# Patient Record
Sex: FEMALE | Race: WHITE | NOT HISPANIC OR LATINO | Employment: OTHER | URBAN - METROPOLITAN AREA
[De-identification: names, ages, dates, MRNs, and addresses within clinical notes are randomized per-mention and may not be internally consistent; named-entity substitution may affect disease eponyms.]

---

## 2021-10-07 ENCOUNTER — MEDICAL CORRESPONDENCE (OUTPATIENT)
Dept: HEALTH INFORMATION MANAGEMENT | Facility: CLINIC | Age: 76
End: 2021-10-07

## 2021-10-07 ENCOUNTER — TRANSCRIBE ORDERS (OUTPATIENT)
Dept: OTHER | Age: 76
End: 2021-10-07

## 2021-10-07 DIAGNOSIS — I27.24 CTEPH (CHRONIC THROMBOEMBOLIC PULMONARY HYPERTENSION) (H): Primary | ICD-10-CM

## 2021-11-23 ENCOUNTER — PRE VISIT (OUTPATIENT)
Dept: CARDIOLOGY | Facility: CLINIC | Age: 76
End: 2021-11-23
Payer: MEDICARE

## 2021-11-23 NOTE — TELEPHONE ENCOUNTER
New Pulmonary Hypertension Patient Form   Patient Name: Debi Espinoza Age: 76 year old, female, 1945 MRN: [id number]   Referring Provider:  Dr. Redmond Appt:  11/29/21       PH Medications:  Sildenafil 20 mg tid, Opsumit 10 mg daily Eliquis 5 mg and Tyvaso 0.6/ml qid      Patient History: Pt has a history of HP, CHF      Recent Testing:       Date Test Results    10/7/21 Echo RVSP:   LVEF:  75%         RV: Markedly dilated right ventricle. Reduced right ventricular systolic function. Pulmonary artery systolic pressure is measured at        3/26/21 6/MWT   Meters/lowest SaO2 O2:  89%     Max HR:  127             PFT FEV1/FVC:   FEV1:       FVC:   DLCO:      2/4/21 RHC RA:  12 PA: 84/19 Mean 45 PVR:  15.5     RV:  76 PAW:  2 EDP 14 COI:  2.77/1.61     Sleep Study       EKG      1/22/21 Chest CT  The study is positive for pulmonary embolism. Findings suggest both acute and chronic components. The thrombus in the pulmonary artery to the anterior basal segment of the left lower lobe is new.   2.  There is evidence for pulmonary hypertension   3.  Enlargement of right ventricle and right atrium relative to the left, which may represent a strain pattern   4.  There are underlying emphysematous changes, of moderate severity.          1/22/21 VQ Scan  The ventilation lung scan shows mild central radiotracer impaction. There is peripheral heterogeneity, but no segmental ventilatory abnormalities.     Moderate size perfusion defect left lower lobe, corresponding to the superior segment. This defect mismatches. There are additional mismatch segmental perfusion defects in the lateral right lung base, in the right upper lobe and in the left lung apex.        Liver US       Angiogram/ 1/25/21  Stress Test

## 2021-11-29 ENCOUNTER — OFFICE VISIT (OUTPATIENT)
Dept: CARDIOLOGY | Facility: CLINIC | Age: 76
End: 2021-11-29
Attending: INTERNAL MEDICINE
Payer: MEDICARE

## 2021-11-29 VITALS
DIASTOLIC BLOOD PRESSURE: 84 MMHG | OXYGEN SATURATION: 95 % | SYSTOLIC BLOOD PRESSURE: 132 MMHG | BODY MASS INDEX: 29.29 KG/M2 | WEIGHT: 149.2 LBS | HEART RATE: 85 BPM | HEIGHT: 60 IN

## 2021-11-29 DIAGNOSIS — I27.24 CTEPH (CHRONIC THROMBOEMBOLIC PULMONARY HYPERTENSION) (H): ICD-10-CM

## 2021-11-29 DIAGNOSIS — R06.09 DOE (DYSPNEA ON EXERTION): Primary | ICD-10-CM

## 2021-11-29 DIAGNOSIS — Z11.59 ENCOUNTER FOR SCREENING FOR OTHER VIRAL DISEASES: ICD-10-CM

## 2021-11-29 PROCEDURE — G0463 HOSPITAL OUTPT CLINIC VISIT: HCPCS

## 2021-11-29 PROCEDURE — 99204 OFFICE O/P NEW MOD 45 MIN: CPT | Mod: GC | Performed by: INTERNAL MEDICINE

## 2021-11-29 RX ORDER — CALCITRIOL 0.25 UG/1
0.25 CAPSULE, LIQUID FILLED ORAL
COMMUNITY
Start: 2021-06-10 | End: 2022-04-04

## 2021-11-29 RX ORDER — ACETAMINOPHEN 500 MG
1000 TABLET ORAL EVERY 6 HOURS PRN
Status: ON HOLD | COMMUNITY
End: 2022-06-25

## 2021-11-29 RX ORDER — FUROSEMIDE 40 MG
20 TABLET ORAL DAILY
COMMUNITY
Start: 2020-07-30 | End: 2022-04-27

## 2021-11-29 RX ORDER — LIDOCAINE 40 MG/G
CREAM TOPICAL
Status: CANCELLED | OUTPATIENT
Start: 2021-11-29

## 2021-11-29 ASSESSMENT — MIFFLIN-ST. JEOR: SCORE: 1083.27

## 2021-11-29 ASSESSMENT — PAIN SCALES - GENERAL: PAINLEVEL: NO PAIN (0)

## 2021-11-29 NOTE — NURSING NOTE
Pts plan of care per Lisette Knox MD:    We will get you scheduled for the following tests  Chest CT (at the Hospital)  Echocardiogram with Bubble Study   Pulmonary Function Test  Right heart catheterization    Orders placed and message sent to Mariya to assist with scheduling.  Dana Ojeda RN on 11/29/2021 at 2:10 PM    Debi Espinoza  [address] [facility] [address]  [phone] (home)     PCP:  Lisette Knox  Anticoagulation: apixaban (ELIQUIS)  Ambulation status: with walker    Reason for Visit:  Patient seen for pre-procedure education in preparation for: Right Heart Catheterization    Procedure Prep:  Hemogram results obtained: To be completed on day of cath lab procedure  Basic Metabolic Panel results obtained: To be completed on day of cath lab procedure  Pertinent cardiac test results: in Epic   COVID-19 test results obtained: [facility]    Patient Education    1. Your arrival time is TBD.  Location is [address] Waiting Room  2. Please plan on being at the hospital all day.  3. At any time, emergencies and/or urgent cases may come up which could delay the start of your procedure.    COVID Testing Instructions  *Mandatory COVID Testing:   ALL Patients will need to complete a COVID test no sooner than 4 days prior to their procedure (regardless of vaccination status).      To schedule COVID testing Please call [phone]    If you want to complete this at an outside facility please call them to find out if they will have the results within the appropriate time frame and their fax number.  You will need to provide us with that information so we can send the order.    The facility completing the test will need to fax the results to [phone]    If you are running into and issues please call us.     Pre-procedure instructions  Patient instructed to not Eat or Drink after  midnight.  Patient instructed to shower the evening before or the morning of the procedure.  Patient instructed to arrange for transportation home following procedure from a responsible family member or friend.     Pre-procedure medication instructions.  Continue medications as scheduled, with a small amount of water on the day of the procedure unless indicated. (NO Diabetic Medications or Blood Thinners)  Pt instructed not to consume Alcohol, Tobacco, Caffeine, or Carbonated beverages 12 hours prior to procedure.              Anticoagulation Medication Instructions   apixaban (ELIQUIS) - Hold 48 hours prior to procedure      Patient states understanding of procedure and agrees to proceed.    *PATIENTS RECORDS AVAILABLE IN [facility] UNLESS OTHERWISE INDICATED*      There is no problem list on file for this patient.      Current Outpatient Medications   Medication Sig Dispense Refill     apixaban ANTICOAGULANT (ELIQUIS) 5 MG tablet Take 5 mg by mouth       calcitRIOL (ROCALTROL) 0.25 MCG capsule Take 0.25 mcg by mouth       furosemide (LASIX) 40 MG tablet Take 40 mg by mouth       riociguat (ADEMPAS) 0.5 MG tablet Take 0.5 mg by mouth 3 times daily (with meals)        acetaminophen (TYLENOL) 500 MG tablet Take 1,000 mg by mouth         Allergies   Allergen Reactions     Lisinopril Cough       Dana Ojeda RN on 11/29/2021 at 2:10 PM

## 2021-11-29 NOTE — PATIENT INSTRUCTIONS
Medication Changes:  No medication changes at this time. Please continue current medication regiment.    Patient Instructions:  1. Continue staying active and eat a heart healthy diet.    2. Please keep current list of medications with you at all times.    3. Remember to weigh yourself daily after voiding and before you consume any food or beverages and log the numbers.  If you have gained 2 pounds overnight or 5 pounds in a week contact us immediately for medication adjustments or further instructions.    4. **Please call us immediately if you have any syncope (fainting or passing out), chest pain, edema (swelling or weight gain), or decline in your functional status (general decline in how you are feeling).ts o    Follow up Appointment Information:    We will get you scheduled for the following tests  Chest CT (at the Hospital)  Echocardiogram with Bubble Study   Pulmonary Function Test  Right heart catheterization    *Mandatory COVID Testing:   Pt will need to complete a COVID test no sooner than 4 days prior to their procedure.      To schedule COVID testing Please call [phone]    If you want to complete this at an outside facility please call them to find out if they will have the results within the appropriate time frame and their fax number.  You will need to provide us with that information so we can send them the order.    They will need to fax the results to [phone]    If you are running into and issues please call us.      Check-In  Time Check-In Location Estimated Length Procedure   Name        [facility]  waiting room 60-90 minutes Right Heart Catheterization**     Procedure Preparations & Instructions     This is an invasive procedure that DOES require preparation:    - Nothing to eat for 6 hours   - You may have clear liquids up until the time of your procedure  - You can take your morning medications (except diabetic and blood thinners) with sips of water  - Please arrange for a ride to drop you  off and pick you up in the instance you are unable to drive home, however you should be able to function as you normally would after the procedure     *For Patients on anticoagulants: ? Hold your Eliquis 48 hours prior to procedure and restart in the evening after your procedure.       We are located on the third floor of [facility] on the [facility].  Our address is     [address] [place] [address] [place] [address] [place] [address]    Thank you for allowing us to be a part of your care here at the [facility]    If you have questions or concerns please contact us at:    Yane Cardenas, RN, BSN, PHN  Mariya Rinaldi (Scheduling,Prior Auth)  Nurse Coordinator     Clinic   Pulmonary Hypertension   Pulmonary Hypertension  [facility]  (Phone)[phone]   (Phone) [phone]        (Fax) [phone]    ** Please note that you will NOT receive a reminder call regarding your scheduled testing, reminder calls are for provider appointments only.  If you are scheduled for testing within the [employer] system you may receive a call regarding pre-registration for billing purposes only.**     Support Group:  Pulmonary Hypertension Association  Https://www.phassociation.org/  **Look at the Events Tab** They even have Support Groups that you can call into    [facility] PH Support Group  Second Saturday of the Month from 1-3 PM   Location: [address] [place] [address]  Leader: Rupa Worthy and Dominique Camacho  Phone: [phone] or [phone]  Email: [email or web address]

## 2021-11-29 NOTE — PROGRESS NOTES
[facility]  Pulmonary Hypertension Clinic    New Patient Visit  Service Date: 11/29/21    Dear Dr. Jerome,     Debi Espinoza is a 76 year old female who presents to us for evaluation of her pulmonary hypertension.    Although you know her well, I will summarize the pertinent points of her history briefly for our records.  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      With regards to her pulmonary hypertension:  In May 2019 she reports that she presented to her PCPs office with fatigue cough, and SOB for a few days.  CTPE showed PE with mild to moderate clot burden (?bilateral). She was also found to have DVT bilaterally. TTE (no images) showed dilated RV with reduced RV function and RVSP of 84mmHg suggestive of severe pulmonary hypertension with mild to moderate TR.   I did find notes that she had a work related injury in June 2018 and was wearing and unna boot into early 2019, ?provoked.  She did see hematology at [place] [facility] in July 2019 who felt her DVT and PE were idiopathic and she wouldn't benefit from a hypercoag workup, recommended anticoagulation for 2 years. Debi says she was on coumadin for about a year.    She saw cardiologist Dr. White in Oct 2019 with SOB, and was ordered a left and right heart cath, which she did not get done.  She also saw Pulm Nov 2019 who recommended RHC to rule out CTEPH. She then presented again with SOB in July 2020, and the tests were done [facility] which showed no significant CAD, LVEDP 17. RHC mean RA 12 showed PA 79/20 mean 43, and WP documented as 26. CO/CI 3.7/2, PVR 4.6.  She had PFTs done which were normal with mildly decreased DLCO.  She only walked 65m on 6MWT with lowest O2 sat was 91%.  She had overnight pulse ox in which she had multiple desats to 79%. Pulmonary hypertension workup  was initiated but patient was refusing additional tests and medications.    She eventually presented back to Dr. White earlier this year in January with worsening SOB. VQ scan was done which showed multiple mismatches, and repeat CTPE was done which suggested acute on chronic PE bilaterally with new thrombus in the left lower lobe. CT also showed moderate emphysema. For PE she was put on Eliquis, and for PH she was started on sildenafil 20mg tid.  Her lasix dose was also adjusted and she was started on home O2.  Dr. Solorio tried to get her on Adempas and on followup felt better, she had stopped using O2. Repeat 6mwt was improved at 156m with no desaturations. Repeat TTE in March showed per report markedly dilated RV with reduced function, PA pressure 82mmHg, severe TR, normal LVEF, markedly dilated RA, dilated IVC and bowing of the IAS from right to left. She was eventually also started on macitentan.  She initially also refused further eval for her CTEPH but now presents today for it.    She presents today with her daughter in law.  She is from [facility].  She reports that she is currently on Adempas 0.5 mg 3 times daily, off Opsimut, and Tyvaso was not covered.  She remains on Eliquis.  She has oxygen at home but she has not used it since February.  She reports her shortness of breath is overall stable.  Walking from the entrance of the building to the clinic she got short of breath.  She says she can walk about a block but feels significant shortness of breath with the stairs.  Her laundry is in the basement and she is able to do that one step at a time.  She is about functional class III.  She has a walker because when she goes out she feels insecure due to balance issues but she does not need the walker in her house she has been using it for over a year.  She denies any chest pain, dizziness, lightheadedness, presyncope or syncope.  No orthopnea or PND.  She reports chronic lower extremity  swelling right greater than left which is controlled with her diuretic dosing of 40 mg Lasix daily.  She has occasional palpitations with exertion but no associated symptoms.  No recent ED visits or hospitalizations.  No history of autoimmune disease.  No other history of blood clots or miscarriages.  No family history that she knows of a pulmonary hypertension.    Other pertinent history:  Family-her sister had breast cancer, her mother  when she was old, her dad  of lung cancer.  She has 1 son who is adopted.  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  She lives at home she is .  Her son and daughter-in-law live close by.    PAST MEDICAL HISTORY:  No past medical history on file.    CURRENT MEDICATIONS:  Current Outpatient Medications   Medication Sig     apixaban ANTICOAGULANT (ELIQUIS) 5 MG tablet Take 5 mg by mouth     calcitRIOL (ROCALTROL) 0.25 MCG capsule Take 0.25 mcg by mouth     furosemide (LASIX) 40 MG tablet Take 40 mg by mouth     riociguat (ADEMPAS) 0.5 MG tablet Take 0.5 mg by mouth 3 times daily (with meals)      acetaminophen (TYLENOL) 500 MG tablet Take 1,000 mg by mouth     No current facility-administered medications for this visit.     ROS:   10 point ROS negative except as discussed in above HPI.    EXAM:  /84 (BP Location: Right arm, Patient Position: Sitting, Cuff Size: Adult Regular)   Pulse 85   Ht 1.524 m (5')   Wt 67.7 kg (149 lb 3.2 oz)   SpO2 95%   BMI 29.14 kg/m      General: appears comfortable, alert and articulate sitting up independently  Head: normocephalic, atraumatic  Eyes: anicteric sclera, EOMI  Neck: no adenopathy  Orophyarynx: moist mucosa, no lesions, dentition intact  Heart: regular, normal S1/S2, no murmur, gallop, rub, estimated JVP 12cn  Lungs: clear to auscultation bilaterally, no rales or wheezing  Abdomen: soft, non-tender, bowel sounds present, no  hepatosplenomegaly  Extremities: no clubbing, cyanosis or edema  Neurological: normal speech and affect, no gross motor deficits    Labs:  CBC RESULTS: No results for input(s): WBC, RBC, HGB, HCT, MCV, MCH, MCHC, RDW, PLT in the last 04762 hours.    No results for input(s): NA, POTASSIUM, CHLORIDE, CO2, ANIONGAP, GLC, BUN, CR, CHELE in the last 18011 hours.    Liver Function Studies - No results for input(s): PROTTOTAL, ALBUMIN, BILITOTAL, ALKPHOS, AST, ALT, BILIDIRECT in the last 69731 hours.    No results found for: NTBNPI  No results found for: NTBNP    Records below are from Care Everywhere    Echocardiogram:  TTE 3/26/21  Left Ventricle: Hyperdynamic left ventricular systolic function. The ejection fraction is visually estimated to be 75 %.     Left Atrium: Normal left atrial size.     IAS: The atrial septum bows from right-to-left.     Right Ventricle: Markedly dilated right ventricle. Reduced right ventricular systolic function. Pulmonary artery systolic pressure is measured at 82 mmHg.     Right Atrium: Markedly dilated right atrium.     Tricuspid Valve: Wide open tricuspid regurgitation.       Comparison Date: 01/23/2021   Comparison Study: Transthoracic Echocardiogram   Pulmonary artery pressure has decreased from 101 mmHg to 82 mmHg     TTE 6/26/20  Qualitative ejection fraction is >70% (hyperdynamic).The left ventricle is small.There is septal flattening compatible with pulmonary hypertension.Wall motion is otherwise normal.   The right ventricular systolic function is reduced.The right ventricle is severely enlarged.   No significant aortic insufficiency.There is no aortic stenosis.   There is trace mitral regurgitation.No mitral valve stenosis.   Moderate tricuspid regurgitation.   Right ventricular systolic pressure estimate is  mmHg.   Inferior vena cava size enlarged and collapsibility < 50% indicating elevated right atrial pressure (15 mm Hg).   There is no pericardial effusion.     TTE  5/31/19  Left Ventricle:   Normal left ventricular systolic function. The ejection fraction is visually estimated to be 65 %.     Right Ventricle:   Reduced right ventricular systolic function. Pulmonary artery systolic pressure is measured at 84 mmHg.     Pulmonary Artery:   Severe pulmonary artery hypertension.       Comparison Study: No previous echo was available for comparison     RHC:  2/4/21  RA: 12  RV: 76/-, 14  PA: 84/19/45  PCWP: 2  Boris CO/CI: 2.8/1.6  PVR: 15.5    PFTs 11/28/19  FVC is 2.54, which is 111% of predicted.  FEV1 is 1.86, which is 110% of predicted.  FEV1/FVC ratio is 73.     Diffusion capacity is 12.9 mL/mmHg per minute, which is 63% of predicted.     CONCLUSION:     1.  Normal spirometry.   2.  Mildly reduced DLCO corrected for hemoglobin.   3.  No previous studies to compare.      6MWT:   10/7/21: 156m, lowest O2 saturation 89%  3/2021: 156m    CTA Chest 1/22/21  1.  The study is positive for pulmonary embolism. Findings suggest both acute and chronic components. The thrombus in the pulmonary artery to the anterior basal segment of the left lower lobe is new.   2.  There is evidence for pulmonary hypertension   3.  Enlargement of right ventricle and right atrium relative to the left, which may represent a strain pattern   4.  There are underlying emphysematous changes, of moderate severity.     CTA Chest 5/30/19  1. Positive for acute pulmonary embolus.   2. Change in the right hilus suggest a may be an element of cor pulmonale related to the PE although clot burden is felt to be moderate   3. Changes superior segment left lower lobe might be related to pulmonary embolus versus infection.   4. Possible obstruction of the right kidney suggest ultrasound for evaluation.      CT Chest without contrast 11/7/19  1. Mildly limited exam due to respiratory motion.   2. Mild scattered linear scarring throughout the bilateral lungs.   3. No airspace consolidation. Previously seen consolidation  within the superior segment of the left lower lobe has resolved.   4. No mediastinal or hilar lymphadenopathy.      V/Q scan 1/22/21  1.   Multiple perfusion defects which mismatch with the ventilation scan. Findings represents a high probability for pulmonary embolism. The pattern suggests the possibility for acute pulmonary emboli.      [facility] 7/4/2020  Coronary Angiography: The coronary circulation is right dominant.     Left Heart Cath: Left ventricular function was not assessed. Ejection fraction was not calculated. The left ventricular end diastolic pressure   was 21 mmHg.     Diagnostic Conclusions:   - There is no evidence of angiographic coronary artery disease      Assessment and Plan:     Debi Espinoza is a 76 year old female with medical history including HTN, renal stones complicated by infection, knee osteoarthritis, history of bilateral PE x2 in 2019 and 2021, DVT 2019, on chronic anticoagulation with Eliquis, and severe pulmonary hypertension.      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  She is maintained on lifelong anticoagulation now since her second PE earlier this year and is on Eliquis.  For pulmonary hypertension therapy she is currently maintained on Adempas 0.5 mg 3 times daily.  We will begin our evaluation here with repeat imaging including a complete echo with bubble study, and a CT PE dual-energy to assess the status of her clots.  We will also get PFTs, especially if we are thinking later on about a surgical treatment option such as endarterectomy.  We will repeat a right heart catheterization here as well since it has been sometime since her last one and she has been maintained on some therapy.  After the results of all of this testing comes back we will have further discussion about further therapeutic options including interventions for disease based on thrombus location such as balloon pulmonary angioplasty or a pulmonary  endarterectomy.  If she is still continuing to refuse those we can also talk further about additional medical therapy.    Plan:  -No changes to medication regimen at this time.  -Once we get the following tests completed we will schedule her for follow-up.  -Tests ordered: CT chest dual-energy, complete TTE with bubble study, PFTs, right heart catheterization    Patient was seen and examined with Dr. Knox.    Agueda Mohr MD.  Adv HF Fellow    I examined the patient and agree with the assessment and plan of Dr. Mohr      She completed her testing for CTEPH.     Dual-energy CT pulmonary angiogram confirmed chronic thromboembolic disease with subsegmental disease in the left lower lobe as well as right lower lobe.    Invasive pulmonary angiogram is showing bilateral proximal disease along with segmental and subsegmental disease.  She clearly has surgical disease.    Her echocardiogram shows moderate to severe right ventricular dilatation with moderate to severely dilated right ventricle.    Her hemodynamic assessment revealed a RA pressure of 6, RV of 77/10, PA of 77/30 with a mean of 44, pulmonary H pressure of 10, measured Boris cardiac output of 3.3 with an index of 2, thermodilution cardiac output of 3.5 with an index of 2.12, PA saturation of 48%, and a calculated PVR of 826 dynes per second per centimeter to the 4-5.    Her pulmonary function test was normal.      Her serological work-up for associated cause of pulmonary arterial hypertension are within normal limits.    Her renal function is marginal with a GFR of 40% and a creatinine of 1.3.    On her most recent 6-minute walk test in [place] she walked only 159 m.    In summary she clearly has proximal as well as segmental and subsegmental chronic thromboembolic pulmonary hypertension.  We discussed her case in our multidisciplinary meeting.  Surgery would be the best option for better outcomes long-term.  However this would be a high risk surgery given  her age, frailty, and kidney dysfunction.  Her mortality risk during surgery would be 10% with a 10% risk of dialysis.  If she is willing, will schedule her for pulmonary thromboendarterectomy.  She would need a coronary angiogram if she decides to go for pulmonary thromboendarterectomy.      If not we will plan for balloon pulmonary angioplasty.  Regardless, in the interim we will optimize her medical therapy.  She is only on a very low-dose of RIOCIGUAT.  I recommend her to increase it to 1 mg 3 times daily and increase it every 2 weeks as tolerated by 25 mg in treatment to maximum dose of 2.5 mg 3 times a day.  We will also start her on macitentan.  She has tolerated macitentan in the recent past.  She will continue on anticoagulation with Eliquis.        Total time today was 80 minutes reviewing notes, imaging, labs, patient visit, orders and documentation         Lisette Knox MD   [facility]  Heart Failure, Transplant, and Mechanical Circulatory Support Cardiology   Cardiovascular Division  [facility]   [phone]

## 2021-11-29 NOTE — NURSING NOTE
Cardiac Testing: Patient given instructions regarding  echocardiogram . Discussed purpose, preparation, procedure and when to expect results reported back to the patient. Patient demonstrated understanding of this information and agreed to call with further questions or concerns.  Return Appointment: Patient given instructions regarding scheduling next clinic visit. Patient demonstrated understanding of this information and agreed to call with further questions or concerns.  Patient stated she understood all health information given and agreed to call with further questions or concerns.  Medication Changes:  No medication changes at this time. Please continue current medication regiment.    Patient Instructions:  1. Continue staying active and eat a heart healthy diet.    2. Please keep current list of medications with you at all times.    3. Remember to weigh yourself daily after voiding and before you consume any food or beverages and log the numbers.  If you have gained 2 pounds overnight or 5 pounds in a week contact us immediately for medication adjustments or further instructions.    4. **Please call us immediately if you have any syncope (fainting or passing out), chest pain, edema (swelling or weight gain), or decline in your functional status (general decline in how you are feeling).      Follow up Appointment Information:  We will decide on follow up testing once we see the results of your labs and CT Today

## 2021-11-29 NOTE — LETTER
11/29/2021      RE: Debi Espinoza  [address] [address]       Dear Colleague,    Thank you for the opportunity to participate in the care of your patient, Debi Espinoza, at the [facility] [place] at [facility]. Please see a copy of my visit note below.    [facility]  Pulmonary Hypertension Clinic    New Patient Visit  Service Date: 11/29/21    Dear Dr. Jerome,     Debi Espinoza is a 76 year old female who presents to us for evaluation of her pulmonary hypertension.    Although you know her well, I will summarize the pertinent points of her history briefly for our records.  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      With regards to her pulmonary hypertension:  In May 2019 she reports that she presented to her PCPs office with fatigue cough, and SOB for a few days.  CTPE showed PE with mild to moderate clot burden (?bilateral). She was also found to have DVT bilaterally. TTE (no images) showed dilated RV with reduced RV function and RVSP of 84mmHg suggestive of severe pulmonary hypertension with mild to moderate TR.   I did find notes that she had a work related injury in June 2018 and was wearing and unna boot into early 2019, ?provoked.  She did see hematology at [facility] in July 2019 who felt her DVT and PE were idiopathic and she wouldn't benefit from a hypercoag workup, recommended anticoagulation for 2 years. Debi says she was on coumadin for about a year.    She saw cardiologist Dr. White in Oct 2019 with SOB, and was ordered a left and right heart cath, which she did not get done.  She also saw Pulm Nov 2019 who recommended RHC to rule out CTEPH. She then presented again with SOB in July 2020, and the tests were done [facility]  which showed no significant CAD, LVEDP 17. RHC mean RA 12 showed PA 79/20 mean 43, and WP documented as 26. CO/CI 3.7/2, PVR 4.6.  She had PFTs done which were normal with mildly decreased DLCO.  She only walked 65m on 6MWT with lowest O2 sat was 91%.  She had overnight pulse ox in which she had multiple desats to 79%. Pulmonary hypertension workup was initiated but patient was refusing additional tests and medications.    She eventually presented back to Dr. White earlier this year in January with worsening SOB. VQ scan was done which showed multiple mismatches, and repeat CTPE was done which suggested acute on chronic PE bilaterally with new thrombus in the left lower lobe. CT also showed moderate emphysema. For PE she was put on Eliquis, and for PH she was started on sildenafil 20mg tid.  Her lasix dose was also adjusted and she was started on home O2.  Dr. Solorio tried to get her on Adempas and on followup felt better, she had stopped using O2. Repeat 6mwt was improved at 156m with no desaturations. Repeat TTE in March showed per report markedly dilated RV with reduced function, PA pressure 82mmHg, severe TR, normal LVEF, markedly dilated RA, dilated IVC and bowing of the IAS from right to left. She was eventually also started on macitentan.  She initially also refused further eval for her CTEPH but now presents today for it.    She presents today with her daughter in law.  She is from [facility].  She reports that she is currently on Adempas 0.5 mg 3 times daily, off Opsimut, and Tyvaso was not covered.  She remains on Eliquis.  She has oxygen at home but she has not used it since February.  She reports her shortness of breath is overall stable.  Walking from the entrance of the building to the clinic she got short of breath.  She says she can walk about a block but feels significant shortness of breath with the stairs.  Her laundry is in the basement and she is able to do that one step at a  time.  She is about functional class III.  She has a walker because when she goes out she feels insecure due to balance issues but she does not need the walker in her house she has been using it for over a year.  She denies any chest pain, dizziness, lightheadedness, presyncope or syncope.  No orthopnea or PND.  She reports chronic lower extremity swelling right greater than left which is controlled with her diuretic dosing of 40 mg Lasix daily.  She has occasional palpitations with exertion but no associated symptoms.  No recent ED visits or hospitalizations.  No history of autoimmune disease.  No other history of blood clots or miscarriages.  No family history that she knows of a pulmonary hypertension.    Other pertinent history:  Family-her sister had breast cancer, her mother  when she was old, her dad  of lung cancer.  She has 1 son who is adopted.  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  She lives at home she is .  Her son and daughter-in-law live close by.    PAST MEDICAL HISTORY:  No past medical history on file.    CURRENT MEDICATIONS:  Current Outpatient Medications   Medication Sig     apixaban ANTICOAGULANT (ELIQUIS) 5 MG tablet Take 5 mg by mouth     calcitRIOL (ROCALTROL) 0.25 MCG capsule Take 0.25 mcg by mouth     furosemide (LASIX) 40 MG tablet Take 40 mg by mouth     riociguat (ADEMPAS) 0.5 MG tablet Take 0.5 mg by mouth 3 times daily (with meals)      acetaminophen (TYLENOL) 500 MG tablet Take 1,000 mg by mouth     No current facility-administered medications for this visit.     ROS:   10 point ROS negative except as discussed in above HPI.    EXAM:  /84 (BP Location: Right arm, Patient Position: Sitting, Cuff Size: Adult Regular)   Pulse 85   Ht 1.524 m (5')   Wt 67.7 kg (149 lb 3.2 oz)   SpO2 95%   BMI 29.14 kg/m      General: appears comfortable, alert and articulate sitting up  independently  Head: normocephalic, atraumatic  Eyes: anicteric sclera, EOMI  Neck: no adenopathy  Orophyarynx: moist mucosa, no lesions, dentition intact  Heart: regular, normal S1/S2, no murmur, gallop, rub, estimated JVP 12cn  Lungs: clear to auscultation bilaterally, no rales or wheezing  Abdomen: soft, non-tender, bowel sounds present, no hepatosplenomegaly  Extremities: no clubbing, cyanosis or edema  Neurological: normal speech and affect, no gross motor deficits    Labs:  CBC RESULTS: No results for input(s): WBC, RBC, HGB, HCT, MCV, MCH, MCHC, RDW, PLT in the last 10244 hours.    No results for input(s): NA, POTASSIUM, CHLORIDE, CO2, ANIONGAP, GLC, BUN, CR, CHELE in the last 40762 hours.    Liver Function Studies - No results for input(s): PROTTOTAL, ALBUMIN, BILITOTAL, ALKPHOS, AST, ALT, BILIDIRECT in the last 09299 hours.    No results found for: NTBNPI  No results found for: NTBNP    Records below are from Care Everywhere    Echocardiogram:  TTE 3/26/21  Left Ventricle: Hyperdynamic left ventricular systolic function. The ejection fraction is visually estimated to be 75 %.     Left Atrium: Normal left atrial size.     IAS: The atrial septum bows from right-to-left.     Right Ventricle: Markedly dilated right ventricle. Reduced right ventricular systolic function. Pulmonary artery systolic pressure is measured at 82 mmHg.     Right Atrium: Markedly dilated right atrium.     Tricuspid Valve: Wide open tricuspid regurgitation.       Comparison Date: 01/23/2021   Comparison Study: Transthoracic Echocardiogram   Pulmonary artery pressure has decreased from 101 mmHg to 82 mmHg     TTE 6/26/20  Qualitative ejection fraction is >70% (hyperdynamic).The left ventricle is small.There is septal flattening compatible with pulmonary hypertension.Wall motion is otherwise normal.   The right ventricular systolic function is reduced.The right ventricle is severely enlarged.   No significant aortic insufficiency.There is no  aortic stenosis.   There is trace mitral regurgitation.No mitral valve stenosis.   Moderate tricuspid regurgitation.   Right ventricular systolic pressure estimate is  mmHg.   Inferior vena cava size enlarged and collapsibility < 50% indicating elevated right atrial pressure (15 mm Hg).   There is no pericardial effusion.     TTE 5/31/19  Left Ventricle:   Normal left ventricular systolic function. The ejection fraction is visually estimated to be 65 %.     Right Ventricle:   Reduced right ventricular systolic function. Pulmonary artery systolic pressure is measured at 84 mmHg.     Pulmonary Artery:   Severe pulmonary artery hypertension.       Comparison Study: No previous echo was available for comparison     RHC:  2/4/21  RA: 12  RV: 76/-, 14  PA: 84/19/45  PCWP: 2  Boris CO/CI: 2.8/1.6  PVR: 15.5    PFTs 11/28/19  FVC is 2.54, which is 111% of predicted.  FEV1 is 1.86, which is 110% of predicted.  FEV1/FVC ratio is 73.     Diffusion capacity is 12.9 mL/mmHg per minute, which is 63% of predicted.     CONCLUSION:     1.  Normal spirometry.   2.  Mildly reduced DLCO corrected for hemoglobin.   3.  No previous studies to compare.      6MWT:   10/7/21: 156m, lowest O2 saturation 89%  3/2021: 156m    CTA Chest 1/22/21  1.  The study is positive for pulmonary embolism. Findings suggest both acute and chronic components. The thrombus in the pulmonary artery to the anterior basal segment of the left lower lobe is new.   2.  There is evidence for pulmonary hypertension   3.  Enlargement of right ventricle and right atrium relative to the left, which may represent a strain pattern   4.  There are underlying emphysematous changes, of moderate severity.     CTA Chest 5/30/19  1. Positive for acute pulmonary embolus.   2. Change in the right hilus suggest a may be an element of cor pulmonale related to the PE although clot burden is felt to be moderate   3. Changes superior segment left lower lobe might be related to  pulmonary embolus versus infection.   4. Possible obstruction of the right kidney suggest ultrasound for evaluation.      CT Chest without contrast 11/7/19  1. Mildly limited exam due to respiratory motion.   2. Mild scattered linear scarring throughout the bilateral lungs.   3. No airspace consolidation. Previously seen consolidation within the superior segment of the left lower lobe has resolved.   4. No mediastinal or hilar lymphadenopathy.      V/Q scan 1/22/21  1.   Multiple perfusion defects which mismatch with the ventilation scan. Findings represents a high probability for pulmonary embolism. The pattern suggests the possibility for acute pulmonary emboli.      [facility] 7/4/2020  Coronary Angiography: The coronary circulation is right dominant.     Left Heart Cath: Left ventricular function was not assessed. Ejection fraction was not calculated. The left ventricular end diastolic pressure   was 21 mmHg.     Diagnostic Conclusions:   - There is no evidence of angiographic coronary artery disease      Assessment and Plan:     Debi Espinoza is a 76 year old female with medical history including HTN, renal stones complicated by infection, knee osteoarthritis, history of bilateral PE x2 in 2019 and 2021, DVT 2019, on chronic anticoagulation with Eliquis, and severe pulmonary hypertension.      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  She is maintained on lifelong anticoagulation now since her second PE earlier this year and is on Eliquis.  For pulmonary hypertension therapy she is currently maintained on Adempas 0.5 mg 3 times daily.  We will begin our evaluation here with repeat imaging including a complete echo with bubble study, and a CT PE dual-energy to assess the status of her clots.  We will also get PFTs, especially if we are thinking later on about a surgical treatment option such as endarterectomy.  We will repeat a right heart catheterization here as  well since it has been sometime since her last one and she has been maintained on some therapy.  After the results of all of this testing comes back we will have further discussion about further therapeutic options including interventions for disease based on thrombus location such as balloon pulmonary angioplasty or a pulmonary endarterectomy.  If she is still continuing to refuse those we can also talk further about additional medical therapy.    Plan:  -No changes to medication regimen at this time.  -Once we get the following tests completed we will schedule her for follow-up.  -Tests ordered: CT chest dual-energy, complete TTE with bubble study, PFTs, right heart catheterization    Patient was seen and examined with Dr. Knox.    Agueda Mohr MD.  Adv HF Fellow        Please do not hesitate to contact me if you have any questions/concerns.     Sincerely,     Lisette Knox MD

## 2021-11-29 NOTE — NURSING NOTE
Chief Complaint   Patient presents with     New Patient     New PH referral from Dr. Redmond; R06.09 Dyspnea on Exertion   Vitals were taken and medications reconciled.    Amari Arroyo, EMT  12:44 PM

## 2021-12-01 ENCOUNTER — TELEPHONE (OUTPATIENT)
Dept: CARDIOLOGY | Facility: CLINIC | Age: 76
End: 2021-12-01
Payer: MEDICARE

## 2021-12-01 NOTE — TELEPHONE ENCOUNTER
*Mandatory COVID Testing:   Pt will need to complete a COVID test no sooner than 4 days prior to their procedure.      To schedule COVID testing Please call [phone]    If you want to complete this at an outside facility please call them to find out if they will have the results within the appropriate time frame and their fax number.  You will need to provide us with that information so we can send them the order.    They will need to fax the results to [phone]    If you are running into and issues please call us.       Check-In  Time Check-In Location Estimated Length Procedure   Name       12-8-21  10:15 am  [facility]  waiting room 60-90 minutes Right Heart Catheterization**     Procedure Preparations & Instructions     This is an invasive procedure that DOES require preparation:    - Nothing to eat for 6 hours   - You may have clear liquids up until the time of your procedure  - You can take your morning medications (except diabetic and blood thinners) with sips of water  - Please arrange for a ride to drop you off and pick you up in the instance you are unable to drive home, however you should be able to function as you normally would after the procedure      ?      *For Patients on anticoagulants: ? Hold Eliquis 48 hours before procedure.

## 2021-12-02 NOTE — TELEPHONE ENCOUNTER
Spoke with pt regarding prep for R heart catheterization, PFT and CT Chest.  Agreed with plan. Will hold Eliquis for 48 hours.

## 2021-12-07 ENCOUNTER — TELEPHONE (OUTPATIENT)
Dept: CARDIOLOGY | Facility: CLINIC | Age: 76
End: 2021-12-07
Payer: MEDICARE

## 2021-12-08 ENCOUNTER — APPOINTMENT (OUTPATIENT)
Dept: LAB | Facility: CLINIC | Age: 76
End: 2021-12-08
Attending: INTERNAL MEDICINE
Payer: MEDICARE

## 2021-12-08 ENCOUNTER — HOSPITAL ENCOUNTER (OUTPATIENT)
Facility: CLINIC | Age: 76
Discharge: HOME | End: 2021-12-08
Attending: INTERNAL MEDICINE | Admitting: INTERNAL MEDICINE
Payer: MEDICARE

## 2021-12-08 ENCOUNTER — APPOINTMENT (OUTPATIENT)
Dept: CARDIOLOGY | Facility: CLINIC | Age: 76
End: 2021-12-08
Attending: INTERNAL MEDICINE
Payer: MEDICARE

## 2021-12-08 ENCOUNTER — APPOINTMENT (OUTPATIENT)
Dept: MEDSURG UNIT | Facility: CLINIC | Age: 76
End: 2021-12-08
Attending: INTERNAL MEDICINE
Payer: MEDICARE

## 2021-12-08 ENCOUNTER — HOSPITAL ENCOUNTER (OUTPATIENT)
Dept: CT IMAGING | Facility: CLINIC | Age: 76
End: 2021-12-08
Attending: INTERNAL MEDICINE
Payer: MEDICARE

## 2021-12-08 VITALS
BODY MASS INDEX: 29.25 KG/M2 | HEIGHT: 60 IN | SYSTOLIC BLOOD PRESSURE: 156 MMHG | DIASTOLIC BLOOD PRESSURE: 91 MMHG | RESPIRATION RATE: 18 BRPM | TEMPERATURE: 97.6 F | HEART RATE: 81 BPM | OXYGEN SATURATION: 99 % | WEIGHT: 149 LBS

## 2021-12-08 DIAGNOSIS — R06.09 DOE (DYSPNEA ON EXERTION): ICD-10-CM

## 2021-12-08 DIAGNOSIS — I27.24 CTEPH (CHRONIC THROMBOEMBOLIC PULMONARY HYPERTENSION) (H): ICD-10-CM

## 2021-12-08 DIAGNOSIS — Z11.59 ENCOUNTER FOR SCREENING FOR OTHER VIRAL DISEASES: ICD-10-CM

## 2021-12-08 DIAGNOSIS — I50.9 HEART FAILURE (H): ICD-10-CM

## 2021-12-08 LAB
ALBUMIN SERPL-MCNC: 3.3 G/DL (ref 3.4–5)
ALP SERPL-CCNC: 87 U/L (ref 40–150)
ALT SERPL W P-5'-P-CCNC: 19 U/L (ref 0–50)
ANION GAP SERPL CALCULATED.3IONS-SCNC: 7 MMOL/L (ref 3–14)
AST SERPL W P-5'-P-CCNC: 22 U/L (ref 0–45)
BILIRUB SERPL-MCNC: 0.9 MG/DL (ref 0.2–1.3)
BUN SERPL-MCNC: 27 MG/DL (ref 7–30)
CALCIUM SERPL-MCNC: 9.4 MG/DL (ref 8.5–10.1)
CHLORIDE BLD-SCNC: 108 MMOL/L (ref 94–109)
CHOLEST SERPL-MCNC: 113 MG/DL
CO2 SERPL-SCNC: 22 MMOL/L (ref 20–32)
CREAT SERPL-MCNC: 1.29 MG/DL (ref 0.52–1.04)
CRP SERPL-MCNC: 7.1 MG/L (ref 0–8)
DEPRECATED CALCIDIOL+CALCIFEROL SERPL-MC: 28 UG/L (ref 20–75)
DLCOUNC-%PRED-PRE: 80 %
DLCOUNC-PRE: 13.27 ML/MIN/MMHG
DLCOUNC-PRED: 16.5 ML/MIN/MMHG
ERV-%PRED-PRE: 162 %
ERV-PRE: 0.57 L
ERV-PRED: 0.35 L
ERYTHROCYTE [DISTWIDTH] IN BLOOD BY AUTOMATED COUNT: 17.1 % (ref 10–15)
EXPTIME-PRE: 6.47 SEC
FASTING STATUS PATIENT QL REPORTED: YES
FEF2575-%PRED-PRE: 82 %
FEF2575-PRE: 1.23 L/SEC
FEF2575-PRED: 1.5 L/SEC
FEFMAX-%PRED-PRE: 84 %
FEFMAX-PRE: 3.8 L/SEC
FEFMAX-PRED: 4.47 L/SEC
FERRITIN SERPL-MCNC: 15 NG/ML (ref 8–252)
FEV1-%PRED-PRE: 88 %
FEV1-PRE: 1.55 L
FEV1FEV6-PRE: 76 %
FEV1FEV6-PRED: 78 %
FEV1FVC-PRE: 76 %
FEV1FVC-PRED: 78 %
FEV1SVC-PRE: 65 %
FEV1SVC-PRED: 73 %
FIFMAX-PRE: 2.95 L/SEC
FRCPLETH-%PRED-PRE: 108 %
FRCPLETH-PRE: 2.71 L
FRCPLETH-PRED: 2.49 L
FVC-%PRED-PRE: 89 %
FVC-PRE: 2.04 L
FVC-PRED: 2.27 L
GFR SERPL CREATININE-BSD FRML MDRD: 40 ML/MIN/1.73M2
GLUCOSE BLD-MCNC: 103 MG/DL (ref 70–99)
HBA1C MFR BLD: 5.8 % (ref 0–5.6)
HBV SURFACE AB SERPL IA-ACNC: 0.52 M[IU]/ML
HBV SURFACE AG SERPL QL IA: NONREACTIVE
HCG SERPL QL: NEGATIVE
HCT VFR BLD AUTO: 41.2 % (ref 35–47)
HCV AB SERPL QL IA: NONREACTIVE
HDLC SERPL-MCNC: 35 MG/DL
HGB BLD-MCNC: 12.5 G/DL (ref 11.7–15.7)
HGB BLD-MCNC: 13 G/DL (ref 11.7–15.7)
HIV 1+2 AB+HIV1 P24 AG SERPL QL IA: NONREACTIVE
IC-%PRED-PRE: 87 %
IC-PRE: 1.8 L
IC-PRED: 2.06 L
INR PPP: 1.2 (ref 0.85–1.15)
IRON SATN MFR SERPL: 5 % (ref 15–46)
IRON SERPL-MCNC: 28 UG/DL (ref 35–180)
LDLC SERPL CALC-MCNC: 59 MG/DL
LVEF ECHO: NORMAL
MAGNESIUM SERPL-MCNC: 2.2 MG/DL (ref 1.6–2.3)
MCH RBC QN AUTO: 26.6 PG (ref 26.5–33)
MCHC RBC AUTO-ENTMCNC: 31.6 G/DL (ref 31.5–36.5)
MCV RBC AUTO: 84 FL (ref 78–100)
NONHDLC SERPL-MCNC: 78 MG/DL
NT-PROBNP SERPL-MCNC: 6611 PG/ML (ref 0–1800)
OXYHGB MFR BLDV: 48 % (ref 92–100)
PLATELET # BLD AUTO: 241 10E3/UL (ref 150–450)
POTASSIUM BLD-SCNC: 3.7 MMOL/L (ref 3.4–5.3)
PROT SERPL-MCNC: 7.5 G/DL (ref 6.8–8.8)
RBC # BLD AUTO: 4.88 10E6/UL (ref 3.8–5.2)
RHEUMATOID FACT SER NEPH-ACNC: <7 IU/ML
RVPLETH-%PRED-PRE: 107 %
RVPLETH-PRE: 2.14 L
RVPLETH-PRED: 1.99 L
SODIUM SERPL-SCNC: 137 MMOL/L (ref 133–144)
TIBC SERPL-MCNC: 547 UG/DL (ref 240–430)
TLCPLETH-%PRED-PRE: 105 %
TLCPLETH-PRE: 4.51 L
TLCPLETH-PRED: 4.27 L
TRIGL SERPL-MCNC: 94 MG/DL
TSH SERPL DL<=0.005 MIU/L-ACNC: 1.63 MU/L (ref 0.4–4)
VA-%PRED-PRE: 92 %
VA-PRE: 3.61 L
VC-%PRED-PRE: 98 %
VC-PRE: 2.37 L
VC-PRED: 2.41 L
WBC # BLD AUTO: 4.3 10E3/UL (ref 4–11)

## 2021-12-08 PROCEDURE — 84425 ASSAY OF VITAMIN B-1: CPT | Performed by: INTERNAL MEDICINE

## 2021-12-08 PROCEDURE — 86431 RHEUMATOID FACTOR QUANT: CPT | Performed by: INTERNAL MEDICINE

## 2021-12-08 PROCEDURE — 250N000009 HC RX 250: Performed by: INTERNAL MEDICINE

## 2021-12-08 PROCEDURE — 85018 HEMOGLOBIN: CPT | Mod: 91

## 2021-12-08 PROCEDURE — C1894 INTRO/SHEATH, NON-LASER: HCPCS | Performed by: INTERNAL MEDICINE

## 2021-12-08 PROCEDURE — 94729 DIFFUSING CAPACITY: CPT | Performed by: INTERNAL MEDICINE

## 2021-12-08 PROCEDURE — 87340 HEPATITIS B SURFACE AG IA: CPT | Performed by: INTERNAL MEDICINE

## 2021-12-08 PROCEDURE — 80053 COMPREHEN METABOLIC PANEL: CPT | Performed by: INTERNAL MEDICINE

## 2021-12-08 PROCEDURE — 80061 LIPID PANEL: CPT | Performed by: INTERNAL MEDICINE

## 2021-12-08 PROCEDURE — 71275 CT ANGIOGRAPHY CHEST: CPT | Mod: MG

## 2021-12-08 PROCEDURE — 85730 THROMBOPLASTIN TIME PARTIAL: CPT | Performed by: INTERNAL MEDICINE

## 2021-12-08 PROCEDURE — 82306 VITAMIN D 25 HYDROXY: CPT | Mod: GZ | Performed by: INTERNAL MEDICINE

## 2021-12-08 PROCEDURE — 87389 HIV-1 AG W/HIV-1&-2 AB AG IA: CPT | Performed by: INTERNAL MEDICINE

## 2021-12-08 PROCEDURE — 94726 PLETHYSMOGRAPHY LUNG VOLUMES: CPT | Performed by: INTERNAL MEDICINE

## 2021-12-08 PROCEDURE — 84238 ASSAY NONENDOCRINE RECEPTOR: CPT | Performed by: INTERNAL MEDICINE

## 2021-12-08 PROCEDURE — 258N000003 HC RX IP 258 OP 636: Performed by: INTERNAL MEDICINE

## 2021-12-08 PROCEDURE — 93321 DOPPLER ECHO F-UP/LMTD STD: CPT | Mod: 26 | Performed by: INTERNAL MEDICINE

## 2021-12-08 PROCEDURE — 93308 TTE F-UP OR LMTD: CPT

## 2021-12-08 PROCEDURE — 250N000011 HC RX IP 250 OP 636: Performed by: INTERNAL MEDICINE

## 2021-12-08 PROCEDURE — 93325 DOPPLER ECHO COLOR FLOW MAPG: CPT | Mod: 26 | Performed by: INTERNAL MEDICINE

## 2021-12-08 PROCEDURE — 84703 CHORIONIC GONADOTROPIN ASSAY: CPT | Performed by: INTERNAL MEDICINE

## 2021-12-08 PROCEDURE — 71275 CT ANGIOGRAPHY CHEST: CPT | Mod: 26 | Performed by: RADIOLOGY

## 2021-12-08 PROCEDURE — 82810 BLOOD GASES O2 SAT ONLY: CPT

## 2021-12-08 PROCEDURE — 86706 HEP B SURFACE ANTIBODY: CPT | Performed by: INTERNAL MEDICINE

## 2021-12-08 PROCEDURE — 999N000142 HC STATISTIC PROCEDURE PREP ONLY

## 2021-12-08 PROCEDURE — 86147 CARDIOLIPIN ANTIBODY EA IG: CPT | Performed by: INTERNAL MEDICINE

## 2021-12-08 PROCEDURE — 83520 IMMUNOASSAY QUANT NOS NONAB: CPT | Performed by: INTERNAL MEDICINE

## 2021-12-08 PROCEDURE — 93568 NJX CAR CTH NSLC P-ART ANGRP: CPT | Performed by: INTERNAL MEDICINE

## 2021-12-08 PROCEDURE — 94375 RESPIRATORY FLOW VOLUME LOOP: CPT | Performed by: INTERNAL MEDICINE

## 2021-12-08 PROCEDURE — 93451 RIGHT HEART CATH: CPT | Performed by: INTERNAL MEDICINE

## 2021-12-08 PROCEDURE — 83880 ASSAY OF NATRIURETIC PEPTIDE: CPT | Performed by: INTERNAL MEDICINE

## 2021-12-08 PROCEDURE — 36415 COLL VENOUS BLD VENIPUNCTURE: CPT | Performed by: INTERNAL MEDICINE

## 2021-12-08 PROCEDURE — 93308 TTE F-UP OR LMTD: CPT | Mod: 26 | Performed by: INTERNAL MEDICINE

## 2021-12-08 PROCEDURE — 83735 ASSAY OF MAGNESIUM: CPT | Performed by: INTERNAL MEDICINE

## 2021-12-08 PROCEDURE — G1004 CDSM NDSC: HCPCS | Mod: GC | Performed by: RADIOLOGY

## 2021-12-08 PROCEDURE — 272N000001 HC OR GENERAL SUPPLY STERILE: Performed by: INTERNAL MEDICINE

## 2021-12-08 PROCEDURE — 86146 BETA-2 GLYCOPROTEIN ANTIBODY: CPT | Performed by: INTERNAL MEDICINE

## 2021-12-08 PROCEDURE — 84443 ASSAY THYROID STIM HORMONE: CPT | Performed by: INTERNAL MEDICINE

## 2021-12-08 PROCEDURE — 83036 HEMOGLOBIN GLYCOSYLATED A1C: CPT | Mod: GZ | Performed by: INTERNAL MEDICINE

## 2021-12-08 PROCEDURE — 93451 RIGHT HEART CATH: CPT | Mod: 26 | Performed by: INTERNAL MEDICINE

## 2021-12-08 PROCEDURE — 86235 NUCLEAR ANTIGEN ANTIBODY: CPT | Performed by: INTERNAL MEDICINE

## 2021-12-08 PROCEDURE — 85610 PROTHROMBIN TIME: CPT | Performed by: INTERNAL MEDICINE

## 2021-12-08 PROCEDURE — 86140 C-REACTIVE PROTEIN: CPT | Performed by: INTERNAL MEDICINE

## 2021-12-08 PROCEDURE — 86803 HEPATITIS C AB TEST: CPT | Performed by: INTERNAL MEDICINE

## 2021-12-08 PROCEDURE — 82728 ASSAY OF FERRITIN: CPT | Performed by: INTERNAL MEDICINE

## 2021-12-08 PROCEDURE — 86038 ANTINUCLEAR ANTIBODIES: CPT | Performed by: INTERNAL MEDICINE

## 2021-12-08 PROCEDURE — 96360 HYDRATION IV INFUSION INIT: CPT | Mod: 59

## 2021-12-08 PROCEDURE — 83550 IRON BINDING TEST: CPT | Performed by: INTERNAL MEDICINE

## 2021-12-08 PROCEDURE — 85014 HEMATOCRIT: CPT | Performed by: INTERNAL MEDICINE

## 2021-12-08 PROCEDURE — 999N000132 HC STATISTIC PP CARE STAGE 1

## 2021-12-08 PROCEDURE — 272N000002 HC OR SUPPLY OTHER OPNP: Performed by: INTERNAL MEDICINE

## 2021-12-08 RX ORDER — IOPAMIDOL 755 MG/ML
INJECTION, SOLUTION INTRAVASCULAR
Status: DISCONTINUED | OUTPATIENT
Start: 2021-12-08 | End: 2021-12-08 | Stop reason: HOSPADM

## 2021-12-08 RX ORDER — IOPAMIDOL 755 MG/ML
100 INJECTION, SOLUTION INTRAVASCULAR ONCE
Status: COMPLETED | OUTPATIENT
Start: 2021-12-08 | End: 2021-12-08

## 2021-12-08 RX ORDER — LIDOCAINE 40 MG/G
CREAM TOPICAL
Status: COMPLETED | OUTPATIENT
Start: 2021-12-08 | End: 2021-12-08

## 2021-12-08 RX ADMIN — LIDOCAINE: 40 CREAM TOPICAL at 13:25

## 2021-12-08 RX ADMIN — SODIUM CHLORIDE 250 ML: 9 INJECTION, SOLUTION INTRAVENOUS at 16:39

## 2021-12-08 RX ADMIN — IOPAMIDOL 100 ML: 755 INJECTION, SOLUTION INTRAVENOUS at 11:34

## 2021-12-08 ASSESSMENT — MIFFLIN-ST. JEOR: SCORE: 1082.36

## 2021-12-08 NOTE — PROGRESS NOTES
[facility]   Interventional Cardiology        Consenting/Education for Balloon Pulmonary Angiogram and Right Heart Catheterization     Patient understands due to their history of probable CTEPH we would like to perform a Pulmonary Angiogram and Right Heart Catheterization.  This procedure will be performed by Dr. Knox and Dr. Hylton    Patient understands during the portion for right heart catheterization a fine tube (catheter) is put into the vein of the groin/neck.  It is carefully passed along until it reaches the heart and then goes up into the blood vessels of the lungs. This is done to measure a variety of pressures in your heart and can tell us how well the heart is filling and emptying, as well as monitor fluid status.   During the portion of the pulmonary angiogram, the physician will inject contrast dye to see the blood flow in the lungs.  At the end of the procedure the artery may be closed with a special plug, Angio Seal, to stop the bleeding.     Patient also understands risks and complications of the procedure which include, but are not limited to bruising/swelling around the incision site, infection, bleeding, allergic reaction to local anesthetic, air embolism, arterial puncture, dissection, cough/hemoptysis.     Patient verbalized understanding of risks and benefits of the pulmonary angiogram and right heart catheterization and has elected to proceed with the procedure.       Denisse CARVER, KERRI  [facility] Interventional Cardiology  [phone]

## 2021-12-08 NOTE — DISCHARGE INSTRUCTIONS
[facility]                        Interventional Cardiology  Discharge Instructions   Post Right Heart Catheterization and Pulmonary Angiogram      AFTER YOU GO HOME:    DO drink plenty of fluids    DO resume your regular diet and medications unless otherwise instructed by your Primary Physician    Do Not scrub the procedure site vigorously    No lotion or powder to the puncture site for 3 days    CALL YOUR PRIMARY PHYSICIAN IF: You may resume all normal activity.  Monitor neck site for bleeding, swelling, or voice changes. If you notice bleeding or swelling immediately apply pressure to the site and call number below to speak with Cardiology Fellow.  If you experience any changes in your breathing you should call your doctor immediately or come to the closest Emergency Department.  Do not drive yourself.    ADDITIONAL INSTRUCTIONS: Medications: You are to resume all home medications including anticoagulation therapy unless otherwise advised by your primary cardiologist or nurse coordinator.    Follow Up: Per your primary cardiology team    If you have any questions or concerns regarding your procedure site please call [phone] at anytime and ask for Cardiology Fellow on call.  They are available 24 hours a day.  You may also contact the Cardiology Clinic after hours number at [phone].                                                       Telephone Numbers [phone] Monday-Friday 8:00 am to 4:30 pm    [phone] After 4:30 pm Monday-Friday, Weekends & Holidays  Ask for Interventional Cardiologist on call. Someone is on call 24 hours/day   [facility] toll free number [phone] Monday-Friday 8:00 am to 4:30 pm   [facility] Emergency Dept [phone]

## 2021-12-08 NOTE — PROGRESS NOTES
Tolerated fluid flush.  RIJ site CDI.  Denies pain.  Reviewed discharge instructions with patient.  PIV removed.  Will discharge to home with daughter.

## 2021-12-08 NOTE — PROGRESS NOTES
Returned from CCL s/p RHC and Pulmonary angiogram.  VSS.  Denies pain.  Dr. Taylor at bedside speaking with Debi and her daughter in law.  IV fluid flush infusing now.  Resting in bed.

## 2021-12-09 LAB
ANA SER QL IF: NEGATIVE
B2 GLYCOPROT1 IGG SERPL IA-ACNC: <0.8 U/ML
B2 GLYCOPROT1 IGM SERPL IA-ACNC: <2.4 U/ML
CARDIOLIPIN IGG SER IA-ACNC: <2 GPL-U/ML
CARDIOLIPIN IGG SER IA-ACNC: NEGATIVE
CARDIOLIPIN IGM SER IA-ACNC: <2 MPL-U/ML
CARDIOLIPIN IGM SER IA-ACNC: NEGATIVE
DRVVT SCREEN RATIO: 1.16
IL6 SERPL-MCNC: 6.84 PG/ML
LA PPP-IMP: NEGATIVE
LUPUS INTERPRETATION: NORMAL
PTT RATIO: 1.03
THROMBIN TIME: 18.6 SECONDS (ref 13–19)

## 2021-12-09 PROCEDURE — 85390 FIBRINOLYSINS SCREEN I&R: CPT | Mod: 26 | Performed by: PATHOLOGY

## 2021-12-10 LAB
ENA SCL70 IGG SER IA-ACNC: <0.6 U/ML
ENA SCL70 IGG SER IA-ACNC: NEGATIVE
STFR SERPL-MCNC: 4.4 MG/L

## 2021-12-11 LAB — VIT B1 SERPL-SCNC: 2 NMOL/L

## 2021-12-12 ENCOUNTER — HEALTH MAINTENANCE LETTER (OUTPATIENT)
Age: 76
End: 2021-12-12

## 2021-12-14 ENCOUNTER — TELEPHONE (OUTPATIENT)
Dept: CARDIOLOGY | Facility: CLINIC | Age: 76
End: 2021-12-14
Payer: MEDICARE

## 2021-12-15 ENCOUNTER — TELEPHONE (OUTPATIENT)
Dept: CARDIOLOGY | Facility: CLINIC | Age: 76
End: 2021-12-15
Payer: MEDICARE

## 2021-12-15 DIAGNOSIS — I27.24 CTEPH (CHRONIC THROMBOEMBOLIC PULMONARY HYPERTENSION) (H): Primary | ICD-10-CM

## 2021-12-15 DIAGNOSIS — R06.09 DOE (DYSPNEA ON EXERTION): ICD-10-CM

## 2021-12-15 NOTE — TELEPHONE ENCOUNTER
From: Lisette Knox MD   Sent: 12/8/2021  11:44 PM CST   To: Marcelo Skinner MD, Mariya Rinaldi, *     Team,     She completed her testing. 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 She is not too enthusiastic about surgery and also to my eyes don't see extensive proximal disease.     Plan:   1. Increase Adempas to 1 mg tid and then every two weeks to a maximum of 2.5 mg tid     2. Opsumit 10 mg daily - She was already on it from Dr. Solorio. She tolerated it but for some reason they stopped it. Thus, I don't think we need a PA - she likely already has one approved     3. Please add her to our next week's CTEPH meeting.     Thank you       TT   -------------------------------------------  Called patient to find out where she receives her Adempas from?  She wasn't sure, but gave me the name [employer] and a number to call; [phone].    LM with new Adempas Rx on the provider line at [facility] with the [employer] assistance foundation.  Yane Cardenas RN on 12/15/2021 at 3:29 PM     Called [facility] to see if they previously serviced her Opsumit, as their are some notes they may have.  They do have her in their system, but they never shipped any meds.  Pharmacist recommend we call the [facility] and find out who was her  for the Opsumit.  Agreed with plan. Yane Cardenas RN on 12/15/2021 at 3:36 PM    Sent staff msg to mariya asking for her help in finding out who was dispensing Opsumit to patient previously, vs starting new PA.  Yane Cardenas RN on 12/15/2021 at 3:40 PM  ----------------------------------------  Mariya Rinaldi sent to Yane Cardenas RN Hi Heather,     I dont think she has a Part D, so I applied for [employer] PAP for the patient.     -Mariya

## 2021-12-16 NOTE — PROGRESS NOTES
Discussed our multidisciplinary recommendations with patient.  Recommended high risk pulmonary thromboendarterectomy given her proximal disease and multiple 's.  Her mortality risk is 10% with a 10% need for dialysis.  She understands the risk and benefits of pulmonary thromboendarterectomy and is willing to proceed.  She understands the alternative option of going for medical therapy and pulmonary balloon angioplasty which would improve her symptoms but would still only have a median survival of 3 to 5 years given her severe RV dysfunction.    She would like to have her surgery in February as this would be convenient for her family.  We will arrange for her to see our surgeon Dr. Connor coughlin in January.  In the interim we will continue to uptitrate her medical therapy with Adempas and macitentan.  We will increase her Adempas by 0.5 mg increments every 2 weeks to a maximum of 2.5 mg 3 times a day.    She will need a coronary angiogram prior to her pulmonary thromboendarterectomy.    She will call us in the interim if you have any further worsening symptoms.    Sincerely,  Lisette Knox MD   [facility]  Heart Failure, Transplant, and Mechanical Circulatory Support Cardiology   Cardiovascular Division  [facility]   [phone]

## 2021-12-16 NOTE — PROGRESS NOTES
Multi-disciplinary Chronic Thromboembolic Pulmonary Hypertension Evaluation    Date of Discussion: 12/15/2021    Primary PH Cardiologist: Lisette Knox MD  Secondary PH Cardiologist Present: Marcelo Skinner MD, Dillan Davila MD    CV Surgeon Present: Connor Peterson MD and Topher Camacho MD, PhD    Interventional Cardiologist Present: Ralph Hylton MD    Cardiac Anesthesiologist Present: Stevie Silva MD, JD [facility]    Radiologist Present: Darcie Bentley MD    Summary of discussion:  She has proximal disease involving the left lower lobe, right lower lobe, right middle lobe and left upper lobe.  She also has segmental and subsegmental disease with 's bilaterally.  Furthermore she has distal disease with proning.    Pulmonary thrombotic endarterectomy would be the best option given her proximal disease and 's.  However this would be high risk surgery with a 10% mortality in 10% risk of dialysis due to her age, frailty and reduced renal function.      Plan  -Proceed with PTE surgery if patient is acceptable with the higher risk of mortality and dialysis.  -Up titration of medical therapy regardless in the interim.  -BPA if patient is not willing for surgery or for residual disease after PT    Lisette Knox MD   [facility]  Heart Failure, Transplant, and Mechanical Circulatory Support Cardiology   Cardiovascular Division  [facility]   [phone]

## 2022-01-12 ENCOUNTER — TELEPHONE (OUTPATIENT)
Dept: CARDIOLOGY | Facility: CLINIC | Age: 77
End: 2022-01-12
Payer: MEDICARE

## 2022-01-12 NOTE — TELEPHONE ENCOUNTER
[facility] Call Center    Phone Message    May a detailed message be left on voicemail: yes     Reason for Call: Other: Debi called stating that she would like her orders for a PFT and echocardiogram sent to [facility] in [place] because it is closer to home for her.  Please advise. Thank you.      Action Taken: Message routed to:  [facility] ([facility]): Cardio    Travel Screening: Not Applicable

## 2022-01-13 ENCOUNTER — TELEPHONE (OUTPATIENT)
Dept: CARDIOLOGY | Facility: CLINIC | Age: 77
End: 2022-01-13
Payer: MEDICARE

## 2022-01-13 NOTE — TELEPHONE ENCOUNTER
Called [facility] in [place] to obtain fax #'s Faxed order for PFT to [phone].  Echocardiogram faxed to [phone]. Pt notified that orders were faxed to [facility].

## 2022-01-13 NOTE — TELEPHONE ENCOUNTER
"Received call from Simin @ [facility] REMS program.  She was calling to confirm that patient is switching prescribing providers for her Adempas to Dr. Knox now?  If his is correct, they will have to send us some forms to complete.  --------------------------  Called Ade[employer] REMS program and confirmed Dr. Davila was going to be the new prescriber for patient's Adempas.  I was advised a new \"enrollment form\" has to be signed by MD with patient's name on it.  The patient doesn't need to sign it again.  Verbalized understanding and agreed with plan.  Yane Cardenas RN on 1/13/2022 at 3:23 PM      [employer]  Healthcare portal for healthcare professionals  Enrollment form needs       "

## 2022-01-13 NOTE — TELEPHONE ENCOUNTER
Debi called back in. She is confused as to why she needs these tests as she just had them done in early Dec. Please review and call her back to discuss

## 2022-01-13 NOTE — TELEPHONE ENCOUNTER
Reviewed chart. Pt does not need any further testing. Pt had PFT and echo on 12/8/21. No further testing needed.

## 2022-01-25 NOTE — TELEPHONE ENCOUNTER
Completed and faxed [employer] Patient Assistance Program request forms for [employer] for review.    Mariya Rinaldi  Clinic   Pulmonary Hypertension  [facility]  (P) [phone]

## 2022-01-25 NOTE — TELEPHONE ENCOUNTER
Received notification from [employer] Patient [employer] that pt has been approved for the 's patient assistance program from 12/15/2021 through 12/31/2022 for O[employer]it.    Mariya Rinaldi  Clinic   Pulmonary Hypertension  [facility]  (P) [phone]

## 2022-01-25 NOTE — TELEPHONE ENCOUNTER
*Confirmed that patient receive her current medication, Adempas, via 's assistance.     *[employer] enrollment and REMS form completed and faxed to [facility] for review and informed [facility] that patient has no insurance coverage for medications and will need assistance through [employer].     [employer] Voucher Program was not requested.    Mariya Rinaldi  Clinic   Pulmonary Hypertension  [facility]  (P) [phone]

## 2022-02-09 ENCOUNTER — TELEPHONE (OUTPATIENT)
Dept: CARDIOLOGY | Facility: CLINIC | Age: 77
End: 2022-02-09
Payer: MEDICARE

## 2022-02-09 DIAGNOSIS — R06.02 SOB (SHORTNESS OF BREATH): ICD-10-CM

## 2022-02-09 DIAGNOSIS — I27.24 CTEPH (CHRONIC THROMBOEMBOLIC PULMONARY HYPERTENSION) (H): Primary | ICD-10-CM

## 2022-02-10 ENCOUNTER — TELEPHONE (OUTPATIENT)
Dept: CARDIOLOGY | Facility: CLINIC | Age: 77
End: 2022-02-10
Payer: MEDICARE

## 2022-02-10 NOTE — TELEPHONE ENCOUNTER
----- Message -----  From: Lisette Knox MD  Sent: 2/9/2022  10:39 AM CST  To: Marcelo Skinner MD, Connor Peterson MD, #    Team,    I am not sure what happened. She was supposed to see  in clinic but I don't any appointments. She is coming to see us at the end of the month - I would appreciate it if we could schedule her to see him during that visit. She is a potential PTE candidate.     Thank you    TT  -----------------------------------------  Placed referral for CV surgery referral with Dr. Peterson for potential PTE surgery.  Unfortunately Dr. Peterson does not have any clinic on 2/28 when patient will be here for an appt with dr. Knox.  Will send msg to MIKE Montoya regarding need for referral appt,  Yane Cardenas RN on 2/9/2022 at 7:05 PM

## 2022-02-10 NOTE — TELEPHONE ENCOUNTER
Called patient and offered Dr. Peterson see her on 3/2 (same week Claudia is seeing her) but she said she'd still have to make two trips. 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 Her daughter will be bringing her to her appts.    Contact info given to patient, address of appt confirmed, all questions/concerns addressed.    ----- Message from Connor Peterson MD sent at 2/10/2022  8:04 AM CST -----  I'm sorry if we missed scheduling her. I have cc'd Cristina here to help in arranging a clinic appointment. Thank you,  Connor  ----- Message -----  From: Lisette Knox MD  Sent: 2/9/2022  10:39 AM CST  To: Marcelo Skinner MD, Connor Peterson MD, #    Team,    I am not sure what happened. She was supposed to see  in clinic but I don't any appointments. She is coming to see us at the end of the month - I would appreciate it if we could schedule her to see him during that visit. She is a potential PTE candidate.     Thank you    TT

## 2022-02-24 ENCOUNTER — TELEPHONE (OUTPATIENT)
Dept: CARDIOLOGY | Facility: CLINIC | Age: 77
End: 2022-02-24
Payer: MEDICARE

## 2022-02-24 DIAGNOSIS — R06.02 SOB (SHORTNESS OF BREATH): ICD-10-CM

## 2022-02-24 DIAGNOSIS — I27.24 CTEPH (CHRONIC THROMBOEMBOLIC PULMONARY HYPERTENSION) (H): Primary | ICD-10-CM

## 2022-02-24 NOTE — TELEPHONE ENCOUNTER
Called patient and asked her what dose of her Adempas she was on? 1.0 and receives it from [facility].  She did start the [employer] and has been on it for about a month now.  Advised her we will want labs prior to Monday's appt.  Patient verbalized understanding, agreed with plan and denied any further questions. Yane Cardenas RN on 2/24/2022 at 12:55 PM      Lab orders placed & lab appt made. Yane Cardenas RN on 2/24/2022 at 12:55 PM  --------------------------------  Called [facility] assistance [facility] and she recently was shipped 1.0mg tabs of Adempas.  Left Verbal prescription on pharmacist line to increase dose by 0.5mg TID every two weeks to a max tolerated dose of 25.mg TID. Yane Cardenas RN on 2/24/2022 at 1:04 PM

## 2022-02-28 ENCOUNTER — TELEPHONE (OUTPATIENT)
Dept: CARDIOLOGY | Facility: CLINIC | Age: 77
End: 2022-02-28
Payer: MEDICARE

## 2022-02-28 NOTE — TELEPHONE ENCOUNTER
----- Message -----  From: Freya Win  Sent: 2/28/2022  10:45 AM CST  To: Cardiology Ph Nurse-    Hello-     Pharmacist called to speak with Yane.  Please call back at [phone].   --------------------------------------  Called pharmacy back and was asked to provide a VORB for the Adempas to alleviate confusion, as they are not able to see what  RX dipak was calling about because they haven't sent the Rx to them yet.  Agreed with plan.    Spoke with Tawanna, pharmacist and gave TORB for up-titration of Adempas by 0.5mg TID Q2 weeks to max dose of 2.5mg TID.    Yane Cardenas RN on 2/28/2022 at 1:14 PM

## 2022-03-14 ENCOUNTER — TELEPHONE (OUTPATIENT)
Dept: CARDIOLOGY | Facility: CLINIC | Age: 77
End: 2022-03-14
Payer: MEDICARE

## 2022-03-14 NOTE — TELEPHONE ENCOUNTER
"[facility] Call Center    Phone Message    May a detailed message be left on voicemail: yes     Reason for Call: Medication Question or concern regarding medication   Prescription Clarification  Name of Medication: Adempas  Prescribing Provider: Lisette   Pharmacy: [facility] - [place], [place] - [address] [facility] [address]   What on the order needs clarification? Clinic wants to know if they can increase medication          Action Taken: Other: routed to [facility] nursing    Travel Screening: Not Applicable           --------------------------------------  Dominique is an RN with the [employer] [facility] who is following patient during up titration.  VSS and patient reports, \" doing good\".  Gave OK to increase Adempas dose to 2.0mg.  They will call back in ~2 weeks with next titration. Yane Cardenas RN on 3/14/2022 at 10:11 AM                                                              "

## 2022-03-16 ENCOUNTER — OFFICE VISIT (OUTPATIENT)
Dept: CARDIOLOGY | Facility: CLINIC | Age: 77
End: 2022-03-16
Attending: THORACIC SURGERY (CARDIOTHORACIC VASCULAR SURGERY)
Payer: MEDICARE

## 2022-03-16 VITALS
HEIGHT: 60 IN | HEART RATE: 90 BPM | BODY MASS INDEX: 30.04 KG/M2 | DIASTOLIC BLOOD PRESSURE: 62 MMHG | WEIGHT: 153 LBS | OXYGEN SATURATION: 90 % | SYSTOLIC BLOOD PRESSURE: 131 MMHG

## 2022-03-16 DIAGNOSIS — I27.24 CTEPH (CHRONIC THROMBOEMBOLIC PULMONARY HYPERTENSION) (H): ICD-10-CM

## 2022-03-16 DIAGNOSIS — R79.9 ABNORMAL FINDING OF BLOOD CHEMISTRY, UNSPECIFIED: ICD-10-CM

## 2022-03-16 DIAGNOSIS — R06.02 SOB (SHORTNESS OF BREATH): ICD-10-CM

## 2022-03-16 PROCEDURE — G0463 HOSPITAL OUTPT CLINIC VISIT: HCPCS

## 2022-03-16 PROCEDURE — 99204 OFFICE O/P NEW MOD 45 MIN: CPT | Performed by: THORACIC SURGERY (CARDIOTHORACIC VASCULAR SURGERY)

## 2022-03-16 ASSESSMENT — PAIN SCALES - GENERAL: PAINLEVEL: NO PAIN (0)

## 2022-03-16 NOTE — NURSING NOTE
Chief Complaint   Patient presents with     New Patient     CTEPH evaluation      Vitals were taken and medications were reconciled.   Jerrell Greenberg, EMT  2:16 PM

## 2022-03-16 NOTE — LETTER
3/16/2022      RE: Debi Espinoza  [address] [address]       Dear Colleague,    Thank you for the opportunity to participate in the care of your patient, Debi Espinoza, at the [facility] [place] [facility]. Please see a copy of my visit note below.    [facility] Cardiovascular Surgery Consult     Debi Espinoza MRN# [id number]   YOB: 1945 Age: 76 year old     Primary care provider: Lisette Knox    Referring Cardiologist(s): Lisette Knox MD and Fely Solorio MD    Date of Service: March 17, 2022    Reason for consult: Chronic thromboembolic pulmonary hypertension           Assessment and Plan:   Debi Espinoza is a 76 year old year old female with a PMH significant for DVT/PE in 2019 associated with sepsis and infected nephrolithiasis. She has had progressive dyspnea (WHO class III). 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 I recommend pulmonary thromboendarterectomy. I discussed the operation along with its risks, benefits and alternatives. Risks of surgery include risk of death (10%), stroke, bleeding, infection, lung failure and prolonged ventilation, potential need for ECMO, lung bleeding (hemoptysis), liver failure, nerve injury, renal failure requiring dialysis, and arrhythmias. Her risks are elevated due to co-morbidities of age, CKD, and severe pulmonary hypertension. She understand that medical therapy may alleviate symptoms but will not reduce risk of mortality, which with severe PH and RV failure is predicted to be within 3-5 years. She is hesitant due to the risks and wishes to consider her options of medically therapy with possible BPA vs. PTE surgery.     - Will schedule for pulmonary thromboendarterectomy after confirming her agreement to proceed  - Plan for anticoagulation bridging with enoxaparin  -  Will plan to hold PH medications day prior to surgery  - PAC clinic appointment and routine blood work to be scheduled    Connor Peterson MD  Cardiothoracic Surgery  [phone]                 Chief Complaint:   Shortness of breath         History of Present Illness:   Ms. Debi Espinoza is a 76 year old female with progressive shortness of breath. She was admitted with sepsis and infected renal stones in 2019 at which time she was diagnosed with PT and DVT. She also has chronic renal disease and chronic right lower extremity edema with some sores at time treated with Unna boots. She has had severe progression of her shortness of breath since 2019 with a gradual decrease in activity. She states when doing housework she is okay but going for a longer walk makes her short of breath. She denies symptoms of palpitations, orthopnea, syncope, pre-syncope, palpitations, or hemoptysis.                    Past Medical History:   No past medical history on file.          Past Surgical History:     Past Surgical History:   Procedure Laterality Date     CV PULMONARY ANGIOGRAM N/A 12/8/2021    Procedure: Pulmonary Angiogram;  Surgeon: Lisette Knox MD;  Location:  HEART CARDIAC CATH LAB     CV RIGHT HEART CATH MEASUREMENTS RECORDED N/A 12/8/2021    Procedure: CV RIGHT HEART CATH;  Surgeon: Lisette Knox MD;  Location:  HEART CARDIAC CATH LAB              Social History:     Social History     Socioeconomic History     Marital status:      Spouse name: Not on file     Number of children: Not on file     Years of education: Not on file     Highest education level: Not on file   Occupational History     Not on file   Tobacco Use     Smoking status: Never Smoker     Smokeless tobacco: Never Used   Substance and Sexual Activity     Alcohol use: Never     Drug use: Never     Sexual activity: Not on file   Other Topics Concern     Parent/sibling w/ CABG, MI or angioplasty before 65F 55M? No   Social History  Narrative     Not on file     Social Determinants of Health     Financial Resource Strain: Not on file   Food Insecurity: Not on file   Transportation Needs: Not on file   Physical Activity: Not on file   Stress: Not on file   Social Connections: Not on file   Intimate Partner Violence: Not on file   Housing Stability: Not on file            Family History:   No family history on file.          Immunizations:     Immunization History   Administered Date(s) Administered     COVID-19,PF,Carmella 03/12/2021             Allergies:      Allergies   Allergen Reactions     Lisinopril Cough             Medications:     Current Outpatient Medications   Medication     acetaminophen (TYLENOL) 500 MG tablet     furosemide (LASIX) 40 MG tablet     riociguat (ADEMPAS) 0.5 MG tablet     riociguat (ADEMPAS) 0.5 MG tablet     calcitRIOL (ROCALTROL) 0.25 MCG capsule     No current facility-administered medications for this visit.             Review of Systems:     A 10 point ROS was performed and is negative other than HPI.             Physical Exam:        Pulse:  [90] 90  BP: (131)/(62) 131/62  SpO2:  [90 %] 90 %    Gen: NAD  Neck: mild JVD, trachea midline  ENT: EOMI, anicteric  Lungs: CTAB, short of breath after long sentances  CV: regular rhythm, normal rate  Abd: soft, nt, nd  Ext: RLL edema to above knee.   Neuro: AOx3    Labs:  Lab Results   Component Value Date    WBC 4.3 12/08/2021    HGB 12.5 12/08/2021    HCT 41.2 12/08/2021     12/08/2021     12/08/2021    POTASSIUM 3.7 12/08/2021    CHLORIDE 108 12/08/2021    CO2 22 12/08/2021    BUN 27 12/08/2021    CR 1.29 (H) 12/08/2021     (H) 12/08/2021    NTBNPI 6,611 (H) 12/08/2021    AST 22 12/08/2021    ALT 19 12/08/2021    ALKPHOS 87 12/08/2021    BILITOTAL 0.9 12/08/2021    INR 1.20 (H) 12/08/2021       Imaging:  Transthoracic echocardiogram (12/8/2022): I have personally reviewed these images  Paradoxical ventricular septal motion c/w increased RV pressure  and volume overload, severe RV dysfunction, Severe TR, RVSP 77 mmHg + RA, LVEF 55-60%,     Coronary angiogram (7/24/2020):  I have personally reviewed these images  No coronary artery disease    Right heart cath/Pulmonary angigoram (12/8/2022):   of right A2, A5 and A6, A2 stenosis, A6 stenosis, Stenosis A4, A7-10  Left A9 and 10 occlusions, segmenta branch A1 and A2 web, A3 stenosis, A4 stenosis, A7 and A8 stenosis    RA 6 mmHg, PA 77/30/44 mmHg, PCWP 10 mmHg, CO/CI Boris 3.29/2, PVR 10 MARTINEZ, 826 dsc-5    V/Q scan (1/22/2021):  1.   Multiple perfusion defects which mismatch with the ventilation scan. Findings represents a high probability for pulmonary embolism. The pattern suggests the possibility for acute pulmonary emboli.     CT Chest/PE (12/8/2022):  I have personally reviewed these images  SARA subsegmental and RLL PEs chronic in nature, mosaic attenuation of bibasilar segments, chronic infarcts, RA and RV dilation with IVC reflux of contrast,     Pulmonary function testing (12/8/2022):  FEV1 1.55, 89% pre  DLCO 80% pre             Please do not hesitate to contact me if you have any questions/concerns.     Sincerely,     Connor Peterson MD

## 2022-03-16 NOTE — PATIENT INSTRUCTIONS
You were seen today in the [facility]acic [facility]    We will work on scheduling surgery and call you with the date. You can update Lynette or Gracia on your decision regarding surgery at that time.    Please call MIKE Ojeda surgery coordinator with any questions.  Thank you.    Phone [phone]  Fax [phone]

## 2022-03-17 ENCOUNTER — PREP FOR PROCEDURE (OUTPATIENT)
Dept: CARDIOLOGY | Facility: CLINIC | Age: 77
End: 2022-03-17
Payer: MEDICARE

## 2022-03-17 DIAGNOSIS — I26.99: Primary | ICD-10-CM

## 2022-03-17 NOTE — PROGRESS NOTES
[facility] Cardiovascular Surgery Consult     Debi Espinoza MRN# [id number]   YOB: 1945 Age: 76 year old     Primary care provider: Lisette Knox    Referring Cardiologist(s): Lisette Knox MD and Fely Solorio MD    Date of Service: March 17, 2022    Reason for consult: Chronic thromboembolic pulmonary hypertension           Assessment and Plan:   Debi Espinoza is a 76 year old year old female with a PMH significant for DVT/PE in 2019 associated with sepsis and infected nephrolithiasis. She has had progressive dyspnea (WHO class III). 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 I recommend pulmonary thromboendarterectomy. I discussed the operation along with its risks, benefits and alternatives. Risks of surgery include risk of death (10%), stroke, bleeding, infection, lung failure and prolonged ventilation, potential need for ECMO, lung bleeding (hemoptysis), liver failure, nerve injury, renal failure requiring dialysis, and arrhythmias. Her risks are elevated due to co-morbidities of age, CKD, and severe pulmonary hypertension. She understand that medical therapy may alleviate symptoms but will not reduce risk of mortality, which with severe PH and RV failure is predicted to be within 3-5 years. She is hesitant due to the risks and wishes to consider her options of medically therapy with possible BPA vs. PTE surgery.     - Will schedule for pulmonary thromboendarterectomy after confirming her agreement to proceed  - Plan for anticoagulation bridging with enoxaparin  - Will plan to hold PH medications day prior to surgery  - PAC clinic appointment and routine blood work to be scheduled    Connor Peterson MD  Cardiothoracic Surgery  [phone]                 Chief Complaint:   Shortness of breath         History of Present Illness:   Ms. Debi Espinoza is a 76 year old female with progressive shortness of  breath. She was admitted with sepsis and infected renal stones in 2019 at which time she was diagnosed with PT and DVT. She also has chronic renal disease and chronic right lower extremity edema with some sores at time treated with Unna boots. She has had severe progression of her shortness of breath since 2019 with a gradual decrease in activity. She states when doing housework she is okay but going for a longer walk makes her short of breath. She denies symptoms of palpitations, orthopnea, syncope, pre-syncope, palpitations, or hemoptysis.                    Past Medical History:   No past medical history on file.          Past Surgical History:     Past Surgical History:   Procedure Laterality Date     CV PULMONARY ANGIOGRAM N/A 12/8/2021    Procedure: Pulmonary Angiogram;  Surgeon: Lisette Knox MD;  Location:  HEART CARDIAC CATH LAB     CV RIGHT HEART CATH MEASUREMENTS RECORDED N/A 12/8/2021    Procedure: CV RIGHT HEART CATH;  Surgeon: Lisette Knox MD;  Location: [facility] HEART CARDIAC CATH LAB              Social History:     Social History     Socioeconomic History     Marital status:      Spouse name: Not on file     Number of children: Not on file     Years of education: Not on file     Highest education level: Not on file   Occupational History     Not on file   Tobacco Use     Smoking status: Never Smoker     Smokeless tobacco: Never Used   Substance and Sexual Activity     Alcohol use: Never     Drug use: Never     Sexual activity: Not on file   Other Topics Concern     Parent/sibling w/ CABG, MI or angioplasty before 65F 55M? No   Social History Narrative     Not on file     Social Determinants of Health     Financial Resource Strain: Not on file   Food Insecurity: Not on file   Transportation Needs: Not on file   Physical Activity: Not on file   Stress: Not on file   Social Connections: Not on file   Intimate Partner Violence: Not on file   Housing Stability: Not on file             Family History:   No family history on file.          Immunizations:     Immunization History   Administered Date(s) Administered     COVID-19,MAGGIE,Carmella 03/12/2021             Allergies:      Allergies   Allergen Reactions     Lisinopril Cough             Medications:     Current Outpatient Medications   Medication     acetaminophen (TYLENOL) 500 MG tablet     furosemide (LASIX) 40 MG tablet     riociguat (ADEMPAS) 0.5 MG tablet     riociguat (ADEMPAS) 0.5 MG tablet     calcitRIOL (ROCALTROL) 0.25 MCG capsule     No current facility-administered medications for this visit.             Review of Systems:     A 10 point ROS was performed and is negative other than HPI.             Physical Exam:        Pulse:  [90] 90  BP: (131)/(62) 131/62  SpO2:  [90 %] 90 %    Gen: NAD  Neck: mild JVD, trachea midline  ENT: EOMI, anicteric  Lungs: CTAB, short of breath after long sentances  CV: regular rhythm, normal rate  Abd: soft, nt, nd  Ext: RLL edema to above knee.   Neuro: AOx3    Labs:  Lab Results   Component Value Date    WBC 4.3 12/08/2021    HGB 12.5 12/08/2021    HCT 41.2 12/08/2021     12/08/2021     12/08/2021    POTASSIUM 3.7 12/08/2021    CHLORIDE 108 12/08/2021    CO2 22 12/08/2021    BUN 27 12/08/2021    CR 1.29 (H) 12/08/2021     (H) 12/08/2021    NTBNPI 6,611 (H) 12/08/2021    AST 22 12/08/2021    ALT 19 12/08/2021    ALKPHOS 87 12/08/2021    BILITOTAL 0.9 12/08/2021    INR 1.20 (H) 12/08/2021       Imaging:  Transthoracic echocardiogram (12/8/2022): I have personally reviewed these images  Paradoxical ventricular septal motion c/w increased RV pressure and volume overload, severe RV dysfunction, Severe TR, RVSP 77 mmHg + RA, LVEF 55-60%,     Coronary angiogram (7/24/2020):  I have personally reviewed these images  No coronary artery disease    Right heart cath/Pulmonary angigoram (12/8/2022):   of right A2, A5 and A6, A2 stenosis, A6 stenosis, Stenosis A4, A7-10  Left A9 and 10  occlusions, segmenta branch A1 and A2 web, A3 stenosis, A4 stenosis, A7 and A8 stenosis    RA 6 mmHg, PA 77/30/44 mmHg, PCWP 10 mmHg, CO/CI Boris 3.29/2, PVR 10 MARTINEZ, 826 dsc-5    V/Q scan (1/22/2021):  1.   Multiple perfusion defects which mismatch with the ventilation scan. Findings represents a high probability for pulmonary embolism. The pattern suggests the possibility for acute pulmonary emboli.     CT Chest/PE (12/8/2022):  I have personally reviewed these images  SARA subsegmental and RLL PEs chronic in nature, mosaic attenuation of bibasilar segments, chronic infarcts, RA and RV dilation with IVC reflux of contrast,     Pulmonary function testing (12/8/2022):  FEV1 1.55, 89% pre  DLCO 80% pre

## 2022-03-18 ENCOUNTER — HOSPITAL ENCOUNTER (INPATIENT)
Facility: CLINIC | Age: 77
Setting detail: SURGERY ADMIT
DRG: 270 | End: 2022-03-18
Attending: THORACIC SURGERY (CARDIOTHORACIC VASCULAR SURGERY) | Admitting: THORACIC SURGERY (CARDIOTHORACIC VASCULAR SURGERY)
Payer: MEDICARE

## 2022-03-18 ENCOUNTER — TELEPHONE (OUTPATIENT)
Dept: CARDIOLOGY | Facility: CLINIC | Age: 77
End: 2022-03-18
Payer: MEDICARE

## 2022-03-18 DIAGNOSIS — I27.24 CTEPH (CHRONIC THROMBOEMBOLIC PULMONARY HYPERTENSION) (H): Primary | ICD-10-CM

## 2022-03-18 RX ORDER — LIDOCAINE 40 MG/G
CREAM TOPICAL
Status: CANCELLED | OUTPATIENT
Start: 2022-03-18

## 2022-03-18 RX ORDER — CEFAZOLIN SODIUM 2 G/50ML
2 SOLUTION INTRAVENOUS
Status: CANCELLED | OUTPATIENT
Start: 2022-03-18

## 2022-03-18 RX ORDER — FAMOTIDINE 20 MG/1
20 TABLET, FILM COATED ORAL
Status: CANCELLED | OUTPATIENT
Start: 2022-03-18

## 2022-03-18 RX ORDER — ACETAMINOPHEN 325 MG/1
975 TABLET ORAL ONCE
Status: CANCELLED | OUTPATIENT
Start: 2022-03-18 | End: 2022-03-18

## 2022-03-18 RX ORDER — GABAPENTIN 100 MG/1
100 CAPSULE ORAL
Status: CANCELLED | OUTPATIENT
Start: 2022-03-18

## 2022-03-18 RX ORDER — CHLORHEXIDINE GLUCONATE ORAL RINSE 1.2 MG/ML
10 SOLUTION DENTAL ONCE
Status: CANCELLED | OUTPATIENT
Start: 2022-03-18 | End: 2022-03-18

## 2022-03-18 RX ORDER — CEFAZOLIN SODIUM 2 G/50ML
2 SOLUTION INTRAVENOUS SEE ADMIN INSTRUCTIONS
Status: CANCELLED | OUTPATIENT
Start: 2022-03-18

## 2022-03-18 RX ORDER — DEXMEDETOMIDINE HYDROCHLORIDE 4 UG/ML
0.2-1.2 INJECTION, SOLUTION INTRAVENOUS CONTINUOUS
Status: CANCELLED | OUTPATIENT
Start: 2022-05-27

## 2022-03-18 RX ORDER — PHENYLEPHRINE HCL IN 0.9% NACL 50MG/250ML
.1-6 PLASTIC BAG, INJECTION (ML) INTRAVENOUS CONTINUOUS
Status: CANCELLED | OUTPATIENT
Start: 2022-05-27

## 2022-03-18 NOTE — TELEPHONE ENCOUNTER
Letter by Lynette Lucia RN on 3/18/2022            CARDIOTHORACIC SURGERY PRE-OP INSTRUCTIONS     Your Heart Surgery is scheduled with Dr. Peterson on Monday, 5/2.  Please arrive at 5:30 AM for your surgery scheduled at 7:30 AM.        Here are instructions for your upcoming surgery and clinic visit if scheduled.     On the morning of your surgery.  Report to the information desk in the front lobby of the hospital at [address], [place] [address]. When you walk in the main entrance of the hospital it is directly in front of you. Ask for an escort or the  can help direct you. You will then be escorted or directed to 3C on the third floor for your surgery preparation.      You have been pre-registered.     At this time due to the Coronavirus, only ONE consistent visitor will be allowed per adult patient for the patient's entire hospital stay.  Visiting policies will be strictly enforced.  Surgical patients can have one visitor only during the preoperative phase, and one person may escort an adult patient to their outpatient clinic appointments.  All visitors will be screened each time they visit, and must pass screening before entry.  Individuals who are sick and not seeking care or have known exposure to COVID-19 are not allowed to visit.  Visitors under the age of 18 are not allowed to enter. Hospital visiting hours are 8:00 am to 6:30 pm. This policy is subject to change as the COVID virus continues to evolve.      You will spend approximately 5 - 7 days in the hospital depending on how quickly you recover.       COVID-19 TESTING  You will be required to undergo COVID-19 testing prior to your surgery.  Please call [phone] to schedule an appointment. You must schedule it WITHIN 4 DAYS of your surgery. A COVID-19 test performed more than 4 days prior will not be counted as your pre-op COVID-19 test.   Your COVID test will be done at your lab appointment on 4/29.     After  the COVID test please protect yourself by following the CDC recommendations for disease prevention.  Wash hands regularly with soap and water and use hand  if soap and water is not available, wear a face mask, separate yourself from others by 6 feet and keep your hands away from your face.      Call your surgery coordinator Lynette Lucia RN or the [facility] number ([phone]) if you develop any of the following symptoms; fever, cough, shortness of breath, sore throat, runny or stuffy nose, muscle or body aches, headaches, fatigue, vomiting or diarrhea.      INSTRUCTIONS PRIOR TO SURGERY      MEDICATIONS:     ASPIRIN is okay to take on the day of your surgery if you are having bypass surgery. Do not take your aspirin on your day of surgery if you are scheduled for a different surgery (valve or aortic surgery). If you do not take aspirin, do not start aspirin unless instructed otherwise. Take your other medications as you normally would until the day of surgery unless instructed otherwise.       IF you are taking a blood thinner, (Coumadin, Warfarin, Plavix, Pradaxa, Effient, Brilinta, Pletal, Eliquis, Xarelto or other antiplatelet),  please inform your surgery team as soon as possible as these medications may need to be stopped for several days (3-7) prior your surgery.       4/27 Eliquis - last dose 4/27 PM  4/28 no Eliquis - hold until AFTER surgery on 5/2 4/29 RHC and coronary angiogram at 12 PM - Start Lovenox injection 4/29 PM   4/30 Lovenox 8AM and 8PM  5/1 Lovenox 7:30 AM ONLY - No PM dose, Also, per Dr. Yusuf hold Opsumit and Adempas all day on 5/1 5/2 day of surgery - no lovenox, adempas or opsumit     LOVENOX BRIDGING INSTRUCTIONS:     Take your last dose of XARELTO on 4/27 (IN PREP FOR YOUR ANGIOGRAM).     Start lovenox 60 mg on 4/29.     Give 1 dose twice a day on 4/29 & 4/30 (12 hours apart).     Give 1 dose only in the morning on 5/1.     Surgery scheduled 5/2.     A prescription  for LOVENOX 60 MG was sent to [facility] pharmacy.      STOP TAKING these medications. STOP ALL ANTIINFLAMMATORY MEDICATIONS: (Ibuprofen, Aleve, Advil, Celebrex, Votaren, Ketoprofen, and Naproxen).  Stop ALL SUPPLEMENTS 10 days prior to your surgery. This includes stopping Co-Q 10, vitamin E and all fish oil supplements.   Please check the labels on any OTC eye vitamins you may be taking.  They often contain vitamin E and should be stopped 10 days prior to surgery.      MEDICATIONS THE MORNING OF SURGERY:  You do not need to take any medications the morning of your surgery.     DO NOT EAT ANY SOLID FOODS AFTER MIDNIGHT or the morning of your surgery. NO MILK, MILK PRODUCTS, SMOOTHIES 8 HOURS PRIOR TO SURGERY.      DO NOT EAT ANYTHING, however you may have any clear liquids; black coffee (sugar okay), clear tea, water, sprite, ginger ale, apple juice, Gatorade or any clear liquid up to two hours before your surgery time. (No iraida tea) No milk, or milk products, no smoothies or juice with pulp.      HISTORY AND PHYSICAL:  LABS and IMAGING  All blood work, tests, and procedures must be within 30 days of your surgery date.  You do NOT need to fast for the blood work.      You have a Pre-operative Assessment Center (PAC) visit in the [facility], [address] [place] [address] on Friday, 4/29. A  or coordinator will assist you. The laboratory and radiology is on the first floor.  The Pre-operative Assessment Center (PAC) is on the fifth floor.       Your schedule for 4/29 is as follows:     EKG at 11:40 AM  Chest xray at 12:00 PM  Blood work & COVID test at 12:30 PM   PAC appointment at 12:45 PM     BELONGINGS  Do not bring personal belongings such as jewelry, money, valuables, toiletries or medications to the hospital the morning of your surgery.   Please remove all jewelry, including body piercings. Cautery instruments are used during surgery that may pose a risk of  burns if a body piercing is left in during surgery.      Do bring a photo ID and insurance card.  A copy of your health care directives, if you have one.  Bring a bag including your phone and  or something to keep you connected to your family/friends.  Things you may want to pack or have brought to you later are slippers/shoes with a sole that are easy to take on/off and comfy pants to wear while you are up walking after surgery. Glasses and hearing aids (bring cases).  You cannot wear contact lenses during the surgery.  Inhaler(s) and eye drops if you use them, tell the staff about them when you arrive. Bring your CPAP machine or breathing device, if you use them.  If you have a pacemaker or ICD (cardiac defibrillator) bring the ID card.  If you have an implanted stimulator, bring the remote control.  If you are a legal guardian bring a copy of the certified (court-stamped) guardianship papers.      Call Amanda our  with any questions or concerns about scheduling. She can be reached by phone at [phone] between 8 AM and 4:30 PM Monday through Friday. Our answering service will  calls outside of those business hours.   If you have questions about your medications, test results or have a change in your health status call your surgery coordinator, MIKE Ojeda during regular business hours.  Call Lynette or the afterhours number ([phone]) if you develop any of the following symptoms; fever, cough, shortness of breath, sore throat, runny or stuffy nose, muscle or body aches, headaches, fatigue, vomiting or diarrhea.       On weekends or after 4:30 PM, please call [phone] and ask the  to page the Cardiovascular Surgery staff on call that weekend.      PLEASE NOTE, there are times elective surgery (like yours) needs to be rescheduled due to unplanned emergency, transplant or urgent surgeries needed in the hospital. Your surgery may also be postponed or cancelled if there are no  ICU beds available in the hospital.  If that should happen, your surgery will be rescheduled as quickly as possible. Our surgery team appreciates your understanding.      Please call me with any questions.  Thank you,  Lynette Lucia RN  Cardiovascular Surgery Coordinator  [phone]  Direct Phone  [phone]  Fax

## 2022-03-18 NOTE — LETTER
Letter by Lynette Lucia RN on 3/18/2022             CARDIOTHORACIC SURGERY PRE-OP INSTRUCTIONS     Your Heart Surgery is scheduled with Dr. Peterson on Monday, 5/2.  Please arrive at 5:30 AM for your surgery scheduled at 7:30 AM.        Here are instructions for your upcoming surgery and clinic visit if scheduled.     On the morning of your surgery.  Report to the information desk in the front lobby of the hospital at [address] [facility] [facility], [place] [address]. When you walk in the main entrance of the hospital it is directly in front of you. Ask for an escort or the  can help direct you. You will then be escorted or directed to 3C on the third floor for your surgery preparation.      You have been pre-registered.     At this time due to the Coronavirus, only ONE consistent visitor will be allowed per adult patient for the patient's entire hospital stay.  Visiting policies will be strictly enforced.  Surgical patients can have one visitor only during the preoperative phase, and one person may escort an adult patient to their outpatient clinic appointments.  All visitors will be screened each time they visit, and must pass screening before entry.  Individuals who are sick and not seeking care or have known exposure to COVID-19 are not allowed to visit.  Visitors under the age of 18 are not allowed to enter. Hospital visiting hours are 8:00 am to 6:30 pm. This policy is subject to change as the COVID virus continues to evolve.      You will spend approximately 5 - 7 days in the hospital depending on how quickly you recover.       COVID-19 TESTING  You will be required to undergo COVID-19 testing prior to your surgery.  Please call [phone] to schedule an appointment. You must schedule it WITHIN 4 DAYS of your surgery. A COVID-19 test performed more than 4 days prior will not be counted as your pre-op COVID-19 test.   Your COVID test will be done at your lab appointment on 4/29.     After  the COVID test please protect yourself by following the CDC recommendations for disease prevention.  Wash hands regularly with soap and water and use hand  if soap and water is not available, wear a face mask, separate yourself from others by 6 feet and keep your hands away from your face.      Call your surgery coordinator Lynette Lucia RN or the [facility] number ([phone]) if you develop any of the following symptoms; fever, cough, shortness of breath, sore throat, runny or stuffy nose, muscle or body aches, headaches, fatigue, vomiting or diarrhea.      INSTRUCTIONS PRIOR TO SURGERY      MEDICATIONS:     ASPIRIN is okay to take on the day of your surgery if you are having bypass surgery. Do not take your aspirin on your day of surgery if you are scheduled for a different surgery (valve or aortic surgery). If you do not take aspirin, do not start aspirin unless instructed otherwise. Take your other medications as you normally would until the day of surgery unless instructed otherwise.       IF you are taking a blood thinner, (Coumadin, Warfarin, Plavix, Pradaxa, Effient, Brilinta, Pletal, Eliquis, Xarelto or other antiplatelet),  please inform your surgery team as soon as possible as these medications may need to be stopped for several days (3-7) prior your surgery.     Take your LAST DOSE of XARELTO on 4/28. Stop your ADEMPAS 24 hours prior to surgery.     LOVENOX BRIDGING INSTRUCTIONS:     Take your last dose of XARELTO on 4/28.     Start lovenox 60 mg on 4/29.     Give 1 dose twice a day on 4/29 & 4/30 (12 hours apart).     Give 1 dose only in the morning on 5/1.     Surgery scheduled 5/2.     A prescription for LOVENOX 60 MG was sent to [facility] pharmacy.      STOP TAKING these medications. STOP ALL ANTIINFLAMMATORY MEDICATIONS: (Ibuprofen, Aleve, Advil, Celebrex, Votaren, Ketoprofen, and Naproxen).  Stop ALL SUPPLEMENTS 10 days prior to your surgery. This includes stopping Co-Q  10, vitamin E and all fish oil supplements.   Please check the labels on any OTC eye vitamins you may be taking.  They often contain vitamin E and should be stopped 10 days prior to surgery.      MEDICATIONS THE MORNING OF SURGERY:  You do not need to take any medications the morning of your surgery.     DO NOT EAT ANY SOLID FOODS AFTER MIDNIGHT or the morning of your surgery. NO MILK, MILK PRODUCTS, SMOOTHIES 8 HOURS PRIOR TO SURGERY.      DO NOT EAT ANYTHING, however you may have any clear liquids; black coffee (sugar okay), clear tea, water, sprite, ginger ale, apple juice, Gatorade or any clear liquid up to two hours before your surgery time. (No iraida tea) No milk, or milk products, no smoothies or juice with pulp.      HISTORY AND PHYSICAL:  LABS and IMAGING  All blood work, tests, and procedures must be within 30 days of your surgery date.  You do NOT need to fast for the blood work.      You have a Pre-operative Assessment Center (PAC) visit in the [facility], [address] [place] [address] on Friday, 4/29. A  or coordinator will assist you. The laboratory and radiology is on the first floor.  The Pre-operative Assessment Center (PAC) is on the fifth floor.       Your schedule for 4/29 is as follows:     EKG at 11:40 AM  Chest xray at 12:00 PM  Blood work & COVID test at 12:30 PM   PAC appointment at 12:45 PM     BELONGINGS  Do not bring personal belongings such as jewelry, money, valuables, toiletries or medications to the hospital the morning of your surgery.   Please remove all jewelry, including body piercings. Cautery instruments are used during surgery that may pose a risk of burns if a body piercing is left in during surgery.      Do bring a photo ID and insurance card.  A copy of your health care directives, if you have one.  Bring a bag including your phone and  or something to keep you connected to your family/friends.  Things you may want to pack or  have brought to you later are slippers/shoes with a sole that are easy to take on/off and comfy pants to wear while you are up walking after surgery. Glasses and hearing aids (bring cases).  You cannot wear contact lenses during the surgery.  Inhaler(s) and eye drops if you use them, tell the staff about them when you arrive. Bring your CPAP machine or breathing device, if you use them.  If you have a pacemaker or ICD (cardiac defibrillator) bring the ID card.  If you have an implanted stimulator, bring the remote control.  If you are a legal guardian bring a copy of the certified (court-stamped) guardianship papers.      Call Amanda our  with any questions or concerns about scheduling. She can be reached by phone at [phone] between 8 AM and 4:30 PM Monday through Friday. Our answering service will  calls outside of those business hours.   If you have questions about your medications, test results or have a change in your health status call your surgery coordinator, MIKE Ojeda during regular business hours.  Call Lynette or the afterhours number ([phone]) if you develop any of the following symptoms; fever, cough, shortness of breath, sore throat, runny or stuffy nose, muscle or body aches, headaches, fatigue, vomiting or diarrhea.       On weekends or after 4:30 PM, please call [phone] and ask the  to page the Cardiovascular Surgery staff on call that weekend.      PLEASE NOTE, there are times elective surgery (like yours) needs to be rescheduled due to unplanned emergency, transplant or urgent surgeries needed in the hospital. Your surgery may also be postponed or cancelled if there are no ICU beds available in the hospital.  If that should happen, your surgery will be rescheduled as quickly as possible. Our surgery team appreciates your understanding.      Please call me with any questions.  Thank you,  Lynette Lucia RN  Cardiovascular Surgery Coordinator  [phone]   Direct Phone  [phone]  Fax

## 2022-03-18 NOTE — TELEPHONE ENCOUNTER
LOVENOX BRIDGING INSTRUCTIONS:    Take your last dose of XARELTO on 4/28.    Start lovenox 60 mg on 4/29.    Give 1 dose twice a day on 4/29 & 4/30 (12 hours apart).    Give 1 dose only in the morning on 5/1.    Surgery scheduled 5/2.

## 2022-03-21 ENCOUNTER — TELEPHONE (OUTPATIENT)
Dept: CARDIOLOGY | Facility: CLINIC | Age: 77
End: 2022-03-21
Payer: MEDICARE

## 2022-03-21 ENCOUNTER — MYC MEDICAL ADVICE (OUTPATIENT)
Dept: CARDIOLOGY | Facility: CLINIC | Age: 77
End: 2022-03-21
Payer: MEDICARE

## 2022-03-21 NOTE — TELEPHONE ENCOUNTER
FUTURE VISIT INFORMATION      SURGERY INFORMATION:    Date: 5/2/22    Location: uu or    Surgeon:  Connor Peterson MD Huddleston, Stephen James, MD    Anesthesia Type: general    Procedure: pulmonary thromboendarterectomy AND ASSOCIATED PROCEDURES    Consult: ov 3/16    RECORDS REQUESTED FROM:       Primary Care Provider: Felicitas Villar APRN-CNP - [facility]    Most recent EKG+ Tracing: 3/16/22    Most recent ECHO: 12/8/21    Most recent PFT's: 12/8/21

## 2022-03-21 NOTE — TELEPHONE ENCOUNTER
LOVENOX BRIDGING INSTRUCTIONS:     Take your last dose of XARELTO on 4/27.     Start lovenox 60 mg on 4/29.     Give 1 dose twice a day on 4/29 & 4/30 (12 hours apart).     Give 1 dose only in the morning on 5/1.     Surgery scheduled 5/2.    Lovenox prescription called into patient's pharmacy - [facility] in [place].

## 2022-03-24 ENCOUNTER — TELEPHONE (OUTPATIENT)
Dept: CARDIOLOGY | Facility: CLINIC | Age: 77
End: 2022-03-24
Payer: MEDICARE

## 2022-03-24 NOTE — TELEPHONE ENCOUNTER
Called Nga back, and she was wondering about condensing appointments - pt is scheduled for all of her preop testing on 4/29, and Dr. Bynum is preferring to see patient prior to surgery as well. Advised daughter in law Nga to ask for a virtual appt with Misa, and the blood work can be done on 4/29.    Nga will follow up with cardiology, all questions/concerns addressed.    ----- Message from Amanda Wade sent at 3/24/2022  8:40 AM CDT -----    Good Morning!  I had a voicemail from this patients daughter in law saying they had questions about upcoming appointments. DIL name is Nga [phone].    Also who do I direct VMs I get for [facility] patients?    Amanda

## 2022-03-27 ENCOUNTER — MEDICAL CORRESPONDENCE (OUTPATIENT)
Dept: HEALTH INFORMATION MANAGEMENT | Facility: CLINIC | Age: 77
End: 2022-03-27
Payer: MEDICARE

## 2022-04-04 ENCOUNTER — VIRTUAL VISIT (OUTPATIENT)
Dept: CARDIOLOGY | Facility: CLINIC | Age: 77
End: 2022-04-04
Attending: INTERNAL MEDICINE
Payer: MEDICARE

## 2022-04-04 DIAGNOSIS — Z11.59 ENCOUNTER FOR SCREENING FOR OTHER VIRAL DISEASES: Primary | ICD-10-CM

## 2022-04-04 DIAGNOSIS — I27.24 CTEPH (CHRONIC THROMBOEMBOLIC PULMONARY HYPERTENSION) (H): Primary | ICD-10-CM

## 2022-04-04 PROCEDURE — 99215 OFFICE O/P EST HI 40 MIN: CPT | Mod: 95 | Performed by: INTERNAL MEDICINE

## 2022-04-04 RX ORDER — POTASSIUM CHLORIDE 1500 MG/1
40 TABLET, EXTENDED RELEASE ORAL
Status: CANCELLED | OUTPATIENT
Start: 2022-04-04

## 2022-04-04 RX ORDER — LIDOCAINE 40 MG/G
CREAM TOPICAL
Status: CANCELLED | OUTPATIENT
Start: 2022-04-04

## 2022-04-04 RX ORDER — ASPIRIN 81 MG/1
243 TABLET, CHEWABLE ORAL ONCE
Status: CANCELLED | OUTPATIENT
Start: 2022-04-04

## 2022-04-04 RX ORDER — ASPIRIN 325 MG
325 TABLET ORAL ONCE
Status: CANCELLED | OUTPATIENT
Start: 2022-04-04 | End: 2022-04-04

## 2022-04-04 RX ORDER — SODIUM CHLORIDE 9 MG/ML
INJECTION, SOLUTION INTRAVENOUS CONTINUOUS
Status: CANCELLED | OUTPATIENT
Start: 2022-04-04

## 2022-04-04 RX ORDER — POTASSIUM CHLORIDE 1500 MG/1
20 TABLET, EXTENDED RELEASE ORAL
Status: CANCELLED | OUTPATIENT
Start: 2022-04-04

## 2022-04-04 ASSESSMENT — ENCOUNTER SYMPTOMS
EXERCISE INTOLERANCE: 1
SPUTUM PRODUCTION: 0
HYPERTENSION: 0
SYNCOPE: 0
COUGH: 1
POSTURAL DYSPNEA: 1
LIGHT-HEADEDNESS: 0
SHORTNESS OF BREATH: 1
HEMOPTYSIS: 0
WHEEZING: 1
LEG PAIN: 0
HYPOTENSION: 0
SNORES LOUDLY: 0
ORTHOPNEA: 1
COUGH DISTURBING SLEEP: 1
DYSPNEA ON EXERTION: 1
PALPITATIONS: 0
SLEEP DISTURBANCES DUE TO BREATHING: 0

## 2022-04-04 ASSESSMENT — PATIENT HEALTH QUESTIONNAIRE - PHQ9: SUM OF ALL RESPONSES TO PHQ QUESTIONS 1-9: 11

## 2022-04-04 NOTE — PATIENT INSTRUCTIONS
Medication Changes:  No changes    Follow up Appointment Information:  We will call you for scheduling following procedures    *Mandatory COVID Testing:   Complete a COVID test no sooner than 4 days prior to their procedure.      To schedule COVID testing Please call [phone]    If you want to complete this at an outside facility please call them to find out if they will have the results within the appropriate time frame and their fax number.  You will need to provide us with that information so we can send them the order.    They will need to fax the results to [phone]    If you are running into and issues please call us.           Right Heart Catheterization    A Right Heart Cath is a test that measures how well your heart is pumping blood to your body and will assess the volume and pressures in your heart.   Location: [facility], [address] [place] [address].   Check in: Gold Waiting Area.   Please bring an updated list of your current medications.  A physician will come and talk with you about the procedure and obtain consent.  A nurse from the Cardiac Catheterization Lab will then escort you to the procedure area. You will receive a shot to numb the site where the catheter (tube) will enter your body.  This may be in the neck or groin - but likely your neck.  You will not receive sedation or anesthesia.   After the procedure you will recover for a short period and then be discharged.    Pre procedure instructions:     1.  Do not eat any solid food or milk products for 6 hours prior to the exam. You may drink clear liquids until 2 hours prior to the exam. Clear liquids include the following: water, Jell-O, clear broth, apple juice or any non-carbonated drink that you can see through (no soda).   2. Do not drink alcohol or smoke 24 hours prior to test.  3. Hold these meds:  eliquis 2 days prior to surger on 5/2/22       Do not take your oral diabetic  medication or short acting insulin the day of the procedure.     4. You may take your other morning medications as prescribed with a sip of water. You may hold your diuretic that morning.   5. The morning of your test, please take your temperature at home. If your temperature is below 100F, continue with plan to come in for testing. If temperature greater than 100F, please call [phone] to discuss whether to proceed with testing or not.   Check-In  Time Check-In Location Estimated Length Procedure   Name     10am   [facility]  waiting room 60-90 minutes Right Heart Catheterization**     Procedure Preparations & Instructions     This is an invasive procedure that DOES require preparation:    - Take your temperature in the morning prior to coming in.  If your temperature is 100 F please call [phone] (Opt. 1) and notify them.  If you do not have access to a thermometer at home, please come in for testing.  If you are running a temperature your procedure may be rescheduled.  - Nothing to eat for 6 hours   - You may have clear liquids up until the time of your procedure  - You can take your morning medications (except diabetic and blood thinners) with sips of water  - Please arrange for a ride to drop you off and pick you up in the instance you are unable to drive home, however you should be able to function as you normally would after the procedure      ?      *For Patients on anticoagulants: ? Your Coumadin Clinic will give you instructions on medication adjustments or bridging prior to the procedure   ? apixaban (ELIQUIS) - Hold 48 hours prior to procedure   You will be administering Lovenox injections on 4/30 AM and PM and 5/1 AM only.                  Results:    We are located on the third floor of the [facility] ([facility]) on the [facility].  Our address is     [address] [place] [address] [place] [address] [place] [address]    Thank you for allowing us to be a part of your  care here at the [facility]    If you have questions or concerns please contact us at:    Yane Cardenas, RN, BSN, PHN  Ladan Aldrich (Scheduling,Prior Auth)  Nurse Coordinator     Clinic   Pulmonary Hypertension   Pulmonary Hypertension  [facility]  (Phone)[phone]   (Phone) [phone]        (Fax) [phone]    ** Please note that you will NOT receive a reminder call regarding your scheduled testing, reminder calls are for provider appointments only.  If you are scheduled for testing within the [employer] system you may receive a call regarding pre-registration for billing purposes only.**     Support Group:  Pulmonary Hypertension Association  Https://www.phassociation.org/  **Look at the Events Tab** They even have Support Groups that you can call into    [facility] Support Group  Second Saturday of the Month from 1-3 PM   Location: [address] [place] [address]  Leader: Rupa Worthy  Phone: [phone]  Email: [email or web address] Application

## 2022-04-04 NOTE — LETTER
4/4/2022      RE: Debi Espinoza  [address] [facility] [address]       Dear Colleague,    Thank you for the opportunity to participate in the care of your patient, Debi Espinoza, at the [facility] [place] at [facility]. Please see a copy of my visit note below.      [facility] [place]  Pulmonary Hypertension Clinic    Service Date: April 4, 2022     Dear Dr. Jerome,     Debi Espinoza is a 76 year old female who presents to us for evaluation of her pulmonary hypertension.    Although you know her well, I will summarize the pertinent points of her history briefly for our records.  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      With regards to her pulmonary hypertension:  In May 2019 she reports that she presented to her PCPs office with fatigue cough, and SOB for a few days.  CTPE showed PE with mild to moderate clot burden (?bilateral). She was also found to have DVT bilaterally. TTE (no images) showed dilated RV with reduced RV function and RVSP of 84mmHg suggestive of severe pulmonary hypertension with mild to moderate TR.   I did find notes that she had a work related injury in June 2018 and was wearing and unna boot into early 2019, ?provoked.  She did see hematology at [place] in July 2019 who felt her DVT and PE were idiopathic and she wouldn't benefit from a hypercoag workup, recommended anticoagulation for 2 years. Debi says she was on coumadin for about a year.    She eventually presented back to Dr. White earlier this year in January with worsening SOB. VQ scan was done which showed multiple mismatches, and repeat CTPE was done which suggested acute on chronic PE bilaterally with new thrombus in the left lower lobe.  CT also showed moderate emphysema. For PE she was put on apixaban, and for PH she was started on sildenafil 20mg tid.  Her lasix dose was also adjusted and she was started on home O2.  Dr. Solorio tried to get her on Adempas and on followup felt better, she had stopped using O2. Repeat 6mwt was improved at 156m with no desaturations. Repeat TTE in March showed per report markedly dilated RV with reduced function, PA pressure 82mmHg, severe TR, normal LVEF, markedly dilated RA, dilated IVC and bowing of the IAS from right to left.       She presents today via video visit from her daughter's home. She reports that she is currently on Adempas 2.5 mg 3 times daily which she was increasing every two weeks after some delay. She has also started macitentan. She remains on Apixaban.  She has oxygen at home but she has not used it.  She reports her shortness of breath is overall stable slightly improved. She says she can walk about a block but feels significant shortness of breath with the stairs.  Her laundry is in the basement and she is able to do that one step at a time. She is able to take care of all of her ADLs. Her groceries are done for her. She is about functional class III. She denies any chest pain, dizziness, lightheadedness, presyncope or syncope. No orthopnea or PND. She continues on a stable dose of 40 mg furosemide daily. No recent ED visits or hospitalizations.     Since our last visit she was seen by Dr. Peterson. They discussed pulmonary endarterectomy and has plans to undergo this surgery on 2022. She still has not undergone coronary angiography.     Other pertinent history:  Family-her sister had breast cancer, her mother  when she was old, her dad  of lung cancer.  She has 1 son who is adopted.  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  She lives at home she is .  Her son and daughter-in-law live close  by.    PAST MEDICAL HISTORY:  No past medical history on file.    CURRENT MEDICATIONS:  Current Outpatient Medications   Medication Sig     acetaminophen (TYLENOL) 500 MG tablet Take 1,000 mg by mouth     calcitRIOL (ROCALTROL) 0.25 MCG capsule Take 0.25 mcg by mouth (Patient not taking: Reported on 3/16/2022)     furosemide (LASIX) 40 MG tablet Take 40 mg by mouth     riociguat (ADEMPAS) 0.5 MG tablet Take 2 tablets (1 mg) by mouth 3 times daily Please make sure you are completing you monthly mandatory labs.     riociguat (ADEMPAS) 0.5 MG tablet Take 0.5 mg by mouth 3 times daily (with meals)      No current facility-administered medications for this visit.     ROS:   10 point ROS negative except as discussed in above HPI.    EXAM:  Exam deferred given Video Encounter    Labs:  Lab Results   Component Value Date    WBC 4.3 12/08/2021    HGB 12.5 12/08/2021    HCT 41.2 12/08/2021     12/08/2021     12/08/2021    POTASSIUM 3.7 12/08/2021    CHLORIDE 108 12/08/2021    CO2 22 12/08/2021    BUN 27 12/08/2021    CR 1.29 (H) 12/08/2021     (H) 12/08/2021    NTBNPI 6,611 (H) 12/08/2021    AST 22 12/08/2021    ALT 19 12/08/2021    ALKPHOS 87 12/08/2021    BILITOTAL 0.9 12/08/2021    INR 1.20 (H) 12/08/2021       Records below are from Care Everywhere    Echocardiogram:  TTE 3/26/21  Left Ventricle: Hyperdynamic left ventricular systolic function. The ejection fraction is visually estimated to be 75 %.     Left Atrium: Normal left atrial size.     IAS: The atrial septum bows from right-to-left.     Right Ventricle: Markedly dilated right ventricle. Reduced right ventricular systolic function. Pulmonary artery systolic pressure is measured at 82 mmHg.     Right Atrium: Markedly dilated right atrium.     Tricuspid Valve: Wide open tricuspid regurgitation.       Comparison Date: 01/23/2021   Comparison Study: Transthoracic Echocardiogram   Pulmonary artery pressure has decreased from 101 mmHg to 82 mmHg      TTE 6/26/20  Qualitative ejection fraction is >70% (hyperdynamic).The left ventricle is small.There is septal flattening compatible with pulmonary hypertension.Wall motion is otherwise normal.   The right ventricular systolic function is reduced.The right ventricle is severely enlarged.   No significant aortic insufficiency.There is no aortic stenosis.   There is trace mitral regurgitation.No mitral valve stenosis.   Moderate tricuspid regurgitation.   Right ventricular systolic pressure estimate is  mmHg.   Inferior vena cava size enlarged and collapsibility < 50% indicating elevated right atrial pressure (15 mm Hg).   There is no pericardial effusion.     TTE 5/31/19  Left Ventricle:   Normal left ventricular systolic function. The ejection fraction is visually estimated to be 65 %.     Right Ventricle:   Reduced right ventricular systolic function. Pulmonary artery systolic pressure is measured at 84 mmHg.     Pulmonary Artery:   Severe pulmonary artery hypertension.       Comparison Study: No previous echo was available for comparison     RHC 2/4/21  RA: 12  RV: 76/-, 14  PA: 84/19/45  PCWP: 2  Boris CO/CI: 2.8/1.6  PVR: 15.5    RHC and Pulmonary Angiogram 12/8/2021  RA: 12  RV: 76/-, 14  PA: 84/19/45  PCWP: 2  Boris CO/CI: 2.8/1.6  PVR: 15.5    RPA, interlobar artery, basal trunk and truncus anterior were all patent.  There is  of the A2 and A5-6 segments.  A separate branch of the A2 segment has a stenosis.  A separate branch of the A6 segment has a stenosis.  There is stenosis of A4, A7-10 segments.    LPA, interlobar artery and truncus anterior were all patent. Basal trunk involving A9 and 10 is occluded.  The main branch of A1 and 2 has a web, A1 and 2 are patent.  A3 has a stenosis.  A4 has a stenosis proximally, A5 is patent.  A 6 is patent. A7 has a stenosis, A8 has a stenosis.  A9 and 10 appear to be occluded.      PFTs 11/28/19  FVC is 2.54, which is 111% of predicted.  FEV1 is 1.86, which  is 110% of predicted.  FEV1/FVC ratio is 73.     Diffusion capacity is 12.9 mL/mmHg per minute, which is 63% of predicted.     CONCLUSION:     1.  Normal spirometry.   2.  Mildly reduced DLCO corrected for hemoglobin.   3.  No previous studies to compare.      6MWT:   10/7/21: 156m, lowest O2 saturation 89%  3/2021: 156m    CTA Chest 1/22/21  1.  The study is positive for pulmonary embolism. Findings suggest both acute and chronic components. The thrombus in the pulmonary artery to the anterior basal segment of the left lower lobe is new.   2.  There is evidence for pulmonary hypertension   3.  Enlargement of right ventricle and right atrium relative to the left, which may represent a strain pattern   4.  There are underlying emphysematous changes, of moderate severity.     CTA Chest 5/30/19  1. Positive for acute pulmonary embolus.   2. Change in the right hilus suggest a may be an element of cor pulmonale related to the PE although clot burden is felt to be moderate   3. Changes superior segment left lower lobe might be related to pulmonary embolus versus infection.   4. Possible obstruction of the right kidney suggest ultrasound for evaluation.      CT Chest without contrast 11/7/19  1. Mildly limited exam due to respiratory motion.   2. Mild scattered linear scarring throughout the bilateral lungs.   3. No airspace consolidation. Previously seen consolidation within the superior segment of the left lower lobe has resolved.   4. No mediastinal or hilar lymphadenopathy.      V/Q scan 1/22/21  1.   Multiple perfusion defects which mismatch with the ventilation scan. Findings represents a high probability for pulmonary embolism. The pattern suggests the possibility for acute pulmonary emboli.      [facility] 7/4/2020  Coronary Angiography: The coronary circulation is right dominant.     Left Heart Cath: Left ventricular function was not assessed. Ejection fraction was not calculated. The left ventricular end diastolic  pressure   was 21 mmHg.     Diagnostic Conclusions:   - There is no evidence of angiographic coronary artery disease      Assessment and Plan:     Debi Espinoza is a 76 year old female with medical history including HTN, renal stones complicated by infection, knee osteoarthritis, history of bilateral PE x2 in 2019 and 2021, DVT 2019, on chronic anticoagulation with Apixaban, and severe pulmonary hypertension.      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 She is maintained on lifelong anticoagulation now since her second PE earlier this year and is on Apixaban. For pulmonary hypertension therapy she is currently maintained on Adempas 2.5 mg 3 times daily.  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 She has since been evaluated by Dr. Peterson with plans to undergo endarterectomy on 5/2/2022.     Prior to surgery she will need to have a coronary angiogram performed. We will schedule this hopefully for the same day she comes in for her pre-op visit 4/29. She has been instructed to stop taking her Apixaban 48 hrs prior to her angiogram and she has received enoxaparin bridging supplies for the weekend prior to her surgery.     Plan:  - no changes to medication regimen at this time.  - coronary angiogram prior to planned pulmonary endarterectomy     Patient was seen and examined with Dr. Knox.    Barak Umanzor MD   Cardiology Fellow, PGY-5  April 4, 2022  3:09 PM     I examined the patient and agree with the assessment and plan of Dr. Umanzor.    On Macitentan and Riociguat 2.5 mg tid  Doing better but still limited FC III  No evidence of decompensated heart failure  Plan for high risk PEA in early May - High risk given her age, high PVR, and borderline renal function. 10% risk of mortality and 10-20% risk of dialysis. Patient understands the risks and willing undergo PTE as long-term outcomes are better with PTE. She is also quite  limited now.   Will plan for coronary angiogram prior to her PTE  Bridge with Lovenox after her coronary angiogram     Total time today was 45 minutes reviewing notes, imaging, labs, patient visit, orders and documentation         Lisette Knox MD   [facility]  Heart Failure, Transplant, and Mechanical Circulatory Support Cardiology   Cardiovascular Division  [facility]   [phone]

## 2022-04-04 NOTE — NURSING NOTE
Patient educated to the following during visit and educated to contact RN or MD for any questions.     Right Heart Catheterization: Patient was instructed regarding right heart catheterization, including discussion of the procedure, preparation, intra-procedural steps, and recovery at home. Patient demonstrated understanding of this information and agreed to call with further questions or concerns.    Medication changes: Hold eliquis prior to procedure on 5/2/22 with lovenox bridging on 4/30 and 4/31    Return Appointment: To be scheduled after testing and procedures    Called patient to discuss further instructions and schedule for testing. Patient will come to clinic on 4/28 for covid testing with procedures and appointments scheduled on 4/29. Patient agreeable to this plan, requested [employer] message instructions and send via mail.     *Mandatory COVID Testing:   Complete a COVID test no sooner than 4 days prior to their procedure.      To schedule COVID testing Please call [phone]    If you want to complete this at an outside facility please call them to find out if they will have the results within the appropriate time frame and their fax number.  You will need to provide us with that information so we can send them the order.    They will need to fax the results to [phone]    If you are running into and issues please call us.           Right Heart Catheterization    A Right Heart Cath is a test that measures how well your heart is pumping blood to your body and will assess the volume and pressures in your heart.   Location: [facility], [address] [place] [address].   Check in: Gold Waiting Area.   Please bring an updated list of your current medications.  A physician will come and talk with you about the procedure and obtain consent.  A nurse from the Cardiac Catheterization Lab will then escort you to the procedure area. You will receive a shot to  numb the site where the catheter (tube) will enter your body.  This may be in the neck or groin - but likely your neck.  You will not receive sedation or anesthesia.   After the procedure you will recover for a short period and then be discharged.    Pre procedure instructions:     1.  Do not eat any solid food or milk products for 6 hours prior to the exam. You may drink clear liquids until 2 hours prior to the exam. Clear liquids include the following: water, Jell-O, clear broth, apple juice or any non-carbonated drink that you can see through (no soda).   2. Do not drink alcohol or smoke 24 hours prior to test.  3. Hold these meds:  eliquis 2 days prior to surger on 5/2/22       Do not take your oral diabetic medication or short acting insulin the day of the procedure.     4. You may take your other morning medications as prescribed with a sip of water. You may hold your diuretic that morning.   5. The morning of your test, please take your temperature at home. If your temperature is below 100F, continue with plan to come in for testing. If temperature greater than 100F, please call [phone] to discuss whether to proceed with testing or not.   Check-In  Time Check-In Location Estimated Length Procedure   Name        [facility]  waiting room 60-90 minutes Right Heart Catheterization**     Procedure Preparations & Instructions     This is an invasive procedure that DOES require preparation:    - Take your temperature in the morning prior to coming in.  If your temperature is 100 F please call [phone] (Opt. 1) and notify them.  If you do not have access to a thermometer at home, please come in for testing.  If you are running a temperature your procedure may be rescheduled.  - Nothing to eat for 6 hours   - You may have clear liquids up until the time of your procedure  - You can take your morning medications (except diabetic and blood thinners) with sips of water  - Please arrange for a ride to drop you off and  pick you up in the instance you are unable to drive home, however you should be able to function as you normally would after the procedure      ?      *For Patients on anticoagulants: ? Your Coumadin Clinic will give you instructions on medication adjustments or bridging prior to the procedure   ? apixaban (ELIQUIS) - Hold 48 hours prior to procedure   You will be administering Lovenox injections on 4/30 AM and PM and 4/31 AM only.

## 2022-04-04 NOTE — NURSING NOTE
Chief Complaint   Patient presents with     Follow Up     no questions while rooming, phq9 score of 11 today, no thoughts of self harm, no vitals to report for todays visit (pt takes them in afternoon)     Patient denies any changes since echeck-in regarding medication and allergies and states all information entered during echeck-in remains accurate.    Jordon Ivory, VF/CMA

## 2022-04-04 NOTE — PROGRESS NOTES
Debi Espinoza  is being evaluated via a billable video visit.      How would you like to obtain your AVS? [employer]  For the video visit, send the invitation by:   Joining via [employer]   Will anyone else be joining your video visit?   pts daughter in law Nga Herreraten, VF/CMA        [facility]  Pulmonary Hypertension Clinic    Service Date: April 4, 2022     Dear Dr. Jerome,     Debi Espinoza is a 76 year old female who presents to us for evaluation of her pulmonary hypertension.    Although you know her well, I will summarize the pertinent points of her history briefly for our records.  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      With regards to her pulmonary hypertension:  In May 2019 she reports that she presented to her PCPs office with fatigue cough, and SOB for a few days.  CTPE showed PE with mild to moderate clot burden (?bilateral). She was also found to have DVT bilaterally. TTE (no images) showed dilated RV with reduced RV function and RVSP of 84mmHg suggestive of severe pulmonary hypertension with mild to moderate TR.   I did find notes that she had a work related injury in June 2018 and was wearing and unna boot into early 2019, ?provoked.  She did see hematology at [facility] in July 2019 who felt her DVT and PE were idiopathic and she wouldn't benefit from a hypercoag workup, recommended anticoagulation for 2 years. Debi says she was on coumadin for about a year.    She eventually presented back to Dr. White earlier this year in January with worsening SOB. VQ scan was done which showed multiple mismatches, and repeat CTPE was done which suggested acute on chronic PE bilaterally with new thrombus in the left lower lobe. CT also showed moderate emphysema. For PE she was put  on apixaban, and for PH she was started on sildenafil 20mg tid.  Her lasix dose was also adjusted and she was started on home O2.  Dr. Solorio tried to get her on Adempas and on followup felt better, she had stopped using O2. Repeat 6mwt was improved at 156m with no desaturations. Repeat TTE in March showed per report markedly dilated RV with reduced function, PA pressure 82mmHg, severe TR, normal LVEF, markedly dilated RA, dilated IVC and bowing of the IAS from right to left.       She presents today via video visit from her daughter's home. She reports that she is currently on Adempas 2.5 mg 3 times daily which she was increasing every two weeks after some delay. She has also started macitentan. She remains on Apixaban.  She has oxygen at home but she has not used it.  She reports her shortness of breath is overall stable slightly improved. She says she can walk about a block but feels significant shortness of breath with the stairs.  Her laundry is in the basement and she is able to do that one step at a time. She is able to take care of all of her ADLs. Her groceries are done for her. She is about functional class III. She denies any chest pain, dizziness, lightheadedness, presyncope or syncope. No orthopnea or PND. She continues on a stable dose of 40 mg furosemide daily. No recent ED visits or hospitalizations.     Since our last visit she was seen by Dr. Peterson. They discussed pulmonary endarterectomy and has plans to undergo this surgery on 2022. She still has not undergone coronary angiography.     Other pertinent history:  Family-her sister had breast cancer, her mother  when she was old, her dad  of lung cancer.  She has 1 son who is adopted.  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  She lives at home she is .  Her son and daughter-in-law live close by.    PAST MEDICAL HISTORY:  No past medical history on  file.    CURRENT MEDICATIONS:  Current Outpatient Medications   Medication Sig     acetaminophen (TYLENOL) 500 MG tablet Take 1,000 mg by mouth     calcitRIOL (ROCALTROL) 0.25 MCG capsule Take 0.25 mcg by mouth (Patient not taking: Reported on 3/16/2022)     furosemide (LASIX) 40 MG tablet Take 40 mg by mouth     riociguat (ADEMPAS) 0.5 MG tablet Take 2 tablets (1 mg) by mouth 3 times daily Please make sure you are completing you monthly mandatory labs.     riociguat (ADEMPAS) 0.5 MG tablet Take 0.5 mg by mouth 3 times daily (with meals)      No current facility-administered medications for this visit.     ROS:   10 point ROS negative except as discussed in above HPI.    EXAM:  Exam deferred given Video Encounter    Labs:  Lab Results   Component Value Date    WBC 4.3 12/08/2021    HGB 12.5 12/08/2021    HCT 41.2 12/08/2021     12/08/2021     12/08/2021    POTASSIUM 3.7 12/08/2021    CHLORIDE 108 12/08/2021    CO2 22 12/08/2021    BUN 27 12/08/2021    CR 1.29 (H) 12/08/2021     (H) 12/08/2021    NTBNPI 6,611 (H) 12/08/2021    AST 22 12/08/2021    ALT 19 12/08/2021    ALKPHOS 87 12/08/2021    BILITOTAL 0.9 12/08/2021    INR 1.20 (H) 12/08/2021       Records below are from Care Everywhere    Echocardiogram:  TTE 3/26/21  Left Ventricle: Hyperdynamic left ventricular systolic function. The ejection fraction is visually estimated to be 75 %.     Left Atrium: Normal left atrial size.     IAS: The atrial septum bows from right-to-left.     Right Ventricle: Markedly dilated right ventricle. Reduced right ventricular systolic function. Pulmonary artery systolic pressure is measured at 82 mmHg.     Right Atrium: Markedly dilated right atrium.     Tricuspid Valve: Wide open tricuspid regurgitation.       Comparison Date: 01/23/2021   Comparison Study: Transthoracic Echocardiogram   Pulmonary artery pressure has decreased from 101 mmHg to 82 mmHg     TTE 6/26/20  Qualitative ejection fraction is >70%  (hyperdynamic).The left ventricle is small.There is septal flattening compatible with pulmonary hypertension.Wall motion is otherwise normal.   The right ventricular systolic function is reduced.The right ventricle is severely enlarged.   No significant aortic insufficiency.There is no aortic stenosis.   There is trace mitral regurgitation.No mitral valve stenosis.   Moderate tricuspid regurgitation.   Right ventricular systolic pressure estimate is  mmHg.   Inferior vena cava size enlarged and collapsibility < 50% indicating elevated right atrial pressure (15 mm Hg).   There is no pericardial effusion.     TTE 5/31/19  Left Ventricle:   Normal left ventricular systolic function. The ejection fraction is visually estimated to be 65 %.     Right Ventricle:   Reduced right ventricular systolic function. Pulmonary artery systolic pressure is measured at 84 mmHg.     Pulmonary Artery:   Severe pulmonary artery hypertension.       Comparison Study: No previous echo was available for comparison     RHC 2/4/21  RA: 12  RV: 76/-, 14  PA: 84/19/45  PCWP: 2  Boris CO/CI: 2.8/1.6  PVR: 15.5    RHC and Pulmonary Angiogram 12/8/2021  RA: 12  RV: 76/-, 14  PA: 84/19/45  PCWP: 2  Boris CO/CI: 2.8/1.6  PVR: 15.5    RPA, interlobar artery, basal trunk and truncus anterior were all patent.  There is  of the A2 and A5-6 segments.  A separate branch of the A2 segment has a stenosis.  A separate branch of the A6 segment has a stenosis.  There is stenosis of A4, A7-10 segments.    LPA, interlobar artery and truncus anterior were all patent. Basal trunk involving A9 and 10 is occluded.  The main branch of A1 and 2 has a web, A1 and 2 are patent.  A3 has a stenosis.  A4 has a stenosis proximally, A5 is patent.  A 6 is patent. A7 has a stenosis, A8 has a stenosis.  A9 and 10 appear to be occluded.      PFTs 11/28/19  FVC is 2.54, which is 111% of predicted.  FEV1 is 1.86, which is 110% of predicted.  FEV1/FVC ratio is 73.      Diffusion capacity is 12.9 mL/mmHg per minute, which is 63% of predicted.     CONCLUSION:     1.  Normal spirometry.   2.  Mildly reduced DLCO corrected for hemoglobin.   3.  No previous studies to compare.      6MWT:   10/7/21: 156m, lowest O2 saturation 89%  3/2021: 156m    CTA Chest 1/22/21  1.  The study is positive for pulmonary embolism. Findings suggest both acute and chronic components. The thrombus in the pulmonary artery to the anterior basal segment of the left lower lobe is new.   2.  There is evidence for pulmonary hypertension   3.  Enlargement of right ventricle and right atrium relative to the left, which may represent a strain pattern   4.  There are underlying emphysematous changes, of moderate severity.     CTA Chest 5/30/19  1. Positive for acute pulmonary embolus.   2. Change in the right hilus suggest a may be an element of cor pulmonale related to the PE although clot burden is felt to be moderate   3. Changes superior segment left lower lobe might be related to pulmonary embolus versus infection.   4. Possible obstruction of the right kidney suggest ultrasound for evaluation.      CT Chest without contrast 11/7/19  1. Mildly limited exam due to respiratory motion.   2. Mild scattered linear scarring throughout the bilateral lungs.   3. No airspace consolidation. Previously seen consolidation within the superior segment of the left lower lobe has resolved.   4. No mediastinal or hilar lymphadenopathy.      V/Q scan 1/22/21  1.   Multiple perfusion defects which mismatch with the ventilation scan. Findings represents a high probability for pulmonary embolism. The pattern suggests the possibility for acute pulmonary emboli.      [facility] 7/4/2020  Coronary Angiography: The coronary circulation is right dominant.     Left Heart Cath: Left ventricular function was not assessed. Ejection fraction was not calculated. The left ventricular end diastolic pressure   was 21 mmHg.     Diagnostic  Conclusions:   - There is no evidence of angiographic coronary artery disease      Assessment and Plan:     Debi Espinoza is a 76 year old female with medical history including HTN, renal stones complicated by infection, knee osteoarthritis, history of bilateral PE x2 in 2019 and 2021, DVT 2019, on chronic anticoagulation with Apixaban, and severe pulmonary hypertension.      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 She is maintained on lifelong anticoagulation now since her second PE earlier this year and is on Apixaban. For pulmonary hypertension therapy she is currently maintained on Adempas 2.5 mg 3 times daily.  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 She has since been evaluated by Dr. Peterson with plans to undergo endarterectomy on 5/2/2022.     Prior to surgery she will need to have a coronary angiogram performed. We will schedule this hopefully for the same day she comes in for her pre-op visit 4/29. She has been instructed to stop taking her Apixaban 48 hrs prior to her angiogram and she has received enoxaparin bridging supplies for the weekend prior to her surgery.     Plan:  - no changes to medication regimen at this time.  - coronary angiogram prior to planned pulmonary endarterectomy     Patient was seen and examined with Dr. Knox.    Barak Umanzor MD   Cardiology Fellow, PGY-5  April 4, 2022  3:09 PM     I examined the patient and agree with the assessment and plan of Dr. Umanzor.    On Macitentan and Riociguat 2.5 mg tid  Doing better but still limited FC III  No evidence of decompensated heart failure  Plan for high risk PEA in early May - High risk given her age, high PVR, and borderline renal function. 10% risk of mortality and 10-20% risk of dialysis. Patient understands the risks and willing undergo PTE as long-term outcomes are better with PTE. She is also quite limited now.   Will plan for coronary  angiogram prior to her PTE  Bridge with Lovenox after her coronary angiogram     Total time today was 45 minutes reviewing notes, imaging, labs, patient visit, orders and documentation         Lisette Knox MD   [facility]  Heart Failure, Transplant, and Mechanical Circulatory Support Cardiology   Cardiovascular Division  [facility]   [phone]

## 2022-04-11 ENCOUNTER — TELEPHONE (OUTPATIENT)
Dept: CARDIOLOGY | Facility: CLINIC | Age: 77
End: 2022-04-11
Payer: MEDICARE

## 2022-04-11 NOTE — TELEPHONE ENCOUNTER
[facility] Call Center    Phone Message    May a detailed message be left on voicemail: yes     Reason for Call: Other: Dominique from the Adempas Program called with questions in regards to Debi's adempas and her upcoming surgery. Please advise. Thank you.      Action Taken: Message routed to:  [facility] ([facility] Cardio    Travel Screening: Not Applicable           ------------------------------------  LM advising her I was calling back and left a direct dial number for call back.Yane Cardenas RN on 4/12/2022 at 11:35 AM                       Spoke with RN and confirmed patient should stay on Adempas 235mg right up until surgery. Yane Cardenas RN on 4/13/2022 at 11:22 AM

## 2022-04-27 RX ORDER — FUROSEMIDE 20 MG
20 TABLET ORAL DAILY
COMMUNITY
End: 2022-08-24

## 2022-04-27 NOTE — PHARMACY - PREOPERATIVE ASSESSMENT CENTER
Anticoagulation Note - Preoperative Assessment Center (PAC) Pharmacist     Patient was interviewed on April 27, 2022 as a part of PAC clinic appointment. The purpose of this note is to document the perioperative anticoagulation plan outlined by the providers caring for Debi Espinoza.     Current Regimen  Anticoagulation Regimen as of April 27, 2022: apixaban (ELIQUIS) 5 mg by mouth twice daily       Indication: CTEPH  Current medications that may interact with this include: none  Creatinine   Date Value Ref Range Status   12/08/2021 1.29 (H) 0.52 - 1.04 mg/dL Final     Perioperative plan  Debi Espinoza is scheduled for pulmonary thromboendarterectomy AND ASSOCIATED PROCEDURES on 5/2 with Dr. Peterson and the perioperative anticoagulation plan outlined by phone conversation with Dr. Knox today is to hold Eliquis after tonight's dose and then bridge with lovenox per instructions below prior to surgery.     Resumption of anticoagulation after procedure will be based on surgery team assessment of bleeding risks and complications.  This plan may require re-assessment and modification by her primary team in the perioperative setting depending on patients clinical situation.        Delmer Toribio, [facility]  April 27, 2022  4:03 PM      --------   Per phone conversation with Dr. Knox on April 27, 2022 at 4:30 PM:     4/27 Eliquis - last dose 4/27 PM  4/28 no Eliquis - hold until AFTER surgery on 5/2 4/29 RHC and coronary angiogram at 12 PM - Start Lovenox injection 4/29 PM   4/30 Lovenox 8AM and 8PM  5/1 Lovenox 7:30 AM ONLY - No PM dose, Also, per Dr. Yusuf hold Opsumit and Adempas all day on 5/1 5/2 day of surgery - no lovenox, adempas or opsumit

## 2022-04-27 NOTE — PHARMACY - PREOPERATIVE ASSESSMENT CENTER
Preoperative Assessment Center Medication History Note    Medication history completed on April 27, 2022 by this writer. See Epic admission navigator for prior to admission medications. Operating room staff will still need to confirm medications and last dose information on day of surgery.     Medication history interview sources  Patient interview: Yes  Care Everywhere records: Yes  Surescripts pharmacy refill records: Yes  Other (if applicable): called [facility] but patient not on file there. Had patient read me her PAH medications from the bottle.     Changes made to PTA medication list  Added: none  Deleted: none  Changed: adempas dose/sig, macitentan dose/sig, acetaminophen,     Additional medication history information (including reliability of information, actions taken by pharmacist):    -- No recent (within 30 days) course of antibiotics  -- No recent (within 30 days) course of systemic steroids  -- Patient reports being on blood thinning medications - Eliquis, see other note.    -- Patient declines being on any other prescription or over-the-counter medications    Prior to Admission medications    Medication Sig Last Dose Taking? Auth Provider   acetaminophen (TYLENOL) 500 MG tablet Take 1,000 mg by mouth every 6 hours as needed Taking Yes Reported, Patient   apixaban ANTICOAGULANT (ELIQUIS) 5 MG tablet Take 1 tablet (5 mg) by mouth 2 times daily Taking Yes Lisette Knox MD   furosemide (LASIX) 20 MG tablet Take 20 mg by mouth daily Taking Yes Unknown, Entered By History   macitentan (OPSUMIT) 10 MG tablet Take 10 mg by mouth daily (with lunch) Taking Yes Lisette Knox MD   riociguat (ADEMPAS) 2.5 MG tablet Take 2.5 mg by mouth 3 times daily Taking Yes Unknown, Entered By History        Medication history completed by: Delmer Toribio [facility]

## 2022-04-28 ENCOUNTER — TELEPHONE (OUTPATIENT)
Dept: CARDIOLOGY | Facility: CLINIC | Age: 77
End: 2022-04-28
Payer: MEDICARE

## 2022-04-28 ENCOUNTER — LAB (OUTPATIENT)
Dept: LAB | Facility: CLINIC | Age: 77
End: 2022-04-28
Attending: INTERNAL MEDICINE

## 2022-04-28 DIAGNOSIS — I27.24 CTEPH (CHRONIC THROMBOEMBOLIC PULMONARY HYPERTENSION) (H): ICD-10-CM

## 2022-04-28 DIAGNOSIS — Z11.59 ENCOUNTER FOR SCREENING FOR OTHER VIRAL DISEASES: ICD-10-CM

## 2022-04-28 PROCEDURE — U0003 INFECTIOUS AGENT DETECTION BY NUCLEIC ACID (DNA OR RNA); SEVERE ACUTE RESPIRATORY SYNDROME CORONAVIRUS 2 (SARS-COV-2) (CORONAVIRUS DISEASE [COVID-19]), AMPLIFIED PROBE TECHNIQUE, MAKING USE OF HIGH THROUGHPUT TECHNOLOGIES AS DESCRIBED BY CMS-2020-01-R: HCPCS | Performed by: THORACIC SURGERY (CARDIOTHORACIC VASCULAR SURGERY)

## 2022-04-28 NOTE — TELEPHONE ENCOUNTER
Called patient to complete Travel Screen for Cardiac Cath Lab appointment on 4/29 and inform patient of updated Visitor Policy.       No answer, no VM  Per chart review, covid test scheduled today

## 2022-04-29 ENCOUNTER — HOSPITAL ENCOUNTER (OUTPATIENT)
Facility: CLINIC | Age: 77
Discharge: HOME | End: 2022-04-29
Attending: INTERNAL MEDICINE | Admitting: INTERNAL MEDICINE
Payer: MEDICARE

## 2022-04-29 ENCOUNTER — OFFICE VISIT (OUTPATIENT)
Dept: SURGERY | Facility: CLINIC | Age: 77
End: 2022-04-29
Payer: MEDICARE

## 2022-04-29 ENCOUNTER — ANESTHESIA EVENT (OUTPATIENT)
Dept: SURGERY | Facility: CLINIC | Age: 77
DRG: 270 | End: 2022-04-29
Payer: MEDICARE

## 2022-04-29 ENCOUNTER — PRE VISIT (OUTPATIENT)
Dept: SURGERY | Facility: CLINIC | Age: 77
End: 2022-04-29

## 2022-04-29 ENCOUNTER — TELEPHONE (OUTPATIENT)
Dept: NURSING | Facility: CLINIC | Age: 77
End: 2022-04-29

## 2022-04-29 ENCOUNTER — TELEPHONE (OUTPATIENT)
Dept: CARDIOLOGY | Facility: CLINIC | Age: 77
End: 2022-04-29

## 2022-04-29 ENCOUNTER — HOSPITAL ENCOUNTER (OUTPATIENT)
Dept: GENERAL RADIOLOGY | Facility: CLINIC | Age: 77
Discharge: HOME | End: 2022-04-29
Attending: THORACIC SURGERY (CARDIOTHORACIC VASCULAR SURGERY)
Payer: MEDICARE

## 2022-04-29 ENCOUNTER — APPOINTMENT (OUTPATIENT)
Dept: MEDSURG UNIT | Facility: CLINIC | Age: 77
End: 2022-04-29
Attending: THORACIC SURGERY (CARDIOTHORACIC VASCULAR SURGERY)
Payer: MEDICARE

## 2022-04-29 VITALS
HEART RATE: 81 BPM | RESPIRATION RATE: 24 BRPM | WEIGHT: 148.5 LBS | BODY MASS INDEX: 29.16 KG/M2 | DIASTOLIC BLOOD PRESSURE: 67 MMHG | SYSTOLIC BLOOD PRESSURE: 111 MMHG | TEMPERATURE: 97.6 F | OXYGEN SATURATION: 92 % | HEIGHT: 60 IN

## 2022-04-29 DIAGNOSIS — I27.24 CTEPH (CHRONIC THROMBOEMBOLIC PULMONARY HYPERTENSION) (H): ICD-10-CM

## 2022-04-29 DIAGNOSIS — I51.89 OTHER ILL-DEFINED HEART DISEASES: ICD-10-CM

## 2022-04-29 DIAGNOSIS — Z01.818 PRE-OP EVALUATION: Primary | ICD-10-CM

## 2022-04-29 DIAGNOSIS — R06.02 SOB (SHORTNESS OF BREATH): ICD-10-CM

## 2022-04-29 LAB
ANION GAP SERPL CALCULATED.3IONS-SCNC: 8 MMOL/L (ref 3–14)
APTT PPP: 45 SECONDS (ref 22–38)
BUN SERPL-MCNC: 26 MG/DL (ref 7–30)
CALCIUM SERPL-MCNC: 9.8 MG/DL (ref 8.5–10.1)
CHLORIDE BLD-SCNC: 114 MMOL/L (ref 94–109)
CO2 SERPL-SCNC: 20 MMOL/L (ref 20–32)
CREAT SERPL-MCNC: 1.14 MG/DL (ref 0.52–1.04)
ERYTHROCYTE [DISTWIDTH] IN BLOOD BY AUTOMATED COUNT: 17 % (ref 10–15)
GFR SERPL CREATININE-BSD FRML MDRD: 49 ML/MIN/1.73M2
GLUCOSE BLD-MCNC: 99 MG/DL (ref 70–99)
HCT VFR BLD AUTO: 37.6 % (ref 35–47)
HGB BLD-MCNC: 11.7 G/DL (ref 11.7–15.7)
INR PPP: 1.35 (ref 0.85–1.15)
MCH RBC QN AUTO: 27.1 PG (ref 26.5–33)
MCHC RBC AUTO-ENTMCNC: 31.1 G/DL (ref 31.5–36.5)
MCV RBC AUTO: 87 FL (ref 78–100)
NT-PROBNP SERPL-MCNC: 4583 PG/ML (ref 0–1800)
PLATELET # BLD AUTO: 241 10E3/UL (ref 150–450)
POTASSIUM BLD-SCNC: 4.2 MMOL/L (ref 3.4–5.3)
RBC # BLD AUTO: 4.32 10E6/UL (ref 3.8–5.2)
SARS-COV-2 RNA RESP QL NAA+PROBE: POSITIVE
SODIUM SERPL-SCNC: 142 MMOL/L (ref 133–144)
WBC # BLD AUTO: 7.4 10E3/UL (ref 4–11)

## 2022-04-29 PROCEDURE — 71046 X-RAY EXAM CHEST 2 VIEWS: CPT

## 2022-04-29 PROCEDURE — 85014 HEMATOCRIT: CPT | Performed by: INTERNAL MEDICINE

## 2022-04-29 PROCEDURE — 83880 ASSAY OF NATRIURETIC PEPTIDE: CPT | Performed by: INTERNAL MEDICINE

## 2022-04-29 PROCEDURE — 85730 THROMBOPLASTIN TIME PARTIAL: CPT | Performed by: INTERNAL MEDICINE

## 2022-04-29 PROCEDURE — 36415 COLL VENOUS BLD VENIPUNCTURE: CPT | Performed by: INTERNAL MEDICINE

## 2022-04-29 PROCEDURE — 71046 X-RAY EXAM CHEST 2 VIEWS: CPT | Mod: 26 | Performed by: RADIOLOGY

## 2022-04-29 PROCEDURE — 99204 OFFICE O/P NEW MOD 45 MIN: CPT | Performed by: NURSE PRACTITIONER

## 2022-04-29 PROCEDURE — 85610 PROTHROMBIN TIME: CPT | Performed by: INTERNAL MEDICINE

## 2022-04-29 PROCEDURE — 80048 BASIC METABOLIC PNL TOTAL CA: CPT | Performed by: INTERNAL MEDICINE

## 2022-04-29 ASSESSMENT — PAIN SCALES - GENERAL: PAINLEVEL: NO PAIN (0)

## 2022-04-29 ASSESSMENT — LIFESTYLE VARIABLES: TOBACCO_USE: 0

## 2022-04-29 ASSESSMENT — ENCOUNTER SYMPTOMS: ORTHOPNEA: 0

## 2022-04-29 NOTE — TELEPHONE ENCOUNTER
Patient classified as COVID treatment eligible by Epic high risk algorithm:  Yes    Coronavirus (COVID-19) Notification    Reason for call  Notify of POSITIVE COVID-19 lab result, assess symptoms,  review [facility] recommendations    Lab Result   Lab test for 2019-nCoV rRt-PCR or SARS-COV-2 PCR  Oropharyngeal AND/OR nasopharyngeal swabs were POSITIVE for 2019-nCoV RNA [OR] SARS-COV-2 RNA (COVID-19) RNA     We have been unable to reach patient by phone at this time to notify of their Positive COVID-19 result.    Left voicemail message requesting a call back to [phone] [facility] for results.        A Positive COVID-19 letter will be sent via [employer] or the mail. (Exception, no letters sent to Presurgerical/Preprocedure Patients)    Marie Howard

## 2022-04-29 NOTE — TELEPHONE ENCOUNTER
Received a call from Dr. Knox advising me that patient had tested positive for Covid, so her surgery (PTE) had to be cancelled.  Surgery will re-schedule patient for 6/8 weeks from now.  He would like me to call patient and advise her she should re-start her Eliquis today and take all her meds as usual.  Verbalized understanding  -------------------------------  LM for patient with Dr. Knox's recommendations.  Also advised her if she is having any sx she needs help with, she should see her PMD.  .If she has any questions for us, she can call or send a [employer] message. Left my direct dial number for call back.  Yane Cardenas RN on 4/29/2022 at 3:34 PM  --------------------------------  Lynette RN with Dr. Peterson and I discussed plan to let patient recover (6-8 weeks)then re-schedule surgery.  Per Dr. Knox, patient will be pre-admitted to start Heparin, have Coronary Angio, then next day PTE. Lynette will advise me of new day so I can set up admission.Yane Cardenas RN on 5/2/2022 at 10:47 AM

## 2022-04-29 NOTE — PATIENT INSTRUCTIONS
Preparing for Your Surgery      Name:  Debi Espinoza   MRN:  [id number]   :  1945   Today's Date:  2022       Arriving for surgery:  Surgery date:  22  Arrival time:  5:30 am    Restrictions due to COVID 19       Effective 22 [facility] is implementing the following visitor policy:     1 person may accompany the patient through the Pre-Op process.      That same person may wait in the Surgery Waiting room, provided there is enough room to social distance         Inpatients are allowed 2 visitors per day for the duration of their stay.        Visitors must wear a mask.      Visitors must not be ill.      Visiting hours are 8 am to 8 pm.    [employer] parking is available for anyone with mobility limitations or disabilities.  ([place]  24 hours/ 7 days a week; [facility]  7 am- 3:30 pm, Mon- Fri)    Please come to:     [facility] [address]  [address] [place] [address]    -  Parking is available at the Patient Visitor Ramp on [facility].    -  When entering the hospital, you will be asked some Covid screening questions and directed to Patient Registration. Patient Registration will then direct you to the 3rd floor Surgery Waiting Room.  [phone]?     - ?If you are in need of directions, wheelchair or escort please stop at the Information Desk in the lobby.  Inform the information person that you are here for surgery; a wheelchair and escort to [address] will be provided.?     What can I eat or drink?  -  You may eat and drink normally for up to 8 hours before your surgery. (Until 11:30 pm 22)  -  You may have clear liquids until 2 hours before surgery. (Until 5:30 am)    Examples of clear liquids:  Water  Clear broth  Juices (apple, white grape, white cranberry  and cider) without pulp  Noncarbonated, powder based beverages  (lemonade and Ramón-Aid)  Sodas (Sprite, 7-Up, ginger ale and  genesis)  Coffee or tea (without milk or cream)  Gatorade    -  No Alcohol for at least 24 hours before surgery     Which medicines can I take?  Hold Multivitamins for 10 days before surgery.  Hold Supplements for 10 days before surgery.  Hold Ibuprofen (Advil, Motrin) for 10 day before surgery--unless otherwise directed by surgeon.  Hold Naproxen (Aleve) for 109 days before surgery.    Per phone conversation with Dr. Knox on April 27, 2022 at 4:30 PM:     4/27 Eliquis - last dose 4/27 PM  4/28 no Eliquis - hold until AFTER surgery on 5/2 4/29 RHC and coronary angiogram at 12 PM - Start Lovenox injection 4/29 PM   4/30 Lovenox 8AM and 8PM  5/1 Lovenox 7:30 AM ONLY - No PM dose, Also, per Dr. Yusuf hold Opsumit and Adempas all day on 5/1 5/2 day of surgery - no lovenox, adempas or opsumit    -  DO NOT take these medications the day of surgery:  Furosemide (Lasix),     -  PLEASE TAKE these medications the day of surgery:  Acetaminophen (Tylenol) if needed    How do I prepare myself?  - Please take 2 showers before surgery using Scrubcare or Hibiclens soap.    Use this soap only from the neck to your toes.     Leave the soap on your skin for one minute--then rinse thoroughly.      You may use your own shampoo and conditioner; no other hair products.   - Please remove all jewelry and body piercings.  - No lotions, deodorants or fragrance.  - No makeup or fingernail polish.   - Bring your ID and insurance card.    -If you have a Deep Brain Stimulator, Spinal Cord Stimulator or any neuro stimulator device---you must bring the remote control to the hospital     - All patients are required to have a Covid-19 test within 4 days of surgery/procedure.      -Patients will be contacted by the [facility] scheduling team within 1 week of surgery to make an appointment.      - Patients may call the Scheduling team at [phone] if they have not been scheduled within 4 days of  surgery.      ALL PATIENTS GOING  HOME THE SAME DAY OF SURGERY ARE REQUIRED TO HAVE A RESPONSIBLE ADULT TO DRIVE AND BE IN ATTENDANCE WITH THEM FOR 24 HOURS FOLLOWING SURGERY.      Questions or Concerns:    - For any questions regarding the day of surgery or your hospital stay, please contact the Pre Admission Nursing Office at [phone].       - If you have health changes between today and your surgery please call your surgeon.       For questions after surgery please call your surgeons office.

## 2022-04-29 NOTE — H&P
Pre-Operative H & P     CC:  Preoperative exam to assess for increased cardiopulmonary risk while undergoing surgery and anesthesia.    Date of Encounter: 4/29/2022  Primary Care Physician:  Lisette Knox     Reason for visit: Pre-operative evaluation    HPI  Debi Espinoza is a 76 year old female who presents for pre-operative H & P in preparation for  Procedure Information     Case: [id number] Date/Time: 05/02/22 0730    Procedure: pulmonary thromboendarterectomy AND ASSOCIATED PROCEDURES (N/A Chest)    Anesthesia type: General    Diagnosis: PTE (pulmonary thromboembolism) (H) [I26.99]    Pre-op diagnosis: PTE (pulmonary thromboembolism) (H) [I26.99]    Location:  OR [address] [place] [address] OR    Providers: Connor Peterson MD          Debi Espinoza is a 76 year old female with medical history including HTN, renal stones complicated by infection, knee osteoarthritis, history of bilateral PE x2 in 2019 and 2021, DVT 2019, on chronic anticoagulation with Apixaban, and severe pulmonary hypertension.    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  She is maintained on lifelong anticoagulation now since her second PE earlier this year and is on Apixaban.  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 She has since been evaluated by Dr. Peterson with plans to undergo endarterectomy on 5/2/2022.   Prior to surgery she will need to have a coronary angiogram performed with Dr. Hylton.     History is obtained from the patient and chart review    Hx of abnormal bleeding or anti-platelet use: yes apixaban    Menstrual history: No LMP recorded. Patient is postmenopausal.:      Past Medical History  No past medical history on file.    Past Surgical History  Past Surgical History:   Procedure Laterality Date     CV PULMONARY ANGIOGRAM N/A 12/8/2021    Procedure: Pulmonary Angiogram;  Surgeon: Lisette Knox MD;   Location:  [facility]     CV RIGHT HEART CATH MEASUREMENTS RECORDED N/A 12/8/2021    Procedure: CV RIGHT HEART CATH;  Surgeon: Lisette Knox MD;  Location:  [facility]       Prior to Admission Medications  Current Outpatient Medications   Medication Sig Dispense Refill     acetaminophen (TYLENOL) 500 MG tablet Take 1,000 mg by mouth every 6 hours as needed       apixaban ANTICOAGULANT (ELIQUIS) 5 MG tablet Take 1 tablet (5 mg) by mouth 2 times daily 60 tablet 3     furosemide (LASIX) 20 MG tablet Take 20 mg by mouth daily       macitentan (OPSUMIT) 10 MG tablet Take 10 mg by mouth daily (with lunch)  3     riociguat (ADEMPAS) 2.5 MG tablet Take 2.5 mg by mouth 3 times daily         Allergies  Allergies   Allergen Reactions     Lisinopril Cough       Social History  Social History     Socioeconomic History     Marital status:      Spouse name: Not on file     Number of children: Not on file     Years of education: Not on file     Highest education level: Not on file   Occupational History     Not on file   Tobacco Use     Smoking status: Never Smoker     Smokeless tobacco: Never Used   Substance and Sexual Activity     Alcohol use: Never     Drug use: Never     Sexual activity: Not on file   Other Topics Concern     Parent/sibling w/ CABG, MI or angioplasty before 65F 55M? No   Social History Narrative     Not on file     Social Determinants of Health     Financial Resource Strain: Not on file   Food Insecurity: Not on file   Transportation Needs: Not on file   Physical Activity: Not on file   Stress: Not on file   Social Connections: Not on file   Intimate Partner Violence: Not on file   Housing Stability: Not on file       Family History  No family history on file.    Review of Systems  The complete review of systems is negative other than noted in the HPI or here.   Anesthesia Evaluation   Pt has had prior anesthetic.     No history of anesthetic complications  "      ROS/MED HX  ENT/Pulmonary:     (+) LUIS M risk factors,  (-) tobacco use   Neurologic:  - neg neurologic ROS     Cardiovascular: Comment: Sleeps in recliner  Severe pulmonary HTN      (+) hypertension-----AGUILAR. Previous cardiac testing   Echo: Date: 12/2021 Results:  Interpretation Summary  Paradoxical septal motion consistent with right ventricular pressure and  volume overload is present.  Moderate to severe right ventricular dilation is present.  Global right ventricular function is severely reduced.  RVFWSL -14.3%.  RV4CSL -10%.  Severe tricuspid insufficiency is present.  Right ventricular systolic pressure is 77mmHg above the right atrial pressure.  IVC diameter >2.1 cm collapsing <50% with sniff suggests a high RA pressure  estimated at 15 mmHg or greater.  No pericardial effusion is present.    Stress Test: Date: Results:    ECG Reviewed: Date: Results:    Cath: Date: 2/2021 Results:  RHC 2/4/21   RA: 12   RV: 76/-, 14   PA: 84/19/45   PCWP: 2   Boris CO/CI: 2.8/1.6   PVR: 15.5      (-) orthopnea/PND, syncope and irregular heartbeat/palpitations   METS/Exercise Tolerance: 1 - Eating, dressing Comment: Uses walker/ wheelchair due to AGUILAR   Hematologic: Comments: bilateral PE x2 in 2019 and 2021, DVT 2019, on chronic anticoagulation with Apixaban  ~Chronic thromboembolic pulmonary hypertension    (+) History of blood clots, pt is anticoagulated,     Musculoskeletal: Comment: knee osteoarthritis  (+) arthritis,     GI/Hepatic:  - neg GI/hepatic ROS     Renal/Genitourinary: Comment: Kidney stones      Endo:       Psychiatric/Substance Use:  - neg psychiatric ROS     Infectious Disease: Comment: Vaccinated for covid x 1   (-) Recent Fever   Malignancy:  - neg malignancy ROS     Other:            /67 (BP Location: Right arm, Patient Position: Sitting, Cuff Size: Adult Regular)   Pulse 81   Temp 97.6  F (36.4  C) (Oral)   Resp 24   Ht 1.521 m (4' 11.9\")   Wt 67.4 kg (148 lb 8 oz)   SpO2 92%   " Breastfeeding No   BMI 29.10 kg/m      Physical Exam   Constitutional: Awake, alert, cooperative, mild shortness of breath at rest, and appears stated age.  Eyes: Pupils equal, round and reactive to light, extra ocular muscles intact, sclera clear, conjunctiva normal.  HENT: Normocephalic, oral pharynx with moist mucus membranes, good dentition. No goiter appreciated.   Respiratory: Clear to auscultation bilaterally, no crackles or wheezing. Mild SOB while speaking  Cardiovascular: Regular rate and rhythm, normal S1 and S2, and no murmur noted.  Carotids +2, no bruits. + edema non-pitting. Palpable pulses to radial  DP and PT arteries.   GI: Normal bowel sounds, soft, non-distended, non-tender, no masses palpated, no hepatosplenomegaly.    Lymph/Hematologic: No cervical lymphadenopathy and no supraclavicular lymphadenopathy.  Genitourinary:  deferred  Skin: Warm and dry.  No rashes at anticipated surgical site.   Musculoskeletal: Full ROM of neck. There is no redness, warmth, or swelling of the joints. Gross motor strength is normal.    Neurologic: Awake, alert, oriented to name, place and time. Cranial nerves II-XII are grossly intact. Gait is normal  Neuropsychiatric: Calm, cooperative. Normal affect.     Prior Labs/Diagnostic Studies   All labs and imaging personally reviewed     EKG/ stress test - if available please see in ROS above   12/2021  Echo result w/o MOPS: Interpretation SummaryParadoxical septal motion consistent with right ventricular pressure andvolume overload is present.Moderate to severe right ventricular dilation is present.Global right ventricular function is severely reduced.RVFWSL -14.3%.RV4CSL -10%.Severe tricuspid insufficiency is present.Right ventricular systolic pressure is 77mmHg above the right atrial pressure.IVC diameter >2.1 cm collapsing <50% with sniff suggests a high RA pressureestimated at 15 mmHg or greater.No pericardial effusion is present.      PFT Latest Ref Rng & Units  12/8/2021   FVC L 2.04   FEV1 L 1.55   FVC% % 89   FEV1% % 88     CT scan 12/2021  IMPRESSION:   1. Multiple subsegmental pulmonary emboli are seen within the left  upper lobe and right lower lobe pulmonary arteries which appear  chronic in nature and correlate to findings described on CT chest  imaging report dated 1/22/2021  2. Bibasilar peripheral consolidative opacities with associated  perfusion defects, concerning for pulmonary infarcts.  3. Evidence of right heart dysfunction with right atrial and right  ventricular dilatation.  4. Multiple sub-6 mm pulmonary nodules as described above. If patient  is at high risk for lung cancer, recommend follow-up CT in 12 months  or correlation with outside imaging to ensure stability.  5. Patchy consolidative opacity in the superior left upper lobe, which  may represent infection. Follow-up to clearing.  6. Pulmonary artery is dilated up to 3.4 cm, to be seen in setting of  pulmonary artery hypertension.  7. Mosaic attenuation of the lungs, which can be seen in the setting  of small airway or small vessel disease.           The patient's records and results personally reviewed by this provider.     Outside records reviewed from: Care Everywhere    LAB/DIAGNOSTIC STUDIES TODAY:    Lab Results   Component Value Date    WBC 7.4 04/29/2022     Lab Results   Component Value Date    RBC 4.32 04/29/2022     Lab Results   Component Value Date    HGB 11.7 04/29/2022     Lab Results   Component Value Date    HCT 37.6 04/29/2022     No components found for: MCT  Lab Results   Component Value Date    MCV 87 04/29/2022     Lab Results   Component Value Date    MCH 27.1 04/29/2022     Lab Results   Component Value Date    MCHC 31.1 04/29/2022     Lab Results   Component Value Date    RDW 17.0 04/29/2022     Lab Results   Component Value Date     04/29/2022     Last Comprehensive Metabolic Panel:  Sodium   Date Value Ref Range Status   04/29/2022 142 133 - 144 mmol/L Final      Potassium   Date Value Ref Range Status   04/29/2022 4.2 3.4 - 5.3 mmol/L Final     Chloride   Date Value Ref Range Status   04/29/2022 114 (H) 94 - 109 mmol/L Final     Carbon Dioxide (CO2)   Date Value Ref Range Status   04/29/2022 20 20 - 32 mmol/L Final     Anion Gap   Date Value Ref Range Status   04/29/2022 8 3 - 14 mmol/L Final     Glucose   Date Value Ref Range Status   04/29/2022 99 70 - 99 mg/dL Final     Urea Nitrogen   Date Value Ref Range Status   04/29/2022 26 7 - 30 mg/dL Final     Creatinine   Date Value Ref Range Status   04/29/2022 1.14 (H) 0.52 - 1.04 mg/dL Final     GFR Estimate   Date Value Ref Range Status   04/29/2022 49 (L) >60 mL/min/1.73m2 Final     Comment:     Effective December 21, 2021 eGFRcr in adults is calculated using the 2021 CKD-EPI creatinine equation which includes age and gender (Susan et al., [place], DOI: 10.1056/XRRXqp6385504)     Calcium   Date Value Ref Range Status   04/29/2022 9.8 8.5 - 10.1 mg/dL Final     INR   Date Value Ref Range Status   04/29/2022 1.35 (H) 0.85 - 1.15 Final          Component Ref Range & Units  9:29 AM 4 mo ago    N terminal Pro BNP Inpatient 0 - 1,800 pg/mL 4,583 High   6,611 High  R          Assessment      Debi Espinoza is a 76 year old female seen as a PAC referral for risk assessment and optimization for anesthesia.    Plan/Recommendations  Pt will be optimized for the proposed procedure.  See below for details on the assessment, risk, and preoperative recommendations    NEUROLOGY  - No history of TIA, CVA or seizure  -Post Op delirium risk factors:  Age    ENT  - No current airway concerns.  Will need to be reassessed day of surgery.  Mallampati: II  TM: > 3    CARDIAC  - No history of CAD and Afib  - Severe PHTN- maintained on adempas 2.5mg 3 times daily and opsumit.  - Chronic thromboembolic pulmonary hypertension- on lifelong anticoagulation.   See pharm D note for anticoagulation plan. She is being bridged with lovenox.   Will hold  "lovenox, adempas and opsumit DOS.  + significant AGUILAR, sleeps in recliner. Uses walker for assist or wheelchair     - METS (Metabolic Equivalents)  Patient CANNOT perform 4 METS exercise without symptoms            Total Score: 1    Functional Capacity: Unable to complete 4 METS      RCRI-Low risk: Class 2 0.9% complication rate            Total Score: 1    RCRI: CHF        PULMONARY  history of bilateral PE x2 in 2019 and 2021, on chronic anticoagulation with Apixaban   AGUILAR and mild SOB while talking at rest. 2/2 Chronic thromboembolic pulmonary hypertension  LUIS M Low Risk  *This score is based on incomplete data *           Total Score: 2*    LUIS M: Hypertension    LUIS M: Over 50 ys old        Criteria with incomplete data:    LUIS M: Observed stopped breathing      - Denies asthma or inhaler use  - Tobacco History      History   Smoking Status     Never Smoker   Smokeless Tobacco     Never Used       GI    PONV Medium Risk  Total Score: 2           1 AN PONV: Pt is Female    1 AN PONV: Patient is not a current smoker       Renal-history of renal stones    ENDOCRINE    - BMI: Estimated body mass index is 29.1 kg/m  as calculated from the following:    Height as of this encounter: 1.521 m (4' 11.9\").    Weight as of this encounter: 67.4 kg (148 lb 8 oz).  Overweight (BMI 25.0-29.9)  - No history of Diabetes Mellitus    HEME  history of bilateral PE x2 in 2019 and 2021, DVT 2019, on chronic anticoagulation with Apixaban  - Coagulopathy second to Apixaban (Eliquis)      MSK    OA    Patient is NOT Frail            Total Score: -        Criteria with incomplete data:    Frailty: Weight loss 10 lbs or greater    Frailty: Slower walking speed    Frailty: Decrease in strength    Frailty: Increased exhaustion    Frailty: Overall lack of energy      - This score is not calculated because of inadequate data             Patient was discussed with Dr Diaz    The patient is optimized for their procedure. AVS with information on " surgery time/arrival time, meds and NPO status given by nursing staff. No further diagnostic testing indicated.      On the day of service:     Prep time: 20 minutes  Visit time: 20 minutes  Documentation time: 15 minutes  ------------------------------------------  Total time: 55 minutes      WEN Olivares CNP  Preoperative Assessment Center  [facility]  [facility]  Phone: [phone]  Fax: [phone]

## 2022-05-03 ENCOUNTER — TELEPHONE (OUTPATIENT)
Dept: CARDIOLOGY | Facility: CLINIC | Age: 77
End: 2022-05-03
Payer: MEDICARE

## 2022-05-03 NOTE — TELEPHONE ENCOUNTER
Called patient's daughter in law Nga and rescheduled PTE surgery to 5/27 with Dr. Peterson due to positive COVID test on 4/28. Admission and angiogram to be coordinated prior to surgery by Dr. Knox's office. Nga aware patient may be admitted 2-3 days prior to her surgery.    All questions addressed at this time, message sent to cardiology RN to coordinate admission and angiogram.

## 2022-05-10 DIAGNOSIS — R06.02 SOB (SHORTNESS OF BREATH): ICD-10-CM

## 2022-05-10 DIAGNOSIS — I27.0 PRIMARY PULMONARY HYPERTENSION (H): Primary | ICD-10-CM

## 2022-05-10 RX ORDER — ASPIRIN 81 MG/1
243 TABLET, CHEWABLE ORAL ONCE
Status: CANCELLED | OUTPATIENT
Start: 2022-05-10

## 2022-05-10 RX ORDER — SODIUM CHLORIDE 9 MG/ML
INJECTION, SOLUTION INTRAVENOUS CONTINUOUS
Status: CANCELLED | OUTPATIENT
Start: 2022-05-10

## 2022-05-10 RX ORDER — LIDOCAINE 40 MG/G
CREAM TOPICAL
Status: CANCELLED | OUTPATIENT
Start: 2022-05-10

## 2022-05-10 RX ORDER — ASPIRIN 325 MG
325 TABLET ORAL ONCE
Status: CANCELLED | OUTPATIENT
Start: 2022-05-10 | End: 2022-05-10

## 2022-05-10 RX ORDER — POTASSIUM CHLORIDE 1500 MG/1
20 TABLET, EXTENDED RELEASE ORAL
Status: CANCELLED | OUTPATIENT
Start: 2022-05-10

## 2022-05-10 RX ORDER — POTASSIUM CHLORIDE 1500 MG/1
40 TABLET, EXTENDED RELEASE ORAL
Status: CANCELLED | OUTPATIENT
Start: 2022-05-10

## 2022-05-16 DIAGNOSIS — Z11.59 ENCOUNTER FOR SCREENING FOR OTHER VIRAL DISEASES: Primary | ICD-10-CM

## 2022-05-18 ENCOUNTER — TELEPHONE (OUTPATIENT)
Dept: CARDIOLOGY | Facility: CLINIC | Age: 77
End: 2022-05-18
Payer: MEDICARE

## 2022-05-18 ENCOUNTER — DOCUMENTATION ONLY (OUTPATIENT)
Dept: CARDIOLOGY | Facility: CLINIC | Age: 77
End: 2022-05-18
Payer: MEDICARE

## 2022-05-18 RX ORDER — POTASSIUM CHLORIDE 1500 MG/1
20 TABLET, EXTENDED RELEASE ORAL
Status: CANCELLED | OUTPATIENT
Start: 2022-05-18

## 2022-05-18 RX ORDER — LIDOCAINE 40 MG/G
CREAM TOPICAL
Status: CANCELLED | OUTPATIENT
Start: 2022-05-18

## 2022-05-18 RX ORDER — SODIUM CHLORIDE 9 MG/ML
INJECTION, SOLUTION INTRAVENOUS CONTINUOUS
Status: CANCELLED | OUTPATIENT
Start: 2022-05-18

## 2022-05-18 RX ORDER — ASPIRIN 325 MG
325 TABLET ORAL ONCE
Status: CANCELLED | OUTPATIENT
Start: 2022-05-18 | End: 2022-05-18

## 2022-05-18 RX ORDER — POTASSIUM CHLORIDE 1500 MG/1
40 TABLET, EXTENDED RELEASE ORAL
Status: CANCELLED | OUTPATIENT
Start: 2022-05-18

## 2022-05-18 RX ORDER — ASPIRIN 81 MG/1
243 TABLET, CHEWABLE ORAL ONCE
Status: CANCELLED | OUTPATIENT
Start: 2022-05-18

## 2022-05-18 NOTE — TELEPHONE ENCOUNTER
Completed admission registration for 5/25/22 @1pm.  patient will begin IV Heparin, have Coronary Angio & RHC 5/26 & PTE 5/27.    Yane Cardenas RN on 5/18/2022 at 1:06 PM  ----------------------  Placed orders for Coronary Angio/RHC 5/26/22 which will be completed as in-patient. Yane Cardenas RN on 5/18/2022 at 1:12 PM  ---------------------  Sent patient instructions via [employer]. Yane Cardenas RN on 5/18/2022 at 1:12 PM

## 2022-05-25 ENCOUNTER — HOSPITAL ENCOUNTER (INPATIENT)
Facility: CLINIC | Age: 77
LOS: 31 days | Discharge: NURSING, REHAB OR LONG-TERM CARE | DRG: 270 | End: 2022-06-25
Attending: INTERNAL MEDICINE | Admitting: INTERNAL MEDICINE
Payer: MEDICARE

## 2022-05-25 DIAGNOSIS — I27.24 CTEPH (CHRONIC THROMBOEMBOLIC PULMONARY HYPERTENSION) (H): Primary | ICD-10-CM

## 2022-05-25 DIAGNOSIS — R06.02 SOB (SHORTNESS OF BREATH): ICD-10-CM

## 2022-05-25 DIAGNOSIS — J15.9 HOSPITAL-ACQUIRED BACTERIAL PNEUMONIA: ICD-10-CM

## 2022-05-25 DIAGNOSIS — I27.0 PRIMARY PULMONARY HYPERTENSION (H): ICD-10-CM

## 2022-05-25 DIAGNOSIS — Z98.890 S/P CORONARY ANGIOGRAM: ICD-10-CM

## 2022-05-25 LAB
ALBUMIN SERPL-MCNC: 3.4 G/DL (ref 3.4–5)
ALP SERPL-CCNC: 75 U/L (ref 40–150)
ALT SERPL W P-5'-P-CCNC: 16 U/L (ref 0–50)
ANION GAP SERPL CALCULATED.3IONS-SCNC: 6 MMOL/L (ref 3–14)
APTT PPP: 35 SECONDS (ref 22–38)
APTT PPP: 78 SECONDS (ref 22–38)
AST SERPL W P-5'-P-CCNC: 16 U/L (ref 0–45)
BILIRUB SERPL-MCNC: 0.7 MG/DL (ref 0.2–1.3)
BUN SERPL-MCNC: 20 MG/DL (ref 7–30)
CALCIUM SERPL-MCNC: 9.4 MG/DL (ref 8.5–10.1)
CHLORIDE BLD-SCNC: 110 MMOL/L (ref 94–109)
CO2 SERPL-SCNC: 23 MMOL/L (ref 20–32)
CREAT SERPL-MCNC: 1.08 MG/DL (ref 0.52–1.04)
ERYTHROCYTE [DISTWIDTH] IN BLOOD BY AUTOMATED COUNT: 17.2 % (ref 10–15)
FERRITIN SERPL-MCNC: 10 NG/ML (ref 8–252)
GFR SERPL CREATININE-BSD FRML MDRD: 53 ML/MIN/1.73M2
GLUCOSE BLD-MCNC: 108 MG/DL (ref 70–99)
HCT VFR BLD AUTO: 36.2 % (ref 35–47)
HGB BLD-MCNC: 11.4 G/DL (ref 11.7–15.7)
INR PPP: 1.13 (ref 0.85–1.15)
IRON SATN MFR SERPL: 8 % (ref 15–46)
IRON SERPL-MCNC: 32 UG/DL (ref 35–180)
MCH RBC QN AUTO: 27.1 PG (ref 26.5–33)
MCHC RBC AUTO-ENTMCNC: 31.5 G/DL (ref 31.5–36.5)
MCV RBC AUTO: 86 FL (ref 78–100)
NT-PROBNP SERPL-MCNC: 3532 PG/ML (ref 0–1800)
PLATELET # BLD AUTO: 225 10E3/UL (ref 150–450)
POTASSIUM BLD-SCNC: 4 MMOL/L (ref 3.4–5.3)
PROT SERPL-MCNC: 6.8 G/DL (ref 6.8–8.8)
RBC # BLD AUTO: 4.21 10E6/UL (ref 3.8–5.2)
SODIUM SERPL-SCNC: 139 MMOL/L (ref 133–144)
TIBC SERPL-MCNC: 416 UG/DL (ref 240–430)
UFH PPP CHRO-ACNC: 0.52 IU/ML
WBC # BLD AUTO: 5.5 10E3/UL (ref 4–11)

## 2022-05-25 PROCEDURE — 85027 COMPLETE CBC AUTOMATED: CPT | Performed by: STUDENT IN AN ORGANIZED HEALTH CARE EDUCATION/TRAINING PROGRAM

## 2022-05-25 PROCEDURE — 250N000013 HC RX MED GY IP 250 OP 250 PS 637: Performed by: STUDENT IN AN ORGANIZED HEALTH CARE EDUCATION/TRAINING PROGRAM

## 2022-05-25 PROCEDURE — 83550 IRON BINDING TEST: CPT | Performed by: STUDENT IN AN ORGANIZED HEALTH CARE EDUCATION/TRAINING PROGRAM

## 2022-05-25 PROCEDURE — 36415 COLL VENOUS BLD VENIPUNCTURE: CPT | Performed by: STUDENT IN AN ORGANIZED HEALTH CARE EDUCATION/TRAINING PROGRAM

## 2022-05-25 PROCEDURE — 999N000128 HC STATISTIC PERIPHERAL IV START W/O US GUIDANCE

## 2022-05-25 PROCEDURE — 120N000003 HC R&B IMCU UMMC

## 2022-05-25 PROCEDURE — 93010 ELECTROCARDIOGRAM REPORT: CPT | Performed by: INTERNAL MEDICINE

## 2022-05-25 PROCEDURE — 83880 ASSAY OF NATRIURETIC PEPTIDE: CPT | Performed by: STUDENT IN AN ORGANIZED HEALTH CARE EDUCATION/TRAINING PROGRAM

## 2022-05-25 PROCEDURE — 80053 COMPREHEN METABOLIC PANEL: CPT | Performed by: STUDENT IN AN ORGANIZED HEALTH CARE EDUCATION/TRAINING PROGRAM

## 2022-05-25 PROCEDURE — 250N000011 HC RX IP 250 OP 636: Performed by: INTERNAL MEDICINE

## 2022-05-25 PROCEDURE — 85730 THROMBOPLASTIN TIME PARTIAL: CPT | Performed by: INTERNAL MEDICINE

## 2022-05-25 PROCEDURE — 82728 ASSAY OF FERRITIN: CPT | Performed by: STUDENT IN AN ORGANIZED HEALTH CARE EDUCATION/TRAINING PROGRAM

## 2022-05-25 PROCEDURE — 85520 HEPARIN ASSAY: CPT | Performed by: STUDENT IN AN ORGANIZED HEALTH CARE EDUCATION/TRAINING PROGRAM

## 2022-05-25 PROCEDURE — 99223 1ST HOSP IP/OBS HIGH 75: CPT | Mod: 25 | Performed by: INTERNAL MEDICINE

## 2022-05-25 PROCEDURE — 85730 THROMBOPLASTIN TIME PARTIAL: CPT | Performed by: STUDENT IN AN ORGANIZED HEALTH CARE EDUCATION/TRAINING PROGRAM

## 2022-05-25 PROCEDURE — 85610 PROTHROMBIN TIME: CPT | Performed by: STUDENT IN AN ORGANIZED HEALTH CARE EDUCATION/TRAINING PROGRAM

## 2022-05-25 PROCEDURE — 250N000011 HC RX IP 250 OP 636: Performed by: STUDENT IN AN ORGANIZED HEALTH CARE EDUCATION/TRAINING PROGRAM

## 2022-05-25 PROCEDURE — 93005 ELECTROCARDIOGRAM TRACING: CPT

## 2022-05-25 RX ORDER — FUROSEMIDE 20 MG
20 TABLET ORAL DAILY
Status: DISCONTINUED | OUTPATIENT
Start: 2022-05-25 | End: 2022-05-27

## 2022-05-25 RX ORDER — LIDOCAINE 40 MG/G
CREAM TOPICAL
Status: DISCONTINUED | OUTPATIENT
Start: 2022-05-25 | End: 2022-06-06

## 2022-05-25 RX ORDER — POLYETHYLENE GLYCOL 3350 17 G/17G
17 POWDER, FOR SOLUTION ORAL DAILY
Status: DISCONTINUED | OUTPATIENT
Start: 2022-05-25 | End: 2022-05-27

## 2022-05-25 RX ORDER — FUROSEMIDE 10 MG/ML
40 INJECTION INTRAMUSCULAR; INTRAVENOUS ONCE
Status: COMPLETED | OUTPATIENT
Start: 2022-05-25 | End: 2022-05-25

## 2022-05-25 RX ORDER — HEPARIN SODIUM 10000 [USP'U]/100ML
0-5000 INJECTION, SOLUTION INTRAVENOUS CONTINUOUS
Status: DISCONTINUED | OUTPATIENT
Start: 2022-05-25 | End: 2022-05-25

## 2022-05-25 RX ORDER — HEPARIN SODIUM 10000 [USP'U]/100ML
0-5000 INJECTION, SOLUTION INTRAVENOUS CONTINUOUS
Status: DISCONTINUED | OUTPATIENT
Start: 2022-05-25 | End: 2022-05-27

## 2022-05-25 RX ORDER — ACETAMINOPHEN 500 MG
1000 TABLET ORAL EVERY 6 HOURS PRN
Status: DISCONTINUED | OUTPATIENT
Start: 2022-05-25 | End: 2022-05-27

## 2022-05-25 RX ORDER — POTASSIUM CHLORIDE 750 MG/1
40 TABLET, EXTENDED RELEASE ORAL ONCE
Status: COMPLETED | OUTPATIENT
Start: 2022-05-25 | End: 2022-05-25

## 2022-05-25 RX ADMIN — FUROSEMIDE 40 MG: 10 INJECTION, SOLUTION INTRAVENOUS at 18:15

## 2022-05-25 RX ADMIN — HEPARIN SODIUM AND DEXTROSE 800 UNITS/HR: 10000; 5 INJECTION INTRAVENOUS at 17:10

## 2022-05-25 RX ADMIN — ACETAMINOPHEN 1000 MG: 500 TABLET ORAL at 18:22

## 2022-05-25 RX ADMIN — POTASSIUM CHLORIDE 40 MEQ: 750 TABLET, EXTENDED RELEASE ORAL at 18:15

## 2022-05-25 RX ADMIN — FUROSEMIDE 20 MG: 20 TABLET ORAL at 14:37

## 2022-05-25 ASSESSMENT — ACTIVITIES OF DAILY LIVING (ADL)
ADLS_ACUITY_SCORE: 30
ADLS_ACUITY_SCORE: 26
ADLS_ACUITY_SCORE: 30
ADLS_ACUITY_SCORE: 26
ADLS_ACUITY_SCORE: 30

## 2022-05-25 NOTE — PROGRESS NOTES
Admission          5/25/2022 12:43 PM  -----------------------------------------------------------  Reason for admission: Scheduled Pulmonary endarderectomy  Primary team notified of pt arrival.  Admitted from: [facility]  Via: Wheelchair  Accompanied by: family  Belongings: Placed in closet; valuables sent home with family  Admission Profile: complete  Teaching: orientation to unit and call light- call light within reach, call don't fall, use of console, meal times, when to call for the RN, and enforced importance of safety   Access: PIV  Telemetry:Placed on pt  Ht./Wt.: complete  Code Status verified on armband: yes  2 RN Skin Assessment Completed By: Magda MCKEON and Valeria BELLA RN  Med Rec completed: Yes  Bed surface reassessed with algorithm and charted: yes  New bed surface ordered: No  Suction/Ambu bag/Flowmeter at bedside: yes  Is patient having diarrhea upon admission- if YES fill out testing algorithm : no    C. Diff Testing Algorithm (MUST be marked YES)   1. 3 or more loose stools in 24 hrs. [] Yes [] No       Additional symptoms:(At least ONE must be marked yes)   1. Abdominal pain/discomfort [] Yes [] No   2. Fever at least 38C (100.4 F) [] Yes [] No   3. Elevated WBC(>11,000) [] Yes [] No       Exclusion Criteria:  (MUST be marked YES)   1. Off laxatives for at least 48 hrs. [] Yes [] No       Pt status:  Neuro: A&Ox4.   Cardiac: SR. VSS.   Respiratory: Sating >92% on RA.  GI/: Adequate urine output. No BM.  Diet/appetite: Awaiting diet order  Activity:  Independent.  Pain: Denies  Skin: No new deficits noted.  LDA's: None    Plan: Admission in progress, CARDs 2 MD to place admission orders. Continue with POC. Notify primary team with changes.      Temp:  [97.6  F (36.4  C)] 97.6  F (36.4  C)  Pulse:  [82] 82  Resp:  [20] 20  BP: (134)/(66) 134/66  SpO2:  [90 %-94 %] 94 %

## 2022-05-25 NOTE — PLAN OF CARE
Neuro: A&Ox4.   Cardiac: SR with first degree AVB. VSS.   Respiratory: Sating > 90% on RA. Dyspnea with exertion.  GI/: Adequate urine output. No Bm this shift.   Diet/appetite: Tolerating low sodium diet. Eating well.  Activity:  SBA with walker.   Pain: At acceptable level on current regimen.   Skin: No new deficits noted.  LDA's: PIV with heparin gtt at 800 unit(s)/hr, PTT recheck due at 2300.     Plan: Continue with POC. Notify primary team with changes.

## 2022-05-25 NOTE — Clinical Note
dry, intact, no bleeding and no hematoma. 7fr RIJ sheath removed manual pressure applied, hemostasis achieved, bandage placed.  6fr RRA sheath removed, TR band placed with 15 ml of air

## 2022-05-25 NOTE — H&P
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    # Pulmonary hypertension, Group IV  # CTEPH  # DVTs  # Severe tricuspid insufficiency  Initially diagnosed with bilateral DVT, PE 2019. TTE showed dilated RV with reduced RV function and RVSP of 84mmHg suggestive of severe pulmonary hypertension with mild to moderate TR. Patient believes the above relates to a work related injury, unna boot early 2019. RHC 12/18/2021 with RA 12, PA 84/19/45, PCWP 2, TAHIRA CO/CI 2.8/1.6. Last dose eliquis 5/23 AM. Has oxygen at home but does not use this.   - Oxygen as needed to maintain saturation >92%  - Continue opsumit, adempas until 5/26 cath  - Hold apixaban  - Low intensity heparin gtt  - Plan for RHC, coronary angiogram 5/26 (NPO at [facility]), order placed. Requesting biplane angiogram to minimize contrast    # Volume overload  PTA on lasix 20mg qday (decreased from 40mg ~6 weeks ago). JVP elevated on initial exam.   - IV lasix 40mg once    # Anemia  Hgb 11.4, similar to prior last month.   - Iron studies    # CKD  Baseline Cr ~1.3.        Diet: 2g Na, NPO at 0800  DVT Prophylaxis: heparin gtt  Win Catheter: Not present  Code Status: Full  Fluids: N/A  Lines: Peripheral IV    Disposition Plan   Expected discharge: 4 - 7 days, recommended to prior living arrangement once recovery from pulmonary artery endarterectomy completed.    Entered: Carol Hunter MD 05/25/2022, 1:37 PM     The patient's care was discussed with the Attending Physician, Dr. Joseph.    Carol Hunter MD  [facility]    ______________________________________________________________________    Chief Complaint   Shortness of breath    History is obtained from the patient.    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    Reports she cut her leg ~0640-6400 and that's when 'this all started'. Was put in a boot to assist with leg healing and and afterwards was diagnosed with multiple DVTs and pulmonary emboli.     She has been feeling stable at home. This procedure was postponed (originally scheduled for 5/2) due to positive COVID screen after which she felt sick for 3-4 days. Reports now feeling back to her 'baseline'. She reports dyspnea with exertion, occasionally accompanied by light-headedness, palpitations. Denies syncope. Shortness of breath not at rest but with minimal exertion (eg walking 8 feet). Also has orthopnea for 3-4 years. Denies PND. Reports swelling in her legs, but states this is improving. Denies recent weight changes but reports 80lb weight loss over last 3 years, most in 2020 'right after this all started'. BP at home: 115-121/78. Has oxygen at home wich she has only used a few nights last summer  (2020) when it was very humid out. Reports cough for years, usually dry but occasionally with phlegm. No hemoptysis. Nausea occasionally with emesis, last episode yesterday. No hematemesis. Doesn't take any antiemetics at home. Reports compliance with her meds. Last dose apixaban  5/23 AM.    Lives alone in [place], is independent. No tobacco, etoh use, drug use.    Review of Systems    Denies Chest pain, fevers, chills, headache, sore throat, abdominal pain/fullness, constipation/diarrhea, difficulty urinating, dysuria, weakness, bleeding/bruising, new skin findings. 'Always feels cold'.    'Everything bothers her'.     Past Medical History    I have reviewed this patient's medical history and updated it with pertinent information if needed.   No past medical history on file.    Past Surgical History   I have reviewed this patient's surgical history and updated it with pertinent information if needed.  Past Surgical History:   Procedure Laterality Date     CV PULMONARY ANGIOGRAM N/A 12/8/2021    Procedure: Pulmonary Angiogram;  Surgeon: Lisette Knox MD;  Location:  [facility]     CV RIGHT HEART CATH MEASUREMENTS RECORDED N/A 12/8/2021    Procedure: CV RIGHT HEART CATH;  Surgeon: Lisette Knox MD;  Location:  HE[facility]ARD[facility] CATH LAB     Social History   I have reviewed this patient's social history and updated it with pertinent information if needed.  Social History     Tobacco Use     Smoking status: Never Smoker     Smokeless tobacco: Never Used   Substance Use Topics     Alcohol use: Never     Drug use: Never     Family History   I have reviewed this patient's family history and updated it with pertinent information if needed.       Prior to Admission Medications   Prior to Admission Medications   Prescriptions Last Dose Informant Patient Reported? Taking?   acetaminophen (TYLENOL) 500 MG tablet   Yes No   Sig: Take 1,000 mg by mouth every 6 hours as needed   apixaban ANTICOAGULANT (ELIQUIS) 5 MG tablet   Yes No   Sig: Take 1 tablet (5 mg) by mouth 2 times daily   furosemide (LASIX) 20 MG tablet   Yes No   Sig: Take 20 mg by mouth daily   macitentan (OPSUMIT) 10 MG tablet   Yes No   Sig: Take 10 mg by mouth daily (with lunch)   riociguat (ADEMPAS) 2.5 MG tablet   Yes No   Sig: Take 2.5 mg by mouth 3 times daily      Facility-Administered Medications: None     Allergies   Allergies   Allergen Reactions     Lisinopril Cough     Physical Exam   Vital Signs: Temp: 97.6  F (36.4  C) Temp src: Oral   Pulse: 82   Resp: 20 SpO2: 90 %      Weight: 150 lbs 9.6 oz    General  Appearance: well-appearing female, lying in bed  Eyes: EOMI, PERRL, no scleral icterus  HEENT: NCAT, MMM, neck supple  Respiratory: Mildly increased work of breathing while sitting up; CTAB  Cardiovascular: RRR, systolic murmur heard throughout precordium, best at LLSB; JVP est 14cm; 3+ BLE edema; palpable pulses in all 4 extremities  GI: Abdomen soft, nontender; bowel sounds present  Skin: No rashes visualized on exposed skin  Musculoskeletal:   Neurologic: Alert, oriented, moving all extremities spontaneously  Psychiatric: Pleasant mood, full affect    Data   Data reviewed today: I reviewed all medications, new labs and imaging results over the last 24 hours. I personally reviewed Cardiology specific data review: no imaging or EKG's to review for today.     No lab results found in last 7 days.    No results found for this or any previous visit (from the past 24 hour(s)).

## 2022-05-26 LAB
ABO/RH(D): NORMAL
ANION GAP SERPL CALCULATED.3IONS-SCNC: 3 MMOL/L (ref 3–14)
ANTIBODY SCREEN: NEGATIVE
APTT PPP: 57 SECONDS (ref 22–38)
APTT PPP: 60 SECONDS (ref 22–38)
ATRIAL RATE - MUSE: 66 BPM
ATRIAL RATE - MUSE: 84 BPM
BLD PROD TYP BPU: NORMAL
BLOOD COMPONENT TYPE: NORMAL
BUN SERPL-MCNC: 20 MG/DL (ref 7–30)
CALCIUM SERPL-MCNC: 9.2 MG/DL (ref 8.5–10.1)
CHLORIDE BLD-SCNC: 110 MMOL/L (ref 94–109)
CO2 SERPL-SCNC: 25 MMOL/L (ref 20–32)
CODING SYSTEM: NORMAL
CREAT SERPL-MCNC: 1.16 MG/DL (ref 0.52–1.04)
CROSSMATCH: NORMAL
CROSSMATCH: NORMAL
DIASTOLIC BLOOD PRESSURE - MUSE: NORMAL MMHG
DIASTOLIC BLOOD PRESSURE - MUSE: NORMAL MMHG
ERYTHROCYTE [DISTWIDTH] IN BLOOD BY AUTOMATED COUNT: 17.2 % (ref 10–15)
GFR SERPL CREATININE-BSD FRML MDRD: 48 ML/MIN/1.73M2
GLUCOSE BLD-MCNC: 86 MG/DL (ref 70–99)
HCT VFR BLD AUTO: 33.9 % (ref 35–47)
HGB BLD-MCNC: 10.6 G/DL (ref 11.7–15.7)
HGB BLD-MCNC: 11.5 G/DL (ref 11.7–15.7)
INTERPRETATION ECG - MUSE: NORMAL
INTERPRETATION ECG - MUSE: NORMAL
ISSUE DATE AND TIME: NORMAL
ISSUE DATE AND TIME: NORMAL
MAGNESIUM SERPL-MCNC: 2.1 MG/DL (ref 1.6–2.3)
MCH RBC QN AUTO: 27.1 PG (ref 26.5–33)
MCHC RBC AUTO-ENTMCNC: 31.3 G/DL (ref 31.5–36.5)
MCV RBC AUTO: 87 FL (ref 78–100)
OXYHGB MFR BLDV: 64 % (ref 92–100)
P AXIS - MUSE: 27 DEGREES
P AXIS - MUSE: 56 DEGREES
PLATELET # BLD AUTO: 212 10E3/UL (ref 150–450)
POTASSIUM BLD-SCNC: 4.2 MMOL/L (ref 3.4–5.3)
PR INTERVAL - MUSE: 210 MS
PR INTERVAL - MUSE: 240 MS
QRS DURATION - MUSE: 104 MS
QRS DURATION - MUSE: 94 MS
QT - MUSE: 394 MS
QT - MUSE: 446 MS
QTC - MUSE: 465 MS
QTC - MUSE: 467 MS
R AXIS - MUSE: 126 DEGREES
R AXIS - MUSE: 130 DEGREES
RBC # BLD AUTO: 3.91 10E6/UL (ref 3.8–5.2)
SODIUM SERPL-SCNC: 138 MMOL/L (ref 133–144)
SPECIMEN EXPIRATION DATE: NORMAL
SYSTOLIC BLOOD PRESSURE - MUSE: NORMAL MMHG
SYSTOLIC BLOOD PRESSURE - MUSE: NORMAL MMHG
T AXIS - MUSE: -28 DEGREES
T AXIS - MUSE: -44 DEGREES
UNIT ABO/RH: NORMAL
UNIT NUMBER: NORMAL
UNIT STATUS: NORMAL
UNIT TYPE ISBT: 6200
VENTRICULAR RATE- MUSE: 66 BPM
VENTRICULAR RATE- MUSE: 84 BPM
WBC # BLD AUTO: 4.9 10E3/UL (ref 4–11)

## 2022-05-26 PROCEDURE — 85018 HEMOGLOBIN: CPT

## 2022-05-26 PROCEDURE — 85027 COMPLETE CBC AUTOMATED: CPT | Performed by: STUDENT IN AN ORGANIZED HEALTH CARE EDUCATION/TRAINING PROGRAM

## 2022-05-26 PROCEDURE — 86923 COMPATIBILITY TEST ELECTRIC: CPT | Performed by: INTERNAL MEDICINE

## 2022-05-26 PROCEDURE — 999N000054 HC STATISTIC EKG NON-CHARGEABLE

## 2022-05-26 PROCEDURE — 99152 MOD SED SAME PHYS/QHP 5/>YRS: CPT | Performed by: INTERNAL MEDICINE

## 2022-05-26 PROCEDURE — 93456 R HRT CORONARY ARTERY ANGIO: CPT | Performed by: INTERNAL MEDICINE

## 2022-05-26 PROCEDURE — 86901 BLOOD TYPING SEROLOGIC RH(D): CPT | Performed by: INTERNAL MEDICINE

## 2022-05-26 PROCEDURE — 999N000128 HC STATISTIC PERIPHERAL IV START W/O US GUIDANCE

## 2022-05-26 PROCEDURE — 250N000011 HC RX IP 250 OP 636: Performed by: THORACIC SURGERY (CARDIOTHORACIC VASCULAR SURGERY)

## 2022-05-26 PROCEDURE — C1887 CATHETER, GUIDING: HCPCS | Performed by: INTERNAL MEDICINE

## 2022-05-26 PROCEDURE — 85730 THROMBOPLASTIN TIME PARTIAL: CPT | Performed by: INTERNAL MEDICINE

## 2022-05-26 PROCEDURE — 99232 SBSQ HOSP IP/OBS MODERATE 35: CPT | Mod: GC | Performed by: INTERNAL MEDICINE

## 2022-05-26 PROCEDURE — 250N000011 HC RX IP 250 OP 636: Performed by: INTERNAL MEDICINE

## 2022-05-26 PROCEDURE — 250N000009 HC RX 250: Performed by: THORACIC SURGERY (CARDIOTHORACIC VASCULAR SURGERY)

## 2022-05-26 PROCEDURE — 36415 COLL VENOUS BLD VENIPUNCTURE: CPT | Performed by: STUDENT IN AN ORGANIZED HEALTH CARE EDUCATION/TRAINING PROGRAM

## 2022-05-26 PROCEDURE — 36415 COLL VENOUS BLD VENIPUNCTURE: CPT | Performed by: INTERNAL MEDICINE

## 2022-05-26 PROCEDURE — 250N000013 HC RX MED GY IP 250 OP 250 PS 637: Performed by: STUDENT IN AN ORGANIZED HEALTH CARE EDUCATION/TRAINING PROGRAM

## 2022-05-26 PROCEDURE — 93010 ELECTROCARDIOGRAM REPORT: CPT | Performed by: INTERNAL MEDICINE

## 2022-05-26 PROCEDURE — 83735 ASSAY OF MAGNESIUM: CPT | Performed by: STUDENT IN AN ORGANIZED HEALTH CARE EDUCATION/TRAINING PROGRAM

## 2022-05-26 PROCEDURE — 4A023N6 MEASUREMENT OF CARDIAC SAMPLING AND PRESSURE, RIGHT HEART, PERCUTANEOUS APPROACH: ICD-10-PCS | Performed by: INTERNAL MEDICINE

## 2022-05-26 PROCEDURE — 120N000003 HC R&B IMCU UMMC

## 2022-05-26 PROCEDURE — 99152 MOD SED SAME PHYS/QHP 5/>YRS: CPT | Mod: GC | Performed by: INTERNAL MEDICINE

## 2022-05-26 PROCEDURE — 80048 BASIC METABOLIC PNL TOTAL CA: CPT | Performed by: STUDENT IN AN ORGANIZED HEALTH CARE EDUCATION/TRAINING PROGRAM

## 2022-05-26 PROCEDURE — B2111ZZ FLUOROSCOPY OF MULTIPLE CORONARY ARTERIES USING LOW OSMOLAR CONTRAST: ICD-10-PCS | Performed by: INTERNAL MEDICINE

## 2022-05-26 PROCEDURE — 250N000009 HC RX 250: Performed by: INTERNAL MEDICINE

## 2022-05-26 PROCEDURE — 272N000001 HC OR GENERAL SUPPLY STERILE: Performed by: INTERNAL MEDICINE

## 2022-05-26 PROCEDURE — 82810 BLOOD GASES O2 SAT ONLY: CPT

## 2022-05-26 PROCEDURE — 93456 R HRT CORONARY ARTERY ANGIO: CPT | Mod: 26 | Performed by: INTERNAL MEDICINE

## 2022-05-26 PROCEDURE — 258N000003 HC RX IP 258 OP 636: Performed by: NURSE PRACTITIONER

## 2022-05-26 PROCEDURE — C1894 INTRO/SHEATH, NON-LASER: HCPCS | Performed by: INTERNAL MEDICINE

## 2022-05-26 PROCEDURE — 258N000003 HC RX IP 258 OP 636: Performed by: THORACIC SURGERY (CARDIOTHORACIC VASCULAR SURGERY)

## 2022-05-26 RX ORDER — SODIUM CHLORIDE 9 MG/ML
75 INJECTION, SOLUTION INTRAVENOUS CONTINUOUS
Status: ACTIVE | OUTPATIENT
Start: 2022-05-26 | End: 2022-05-26

## 2022-05-26 RX ORDER — ASPIRIN 81 MG/1
324 TABLET, CHEWABLE ORAL ONCE
Status: COMPLETED | OUTPATIENT
Start: 2022-05-26 | End: 2022-05-26

## 2022-05-26 RX ORDER — LIDOCAINE/PRILOCAINE 2.5 %-2.5%
CREAM (GRAM) TOPICAL
Status: DISCONTINUED | OUTPATIENT
Start: 2022-05-26 | End: 2022-06-16

## 2022-05-26 RX ORDER — ASPIRIN 81 MG/1
243 TABLET, CHEWABLE ORAL ONCE
Status: DISCONTINUED | OUTPATIENT
Start: 2022-05-26 | End: 2022-05-26

## 2022-05-26 RX ORDER — CHLORHEXIDINE GLUCONATE ORAL RINSE 1.2 MG/ML
10 SOLUTION DENTAL ONCE
Status: COMPLETED | OUTPATIENT
Start: 2022-05-26 | End: 2022-05-27

## 2022-05-26 RX ORDER — SODIUM CHLORIDE 9 MG/ML
INJECTION, SOLUTION INTRAVENOUS CONTINUOUS
Status: DISCONTINUED | OUTPATIENT
Start: 2022-05-26 | End: 2022-05-26

## 2022-05-26 RX ORDER — FLUMAZENIL 0.1 MG/ML
0.2 INJECTION, SOLUTION INTRAVENOUS
Status: ACTIVE | OUTPATIENT
Start: 2022-05-26 | End: 2022-05-26

## 2022-05-26 RX ORDER — NALOXONE HYDROCHLORIDE 0.4 MG/ML
0.4 INJECTION, SOLUTION INTRAMUSCULAR; INTRAVENOUS; SUBCUTANEOUS
Status: ACTIVE | OUTPATIENT
Start: 2022-05-26 | End: 2022-05-26

## 2022-05-26 RX ORDER — CEFAZOLIN SODIUM 2 G/100ML
2 INJECTION, SOLUTION INTRAVENOUS
Status: COMPLETED | OUTPATIENT
Start: 2022-05-26 | End: 2022-05-27

## 2022-05-26 RX ORDER — OXYCODONE HYDROCHLORIDE 10 MG/1
10 TABLET ORAL EVERY 4 HOURS PRN
Status: DISCONTINUED | OUTPATIENT
Start: 2022-05-26 | End: 2022-06-01

## 2022-05-26 RX ORDER — PHENYLEPHRINE HCL IN 0.9% NACL 50MG/250ML
.1-6 PLASTIC BAG, INJECTION (ML) INTRAVENOUS CONTINUOUS
Status: DISCONTINUED | OUTPATIENT
Start: 2022-05-26 | End: 2022-05-27

## 2022-05-26 RX ORDER — HYDROXYZINE HYDROCHLORIDE 25 MG/1
25-50 TABLET, FILM COATED ORAL EVERY 6 HOURS PRN
Status: DISCONTINUED | OUTPATIENT
Start: 2022-05-26 | End: 2022-06-01

## 2022-05-26 RX ORDER — HEPARIN SODIUM 1000 [USP'U]/ML
INJECTION, SOLUTION INTRAVENOUS; SUBCUTANEOUS
Status: DISCONTINUED | OUTPATIENT
Start: 2022-05-26 | End: 2022-05-26 | Stop reason: HOSPADM

## 2022-05-26 RX ORDER — ATROPINE SULFATE 0.1 MG/ML
0.5 INJECTION INTRAVENOUS
Status: ACTIVE | OUTPATIENT
Start: 2022-05-26 | End: 2022-05-26

## 2022-05-26 RX ORDER — NICARDIPINE HYDROCHLORIDE 2.5 MG/ML
INJECTION INTRAVENOUS
Status: DISCONTINUED | OUTPATIENT
Start: 2022-05-26 | End: 2022-05-26 | Stop reason: HOSPADM

## 2022-05-26 RX ORDER — ASPIRIN 325 MG
325 TABLET ORAL ONCE
Status: DISCONTINUED | OUTPATIENT
Start: 2022-05-26 | End: 2022-05-26

## 2022-05-26 RX ORDER — GABAPENTIN 100 MG/1
100 CAPSULE ORAL
Status: COMPLETED | OUTPATIENT
Start: 2022-05-26 | End: 2022-05-27

## 2022-05-26 RX ORDER — LANOLIN ALCOHOL/MO/W.PET/CERES
3 CREAM (GRAM) TOPICAL AT BEDTIME
Status: DISCONTINUED | OUTPATIENT
Start: 2022-05-26 | End: 2022-06-01

## 2022-05-26 RX ORDER — DEXMEDETOMIDINE HYDROCHLORIDE 4 UG/ML
0.2-1.2 INJECTION, SOLUTION INTRAVENOUS CONTINUOUS
Status: DISCONTINUED | OUTPATIENT
Start: 2022-05-26 | End: 2022-05-27 | Stop reason: HOSPADM

## 2022-05-26 RX ORDER — FENTANYL CITRATE 50 UG/ML
INJECTION, SOLUTION INTRAMUSCULAR; INTRAVENOUS
Status: DISCONTINUED | OUTPATIENT
Start: 2022-05-26 | End: 2022-05-26 | Stop reason: HOSPADM

## 2022-05-26 RX ORDER — IOPAMIDOL 755 MG/ML
INJECTION, SOLUTION INTRAVASCULAR
Status: DISCONTINUED | OUTPATIENT
Start: 2022-05-26 | End: 2022-05-26 | Stop reason: HOSPADM

## 2022-05-26 RX ORDER — NALOXONE HYDROCHLORIDE 0.4 MG/ML
0.2 INJECTION, SOLUTION INTRAMUSCULAR; INTRAVENOUS; SUBCUTANEOUS
Status: ACTIVE | OUTPATIENT
Start: 2022-05-26 | End: 2022-05-26

## 2022-05-26 RX ORDER — POTASSIUM CHLORIDE 750 MG/1
20 TABLET, EXTENDED RELEASE ORAL
Status: DISCONTINUED | OUTPATIENT
Start: 2022-05-26 | End: 2022-05-26 | Stop reason: HOSPADM

## 2022-05-26 RX ORDER — LIDOCAINE 40 MG/G
CREAM TOPICAL
Status: DISCONTINUED | OUTPATIENT
Start: 2022-05-26 | End: 2022-05-27 | Stop reason: HOSPADM

## 2022-05-26 RX ORDER — POTASSIUM CHLORIDE 750 MG/1
40 TABLET, EXTENDED RELEASE ORAL
Status: DISCONTINUED | OUTPATIENT
Start: 2022-05-26 | End: 2022-05-26 | Stop reason: HOSPADM

## 2022-05-26 RX ORDER — OXYCODONE HYDROCHLORIDE 5 MG/1
5 TABLET ORAL EVERY 4 HOURS PRN
Status: DISCONTINUED | OUTPATIENT
Start: 2022-05-26 | End: 2022-06-01

## 2022-05-26 RX ORDER — ACETAMINOPHEN 325 MG/1
975 TABLET ORAL ONCE
Status: COMPLETED | OUTPATIENT
Start: 2022-05-26 | End: 2022-05-27

## 2022-05-26 RX ORDER — NITROGLYCERIN 5 MG/ML
VIAL (ML) INTRAVENOUS
Status: DISCONTINUED | OUTPATIENT
Start: 2022-05-26 | End: 2022-05-26 | Stop reason: HOSPADM

## 2022-05-26 RX ORDER — CEFAZOLIN SODIUM 2 G/100ML
2 INJECTION, SOLUTION INTRAVENOUS SEE ADMIN INSTRUCTIONS
Status: DISCONTINUED | OUTPATIENT
Start: 2022-05-26 | End: 2022-05-27 | Stop reason: HOSPADM

## 2022-05-26 RX ORDER — FAMOTIDINE 20 MG/1
20 TABLET, FILM COATED ORAL
Status: COMPLETED | OUTPATIENT
Start: 2022-05-26 | End: 2022-05-27

## 2022-05-26 RX ADMIN — SODIUM CHLORIDE, PRESERVATIVE FREE 75 ML/HR: 5 INJECTION INTRAVENOUS at 14:35

## 2022-05-26 RX ADMIN — FUROSEMIDE 20 MG: 20 TABLET ORAL at 08:13

## 2022-05-26 RX ADMIN — Medication 3 MG: at 22:28

## 2022-05-26 RX ADMIN — ACETAMINOPHEN 1000 MG: 500 TABLET ORAL at 04:15

## 2022-05-26 RX ADMIN — ASPIRIN 81 MG CHEWABLE TABLET 324 MG: 81 TABLET CHEWABLE at 11:55

## 2022-05-26 RX ADMIN — HEPARIN SODIUM AND DEXTROSE 800 UNITS/HR: 10000; 5 INJECTION INTRAVENOUS at 16:46

## 2022-05-26 ASSESSMENT — ACTIVITIES OF DAILY LIVING (ADL)
ADLS_ACUITY_SCORE: 30

## 2022-05-26 ASSESSMENT — ENCOUNTER SYMPTOMS: ORTHOPNEA: 0

## 2022-05-26 ASSESSMENT — LIFESTYLE VARIABLES: TOBACCO_USE: 0

## 2022-05-26 NOTE — PROGRESS NOTES
Neuro: A&Ox4.   Cardiac: SR. VSS.   Respiratory: Sating 96 on RA, dyspnea w exertion.  GI/: Adequate urine output. No BM this shift.  Diet/appetite: NPO at 0800 for anticipated procedure..  Activity:  Assist of 1, up to chair and bathroom.  Pain: At acceptable level on current regimen. PRN tylenol given x1.  Skin: No new deficits noted.  LDA's: L PIV running heparin gtt @ 800units/hr, next PTT recheck scheduled for 0400 05/27.     Plan: Plan for angiogram and R heart cath this afternoon 05/26. Continue with POC. Notify primary team with changes.

## 2022-05-26 NOTE — PLAN OF CARE
Neuro: A&Ox4.   Cardiac: SR. VSS.   Respiratory: Sating >95% on 2L at rest. 2-4L with activity. Mild dyspnea noted with activity.  GI/: Adequate urine output. Lasix this morning.  Diet/appetite: NPO all morning. Regular diet this afternoon.  Activity:  SBA, up to chair most of shift.   Pain: C/O intermittenet neck soreness this morning, complete relief with heat.   Skin: TR Band to R Wrist.   LDA's: PIV x1 to LUE, PIV x1 to RUE.     R Heart cath and angiogram today. Got back to floor at 1345    Plan: Continue with POC. Notify primary team with changes.

## 2022-05-26 NOTE — ANESTHESIA PREPROCEDURE EVALUATION
Pre-Operative H & P     CC:  Preoperative exam to assess for increased cardiopulmonary risk while undergoing surgery and anesthesia.    Date of Encounter: 4/29/2022  Primary Care Physician:  Lisette Knox     Reason for visit: Pre-operative evaluation    HPI  Debi Espinoza is a 76 year old female who presents for pre-operative H & P in preparation for  Procedure Information     Case: [id number] Date/Time: 05/27/22 0730    Procedure: pulmonary thromboendarterectomy and Associated Procedures (N/A Chest)    Anesthesia type: General    Diagnosis: PTE (pulmonary thromboembolism) (H) [I26.99]    Pre-op diagnosis: PTE (pulmonary thromboembolism) (H) [I26.99]    Location:  OR [address] [place] [address] OR    Providers: Connor Peterson MD          Debi Espinoza is a 76 year old female with medical history including HTN, renal stones complicated by infection, knee osteoarthritis, history of bilateral PE x2 in 2019 and 2021, DVT 2019, on chronic anticoagulation with Apixaban, and severe pulmonary hypertension.    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  She is maintained on lifelong anticoagulation now since her second PE earlier this year and is on Apixaban.  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 She has since been evaluated by Dr. Peterson with plans to undergo endarterectomy on 5/2/2022.   Prior to surgery she will need to have a coronary angiogram performed with Dr. Hylton.     History is obtained from the patient and chart review    Hx of abnormal bleeding or anti-platelet use: yes apixaban    Menstrual history: No LMP recorded. Patient is postmenopausal.:      Past Medical History  No past medical history on file.    Past Surgical History  Past Surgical History:   Procedure Laterality Date     CV PULMONARY ANGIOGRAM N/A 12/8/2021    Procedure: Pulmonary Angiogram;  Surgeon: Lisette Knox MD;   Location:  [facility]     CV RIGHT HEART CATH MEASUREMENTS RECORDED N/A 12/8/2021    Procedure: CV RIGHT HEART CATH;  Surgeon: Lisette Knox MD;  [facility]       Prior to Admission Medications  No current outpatient medications on file.       Allergies  Allergies   Allergen Reactions     Lisinopril Cough       Social History  Social History     Socioeconomic History     Marital status:      Spouse name: Not on file     Number of children: Not on file     Years of education: Not on file     Highest education level: Not on file   Occupational History     Not on file   Tobacco Use     Smoking status: Never Smoker     Smokeless tobacco: Never Used   Substance and Sexual Activity     Alcohol use: Never     Drug use: Never     Sexual activity: Not on file   Other Topics Concern     Parent/sibling w/ CABG, MI or angioplasty before 65F 55M? No   Social History Narrative     Not on file     Social Determinants of Health     Financial Resource Strain: Not on file   Food Insecurity: Not on file   Transportation Needs: Not on file   Physical Activity: Not on file   Stress: Not on file   Social Connections: Not on file   Intimate Partner Violence: Not on file   Housing Stability: Not on file       Family History  No family history on file.    Review of Systems  The complete review of systems is negative other than noted in the HPI or here.   Anesthesia Evaluation   Pt has had prior anesthetic.     No history of anesthetic complications       ROS/MED HX  ENT/Pulmonary:     (+) LUIS M risk factors, hypertension,  (-) tobacco use   Neurologic:  - neg neurologic ROS  (-) no CVA and no TIA   Cardiovascular: Comment: Sleeps in recliner  Severe pulmonary HTN      (+) hypertension-----AGUILAR. pulmonary hypertension, Previous cardiac testing   Echo: Date: 12/8/22 Results:  Interpretation Summary  Paradoxical septal motion consistent with right ventricular pressure and  volume overload is  present.  Moderate to severe right ventricular dilation is present.  Global right ventricular function is severely reduced.  RVFWSL -14.3%.  RV4CSL -10%.  Severe tricuspid insufficiency is present.  Right ventricular systolic pressure is 77mmHg above the right atrial pressure.  IVC diameter >2.1 cm collapsing <50% with sniff suggests a high RA pressure  estimated at 15 mmHg or greater.  No pericardial effusion is present.    Stress Test: Date: Results:    ECG Reviewed: Date: 5/26/22 Results:  Sinus rhythm with 1st degree A-V block   Right axis deviation   Incomplete right bundle branch block   Right ventricular hypertrophy   ST & T wave abnormality, consider inferior ischemia   ST & T wave abnormality, consider anterolateral ischemia   Cath:  Date: 5/26/22 Results:  Diagnostic  Dominance: Right    Left Main  The vessel was visualized by selective angiography, is moderate in size and is angiographically normal.  Left Anterior Descending  The vessel is moderate in size.  Prox LAD lesion is 30% stenosed.  0% stenosed Mid LAD lesion.  First Diagonal Branch  The vessel is moderate in size and is angiographically normal.  First Septal Branch  The vessel is small and is angiographically normal.  Second Diagonal Branch  The vessel is moderate in size. The vessel exhibits minimal luminal irregularities.  Second Septal Branch  The vessel is small and is angiographically normal.  Left Circumflex  The vessel is moderate in size and is angiographically normal.  First Obtuse Marginal Branch  The vessel is small and is angiographically normal.  Second Obtuse Marginal Branch  The vessel is small and is angiographically normal. The vessel is tortuous.  Third Obtuse Marginal Branch  The vessel is small and is angiographically normal.  Right Coronary Artery  The vessel was visualized by selective angiography, is large and is angiographically normal.  Acute Marginal Branch  The vessel is small and is angiographically normal.  Right  Ventricular Branch  The vessel is small and is angiographically normal.  Right Posterior Descending Artery  The vessel is moderate in size.  RPDA lesion is 20% stenosed.  Right Posterior Atrioventricular Artery  The vessel is large.  RPAV lesion is 20% stenosed.      RHC:  Ao 104/60/82  RA 13/13/10  RV 85/10  PA 86/33/47  PCWP --/--/12  PA Sat 64.4%  TD CO/CI 5.0/3.1  Boris CO/CI 5.3/3.2  PVR 6.7 MARTINEZ  SVR 1094 dsc-5     Right sided filling pressures are mildly elevated. Left sided filling pressures are normal. Severely elevated pulmonary artery hypertension. Normal cardiac output level. (-) orthopnea/PND, syncope and irregular heartbeat/palpitations   METS/Exercise Tolerance: 1 - Eating, dressing Comment: Uses walker/ wheelchair due to AGUILAR   Hematologic: Comments: bilateral PE x2 in 2019 and 2021, DVT 2019, on chronic anticoagulation with Apixaban  ~Chronic thromboembolic pulmonary hypertension    (+) History of blood clots, pt is anticoagulated,     Musculoskeletal: Comment: knee osteoarthritis  (+) arthritis,     GI/Hepatic:  - neg GI/hepatic ROS     Renal/Genitourinary: Comment: Kidney stones    (+) renal disease, type: CRI, Pt does not require dialysis,     Endo:  - neg endo ROS  (-) Type II DM and thyroid disease   Psychiatric/Substance Use:  - neg psychiatric ROS     Infectious Disease: Comment: Vaccinated for covid x 1    !!!COVID POSITIVE 4/28/22!!!   (-) Recent Fever   Malignancy:  - neg malignancy ROS     Other:  - neg other ROS          /57 (BP Location: Right arm, Cuff Size: Adult Regular)   Pulse 67   Temp 36.9  C (98.4  F) (Oral)   Resp 16   Ht 1.524 m (5')   Wt 65.9 kg (145 lb 4.5 oz)   SpO2 96%   BMI 28.37 kg/m      Physical Exam   Constitutional: Awake, alert, cooperative, mild shortness of breath at rest, and appears stated age.  Eyes: Pupils equal, round and reactive to light, extra ocular muscles intact, sclera clear, conjunctiva normal.  HENT: Normocephalic, oral pharynx with moist  mucus membranes, good dentition. No goiter appreciated.   Respiratory: Clear to auscultation bilaterally, no crackles or wheezing. Mild SOB while speaking  Cardiovascular: Regular rate and rhythm, normal S1 and S2, and no murmur noted.  Carotids +2, no bruits. + edema non-pitting. Palpable pulses to radial  DP and PT arteries.   GI: Normal bowel sounds, soft, non-distended, non-tender, no masses palpated, no hepatosplenomegaly.    Lymph/Hematologic: No cervical lymphadenopathy and no supraclavicular lymphadenopathy.  Genitourinary:  deferred  Skin: Warm and dry.  No rashes at anticipated surgical site.   Musculoskeletal: Full ROM of neck. There is no redness, warmth, or swelling of the joints. Gross motor strength is normal.    Neurologic: Awake, alert, oriented to name, place and time. Cranial nerves II-XII are grossly intact. Gait is normal  Neuropsychiatric: Calm, cooperative. Normal affect.     Prior Labs/Diagnostic Studies   All labs and imaging personally reviewed     EKG/ stress test - if available please see in ROS above   12/2021  Echo result w/o MOPS: Interpretation SummaryParadoxical septal motion consistent with right ventricular pressure andvolume overload is present.Moderate to severe right ventricular dilation is present.Global right ventricular function is severely reduced.RVFWSL -14.3%.RV4CSL -10%.Severe tricuspid insufficiency is present.Right ventricular systolic pressure is 77mmHg above the right atrial pressure.IVC diameter >2.1 cm collapsing <50% with sniff suggests a high RA pressureestimated at 15 mmHg or greater.No pericardial effusion is present.      PFT Latest Ref Rng & Units 12/8/2021   FVC L 2.04   FEV1 L 1.55   FVC% % 89   FEV1% % 88     CT scan 12/2021  IMPRESSION:   1. Multiple subsegmental pulmonary emboli are seen within the left  upper lobe and right lower lobe pulmonary arteries which appear  chronic in nature and correlate to findings described on CT chest  imaging report dated  1/22/2021  2. Bibasilar peripheral consolidative opacities with associated  perfusion defects, concerning for pulmonary infarcts.  3. Evidence of right heart dysfunction with right atrial and right  ventricular dilatation.  4. Multiple sub-6 mm pulmonary nodules as described above. If patient  is at high risk for lung cancer, recommend follow-up CT in 12 months  or correlation with outside imaging to ensure stability.  5. Patchy consolidative opacity in the superior left upper lobe, which  may represent infection. Follow-up to clearing.  6. Pulmonary artery is dilated up to 3.4 cm, to be seen in setting of  pulmonary artery hypertension.  7. Mosaic attenuation of the lungs, which can be seen in the setting  of small airway or small vessel disease.           The patient's records and results personally reviewed by this provider.     Outside records reviewed from: Care Everywhere    LAB/DIAGNOSTIC STUDIES TODAY:    Lab Results   Component Value Date    WBC 7.4 04/29/2022     Lab Results   Component Value Date    RBC 4.32 04/29/2022     Lab Results   Component Value Date    HGB 11.7 04/29/2022     Lab Results   Component Value Date    HCT 37.6 04/29/2022     No components found for: MCT  Lab Results   Component Value Date    MCV 87 04/29/2022     Lab Results   Component Value Date    MCH 27.1 04/29/2022     Lab Results   Component Value Date    MCHC 31.1 04/29/2022     Lab Results   Component Value Date    RDW 17.0 04/29/2022     Lab Results   Component Value Date     04/29/2022     Last Comprehensive Metabolic Panel:  Sodium   Date Value Ref Range Status   05/27/2022 140 133 - 144 mmol/L Preliminary     Potassium   Date Value Ref Range Status   05/27/2022 4.0 3.4 - 5.3 mmol/L Preliminary     Chloride   Date Value Ref Range Status   05/27/2022 109 94 - 109 mmol/L Preliminary     Carbon Dioxide (CO2)   Date Value Ref Range Status   05/26/2022 25 20 - 32 mmol/L Final     Anion Gap   Date Value Ref Range Status    05/26/2022 3 3 - 14 mmol/L Final     Glucose   Date Value Ref Range Status   05/26/2022 86 70 - 99 mg/dL Final     Urea Nitrogen   Date Value Ref Range Status   05/26/2022 20 7 - 30 mg/dL Final     Creatinine   Date Value Ref Range Status   05/26/2022 1.16 (H) 0.52 - 1.04 mg/dL Final     GFR Estimate   Date Value Ref Range Status   05/26/2022 48 (L) >60 mL/min/1.73m2 Final     Comment:     Effective December 21, 2021 eGFRcr in adults is calculated using the 2021 CKD-EPI creatinine equation which includes age and gender (Susan et al., [place], DOI: 10.1056/IZMMte3986858)     Calcium   Date Value Ref Range Status   05/26/2022 9.2 8.5 - 10.1 mg/dL Final     INR   Date Value Ref Range Status   05/25/2022 1.13 0.85 - 1.15 Final          Component Ref Range & Units  9:29 AM 4 mo ago    N terminal Pro BNP Inpatient 0 - 1,800 pg/mL 4,583 High   6,611 High  R          Assessment      Debi Espinoza is a 76 year old female seen as a PAC referral for risk assessment and optimization for anesthesia.    Plan/Recommendations  Pt will be optimized for the proposed procedure.  See below for details on the assessment, risk, and preoperative recommendations    NEUROLOGY  - No history of TIA, CVA or seizure  -Post Op delirium risk factors:  Age    ENT  - No current airway concerns.  Will need to be reassessed day of surgery.  Mallampati: II  TM: > 3    CARDIAC  - No history of CAD and Afib  - Severe PHTN- maintained on adempas 2.5mg 3 times daily and opsumit.  - Chronic thromboembolic pulmonary hypertension- on lifelong anticoagulation.   See pharm D note for anticoagulation plan. She is being bridged with lovenox.   Will hold lovenox, adempas and opsumit DOS.  + significant AGUILAR, sleeps in recliner. Uses walker for assist or wheelchair     - METS (Metabolic Equivalents)  Patient CANNOT perform 4 METS exercise without symptoms            Total Score: 1    Functional Capacity: Unable to complete 4 METS      RCRI-Low risk: Class 2 0.9%  complication rate            Total Score: 1    RCRI: CHF        PULMONARY  history of bilateral PE x2 in 2019 and 2021, on chronic anticoagulation with Apixaban   AGUILAR and mild SOB while talking at rest. 2/2 Chronic thromboembolic pulmonary hypertension  LUIS M Low Risk  *This score is based on incomplete data *           Total Score: 2*    LUIS M: Hypertension    LUIS M: Over 50 ys old        Criteria with incomplete data:    LUIS M: Observed stopped breathing      - Denies asthma or inhaler use  - Tobacco History      History   Smoking Status     Never Smoker   Smokeless Tobacco     Never Used       GI    PONV High Risk  Total Score: 3           1 AN PONV: Pt is Female    1 AN PONV: Patient is not a current smoker    1 AN PONV: Intended Post Op Opioids       Renal-history of renal stones    ENDOCRINE    - BMI: Estimated body mass index is 28.37 kg/m  as calculated from the following:    Height as of this encounter: 1.524 m (5').    Weight as of this encounter: 65.9 kg (145 lb 4.5 oz).  Overweight (BMI 25.0-29.9)  - No history of Diabetes Mellitus    HEME  history of bilateral PE x2 in 2019 and 2021, DVT 2019, on chronic anticoagulation with Apixaban  - Coagulopathy second to Apixaban (Eliquis)      MSK    OA    Patient is NOT Frail            Total Score: -        Criteria with incomplete data:    Frailty: Weight loss 10 lbs or greater    Frailty: Slower walking speed    Frailty: Decrease in strength    Frailty: Increased exhaustion    Frailty: Overall lack of energy      - This score is not calculated because of inadequate data             Patient was discussed with Dr Diaz    The patient is optimized for their procedure. AVS with information on surgery time/arrival time, meds and NPO status given by nursing staff. No further diagnostic testing indicated.      On the day of service:     Prep time: 20 minutes  Visit time: 20 minutes  Documentation time: 15 minutes  ------------------------------------------  Total time: 55  minutes      WEN Olivares CNP  Preoperative Assessment Center  [facility]  Phone: [phone]  Fax: [phone]           Anesthesia Plan    ASA Status:  4   NPO Status:  NPO Appropriate    Anesthesia Type: General.     - Airway: ETT   Induction: Intravenous.   Maintenance: Balanced.   Techniques and Equipment:     - Lines/Monitors: Arterial Line, Central Line, PAC, CVP, BIS, NIRS, AMILCAR            AMILCAR Absolute Contra-indication: NONE            AMILCAR Relative Contra-indication: NONE     - Blood: Cell Saver, T&S     - Drips/Meds: Vasopressin, Epinephrine, Nitric Oxide, Norepi, Milrinone     Consents    Anesthesia Plan(s) and associated risks, benefits, and realistic alternatives discussed. Questions answered and patient/representative(s) expressed understanding.    - Discussed:     - Discussed with:  Patient      - Extended Intubation/Ventilatory Support Discussed: Yes.      - Patient is DNR/DNI Status: No    Use of blood products discussed: Yes.     - Discussed with: Patient.     - Consented: consented to blood products            Reason for refusal: other.     Postoperative Care    Pain management: Multi-modal analgesia.   PONV prophylaxis: Ondansetron (or other 5HT-3), Dexamethasone or Solumedrol     Comments:    Other Comments: 77 yo F w/ PMH of CTEPH presents to the OR for pulmonary thromboendarterectomy. Plan for GETA w/ arterial line (x2), CVC, PAC and AMILCAR.

## 2022-05-26 NOTE — PHARMACY-ADMISSION MEDICATION HISTORY
Admission Medication History Completed by Pharmacy    See [facility] Admission Navigator for allergy information, preferred outpatient pharmacy, prior to admission medications and immunization status.     Medication History Sources:     Spoke with patient via phone    Reviewed [facility] Everywhere record    Changes made to PTA medication list (reason):    Added: None    Deleted: None    Changed: None    Additional Information:    Verified with patient that she is not taking the following medications that were listed on her [facility] medication list: calcitriol 0.25 mcg by mouth daily, allopurinol 100mg by mouth daily and benzonatate 200mg by mouth three times daily as needed for cough.    Patient was a good historian. She was able to say that her furosemide medication dose had changed from 40mg daily to 20mg daily.    Prior to Admission medications    Medication Sig Last Dose Taking? Auth Provider   acetaminophen (TYLENOL) 500 MG tablet Take 1,000 mg by mouth every 6 hours as needed 5/25/2022 at 0700 Yes Reported, Patient   furosemide (LASIX) 20 MG tablet Take 20 mg by mouth daily 5/24/2022 at 1200 Yes Unknown, Entered By History   macitentan (OPSUMIT) 10 MG tablet Take 10 mg by mouth daily (with lunch) 5/24/2022 at 1200 Yes Lisette Knox MD   riociguat (ADEMPAS) 2.5 MG tablet Take 2.5 mg by mouth 3 times daily 5/25/2022 at 0730 Yes Unknown, Entered By History   apixaban ANTICOAGULANT (ELIQUIS) 5 MG tablet Take 1 tablet (5 mg) by mouth 2 times daily 5/23/2022 at 0800  Lisette Knox MD       Date completed: 05/26/22    Medication history completed by: Kim Booth, Pharm.D., [facility]  Pager [phone]

## 2022-05-26 NOTE — UTILIZATION REVIEW
"  Admission Status; Secondary Review Determination         Under the authority of the Utilization Management Committee, the utilization review process indicated a secondary review on the above patient.  The review outcome is based on review of the medical records, discussions with staff, and applying clinical experience noted on the date of the review.        (X)      Inpatient Status Appropriate - This patient's medical care is consistent with medical management for inpatient care and reasonable inpatient medical practice.      () Observation Status Appropriate - This patient does not meet hospital inpatient criteria and is placed in observation status. If this patient's primary payer is Medicare and was admitted as an inpatient, Condition Code 44 should be used and patient status changed to \"observation\".   () Admission Status NOT Appropriate - This patient's medical care is not consistent with medical management for Inpatient or Observation Status.          RATIONALE FOR DETERMINATION     76 year old female with a history of renal stones, CKD, DVT/PE (on apixaban), 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  She requires bridging anticoagulation prior to the procedures.    The severity of illness, intensity of service provided, expected LOS and risk for adverse outcome make the care complex, high risk and appropriate for hospital admission.        The information on this document is developed by the utilization review team in order for the business office to ensure compliance.  This only denotes the appropriateness of proper admission status and does not reflect the quality of care rendered.         The definitions of Inpatient Status and Observation Status used in making the determination above are those provided in the CMS Coverage Manual, Chapter 1 and Chapter 6, section 70.4.      Sincerely,     Jhon Mustafa, " MD  Physician Advisor  Utilization Review/ Case Management  [facility].

## 2022-05-26 NOTE — PROGRESS NOTES
[facility]    Cardiology Progress Note- Cards 2        Date of Admission:  5/25/2022     Assessment & Plan: HVSL   Debi Espinoza is a 76 year old female with a history of renal stones, CKD, DVT/PE (on apixaban), 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    Today: RHC, coronary angiogram    # Pulmonary hypertension, Group IV  # CTEPH  # DVTs  # Severe tricuspid insufficiency  Initially diagnosed with bilateral DVT, PE 2019. TTE showed dilated RV with reduced RV function and RVSP of 84mmHg suggestive of severe pulmonary hypertension with mild to moderate TR. Patient believes the above relates to a work related injury, unna boot early 2019. RHC 12/18/2021 with RA 12, PA 84/19/45, PCWP 2, TAHIRA CO/CI 2.8/1.6. Last dose eliquis 5/23 AM. Has oxygen at home but does not use this.   - Oxygen as needed to maintain saturation >92%  - Continue opsumit, adempas until 5/26 cath  - Hold apixaban  - Low intensity heparin gtt  - Plan for RHC, coronary angiogram 5/26 (NPO at [place]), order placed. Requesting biplane angiogram to minimize contrast  - Planning for pulmonary artery endarterectomy 5/27    # Volume overload  PTA on lasix 20mg qday (decreased from 40mg ~6 weeks ago). JVP elevated on initial exam. S/p 40mg IV lasix 5/25  - Further diuresis per RHC     # Anemia  Hgb 11.4, similar to prior last month.   - Iron studies    # CKD  Baseline Cr ~1.3.      Diet:   NPO  DVT Prophylaxis: heparin gtt  Win Catheter: Not present  Code Status:         Disposition Plan   Expected discharge: 3-4 days, recommended to prior living arrangement once pulmonary artery endarterectomy complete, patient stabilized.    Entered: Carol Hunter MD 05/26/2022, 6:32 AM     The patient's care was discussed with the Attending Physician, Dr. Joseph.    Carol Hunter MD  [facility]  [facility]        Late entry- pt seen and discussed on 5/26/22  I have reviewed today's vital signs, notes, medications, labs and imaging. I have also seen and examined the patient and agree with the findings and plan as outlined above.    Jody Joseph MD  Section Head - Advanced Heart Failure, Transplantation and Mechanical Circulatory Support  Director - Adult Congenital [facility] Cardiovascular [facility]etics [facility]  Associate Professor of Medicine, [facility]    ______________________________________________________________________    Interval History   NAEO. Slept OK. Still has dyspnea on exertion. Some neck discomfort this morning. Denies chest pain, light-headedness, nausea/vomiting, fevers/chills.     Data reviewed today: I reviewed all medications, new labs and imaging results over the last 24 hours. I personally reviewed the EKG tracing showing 1st degree AV block, RVH, right axis deviation     Physical Exam   Vital Signs: Temp: 97.8  F (36.6  C) Temp src: Oral BP: 97/61 Pulse: 67   Resp: 18 SpO2: 99 % O2 Device: Nasal cannula Oxygen Delivery: 2 LPM  Weight: 147 lbs .75 oz  General Appearance: well-appearing female, lying in bed  Eyes: EOMI, PERRL, no scleral icterus  HEENT: NCAT, MMM, neck supple  Respiratory: Mildly increased work of breathing while sitting up; CTAB  Cardiovascular: RRR, systolic murmur heard throughout precordium, best at LLSB; JVP est 11cm; 3+ BLE edema; palpable pulses in all 4 extremities  GI: Abdomen soft, nontender; bowel sounds present  Skin: No rashes visualized on exposed skin  Musculoskeletal:   Neurologic: Alert, oriented, moving all extremities spontaneously  Psychiatric: Pleasant mood, full affect    Data   Recent Labs   Lab 05/26/22  0454 05/25/22  1622   WBC 4.9 5.5   HGB 10.6* 11.4*   MCV 87 86    225   INR  --  1.13    139   POTASSIUM 4.2 4.0   CHLORIDE 110* 110*   CO2 25 23   BUN 20 20   CR 1.16* 1.08*   ANIONGAP 3 6   CHELE 9.2 9.4    GLC 86 108*   ALBUMIN  --  3.4   PROTTOTAL  --  6.8   BILITOTAL  --  0.7   ALKPHOS  --  75   ALT  --  16   AST  --  16     No results found for this or any previous visit (from the past 24 hour(s)).

## 2022-05-27 ENCOUNTER — APPOINTMENT (OUTPATIENT)
Dept: GENERAL RADIOLOGY | Facility: CLINIC | Age: 77
DRG: 270 | End: 2022-05-27
Attending: SURGERY
Payer: MEDICARE

## 2022-05-27 ENCOUNTER — ANESTHESIA (OUTPATIENT)
Dept: SURGERY | Facility: CLINIC | Age: 77
DRG: 270 | End: 2022-05-27
Payer: MEDICARE

## 2022-05-27 ENCOUNTER — RESEARCH ENCOUNTER (OUTPATIENT)
Dept: CARDIOLOGY | Facility: CLINIC | Age: 77
End: 2022-05-27
Payer: MEDICARE

## 2022-05-27 LAB
ALBUMIN SERPL-MCNC: 1.8 G/DL (ref 3.4–5)
ALBUMIN SERPL-MCNC: 2.5 G/DL (ref 3.4–5)
ALP SERPL-CCNC: 37 U/L (ref 40–150)
ALP SERPL-CCNC: 48 U/L (ref 40–150)
ALT SERPL W P-5'-P-CCNC: 11 U/L (ref 0–50)
ALT SERPL W P-5'-P-CCNC: 14 U/L (ref 0–50)
ANION GAP SERPL CALCULATED.3IONS-SCNC: 6 MMOL/L (ref 3–14)
ANION GAP SERPL CALCULATED.3IONS-SCNC: 7 MMOL/L (ref 3–14)
ANION GAP SERPL CALCULATED.3IONS-SCNC: 7 MMOL/L (ref 3–14)
APTT PPP: 60 SECONDS (ref 22–38)
APTT PPP: 63 SECONDS (ref 22–38)
APTT PPP: 76 SECONDS (ref 22–38)
APTT PPP: 89 SECONDS (ref 22–38)
AST SERPL W P-5'-P-CCNC: 36 U/L (ref 0–45)
AST SERPL W P-5'-P-CCNC: 47 U/L (ref 0–45)
BASE EXCESS BLDA CALC-SCNC: -0.2 MMOL/L (ref -9.6–2)
BASE EXCESS BLDA CALC-SCNC: -1.1 MMOL/L (ref -9.6–2)
BASE EXCESS BLDA CALC-SCNC: -1.3 MMOL/L (ref -9.6–2)
BASE EXCESS BLDA CALC-SCNC: -1.5 MMOL/L (ref -9–1.8)
BASE EXCESS BLDA CALC-SCNC: -2 MMOL/L (ref -9–1.8)
BASE EXCESS BLDA CALC-SCNC: -2.2 MMOL/L (ref -9.6–2)
BASE EXCESS BLDA CALC-SCNC: -2.6 MMOL/L (ref -9.6–2)
BASE EXCESS BLDA CALC-SCNC: -3 MMOL/L (ref -9.6–2)
BASE EXCESS BLDA CALC-SCNC: -3.9 MMOL/L (ref -9.6–2)
BASE EXCESS BLDA CALC-SCNC: -4.2 MMOL/L (ref -9.6–2)
BASE EXCESS BLDA CALC-SCNC: -4.8 MMOL/L (ref -9.6–2)
BASE EXCESS BLDA CALC-SCNC: 0.4 MMOL/L (ref -9.6–2)
BASE EXCESS BLDV CALC-SCNC: -0.3 MMOL/L (ref -8.1–1.9)
BASE EXCESS BLDV CALC-SCNC: -1.2 MMOL/L (ref -7.7–1.9)
BASE EXCESS BLDV CALC-SCNC: -5.1 MMOL/L (ref -8.1–1.9)
BASE EXCESS BLDV CALC-SCNC: 0 MMOL/L (ref -8.1–1.9)
BILIRUB SERPL-MCNC: 1.1 MG/DL (ref 0.2–1.3)
BILIRUB SERPL-MCNC: 1.2 MG/DL (ref 0.2–1.3)
BLD PROD TYP BPU: NORMAL
BLOOD COMPONENT TYPE: NORMAL
BUN SERPL-MCNC: 16 MG/DL (ref 7–30)
BUN SERPL-MCNC: 18 MG/DL (ref 7–30)
BUN SERPL-MCNC: 20 MG/DL (ref 7–30)
CA-I BLD-MCNC: 4.2 MG/DL (ref 4.4–5.2)
CA-I BLD-MCNC: 4.4 MG/DL (ref 4.4–5.2)
CA-I BLD-MCNC: 4.4 MG/DL (ref 4.4–5.2)
CA-I BLD-MCNC: 4.5 MG/DL (ref 4.4–5.2)
CA-I BLD-MCNC: 4.7 MG/DL (ref 4.4–5.2)
CA-I BLD-MCNC: 4.7 MG/DL (ref 4.4–5.2)
CA-I BLD-MCNC: 4.8 MG/DL (ref 4.4–5.2)
CA-I BLD-MCNC: 4.8 MG/DL (ref 4.4–5.2)
CA-I BLD-MCNC: 4.9 MG/DL (ref 4.4–5.2)
CA-I BLD-MCNC: 4.9 MG/DL (ref 4.4–5.2)
CA-I BLD-MCNC: 5 MG/DL (ref 4.4–5.2)
CA-I BLD-MCNC: 5.3 MG/DL (ref 4.4–5.2)
CA-I BLD-MCNC: 5.4 MG/DL (ref 4.4–5.2)
CA-I BLD-MCNC: 6 MG/DL (ref 4.4–5.2)
CALCIUM SERPL-MCNC: 8.1 MG/DL (ref 8.5–10.1)
CALCIUM SERPL-MCNC: 8.7 MG/DL (ref 8.5–10.1)
CALCIUM SERPL-MCNC: 9.4 MG/DL (ref 8.5–10.1)
CF REDUC 30M P MA P HEP LENFR BLD TEG: 0 % (ref 0–8)
CF REDUC 30M P MA P HEP LENFR BLD TEG: 0 % (ref 0–8)
CF REDUC 60M P MA P HEPASE LENFR BLD TEG: 0.1 % (ref 0–15)
CF REDUC 60M P MA P HEPASE LENFR BLD TEG: 1.2 % (ref 0–15)
CFT P HPASE BLD TEG: 0.9 MINUTE (ref 1–3)
CFT P HPASE BLD TEG: 2.5 MINUTE (ref 1–3)
CHLORIDE BLD-SCNC: 109 MMOL/L (ref 94–109)
CHLORIDE BLD-SCNC: 113 MMOL/L (ref 94–109)
CHLORIDE BLD-SCNC: 115 MMOL/L (ref 94–109)
CI (COAGULATION INDEX)(Z): -0.8 (ref -3–3)
CI (COAGULATION INDEX)(Z): 3.9 (ref -3–3)
CLOT ANGLE P HPASE BLD TEG: 63.2 DEGREES (ref 53–72)
CLOT ANGLE P HPASE BLD TEG: 76.3 DEGREES (ref 53–72)
CLOT INIT P HPASE BLD TEG: 3.5 MINUTE (ref 5–10)
CLOT INIT P HPASE BLD TEG: 4.6 MINUTE (ref 5–10)
CLOT STRENGTH P HPASE BLD TEG: 11.7 KD/SC (ref 4.5–11)
CLOT STRENGTH P HPASE BLD TEG: 5 KD/SC (ref 4.5–11)
CO2 SERPL-SCNC: 25 MMOL/L (ref 20–32)
CO2 SERPL-SCNC: 25 MMOL/L (ref 20–32)
CO2 SERPL-SCNC: 26 MMOL/L (ref 20–32)
CODING SYSTEM: NORMAL
CREAT SERPL-MCNC: 0.88 MG/DL (ref 0.52–1.04)
CREAT SERPL-MCNC: 0.98 MG/DL (ref 0.52–1.04)
CREAT SERPL-MCNC: 1.17 MG/DL (ref 0.52–1.04)
CROSSMATCH: NORMAL
ERYTHROCYTE [DISTWIDTH] IN BLOOD BY AUTOMATED COUNT: 16.3 % (ref 10–15)
ERYTHROCYTE [DISTWIDTH] IN BLOOD BY AUTOMATED COUNT: 16.3 % (ref 10–15)
ERYTHROCYTE [DISTWIDTH] IN BLOOD BY AUTOMATED COUNT: 16.4 % (ref 10–15)
ERYTHROCYTE [DISTWIDTH] IN BLOOD BY AUTOMATED COUNT: 16.8 % (ref 10–15)
ERYTHROCYTE [DISTWIDTH] IN BLOOD BY AUTOMATED COUNT: 17.2 % (ref 10–15)
FIBRINOGEN PPP-MCNC: 137 MG/DL (ref 170–490)
FIBRINOGEN PPP-MCNC: 142 MG/DL (ref 170–490)
FIBRINOGEN PPP-MCNC: 204 MG/DL (ref 170–490)
GFR SERPL CREATININE-BSD FRML MDRD: 48 ML/MIN/1.73M2
GFR SERPL CREATININE-BSD FRML MDRD: 59 ML/MIN/1.73M2
GFR SERPL CREATININE-BSD FRML MDRD: 67 ML/MIN/1.73M2
GLUCOSE BLD-MCNC: 113 MG/DL (ref 70–99)
GLUCOSE BLD-MCNC: 126 MG/DL (ref 70–99)
GLUCOSE BLD-MCNC: 141 MG/DL (ref 70–99)
GLUCOSE BLD-MCNC: 146 MG/DL (ref 70–99)
GLUCOSE BLD-MCNC: 152 MG/DL (ref 70–99)
GLUCOSE BLD-MCNC: 156 MG/DL (ref 70–99)
GLUCOSE BLD-MCNC: 157 MG/DL (ref 70–99)
GLUCOSE BLD-MCNC: 158 MG/DL (ref 70–99)
GLUCOSE BLD-MCNC: 159 MG/DL (ref 70–99)
GLUCOSE BLD-MCNC: 164 MG/DL (ref 70–99)
GLUCOSE BLD-MCNC: 167 MG/DL (ref 70–99)
GLUCOSE BLD-MCNC: 180 MG/DL (ref 70–99)
GLUCOSE BLD-MCNC: 185 MG/DL (ref 70–99)
GLUCOSE BLD-MCNC: 88 MG/DL (ref 70–99)
GLUCOSE BLD-MCNC: 89 MG/DL (ref 70–99)
GLUCOSE BLD-MCNC: 92 MG/DL (ref 70–99)
GLUCOSE BLDC GLUCOMTR-MCNC: 135 MG/DL (ref 70–99)
GLUCOSE BLDC GLUCOMTR-MCNC: 135 MG/DL (ref 70–99)
GLUCOSE BLDC GLUCOMTR-MCNC: 87 MG/DL (ref 70–99)
GLUCOSE BLDC GLUCOMTR-MCNC: 95 MG/DL (ref 70–99)
HCO3 BLD-SCNC: 24 MMOL/L (ref 21–28)
HCO3 BLD-SCNC: 25 MMOL/L (ref 21–28)
HCO3 BLDA-SCNC: 22 MMOL/L (ref 21–28)
HCO3 BLDA-SCNC: 23 MMOL/L (ref 21–28)
HCO3 BLDA-SCNC: 24 MMOL/L (ref 21–28)
HCO3 BLDV-SCNC: 22 MMOL/L (ref 21–28)
HCO3 BLDV-SCNC: 23 MMOL/L (ref 21–28)
HCO3 BLDV-SCNC: 25 MMOL/L (ref 21–28)
HCO3 BLDV-SCNC: 26 MMOL/L (ref 21–28)
HCT VFR BLD AUTO: 23.5 % (ref 35–47)
HCT VFR BLD AUTO: 25.3 % (ref 35–47)
HCT VFR BLD AUTO: 29.2 % (ref 35–47)
HCT VFR BLD AUTO: 31.2 % (ref 35–47)
HCT VFR BLD AUTO: 36 % (ref 35–47)
HGB BLD-MCNC: 10.1 G/DL (ref 11.7–15.7)
HGB BLD-MCNC: 10.2 G/DL (ref 11.7–15.7)
HGB BLD-MCNC: 11.1 G/DL (ref 11.7–15.7)
HGB BLD-MCNC: 7.2 G/DL (ref 11.7–15.7)
HGB BLD-MCNC: 7.2 G/DL (ref 11.7–15.7)
HGB BLD-MCNC: 7.3 G/DL (ref 11.7–15.7)
HGB BLD-MCNC: 7.6 G/DL (ref 11.7–15.7)
HGB BLD-MCNC: 7.6 G/DL (ref 11.7–15.7)
HGB BLD-MCNC: 7.7 G/DL (ref 11.7–15.7)
HGB BLD-MCNC: 7.8 G/DL (ref 11.7–15.7)
HGB BLD-MCNC: 7.9 G/DL (ref 11.7–15.7)
HGB BLD-MCNC: 8 G/DL (ref 11.7–15.7)
HGB BLD-MCNC: 8.2 G/DL (ref 11.7–15.7)
HGB BLD-MCNC: 8.3 G/DL (ref 11.7–15.7)
HGB BLD-MCNC: 8.5 G/DL (ref 11.7–15.7)
HGB BLD-MCNC: 8.5 G/DL (ref 11.7–15.7)
HGB BLD-MCNC: 9.4 G/DL (ref 11.7–15.7)
HGB BLD-MCNC: 9.7 G/DL (ref 11.7–15.7)
INR PPP: 1.71 (ref 0.85–1.15)
INR PPP: 2.2 (ref 0.85–1.15)
INR PPP: 2.4 (ref 0.85–1.15)
ISSUE DATE AND TIME: NORMAL
LACTATE BLD-SCNC: 0.5 MMOL/L
LACTATE BLD-SCNC: 1 MMOL/L
LACTATE BLD-SCNC: 1.1 MMOL/L
LACTATE BLD-SCNC: 1.1 MMOL/L
LACTATE BLD-SCNC: 1.9 MMOL/L
LACTATE BLD-SCNC: 2.1 MMOL/L
LACTATE BLD-SCNC: 2.2 MMOL/L
LACTATE BLD-SCNC: 2.3 MMOL/L
LACTATE BLD-SCNC: 2.7 MMOL/L
LACTATE BLD-SCNC: 2.9 MMOL/L
LACTATE BLD-SCNC: 3.2 MMOL/L
LACTATE BLD-SCNC: 3.2 MMOL/L
LACTATE BLD-SCNC: 3.3 MMOL/L
LACTATE SERPL-SCNC: 2.2 MMOL/L (ref 0.7–2)
LACTATE SERPL-SCNC: 2.3 MMOL/L (ref 0.7–2)
MAGNESIUM SERPL-MCNC: 2.1 MG/DL (ref 1.6–2.3)
MAGNESIUM SERPL-MCNC: 2.4 MG/DL (ref 1.6–2.3)
MAGNESIUM SERPL-MCNC: 2.4 MG/DL (ref 1.6–2.3)
MCF P HPASE BLD TEG: 49.9 MM (ref 50–70)
MCF P HPASE BLD TEG: 70 MM (ref 50–70)
MCH RBC QN AUTO: 26.8 PG (ref 26.5–33)
MCH RBC QN AUTO: 26.9 PG (ref 26.5–33)
MCH RBC QN AUTO: 27.4 PG (ref 26.5–33)
MCH RBC QN AUTO: 27.5 PG (ref 26.5–33)
MCH RBC QN AUTO: 27.7 PG (ref 26.5–33)
MCHC RBC AUTO-ENTMCNC: 30.6 G/DL (ref 31.5–36.5)
MCHC RBC AUTO-ENTMCNC: 30.8 G/DL (ref 31.5–36.5)
MCHC RBC AUTO-ENTMCNC: 31.2 G/DL (ref 31.5–36.5)
MCHC RBC AUTO-ENTMCNC: 32.2 G/DL (ref 31.5–36.5)
MCHC RBC AUTO-ENTMCNC: 32.4 G/DL (ref 31.5–36.5)
MCV RBC AUTO: 85 FL (ref 78–100)
MCV RBC AUTO: 86 FL (ref 78–100)
MCV RBC AUTO: 87 FL (ref 78–100)
MCV RBC AUTO: 87 FL (ref 78–100)
MCV RBC AUTO: 88 FL (ref 78–100)
O2/TOTAL GAS SETTING VFR VENT: 100 %
O2/TOTAL GAS SETTING VFR VENT: 40 %
O2/TOTAL GAS SETTING VFR VENT: 45 %
O2/TOTAL GAS SETTING VFR VENT: 50 %
O2/TOTAL GAS SETTING VFR VENT: 60 %
O2/TOTAL GAS SETTING VFR VENT: 80 %
O2/TOTAL GAS SETTING VFR VENT: 90 %
OXYHGB MFR BLD: 89 % (ref 92–100)
OXYHGB MFR BLD: 90 % (ref 92–100)
OXYHGB MFR BLDV: 50 % (ref 70–75)
PCO2 BLD: 45 MM HG (ref 35–45)
PCO2 BLD: 49 MM HG (ref 35–45)
PCO2 BLDA: 27 MM HG (ref 35–45)
PCO2 BLDA: 35 MM HG (ref 35–45)
PCO2 BLDA: 37 MM HG (ref 35–45)
PCO2 BLDA: 38 MM HG (ref 35–45)
PCO2 BLDA: 38 MM HG (ref 35–45)
PCO2 BLDA: 42 MM HG (ref 35–45)
PCO2 BLDA: 44 MM HG (ref 35–45)
PCO2 BLDA: 45 MM HG (ref 35–45)
PCO2 BLDA: 46 MM HG (ref 35–45)
PCO2 BLDA: 55 MM HG (ref 35–45)
PCO2 BLDV: 31 MM HG (ref 40–50)
PCO2 BLDV: 44 MM HG (ref 40–50)
PCO2 BLDV: 51 MM HG (ref 40–50)
PCO2 BLDV: 54 MM HG (ref 40–50)
PH BLD: 7.31 [PH] (ref 7.35–7.45)
PH BLD: 7.34 [PH] (ref 7.35–7.45)
PH BLDA: 7.23 [PH] (ref 7.35–7.45)
PH BLDA: 7.3 [PH] (ref 7.35–7.45)
PH BLDA: 7.3 [PH] (ref 7.35–7.45)
PH BLDA: 7.35 [PH] (ref 7.35–7.45)
PH BLDA: 7.35 [PH] (ref 7.35–7.45)
PH BLDA: 7.38 [PH] (ref 7.35–7.45)
PH BLDA: 7.39 [PH] (ref 7.35–7.45)
PH BLDA: 7.41 [PH] (ref 7.35–7.45)
PH BLDA: 7.45 [PH] (ref 7.35–7.45)
PH BLDA: 7.51 [PH] (ref 7.35–7.45)
PH BLDV: 7.25 [PH] (ref 7.32–7.43)
PH BLDV: 7.29 [PH] (ref 7.32–7.43)
PH BLDV: 7.37 [PH] (ref 7.32–7.43)
PH BLDV: 7.48 [PH] (ref 7.32–7.43)
PHOSPHATE SERPL-MCNC: 2.5 MG/DL (ref 2.5–4.5)
PHOSPHATE SERPL-MCNC: 2.8 MG/DL (ref 2.5–4.5)
PLATELET # BLD AUTO: 119 10E3/UL (ref 150–450)
PLATELET # BLD AUTO: 136 10E3/UL (ref 150–450)
PLATELET # BLD AUTO: 193 10E3/UL (ref 150–450)
PLATELET # BLD AUTO: 211 10E3/UL (ref 150–450)
PLATELET # BLD AUTO: 69 10E3/UL (ref 150–450)
PO2 BLD: 62 MM HG (ref 80–105)
PO2 BLD: 67 MM HG (ref 80–105)
PO2 BLDA: 177 MM HG (ref 80–105)
PO2 BLDA: 238 MM HG (ref 80–105)
PO2 BLDA: 310 MM HG (ref 80–105)
PO2 BLDA: 322 MM HG (ref 80–105)
PO2 BLDA: 323 MM HG (ref 80–105)
PO2 BLDA: 415 MM HG (ref 80–105)
PO2 BLDA: 436 MM HG (ref 80–105)
PO2 BLDA: 512 MM HG (ref 80–105)
PO2 BLDA: 515 MM HG (ref 80–105)
PO2 BLDA: 86 MM HG (ref 80–105)
PO2 BLDV: 32 MM HG (ref 25–47)
PO2 BLDV: 42 MM HG (ref 25–47)
PO2 BLDV: 43 MM HG (ref 25–47)
PO2 BLDV: 76 MM HG (ref 25–47)
POTASSIUM BLD-SCNC: 3.3 MMOL/L (ref 3.5–5)
POTASSIUM BLD-SCNC: 3.5 MMOL/L (ref 3.5–5)
POTASSIUM BLD-SCNC: 3.5 MMOL/L (ref 3.5–5)
POTASSIUM BLD-SCNC: 3.6 MMOL/L (ref 3.5–5)
POTASSIUM BLD-SCNC: 3.8 MMOL/L (ref 3.5–5)
POTASSIUM BLD-SCNC: 3.9 MMOL/L (ref 3.5–5)
POTASSIUM BLD-SCNC: 4 MMOL/L (ref 3.4–5.3)
POTASSIUM BLD-SCNC: 4 MMOL/L (ref 3.5–5)
POTASSIUM BLD-SCNC: 4 MMOL/L (ref 3.5–5)
POTASSIUM BLD-SCNC: 4.1 MMOL/L (ref 3.4–5.3)
POTASSIUM BLD-SCNC: 4.4 MMOL/L (ref 3.5–5)
POTASSIUM BLD-SCNC: 4.5 MMOL/L (ref 3.5–5)
POTASSIUM BLD-SCNC: 4.5 MMOL/L (ref 3.5–5)
PROT SERPL-MCNC: 3.2 G/DL (ref 6.8–8.8)
PROT SERPL-MCNC: 4.4 G/DL (ref 6.8–8.8)
RBC # BLD AUTO: 2.69 10E6/UL (ref 3.8–5.2)
RBC # BLD AUTO: 2.87 10E6/UL (ref 3.8–5.2)
RBC # BLD AUTO: 3.39 10E6/UL (ref 3.8–5.2)
RBC # BLD AUTO: 3.68 10E6/UL (ref 3.8–5.2)
RBC # BLD AUTO: 4.13 10E6/UL (ref 3.8–5.2)
SODIUM BLD-SCNC: 140 MMOL/L (ref 133–144)
SODIUM BLD-SCNC: 140 MMOL/L (ref 133–144)
SODIUM BLD-SCNC: 141 MMOL/L (ref 133–144)
SODIUM BLD-SCNC: 142 MMOL/L (ref 133–144)
SODIUM BLD-SCNC: 143 MMOL/L (ref 133–144)
SODIUM BLD-SCNC: 144 MMOL/L (ref 133–144)
SODIUM SERPL-SCNC: 140 MMOL/L (ref 133–144)
SODIUM SERPL-SCNC: 145 MMOL/L (ref 133–144)
SODIUM SERPL-SCNC: 148 MMOL/L (ref 133–144)
UFH PPP CHRO-ACNC: 0.36 IU/ML
UNIT ABO/RH: NORMAL
UNIT NUMBER: NORMAL
UNIT STATUS: NORMAL
UNIT TYPE ISBT: 6200
WBC # BLD AUTO: 12.4 10E3/UL (ref 4–11)
WBC # BLD AUTO: 15.1 10E3/UL (ref 4–11)
WBC # BLD AUTO: 17.4 10E3/UL (ref 4–11)
WBC # BLD AUTO: 4.6 10E3/UL (ref 4–11)
WBC # BLD AUTO: 9.3 10E3/UL (ref 4–11)

## 2022-05-27 PROCEDURE — 250N000011 HC RX IP 250 OP 636: Performed by: NURSE ANESTHETIST, CERTIFIED REGISTERED

## 2022-05-27 PROCEDURE — 82805 BLOOD GASES W/O2 SATURATION: CPT | Performed by: SURGERY

## 2022-05-27 PROCEDURE — 02HQ32Z INSERTION OF MONITORING DEVICE INTO RIGHT PULMONARY ARTERY, PERCUTANEOUS APPROACH: ICD-10-PCS | Performed by: THORACIC SURGERY (CARDIOTHORACIC VASCULAR SURGERY)

## 2022-05-27 PROCEDURE — 82330 ASSAY OF CALCIUM: CPT | Performed by: THORACIC SURGERY (CARDIOTHORACIC VASCULAR SURGERY)

## 2022-05-27 PROCEDURE — 410N000003 HC PER-PERFUSION 1ST 30 MIN: Performed by: THORACIC SURGERY (CARDIOTHORACIC VASCULAR SURGERY)

## 2022-05-27 PROCEDURE — 4A133B3 MONITORING OF ARTERIAL PRESSURE, PULMONARY, PERCUTANEOUS APPROACH: ICD-10-PCS | Performed by: THORACIC SURGERY (CARDIOTHORACIC VASCULAR SURGERY)

## 2022-05-27 PROCEDURE — 200N000002 HC R&B ICU UMMC

## 2022-05-27 PROCEDURE — 4A1239Z MONITORING OF CARDIAC OUTPUT, PERCUTANEOUS APPROACH: ICD-10-PCS | Performed by: THORACIC SURGERY (CARDIOTHORACIC VASCULAR SURGERY)

## 2022-05-27 PROCEDURE — 272N000001 HC OR GENERAL SUPPLY STERILE: Performed by: THORACIC SURGERY (CARDIOTHORACIC VASCULAR SURGERY)

## 2022-05-27 PROCEDURE — 84132 ASSAY OF SERUM POTASSIUM: CPT

## 2022-05-27 PROCEDURE — 272N000088 HC PUMP APP ADULT PERFUSION: Performed by: THORACIC SURGERY (CARDIOTHORACIC VASCULAR SURGERY)

## 2022-05-27 PROCEDURE — 84100 ASSAY OF PHOSPHORUS: CPT | Performed by: THORACIC SURGERY (CARDIOTHORACIC VASCULAR SURGERY)

## 2022-05-27 PROCEDURE — 83605 ASSAY OF LACTIC ACID: CPT | Performed by: THORACIC SURGERY (CARDIOTHORACIC VASCULAR SURGERY)

## 2022-05-27 PROCEDURE — 250N000009 HC RX 250: Performed by: NURSE ANESTHETIST, CERTIFIED REGISTERED

## 2022-05-27 PROCEDURE — 88304 TISSUE EXAM BY PATHOLOGIST: CPT | Mod: TC | Performed by: THORACIC SURGERY (CARDIOTHORACIC VASCULAR SURGERY)

## 2022-05-27 PROCEDURE — 272N000085 HC PACK CELL SAVER CSP: Performed by: THORACIC SURGERY (CARDIOTHORACIC VASCULAR SURGERY)

## 2022-05-27 PROCEDURE — 84132 ASSAY OF SERUM POTASSIUM: CPT | Performed by: THORACIC SURGERY (CARDIOTHORACIC VASCULAR SURGERY)

## 2022-05-27 PROCEDURE — 82805 BLOOD GASES W/O2 SATURATION: CPT | Performed by: THORACIC SURGERY (CARDIOTHORACIC VASCULAR SURGERY)

## 2022-05-27 PROCEDURE — 250N000009 HC RX 250: Performed by: STUDENT IN AN ORGANIZED HEALTH CARE EDUCATION/TRAINING PROGRAM

## 2022-05-27 PROCEDURE — 71045 X-RAY EXAM CHEST 1 VIEW: CPT | Mod: 26 | Performed by: RADIOLOGY

## 2022-05-27 PROCEDURE — 33916 SURGERY OF GREAT VESSEL: CPT | Mod: GC | Performed by: THORACIC SURGERY (CARDIOTHORACIC VASCULAR SURGERY)

## 2022-05-27 PROCEDURE — 85730 THROMBOPLASTIN TIME PARTIAL: CPT | Performed by: THORACIC SURGERY (CARDIOTHORACIC VASCULAR SURGERY)

## 2022-05-27 PROCEDURE — 85027 COMPLETE CBC AUTOMATED: CPT | Performed by: NURSE ANESTHETIST, CERTIFIED REGISTERED

## 2022-05-27 PROCEDURE — 80053 COMPREHEN METABOLIC PANEL: CPT | Performed by: STUDENT IN AN ORGANIZED HEALTH CARE EDUCATION/TRAINING PROGRAM

## 2022-05-27 PROCEDURE — 999N000157 HC STATISTIC RCP TIME EA 10 MIN

## 2022-05-27 PROCEDURE — 250N000009 HC RX 250: Performed by: SURGERY

## 2022-05-27 PROCEDURE — 258N000003 HC RX IP 258 OP 636: Performed by: NURSE ANESTHETIST, CERTIFIED REGISTERED

## 2022-05-27 PROCEDURE — 02CR0ZZ EXTIRPATION OF MATTER FROM LEFT PULMONARY ARTERY, OPEN APPROACH: ICD-10-PCS | Performed by: SURGERY

## 2022-05-27 PROCEDURE — 250N000011 HC RX IP 250 OP 636

## 2022-05-27 PROCEDURE — 84450 TRANSFERASE (AST) (SGOT): CPT | Performed by: THORACIC SURGERY (CARDIOTHORACIC VASCULAR SURGERY)

## 2022-05-27 PROCEDURE — 36415 COLL VENOUS BLD VENIPUNCTURE: CPT | Performed by: STUDENT IN AN ORGANIZED HEALTH CARE EDUCATION/TRAINING PROGRAM

## 2022-05-27 PROCEDURE — 33916 SURGERY OF GREAT VESSEL: CPT | Mod: 80 | Performed by: SURGERY

## 2022-05-27 PROCEDURE — 85520 HEPARIN ASSAY: CPT | Performed by: INTERNAL MEDICINE

## 2022-05-27 PROCEDURE — 02UQ08Z SUPPLEMENT RIGHT PULMONARY ARTERY WITH ZOOPLASTIC TISSUE, OPEN APPROACH: ICD-10-PCS | Performed by: THORACIC SURGERY (CARDIOTHORACIC VASCULAR SURGERY)

## 2022-05-27 PROCEDURE — 250N000011 HC RX IP 250 OP 636: Performed by: SURGERY

## 2022-05-27 PROCEDURE — 258N000003 HC RX IP 258 OP 636: Performed by: STUDENT IN AN ORGANIZED HEALTH CARE EDUCATION/TRAINING PROGRAM

## 2022-05-27 PROCEDURE — C1898 LEAD, PMKR, OTHER THAN TRANS: HCPCS | Performed by: THORACIC SURGERY (CARDIOTHORACIC VASCULAR SURGERY)

## 2022-05-27 PROCEDURE — 258N000003 HC RX IP 258 OP 636: Performed by: THORACIC SURGERY (CARDIOTHORACIC VASCULAR SURGERY)

## 2022-05-27 PROCEDURE — 83735 ASSAY OF MAGNESIUM: CPT | Performed by: THORACIC SURGERY (CARDIOTHORACIC VASCULAR SURGERY)

## 2022-05-27 PROCEDURE — 85014 HEMATOCRIT: CPT | Performed by: INTERNAL MEDICINE

## 2022-05-27 PROCEDURE — 360N000079 HC SURGERY LEVEL 6, PER MIN: Performed by: THORACIC SURGERY (CARDIOTHORACIC VASCULAR SURGERY)

## 2022-05-27 PROCEDURE — 250N000011 HC RX IP 250 OP 636: Performed by: STUDENT IN AN ORGANIZED HEALTH CARE EDUCATION/TRAINING PROGRAM

## 2022-05-27 PROCEDURE — 93005 ELECTROCARDIOGRAM TRACING: CPT

## 2022-05-27 PROCEDURE — 85610 PROTHROMBIN TIME: CPT | Performed by: INTERNAL MEDICINE

## 2022-05-27 PROCEDURE — P9037 PLATE PHERES LEUKOREDU IRRAD: HCPCS | Performed by: THORACIC SURGERY (CARDIOTHORACIC VASCULAR SURGERY)

## 2022-05-27 PROCEDURE — 250N000009 HC RX 250: Performed by: THORACIC SURGERY (CARDIOTHORACIC VASCULAR SURGERY)

## 2022-05-27 PROCEDURE — C1763 CONN TISS, NON-HUMAN: HCPCS | Performed by: THORACIC SURGERY (CARDIOTHORACIC VASCULAR SURGERY)

## 2022-05-27 PROCEDURE — 250N000025 HC SEVOFLURANE, PER MIN: Performed by: THORACIC SURGERY (CARDIOTHORACIC VASCULAR SURGERY)

## 2022-05-27 PROCEDURE — 3E043XZ INTRODUCTION OF VASOPRESSOR INTO CENTRAL VEIN, PERCUTANEOUS APPROACH: ICD-10-PCS | Performed by: THORACIC SURGERY (CARDIOTHORACIC VASCULAR SURGERY)

## 2022-05-27 PROCEDURE — P9073 PLATELETS PHERESIS PATH REDU: HCPCS | Performed by: THORACIC SURGERY (CARDIOTHORACIC VASCULAR SURGERY)

## 2022-05-27 PROCEDURE — 999N000065 XR CHEST PORT 1 VIEW

## 2022-05-27 PROCEDURE — 83735 ASSAY OF MAGNESIUM: CPT | Performed by: STUDENT IN AN ORGANIZED HEALTH CARE EDUCATION/TRAINING PROGRAM

## 2022-05-27 PROCEDURE — 85730 THROMBOPLASTIN TIME PARTIAL: CPT | Performed by: INTERNAL MEDICINE

## 2022-05-27 PROCEDURE — 85730 THROMBOPLASTIN TIME PARTIAL: CPT | Performed by: NURSE ANESTHETIST, CERTIFIED REGISTERED

## 2022-05-27 PROCEDURE — 85027 COMPLETE CBC AUTOMATED: CPT | Performed by: STUDENT IN AN ORGANIZED HEALTH CARE EDUCATION/TRAINING PROGRAM

## 2022-05-27 PROCEDURE — 999N000141 HC STATISTIC PRE-PROCEDURE NURSING ASSESSMENT: Performed by: THORACIC SURGERY (CARDIOTHORACIC VASCULAR SURGERY)

## 2022-05-27 PROCEDURE — 85384 FIBRINOGEN ACTIVITY: CPT | Performed by: INTERNAL MEDICINE

## 2022-05-27 PROCEDURE — 5A1221Z PERFORMANCE OF CARDIAC OUTPUT, CONTINUOUS: ICD-10-PCS | Performed by: THORACIC SURGERY (CARDIOTHORACIC VASCULAR SURGERY)

## 2022-05-27 PROCEDURE — P9016 RBC LEUKOCYTES REDUCED: HCPCS | Performed by: INTERNAL MEDICINE

## 2022-05-27 PROCEDURE — 250N000011 HC RX IP 250 OP 636: Performed by: THORACIC SURGERY (CARDIOTHORACIC VASCULAR SURGERY)

## 2022-05-27 PROCEDURE — 85384 FIBRINOGEN ACTIVITY: CPT | Performed by: NURSE ANESTHETIST, CERTIFIED REGISTERED

## 2022-05-27 PROCEDURE — 370N000017 HC ANESTHESIA TECHNICAL FEE, PER MIN: Performed by: THORACIC SURGERY (CARDIOTHORACIC VASCULAR SURGERY)

## 2022-05-27 PROCEDURE — 02CQ0ZZ EXTIRPATION OF MATTER FROM RIGHT PULMONARY ARTERY, OPEN APPROACH: ICD-10-PCS | Performed by: THORACIC SURGERY (CARDIOTHORACIC VASCULAR SURGERY)

## 2022-05-27 PROCEDURE — 93010 ELECTROCARDIOGRAM REPORT: CPT | Performed by: INTERNAL MEDICINE

## 2022-05-27 PROCEDURE — 250N000013 HC RX MED GY IP 250 OP 250 PS 637: Performed by: THORACIC SURGERY (CARDIOTHORACIC VASCULAR SURGERY)

## 2022-05-27 PROCEDURE — 85610 PROTHROMBIN TIME: CPT | Performed by: THORACIC SURGERY (CARDIOTHORACIC VASCULAR SURGERY)

## 2022-05-27 PROCEDURE — 85610 PROTHROMBIN TIME: CPT | Performed by: NURSE ANESTHETIST, CERTIFIED REGISTERED

## 2022-05-27 PROCEDURE — 250N000009 HC RX 250

## 2022-05-27 PROCEDURE — P9041 ALBUMIN (HUMAN),5%, 50ML: HCPCS

## 2022-05-27 PROCEDURE — 84155 ASSAY OF PROTEIN SERUM: CPT | Performed by: THORACIC SURGERY (CARDIOTHORACIC VASCULAR SURGERY)

## 2022-05-27 PROCEDURE — 85396 CLOTTING ASSAY WHOLE BLOOD: CPT | Performed by: INTERNAL MEDICINE

## 2022-05-27 PROCEDURE — 82803 BLOOD GASES ANY COMBINATION: CPT

## 2022-05-27 PROCEDURE — 999N000065 XR ABDOMEN PORT 1 VIEWS

## 2022-05-27 PROCEDURE — 85384 FIBRINOGEN ACTIVITY: CPT | Performed by: THORACIC SURGERY (CARDIOTHORACIC VASCULAR SURGERY)

## 2022-05-27 PROCEDURE — 410N000004: Performed by: THORACIC SURGERY (CARDIOTHORACIC VASCULAR SURGERY)

## 2022-05-27 PROCEDURE — 5A1945Z RESPIRATORY VENTILATION, 24-96 CONSECUTIVE HOURS: ICD-10-PCS | Performed by: THORACIC SURGERY (CARDIOTHORACIC VASCULAR SURGERY)

## 2022-05-27 PROCEDURE — 85027 COMPLETE CBC AUTOMATED: CPT | Performed by: THORACIC SURGERY (CARDIOTHORACIC VASCULAR SURGERY)

## 2022-05-27 PROCEDURE — 94002 VENT MGMT INPAT INIT DAY: CPT

## 2022-05-27 PROCEDURE — 74018 RADEX ABDOMEN 1 VIEW: CPT | Mod: 26 | Performed by: RADIOLOGY

## 2022-05-27 PROCEDURE — 250N000024 HC ISOFLURANE, PER MIN: Performed by: THORACIC SURGERY (CARDIOTHORACIC VASCULAR SURGERY)

## 2022-05-27 PROCEDURE — P9059 PLASMA, FRZ BETWEEN 8-24HOUR: HCPCS | Performed by: INTERNAL MEDICINE

## 2022-05-27 PROCEDURE — 82330 ASSAY OF CALCIUM: CPT

## 2022-05-27 DEVICE — GRAFT PERICARDIUM 6X8CM BOVINE E6P8: Type: IMPLANTABLE DEVICE | Site: CHEST | Status: FUNCTIONAL

## 2022-05-27 RX ORDER — FENTANYL CITRATE 50 UG/ML
INJECTION, SOLUTION INTRAMUSCULAR; INTRAVENOUS PRN
Status: DISCONTINUED | OUTPATIENT
Start: 2022-05-27 | End: 2022-05-27

## 2022-05-27 RX ORDER — MUPIROCIN 20 MG/G
0.5 OINTMENT TOPICAL 2 TIMES DAILY
Status: DISCONTINUED | OUTPATIENT
Start: 2022-05-27 | End: 2022-05-31

## 2022-05-27 RX ORDER — HYDRALAZINE HYDROCHLORIDE 20 MG/ML
10 INJECTION INTRAMUSCULAR; INTRAVENOUS EVERY 30 MIN PRN
Status: DISCONTINUED | OUTPATIENT
Start: 2022-05-27 | End: 2022-06-17

## 2022-05-27 RX ORDER — AMOXICILLIN 250 MG
1 CAPSULE ORAL 2 TIMES DAILY
Status: DISCONTINUED | OUTPATIENT
Start: 2022-05-27 | End: 2022-06-15

## 2022-05-27 RX ORDER — PHENYLEPHRINE HCL IN 0.9% NACL 50MG/250ML
.1-6 PLASTIC BAG, INJECTION (ML) INTRAVENOUS CONTINUOUS
Status: DISCONTINUED | OUTPATIENT
Start: 2022-05-27 | End: 2022-06-01

## 2022-05-27 RX ORDER — MILRINONE LACTATE 0.2 MG/ML
INJECTION, SOLUTION INTRAVENOUS CONTINUOUS PRN
Status: DISCONTINUED | OUTPATIENT
Start: 2022-05-27 | End: 2022-05-27

## 2022-05-27 RX ORDER — NOREPINEPHRINE BITARTRATE 0.06 MG/ML
.01-.4 INJECTION, SOLUTION INTRAVENOUS CONTINUOUS
Status: DISCONTINUED | OUTPATIENT
Start: 2022-05-27 | End: 2022-06-08 | Stop reason: CLARIF

## 2022-05-27 RX ORDER — METHOCARBAMOL 500 MG/1
500 TABLET, FILM COATED ORAL EVERY 6 HOURS PRN
Status: DISCONTINUED | OUTPATIENT
Start: 2022-05-27 | End: 2022-06-01

## 2022-05-27 RX ORDER — HEPARIN SODIUM 5000 [USP'U]/.5ML
5000 INJECTION, SOLUTION INTRAVENOUS; SUBCUTANEOUS EVERY 8 HOURS
Status: DISCONTINUED | OUTPATIENT
Start: 2022-05-28 | End: 2022-05-28

## 2022-05-27 RX ORDER — NALOXONE HYDROCHLORIDE 0.4 MG/ML
0.2 INJECTION, SOLUTION INTRAMUSCULAR; INTRAVENOUS; SUBCUTANEOUS
Status: DISCONTINUED | OUTPATIENT
Start: 2022-05-27 | End: 2022-06-25 | Stop reason: HOSPADM

## 2022-05-27 RX ORDER — SODIUM CHLORIDE, SODIUM LACTATE, POTASSIUM CHLORIDE, CALCIUM CHLORIDE 600; 310; 30; 20 MG/100ML; MG/100ML; MG/100ML; MG/100ML
INJECTION, SOLUTION INTRAVENOUS CONTINUOUS PRN
Status: DISCONTINUED | OUTPATIENT
Start: 2022-05-27 | End: 2022-05-27

## 2022-05-27 RX ORDER — BISACODYL 10 MG
10 SUPPOSITORY, RECTAL RECTAL DAILY PRN
Status: DISCONTINUED | OUTPATIENT
Start: 2022-05-27 | End: 2022-06-25 | Stop reason: HOSPADM

## 2022-05-27 RX ORDER — HEPARIN SODIUM 1000 [USP'U]/ML
INJECTION, SOLUTION INTRAVENOUS; SUBCUTANEOUS PRN
Status: DISCONTINUED | OUTPATIENT
Start: 2022-05-27 | End: 2022-05-27

## 2022-05-27 RX ORDER — ASPIRIN 81 MG/1
81 TABLET, CHEWABLE ORAL DAILY
Status: DISCONTINUED | OUTPATIENT
Start: 2022-05-28 | End: 2022-06-05

## 2022-05-27 RX ORDER — LIDOCAINE 40 MG/G
CREAM TOPICAL
Status: DISCONTINUED | OUTPATIENT
Start: 2022-05-27 | End: 2022-06-16

## 2022-05-27 RX ORDER — PROPOFOL 10 MG/ML
5-75 INJECTION, EMULSION INTRAVENOUS CONTINUOUS
Status: DISCONTINUED | OUTPATIENT
Start: 2022-05-27 | End: 2022-06-02

## 2022-05-27 RX ORDER — GABAPENTIN 100 MG/1
100 CAPSULE ORAL AT BEDTIME
Status: DISCONTINUED | OUTPATIENT
Start: 2022-05-27 | End: 2022-06-01

## 2022-05-27 RX ORDER — ONDANSETRON 4 MG/1
4 TABLET, ORALLY DISINTEGRATING ORAL EVERY 6 HOURS PRN
Status: DISCONTINUED | OUTPATIENT
Start: 2022-05-27 | End: 2022-06-25 | Stop reason: HOSPADM

## 2022-05-27 RX ORDER — PROCHLORPERAZINE MALEATE 5 MG
5 TABLET ORAL EVERY 6 HOURS PRN
Status: DISCONTINUED | OUTPATIENT
Start: 2022-05-27 | End: 2022-06-01

## 2022-05-27 RX ORDER — CALCIUM GLUCONATE 20 MG/ML
1 INJECTION, SOLUTION INTRAVENOUS
Status: ACTIVE | OUTPATIENT
Start: 2022-05-27 | End: 2022-05-30

## 2022-05-27 RX ORDER — VANCOMYCIN HYDROCHLORIDE 1 G/20ML
INJECTION, POWDER, LYOPHILIZED, FOR SOLUTION INTRAVENOUS PRN
Status: DISCONTINUED | OUTPATIENT
Start: 2022-05-27 | End: 2022-05-27 | Stop reason: HOSPADM

## 2022-05-27 RX ORDER — NALOXONE HYDROCHLORIDE 0.4 MG/ML
0.4 INJECTION, SOLUTION INTRAMUSCULAR; INTRAVENOUS; SUBCUTANEOUS
Status: DISCONTINUED | OUTPATIENT
Start: 2022-05-27 | End: 2022-06-25 | Stop reason: HOSPADM

## 2022-05-27 RX ORDER — FUROSEMIDE 10 MG/ML
60 INJECTION INTRAMUSCULAR; INTRAVENOUS ONCE
Status: COMPLETED | OUTPATIENT
Start: 2022-05-27 | End: 2022-05-27

## 2022-05-27 RX ORDER — DEXTROSE MONOHYDRATE 25 G/50ML
25-50 INJECTION, SOLUTION INTRAVENOUS
Status: DISCONTINUED | OUTPATIENT
Start: 2022-05-27 | End: 2022-06-25 | Stop reason: HOSPADM

## 2022-05-27 RX ORDER — PROTAMINE SULFATE 10 MG/ML
INJECTION, SOLUTION INTRAVENOUS PRN
Status: DISCONTINUED | OUTPATIENT
Start: 2022-05-27 | End: 2022-05-27

## 2022-05-27 RX ORDER — SODIUM CHLORIDE, SODIUM GLUCONATE, SODIUM ACETATE, POTASSIUM CHLORIDE AND MAGNESIUM CHLORIDE 526; 502; 368; 37; 30 MG/100ML; MG/100ML; MG/100ML; MG/100ML; MG/100ML
INJECTION, SOLUTION INTRAVENOUS CONTINUOUS PRN
Status: DISCONTINUED | OUTPATIENT
Start: 2022-05-27 | End: 2022-05-27

## 2022-05-27 RX ORDER — PROPOFOL 10 MG/ML
INJECTION, EMULSION INTRAVENOUS CONTINUOUS PRN
Status: DISCONTINUED | OUTPATIENT
Start: 2022-05-27 | End: 2022-05-27

## 2022-05-27 RX ORDER — ACETAMINOPHEN 325 MG/1
975 TABLET ORAL EVERY 8 HOURS
Status: COMPLETED | OUTPATIENT
Start: 2022-05-27 | End: 2022-05-30

## 2022-05-27 RX ORDER — POLYETHYLENE GLYCOL 3350 17 G/17G
17 POWDER, FOR SOLUTION ORAL DAILY
Status: DISCONTINUED | OUTPATIENT
Start: 2022-05-28 | End: 2022-05-30

## 2022-05-27 RX ORDER — HYDROMORPHONE HCL IN WATER/PF 6 MG/30 ML
0.2 PATIENT CONTROLLED ANALGESIA SYRINGE INTRAVENOUS
Status: DISCONTINUED | OUTPATIENT
Start: 2022-05-27 | End: 2022-06-22

## 2022-05-27 RX ORDER — ETOMIDATE 2 MG/ML
INJECTION INTRAVENOUS PRN
Status: DISCONTINUED | OUTPATIENT
Start: 2022-05-27 | End: 2022-05-27

## 2022-05-27 RX ORDER — LIDOCAINE HYDROCHLORIDE 20 MG/ML
INJECTION, SOLUTION INFILTRATION; PERINEURAL PRN
Status: DISCONTINUED | OUTPATIENT
Start: 2022-05-27 | End: 2022-05-27

## 2022-05-27 RX ORDER — ONDANSETRON 2 MG/ML
4 INJECTION INTRAMUSCULAR; INTRAVENOUS EVERY 6 HOURS PRN
Status: DISCONTINUED | OUTPATIENT
Start: 2022-05-27 | End: 2022-06-25 | Stop reason: HOSPADM

## 2022-05-27 RX ORDER — CEFAZOLIN SODIUM 1 G/3ML
1 INJECTION, POWDER, FOR SOLUTION INTRAMUSCULAR; INTRAVENOUS EVERY 8 HOURS
Status: COMPLETED | OUTPATIENT
Start: 2022-05-27 | End: 2022-05-28

## 2022-05-27 RX ORDER — FIBRINOGEN (HUMAN) 700-1300MG
1100 KIT INTRAVENOUS ONCE
Status: DISCONTINUED | OUTPATIENT
Start: 2022-05-27 | End: 2022-05-27

## 2022-05-27 RX ORDER — CALCIUM GLUCONATE 20 MG/ML
2 INJECTION, SOLUTION INTRAVENOUS
Status: ACTIVE | OUTPATIENT
Start: 2022-05-27 | End: 2022-05-30

## 2022-05-27 RX ORDER — ACETAMINOPHEN 325 MG/1
650 TABLET ORAL EVERY 4 HOURS PRN
Status: DISCONTINUED | OUTPATIENT
Start: 2022-05-30 | End: 2022-06-01

## 2022-05-27 RX ORDER — PANTOPRAZOLE SODIUM 40 MG/1
40 TABLET, DELAYED RELEASE ORAL DAILY
Status: DISCONTINUED | OUTPATIENT
Start: 2022-05-28 | End: 2022-06-11

## 2022-05-27 RX ORDER — OXYCODONE HYDROCHLORIDE 5 MG/1
5 TABLET ORAL EVERY 4 HOURS PRN
Status: DISCONTINUED | OUTPATIENT
Start: 2022-05-27 | End: 2022-05-27

## 2022-05-27 RX ORDER — NICOTINE POLACRILEX 4 MG
15-30 LOZENGE BUCCAL
Status: DISCONTINUED | OUTPATIENT
Start: 2022-05-27 | End: 2022-06-25 | Stop reason: HOSPADM

## 2022-05-27 RX ORDER — DEXMEDETOMIDINE HYDROCHLORIDE 4 UG/ML
.2-.7 INJECTION, SOLUTION INTRAVENOUS CONTINUOUS
Status: DISCONTINUED | OUTPATIENT
Start: 2022-05-27 | End: 2022-05-30

## 2022-05-27 RX ADMIN — CHLORHEXIDINE GLUCONATE 0.12% ORAL RINSE 10 ML: 1.2 LIQUID ORAL at 07:00

## 2022-05-27 RX ADMIN — PROPOFOL 50 MCG/KG/MIN: 10 INJECTION, EMULSION INTRAVENOUS at 18:30

## 2022-05-27 RX ADMIN — Medication 50 MG: at 15:08

## 2022-05-27 RX ADMIN — EPINEPHRINE 0.03 MCG/KG/MIN: 1 INJECTION INTRAMUSCULAR; INTRAVENOUS; SUBCUTANEOUS at 11:00

## 2022-05-27 RX ADMIN — Medication 0.12 MCG/KG/MIN: at 21:18

## 2022-05-27 RX ADMIN — ETOMIDATE 20 MG: 40 INJECTION, SOLUTION INTRAVENOUS at 10:50

## 2022-05-27 RX ADMIN — NOREPINEPHRINE BITARTRATE 0.03 MCG/KG/MIN: 1 INJECTION, SOLUTION, CONCENTRATE INTRAVENOUS at 11:00

## 2022-05-27 RX ADMIN — EPINEPHRINE 0.05 MCG/KG/MIN: 1 INJECTION INTRAMUSCULAR; INTRAVENOUS; SUBCUTANEOUS at 22:41

## 2022-05-27 RX ADMIN — MILRINONE LACTATE IN DEXTROSE 0.25 MCG/KG/MIN: 200 INJECTION, SOLUTION INTRAVENOUS at 11:03

## 2022-05-27 RX ADMIN — SODIUM CHLORIDE, SODIUM LACTATE, POTASSIUM CHLORIDE, CALCIUM CHLORIDE: 600; 310; 30; 20 INJECTION, SOLUTION INTRAVENOUS at 11:03

## 2022-05-27 RX ADMIN — ACETAMINOPHEN 975 MG: 325 TABLET, FILM COATED ORAL at 06:58

## 2022-05-27 RX ADMIN — CEFAZOLIN 1 G: 1 INJECTION, POWDER, FOR SOLUTION INTRAMUSCULAR; INTRAVENOUS at 22:42

## 2022-05-27 RX ADMIN — EPINEPHRINE 0.04 MCG/KG/MIN: 1 INJECTION INTRAMUSCULAR; INTRAVENOUS; SUBCUTANEOUS at 21:18

## 2022-05-27 RX ADMIN — GABAPENTIN 100 MG: 100 CAPSULE ORAL at 07:00

## 2022-05-27 RX ADMIN — Medication 1 UNITS: at 15:58

## 2022-05-27 RX ADMIN — Medication 2 UNITS: at 18:03

## 2022-05-27 RX ADMIN — Medication 50 MG: at 16:34

## 2022-05-27 RX ADMIN — Medication 4 UNITS/HR: at 22:24

## 2022-05-27 RX ADMIN — DOCUSATE SODIUM 50 MG AND SENNOSIDES 8.6 MG 1 TABLET: 8.6; 5 TABLET, FILM COATED ORAL at 21:22

## 2022-05-27 RX ADMIN — SODIUM CHLORIDE, POTASSIUM CHLORIDE, SODIUM LACTATE AND CALCIUM CHLORIDE: 600; 310; 30; 20 INJECTION, SOLUTION INTRAVENOUS at 11:00

## 2022-05-27 RX ADMIN — HUMAN INSULIN 1 UNITS/HR: 100 INJECTION, SOLUTION SUBCUTANEOUS at 14:54

## 2022-05-27 RX ADMIN — HEPARIN SODIUM 20000 UNITS: 1000 INJECTION INTRAVENOUS; SUBCUTANEOUS at 16:18

## 2022-05-27 RX ADMIN — FAMOTIDINE 20 MG: 20 TABLET ORAL at 06:59

## 2022-05-27 RX ADMIN — PROTAMINE SULFATE 100 MG: 10 INJECTION, SOLUTION INTRAVENOUS at 15:58

## 2022-05-27 RX ADMIN — Medication 2 G: at 11:05

## 2022-05-27 RX ADMIN — SODIUM CHLORIDE, SODIUM LACTATE, POTASSIUM CHLORIDE, CALCIUM CHLORIDE: 600; 310; 30; 20 INJECTION, SOLUTION INTRAVENOUS at 17:48

## 2022-05-27 RX ADMIN — PROPOFOL 30 MCG/KG/MIN: 10 INJECTION, EMULSION INTRAVENOUS at 21:04

## 2022-05-27 RX ADMIN — Medication 50 MG: at 11:51

## 2022-05-27 RX ADMIN — Medication 12.5 MG: at 07:00

## 2022-05-27 RX ADMIN — PROTAMINE SULFATE 50 MG: 10 INJECTION, SOLUTION INTRAVENOUS at 18:30

## 2022-05-27 RX ADMIN — VASOPRESSIN 0.5 UNITS/HR: 20 INJECTION INTRAVENOUS at 11:53

## 2022-05-27 RX ADMIN — Medication 1 UNITS: at 16:02

## 2022-05-27 RX ADMIN — LIDOCAINE HYDROCHLORIDE 100 MG: 20 INJECTION, SOLUTION INFILTRATION; PERINEURAL at 10:50

## 2022-05-27 RX ADMIN — HEPARIN SODIUM 5000 UNITS: 1000 INJECTION INTRAVENOUS; SUBCUTANEOUS at 12:06

## 2022-05-27 RX ADMIN — PROTAMINE SULFATE 150 MG: 10 INJECTION, SOLUTION INTRAVENOUS at 17:32

## 2022-05-27 RX ADMIN — MUPIROCIN 0.5 G: 20 OINTMENT TOPICAL at 21:49

## 2022-05-27 RX ADMIN — FENTANYL CITRATE 100 MCG: 50 INJECTION, SOLUTION INTRAMUSCULAR; INTRAVENOUS at 11:49

## 2022-05-27 RX ADMIN — FENTANYL CITRATE 50 MCG: 50 INJECTION, SOLUTION INTRAMUSCULAR; INTRAVENOUS at 10:50

## 2022-05-27 RX ADMIN — Medication 2 UNITS: at 17:45

## 2022-05-27 RX ADMIN — NOREPINEPHRINE BITARTRATE 12.8 MCG: 1 INJECTION, SOLUTION, CONCENTRATE INTRAVENOUS at 17:21

## 2022-05-27 RX ADMIN — FUROSEMIDE 60 MG: 10 INJECTION, SOLUTION INTRAVENOUS at 23:27

## 2022-05-27 RX ADMIN — FENTANYL CITRATE 250 MCG: 50 INJECTION, SOLUTION INTRAMUSCULAR; INTRAVENOUS at 14:57

## 2022-05-27 RX ADMIN — FENTANYL CITRATE 100 MCG: 50 INJECTION, SOLUTION INTRAMUSCULAR; INTRAVENOUS at 11:41

## 2022-05-27 RX ADMIN — SODIUM CHLORIDE, SODIUM GLUCONATE, SODIUM ACETATE, POTASSIUM CHLORIDE AND MAGNESIUM CHLORIDE: 526; 502; 368; 37; 30 INJECTION, SOLUTION INTRAVENOUS at 18:30

## 2022-05-27 RX ADMIN — ACETAMINOPHEN 325MG 975 MG: 325 TABLET ORAL at 21:22

## 2022-05-27 RX ADMIN — SODIUM CHLORIDE, SODIUM GLUCONATE, SODIUM ACETATE, POTASSIUM CHLORIDE AND MAGNESIUM CHLORIDE: 526; 502; 368; 37; 30 INJECTION, SOLUTION INTRAVENOUS at 10:50

## 2022-05-27 RX ADMIN — Medication 0.5 MCG/KG/MIN: at 23:17

## 2022-05-27 RX ADMIN — MIDAZOLAM 5 MG: 1 INJECTION INTRAMUSCULAR; INTRAVENOUS at 10:50

## 2022-05-27 RX ADMIN — Medication 100 MG: at 10:50

## 2022-05-27 RX ADMIN — HEPARIN SODIUM 16000 UNITS: 1000 INJECTION INTRAVENOUS; SUBCUTANEOUS at 11:51

## 2022-05-27 RX ADMIN — GABAPENTIN 100 MG: 100 CAPSULE ORAL at 22:23

## 2022-05-27 RX ADMIN — FENTANYL CITRATE 250 MCG: 50 INJECTION, SOLUTION INTRAMUSCULAR; INTRAVENOUS at 13:00

## 2022-05-27 RX ADMIN — Medication 1 UNITS: at 16:31

## 2022-05-27 RX ADMIN — Medication 2 UNITS: at 17:43

## 2022-05-27 RX ADMIN — Medication 2 G: at 15:05

## 2022-05-27 ASSESSMENT — ACTIVITIES OF DAILY LIVING (ADL)
ADLS_ACUITY_SCORE: 30
ADLS_ACUITY_SCORE: 38
ADLS_ACUITY_SCORE: 30

## 2022-05-27 NOTE — ANESTHESIA PROCEDURE NOTES
Perioperative AMILCAR Procedure Note    Staff -        Anesthesiologist:  Tyler Ledezma MD       Performed By: anesthesiologist  Preanesthesia Checklist:  Patient identified, IV assessed, risks and benefits discussed, monitors and equipment assessed, procedure being performed at surgeon's request and anesthesia consent obtained.    AMILCAR Probe Insertion    Probe Status PRE Insertion: NO obvious damage  Probe type:  Adult 3D  Bite block used:   Oral Airway  Insertion Technique: Easy, no oropharyngeal manipulation  Insertion complications: None obvious  Billing Report:AMILCAR report by Anesthesiologist (See Separate Report note)  Probe Status POST Removal: NO obvious damage    AMILCAR Report  General Procedure Information  Images for this study have been archived.  Modalities: 2D, 3D, CW Doppler, Color flow mapping and PW Doppler  Diagnostic Indications comments: CTEPH.  Echocardiographic and Doppler Measurements  Right Ventricle:  Cavity size dilated.    Hypertrophy present.   Thrombus not present.    Global function moderately impaired.     Left Ventricle:  Cavity size normal.    Hypertrophy not present.   Thrombus not present.   Global Function normal.   Ejection Fraction 55%.      Ventricular Regional Function:  1- Basal Anteroseptal:  normal  2- Basal Anterior:  normal  3- Basal Anterolateral:  normal  4- Basal Inferolateral:  normal  5- Basal Inferior:  normal  6- Basal Inferoseptal:  normal  7- Mid Anteroseptal:  normal  8- Mid Anterior:  normal  9- Mid Anterolateral:  normal  10- Mid Inferolateral:  normal  11- Mid Inferior:  normal  12- Mid Inferoseptal:  normal  13- Apical Anterior:  normal  14- Apical Lateral:  normal  15- Apical Inferior:  normal  16- Apical Septal:  normal  17- [place]:  normal    Valves  Aortic Valve: Annulus normal.  Stenosis not present.  Area: 1.9 cm .  Regurgitation absent.  Leaflets normal.  Leaflet motions normal.    Mitral Valve: Annulus normal.  Stenosis not present.  Regurgitation +1.   Leaflets normal.  Leaflet motions normal.    Tricuspid Valve: Annulus normal.  Stenosis not present.  Regurgitation +4.  Leaflets normal.  Leaflet motions normal.    Pulmonic Valve: Annulus normal.  Stenosis not present.  Regurgitation +1.      Aorta: Ascending Aorta: Size normal.  Diameter 3.42 cm.  Dissection not present.  Plaque thickness less than 3 mm.  Mobile plaque not present.    Aortic Arch: Size normal.    Dissection not present.   Plaque thickness less than 3 mm.   Mobile plaque not present.    Descending Aorta: Size normal.    Dissection not present.   Plaque thickness less than 3 mm.   Mobile plaque not present.      Right Atrium:  Size normal.   Spontaneous echo contrast not present.   Thrombus not present.   Tumor not present.   Device not present.     Left Atrium: Size normal.  Spontaneous echo contrast not present.  Thrombus not present.  Tumor not present.  Device not present.    Left atrial appendage normal.   Other Atria Findings:  ADITYA velocity > 40 cm/s  Atrial Septum: Intra-atrial septal morphology normal.       Ventricular Septum: Intra-ventricular septum morphology normal.      Diastolic Function Measurements:  Diastolic Dysfunction Grade= II.  E=  ms.  A=  ms.  E/A Ratio= .  DT=  ms.  S/D= .  IVRT= ms.  Other Findings:   Pericardium:  normal. Pleural Effusion:  none. Pulmonary Arteries:  normal. Pulmonary Venous Flow:  normal. No coronary sinus catheter present.    Post Intervention Findings  Procedure(s) performed:  Other (see comments) (PA endarterectomy).  Regional wall motion: Unchanged. Surgeon(s) notified of all postintervention findings: Yes.             Post Intervention comments: Post-bypass RV function in improved. RV remains dilated though is significantly less dilated post-CTEPH, TR is now trivial to mild. LV is hyperdynamic, no regional wall motion abnormalities. Mitral, Aortic, pulmonic valves are unchanged. No aortic dissection or pericardial effusion. There was fluid in the  right chest, noted to be hemothorax, evacuated, fluid absent on re-eval. .    Echocardiogram Comments  Echocardiogram comments:   PRE-CPB:  Normal LV systolic function. Psuedonormal filling pattern. Severely dilated RV with moderately reduced systolic function. Severe TR with systolic hepatic venous flow reversal. No PFO by CFD. No thrombus visualized in the main PA branches. No pericardial effusion. No aortic dissection. All findings communicated with surgical team.    .

## 2022-05-27 NOTE — PROGRESS NOTES
"CLINICAL NUTRITION SERVICES    Nutrition screen alert received per MST score of 2, for unplanned weight loss PTA and decreased appetite.  Pt is a 76 yof, admitted to [facility] for a pulmonary artery endarterectomy. Currently in the OR and NPO. Per rounds, pt likely to be transferred to CVICU following procedure.     Recent weights:   Wt Readings from Last 10 Encounters:   05/27/22 65.9 kg (145 lb 4.5 oz)   04/29/22 67.4 kg (148 lb 8 oz)   03/16/22 69.4 kg (153 lb)   12/08/21 67.6 kg (149 lb)   11/29/21 67.7 kg (149 lb 3.2 oz)     Per RN documentation, previously on a regular diet with good intake, I/O reflects intake of % of meals.     Per H&P: \"Denies recent weight changes but reports 80lb weight loss over last 3 years, most in 2020 'right after this all started'.\" Per review of weight measures PTA, weight has been stable for past ~6-7 months.     Defer nutrition screen assessment at this time, given pt unavailable and in OR. Will see pt for LOS if remains inpatient at LOS 7-10, unless consulted earlier. RD will follow via rounds at this time, unless consulted.    Keith Ardon, MAYO, LD, [facility]   Weekend/Holiday RD pager [phone]          "

## 2022-05-27 NOTE — ANESTHESIA PROCEDURE NOTES
Central Line/PA Catheter Placement    Pre-Procedure   Staff -        Anesthesiologist:  Tyler Ledezma MD       Resident/Fellow: Power Mckay III, MD       Performed By: resident       Location: OR       Pre-Anesthestic Checklist: patient identified, IV checked, site marked, risks and benefits discussed, informed consent, monitors and equipment checked, pre-op evaluation and at physician/surgeon's request  Timeout:       Correct Patient: Yes        Correct Procedure: Yes        Correct Site: Yes        Correct Position: Yes        Correct Laterality: Yes   Line Placement:   This line was placed Post Induction    Procedure   Procedure: central line       Diagnosis: perioperative management and monitoring       Laterality: left       Insertion Site: internal jugular.       Patient Position: Trendelenburg  Sterile Prep        All elements of maximal sterile barrier technique followed       Patient Prep/Sterile Barriers: draped, hand hygiene, gloves , hat , mask , draped, gown, sterile gel and probe cover       Skin prep: Chloraprep  Insertion/Injection        Technique: ultrasound guided and Seldinger Technique        1. Ultrasound was used to evaluate the access site.       2. Vein evaluated via ultrasound for patency/adequacy.       3. Using real-time ultrasound the needle/catheter was observed entering the artery/vein.       Introducer Type: 9 Fr, 2-lumen MAC        Type: PA/CVC with Introducer       Catheter Size: 9 Fr       Catheter Length: 15       Number of Lumens: double lumen       PA Catheter Type: CCO         Appropriate RV, RA and PA waveforms noted:  Yes            Withdrawn and Locked at cm: 52            Balloon down at end of the procedure:   Narrative         Secured by: suture       Tegaderm and Biopatch dressing used.       Complications: None apparent,        blood aspirated from all lumens,        All lumens flushed: Yes       Verification method: Ultrasound and Placement to be verified  post-op   Comments:  Routine MAC line and PAC. Placed on left due to bruising from right heart cath on right.

## 2022-05-27 NOTE — PROGRESS NOTES
[facility]    Cardiology Progress Note- Cards 2        Date of Admission:  5/25/2022     Assessment & Plan: HVSL   Debi Espinoza is a 76 year old female with a history of renal stones, CKD, DVT/PE (on apixaban), 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    Today: Pulmonary endarterectomy today. Transfer to ICU post up under CVICU team. Will follow post op.    # Pulmonary hypertension, Group IV  # CTEPH  # DVTs  # Severe tricuspid insufficiency  Initially diagnosed with bilateral DVT, PE 2019. TTE showed dilated RV with reduced RV function and RVSP of 84mmHg suggestive of severe pulmonary hypertension with mild to moderate TR. Patient believes the above relates to a work related injury requiuring unna boot and immobilization early 2019. RHC (5/26): RA 10, PA 47, PCWP 12, CI 3.2  - Oxygen as needed to maintain saturation >92%  - opsumit, adempas on hold for OR  - Hold apixaban  - Low intensity heparin gtt         Diet:   NPO  DVT Prophylaxis: heparin gtt  Win Catheter: Not present  Code Status:         Disposition Plan   Expected discharge: 3-4 days, recommended to prior living arrangement once pulmonary artery endarterectomy complete, patient stabilized.    Entered: Reji Stewart MD 05/27/2022, 8:58 AM     The patient's care was discussed with the Attending Physician, Dr. Joseph.    Reji Stewart MD  [facility]    ______________________________________________________________________    Interval History   Reviewed events from last 24 hours.  No acute events overnight.      Data reviewed today: I reviewed all medications, new labs and imaging results over the last 24 hours. I personally reviewed the EKG tracing showing 1st degree AV block, RVH, right axis deviation     Physical Exam   Vital Signs: Temp: 98.4  F (36.9  C) Temp  src: Oral BP: 122/53 Pulse: 65   Resp: 16 SpO2: 98 % O2 Device: Nasal cannula Oxygen Delivery: 2 LPM  Weight: 145 lbs 4.53 oz       DID NOT EXAMINE PT TODAY SINCE PT WAS IN OR THE WHOLE DAY AND CVTS WILL BE PRIMARY TEAM POST OP. THE FOLLOWING EXAMINATION IS FROM YESTERDAY.  General Appearance: well-appearing female, lying in bed  Eyes: EOMI, PERRL, no scleral icterus  HEENT: NCAT, MMM, neck supple  Respiratory: Mildly increased work of breathing while sitting up; CTAB  Cardiovascular: RRR, systolic murmur heard throughout precordium, best at LLSB; JVP est 11cm; 3+ BLE edema; palpable pulses in all 4 extremities  GI: Abdomen soft, nontender; bowel sounds present  Skin: No rashes visualized on exposed skin  Musculoskeletal:   Neurologic: Alert, oriented, moving all extremities spontaneously  Psychiatric: Pleasant mood, full affect    Data   Recent Labs   Lab 05/27/22  0630 05/27/22  0504 05/26/22  1307 05/26/22  0454 05/25/22  1622   WBC  --  4.6  --  4.9 5.5   HGB  --  11.1* 11.5* 10.6* 11.4*   MCV  --  87  --  87 86   PLT  --  211  --  212 225   INR  --   --   --   --  1.13   NA  --  140  --  138 139   POTASSIUM  --  4.0  --  4.2 4.0   CHLORIDE  --  109  --  110* 110*   CO2  --  25  --  25 23   BUN  --  20  --  20 20   CR  --  1.17*  --  1.16* 1.08*   ANIONGAP  --  6  --  3 6   CHELE  --  9.4  --  9.2 9.4   GLC 95 89  --  86 108*   ALBUMIN  --   --   --   --  3.4   PROTTOTAL  --   --   --   --  6.8   BILITOTAL  --   --   --   --  0.7   ALKPHOS  --   --   --   --  75   ALT  --   --   --   --  16   AST  --   --   --   --  16     Recent Results (from the past 24 hour(s))   Cardiac Catheterization    Narrative      Mild non-obstructive coronary artery disease.    Right sided filling pressures are mildly elevated.    Severe pre-capillary pulmonary hypertension.    Left sided filling pressures are normal.    Normal cardiac output level.

## 2022-05-27 NOTE — PROGRESS NOTES
Research Title: [place] Pulmonary Hypertension Repository Study (MEASURE)  IRB #: [id number]  PI: Lisette Knox MD ([phone])   Study coordinators: Corin Woo RN ([phone]), Bertha Rinaldi CMA ([phone])    Estimated duration of study: Until no longer receiving clinical care at [facility]    Patient was approached for possible participation in the MEASURE registry. The current IRB approved consent form was reviewed and discussed at length with the patient. This included purpose, nature of the research, and the potential risks of blood draws if the patient decided to participate in the optional biorepository sub-study.  Patient was informed that if they chose to participate in the sub-study, blood will be collected concurrently during their standard clinical visit and that no additional research related follow up were required.  Confidentiality issues, compensation for injury, and alternatives were also explained. Patient was informed that participation is completely voluntary and that their refusal to participate will incur no penalty or decreased benefits to which the patient is otherwise entitled to.      Patient had the opportunity to read the entire written consent form, ask any questions and concerns of the study, and was offered sufficient time to consider the research trial.  All questions and concerns were addressed and the patient was able to clearly state what study participation involved. Patient voluntarily signed the combined consent and HIPAA form prior to any research data collection and/or blood sample collection.  A signed copy of the combined consent form was given to the patient.    Patient was consented on 27-MAY-2022 at 5:45AM and opted in the sub-study.

## 2022-05-27 NOTE — PLAN OF CARE
Neuro: A&Ox4.   Cardiac: SR. SBP . Afebrile  Respiratory: Sating >93% on 2L NC at rest, 4L with activity. LS clear/diminished.  GI/: Adequate urine output. No BM this shift  Diet/appetite: Tolerating regular diet. Eating well.  Activity:  SBA, up to bathroom  Pain: At acceptable level on current regimen.   Skin: No new deficits noted.  LDA's: PIV x 2  TR band removed at 1635 per protocol. No bleeding noted.  Heparin drip restarted at previous rate at 1645 per Dr. Hunter. PTT scheduled for 2300.    Plan: Continue with POC. Plan for pulmonary thromboendarterectomy 5/27 @ 7:30AM.  Notify primary team with changes.

## 2022-05-27 NOTE — ANESTHESIA PROCEDURE NOTES
Arterial Line Procedure Note    Pre-Procedure   Staff -        Anesthesiologist:  Tyler Ledezma MD       Resident/Fellow: Power Mckay III, MD       Performed By: anesthesiologist       Location: OR       Pre-Anesthestic Checklist: patient identified, IV checked, risks and benefits discussed, informed consent, monitors and equipment checked, pre-op evaluation and at physician/surgeon's request  Timeout:       Correct Patient: Yes        Correct Procedure: Yes        Correct Site: Yes        Correct Position: Yes   Line Placement:   This line was placed Pre Induction starting at 5/27/2022 10:30 AM and ending at 5/27/2022 11:00 AM  Procedure   Procedure: arterial line       Diagnosis: perioperative monitoring       Laterality: left       Insertion Site: brachial.  Sterile Prep        Standard elements of sterile barrier followed       Skin prep: Chloraprep  Insertion/Injection        Technique: ultrasound guided and Seldinger Technique        1. Ultrasound was used to evaluate the access site.       2. Artery evaluated via ultrasound for patency/adequacy.       3. Using real-time ultrasound the needle/catheter was observed entering the artery/vein.       Catheter Type/Size: 20 G, 12 cm  Narrative         Secured by: suture       Tegaderm dressing used.       Complications: None apparent and Hematoma,        Arterial waveform: Yes        IBP within 10% of NIBP: Yes   Comments:  Attempts on left and right radials unsuccessful with small hematoma formations due to small vessels. Pressure held and hematomas stable. Left brachial successful with micropuncture kit.

## 2022-05-27 NOTE — ANESTHESIA PROCEDURE NOTES
Airway       Patient location during procedure: OR       Procedure Start/Stop Times: 5/27/2022 10:51 AM  Staff -        Anesthesiologist:  Tyler Ledezma MD       Resident/Fellow: Power Mckay III, MD       Performed By: resident  Consent for Airway        Urgency: elective  Indications and Patient Condition       Indications for airway management: samantha-procedural       Induction type:intravenous       Mask difficulty assessment: 1 - vent by mask    Final Airway Details       Final airway type: endotracheal airway       Successful airway: ETT - single  Endotracheal Airway Details        ETT size (mm): 8.0       Cuffed: yes       Successful intubation technique: direct laryngoscopy       DL Blade Type: MAC 3       Grade View of Cords: 3       Adjucts: stylet       Position: Right       Measured from: gums/teeth       Secured at (cm): 21       Bite block used: None    Post intubation assessment        Placement verified by: capnometry, equal breath sounds and chest rise        Number of attempts at approach: 1       Number of other approaches attempted: 0       Secured with: pink tape       Ease of procedure: easy       Dentition: Intact    Medication(s) Administered   Medication Administration Time: 5/27/2022 10:51 AM    Additional Comments       Routine intubation. Poor view. Would recommend different blade or videoscope next attempt

## 2022-05-27 NOTE — PLAN OF CARE
Goal Outcome Evaluation:    Plan of Care Reviewed With: patient     Overall Patient Progress: no change    Neuro: A&Ox4. Generalized weakness.  Cardiac: SR. VSS. Softer pressures. 90s-110s/40s-50s.   Respiratory: Sating 100 on 2L NC.  GI/: Adequate urine output. Last BM prior to admission  Diet/appetite: Tolerating regular diet but has been NPO since midnight.   Activity:  Standby assist up to bathroom. Patient ambulates well.   Pain: At acceptable level on current regimen. Did not complain of pain during shift.   Skin: No new deficits noted.  LDA's: Right and Left PIV.     Plan: Continue with POC. Notify primary team with changes. Patient heading down to pre-op shortly for pulmonary artery thrombectomy. CHG bath completed. Awaiting transport. Accompanied by Son.

## 2022-05-28 ENCOUNTER — APPOINTMENT (OUTPATIENT)
Dept: GENERAL RADIOLOGY | Facility: CLINIC | Age: 77
DRG: 270 | End: 2022-05-28
Attending: THORACIC SURGERY (CARDIOTHORACIC VASCULAR SURGERY)
Payer: MEDICARE

## 2022-05-28 LAB
ALBUMIN UR-MCNC: NEGATIVE MG/DL
ANION GAP SERPL CALCULATED.3IONS-SCNC: 9 MMOL/L (ref 3–14)
APPEARANCE UR: CLEAR
APTT PPP: 59 SECONDS (ref 22–38)
APTT PPP: 60 SECONDS (ref 22–38)
APTT PPP: 60 SECONDS (ref 22–38)
BASE EXCESS BLDA CALC-SCNC: -1.3 MMOL/L (ref -9–1.8)
BASE EXCESS BLDA CALC-SCNC: -1.9 MMOL/L (ref -9–1.8)
BASE EXCESS BLDA CALC-SCNC: 0 MMOL/L (ref -9–1.8)
BASE EXCESS BLDA CALC-SCNC: 0.9 MMOL/L (ref -9–1.8)
BASE EXCESS BLDA CALC-SCNC: 2 MMOL/L (ref -9–1.8)
BASE EXCESS BLDA CALC-SCNC: 2 MMOL/L (ref -9–1.8)
BASE EXCESS BLDA CALC-SCNC: 2.7 MMOL/L (ref -9–1.8)
BASE EXCESS BLDA CALC-SCNC: 2.7 MMOL/L (ref -9–1.8)
BASE EXCESS BLDV CALC-SCNC: -0.9 MMOL/L (ref -7.7–1.9)
BASE EXCESS BLDV CALC-SCNC: 0 MMOL/L (ref -7.7–1.9)
BASE EXCESS BLDV CALC-SCNC: 0.4 MMOL/L (ref -7.7–1.9)
BASE EXCESS BLDV CALC-SCNC: 1.9 MMOL/L (ref -7.7–1.9)
BASE EXCESS BLDV CALC-SCNC: 3.3 MMOL/L (ref -7.7–1.9)
BASE EXCESS BLDV CALC-SCNC: 3.5 MMOL/L (ref -7.7–1.9)
BILIRUB UR QL STRIP: NEGATIVE
BUN SERPL-MCNC: 19 MG/DL (ref 7–30)
CA-I BLD-MCNC: 4.8 MG/DL (ref 4.4–5.2)
CA-I BLD-MCNC: 4.8 MG/DL (ref 4.4–5.2)
CA-I BLD-MCNC: 4.9 MG/DL (ref 4.4–5.2)
CALCIUM SERPL-MCNC: 9 MG/DL (ref 8.5–10.1)
CHLORIDE BLD-SCNC: 113 MMOL/L (ref 94–109)
CO2 SERPL-SCNC: 24 MMOL/L (ref 20–32)
COLOR UR AUTO: ABNORMAL
CREAT SERPL-MCNC: 1.06 MG/DL (ref 0.52–1.04)
ERYTHROCYTE [DISTWIDTH] IN BLOOD BY AUTOMATED COUNT: 16.8 % (ref 10–15)
ERYTHROCYTE [DISTWIDTH] IN BLOOD BY AUTOMATED COUNT: 16.8 % (ref 10–15)
GFR SERPL CREATININE-BSD FRML MDRD: 54 ML/MIN/1.73M2
GLUCOSE BLD-MCNC: 151 MG/DL (ref 70–99)
GLUCOSE BLDC GLUCOMTR-MCNC: 114 MG/DL (ref 70–99)
GLUCOSE BLDC GLUCOMTR-MCNC: 120 MG/DL (ref 70–99)
GLUCOSE BLDC GLUCOMTR-MCNC: 121 MG/DL (ref 70–99)
GLUCOSE BLDC GLUCOMTR-MCNC: 125 MG/DL (ref 70–99)
GLUCOSE BLDC GLUCOMTR-MCNC: 131 MG/DL (ref 70–99)
GLUCOSE BLDC GLUCOMTR-MCNC: 131 MG/DL (ref 70–99)
GLUCOSE BLDC GLUCOMTR-MCNC: 132 MG/DL (ref 70–99)
GLUCOSE BLDC GLUCOMTR-MCNC: 137 MG/DL (ref 70–99)
GLUCOSE BLDC GLUCOMTR-MCNC: 161 MG/DL (ref 70–99)
GLUCOSE BLDC GLUCOMTR-MCNC: 91 MG/DL (ref 70–99)
GLUCOSE UR STRIP-MCNC: NEGATIVE MG/DL
HCO3 BLD-SCNC: 24 MMOL/L (ref 21–28)
HCO3 BLD-SCNC: 24 MMOL/L (ref 21–28)
HCO3 BLD-SCNC: 25 MMOL/L (ref 21–28)
HCO3 BLD-SCNC: 26 MMOL/L (ref 21–28)
HCO3 BLD-SCNC: 27 MMOL/L (ref 21–28)
HCO3 BLD-SCNC: 27 MMOL/L (ref 21–28)
HCO3 BLD-SCNC: 28 MMOL/L (ref 21–28)
HCO3 BLD-SCNC: 28 MMOL/L (ref 21–28)
HCO3 BLDV-SCNC: 26 MMOL/L (ref 21–28)
HCO3 BLDV-SCNC: 26 MMOL/L (ref 21–28)
HCO3 BLDV-SCNC: 27 MMOL/L (ref 21–28)
HCO3 BLDV-SCNC: 28 MMOL/L (ref 21–28)
HCO3 BLDV-SCNC: 29 MMOL/L (ref 21–28)
HCO3 BLDV-SCNC: 29 MMOL/L (ref 21–28)
HCT VFR BLD AUTO: 31.6 % (ref 35–47)
HCT VFR BLD AUTO: 32 % (ref 35–47)
HGB BLD-MCNC: 10.2 G/DL (ref 11.7–15.7)
HGB BLD-MCNC: 10.3 G/DL (ref 11.7–15.7)
HGB BLD-MCNC: 10.3 G/DL (ref 11.7–15.7)
HGB BLD-MCNC: 9.5 G/DL (ref 11.7–15.7)
HGB UR QL STRIP: ABNORMAL
HYALINE CASTS: 1 /LPF
INR PPP: 1.52 (ref 0.85–1.15)
KETONES UR STRIP-MCNC: NEGATIVE MG/DL
LEUKOCYTE ESTERASE UR QL STRIP: NEGATIVE
MAGNESIUM SERPL-MCNC: 2 MG/DL (ref 1.6–2.3)
MAGNESIUM SERPL-MCNC: 2.1 MG/DL (ref 1.6–2.3)
MAGNESIUM SERPL-MCNC: 2.3 MG/DL (ref 1.6–2.3)
MCH RBC QN AUTO: 27.6 PG (ref 26.5–33)
MCH RBC QN AUTO: 27.6 PG (ref 26.5–33)
MCHC RBC AUTO-ENTMCNC: 32.2 G/DL (ref 31.5–36.5)
MCHC RBC AUTO-ENTMCNC: 32.3 G/DL (ref 31.5–36.5)
MCV RBC AUTO: 86 FL (ref 78–100)
MCV RBC AUTO: 86 FL (ref 78–100)
MUCOUS THREADS #/AREA URNS LPF: PRESENT /LPF
NITRATE UR QL: NEGATIVE
O2/TOTAL GAS SETTING VFR VENT: 50 %
O2/TOTAL GAS SETTING VFR VENT: 60 %
O2/TOTAL GAS SETTING VFR VENT: 80 %
OXYHGB MFR BLD: 95 % (ref 92–100)
OXYHGB MFR BLD: 98 % (ref 92–100)
OXYHGB MFR BLD: 98 % (ref 92–100)
OXYHGB MFR BLD: 99 % (ref 92–100)
OXYHGB MFR BLD: 99 % (ref 92–100)
OXYHGB MFR BLDV: 58 % (ref 70–75)
OXYHGB MFR BLDV: 61 % (ref 70–75)
OXYHGB MFR BLDV: 63 % (ref 70–75)
OXYHGB MFR BLDV: 64 % (ref 70–75)
OXYHGB MFR BLDV: 65 % (ref 70–75)
OXYHGB MFR BLDV: 68 % (ref 70–75)
PCO2 BLD: 41 MM HG (ref 35–45)
PCO2 BLD: 42 MM HG (ref 35–45)
PCO2 BLD: 42 MM HG (ref 35–45)
PCO2 BLD: 43 MM HG (ref 35–45)
PCO2 BLD: 44 MM HG (ref 35–45)
PCO2 BLD: 44 MM HG (ref 35–45)
PCO2 BLDV: 48 MM HG (ref 40–50)
PCO2 BLDV: 49 MM HG (ref 40–50)
PCO2 BLDV: 49 MM HG (ref 40–50)
PCO2 BLDV: 50 MM HG (ref 40–50)
PH BLD: 7.34 [PH] (ref 7.35–7.45)
PH BLD: 7.35 [PH] (ref 7.35–7.45)
PH BLD: 7.39 [PH] (ref 7.35–7.45)
PH BLD: 7.4 [PH] (ref 7.35–7.45)
PH BLD: 7.41 [PH] (ref 7.35–7.45)
PH BLD: 7.42 [PH] (ref 7.35–7.45)
PH BLDV: 7.32 [PH] (ref 7.32–7.43)
PH BLDV: 7.33 [PH] (ref 7.32–7.43)
PH BLDV: 7.34 [PH] (ref 7.32–7.43)
PH BLDV: 7.36 [PH] (ref 7.32–7.43)
PH BLDV: 7.38 [PH] (ref 7.32–7.43)
PH BLDV: 7.39 [PH] (ref 7.32–7.43)
PH UR STRIP: 5 [PH] (ref 5–7)
PHOSPHATE SERPL-MCNC: 3.2 MG/DL (ref 2.5–4.5)
PLATELET # BLD AUTO: 165 10E3/UL (ref 150–450)
PLATELET # BLD AUTO: 186 10E3/UL (ref 150–450)
PO2 BLD: 133 MM HG (ref 80–105)
PO2 BLD: 135 MM HG (ref 80–105)
PO2 BLD: 148 MM HG (ref 80–105)
PO2 BLD: 148 MM HG (ref 80–105)
PO2 BLD: 171 MM HG (ref 80–105)
PO2 BLD: 192 MM HG (ref 80–105)
PO2 BLD: 217 MM HG (ref 80–105)
PO2 BLD: 86 MM HG (ref 80–105)
PO2 BLDV: 33 MM HG (ref 25–47)
PO2 BLDV: 34 MM HG (ref 25–47)
PO2 BLDV: 35 MM HG (ref 25–47)
PO2 BLDV: 38 MM HG (ref 25–47)
POTASSIUM BLD-SCNC: 4.4 MMOL/L (ref 3.4–5.3)
POTASSIUM BLD-SCNC: 4.4 MMOL/L (ref 3.4–5.3)
POTASSIUM BLD-SCNC: 4.8 MMOL/L (ref 3.4–5.3)
RBC # BLD AUTO: 3.69 10E6/UL (ref 3.8–5.2)
RBC # BLD AUTO: 3.73 10E6/UL (ref 3.8–5.2)
RBC URINE: 6 /HPF
SARS-COV-2 RNA RESP QL NAA+PROBE: NEGATIVE
SODIUM SERPL-SCNC: 146 MMOL/L (ref 133–144)
SP GR UR STRIP: 1.01 (ref 1–1.03)
SQUAMOUS EPITHELIAL: <1 /HPF
TRANSITIONAL EPI: <1 /HPF
UROBILINOGEN UR STRIP-MCNC: NORMAL MG/DL
WBC # BLD AUTO: 14.1 10E3/UL (ref 4–11)
WBC # BLD AUTO: 14.9 10E3/UL (ref 4–11)
WBC URINE: 2 /HPF

## 2022-05-28 PROCEDURE — 250N000011 HC RX IP 250 OP 636: Performed by: SURGERY

## 2022-05-28 PROCEDURE — 83735 ASSAY OF MAGNESIUM: CPT | Performed by: SURGERY

## 2022-05-28 PROCEDURE — 82805 BLOOD GASES W/O2 SATURATION: CPT | Performed by: THORACIC SURGERY (CARDIOTHORACIC VASCULAR SURGERY)

## 2022-05-28 PROCEDURE — 250N000009 HC RX 250: Performed by: SURGERY

## 2022-05-28 PROCEDURE — 94003 VENT MGMT INPAT SUBQ DAY: CPT

## 2022-05-28 PROCEDURE — 999N000015 HC STATISTIC ARTERIAL MONITORING DAILY

## 2022-05-28 PROCEDURE — 99291 CRITICAL CARE FIRST HOUR: CPT | Mod: GC | Performed by: ANESTHESIOLOGY

## 2022-05-28 PROCEDURE — 71045 X-RAY EXAM CHEST 1 VIEW: CPT

## 2022-05-28 PROCEDURE — 71045 X-RAY EXAM CHEST 1 VIEW: CPT | Mod: 26 | Performed by: RADIOLOGY

## 2022-05-28 PROCEDURE — 87040 BLOOD CULTURE FOR BACTERIA: CPT | Performed by: THORACIC SURGERY (CARDIOTHORACIC VASCULAR SURGERY)

## 2022-05-28 PROCEDURE — 84132 ASSAY OF SERUM POTASSIUM: CPT | Performed by: SURGERY

## 2022-05-28 PROCEDURE — 36415 COLL VENOUS BLD VENIPUNCTURE: CPT | Performed by: THORACIC SURGERY (CARDIOTHORACIC VASCULAR SURGERY)

## 2022-05-28 PROCEDURE — U0003 INFECTIOUS AGENT DETECTION BY NUCLEIC ACID (DNA OR RNA); SEVERE ACUTE RESPIRATORY SYNDROME CORONAVIRUS 2 (SARS-COV-2) (CORONAVIRUS DISEASE [COVID-19]), AMPLIFIED PROBE TECHNIQUE, MAKING USE OF HIGH THROUGHPUT TECHNOLOGIES AS DESCRIBED BY CMS-2020-01-R: HCPCS

## 2022-05-28 PROCEDURE — 250N000011 HC RX IP 250 OP 636: Performed by: THORACIC SURGERY (CARDIOTHORACIC VASCULAR SURGERY)

## 2022-05-28 PROCEDURE — 82805 BLOOD GASES W/O2 SATURATION: CPT | Performed by: SURGERY

## 2022-05-28 PROCEDURE — 93005 ELECTROCARDIOGRAM TRACING: CPT

## 2022-05-28 PROCEDURE — 999N000157 HC STATISTIC RCP TIME EA 10 MIN

## 2022-05-28 PROCEDURE — 84100 ASSAY OF PHOSPHORUS: CPT | Performed by: THORACIC SURGERY (CARDIOTHORACIC VASCULAR SURGERY)

## 2022-05-28 PROCEDURE — 85730 THROMBOPLASTIN TIME PARTIAL: CPT | Performed by: INTERNAL MEDICINE

## 2022-05-28 PROCEDURE — 82330 ASSAY OF CALCIUM: CPT | Performed by: SURGERY

## 2022-05-28 PROCEDURE — 200N000002 HC R&B ICU UMMC

## 2022-05-28 PROCEDURE — 258N000003 HC RX IP 258 OP 636: Performed by: THORACIC SURGERY (CARDIOTHORACIC VASCULAR SURGERY)

## 2022-05-28 PROCEDURE — 85018 HEMOGLOBIN: CPT | Performed by: SURGERY

## 2022-05-28 PROCEDURE — 82805 BLOOD GASES W/O2 SATURATION: CPT

## 2022-05-28 PROCEDURE — 85027 COMPLETE CBC AUTOMATED: CPT

## 2022-05-28 PROCEDURE — 80048 BASIC METABOLIC PNL TOTAL CA: CPT | Performed by: STUDENT IN AN ORGANIZED HEALTH CARE EDUCATION/TRAINING PROGRAM

## 2022-05-28 PROCEDURE — 83735 ASSAY OF MAGNESIUM: CPT | Performed by: STUDENT IN AN ORGANIZED HEALTH CARE EDUCATION/TRAINING PROGRAM

## 2022-05-28 PROCEDURE — 250N000011 HC RX IP 250 OP 636

## 2022-05-28 PROCEDURE — 81001 URINALYSIS AUTO W/SCOPE: CPT

## 2022-05-28 PROCEDURE — 82330 ASSAY OF CALCIUM: CPT | Performed by: THORACIC SURGERY (CARDIOTHORACIC VASCULAR SURGERY)

## 2022-05-28 PROCEDURE — 82803 BLOOD GASES ANY COMBINATION: CPT

## 2022-05-28 PROCEDURE — 93010 ELECTROCARDIOGRAM REPORT: CPT | Mod: 59 | Performed by: INTERNAL MEDICINE

## 2022-05-28 PROCEDURE — 85027 COMPLETE CBC AUTOMATED: CPT | Performed by: THORACIC SURGERY (CARDIOTHORACIC VASCULAR SURGERY)

## 2022-05-28 PROCEDURE — 250N000013 HC RX MED GY IP 250 OP 250 PS 637: Performed by: THORACIC SURGERY (CARDIOTHORACIC VASCULAR SURGERY)

## 2022-05-28 PROCEDURE — 85730 THROMBOPLASTIN TIME PARTIAL: CPT

## 2022-05-28 PROCEDURE — 82803 BLOOD GASES ANY COMBINATION: CPT | Performed by: SURGERY

## 2022-05-28 PROCEDURE — 99291 CRITICAL CARE FIRST HOUR: CPT | Mod: GC | Performed by: INTERNAL MEDICINE

## 2022-05-28 RX ORDER — FUROSEMIDE 10 MG/ML
60 INJECTION INTRAMUSCULAR; INTRAVENOUS ONCE
Status: COMPLETED | OUTPATIENT
Start: 2022-05-28 | End: 2022-05-28

## 2022-05-28 RX ORDER — HEPARIN SODIUM 10000 [USP'U]/100ML
500 INJECTION, SOLUTION INTRAVENOUS CONTINUOUS
Status: DISCONTINUED | OUTPATIENT
Start: 2022-05-28 | End: 2022-05-29

## 2022-05-28 RX ADMIN — CEFAZOLIN 1 G: 1 INJECTION, POWDER, FOR SOLUTION INTRAMUSCULAR; INTRAVENOUS at 06:32

## 2022-05-28 RX ADMIN — DOCUSATE SODIUM 50 MG AND SENNOSIDES 8.6 MG 1 TABLET: 8.6; 5 TABLET, FILM COATED ORAL at 19:46

## 2022-05-28 RX ADMIN — MUPIROCIN 0.5 G: 20 OINTMENT TOPICAL at 07:56

## 2022-05-28 RX ADMIN — HEPARIN SODIUM AND DEXTROSE 500 UNITS/HR: 10000; 5 INJECTION INTRAVENOUS at 15:22

## 2022-05-28 RX ADMIN — Medication 4 UNITS/HR: at 13:00

## 2022-05-28 RX ADMIN — Medication 4 UNITS/HR: at 05:36

## 2022-05-28 RX ADMIN — ASPIRIN 81 MG 81 MG: 81 TABLET ORAL at 07:56

## 2022-05-28 RX ADMIN — Medication 1 MCG/KG/MIN: at 23:19

## 2022-05-28 RX ADMIN — Medication 1 MCG/KG/MIN: at 12:00

## 2022-05-28 RX ADMIN — PROPOFOL 20 MCG/KG/MIN: 10 INJECTION, EMULSION INTRAVENOUS at 17:25

## 2022-05-28 RX ADMIN — POLYETHYLENE GLYCOL 3350 17 G: 17 POWDER, FOR SOLUTION ORAL at 07:57

## 2022-05-28 RX ADMIN — ACETAMINOPHEN 325MG 975 MG: 325 TABLET ORAL at 15:27

## 2022-05-28 RX ADMIN — GABAPENTIN 100 MG: 100 CAPSULE ORAL at 22:13

## 2022-05-28 RX ADMIN — MUPIROCIN 0.5 G: 20 OINTMENT TOPICAL at 19:46

## 2022-05-28 RX ADMIN — ACETAMINOPHEN 325MG 975 MG: 325 TABLET ORAL at 22:11

## 2022-05-28 RX ADMIN — ACETAMINOPHEN 325MG 975 MG: 325 TABLET ORAL at 05:17

## 2022-05-28 RX ADMIN — FUROSEMIDE 60 MG: 10 INJECTION, SOLUTION INTRAVENOUS at 10:46

## 2022-05-28 RX ADMIN — PROPOFOL 30 MCG/KG/MIN: 10 INJECTION, EMULSION INTRAVENOUS at 04:30

## 2022-05-28 RX ADMIN — Medication 50 MCG/HR: at 00:52

## 2022-05-28 RX ADMIN — Medication 40 MG: at 07:57

## 2022-05-28 RX ADMIN — CEFAZOLIN 1 G: 1 INJECTION, POWDER, FOR SOLUTION INTRAMUSCULAR; INTRAVENOUS at 15:27

## 2022-05-28 ASSESSMENT — ACTIVITIES OF DAILY LIVING (ADL)
ADLS_ACUITY_SCORE: 34
ADLS_ACUITY_SCORE: 42
ADLS_ACUITY_SCORE: 42
ADLS_ACUITY_SCORE: 38
ADLS_ACUITY_SCORE: 34
ADLS_ACUITY_SCORE: 38

## 2022-05-28 NOTE — PLAN OF CARE
Goal Outcome Evaluation:    Plan of Care Reviewed With: patient     Overall Patient Progress: improving    Outcome Evaluation: Oxygenating well on 50% FiO2, pressors weaned slightly, diuresed with 60 IV lasix (CVP recovering without increased pressors)    Major Shift Events:  GARZA to command with prop/fent held. Tmax 38.5C, downtrending to 37.6C. SR+PACs (60s). Temp-PM competing with intrinsic rate, PM rate now 50. Able to wean pressors slightly while maintaining MAP 70-75 (see gtt FS). PA 50s/10s. Diuresed with 60 IV lasix for CVP 10, back up to 8 by EOS. TAHIRA CI 2.5, C2 and CVTS aware. CI on flotrak 1.8-2.2. NPO, +flatus. Insulin gtt per protocol. ~1.4L net negative today.    Plan: Maintain CVP<10, wean pressors as able.  For vital signs and complete assessments, please see documentation flowsheets.

## 2022-05-28 NOTE — PROGRESS NOTES
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    Changes today:   - wean pressors as tolerated  - goal CI 2.0-2.5 (at goal today)  - goal CVP < 10 (at goal today)  - maintained diuresis to keep slightly negative and achieve above parameters  - follow post CTEPH pulmonary endarterectomy protocols  - wean epinepherine if CI rises above goal range    # Pulmonary hypertension, Group IV  # CTEPH s/p pulmonary endaterectomy  # DVTs  # Severe tricuspid insufficiency  Initially diagnosed with bilateral DVT, PE 2019. TTE showed dilated RV with reduced RV function and RVSP of 84mmHg suggestive of severe pulmonary hypertension with mild to moderate TR. Patient believes the above relates to a work related injury, unna boot early 2019. RHC 12/18/2021 with RA 12, PA 84/19/45, PCWP 2, TAHIRA CO/CI 2.8/1.6. Last dose eliquis 5/23 AM. Has oxygen at home but does not use this. Underwent endarterectomy on 5/27. Post op on multiple pressors and briefly on milrinone.   - Goal CVP < 10, diuresis as needed to achieve  - Goal CI 2.0-2.5  - holding pHTN meds in ICU    # Volume overload  PTA on lasix 20mg qday (decreased from 40mg ~6 weeks ago). JVP elevated on initial exam. S/p 40mg IV lasix 5/25      # Anemia  Hgb 11.4, similar to prior last month.       # CKD  Baseline Cr ~1.3.         Dwain Ferrer MD, MSc  Cardiovascular Disease Fellow  [facility]      ______________________________________________________________________    Interval History   Underwent pulmonary endarterectomy yesterday.  Recovering well, however remains in multiple pressors, weaning as  tolerated. I/O slightly negative overnight. Remains intubated/sedated.     Data reviewed today: I reviewed all medications, new labs and imaging results over the last 24 hours. I personally reviewed the EKG tracing showing 1st degree AV block, RVH, right axis deviation     Physical Exam   Vital Signs: Temp: (!) 100.76  F (38.2  C) Temp src: Bladder   Pulse: 62   Resp: 14 SpO2: 98 % O2 Device: Mechanical Ventilator    Weight: 157 lbs 10.06 oz  General Appearance:intubated/sedated lying in bed  Eyes: EOMI, PERRL, no scleral icterus  HEENT: NCAT, MMM, neck supple  Respiratory: Mildly increased work of breathing while sitting up; CTAB  Cardiovascular: RRR, systolic murmur heard throughout precordium, best at LLSB; 2+ BLE edema; palpable pulses in all 4 extremities  GI: Abdomen soft, nontender; bowel sounds present  Skin: No rashes visualized on exposed skin  Musculoskeletal:   Neurologic: Alert, oriented, moving all extremities spontaneously  Psychiatric: Pleasant mood, full affect    Data   Recent Labs   Lab 05/28/22  0655 05/28/22  0556 05/28/22  0357 05/28/22  0349 05/27/22  2203 05/27/22  2156 05/27/22  1933 05/27/22  1931 05/27/22  1824 05/27/22  1758   WBC  --   --   --  14.9*  --  17.4*  --  15.1*  --  9.3   HGB  --   --   --  10.2*  --  10.1*  --  9.4*   < > 7.9*   MCV  --   --   --  86  --  85  --  86  --  88   PLT  --   --   --  186  --  193  --  119*  --  69*   INR  --   --   --   --   --  1.52*  --  2.20*  --  2.40*   NA  --   --   --  146*  --  145*  --  148*   < >  --    POTASSIUM  --   --   --  4.4  --  4.0  4.0  --  4.1   < >  --    CHLORIDE  --   --   --  113*  --  113*  --  115*  --   --    CO2  --   --   --  24  --  25  --  26  --   --    BUN  --   --   --  19  --  18  --  16  --   --    CR  --   --   --  1.06*  --  0.98  --  0.88  --   --    ANIONGAP  --   --   --  9  --  7  --  7  --   --    CHELE  --   --   --  9.0  --  8.7  --  8.1*  --   --    * 137* 161* 151*   < > 141*   < > 92   < >  --     ALBUMIN  --   --   --   --   --  2.5*  --  1.8*  --   --    PROTTOTAL  --   --   --   --   --  4.4*  --  3.2*  --   --    BILITOTAL  --   --   --   --   --  1.1  --  1.2  --   --    ALKPHOS  --   --   --   --   --  48  --  37*  --   --    ALT  --   --   --   --   --  14  --  11  --   --    AST  --   --   --   --   --  47*  --  36  --   --     < > = values in this interval not displayed.     Recent Results (from the past 24 hour(s))   XR Abdomen Port 1 View    Narrative    EXAMINATION:  XR ABDOMEN PORT 1 VIEWS 5/27/2022 8:31 PM.    COMPARISON: None.    HISTORY:  OG placement    FINDINGS: Frontal view. Gastric tube tip and sidehole project over the  stomach. Partial visualized thoracic support devices. Nonobstructive  bowel gas pattern. Lung bases are clear.      Impression    IMPRESSION: Gastric tube tip and sidehole project over the stomach.    I have personally reviewed the examination and initial interpretation  and I agree with the findings.    CANDACE ALMONTE MD         SYSTEM ID:  [id number]   XR Chest Port 1 View    Narrative    EXAMINATION:  XR CHEST PORT 1 VIEW 5/27/2022 8:31 PM.    COMPARISON: 4/29/2022    HISTORY:  swan placement    FINDINGS: Frontal view. Endotracheal tube tip projects over the mid  trachea. Left IJ approach [place] catheter tip projects over the  central right pulmonary artery. Right basilar chest tube. Mediastinal  drain. Epicardial pacer wires. Stable cardiac silhouette. No new  pulmonary opacity. No pneumothorax.      Impression    IMPRESSION: [place] catheter tip projects over the central right  pulmonary artery.    I have personally reviewed the examination and initial interpretation  and I agree with the findings.    CANDACE ALMONTE MD         SYSTEM ID:  [id number]         Intake/Output Summary (Last 24 hours) at 5/28/2022 1253  Last data filed at 5/28/2022 1200  Gross per 24 hour   Intake 4705.28 ml   Output 3175 ml   Net 1530.28 ml     Net IO Since Admission: 465.48 mL [05/28/22  1253]

## 2022-05-28 NOTE — H&P
CV ICU H&P  5/27/2022      CO-MORBIDITIES:   Patient Active Problem List   Diagnosis     CTEPH (chronic thromboembolic pulmonary hypertension) (H)       ASSESSMENT: Debi Espinoza is a 76 year old female with PMH of HTN, nephrolithiasis, DVT (2019) and PE (2019, 2021) on chronic anticoagulation, and chronic thromboembolic pulmonary hypertension who underwent pulmonary artery thromboendarterectomy on 5/27 with Dr. Peterson.    PLAN:  Neuro/ pain/ sedation:  Acute Postoperative pain  - Monitor neurological status. Notify the MD for any acute changes in exam.  - Pain: fentanyl gtt.   - Sedation: propofol gtt    Pulmonary care:   S/p Pulmonary artery thromboendarterectomy on 5/27/2022 by Dr. Peterson  Hx Chronic pulmonary thromboembolic disease  Hx PE (2019, 2021)  Hx Emphysema, moderate  PTA regimen: apixaban 5mg BID  CTA 05/2019 demonstrated underlying moderate emphysema  - Intubated, ventilated  -- lung protective vent settings  - Titrate supplemental oxygen and PEEP to maintain saturation above 92%.    Cardiovascular:    Hx Severe pulmonary hypertension  Hx RV dilation and reduced RV function  Hx Severe tricuspid insufficiency  Hx Essential HTN   PTA regimen: furosemide 20mg qd, adempas 2.5mg TID, opsumit 10mg qd  Echo on 03/2021 with LVEF of 78%, severe pulmonary hypertension (82 mmHg), reduced RV function (TAPSE 16mm), severe tricuspid insufficiency  - Monitor hemodynamic status.   - Goal MAP>65, SBP<140  - Statin hold  - BB hold  - ASA: start tomorrow  - Epi, norepi, vaso gtt; wean as tolerated  --Okay with CI 1.8-2.2, decrease risk of reperfusion injury to the lungs    GI care/ Nutrition:   - NPO   - PPI    Renal/ Fluid Balance/ Electrolytes:   Nephrolithiasis  PTA regimen: furosemide 20mg qd  BL creat appears to be ~ 1.14  - Strict I/O, daily weights  - Avoid/limit nephrotoxins as able  - Replete lytes PRN per protocol  - Keep on the dry side-- goal net negative 1 L-- surgeon okay with lasix use as  needed    Endocrine:    Stress induced hyperglycemia  Preop A1c 5.8 (12/21)  - Insulin gtt  - Goal BG <180 for optimal healing    ID/ Antibiotics:  Stress induced leukocytosis  - To complete perioperative regimen  - Continue to monitor fever curve, WBC and inflammatory markers as appropriate    Heme:     Stress induced leukocytosis  Acute blood loss anemia  Acute blood loss thrombocytopenia  Hx DVT (2019), PE (2019, 2021) on chronic anticoagulation  - Continue to monitor  - CBC   - PA tear intra-op s/p patch repair-- surgeon wishes to hold off on heparin until the morning of POD#1, will re-assess with surgery team tomorrow    MSK/ Skin:  Sternotomy  Surgical Incision  - Sternal precautions  - Postoperative incision management per protocol  - PT/OT/CR    Prophylaxis:    - Mechanical prophylaxis for DVT  - Chemical DVT prophylaxis - start subcutaneous heparin tomorrow  - PPI    Lines/ tubes/ drains:  - Arterial Line, ETT, CTs, PA catheter, Left IJ, ly, OG    Disposition:  - CVICU    Rene Sin MD  CVICU moonlighter  ====================================    HPI:   Ms. Espinoza is a 76 year old female with PMH of DVT (2019), PE (2019, 2021), and chronic thromboembolic pulmonary hypertension.    Ms. Espinoza was first diagnosed with bilateral DVTs and a provoked pulmonary embolism in 2019 following prolonged immobilization. She was treated with 6 months of eliquis. During a follow-up appointment in 2021, she was diagnosed with an acute on chronic pulmonary embolism on chest CTA. Right heart catheterization demonstrated severe pulmonary hypertension. She was placed on chronic anticoagulation with Eliquis BID, as well as opsumit and adempas for treatment of severe pulmonary hypertension. With ongoing severe pulmonary hypertension, Ms. Espinoza underwent a pulmonary artery thromboendarterectomy on 5/27 with Dr. Peterson.    PAST MEDICAL HISTORY:   Past Medical History:   Diagnosis Date     History of deep venous  thrombosis     2019     History of pulmonary embolism     2019, 2021     HTN (hypertension)      Nephrolithiasis      Osteoarthritis        PAST SURGICAL HISTORY:   Past Surgical History:   Procedure Laterality Date     CV PULMONARY ANGIOGRAM N/A 12/8/2021    Procedure: Pulmonary Angiogram;  Surgeon: Lisette Knox MD;  Location:  [facility]     CV RIGHT HEART CATH MEASUREMENTS RECORDED N/A 12/8/2021    Procedure: CV RIGHT HEART CATH;  Surgeon: Lisette Knox MD;  Location:  HE[facility]ARD[facility] CATH LAB       FAMILY HISTORY: History reviewed. No pertinent family history.    SOCIAL HISTORY:   Social History     Tobacco Use     Smoking status: Never Smoker     Smokeless tobacco: Never Used   Substance Use Topics     Alcohol use: Never         OBJECTIVE:   1. VITAL SIGNS:    Vital signs:  Temp: 98.4  F (36.9  C) Temp src: Oral BP: 122/53 Pulse: 65   Resp: 16 SpO2: 98 % O2 Device: Nasal cannula Oxygen Delivery: 2 LPM Height: 152.4 cm (5') Weight: 65.9 kg (145 lb 4.5 oz)  Estimated body mass index is 28.37 kg/m  as calculated from the following:    Height as of this encounter: 1.524 m (5').    Weight as of this encounter: 65.9 kg (145 lb 4.5 oz).      2. PHYSICAL EXAMINATION:     General: sedated  Neuro: sedated  Resp: intubated, ventilated  CV: S1, S2, RRR  Abdomen: Soft, non-distended, non-tender  Incisions: c/d/i  Extremities: warm and well perfused  CT: To suction, serosang output, no airleak, crepitus    4. INVESTIGATIONS:   Arterial Blood Gases   Recent Labs   Lab 05/27/22  1824 05/27/22  1739 05/27/22  1645 05/27/22  1608   PH 7.23* 7.30* 7.41 7.35   PCO2 55* 45 38 42   PO2 177* 86 436* 238*   HCO3 23 22 24 23     Complete Blood Count   Recent Labs   Lab 05/27/22  1824 05/27/22  1758 05/27/22  1739 05/27/22  1705 05/27/22  1608 05/27/22  1604 05/27/22  1121 05/27/22  0504 05/26/22  1307 05/26/22  0454   WBC  --  9.3  --   --   --  12.4*  --  4.6  --  4.9   HGB 9.7* 7.9* 7.7* 7.3*   < > 7.2*    < > 11.1*   < > 10.6*   PLT  --  69*  --   --   --  136*  --  211  --  212    < > = values in this interval not displayed.     Basic Metabolic Panel  Recent Labs   Lab 05/27/22  1824 05/27/22  1739 05/27/22  1705 05/27/22  1645 05/27/22  0630 05/27/22  0504 05/26/22  0454 05/25/22  1622    143 143 144   < > 140 138 139   POTASSIUM 3.9 3.5 3.6 3.6   < > 4.0 4.2 4.0   CHLORIDE  --   --   --   --   --  109 110* 110*   CO2  --   --   --   --   --  25 25 23   BUN  --   --   --   --   --  20 20 20   CR  --   --   --   --   --  1.17* 1.16* 1.08*   GLC 88 126* 146* 156*   < > 89 86 108*    < > = values in this interval not displayed.     Liver Function Tests  Recent Labs   Lab 05/27/22  1758 05/27/22  1604 05/25/22  1622   AST  --   --  16   ALT  --   --  16   ALKPHOS  --   --  75   BILITOTAL  --   --  0.7   ALBUMIN  --   --  3.4   INR 2.40* 1.71* 1.13     Pancreatic Enzymes  No lab results found in last 7 days.  Coagulation Profile  Recent Labs   Lab 05/27/22  1758 05/27/22  1604 05/27/22  0504 05/26/22  2321 05/25/22  2146 05/25/22  1622   INR 2.40* 1.71*  --   --   --  1.13   PTT 76* 89* 60* 57*   < > 35    < > = values in this interval not displayed.         5. RADIOLOGY:   Catheterization, Coronary angiogram 05/26/2022  Summary    Mild non-obstructive coronary artery disease.    Right sided filling pressures are mildly elevated.    Severe pre-capillary pulmonary hypertension.    Left sided filling pressures are normal.    Normal cardiac output level.  Ao 104/60/82  RA 13/13/10  RV 85/10  PA 86/33/47  PCWP --/--/12  PA Sat 64.4%  TD CO/CI 5.0/3.1  Boris CO/CI 5.3/3.2  PVR 6.7 MARTINEZ  SVR 1094 dsc-5    Angiogram:   LAD: proximal 30% stenosis   First Diagonal: normal   Second Diagonal: normal   First Septal: normal   Second Septal: normal  LCx: normal   First Obtuse Marginal Branch: normal   Second Obtuse Marginal Branch: normal   Third Obtuse Marginal Branch: normal  RCA: normal   Acute Marginal: normal   Right  Ventricular: normal   Right Posterior Descending Artery: 20% stenosis   Right Posterior Atrioventricular Artery: 20% stenosis    EKG 5/26/2022   Sinus rhythm with 1st degree A-V block    Right axis deviation    Incomplete right bundle branch block    Right ventricular hypertrophy    ST & T wave abnormality, consider inferior ischemia    ST & T wave abnormality, consider anterolateral ischemia    Abnormal ECG    When compared with ECG of 25-MAY-2022 16:01, (unconfirmed)    No significant change was found    Confirmed by Howard Robbins ([id number]) on 5/26/2022 12:38:45 PM     AMILCAR (3/26/2021)  Conclusions:  Left Ventricle: Hyperdynamic left ventricular systolic function. The ejection fraction is visually estimated to be 75 %.    Left Atrium: Normal left atrial size.     IAS: The atrial septum bows from right-to-left.    Right Ventricle: Markedly dilated right ventricle. Reduced right ventricular systolic function (TAPSE 16mm). Pulmonary artery systolic pressure is measured at 82 mmHg.   Right Atrium: Markedly dilated right atrium.     Tricuspid Valve: Wide open tricuspid regurgitation.       Echocardiogram 12/2021  Interpretation Summary  Paradoxical septal motion consistent with right ventricular pressure and volume overload is present. Moderate to severe right ventricular dilation is present. Global right ventricular function is severely reduced.RVFWSL -14.3%.RV4CSL -10%. Severe tricuspid insufficiency is present. Right ventricular systolic pressure is 77mmHg above the right atrial pressure.IVC diameter >2.1 cm collapsing <50% with sniff suggests a high RA pressure estimated at 15 mmHg or greater.No pericardial effusion is present.    CT scan 12/2021  IMPRESSION:   1. Multiple subsegmental pulmonary emboli are seen within the left  upper lobe and right lower lobe pulmonary arteries which appear  chronic in nature and correlate to findings described on CT chest  imaging report dated 1/22/2021  2. Bibasilar peripheral  consolidative opacities with associated  perfusion defects, concerning for pulmonary infarcts.  3. Evidence of right heart dysfunction with right atrial and right  ventricular dilatation.  4. Multiple sub-6 mm pulmonary nodules as described above. If patient  is at high risk for lung cancer, recommend follow-up CT in 12 months  or correlation with outside imaging to ensure stability.  5. Patchy consolidative opacity in the superior left upper lobe, which  may represent infection. Follow-up to clearing.  6. Pulmonary artery is dilated up to 3.4 cm, to be seen in setting of  pulmonary artery hypertension.  7. Mosaic attenuation of the lungs, which can be seen in the setting  of small airway or small vessel disease.         =========================================

## 2022-05-28 NOTE — ANESTHESIA CARE TRANSFER NOTE
Patient: Debi Espinoza    Procedure: Procedure(s):  Median Sternotomy, Pulmonary Thromboendarterectomy, on cardiopulmonary bypass, Transesophageal Echocardiogram by Anesthesia       Diagnosis: PTE (pulmonary thromboembolism) (H) [I26.99]  Diagnosis Additional Information: No value filed.    Anesthesia Type:   General     Note:    Oropharynx: endotracheal tube in place  Level of Consciousness: awake      Independent Airway: airway patency not satisfactory and stable  Dentition: dentition unchanged  Vital Signs Stable: post-procedure vital signs reviewed and stable  Report to RN Given: handoff report given  Patient transferred to: PACU  Comments: Smooth transport to 4e  Handoff Report: Identifed the Patient, Identified the Reponsible Provider, Reviewed the pertinent medical history, Discussed the surgical course, Reviewed Intra-OP anesthesia mangement and issues during anesthesia, Set expectations for post-procedure period and Allowed opportunity for questions and acknowledgement of understanding      Vitals:  Vitals Value Taken Time   BP     Temp     Pulse 75 05/27/22 1908   Resp 13 05/27/22 1908   SpO2     Vitals shown include unvalidated device data.    Electronically Signed By: WEN Sorto CRNA  May 27, 2022  7:09 PM

## 2022-05-28 NOTE — PROGRESS NOTES
CLINICAL NUTRITION SERVICES - BRIEF NOTE    Received provider consult for nutrition education with comments post op cardiovascular surgery (automatic consult on post-op order set). S/p median sternotomy on 5/27. Nutrition education not indicated.    RD will follow per LOS protocol or if re-consulted.     Candida Lopez MS, RD, LD, CNSC  4E (CVICU) RD pager: [phone]  Ascom: [id number]  Weekend/Holiday RD pager: [phone]

## 2022-05-28 NOTE — PROGRESS NOTES
CV ICU PROGRESS NOTE  May 28, 2022      CO-MORBIDITIES:   CTEPH (chronic thromboembolic pulmonary hypertension) (H)  (primary encounter diagnosis)  Primary pulmonary hypertension (H)  SOB (shortness of breath)  S/P coronary angiogram    ASSESSMENT: Debi Espinoza is a 76 year old female with PMH of HTN, nephrolithiasis, DVT (2019) and PE (2019, 2021) on chronic anticoagulation, and chronic thromboembolic pulmonary hypertension who underwent pulmonary artery thromboendarterectomy on 5/27 with Dr. Peterson.    TODAY'S PROGRESS:   - Sedation vacation  - Decrease back up pacing to 50  - Wean off off levophed first, then epi  - Decrease , RR 18  - Hold heparin gtt per CVTS, okay for SQH  - Discontinue femoral arterial line  - Febrile, pan culture    PLAN:  Neuro/ pain/ sedation:  Acute Postoperative pain  - Monitor neurological status. Notify the MD for any acute changes in exam.  - Pain: fentanyl gtt.   - Sedation: propofol gtt  - Daily sedation vacation     Pulmonary care:   S/p Pulmonary artery thromboendarterectomy on 5/27/2022 by Dr. Peterson  Hx Chronic pulmonary thromboembolic disease  Hx PE (2019, 2021)  Hx Emphysema, moderate  PTA regimen: apixaban 5mg BID  CTA 05/2019 demonstrated underlying moderate emphysema  - Intubated, ventilated CMV 18/360/19/80  -- lung protective vent settings  - Titrate supplemental oxygen and PEEP to maintain saturation above 92%.     Cardiovascular:    Hx Severe pulmonary hypertension  Hx RV dilation and reduced RV function  Hx Severe tricuspid insufficiency  Hx Essential HTN   PTA regimen: furosemide 20mg qd, adempas 2.5mg TID, opsumit 10mg qd  Echo on 03/2021 with LVEF of 78%, severe pulmonary hypertension (82 mmHg), reduced RV function (TAPSE 16mm), severe tricuspid insufficiency  - Monitor hemodynamic status.   - ASA 81  - Epi, norepi, vaso gtt; wean as tolerated  --Okay with CI 1.8-2.2, decrease risk of reperfusion injury to the lungs  - CVP goal <10, MAP goal 70-75 for  reduced reperfusion edema and hypoxia  - Cardiac index goal 2.0-2.5  - V pacing back up 50 bpm    GI care/ Nutrition:   - NPO   - PPI    Renal/ Fluid Balance/ Electrolytes:   Nephrolithiasis  PTA regimen: furosemide 20mg qd  BL creat appears to be ~ 1.14  - Goal for net negative daily  - Will follow and diurese as needed with CVP <10  - Strict I/O, daily weights  - Avoid/limit nephrotoxins as able  - Replete lytes PRN per protocol  - Keep on the dry side-- goal net negative 1 L-- surgeon okay with lasix use as needed     Endocrine:    Stress induced hyperglycemia  Preop A1c 5.8 (12/21)  - Insulin gtt  - Goal BG <180 for optimal healing     ID/ Antibiotics:  Stress induced leukocytosis  Fever to 38.5  - To complete perioperative regimen  - Continue to monitor fever curve, WBC and inflammatory markers as appropriate  - Will pan culture given fever    5/28: Blood culture x2, UA/UC - pend     Heme:     Stress induced leukocytosis  Acute blood loss anemia  Acute blood loss thrombocytopenia  Hx DVT (2019), PE (2019, 2021) on chronic anticoagulation  - Continue to monitor  - CBC   - PA tear intra-op s/p patch repair  - Hold heparin for now, likely start gtt tomorrow     MSK/ Skin:  Sternotomy  Surgical Incision  - Sternal precautions  - Postoperative incision management per protocol  - PT/OT/CR     Prophylaxis:    - Mechanical prophylaxis for DVT  - Chemical DVT prophylaxis - start subcutaneous heparin  - PPI     Lines/ tubes/ drains:  - Arterial Line, ETT, CTs, PA catheter, Left IJ, ly, OG, pull femoral line     Disposition:  - CVICU    Patient seen, findings and plan discussed with CV ICU staff, Dr. Denis, CVTS fellow, Dr. Dihel, Cards 2 team, CVTS attending, Dr. George.    -----------------------------------  Jhon Corado MD  PGY-3, General Surgery    ====================================    SUBJECTIVE:   No acute events overnight. Some increased oxygen requirements with PEEP increased to 10. Given lasix with good  response.     OBJECTIVE:   1. VITAL SIGNS:   Temp:  [96.8  F (36  C)-100.94  F (38.3  C)] 100.76  F (38.2  C)  Pulse:  [56-77] 62  Resp:  [14-28] 14  MAP:  [56 mmHg-90 mmHg] 74 mmHg  Arterial Line BP: ()/(41-63) 136/50  FiO2 (%):  [60 %-80 %] 60 %  SpO2:  [79 %-100 %] 99 %  Vent Mode: CMV/AC  (Continuous Mandatory Ventilation/ Assist Control)  FiO2 (%): 60 %  Resp Rate (Set): 18 breaths/min  Tidal Volume (Set, mL): 360 mL  PEEP (cm H2O): 10 cmH2O  Resp: 14      2. INTAKE/ OUTPUT:   I/O last 3 completed shifts:  In: 4837 [I.V.:3593; Other:555; NG/GT:60]  Out: 2545 [Urine:2145; Emesis/NG output:100; Chest Tube:300]    3. PHYSICAL EXAMINATION:   General: lying in bed, critically ill  Neuro: intubated, sedated  Resp: Mechanically ventilated, chest tubes with serosanguinous output  CV: RRR  Abdomen: Soft, Non-distended, Non-tender  Incisions: clean dry intact  Extremities: warm and well perfused    4. INVESTIGATIONS:   Arterial Blood Gases   Recent Labs   Lab 05/28/22  1057 05/28/22  0814 05/28/22  0350 05/27/22  2347   PH 7.40 7.39 7.35 7.34*   PCO2 41 41 44 44   PO2 192* 171* 217* 86   HCO3 26 25 24 24     Complete Blood Count   Recent Labs   Lab 05/28/22  0349 05/27/22  2156 05/27/22  1931 05/27/22  1824 05/27/22  1758   WBC 14.9* 17.4* 15.1*  --  9.3   HGB 10.2* 10.1* 9.4* 9.7* 7.9*    193 119*  --  69*     Basic Metabolic Panel  Recent Labs   Lab 05/28/22  1103 05/28/22  0819 05/28/22  0655 05/28/22  0556 05/28/22  0357 05/28/22  0349 05/27/22 2203 05/27/22 2156 05/27/22 1933 05/27/22 1931 05/27/22  1824 05/27/22  0630 05/27/22  0504   NA  --   --   --   --   --  146*  --  145*  --  148* 142   < > 140   POTASSIUM  --   --   --   --   --  4.4  --  4.0  4.0  --  4.1 3.9   < > 4.0   CHLORIDE  --   --   --   --   --  113*  --  113*  --  115*  --   --  109   CO2  --   --   --   --   --  24  --  25  --  26  --   --  25   BUN  --   --   --   --   --  19  --  18  --  16  --   --  20   CR  --   --   --   --    --  1.06*  --  0.98  --  0.88  --   --  1.17*   * 131* 120* 137*   < > 151*   < > 141*   < > 92 88   < > 89    < > = values in this interval not displayed.     Liver Function Tests  Recent Labs   Lab 05/27/22 2156 05/27/22 1931 05/27/22 1758 05/27/22  1604 05/25/22  1622   AST 47* 36  --   --  16   ALT 14 11  --   --  16   ALKPHOS 48 37*  --   --  75   BILITOTAL 1.1 1.2  --   --  0.7   ALBUMIN 2.5* 1.8*  --   --  3.4   INR 1.52* 2.20* 2.40* 1.71* 1.13     Pancreatic Enzymes  No lab results found in last 7 days.  Coagulation Profile  Recent Labs   Lab 05/28/22  1057 05/28/22  0349 05/27/22 2156 05/27/22 1931 05/27/22 1758 05/27/22  1604   INR  --   --  1.52* 2.20* 2.40* 1.71*   PTT 59* 60* 60* 63* 76* 89*         5. RADIOLOGY:   Recent Results (from the past 24 hour(s))   XR Abdomen Port 1 View    Narrative    EXAMINATION:  XR ABDOMEN PORT 1 VIEWS 5/27/2022 8:31 PM.    COMPARISON: None.    HISTORY:  OG placement    FINDINGS: Frontal view. Gastric tube tip and sidehole project over the  stomach. Partial visualized thoracic support devices. Nonobstructive  bowel gas pattern. Lung bases are clear.      Impression    IMPRESSION: Gastric tube tip and sidehole project over the stomach.    I have personally reviewed the examination and initial interpretation  and I agree with the findings.    CANDACE ALMONTE MD         SYSTEM ID:  [id number]   XR Chest Port 1 View    Narrative    EXAMINATION:  XR CHEST PORT 1 VIEW 5/27/2022 8:31 PM.    COMPARISON: 4/29/2022    HISTORY:  swan placement    FINDINGS: Frontal view. Endotracheal tube tip projects over the mid  trachea. Left IJ approach Given-Diamond catheter tip projects over the  central right pulmonary artery. Right basilar chest tube. Mediastinal  drain. Epicardial pacer wires. Stable cardiac silhouette. No new  pulmonary opacity. No pneumothorax.      Impression    IMPRESSION: Given-Diamond catheter tip projects over the central right  pulmonary artery.    I have  personally reviewed the examination and initial interpretation  and I agree with the findings.    CANDACE ALMONTE MD         SYSTEM ID:  [id number]   XR Chest Port 1 View    Narrative    Exam: XR CHEST PORT 1 VIEW, 5/28/2022 2:08 AM    Indication: Post Op CVTS Surgery    Comparison: 5/27/2022    Findings:   Postsurgical changes of the chest similar to prior. ET tube projects  2.5 cm from the man.  Morley-Diamond projects over the right main  pulmonary artery. Chest tubes and mediastinal drain in place similar  to prior. Cardiac silhouette is unchanged. Slightly improved perihilar  opacities. No pneumothorax or pleural effusion.      Impression    Impression: Postoperative chest with slightly improved perihilar  atelectasis or edema. Endotracheal tube tip in lower trachea 2.5 cm  above the man.    I have personally reviewed the examination and initial interpretation  and I agree with the findings.    YVETTE GRAY MD         SYSTEM ID:  [id number]       =========================================

## 2022-05-28 NOTE — ANESTHESIA POSTPROCEDURE EVALUATION
Patient: Debi Espinoza    Procedure: Procedure(s):  Median Sternotomy, Pulmonary Thromboendarterectomy, on cardiopulmonary bypass, Transesophageal Echocardiogram by Anesthesia       Anesthesia Type:  General    Note:  Disposition: ICU            ICU Sign Out: Anesthesiologist/ICU physician sign out WAS performed   Postop Pain Control: Uneventful            Sign Out: Well controlled pain   PONV: No   Neuro/Psych: Uneventful            Sign Out: PLANNED postop sedation   Airway/Respiratory: Uneventful            Sign Out: AIRWAY IN SITU/Resp. Support               Airway in situ/Resp. Support: ETT                 Reason: Planned Pre-op   CV/Hemodynamics: Uneventful            Sign Out: Detailed CV status               Blood Pressure: Normal; Pressors               Rate/Rhythm: Normal HR               Perfusion:  Adequate perfusion indices; Inotropes   Other NRE: NONE   DID A NON-ROUTINE EVENT OCCUR? No           Last vitals:  Vitals:    05/28/22 0400 05/28/22 0415 05/28/22 0430   BP:      Pulse: 57 57    Resp: 14 14    Temp: 38  C (100.4  F) 38  C (100.4  F) 38  C (100.4  F)   SpO2: 100% 100%        Electronically Signed By: Topher Salguero MD  May 28, 2022  4:47 AM

## 2022-05-28 NOTE — OP NOTE
"DATE OF SERVICE: 05/27/22      PREOPERATIVE DIAGNOSES:  1. Chronic thromboembolic pulmonary hypertension  2. Severe tricuspid regurgitation  3. Pulmonary embolism  4. Deep venous thrombosis  5. Hypertension  6. Osteoarthritis  7. Nephrolithiasis with infection    POSTOPERATIVE DIAGNOSES:  1. Chronic thromboembolic pulmonary hypertension  2. Severe tricuspid regurgitation  3. Pulmonary embolism  4. Deep venous thrombosis  5. Hypertension  6. Osteoarthritis  7. Nephrolithiasis with infection     PROCEDURE PERFORMED:  1. Median sternotomy and cardiopulmonary bypass with profound hypothermic circulatory arrest  2. Right pulmonary endarterectomy  3. Left pulmonary endarterectomy  4. Patch repair of right pulmonary artery     SURGEON:  Connor Peterson MD    CO-SURGEON: Topher Camacho MD, PhD     FELLOW SURGEON:  Dev Diehl MD.     ANESTHESIA:  General endotracheal anesthesia.     ANESTHESIOLOGIST:  Tyler Ledezma MD     ESTIMATED BLOOD LOSS:  1000 mL     SPECIMEN:  Bilateral pulmonary endarterectomy.    CARDIOPULMONARY BYPASS TIME: 223\" + 50\"; Total 273\"    AORTIC CROSS CLAMP TIME: 64\"    DEEP HYPOTHERMIC CIRCULATORY ARREST TIME:  Right: 18\"  Left: 23\"  Total: 41\"     INDICATIONS FOR PROCEDURE: Ms. Debi Espinoza is a 76 year old year-old female who presents with chronic thromboembolic pulmonary hypertension with WHO class 3 symptoms. Upon CTePH committee review they are deemed to be a surgical candidate with disease that is approachable by endarterectomy. Risks, benefits and alternatives were discussed and they agree to proceed with surgery electively.     OPERATIVE FINDINGS:   1. Right pulmonary endarterectomy specimen extracted under circulatory arrest  2. Left pulmonary endarterectomy specimen extracted under circulatory arrest      Preop (Cath) Preop (OR) Post op   AoP 150/82/110 103/49/68 102/44/60   PAP 77/30/44 85/30/49 44/25/30    800 500   CO 3.29 3.9 3.2   CVP 6 15 11       OPERATIVE " DESCRIPTION IN DETAIL:    After obtaining informed consent, the patient was brought to the operating room and placed in the supine position on the operating room table. The patient underwent smooth induction of general anesthesia, and the trachea was intubated orally. Invasive monitoring lines were placed by anesthesia.  The patient was prepped and draped, and a timeout was performed to confirm the correct patient identity,as well as the procedure to be performed.      A median sternotomy was performed and the pericardial well opened and tented to create a well. The aorta and pulmonary artery were dissected apart and the ligamentum arteriosum was transected. The SVC was mobilized from the right pulmonary artery. The right pulmonary artery was also mobilized from the aorta.      The patient was given full dose heparin. Cannulation of the distal ascending aorta, inferior vena cava and superior vena cava via the right atrium was performed through pursestrings and cardiopulmonary bypass was commenced. The cavae were snared. An antegrade cardioplegia catheter/root vent was placed. A left ventricular vent was placed via the right superior pulmonary vein through a pursestring. A pulmonary artery vent was placed in the proximal main pulmonary artery. We began to cool the patient to 20 degrees Centigrade.      At 22 degrees Centigrade a longitudinal arteriotomy was made in the right PA from the aorta to the takeoff of the right middle lobe branch. An endarterectomy spatula was used to raise a flap and identify the correct endarterectomy plane. The aortic crossclamp was then placed and the heart achieved successful diastolic arrest with 1 litre antegrade cold blood cardioplegia. The endarterectomy plane was continued distally. The orifice of the upper lobe specimen was transected to return to later. The endarterectomy continued distally until blood prohibited visualization. The head was packed in ice and mannitol and  solumedrol were given by anesthesia. At a core temperature of 20 degrees Centigrade the patient was exsanguinated into the pump and the circuit arrested. The endarterectomy was continued distally into the lobar and segmental branches until the specimen was delivered. We then returned to the right upper lobe and completed the endarterectomy into the segmental branches of the upper lobe. Once completed, flow was resumed and the aortic crossclamp was removed. The right PA arteriotomy was closed in 2 layers with a running 6-0 prolene suture.     Next the same process was repeated for the left side. A longitudinal arteriotomy was made in the main PA directed towards the left main PA and hockey stick extended inferiorly just beyond the ligamentum arteriosum. An endarterectomy spatula was used to raise a flap and identify the correct endarterectomy plane. The aortic crossclamp was then placed and the heart achieved successful diastolic arrest with 400 mL antegrade cold blood cardioplegia. The endarterectomy continued distally until blood prohibited visualization. The patient was exsanguinated again into the pump and the circuit arrested. The endarterectomy was continued distally into the lobar and segmental branches until the specimen was delivered. Flow was then resumed and the aortic crossclamp was removed. The PA arteriotomy was closed in 2 layers with a running 6-0 prolene suture. We began rewarming the patient at this time.     A sinus rhythm was obtained after removing the crossclamp . A paced rhythm was initiated via a temporary epicardial ventricular wire and then set to a back up rate. Once the patient was warm, the PA vent and LV vent were removed and oversewn. The root vent was removed and was hemostatic. A vasoconstrictor was begun by anesthesia. Calcium was given by perfusion and we weaned from cardiopulmonary bypass. They were then decannulated and cannulation sites were oversewn for hemostasis. Protamine was  given for reversal of heparinization.     There was dark bleeding noted at between the aorta and superior vena cava. The right pulmonary arteriotomy suture line was noted to be tearing and this was initially repaired with several 5-0 prolene pledgeted sutures. The pulmonary artery continued to tear at every suture repair site. The patient was re-heparinized with 400 units/Kg with a final ACT of > 480 seconds. The aorta, SVC and IVC were again cannulated through new pursestrings as before. The aorta was cannulated in a new location. The SVC and IVC were cannulated through the previous sites. Full bi-caval cardiopulmonary bypass was initiated.     The right pulmonary arteriotomy was opened and the edges freshened by excising the torn tissue. A bovine pericardial patch was cut into a miri shape and sutures to the pulmonary artery as a patch with running 6-0 prolene suture. Volume was transfused to the patient and the PA distended. Several repair sutures were placed. The patient was again weaned from cardiopulmonary bypass and decannulated. Heparin was again reversed with protamine. Cannulation sites were made hemostatic with additional suture. The PA patch repair was made hemostatic with additional repair suture and topical agents.    A 32-Cambodian and a 28-Cambodian angled chest tubes were placed in the anterior mediastinum and brought out through a separate stab incision. A 28-Cambodian angled chest tube was placed into the right pleural space as a pneumothorax was evident and the pleura opened. The sternal edges were coated with vancomycin paste and then reapposed with 3 interrupted #6 stainless steel sternal wires in the manubrium, 3 double wires in the body of the sternum and 1 additional #6 stainless steel sternal wire at the lower aspect of the sternum. The muscle, fascia, and skin were closed in layers with absorbable Stratafix suture. Surgical skin glue was applied to the incision. All sponge, needle and instrument  counts were correct at the end of the case.      Connor Peterson MD  Cardiothoracic Surgery  Pager [phone]

## 2022-05-28 NOTE — PLAN OF CARE
Major shift events: Patient arrived to  at 1910 05/27/2022 post thrombo endarterectomy. 2 RN skin assessment completed by Rashard. Patient's glasses in bedside safe. Patient arouses to voice and intermittently follows commands moving all extremities when sedation weaned. SR 60-70s and temp paced at VVI 60. BP labile requiring the addition of phenylephrine. CVP 8, PAP 50/17, SvO2 61%, CI 2.4, SVR 1100. PEEP increased to 8 with improving ABGs. 60 mg lasix given. 0-20 ml/hr chest tube output. OG to LIS. Urine output through ly 50-60 ml/hr. No BM. Continue to wean pressors as able, monitor respiratory status, and update team with further changes. See flowsheet for details and assessment.

## 2022-05-29 ENCOUNTER — APPOINTMENT (OUTPATIENT)
Dept: GENERAL RADIOLOGY | Facility: CLINIC | Age: 77
DRG: 270 | End: 2022-05-29
Attending: INTERNAL MEDICINE
Payer: MEDICARE

## 2022-05-29 LAB
ANION GAP SERPL CALCULATED.3IONS-SCNC: 7 MMOL/L (ref 3–14)
BASE EXCESS BLDA CALC-SCNC: 0.9 MMOL/L (ref -9–1.8)
BASE EXCESS BLDA CALC-SCNC: 0.9 MMOL/L (ref -9–1.8)
BASE EXCESS BLDA CALC-SCNC: 1.9 MMOL/L (ref -9–1.8)
BASE EXCESS BLDA CALC-SCNC: 2.5 MMOL/L (ref -9–1.8)
BASE EXCESS BLDA CALC-SCNC: 2.6 MMOL/L (ref -9–1.8)
BASE EXCESS BLDA CALC-SCNC: 2.8 MMOL/L (ref -9–1.8)
BASE EXCESS BLDA CALC-SCNC: 3.1 MMOL/L (ref -9–1.8)
BASE EXCESS BLDA CALC-SCNC: 3.8 MMOL/L (ref -9–1.8)
BASE EXCESS BLDV CALC-SCNC: 1.6 MMOL/L (ref -7.7–1.9)
BASE EXCESS BLDV CALC-SCNC: 1.7 MMOL/L (ref -7.7–1.9)
BASE EXCESS BLDV CALC-SCNC: 1.9 MMOL/L (ref -7.7–1.9)
BASE EXCESS BLDV CALC-SCNC: 2.7 MMOL/L (ref -7.7–1.9)
BASE EXCESS BLDV CALC-SCNC: 3.7 MMOL/L (ref -7.7–1.9)
BASE EXCESS BLDV CALC-SCNC: 3.7 MMOL/L (ref -7.7–1.9)
BASE EXCESS BLDV CALC-SCNC: 4.2 MMOL/L (ref -7.7–1.9)
BUN SERPL-MCNC: 24 MG/DL (ref 7–30)
CA-I BLD-MCNC: 4.9 MG/DL (ref 4.4–5.2)
CALCIUM SERPL-MCNC: 8.4 MG/DL (ref 8.5–10.1)
CHLORIDE BLD-SCNC: 112 MMOL/L (ref 94–109)
CO2 SERPL-SCNC: 26 MMOL/L (ref 20–32)
CREAT SERPL-MCNC: 1.09 MG/DL (ref 0.52–1.04)
ERYTHROCYTE [DISTWIDTH] IN BLOOD BY AUTOMATED COUNT: 17.2 % (ref 10–15)
ERYTHROCYTE [DISTWIDTH] IN BLOOD BY AUTOMATED COUNT: 17.4 % (ref 10–15)
GFR SERPL CREATININE-BSD FRML MDRD: 52 ML/MIN/1.73M2
GLUCOSE BLD-MCNC: 129 MG/DL (ref 70–99)
GLUCOSE BLDC GLUCOMTR-MCNC: 112 MG/DL (ref 70–99)
GLUCOSE BLDC GLUCOMTR-MCNC: 113 MG/DL (ref 70–99)
GLUCOSE BLDC GLUCOMTR-MCNC: 121 MG/DL (ref 70–99)
GLUCOSE BLDC GLUCOMTR-MCNC: 125 MG/DL (ref 70–99)
GLUCOSE BLDC GLUCOMTR-MCNC: 130 MG/DL (ref 70–99)
GLUCOSE BLDC GLUCOMTR-MCNC: 133 MG/DL (ref 70–99)
GLUCOSE BLDC GLUCOMTR-MCNC: 137 MG/DL (ref 70–99)
GLUCOSE BLDC GLUCOMTR-MCNC: 146 MG/DL (ref 70–99)
GLUCOSE BLDC GLUCOMTR-MCNC: 156 MG/DL (ref 70–99)
HCO3 BLD-SCNC: 26 MMOL/L (ref 21–28)
HCO3 BLD-SCNC: 26 MMOL/L (ref 21–28)
HCO3 BLD-SCNC: 27 MMOL/L (ref 21–28)
HCO3 BLD-SCNC: 28 MMOL/L (ref 21–28)
HCO3 BLD-SCNC: 28 MMOL/L (ref 21–28)
HCO3 BLDV-SCNC: 27 MMOL/L (ref 21–28)
HCO3 BLDV-SCNC: 28 MMOL/L (ref 21–28)
HCO3 BLDV-SCNC: 29 MMOL/L (ref 21–28)
HCO3 BLDV-SCNC: 29 MMOL/L (ref 21–28)
HCO3 BLDV-SCNC: 30 MMOL/L (ref 21–28)
HCT VFR BLD AUTO: 26.8 % (ref 35–47)
HCT VFR BLD AUTO: 26.9 % (ref 35–47)
HGB BLD-MCNC: 8.5 G/DL (ref 11.7–15.7)
LACTATE SERPL-SCNC: 1 MMOL/L (ref 0.7–2)
MAGNESIUM SERPL-MCNC: 2.2 MG/DL (ref 1.6–2.3)
MCH RBC QN AUTO: 27.2 PG (ref 26.5–33)
MCH RBC QN AUTO: 28.1 PG (ref 26.5–33)
MCHC RBC AUTO-ENTMCNC: 31.6 G/DL (ref 31.5–36.5)
MCHC RBC AUTO-ENTMCNC: 31.7 G/DL (ref 31.5–36.5)
MCV RBC AUTO: 86 FL (ref 78–100)
MCV RBC AUTO: 88 FL (ref 78–100)
O2/TOTAL GAS SETTING VFR VENT: 40 %
O2/TOTAL GAS SETTING VFR VENT: 50 %
O2/TOTAL GAS SETTING VFR VENT: 50 %
OXYHGB MFR BLD: 93 % (ref 92–100)
OXYHGB MFR BLD: 95 % (ref 92–100)
OXYHGB MFR BLD: 99 % (ref 92–100)
OXYHGB MFR BLDV: 55 % (ref 70–75)
OXYHGB MFR BLDV: 56 % (ref 70–75)
OXYHGB MFR BLDV: 58 % (ref 70–75)
OXYHGB MFR BLDV: 60 % (ref 70–75)
OXYHGB MFR BLDV: 63 % (ref 70–75)
OXYHGB MFR BLDV: 67 % (ref 70–75)
OXYHGB MFR BLDV: 70 % (ref 70–75)
PCO2 BLD: 40 MM HG (ref 35–45)
PCO2 BLD: 41 MM HG (ref 35–45)
PCO2 BLD: 43 MM HG (ref 35–45)
PCO2 BLD: 43 MM HG (ref 35–45)
PCO2 BLDV: 44 MM HG (ref 40–50)
PCO2 BLDV: 45 MM HG (ref 40–50)
PCO2 BLDV: 46 MM HG (ref 40–50)
PCO2 BLDV: 47 MM HG (ref 40–50)
PCO2 BLDV: 48 MM HG (ref 40–50)
PH BLD: 7.39 [PH] (ref 7.35–7.45)
PH BLD: 7.41 [PH] (ref 7.35–7.45)
PH BLD: 7.42 [PH] (ref 7.35–7.45)
PH BLD: 7.42 [PH] (ref 7.35–7.45)
PH BLD: 7.43 [PH] (ref 7.35–7.45)
PH BLD: 7.44 [PH] (ref 7.35–7.45)
PH BLD: 7.44 [PH] (ref 7.35–7.45)
PH BLD: 7.46 [PH] (ref 7.35–7.45)
PH BLDV: 7.36 [PH] (ref 7.32–7.43)
PH BLDV: 7.38 [PH] (ref 7.32–7.43)
PH BLDV: 7.39 [PH] (ref 7.32–7.43)
PH BLDV: 7.4 [PH] (ref 7.32–7.43)
PH BLDV: 7.4 [PH] (ref 7.32–7.43)
PH BLDV: 7.41 [PH] (ref 7.32–7.43)
PH BLDV: 7.41 [PH] (ref 7.32–7.43)
PHOSPHATE SERPL-MCNC: 3.8 MG/DL (ref 2.5–4.5)
PLATELET # BLD AUTO: 122 10E3/UL (ref 150–450)
PLATELET # BLD AUTO: 123 10E3/UL (ref 150–450)
PO2 BLD: 121 MM HG (ref 80–105)
PO2 BLD: 153 MM HG (ref 80–105)
PO2 BLD: 65 MM HG (ref 80–105)
PO2 BLD: 74 MM HG (ref 80–105)
PO2 BLD: 74 MM HG (ref 80–105)
PO2 BLD: 77 MM HG (ref 80–105)
PO2 BLD: 77 MM HG (ref 80–105)
PO2 BLD: 99 MM HG (ref 80–105)
PO2 BLDV: 31 MM HG (ref 25–47)
PO2 BLDV: 32 MM HG (ref 25–47)
PO2 BLDV: 32 MM HG (ref 25–47)
PO2 BLDV: 33 MM HG (ref 25–47)
PO2 BLDV: 33 MM HG (ref 25–47)
PO2 BLDV: 36 MM HG (ref 25–47)
PO2 BLDV: 39 MM HG (ref 25–47)
POTASSIUM BLD-SCNC: 4.3 MMOL/L (ref 3.4–5.3)
RBC # BLD AUTO: 3.03 10E6/UL (ref 3.8–5.2)
RBC # BLD AUTO: 3.13 10E6/UL (ref 3.8–5.2)
SODIUM SERPL-SCNC: 145 MMOL/L (ref 133–144)
UFH PPP CHRO-ACNC: 0.19 IU/ML
UFH PPP CHRO-ACNC: 0.27 IU/ML
UFH PPP CHRO-ACNC: 0.3 IU/ML
WBC # BLD AUTO: 12.6 10E3/UL (ref 4–11)
WBC # BLD AUTO: 12.8 10E3/UL (ref 4–11)

## 2022-05-29 PROCEDURE — 250N000013 HC RX MED GY IP 250 OP 250 PS 637: Performed by: STUDENT IN AN ORGANIZED HEALTH CARE EDUCATION/TRAINING PROGRAM

## 2022-05-29 PROCEDURE — 999N000157 HC STATISTIC RCP TIME EA 10 MIN

## 2022-05-29 PROCEDURE — 250N000009 HC RX 250: Performed by: SURGERY

## 2022-05-29 PROCEDURE — 85520 HEPARIN ASSAY: CPT

## 2022-05-29 PROCEDURE — 82805 BLOOD GASES W/O2 SATURATION: CPT

## 2022-05-29 PROCEDURE — 999N000015 HC STATISTIC ARTERIAL MONITORING DAILY

## 2022-05-29 PROCEDURE — 85027 COMPLETE CBC AUTOMATED: CPT

## 2022-05-29 PROCEDURE — 250N000009 HC RX 250: Performed by: THORACIC SURGERY (CARDIOTHORACIC VASCULAR SURGERY)

## 2022-05-29 PROCEDURE — 85520 HEPARIN ASSAY: CPT | Performed by: THORACIC SURGERY (CARDIOTHORACIC VASCULAR SURGERY)

## 2022-05-29 PROCEDURE — 250N000012 HC RX MED GY IP 250 OP 636 PS 637

## 2022-05-29 PROCEDURE — 83735 ASSAY OF MAGNESIUM: CPT | Performed by: STUDENT IN AN ORGANIZED HEALTH CARE EDUCATION/TRAINING PROGRAM

## 2022-05-29 PROCEDURE — 71045 X-RAY EXAM CHEST 1 VIEW: CPT | Mod: 26 | Performed by: RADIOLOGY

## 2022-05-29 PROCEDURE — 258N000003 HC RX IP 258 OP 636: Performed by: THORACIC SURGERY (CARDIOTHORACIC VASCULAR SURGERY)

## 2022-05-29 PROCEDURE — P9041 ALBUMIN (HUMAN),5%, 50ML: HCPCS

## 2022-05-29 PROCEDURE — 82803 BLOOD GASES ANY COMBINATION: CPT

## 2022-05-29 PROCEDURE — 999N000065 XR CHEST PORT 1 VIEW

## 2022-05-29 PROCEDURE — 94003 VENT MGMT INPAT SUBQ DAY: CPT

## 2022-05-29 PROCEDURE — 82805 BLOOD GASES W/O2 SATURATION: CPT | Performed by: THORACIC SURGERY (CARDIOTHORACIC VASCULAR SURGERY)

## 2022-05-29 PROCEDURE — 84100 ASSAY OF PHOSPHORUS: CPT

## 2022-05-29 PROCEDURE — 200N000002 HC R&B ICU UMMC

## 2022-05-29 PROCEDURE — 82330 ASSAY OF CALCIUM: CPT | Performed by: THORACIC SURGERY (CARDIOTHORACIC VASCULAR SURGERY)

## 2022-05-29 PROCEDURE — 83605 ASSAY OF LACTIC ACID: CPT | Performed by: STUDENT IN AN ORGANIZED HEALTH CARE EDUCATION/TRAINING PROGRAM

## 2022-05-29 PROCEDURE — 85027 COMPLETE CBC AUTOMATED: CPT | Performed by: SURGERY

## 2022-05-29 PROCEDURE — 80048 BASIC METABOLIC PNL TOTAL CA: CPT | Performed by: STUDENT IN AN ORGANIZED HEALTH CARE EDUCATION/TRAINING PROGRAM

## 2022-05-29 PROCEDURE — 71045 X-RAY EXAM CHEST 1 VIEW: CPT

## 2022-05-29 PROCEDURE — 250N000011 HC RX IP 250 OP 636

## 2022-05-29 PROCEDURE — 99291 CRITICAL CARE FIRST HOUR: CPT | Mod: GC | Performed by: ANESTHESIOLOGY

## 2022-05-29 PROCEDURE — 99291 CRITICAL CARE FIRST HOUR: CPT | Mod: GC | Performed by: INTERNAL MEDICINE

## 2022-05-29 PROCEDURE — 250N000011 HC RX IP 250 OP 636: Performed by: SURGERY

## 2022-05-29 PROCEDURE — 250N000013 HC RX MED GY IP 250 OP 250 PS 637: Performed by: THORACIC SURGERY (CARDIOTHORACIC VASCULAR SURGERY)

## 2022-05-29 PROCEDURE — 250N000013 HC RX MED GY IP 250 OP 250 PS 637

## 2022-05-29 PROCEDURE — 250N000011 HC RX IP 250 OP 636: Performed by: STUDENT IN AN ORGANIZED HEALTH CARE EDUCATION/TRAINING PROGRAM

## 2022-05-29 PROCEDURE — 250N000011 HC RX IP 250 OP 636: Performed by: THORACIC SURGERY (CARDIOTHORACIC VASCULAR SURGERY)

## 2022-05-29 PROCEDURE — 85018 HEMOGLOBIN: CPT | Performed by: THORACIC SURGERY (CARDIOTHORACIC VASCULAR SURGERY)

## 2022-05-29 RX ORDER — GUAIFENESIN 600 MG/1
15 TABLET, EXTENDED RELEASE ORAL DAILY
Status: DISCONTINUED | OUTPATIENT
Start: 2022-05-29 | End: 2022-06-25 | Stop reason: HOSPADM

## 2022-05-29 RX ORDER — HEPARIN SODIUM 10000 [USP'U]/100ML
0-5000 INJECTION, SOLUTION INTRAVENOUS CONTINUOUS
Status: DISCONTINUED | OUTPATIENT
Start: 2022-05-29 | End: 2022-06-02

## 2022-05-29 RX ORDER — FUROSEMIDE 10 MG/ML
40 INJECTION INTRAMUSCULAR; INTRAVENOUS ONCE
Status: COMPLETED | OUTPATIENT
Start: 2022-05-29 | End: 2022-05-29

## 2022-05-29 RX ORDER — ALBUMIN, HUMAN INJ 5% 5 %
12.5 SOLUTION INTRAVENOUS ONCE
Status: COMPLETED | OUTPATIENT
Start: 2022-05-29 | End: 2022-05-29

## 2022-05-29 RX ORDER — DEXTROSE MONOHYDRATE 100 MG/ML
INJECTION, SOLUTION INTRAVENOUS CONTINUOUS PRN
Status: DISCONTINUED | OUTPATIENT
Start: 2022-05-29 | End: 2022-06-02

## 2022-05-29 RX ORDER — ALBUMIN, HUMAN INJ 5% 5 %
SOLUTION INTRAVENOUS
Status: COMPLETED
Start: 2022-05-29 | End: 2022-05-29

## 2022-05-29 RX ORDER — AMINO AC/PROTEIN HYDR/WHEY PRO 10G-100/30
1 LIQUID (ML) ORAL DAILY
Status: DISCONTINUED | OUTPATIENT
Start: 2022-05-29 | End: 2022-06-01

## 2022-05-29 RX ADMIN — Medication 2 UNITS/HR: at 17:00

## 2022-05-29 RX ADMIN — GABAPENTIN 100 MG: 100 CAPSULE ORAL at 22:59

## 2022-05-29 RX ADMIN — PROPOFOL 20 MCG/KG/MIN: 10 INJECTION, EMULSION INTRAVENOUS at 03:41

## 2022-05-29 RX ADMIN — Medication 50 MCG/HR: at 23:24

## 2022-05-29 RX ADMIN — ACETAMINOPHEN 325MG 975 MG: 325 TABLET ORAL at 05:50

## 2022-05-29 RX ADMIN — MUPIROCIN 0.5 G: 20 OINTMENT TOPICAL at 08:19

## 2022-05-29 RX ADMIN — Medication 0.08 MCG/KG/MIN: at 22:48

## 2022-05-29 RX ADMIN — ACETAMINOPHEN 325MG 975 MG: 325 TABLET ORAL at 14:44

## 2022-05-29 RX ADMIN — Medication 15 ML: at 14:44

## 2022-05-29 RX ADMIN — DOCUSATE SODIUM 50 MG AND SENNOSIDES 8.6 MG 1 TABLET: 8.6; 5 TABLET, FILM COATED ORAL at 07:42

## 2022-05-29 RX ADMIN — INSULIN ASPART 1 UNITS: 100 INJECTION, SOLUTION INTRAVENOUS; SUBCUTANEOUS at 21:58

## 2022-05-29 RX ADMIN — Medication 1 PACKET: at 14:44

## 2022-05-29 RX ADMIN — Medication 2 UNITS/HR: at 00:01

## 2022-05-29 RX ADMIN — EPINEPHRINE 0.02 MCG/KG/MIN: 1 INJECTION INTRAMUSCULAR; INTRAVENOUS; SUBCUTANEOUS at 21:05

## 2022-05-29 RX ADMIN — ALBUMIN HUMAN 12.5 G: 50 SOLUTION INTRAVENOUS at 21:42

## 2022-05-29 RX ADMIN — DOCUSATE SODIUM 50 MG AND SENNOSIDES 8.6 MG 1 TABLET: 8.6; 5 TABLET, FILM COATED ORAL at 19:41

## 2022-05-29 RX ADMIN — EPINEPHRINE 0.02 MCG/KG/MIN: 1 INJECTION INTRAMUSCULAR; INTRAVENOUS; SUBCUTANEOUS at 00:01

## 2022-05-29 RX ADMIN — PROPOFOL 10 MCG/KG/MIN: 10 INJECTION, EMULSION INTRAVENOUS at 17:45

## 2022-05-29 RX ADMIN — ACETAMINOPHEN 325MG 975 MG: 325 TABLET ORAL at 22:59

## 2022-05-29 RX ADMIN — Medication 3 MG: at 22:59

## 2022-05-29 RX ADMIN — Medication 1 MCG/KG/MIN: at 14:00

## 2022-05-29 RX ADMIN — POLYETHYLENE GLYCOL 3350 17 G: 17 POWDER, FOR SOLUTION ORAL at 07:42

## 2022-05-29 RX ADMIN — ALBUMIN HUMAN 12.5 G: 0.05 INJECTION, SOLUTION INTRAVENOUS at 21:42

## 2022-05-29 RX ADMIN — MUPIROCIN 0.5 G: 20 OINTMENT TOPICAL at 19:40

## 2022-05-29 RX ADMIN — Medication 40 MG: at 07:41

## 2022-05-29 RX ADMIN — FUROSEMIDE 40 MG: 10 INJECTION, SOLUTION INTRAVENOUS at 06:55

## 2022-05-29 RX ADMIN — ASPIRIN 81 MG 81 MG: 81 TABLET ORAL at 07:42

## 2022-05-29 RX ADMIN — FUROSEMIDE 40 MG: 10 INJECTION, SOLUTION INTRAVENOUS at 19:41

## 2022-05-29 ASSESSMENT — ACTIVITIES OF DAILY LIVING (ADL)
ADLS_ACUITY_SCORE: 38
ADLS_ACUITY_SCORE: 34
ADLS_ACUITY_SCORE: 38
ADLS_ACUITY_SCORE: 34
ADLS_ACUITY_SCORE: 38

## 2022-05-29 NOTE — OP NOTE
"CO-SURGEON NOTE    DATE OF SERVICE: 05/27/22      PREOPERATIVE DIAGNOSES:  1. Chronic thromboembolic pulmonary hypertension  2. Severe tricuspid regurgitation  3. Pulmonary embolism  4. Deep venous thrombosis  5. Hypertension  6. Osteoarthritis  7. Nephrolithiasis with infection     POSTOPERATIVE DIAGNOSES:  1. Chronic thromboembolic pulmonary hypertension  2. Severe tricuspid regurgitation  3. Pulmonary embolism  4. Deep venous thrombosis  5. Hypertension  6. Osteoarthritis  7. Nephrolithiasis with infection     PROCEDURE PERFORMED:  1. Median sternotomy and cardiopulmonary bypass with profound hypothermic circulatory arrest  2. Right pulmonary endarterectomy  3. Left pulmonary endarterectomy  4. Patch repair of right pulmonary artery     SURGEON:  Connor Peterson MD     CO-SURGEON: Topher Camacho MD, PhD. The role of a co-surgeon was necessary due to the compelxity and high risk nature of the surgery to be performed.     FELLOW SURGEON:  Dev Diehl MD.     ANESTHESIA:  General endotracheal anesthesia.     ANESTHESIOLOGIST:  Tyler Ledezma MD     ESTIMATED BLOOD LOSS:  1000 mL     SPECIMEN:  Bilateral pulmonary endarterectomy.     CARDIOPULMONARY BYPASS TIME: 223\" + 50\"; Total 273\"     AORTIC CROSS CLAMP TIME: 64\"     DEEP HYPOTHERMIC CIRCULATORY ARREST TIME:  Right: 18\"  Left: 23\"  Total: 41\"     INDICATIONS FOR PROCEDURE: Ms. Debi Espinoza is a 76 year old year-old female who presents with chronic thromboembolic pulmonary hypertension with WHO class 3 symptoms. Upon CTePH committee review they are deemed to be a surgical candidate with disease that is approachable by endarterectomy. Risks, benefits and alternatives were discussed and they agree to proceed with surgery electively.     OPERATIVE FINDINGS:   1. Right pulmonary endarterectomy specimen extracted under circulatory arrest  2. Left pulmonary endarterectomy specimen extracted under circulatory arrest        Preop (Cath) Preop (OR) Post op "   AoP 150/82/110 103/49/68 102/44/60   PAP 77/30/44 85/30/49 44/25/30    800 500   CO 3.29 3.9 3.2   CVP 6 15 11         OPERATIVE DESCRIPTION IN DETAIL:    I performed the pulmonary thromboendarterectomy on the left pulmonary artery and branches, and I closed the left pulmonary arteriotomy. Please see the separate operative report by Dr. Peterson for additional details.    Topher Camacho MD

## 2022-05-29 NOTE — PROGRESS NOTES
CLINICAL NUTRITION SERVICES - ASSESSMENT NOTE     Nutrition Prescription    RECOMMENDATIONS FOR MDs/PROVIDERS TO ORDER:  None at this time    Malnutrition Status:    Patient does not meet two of the established criteria necessary for diagnosing malnutrition but is at risk for malnutrition    Recommendations already ordered by Registered Dietitian (RD):  --If EN becomes POC:  --Enteral access: OGT  --GOAL: Osmolite 1.5 Mayur @ goal of  35ml/hr (840ml/day) will provide: 1260 kcals, 52 g PRO, 640 ml free H20, 171 g CHO, and 0 g fiber daily. + 1 pkt prosource TF = 1300 kcal (25 kcal/kg) and 63 g protein (1.2 g/kg)  --Start TF @ 10 ml/hr and advance by 10 ml q 8 hrs until goal rate.  --Do not start or advance TF rate unless K+ >3.0, Mg++ > 1.5,  and Phos > 1.9.  --Minimum 30 ml q 4 hrs water flushes for tube patency.  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 Peterson.    FINDINGS  Patient vented and sedated with goal to wean as tolerated. Pt previously tolerating diet, not at risk for refeeding. Plan to start trickle feeds today.    CURRENT NUTRITION ORDERS  Diet: NPO  Intake/Tolerance: Previously with good intakes    LABS  Labs reviewed: Na 145 (H), K 4.3 (WNL), BUN 24 (WNL), Creatinine 1.09 (H), Mag 2.2 (WNL) Phos 3.2 on 5/28 (WNL) Glucose  5/27-5/28    MEDICATIONS  Medications reviewed  Lasix  miralax, senna  Epinephrine :  "0.02 mcg/kg/min - stable  Norepinephrine: stopped at 0600  Phenylephrine: 1 mcg/kg/min - stable  Vasopressin: 2 units/hr - decreasing  Fentanyl  Insulin 1 unit(s)/hr dc'ed 1100  propofol    ANTHROPOMETRICS  Height: 152.4 cm (5' 0\")  Most Recent Weight: 70.2 kg (154 lb 12.2 oz)    IBW: 45.5 kg  BMI: Obesity Grade I BMI 30-34.9  Weight History: Pt with weight fluctuations over last year, previously with 8% weight loss in 2 months weight stable over last 6 months. Likely some fluid related.   Wt Readings from Last Encounters:   05/29/22 70.2 kg (154 lb 12.2 oz)   04/29/22 67.4 kg (148 lb 8 oz)   03/16/22 69.4 kg (153 lb)   12/23/21 69.9 kg (154 lb)   12/08/21 67.6 kg (149 lb)   11/29/21 67.7 kg (149 lb 3.2 oz)   10/07/21 73.5 kg (162 lb 1.6 oz)   07/06/21 70.1 kg (154 lb 8 oz)   06/10/21 69.5 kg (153 lb 4.8 oz)     Dosing Weight: 52 kg - ABW based on current weight and IBW of 45.5 kg    ASSESSED NUTRITION NEEDS  Estimated Energy Needs: 7613-4316 kcals/day (20 - 25 kcals/kg)  Justification: Vented  Estimated Protein Needs: 62-78 grams protein/day (1.2 - 1.5 grams of pro/kg)  Justification: Hypercatabolism with critical illness  Estimated Fluid Needs: 1 mL/kcal or per provider pending fluid status    PHYSICAL FINDINGS  See malnutrition section below.  Sternotomy, surgical incisions, ventilated    MALNUTRITION  % Intake: No decreased intake noted  % Weight Loss: None recently, significant weight loss 6 months ago  Subcutaneous Fat Loss: None observed  Muscle Loss: None observed  Fluid Accumulation/Edema: Mild-moderate  Malnutrition Diagnosis: Patient does not meet two of the established criteria necessary for diagnosing malnutrition but is at risk for malnutrition    NUTRITION DIAGNOSIS  Inadequate oral intake related to ventilation as evidenced by NPO order.       INTERVENTIONS  Implementation  Enteral Nutrition - Initiate     Goals  Diet adv v nutrition support within 2-3 days.     Monitoring/Evaluation  Progress toward " goals will be monitored and evaluated per protocol.    Candida Damian MS, RDN, LDN  4C ([facility]) RD pager: [phone]  Weekend/Holiday RD pager: [phone]

## 2022-05-29 NOTE — PROGRESS NOTES
Cardiology Fellow Brief Progress Note  _________________________________    May 29, 2022    Interval Events  ----------------------  NAEON    Current Medications  ----------------------  Current Facility-Administered Medications   Medication     [START ON 5/30/2022] acetaminophen (TYLENOL) tablet 650 mg     acetaminophen (TYLENOL) tablet 975 mg     aspirin (ASA) chewable tablet 81 mg     bisacodyl (DULCOLAX) Suppository 10 mg     calcium gluconate 1 g in 50 mL sodium chloride intermittent infusion (premix)     calcium gluconate 2 g in 100 mL NS intermittent infusion PREMIX     calcium gluconate 3 g in sodium chloride 0.9 % 100 mL intermittent infusion     dexmedetomidine (PRECEDEX) 400 mcg in 0.9% sodium chloride 100 mL     glucose gel 15-30 g    Or     dextrose 50 % injection 25-50 mL    Or     glucagon injection 1 mg     EPINEPHrine (ADRENALIN) 5 mg in sodium chloride 0.9 % 250 mL infusion CENTRAL     fentaNYL (SUBLIMAZE) infusion     gabapentin (NEURONTIN) capsule 100 mg     heparin infusion 25,000 units in D5W 250 mL ANTICOAGULANT     HOLD:  Metformin and metformin containing medications if patient received IV contrast with acute kidney injury or severe chronic kidney disease (stage IV or stage V; i.e., eGFR less than 30)     hydrALAZINE (APRESOLINE) injection 10 mg     HYDROmorphone (DILAUDID) injection 0.2 mg     hydrOXYzine (ATARAX) tablet 25-50 mg     insulin 1 unit/mL in saline (NovoLIN, HumuLIN Regular) drip - ADULT IV Infusion     lidocaine (LMX4) cream     lidocaine (LMX4) cream     lidocaine 1 % 0.1-1 mL     lidocaine 1 % 0.1-1 mL     lidocaine-prilocaine (EMLA) cream     magnesium hydroxide (MILK OF MAGNESIA) suspension 30 mL     medication instruction     melatonin tablet 3 mg     methocarbamol (ROBAXIN) tablet 500 mg     mupirocin (BACTROBAN) 2 % ointment 0.5 g     naloxone (NARCAN) injection 0.2 mg    Or     naloxone (NARCAN) injection 0.4 mg    Or     naloxone (NARCAN) injection 0.2 mg    Or      naloxone (NARCAN) injection 0.4 mg     norepinephrine (LEVOPHED) 16 mg in  mL infusion MAX CONC CENTRAL LINE     ondansetron (ZOFRAN ODT) ODT tab 4 mg    Or     ondansetron (ZOFRAN) injection 4 mg     oxyCODONE (ROXICODONE) tablet 5 mg    Or     oxyCODONE IR (ROXICODONE) tablet 10 mg     pantoprazole (PROTONIX) 2 mg/mL suspension 40 mg    Or     pantoprazole (PROTONIX) EC tablet 40 mg     phenylephrine (LUISITO-SYNEPHRINE) 50 mg in NaCl 0.9 % 250 mL infusion     polyethylene glycol (MIRALAX) Packet 17 g     prochlorperazine (COMPAZINE) injection 5 mg    Or     prochlorperazine (COMPAZINE) tablet 5 mg     propofol (DIPRIVAN) infusion     Reason beta blocker order not selected     senna-docusate (SENOKOT-S/PERICOLACE) 8.6-50 MG per tablet 1 tablet     sodium chloride (PF) 0.9% PF flush 3 mL     sodium chloride (PF) 0.9% PF flush 3 mL     sodium chloride (PF) 0.9% PF flush 3 mL     sodium chloride (PF) 0.9% PF flush 3 mL     vasopressin 1 unit/mL MAX Conc (PITRESSIN) infusion        Intake and Output  ----------------------    Intake/Output Summary (Last 24 hours) at 5/29/2022 0744  Last data filed at 5/29/2022 0700  Gross per 24 hour   Intake 1770.51 ml   Output 2290 ml   Net -519.49 ml       Weight  ----------------------  Wt Readings from Last 2 Encounters:   05/29/22 70.2 kg (154 lb 12.2 oz)   04/29/22 67.4 kg (148 lb 8 oz)       Objective  ----------------------  Results for orders placed or performed during the hospital encounter of 05/25/22 (from the past 24 hour(s))   Blood gas arterial   Result Value Ref Range    pH Arterial 7.41 7.35 - 7.45    pCO2 Arterial 43 35 - 45 mm Hg    pO2 Arterial 135 (H) 80 - 105 mm Hg    FIO2 50     Bicarbonate Arterial 27 21 - 28 mmol/L    Base Excess/Deficit (+/-) 2.0 (H) -9.0 - 1.8 mmol/L   Blood gas venous with oxyhemoglobin   Result Value Ref Range    pH Venous 7.38 7.32 - 7.43    pCO2 Venous 49 40 - 50 mm Hg    pO2 Venous 35 25 - 47 mm Hg    Bicarbonate Venous 29 (H) 21 -  28 mmol/L    FIO2 50     Oxyhemoglobin Venous 64 (L) 70 - 75 %    Base Excess/Deficit (+/-) 3.3 (H) -7.7 - 1.9 mmol/L   Glucose by meter   Result Value Ref Range    GLUCOSE BY METER POCT 125 (H) 70 - 99 mg/dL   Asymptomatic COVID-19 Virus (Coronavirus) by PCR Nasopharyngeal    Specimen: Nasopharyngeal; Swab   Result Value Ref Range    SARS CoV2 PCR Negative Negative, Testing sent to reference lab. Results will be returned via unsolicited result    Narrative    Testing was performed using the Xpert Xpress SARS-CoV-2 Assay on the  Cepheid Gene-Xpert Instrument Systems. Additional information about  this Emergency Use Authorization (EUA) assay can be found via the Lab  Guide. This test should be ordered for the detection of SARS-CoV-2 in  individuals who meet SARS-CoV-2 clinical and/or epidemiological  criteria. Test performance is unknown in asymptomatic patients. This  test is for in vitro diagnostic use under the FDA EUA for  laboratories certified under CLIA to perform high complexity testing.  This test has not been FDA cleared or approved. A negative result  does not rule out the presence of PCR inhibitors in the specimen or  target RNA in concentration below the limit of detection for the  assay. The possibility of a false negative should be considered if  the patient's recent exposure or clinical presentation suggests  COVID-19. This test was validated by the [facility]. This laboratory is certified under  the Clinical Laboratory Improvement Amendments of 1988 (CLIA-88) as  qualified to perform high complexity laboratory testing.     Blood gas arterial with oxyhemoglobin   Result Value Ref Range    pH Arterial 7.42 7.35 - 7.45    pCO2 Arterial 42 35 - 45 mm Hg    pO2 Arterial 148 (H) 80 - 105 mm Hg    Bicarbonate Arterial 28 21 - 28 mmol/L    Oxyhemoglobin Arterial 98 92 - 100 %    Base Excess/Deficit (+/-) 2.7 (H) -9.0 - 1.8 mmol/L    FIO2 50    Blood gas  arterial   Result Value Ref Range    pH Arterial 7.42 7.35 - 7.45    pCO2 Arterial 42 35 - 45 mm Hg    pO2 Arterial 148 (H) 80 - 105 mm Hg    FIO2 50     Bicarbonate Arterial 28 21 - 28 mmol/L    Base Excess/Deficit (+/-) 2.7 (H) -9.0 - 1.8 mmol/L   Ionized Calcium   Result Value Ref Range    Calcium Ionized 4.8 4.4 - 5.2 mg/dL   Magnesium   Result Value Ref Range    Magnesium 2.0 1.6 - 2.3 mg/dL   Potassium   Result Value Ref Range    Potassium 4.4 3.4 - 5.3 mmol/L   Hemoglobin   Result Value Ref Range    Hemoglobin 9.5 (L) 11.7 - 15.7 g/dL   Blood gas venous with oxyhemoglobin   Result Value Ref Range    pH Venous 7.39 7.32 - 7.43    pCO2 Venous 48 40 - 50 mm Hg    pO2 Venous 34 25 - 47 mm Hg    Bicarbonate Venous 29 (H) 21 - 28 mmol/L    FIO2 50     Oxyhemoglobin Venous 65 (L) 70 - 75 %    Base Excess/Deficit (+/-) 3.5 (H) -7.7 - 1.9 mmol/L   Glucose by meter   Result Value Ref Range    GLUCOSE BY METER POCT 114 (H) 70 - 99 mg/dL   Glucose by meter   Result Value Ref Range    GLUCOSE BY METER POCT 91 70 - 99 mg/dL   Blood gas arterial with oxyhemoglobin   Result Value Ref Range    pH Arterial 7.39 7.35 - 7.45    pCO2 Arterial 43 35 - 45 mm Hg    pO2 Arterial 153 (H) 80 - 105 mm Hg    Bicarbonate Arterial 26 21 - 28 mmol/L    Oxyhemoglobin Arterial 99 92 - 100 %    Base Excess/Deficit (+/-) 0.9 -9.0 - 1.8 mmol/L    FIO2 50    Blood gas venous with oxyhemoglobin   Result Value Ref Range    pH Venous 7.36 7.32 - 7.43    pCO2 Venous 48 40 - 50 mm Hg    pO2 Venous 39 25 - 47 mm Hg    Bicarbonate Venous 27 21 - 28 mmol/L    FIO2 50     Oxyhemoglobin Venous 70 70 - 75 %    Base Excess/Deficit (+/-) 1.6 -7.7 - 1.9 mmol/L   Glucose by meter   Result Value Ref Range    GLUCOSE BY METER POCT 125 (H) 70 - 99 mg/dL   Basic metabolic panel   Result Value Ref Range    Sodium 145 (H) 133 - 144 mmol/L    Potassium 4.3 3.4 - 5.3 mmol/L    Chloride 112 (H) 94 - 109 mmol/L    Carbon Dioxide (CO2) 26 20 - 32 mmol/L    Anion Gap 7 3 -  14 mmol/L    Urea Nitrogen 24 7 - 30 mg/dL    Creatinine 1.09 (H) 0.52 - 1.04 mg/dL    Calcium 8.4 (L) 8.5 - 10.1 mg/dL    Glucose 129 (H) 70 - 99 mg/dL    GFR Estimate 52 (L) >60 mL/min/1.73m2   Magnesium   Result Value Ref Range    Magnesium 2.2 1.6 - 2.3 mg/dL   Ionized Calcium   Result Value Ref Range    Calcium Ionized 4.9 4.4 - 5.2 mg/dL   Heparin Unfractionated Anti Xa Level   Result Value Ref Range    Anti Xa Unfractionated Heparin 0.19 For Reference Range, See Comment IU/mL    Narrative    Therapeutic Range: UFH: 0.25-0.50 IU/mL for low intensity dosing,  0.30-0.70 IU/mL for high intensity dosing DVT and PE.  This test is not validated for other direct factor X inhibitors (e.g. rivaroxaban, apixaban, edoxaban, betrixaban, fondaparinux) and should not be used for monitoring of other medications.   Blood gas venous with oxyhemoglobin   Result Value Ref Range    pH Venous 7.38 7.32 - 7.43    pCO2 Venous 46 40 - 50 mm Hg    pO2 Venous 36 25 - 47 mm Hg    Bicarbonate Venous 27 21 - 28 mmol/L    FIO2 40     Oxyhemoglobin Venous 67 (L) 70 - 75 %    Base Excess/Deficit (+/-) 1.7 -7.7 - 1.9 mmol/L   Blood gas arterial   Result Value Ref Range    pH Arterial 7.41 7.35 - 7.45    pCO2 Arterial 41 35 - 45 mm Hg    pO2 Arterial 121 (H) 80 - 105 mm Hg    FIO2 40     Bicarbonate Arterial 26 21 - 28 mmol/L    Base Excess/Deficit (+/-) 0.9 -9.0 - 1.8 mmol/L   CBC with platelets   Result Value Ref Range    WBC Count 12.8 (H) 4.0 - 11.0 10e3/uL    RBC Count 3.13 (L) 3.80 - 5.20 10e6/uL    Hemoglobin 8.5 (L) 11.7 - 15.7 g/dL    Hematocrit 26.9 (L) 35.0 - 47.0 %    MCV 86 78 - 100 fL    MCH 27.2 26.5 - 33.0 pg    MCHC 31.6 31.5 - 36.5 g/dL    RDW 17.2 (H) 10.0 - 15.0 %    Platelet Count 122 (L) 150 - 450 10e3/uL   Phosphorus   Result Value Ref Range    Phosphorus 3.8 2.5 - 4.5 mg/dL   Glucose by meter   Result Value Ref Range    GLUCOSE BY METER POCT 130 (H) 70 - 99 mg/dL   Glucose by meter   Result Value Ref Range    GLUCOSE BY  METER POCT 133 (H) 70 - 99 mg/dL   Lactic acid whole blood   Result Value Ref Range    Lactic Acid 1.0 0.7 - 2.0 mmol/L   Blood gas venous with oxyhemoglobin   Result Value Ref Range    pH Venous 7.40 7.32 - 7.43    pCO2 Venous 44 40 - 50 mm Hg    pO2 Venous 33 25 - 47 mm Hg    Bicarbonate Venous 27 21 - 28 mmol/L    FIO2 40     Oxyhemoglobin Venous 63 (L) 70 - 75 %    Base Excess/Deficit (+/-) 1.9 -7.7 - 1.9 mmol/L   Hemoglobin   Result Value Ref Range    Hemoglobin 8.5 (L) 11.7 - 15.7 g/dL   CBC with platelets   Result Value Ref Range    WBC Count 12.6 (H) 4.0 - 11.0 10e3/uL    RBC Count 3.03 (L) 3.80 - 5.20 10e6/uL    Hemoglobin 8.5 (L) 11.7 - 15.7 g/dL    Hematocrit 26.8 (L) 35.0 - 47.0 %    MCV 88 78 - 100 fL    MCH 28.1 26.5 - 33.0 pg    MCHC 31.7 31.5 - 36.5 g/dL    RDW 17.4 (H) 10.0 - 15.0 %    Platelet Count 123 (L) 150 - 450 10e3/uL   Glucose by meter   Result Value Ref Range    GLUCOSE BY METER POCT 146 (H) 70 - 99 mg/dL   XR Chest Port 1 View    Narrative    Exam: XR CHEST PORT 1 VIEW, 5/29/2022 9:28 AM    Indication: interval follow-up s/p pulmonary endarterectomy    Comparison: 5/28/2022    Findings:   Postsurgical changes of the chest similar to prior. ET tube tip in the  lower thoracic trachea.  [place] projects over the right main  pulmonary artery. Chest tubes and mediastinal drain in place similar  to prior. Cardiac silhouette is unchanged. No significant change in  perihilar opacities. No pneumothorax or pleural effusion.      Impression    Impression: Postoperative chest with stable perihilar atelectasis or  edema. Endotracheal tube tip 1.7 cm superior to the man.    I have personally reviewed the examination and initial interpretation  and I agree with the findings.    YVETTE GRAY MD         SYSTEM ID:  [id number]   XR Chest Port 1 View    Narrative     Examination:  XR CHEST PORT 1 VIEW     Date:  5/29/2022 10:08 AM      Clinical Information: s/p ETT retraction     Additional  Information: none    Comparison: Chest x-ray 5/29/2022 0840 hours    Findings:   Endotracheal tube pulled back slightly now 2.4 cm above the man.  Bilateral chest tubes and mediastinal drain unchanged. A left IJ  [place] catheter with tip in the right pulmonary artery. Patchy  opacities both lung bases. Patchy opacity in the right midlung  laterally. Otherwise lungs are relatively clear.  No acute osseous  injury.      Impression    Impression:  1. Endotracheal tube retracted now 2.4 cm from the man.   2. No significant change in small amount of atelectasis lung bases  bilaterally..    YVETTE GRAY MD         SYSTEM ID:  [id number]   Blood gas arterial   Result Value Ref Range    pH Arterial 7.46 (H) 7.35 - 7.45    pCO2 Arterial 40 35 - 45 mm Hg    pO2 Arterial 99 80 - 105 mm Hg    FIO2 40     Bicarbonate Arterial 28 21 - 28 mmol/L    Base Excess/Deficit (+/-) 3.8 (H) -9.0 - 1.8 mmol/L   Glucose by meter   Result Value Ref Range    GLUCOSE BY METER POCT 113 (H) 70 - 99 mg/dL   Blood gas venous with oxyhemoglobin   Result Value Ref Range    pH Venous 7.41 7.32 - 7.43    pCO2 Venous 47 40 - 50 mm Hg    pO2 Venous 33 25 - 47 mm Hg    Bicarbonate Venous 30 (H) 21 - 28 mmol/L    FIO2 40     Oxyhemoglobin Venous 60 (L) 70 - 75 %    Base Excess/Deficit (+/-) 4.2 (H) -7.7 - 1.9 mmol/L   Glucose by meter   Result Value Ref Range    GLUCOSE BY METER POCT 121 (H) 70 - 99 mg/dL   Blood gas arterial with oxyhemoglobin   Result Value Ref Range    pH Arterial 7.44 7.35 - 7.45    pCO2 Arterial 40 35 - 45 mm Hg    pO2 Arterial 65 (L) 80 - 105 mm Hg    Bicarbonate Arterial 27 21 - 28 mmol/L    Oxyhemoglobin Arterial 93 92 - 100 %    Base Excess/Deficit (+/-) 2.5 (H) -9.0 - 1.8 mmol/L    FIO2 40    Blood gas venous with oxyhemoglobin   Result Value Ref Range    pH Venous 7.41 7.32 - 7.43    pCO2 Venous 45 40 - 50 mm Hg    pO2 Venous 32 25 - 47 mm Hg    Bicarbonate Venous 29 (H) 21 - 28 mmol/L    FIO2 40     Oxyhemoglobin  Venous 58 (L) 70 - 75 %    Base Excess/Deficit (+/-) 3.7 (H) -7.7 - 1.9 mmol/L   Glucose by meter   Result Value Ref Range    GLUCOSE BY METER POCT 112 (H) 70 - 99 mg/dL   Blood gas arterial   Result Value Ref Range    pH Arterial 7.42 7.35 - 7.45    pCO2 Arterial 41 35 - 45 mm Hg    pO2 Arterial 77 (L) 80 - 105 mm Hg    FIO2 40     Bicarbonate Arterial 27 21 - 28 mmol/L    Base Excess/Deficit (+/-) 1.9 (H) -9.0 - 1.8 mmol/L   Glucose by meter   Result Value Ref Range    GLUCOSE BY METER POCT 137 (H) 70 - 99 mg/dL       Recent Labs   Lab Test 12/08/21  1221   CHOL 113   HDL 35*   LDL 59   TRIG 94       Hemoglobin A1C   Date Value Ref Range Status   12/08/2021 5.8 (H) 0.0 - 5.6 % Final     Comment:     Normal <5.7%   Prediabetes 5.7-6.4%    Diabetes 6.5% or higher     Note: Adopted from ADA consensus guidelines.       Results for orders placed or performed during the hospital encounter of 05/25/22   XR Chest Port 1 View    Impression    IMPRESSION: [place] catheter tip projects over the central right  pulmonary artery.    I have personally reviewed the examination and initial interpretation  and I agree with the findings.    CANDACE ALMONTE MD         SYSTEM ID:  [id number]   XR Abdomen Port 1 View    Impression    IMPRESSION: Gastric tube tip and sidehole project over the stomach.    I have personally reviewed the examination and initial interpretation  and I agree with the findings.    CANDACE ALMONTE MD         SYSTEM ID:  [id number]   XR Chest Port 1 View    Impression    Impression: Postoperative chest with slightly improved perihilar  atelectasis or edema. Endotracheal tube tip in lower trachea 2.5 cm  above the man.    I have personally reviewed the examination and initial interpretation  and I agree with the findings.    YVETTE GRAY MD         SYSTEM ID:  [id number]       Physical Exam  Vitals:    05/29/22 0630 05/29/22 0645 05/29/22 0700 05/29/22 0800   BP:       BP Location:       Cuff Size:       Pulse:  68 67 66    Resp: 18 18 18    Temp: 98.96  F (37.2  C) 98.96  F (37.2  C) 98.96  F (37.2  C)    TempSrc:    Bladder   SpO2: 99% 99% 99%    Weight:       Height:         Physical Exam   Constitutional: No distress. She appears chronically ill.   Neck: No JVD present.   Pulmonary/Chest: Breath sounds normal. She has bibasilar rales.   Skin: Skin is warm and dry.        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     Changes today:   - wean pressors as tolerated  - goal CI 2.0-2.5 (at goal today)  - goal CVP < 10 (at goal today)  - maintained diuresis to keep slightly negative and achieve above parameters  - follow post CTEPH pulmonary endarterectomy protocols  - wean PEEP     # Pulmonary hypertension, Group IV  # CTEPH s/p pulmonary endaterectomy  # DVTs  # Severe tricuspid insufficiency  Initially diagnosed with bilateral DVT, PE 2019. TTE showed dilated RV with reduced RV function and RVSP of 84mmHg suggestive of severe pulmonary hypertension with mild to moderate TR. Patient believes the above relates to a work related injury, unna boot early 2019. RHC 12/18/2021 with RA 12, PA 84/19/45, PCWP 2, TAHIRA CO/CI 2.8/1.6. Last dose eliquis 5/23 AM. Has oxygen at home but does not use this. Underwent endarterectomy on 5/27. Post op on multiple pressors and briefly on milrinone.   - Goal CVP < 10, diuresis as needed to achieve  - Goal CI 2.0-2.5  - holding pHTN meds in ICU     # Volume overload  PTA on lasix 20mg qday (decreased from 40mg ~6 weeks ago). JVP elevated on initial exam. S/p 40mg IV lasix 5/25        # Anemia  Hgb 11.4, similar to prior last month.         # CKD  Baseline Cr ~1.3.     Benjamín Jimenez MD  Fellow Physician PGY-4  Division of Cardiovascular Disease  Pager [phone]    Discussed with  Attending, recommendations are not final until attested by the Attending

## 2022-05-29 NOTE — PLAN OF CARE
Goal Outcome Evaluation:    Plan of Care Reviewed With: patient, other (see comments), son (D-I-L Nga)     Overall Patient Progress: improving    Outcome Evaluation: Unable to wean PEEP <8 without P-F ratio <200, pressors weaned as able. Epi off d/t increased CI    Major Shift Events:  GARZA to command with prop/fent held. Tmax 37.5C. SR+PACs (60s). Temp-PM backup at 50. Epi weaned off d/t CI >2.5. Vaso 2, phenyl 1. LI heparin gtt started, tx at 850, recheck ordered for 21:00Attempted to wean PEEP 10 to 8 then -1 cmH20 Q1H while P-F ratio >200 (per PTE protocol). P-F ~150 with PEEP=6. PEEP uptitrated to 7 and 8 step-wise d/t low PaO2 (P-F <200). Awaiting recheck 18:00 ABG. TF started at 15/hr (increase by 10ml q8h), standard FWF. Transitioned to subcutaneous insulin. UOP trending down towards end of shift. CVTS notified.     Plan: Maintain P-F ratio >200 and CVP<10, wean pressors as able.  For vital signs and complete assessments, please see documentation flowsheets

## 2022-05-29 NOTE — PROVIDER NOTIFICATION
Uptrending CVP, PA discussed with CVTS (Jack Lozano and Adeel) who deferred to Cards 2 attending Dr. Etienne. Plan made to continue to wean PEEP to 6. Will reassess following next ABG, VBG.

## 2022-05-29 NOTE — PLAN OF CARE
Goal Outcome Evaluation:    Plan of Care Reviewed With: patient     Overall Patient Progress: improving    Outcome Evaluation: Oxygenating well on 40% FiO2, pressors weaned and off levo    Major shift events: Patient able to follow commands and moves all extremities when sedation weaned. FiO2 weaned and trending ABGs. TAHIRA CVP 8, PAP 56/20, SvO2 67%, CI 3.4, . MD aware of CI 3.4 with no futher changes at this time. Temp pacer in place but remains SR 60-70 without pacing. Win with marginal urine output 30 ml/hr. Chest tubes with minimal output and known leak. No BM with audible bowel sounds. Insulin gtt off at this time. Continue to wean pressors as able and update team with further changes. See flowsheet for details and assessment.

## 2022-05-29 NOTE — PROGRESS NOTES
CV ICU PROGRESS NOTE        CO-MORBIDITIES:   CTEPH (chronic thromboembolic pulmonary hypertension) (H)  (primary encounter diagnosis)  Primary pulmonary hypertension (H)  SOB (shortness of breath)  S/P coronary angiogram    ASSESSMENT: Debi Espinoza is a 76 year old female with PMH of HTN, nephrolithiasis, DVT (2019) and PE (2019, 2021) on chronic anticoagulation, and chronic thromboembolic pulmonary hypertension who underwent pulmonary artery thromboendarterectomy on 5/27 with Dr. Peterson.    TODAY'S PROGRESS:   - Sedation vacation  - Wean off epi, preferentially use phenylephrine if needed  - Wean PEEP down to 8, then 1 mm Hg until 5  - Can pressure support afterward  - Monitor I/Os, consider diuresis this afternoon if net positive  - Low intensity heparin gtt today, no bolus    PLAN:  Neuro/ pain/ sedation:  Acute Postoperative pain  - Monitor neurological status. Notify the MD for any acute changes in exam.  - Pain: fentanyl gtt.   - Sedation: propofol gtt  - Daily sedation vacation     Pulmonary care:   S/p Pulmonary artery thromboendarterectomy on 5/27/2022 by Dr. Peterson  Hx Chronic pulmonary thromboembolic disease  Hx PE (2019, 2021)  Hx Emphysema, moderate  PTA regimen: apixaban 5mg BID  CTA 05/2019 demonstrated underlying moderate emphysema  - Intubated, ventilated CMV 18/360/10/40  -- lung protective vent settings  - Titrate supplemental oxygen and PEEP to maintain saturation above 92%.  - PEEP down to  8 from 10, if maintaining oxygenation can continue to drop by 1 mm Hg  - Will pressure support once PEEP down to 5     Cardiovascular:    Hx Severe pulmonary hypertension  Hx RV dilation and reduced RV function  Hx Severe tricuspid insufficiency  Hx Essential HTN   PTA regimen: furosemide 20mg qd, adempas 2.5mg TID, opsumit 10mg qd  Echo on 03/2021 with LVEF of 78%, severe pulmonary hypertension (82 mmHg), reduced RV function (TAPSE 16mm), severe tricuspid insufficiency  - Monitor hemodynamic status.    - ASA 81  - Phenylephrine and vasopressin, preferentially avoid epi to avoid increasing CI  - CVP goal <10, MAP goal 70-75 for reduced reperfusion edema and hypoxia  - Cardiac index goal 2.0-2.5  - V pacing back up 50 bpm    GI care/ Nutrition:   - NPO   - PPI    Renal/ Fluid Balance/ Electrolytes:   Nephrolithiasis  PTA regimen: furosemide 20mg qd  BL creat appears to be ~ 1.14  - Goal for net negative daily  - Will follow and diurese as needed with CVP <10  - Strict I/O, daily weights  - Avoid/limit nephrotoxins as able  - Replete lytes PRN per protocol  - Keep on the dry side-- goal net negative 1 L-- surgeon okay with lasix use as needed     Endocrine:    Stress induced hyperglycemia  Preop A1c 5.8 (12/21)  - Discontinue insulin gtt  - Start high sliding scale insulin   - Goal BG <180 for optimal healing     ID/ Antibiotics:  Stress induced leukocytosis  Fever to 38.5 on 5/28 - afebrile now  - To complete perioperative regimen  - Continue to monitor fever curve, WBC and inflammatory markers as appropriate  - Afebrile    5/28: Blood culture x2, UA/UC - NGTD     Heme:     Stress induced leukocytosis  Acute blood loss anemia  Acute blood loss thrombocytopenia  Hx DVT (2019), PE (2019, 2021) on chronic anticoagulation  - Continue to monitor  - CBC   - PA tear intra-op s/p patch repair  - Low intensity heparin gtt, no bolus     MSK/ Skin:  Sternotomy  Surgical Incision  - Sternal precautions  - Postoperative incision management per protocol  - PT/OT/CR     Prophylaxis:    - Mechanical prophylaxis for DVT  - Chemical DVT prophylaxis - low intensity heparin gtt  - PPI     Lines/ tubes/ drains:  - Arterial Line, ETT, CTs, PA catheter, Left IJ, ly, OG     Disposition:  - CVICU    Patient seen, findings and plan discussed with CV ICU staff, Dr. Denis, CVTS fellow, Dr. Diehl, Cards 2 team, CVTS attending, Dr. Camacho.    -----------------------------------  Jhon Corado MD  PGY-3, General  Surgery    ====================================    SUBJECTIVE:   No acute events overnight. Diuresed some overnight. Awakening with sedation weaned.    OBJECTIVE:   1. VITAL SIGNS:   Temp:  [98.6  F (37  C)-100.8  F (38.2  C)] 98.96  F (37.2  C)  Pulse:  [59-87] 68  Resp:  [18-30] 18  MAP:  [0 mmHg-98 mmHg] 78 mmHg  Arterial Line BP: (-1-158)/(-1-62) 117/53  FiO2 (%):  [40 %-60 %] 40 %  SpO2:  [64 %-100 %] 100 %  Vent Mode: CMV/AC  (Continuous Mandatory Ventilation/ Assist Control)  FiO2 (%): 40 %  Resp Rate (Set): 18 breaths/min  Tidal Volume (Set, mL): 360 mL  PEEP (cm H2O): 10 cmH2O  Resp: 18      2. INTAKE/ OUTPUT:   I/O last 3 completed shifts:  In: 1801.31 [I.V.:1671.31; NG/GT:130]  Out: 2340 [Urine:1910; Emesis/NG output:300; Chest Tube:130]    3. PHYSICAL EXAMINATION:   General: lying in bed, appears comfortable  Neuro: intubated, sedated  Resp: Mechanically ventilated, chest tubes with serosanguinous output  CV: RRR  Abdomen: Soft, Non-distended, Non-tender  Incisions: clean dry intact  Extremities: warm and well perfused    4. INVESTIGATIONS:   Arterial Blood Gases   Recent Labs   Lab 05/29/22  0343 05/29/22  0002 05/28/22 1931 05/28/22  1753   PH 7.41 7.39 7.42  7.42 7.41   PCO2 41 43 42  42 43   PO2 121* 153* 148*  148* 135*   HCO3 26 26 28  28 27     Complete Blood Count   Recent Labs   Lab 05/29/22  0732 05/29/22  0343 05/28/22  1932 05/28/22  1401 05/28/22  0349   WBC 12.6* 12.8*  --  14.1* 14.9*   HGB 8.5*  8.5* 8.5* 9.5* 10.3*  10.3* 10.2*   * 122*  --  165 186     Basic Metabolic Panel  Recent Labs   Lab 05/29/22  1057 05/29/22  0737 05/29/22  0624 05/29/22  0356 05/29/22 0343 05/28/22 1938 05/28/22 1932 05/28/22  1414 05/28/22  1401 05/28/22  0357 05/28/22  0349 05/27/22 2203 05/27/22 2156 05/27/22 1933 05/27/22 1931   NA  --   --   --   --  145*  --   --   --   --   --  146*  --  145*  --  148*   POTASSIUM  --   --   --   --  4.3  --  4.4  --  4.8  --  4.4  --  4.0  4.0  --   4.1   CHLORIDE  --   --   --   --  112*  --   --   --   --   --  113*  --  113*  --  115*   CO2  --   --   --   --  26  --   --   --   --   --  24  --  25  --  26   BUN  --   --   --   --  24  --   --   --   --   --  19  --  18  --  16   CR  --   --   --   --  1.09*  --   --   --   --   --  1.06*  --  0.98  --  0.88   * 146* 133* 130* 129*   < >  --    < >  --    < > 151*   < > 141*   < > 92    < > = values in this interval not displayed.     Liver Function Tests  Recent Labs   Lab 05/27/22 2156 05/27/22 1931 05/27/22 1758 05/27/22  1604 05/25/22  1622   AST 47* 36  --   --  16   ALT 14 11  --   --  16   ALKPHOS 48 37*  --   --  75   BILITOTAL 1.1 1.2  --   --  0.7   ALBUMIN 2.5* 1.8*  --   --  3.4   INR 1.52* 2.20* 2.40* 1.71* 1.13     Pancreatic Enzymes  No lab results found in last 7 days.  Coagulation Profile  Recent Labs   Lab 05/28/22  1057 05/28/22  0349 05/27/22 2156 05/27/22 1931 05/27/22 1758 05/27/22  1604   INR  --   --  1.52* 2.20* 2.40* 1.71*   PTT 59* 60* 60* 63* 76* 89*         5. RADIOLOGY:   Recent Results (from the past 24 hour(s))   XR Chest Port 1 View    Impression    RESIDENT PRELIMINARY INTERPRETATION  Impression: Postoperative chest with stable perihilar atelectasis or  edema.       =========================================

## 2022-05-30 ENCOUNTER — APPOINTMENT (OUTPATIENT)
Dept: GENERAL RADIOLOGY | Facility: CLINIC | Age: 77
DRG: 270 | End: 2022-05-30
Attending: STUDENT IN AN ORGANIZED HEALTH CARE EDUCATION/TRAINING PROGRAM
Payer: MEDICARE

## 2022-05-30 LAB
ABO/RH(D): NORMAL
ALBUMIN UR-MCNC: 10 MG/DL
ANION GAP SERPL CALCULATED.3IONS-SCNC: 7 MMOL/L (ref 3–14)
ANTIBODY SCREEN: NEGATIVE
APPEARANCE UR: CLEAR
BASE EXCESS BLDA CALC-SCNC: 2.7 MMOL/L (ref -9–1.8)
BASE EXCESS BLDA CALC-SCNC: 3 MMOL/L (ref -9–1.8)
BASE EXCESS BLDA CALC-SCNC: 3.3 MMOL/L (ref -9–1.8)
BASE EXCESS BLDA CALC-SCNC: 3.7 MMOL/L (ref -9–1.8)
BASE EXCESS BLDA CALC-SCNC: 3.9 MMOL/L (ref -9–1.8)
BASE EXCESS BLDA CALC-SCNC: 4.3 MMOL/L (ref -9–1.8)
BASE EXCESS BLDA CALC-SCNC: 5.2 MMOL/L (ref -9–1.8)
BASE EXCESS BLDA CALC-SCNC: 5.6 MMOL/L (ref -9–1.8)
BASE EXCESS BLDV CALC-SCNC: 2.8 MMOL/L (ref -7.7–1.9)
BASE EXCESS BLDV CALC-SCNC: 3.2 MMOL/L (ref -7.7–1.9)
BASE EXCESS BLDV CALC-SCNC: 3.4 MMOL/L (ref -7.7–1.9)
BASE EXCESS BLDV CALC-SCNC: 4 MMOL/L (ref -7.7–1.9)
BASE EXCESS BLDV CALC-SCNC: 4.8 MMOL/L (ref -7.7–1.9)
BASE EXCESS BLDV CALC-SCNC: 6.3 MMOL/L (ref -7.7–1.9)
BILIRUB UR QL STRIP: NEGATIVE
BLD PROD TYP BPU: NORMAL
BLOOD COMPONENT TYPE: NORMAL
BUN SERPL-MCNC: 29 MG/DL (ref 7–30)
CA-I BLD-MCNC: 4.5 MG/DL (ref 4.4–5.2)
CA-I BLD-MCNC: 4.6 MG/DL (ref 4.4–5.2)
CALCIUM SERPL-MCNC: 8.4 MG/DL (ref 8.5–10.1)
CHLORIDE BLD-SCNC: 110 MMOL/L (ref 94–109)
CO2 SERPL-SCNC: 27 MMOL/L (ref 20–32)
CODING SYSTEM: NORMAL
COLOR UR AUTO: YELLOW
CREAT SERPL-MCNC: 1.19 MG/DL (ref 0.52–1.04)
CROSSMATCH: NORMAL
ERYTHROCYTE [DISTWIDTH] IN BLOOD BY AUTOMATED COUNT: 17.2 % (ref 10–15)
ERYTHROCYTE [DISTWIDTH] IN BLOOD BY AUTOMATED COUNT: 17.3 % (ref 10–15)
GFR SERPL CREATININE-BSD FRML MDRD: 47 ML/MIN/1.73M2
GLUCOSE BLD-MCNC: 162 MG/DL (ref 70–99)
GLUCOSE BLDC GLUCOMTR-MCNC: 145 MG/DL (ref 70–99)
GLUCOSE BLDC GLUCOMTR-MCNC: 152 MG/DL (ref 70–99)
GLUCOSE BLDC GLUCOMTR-MCNC: 158 MG/DL (ref 70–99)
GLUCOSE BLDC GLUCOMTR-MCNC: 163 MG/DL (ref 70–99)
GLUCOSE BLDC GLUCOMTR-MCNC: 169 MG/DL (ref 70–99)
GLUCOSE BLDC GLUCOMTR-MCNC: 189 MG/DL (ref 70–99)
GLUCOSE UR STRIP-MCNC: NEGATIVE MG/DL
HCO3 BLD-SCNC: 28 MMOL/L (ref 21–28)
HCO3 BLD-SCNC: 29 MMOL/L (ref 21–28)
HCO3 BLD-SCNC: 30 MMOL/L (ref 21–28)
HCO3 BLD-SCNC: 30 MMOL/L (ref 21–28)
HCO3 BLDV-SCNC: 28 MMOL/L (ref 21–28)
HCO3 BLDV-SCNC: 29 MMOL/L (ref 21–28)
HCO3 BLDV-SCNC: 30 MMOL/L (ref 21–28)
HCO3 BLDV-SCNC: 32 MMOL/L (ref 21–28)
HCT VFR BLD AUTO: 22.2 % (ref 35–47)
HCT VFR BLD AUTO: 23 % (ref 35–47)
HGB BLD-MCNC: 6.9 G/DL (ref 11.7–15.7)
HGB BLD-MCNC: 7.3 G/DL (ref 11.7–15.7)
HGB BLD-MCNC: 8.2 G/DL (ref 11.7–15.7)
HGB UR QL STRIP: NEGATIVE
HYALINE CASTS: 3 /LPF
ISSUE DATE AND TIME: NORMAL
KETONES UR STRIP-MCNC: NEGATIVE MG/DL
LACTATE SERPL-SCNC: 0.8 MMOL/L (ref 0.7–2)
LEUKOCYTE ESTERASE UR QL STRIP: NEGATIVE
MAGNESIUM SERPL-MCNC: 1.9 MG/DL (ref 1.6–2.3)
MCH RBC QN AUTO: 27.4 PG (ref 26.5–33)
MCH RBC QN AUTO: 27.9 PG (ref 26.5–33)
MCHC RBC AUTO-ENTMCNC: 31.1 G/DL (ref 31.5–36.5)
MCHC RBC AUTO-ENTMCNC: 31.7 G/DL (ref 31.5–36.5)
MCV RBC AUTO: 88 FL (ref 78–100)
MCV RBC AUTO: 88 FL (ref 78–100)
MRSA DNA SPEC QL NAA+PROBE: NEGATIVE
MUCOUS THREADS #/AREA URNS LPF: PRESENT /LPF
NITRATE UR QL: NEGATIVE
O2/TOTAL GAS SETTING VFR VENT: 40 %
O2/TOTAL GAS SETTING VFR VENT: 50 %
OXYHGB MFR BLD: 94 % (ref 92–100)
OXYHGB MFR BLD: 96 % (ref 92–100)
OXYHGB MFR BLD: 97 % (ref 92–100)
OXYHGB MFR BLD: 98 % (ref 92–100)
OXYHGB MFR BLDV: 56 % (ref 70–75)
OXYHGB MFR BLDV: 60 % (ref 70–75)
OXYHGB MFR BLDV: 60 % (ref 70–75)
OXYHGB MFR BLDV: 63 % (ref 70–75)
OXYHGB MFR BLDV: 66 % (ref 70–75)
OXYHGB MFR BLDV: 68 % (ref 70–75)
PCO2 BLD: 42 MM HG (ref 35–45)
PCO2 BLD: 43 MM HG (ref 35–45)
PCO2 BLD: 43 MM HG (ref 35–45)
PCO2 BLD: 44 MM HG (ref 35–45)
PCO2 BLD: 44 MM HG (ref 35–45)
PCO2 BLD: 45 MM HG (ref 35–45)
PCO2 BLD: 45 MM HG (ref 35–45)
PCO2 BLD: 47 MM HG (ref 35–45)
PCO2 BLDV: 46 MM HG (ref 40–50)
PCO2 BLDV: 47 MM HG (ref 40–50)
PCO2 BLDV: 48 MM HG (ref 40–50)
PCO2 BLDV: 49 MM HG (ref 40–50)
PCO2 BLDV: 49 MM HG (ref 40–50)
PCO2 BLDV: 53 MM HG (ref 40–50)
PH BLD: 7.41 [PH] (ref 7.35–7.45)
PH BLD: 7.41 [PH] (ref 7.35–7.45)
PH BLD: 7.42 [PH] (ref 7.35–7.45)
PH BLD: 7.43 [PH] (ref 7.35–7.45)
PH BLD: 7.44 [PH] (ref 7.35–7.45)
PH BLD: 7.45 [PH] (ref 7.35–7.45)
PH BLDV: 7.37 [PH] (ref 7.32–7.43)
PH BLDV: 7.38 [PH] (ref 7.32–7.43)
PH BLDV: 7.39 [PH] (ref 7.32–7.43)
PH BLDV: 7.39 [PH] (ref 7.32–7.43)
PH BLDV: 7.41 [PH] (ref 7.32–7.43)
PH BLDV: 7.42 [PH] (ref 7.32–7.43)
PH UR STRIP: 5.5 [PH] (ref 5–7)
PLATELET # BLD AUTO: 106 10E3/UL (ref 150–450)
PLATELET # BLD AUTO: 113 10E3/UL (ref 150–450)
PO2 BLD: 101 MM HG (ref 80–105)
PO2 BLD: 70 MM HG (ref 80–105)
PO2 BLD: 72 MM HG (ref 80–105)
PO2 BLD: 79 MM HG (ref 80–105)
PO2 BLD: 79 MM HG (ref 80–105)
PO2 BLD: 87 MM HG (ref 80–105)
PO2 BLD: 89 MM HG (ref 80–105)
PO2 BLD: 97 MM HG (ref 80–105)
PO2 BLDV: 30 MM HG (ref 25–47)
PO2 BLDV: 33 MM HG (ref 25–47)
PO2 BLDV: 33 MM HG (ref 25–47)
PO2 BLDV: 34 MM HG (ref 25–47)
PO2 BLDV: 36 MM HG (ref 25–47)
PO2 BLDV: 37 MM HG (ref 25–47)
POTASSIUM BLD-SCNC: 3.4 MMOL/L (ref 3.4–5.3)
POTASSIUM BLD-SCNC: 3.5 MMOL/L (ref 3.4–5.3)
POTASSIUM BLD-SCNC: 4 MMOL/L (ref 3.4–5.3)
RBC # BLD AUTO: 2.52 10E6/UL (ref 3.8–5.2)
RBC # BLD AUTO: 2.62 10E6/UL (ref 3.8–5.2)
RBC URINE: 2 /HPF
SA TARGET DNA: NEGATIVE
SODIUM SERPL-SCNC: 144 MMOL/L (ref 133–144)
SP GR UR STRIP: 1.03 (ref 1–1.03)
SPECIMEN EXPIRATION DATE: NORMAL
SQUAMOUS EPITHELIAL: 2 /HPF
TRANSITIONAL EPI: 1 /HPF
UFH PPP CHRO-ACNC: 0.3 IU/ML
UNIT ABO/RH: NORMAL
UNIT NUMBER: NORMAL
UNIT STATUS: NORMAL
UNIT TYPE ISBT: 6200
UROBILINOGEN UR STRIP-MCNC: NORMAL MG/DL
WBC # BLD AUTO: 12.6 10E3/UL (ref 4–11)
WBC # BLD AUTO: 13.6 10E3/UL (ref 4–11)
WBC URINE: 6 /HPF

## 2022-05-30 PROCEDURE — 250N000011 HC RX IP 250 OP 636: Performed by: SURGERY

## 2022-05-30 PROCEDURE — P9041 ALBUMIN (HUMAN),5%, 50ML: HCPCS | Performed by: STUDENT IN AN ORGANIZED HEALTH CARE EDUCATION/TRAINING PROGRAM

## 2022-05-30 PROCEDURE — 99291 CRITICAL CARE FIRST HOUR: CPT | Mod: 24 | Performed by: ANESTHESIOLOGY

## 2022-05-30 PROCEDURE — 250N000009 HC RX 250: Performed by: THORACIC SURGERY (CARDIOTHORACIC VASCULAR SURGERY)

## 2022-05-30 PROCEDURE — 71045 X-RAY EXAM CHEST 1 VIEW: CPT

## 2022-05-30 PROCEDURE — 86901 BLOOD TYPING SEROLOGIC RH(D): CPT | Performed by: THORACIC SURGERY (CARDIOTHORACIC VASCULAR SURGERY)

## 2022-05-30 PROCEDURE — 85027 COMPLETE CBC AUTOMATED: CPT | Performed by: STUDENT IN AN ORGANIZED HEALTH CARE EDUCATION/TRAINING PROGRAM

## 2022-05-30 PROCEDURE — 84132 ASSAY OF SERUM POTASSIUM: CPT | Performed by: THORACIC SURGERY (CARDIOTHORACIC VASCULAR SURGERY)

## 2022-05-30 PROCEDURE — 87040 BLOOD CULTURE FOR BACTERIA: CPT

## 2022-05-30 PROCEDURE — 87641 MR-STAPH DNA AMP PROBE: CPT

## 2022-05-30 PROCEDURE — 85520 HEPARIN ASSAY: CPT | Performed by: THORACIC SURGERY (CARDIOTHORACIC VASCULAR SURGERY)

## 2022-05-30 PROCEDURE — 83605 ASSAY OF LACTIC ACID: CPT | Performed by: STUDENT IN AN ORGANIZED HEALTH CARE EDUCATION/TRAINING PROGRAM

## 2022-05-30 PROCEDURE — 86923 COMPATIBILITY TEST ELECTRIC: CPT | Performed by: STUDENT IN AN ORGANIZED HEALTH CARE EDUCATION/TRAINING PROGRAM

## 2022-05-30 PROCEDURE — 99291 CRITICAL CARE FIRST HOUR: CPT | Mod: GC | Performed by: INTERNAL MEDICINE

## 2022-05-30 PROCEDURE — 250N000011 HC RX IP 250 OP 636: Performed by: STUDENT IN AN ORGANIZED HEALTH CARE EDUCATION/TRAINING PROGRAM

## 2022-05-30 PROCEDURE — 250N000013 HC RX MED GY IP 250 OP 250 PS 637: Performed by: THORACIC SURGERY (CARDIOTHORACIC VASCULAR SURGERY)

## 2022-05-30 PROCEDURE — 258N000003 HC RX IP 258 OP 636: Performed by: THORACIC SURGERY (CARDIOTHORACIC VASCULAR SURGERY)

## 2022-05-30 PROCEDURE — 82805 BLOOD GASES W/O2 SATURATION: CPT

## 2022-05-30 PROCEDURE — 999N000015 HC STATISTIC ARTERIAL MONITORING DAILY

## 2022-05-30 PROCEDURE — 82805 BLOOD GASES W/O2 SATURATION: CPT | Performed by: STUDENT IN AN ORGANIZED HEALTH CARE EDUCATION/TRAINING PROGRAM

## 2022-05-30 PROCEDURE — 71045 X-RAY EXAM CHEST 1 VIEW: CPT | Mod: 26 | Performed by: RADIOLOGY

## 2022-05-30 PROCEDURE — 82330 ASSAY OF CALCIUM: CPT | Performed by: THORACIC SURGERY (CARDIOTHORACIC VASCULAR SURGERY)

## 2022-05-30 PROCEDURE — 250N000013 HC RX MED GY IP 250 OP 250 PS 637

## 2022-05-30 PROCEDURE — 200N000002 HC R&B ICU UMMC

## 2022-05-30 PROCEDURE — 250N000009 HC RX 250: Performed by: SURGERY

## 2022-05-30 PROCEDURE — 36415 COLL VENOUS BLD VENIPUNCTURE: CPT

## 2022-05-30 PROCEDURE — 250N000011 HC RX IP 250 OP 636: Performed by: THORACIC SURGERY (CARDIOTHORACIC VASCULAR SURGERY)

## 2022-05-30 PROCEDURE — 250N000013 HC RX MED GY IP 250 OP 250 PS 637: Performed by: STUDENT IN AN ORGANIZED HEALTH CARE EDUCATION/TRAINING PROGRAM

## 2022-05-30 PROCEDURE — 999N000045 HC STATISTIC DAILY SWAN MONITORING

## 2022-05-30 PROCEDURE — 80048 BASIC METABOLIC PNL TOTAL CA: CPT | Performed by: STUDENT IN AN ORGANIZED HEALTH CARE EDUCATION/TRAINING PROGRAM

## 2022-05-30 PROCEDURE — 250N000011 HC RX IP 250 OP 636

## 2022-05-30 PROCEDURE — 999N000157 HC STATISTIC RCP TIME EA 10 MIN

## 2022-05-30 PROCEDURE — 82805 BLOOD GASES W/O2 SATURATION: CPT | Performed by: THORACIC SURGERY (CARDIOTHORACIC VASCULAR SURGERY)

## 2022-05-30 PROCEDURE — 85018 HEMOGLOBIN: CPT | Performed by: THORACIC SURGERY (CARDIOTHORACIC VASCULAR SURGERY)

## 2022-05-30 PROCEDURE — 87205 SMEAR GRAM STAIN: CPT

## 2022-05-30 PROCEDURE — 83735 ASSAY OF MAGNESIUM: CPT | Performed by: STUDENT IN AN ORGANIZED HEALTH CARE EDUCATION/TRAINING PROGRAM

## 2022-05-30 PROCEDURE — P9016 RBC LEUKOCYTES REDUCED: HCPCS | Performed by: STUDENT IN AN ORGANIZED HEALTH CARE EDUCATION/TRAINING PROGRAM

## 2022-05-30 PROCEDURE — 82330 ASSAY OF CALCIUM: CPT | Performed by: STUDENT IN AN ORGANIZED HEALTH CARE EDUCATION/TRAINING PROGRAM

## 2022-05-30 PROCEDURE — 94003 VENT MGMT INPAT SUBQ DAY: CPT

## 2022-05-30 PROCEDURE — 81001 URINALYSIS AUTO W/SCOPE: CPT

## 2022-05-30 PROCEDURE — 82803 BLOOD GASES ANY COMBINATION: CPT

## 2022-05-30 PROCEDURE — 999N000155 HC STATISTIC RAPCV CVP MONITORING

## 2022-05-30 RX ORDER — POTASSIUM CHLORIDE 7.45 MG/ML
10 INJECTION INTRAVENOUS ONCE
Status: COMPLETED | OUTPATIENT
Start: 2022-05-30 | End: 2022-05-30

## 2022-05-30 RX ORDER — ALBUMIN, HUMAN INJ 5% 5 %
12.5 SOLUTION INTRAVENOUS ONCE
Status: DISCONTINUED | OUTPATIENT
Start: 2022-05-30 | End: 2022-05-30

## 2022-05-30 RX ORDER — FUROSEMIDE 10 MG/ML
20 INJECTION INTRAMUSCULAR; INTRAVENOUS ONCE
Status: DISCONTINUED | OUTPATIENT
Start: 2022-05-30 | End: 2022-05-30

## 2022-05-30 RX ORDER — FUROSEMIDE 10 MG/ML
40 INJECTION INTRAMUSCULAR; INTRAVENOUS ONCE
Status: COMPLETED | OUTPATIENT
Start: 2022-05-30 | End: 2022-05-30

## 2022-05-30 RX ORDER — VANCOMYCIN HYDROCHLORIDE 1 G/200ML
1000 INJECTION, SOLUTION INTRAVENOUS EVERY 24 HOURS
Status: DISCONTINUED | OUTPATIENT
Start: 2022-05-31 | End: 2022-05-31

## 2022-05-30 RX ORDER — PIPERACILLIN SODIUM, TAZOBACTAM SODIUM 3; .375 G/15ML; G/15ML
3.38 INJECTION, POWDER, LYOPHILIZED, FOR SOLUTION INTRAVENOUS EVERY 6 HOURS
Status: DISCONTINUED | OUTPATIENT
Start: 2022-05-30 | End: 2022-05-31

## 2022-05-30 RX ORDER — POTASSIUM CHLORIDE 29.8 MG/ML
20 INJECTION INTRAVENOUS ONCE
Status: COMPLETED | OUTPATIENT
Start: 2022-05-30 | End: 2022-05-30

## 2022-05-30 RX ORDER — POLYETHYLENE GLYCOL 3350 17 G/17G
17 POWDER, FOR SOLUTION ORAL 2 TIMES DAILY
Status: DISCONTINUED | OUTPATIENT
Start: 2022-05-30 | End: 2022-06-15

## 2022-05-30 RX ORDER — MAGNESIUM SULFATE HEPTAHYDRATE 40 MG/ML
2 INJECTION, SOLUTION INTRAVENOUS ONCE
Status: COMPLETED | OUTPATIENT
Start: 2022-05-30 | End: 2022-05-30

## 2022-05-30 RX ADMIN — PIPERACILLIN AND TAZOBACTAM 3.38 G: 3; .375 INJECTION, POWDER, LYOPHILIZED, FOR SOLUTION INTRAVENOUS at 10:51

## 2022-05-30 RX ADMIN — PROPOFOL 20 MCG/KG/MIN: 10 INJECTION, EMULSION INTRAVENOUS at 01:10

## 2022-05-30 RX ADMIN — Medication 3 UNITS/HR: at 06:23

## 2022-05-30 RX ADMIN — INSULIN ASPART 2 UNITS: 100 INJECTION, SOLUTION INTRAVENOUS; SUBCUTANEOUS at 13:20

## 2022-05-30 RX ADMIN — MAGNESIUM SULFATE IN WATER 2 G: 40 INJECTION, SOLUTION INTRAVENOUS at 06:07

## 2022-05-30 RX ADMIN — INSULIN ASPART 1 UNITS: 100 INJECTION, SOLUTION INTRAVENOUS; SUBCUTANEOUS at 04:09

## 2022-05-30 RX ADMIN — INSULIN ASPART 1 UNITS: 100 INJECTION, SOLUTION INTRAVENOUS; SUBCUTANEOUS at 20:34

## 2022-05-30 RX ADMIN — POTASSIUM CHLORIDE 10 MEQ: 7.46 INJECTION, SOLUTION INTRAVENOUS at 04:51

## 2022-05-30 RX ADMIN — ACETAMINOPHEN 325MG 975 MG: 325 TABLET ORAL at 14:39

## 2022-05-30 RX ADMIN — INSULIN ASPART 1 UNITS: 100 INJECTION, SOLUTION INTRAVENOUS; SUBCUTANEOUS at 16:42

## 2022-05-30 RX ADMIN — DOCUSATE SODIUM 50 MG AND SENNOSIDES 8.6 MG 1 TABLET: 8.6; 5 TABLET, FILM COATED ORAL at 07:57

## 2022-05-30 RX ADMIN — Medication 0.06 MCG/KG/MIN: at 19:34

## 2022-05-30 RX ADMIN — POLYETHYLENE GLYCOL 3350 17 G: 17 POWDER, FOR SOLUTION ORAL at 07:57

## 2022-05-30 RX ADMIN — VANCOMYCIN HYDROCHLORIDE 1250 MG: 10 INJECTION, POWDER, LYOPHILIZED, FOR SOLUTION INTRAVENOUS at 10:31

## 2022-05-30 RX ADMIN — HEPARIN SODIUM AND DEXTROSE 850 UNITS/HR: 10000; 5 INJECTION INTRAVENOUS at 01:19

## 2022-05-30 RX ADMIN — PROPOFOL 20 MCG/KG/MIN: 10 INJECTION, EMULSION INTRAVENOUS at 22:18

## 2022-05-30 RX ADMIN — ACETAMINOPHEN 325MG 975 MG: 325 TABLET ORAL at 06:07

## 2022-05-30 RX ADMIN — PIPERACILLIN AND TAZOBACTAM 3.38 G: 3; .375 INJECTION, POWDER, LYOPHILIZED, FOR SOLUTION INTRAVENOUS at 16:57

## 2022-05-30 RX ADMIN — PROPOFOL 20 MCG/KG/MIN: 10 INJECTION, EMULSION INTRAVENOUS at 12:04

## 2022-05-30 RX ADMIN — POTASSIUM CHLORIDE 20 MEQ: 29.8 INJECTION, SOLUTION INTRAVENOUS at 19:30

## 2022-05-30 RX ADMIN — MUPIROCIN 0.5 G: 20 OINTMENT TOPICAL at 07:49

## 2022-05-30 RX ADMIN — INSULIN ASPART 2 UNITS: 100 INJECTION, SOLUTION INTRAVENOUS; SUBCUTANEOUS at 08:06

## 2022-05-30 RX ADMIN — FUROSEMIDE 40 MG: 10 INJECTION, SOLUTION INTRAVENOUS at 16:40

## 2022-05-30 RX ADMIN — Medication 1 PACKET: at 07:51

## 2022-05-30 RX ADMIN — FUROSEMIDE 40 MG: 10 INJECTION, SOLUTION INTRAVENOUS at 22:52

## 2022-05-30 RX ADMIN — DOCUSATE SODIUM 50 MG AND SENNOSIDES 8.6 MG 1 TABLET: 8.6; 5 TABLET, FILM COATED ORAL at 19:35

## 2022-05-30 RX ADMIN — Medication 0.7 MCG/KG/MIN: at 19:34

## 2022-05-30 RX ADMIN — POLYETHYLENE GLYCOL 3350 17 G: 17 POWDER, FOR SOLUTION ORAL at 19:35

## 2022-05-30 RX ADMIN — Medication 40 MG: at 07:49

## 2022-05-30 RX ADMIN — Medication 0.5 MCG/KG/MIN: at 01:29

## 2022-05-30 RX ADMIN — Medication 3 UNITS/HR: at 17:02

## 2022-05-30 RX ADMIN — GABAPENTIN 100 MG: 100 CAPSULE ORAL at 22:18

## 2022-05-30 RX ADMIN — MUPIROCIN 0.5 G: 20 OINTMENT TOPICAL at 19:35

## 2022-05-30 RX ADMIN — PIPERACILLIN AND TAZOBACTAM 3.38 G: 3; .375 INJECTION, POWDER, LYOPHILIZED, FOR SOLUTION INTRAVENOUS at 22:18

## 2022-05-30 RX ADMIN — INSULIN ASPART 2 UNITS: 100 INJECTION, SOLUTION INTRAVENOUS; SUBCUTANEOUS at 00:16

## 2022-05-30 RX ADMIN — HEPARIN SODIUM AND DEXTROSE 850 UNITS/HR: 10000; 5 INJECTION INTRAVENOUS at 22:19

## 2022-05-30 RX ADMIN — Medication 15 ML: at 07:57

## 2022-05-30 RX ADMIN — ASPIRIN 81 MG 81 MG: 81 TABLET ORAL at 07:57

## 2022-05-30 RX ADMIN — Medication 3 MG: at 22:18

## 2022-05-30 ASSESSMENT — ACTIVITIES OF DAILY LIVING (ADL)
ADLS_ACUITY_SCORE: 38
ADLS_ACUITY_SCORE: 34
ADLS_ACUITY_SCORE: 38
ADLS_ACUITY_SCORE: 38
ADLS_ACUITY_SCORE: 34
ADLS_ACUITY_SCORE: 34
ADLS_ACUITY_SCORE: 38
ADLS_ACUITY_SCORE: 34
ADLS_ACUITY_SCORE: 38
ADLS_ACUITY_SCORE: 38
ADLS_ACUITY_SCORE: 34
ADLS_ACUITY_SCORE: 34

## 2022-05-30 NOTE — PROGRESS NOTES
CV ICU PROGRESS NOTE        CO-MORBIDITIES:   CTEPH (chronic thromboembolic pulmonary hypertension) (H)  (primary encounter diagnosis)  Primary pulmonary hypertension (H)  SOB (shortness of breath)  S/P coronary angiogram    ASSESSMENT: Debi Espinoza is a 76 year old female with PMH of HTN, nephrolithiasis, DVT (2019) and PE (2019, 2021) on chronic anticoagulation, and chronic thromboembolic pulmonary hypertension who underwent pulmonary artery thromboendarterectomy on 5/27 with Dr. Peterson.    TODAY'S PROGRESS:   - Sedation vacation  - Wean pressors as able  - Keep CVP <10  - Wean PEEP to 8 today  - Monitor ABG  - Start trickle feeds  - Check cultures, MRSA swab  - Vanc, Zosyn started  - 1 unit(s) pRBC for Hgb 6.9    PLAN:  Neuro/ pain/ sedation:  Acute Postoperative pain  - Monitor neurological status. Notify the MD for any acute changes in exam.  - Pain: fentanyl gtt.   - Sedation: propofol gtt  - Daily sedation vacation     Pulmonary care:   S/p Pulmonary artery thromboendarterectomy on 5/27/2022 by Dr. Peterson  Hx Chronic pulmonary thromboembolic disease  Hx PE (2019, 2021)  Hx Emphysema, moderate  PTA regimen: apixaban 5mg BID  CTA 05/2019 demonstrated underlying moderate emphysema  - Intubated, ventilated CMV 18/360/10/40  -- lung protective vent settings  - Titrate supplemental oxygen and PEEP to maintain saturation above 92%.  - PEEP down to  8 from 10, follow up ABG  - Will wean further tomorrow     Cardiovascular:    Hx Severe pulmonary hypertension  Hx RV dilation and reduced RV function  Hx Severe tricuspid insufficiency  Hx Essential HTN  Distributive shock  PTA regimen: furosemide 20mg qd, adempas 2.5mg TID, opsumit 10mg qd  Echo on 03/2021 with LVEF of 78%, severe pulmonary hypertension (82 mmHg), reduced RV function (TAPSE 16mm), severe tricuspid insufficiency  - Monitor hemodynamic status.   - ASA 81  - Phenylephrine, vasopressin, levophed, avoid epi if able  - CVP goal <10, MAP goal 70-75 for  reduced reperfusion edema and hypoxia  - Cardiac index goal 2.0-2.5  - V pacing back up 50 bpm    GI care/ Nutrition:   - NPO   - Start trickle feeds through OGT  - Will plan on post pyloric tube tomorrow  - PPI    Renal/ Fluid Balance/ Electrolytes:   Nephrolithiasis  PTA regimen: furosemide 20mg qd  BL creat appears to be ~ 1.14  - Goal for net negative daily  - Will follow and diurese as needed with CVP <10  - Strict I/O, daily weights  - Avoid/limit nephrotoxins as able  - Replete lytes PRN per protocol  - Keep on the dry side-- goal net negative 1 L-- surgeon okay with lasix use as needed     Endocrine:    Stress induced hyperglycemia  Preop A1c 5.8 (12/21)  - high sliding scale insulin   - Goal BG <180 for optimal healing     ID/ Antibiotics:  Stress induced leukocytosis  Fever to 38.5 on 5/28 - afebrile now  Concern for possible infection given distributive shock  - To complete perioperative regimen  - Continue to monitor fever curve, WBC and inflammatory markers as appropriate  - Afebrile but concern for distributive shock  - Pan culture, MRSA swab  - Start vancomycin, zosyn    5/29: Blood culture x2, UA/UC, Sputum - pend  5/28: Blood culture x2, UA/UC - NGTD     Heme:     Stress induced leukocytosis  Acute blood loss anemia  Acute blood loss thrombocytopenia  Hx DVT (2019), PE (2019, 2021) on chronic anticoagulation  - Continue to monitor  - CBC   - PA tear intra-op s/p patch repair  - Low intensity heparin gtt, no bolus     MSK/ Skin:  Sternotomy  Surgical Incision  - Sternal precautions  - Postoperative incision management per protocol  - PT/OT/CR     Prophylaxis:    - Mechanical prophylaxis for DVT  - Chemical DVT prophylaxis - low intensity heparin gtt  - PPI     Lines/ tubes/ drains:  - Arterial Line, ETT, CTs, PA catheter, Left IJ, ly, OG     Disposition:  - CVICU    Patient seen, findings and plan discussed with CV ICU staff, Dr. Parker,  Cards 2 team, CVTS attending,   Janice.    -----------------------------------  Jhon Corado MD  PGY-3, General Surgery    ====================================    SUBJECTIVE:   No acute events overnight. Required increasing pressors. Put back on PEEP of 10.    OBJECTIVE:   1. VITAL SIGNS:   Temp:  [98.2  F (36.8  C)-99.5  F (37.5  C)] 99.3  F (37.4  C)  Pulse:  [53-79] 63  Resp:  [16-25] 18  BP: (82)/(45) 82/45  MAP:  [55 mmHg-82 mmHg] 71 mmHg  Arterial Line BP: ()/(35-53) 115/45  FiO2 (%):  [40 %] 40 %  SpO2:  [90 %-100 %] 95 %  Vent Mode: CMV/AC  (Continuous Mandatory Ventilation/ Assist Control)  FiO2 (%): 40 %  Resp Rate (Set): 18 breaths/min  Tidal Volume (Set, mL): 360 mL  PEEP (cm H2O): 10 cmH2O  Resp: 18      2. INTAKE/ OUTPUT:   I/O last 3 completed shifts:  In: 2269.78 [I.V.:1614.78; NG/GT:360]  Out: 2170 [Urine:1930; Emesis/NG output:100; Chest Tube:140]    3. PHYSICAL EXAMINATION:   General: lying in bed, appears comfortable  Neuro: intubated, sedated  Resp: Mechanically ventilated, chest tubes with serosanguinous output  CV: RRR  Abdomen: Soft, Non-distended, Non-tender  Incisions: clean dry intact  Extremities: warm and well perfused    4. INVESTIGATIONS:   Arterial Blood Gases   Recent Labs   Lab 05/30/22  0355 05/30/22  0011 05/29/22  2037 05/29/22  1811   PH 7.45 7.42 7.43 7.42   PCO2 42 43 41 43   PO2 97 79* 77* 74*   HCO3 29* 28 27 27     Complete Blood Count   Recent Labs   Lab 05/30/22  0354 05/30/22  0058 05/29/22  0732 05/29/22  0343   WBC 12.6* 13.6* 12.6* 12.8*   HGB 6.9* 7.3* 8.5*  8.5* 8.5*   * 113* 123* 122*     Basic Metabolic Panel  Recent Labs   Lab 05/30/22  0409 05/30/22 0354 05/30/22  0010 05/29/22 2036 05/29/22  0356 05/29/22 0343 05/28/22 1938 05/28/22 1932 05/28/22  1414 05/28/22  1401 05/28/22  0357 05/28/22 0349 05/27/22 2203 05/27/22  2156   NA  --  144  --   --   --  145*  --   --   --   --   --  146*  --  145*   POTASSIUM  --  3.5  --   --   --  4.3  --  4.4  --  4.8  --  4.4  --   4.0  4.0   CHLORIDE  --  110*  --   --   --  112*  --   --   --   --   --  113*  --  113*   CO2  --  27  --   --   --  26  --   --   --   --   --  24  --  25   BUN  --  29  --   --   --  24  --   --   --   --   --  19  --  18   CR  --  1.19*  --   --   --  1.09*  --   --   --   --   --  1.06*  --  0.98   * 162* 189* 156*   < > 129*   < >  --    < >  --    < > 151*   < > 141*    < > = values in this interval not displayed.     Liver Function Tests  Recent Labs   Lab 05/27/22 2156 05/27/22 1931 05/27/22 1758 05/27/22  1604 05/25/22  1622   AST 47* 36  --   --  16   ALT 14 11  --   --  16   ALKPHOS 48 37*  --   --  75   BILITOTAL 1.1 1.2  --   --  0.7   ALBUMIN 2.5* 1.8*  --   --  3.4   INR 1.52* 2.20* 2.40* 1.71* 1.13     Pancreatic Enzymes  No lab results found in last 7 days.  Coagulation Profile  Recent Labs   Lab 05/28/22  1057 05/28/22  0349 05/27/22 2156 05/27/22 1931 05/27/22 1758 05/27/22  1604   INR  --   --  1.52* 2.20* 2.40* 1.71*   PTT 59* 60* 60* 63* 76* 89*         5. RADIOLOGY:   Recent Results (from the past 24 hour(s))   XR Chest Port 1 View    Narrative    Exam: XR CHEST PORT 1 VIEW, 5/29/2022 9:28 AM    Indication: interval follow-up s/p pulmonary endarterectomy    Comparison: 5/28/2022    Findings:   Postsurgical changes of the chest similar to prior. ET tube tip in the  lower thoracic trachea.  [place] projects over the right main  pulmonary artery. Chest tubes and mediastinal drain in place similar  to prior. Cardiac silhouette is unchanged. No significant change in  perihilar opacities. No pneumothorax or pleural effusion.      Impression    Impression: Postoperative chest with stable perihilar atelectasis or  edema. Endotracheal tube tip 1.7 cm superior to the man.    I have personally reviewed the examination and initial interpretation  and I agree with the findings.    YVETTE GRAY MD         SYSTEM ID:  [id number]   XR Chest Port 1 View    Narrative     Examination:   XR CHEST PORT 1 VIEW     Date:  5/29/2022 10:08 AM      Clinical Information: s/p ETT retraction     Additional Information: none    Comparison: Chest x-ray 5/29/2022 0840 hours    Findings:   Endotracheal tube pulled back slightly now 2.4 cm above the man.  Bilateral chest tubes and mediastinal drain unchanged. A left IJ  [place] catheter with tip in the right pulmonary artery. Patchy  opacities both lung bases. Patchy opacity in the right midlung  laterally. Otherwise lungs are relatively clear.  No acute osseous  injury.      Impression    Impression:  1. Endotracheal tube retracted now 2.4 cm from the man.   2. No significant change in small amount of atelectasis lung bases  bilaterally..    YVETTE GRAY MD         SYSTEM ID:  [id number]       =========================================

## 2022-05-30 NOTE — PHARMACY-VANCOMYCIN DOSING SERVICE
"Pharmacy Vancomycin Initial Note  Date of Service May 30, 2022  Patient's  1945  76 year old, female    Indication: Sepsis    Current estimated CrCl = Estimated Creatinine Clearance: 33.9 mL/min (A) (based on SCr of 1.19 mg/dL (H)).    Creatinine for last 3 days  2022:  7:31 PM Creatinine 0.88 mg/dL;  9:56 PM Creatinine 0.98 mg/dL  2022:  3:49 AM Creatinine 1.06 mg/dL  2022:  3:43 AM Creatinine 1.09 mg/dL  2022:  3:54 AM Creatinine 1.19 mg/dL    Recent Vancomycin Level(s) for last 3 days  No results found for requested labs within last 72 hours.      Vancomycin IV Administrations (past 72 hours)                   vancomycin 1250 mg in 0.9% NaCl 250 mL intermittent infusion 1,250 mg (mg) 1,250 mg New Bag 22 1031                Nephrotoxins and other renal medications (From now, onward)    Start     Dose/Rate Route Frequency Ordered Stop    22 1030  vancomycin (VANCOCIN) 1000 mg in dextrose 5% 200 mL PREMIX         1,000 mg  200 mL/hr over 1 Hours Intravenous EVERY 24 HOURS 22 1326      22 1000  piperacillin-tazobactam (ZOSYN) 3.375 g vial to attach to  mL bag        Note to Pharmacy: For [facility], [facility] and [facility]: For Zosyn-naive patients, use the \"Zosyn initial dose + extended infusion\" order panel.    3.375 g  over 30 Minutes Intravenous EVERY 6 HOURS 22 0922 2100  vasopressin 1 unit/mL MAX Conc (PITRESSIN) infusion         0.5-4 Units/hr  0.5-4 mL/hr  Intravenous CONTINUOUS 22 2100  norepinephrine (LEVOPHED) 16 mg in  mL infusion MAX CONC CENTRAL LINE         0.01-0.4 mcg/kg/min × 65.9 kg  0.6-24.7 mL/hr  Intravenous CONTINUOUS 22            Contrast Orders - past 72 hours (72h ago, onward)    None          [employer] Prediction of Planned Initial Vancomycin Regimen  Loading dose: 1250 mg  Regimen: 1000 mg IV every 24 hours.  Start time: 14:22 on 2022  Exposure target: AUC24 (range)400-600 mg/L.hr "   AUC24,ss: 522 mg/L.hr  Probability of AUC24 > 400: 76 %  Ctrough,ss: 16.4 mg/L  Probability of Ctrough,ss > 20: 34 %  Probability of nephrotoxicity (Lodise SUNNY 2009): 12 %          Plan:  1. Start vancomycin  1250 mg IV load followed by 1000 mg IV q24h.   2. Vancomycin monitoring method: AUC  3. Vancomycin therapeutic monitoring goal: 400-600 mg*h/L  4. Pharmacy will check vancomycin levels as appropriate in 3-5 Days.    5. Serum creatinine levels will be ordered daily for the first week of therapy and at least twice weekly for subsequent weeks.      Gina Francisco RPH

## 2022-05-30 NOTE — PLAN OF CARE
Shift Summary [id number]      Major Shift Events:    Pt remains intubated/sedated on prop/fent, RASS -2, follows commands, PERRLA. PEEP increased to 10 d/t PF ration <200, PF ratio 242 during 0400 gas. At start of shift, pt presented with bradycardia, nasir shut off. Due to hypotension, epi restarted, CVP low, 250mL albumin given. D/t increase in CI, epi off, touch of nasir started w/levo and vaso. See flow sheets for TAHIRA/Flotrac #'s. Win remains, w/UO trending down, team made aware, no BM, TF increased to 30 at 0000. No new skin issues present during shift.    Potassium and Mag replaced per protocol x1    Critical Hgb of 6.9, 1 unit PRBCs given, no s/sx of bleeding    Plan: continue current  For vital signs and complete assessments, please see documentation flowsheets.

## 2022-05-30 NOTE — PROGRESS NOTES
Cardiology Fellow Brief Progress Note  _________________________________        Interval Events  ----------------------  Increased pressor requirements overnight    Current Medications  ----------------------  Current Facility-Administered Medications   Medication     acetaminophen (TYLENOL) tablet 650 mg     acetaminophen (TYLENOL) tablet 975 mg     aspirin (ASA) chewable tablet 81 mg     bisacodyl (DULCOLAX) Suppository 10 mg     calcium gluconate 1 g in 50 mL sodium chloride intermittent infusion (premix)     calcium gluconate 2 g in 100 mL NS intermittent infusion PREMIX     calcium gluconate 3 g in sodium chloride 0.9 % 100 mL intermittent infusion     dexmedetomidine (PRECEDEX) 400 mcg in 0.9% sodium chloride 100 mL     dextrose 10% infusion     glucose gel 15-30 g    Or     dextrose 50 % injection 25-50 mL    Or     glucagon injection 1 mg     EPINEPHrine (ADRENALIN) 5 mg in sodium chloride 0.9 % 250 mL infusion CENTRAL     fentaNYL (SUBLIMAZE) infusion     gabapentin (NEURONTIN) capsule 100 mg     heparin infusion 25,000 units in D5W 250 mL ANTICOAGULANT     HOLD:  Metformin and metformin containing medications if patient received IV contrast with acute kidney injury or severe chronic kidney disease (stage IV or stage V; i.e., eGFR less than 30)     hydrALAZINE (APRESOLINE) injection 10 mg     HYDROmorphone (DILAUDID) injection 0.2 mg     hydrOXYzine (ATARAX) tablet 25-50 mg     insulin aspart (NovoLOG) injection (RAPID ACTING)     lidocaine (LMX4) cream     lidocaine (LMX4) cream     lidocaine 1 % 0.1-1 mL     lidocaine 1 % 0.1-1 mL     lidocaine-prilocaine (EMLA) cream     magnesium hydroxide (MILK OF MAGNESIA) suspension 30 mL     medication instruction     melatonin tablet 3 mg     methocarbamol (ROBAXIN) tablet 500 mg     multivitamins w/minerals liquid 15 mL     mupirocin (BACTROBAN) 2 % ointment 0.5 g     naloxone (NARCAN) injection 0.2 mg    Or     naloxone (NARCAN) injection 0.4 mg    Or      naloxone (NARCAN) injection 0.2 mg    Or     naloxone (NARCAN) injection 0.4 mg     norepinephrine (LEVOPHED) 16 mg in  mL infusion MAX CONC CENTRAL LINE     ondansetron (ZOFRAN ODT) ODT tab 4 mg    Or     ondansetron (ZOFRAN) injection 4 mg     oxyCODONE (ROXICODONE) tablet 5 mg    Or     oxyCODONE IR (ROXICODONE) tablet 10 mg     pantoprazole (PROTONIX) 2 mg/mL suspension 40 mg    Or     pantoprazole (PROTONIX) EC tablet 40 mg     phenylephrine (LUISITO-SYNEPHRINE) 50 mg in NaCl 0.9 % 250 mL infusion     polyethylene glycol (MIRALAX) Packet 17 g     prochlorperazine (COMPAZINE) injection 5 mg    Or     prochlorperazine (COMPAZINE) tablet 5 mg     propofol (DIPRIVAN) infusion     protein modular (PROSOURCE TF) 1 packet     Reason beta blocker order not selected     senna-docusate (SENOKOT-S/PERICOLACE) 8.6-50 MG per tablet 1 tablet     sodium chloride (PF) 0.9% PF flush 3 mL     sodium chloride (PF) 0.9% PF flush 3 mL     sodium chloride (PF) 0.9% PF flush 3 mL     sodium chloride (PF) 0.9% PF flush 3 mL     vasopressin 1 unit/mL MAX Conc (PITRESSIN) infusion        Intake and Output  ----------------------    Intake/Output Summary (Last 24 hours) at 5/30/2022 1319  Last data filed at 5/30/2022 1300  Gross per 24 hour   Intake 3197.72 ml   Output 1529 ml   Net 1668.72 ml         Weight  ----------------------  Wt Readings from Last 2 Encounters:   05/30/22 67.4 kg (148 lb 9.4 oz)   04/29/22 67.4 kg (148 lb 8 oz)       Objective  ----------------------  Results for orders placed or performed during the hospital encounter of 05/25/22 (from the past 24 hour(s))   Lactic acid whole blood   Result Value Ref Range    Lactic Acid 1.0 0.7 - 2.0 mmol/L   Blood gas venous with oxyhemoglobin   Result Value Ref Range    pH Venous 7.40 7.32 - 7.43    pCO2 Venous 44 40 - 50 mm Hg    pO2 Venous 33 25 - 47 mm Hg    Bicarbonate Venous 27 21 - 28 mmol/L    FIO2 40     Oxyhemoglobin Venous 63 (L) 70 - 75 %    Base Excess/Deficit  (+/-) 1.9 -7.7 - 1.9 mmol/L   Hemoglobin   Result Value Ref Range    Hemoglobin 8.5 (L) 11.7 - 15.7 g/dL   CBC with platelets   Result Value Ref Range    WBC Count 12.6 (H) 4.0 - 11.0 10e3/uL    RBC Count 3.03 (L) 3.80 - 5.20 10e6/uL    Hemoglobin 8.5 (L) 11.7 - 15.7 g/dL    Hematocrit 26.8 (L) 35.0 - 47.0 %    MCV 88 78 - 100 fL    MCH 28.1 26.5 - 33.0 pg    MCHC 31.7 31.5 - 36.5 g/dL    RDW 17.4 (H) 10.0 - 15.0 %    Platelet Count 123 (L) 150 - 450 10e3/uL   Glucose by meter   Result Value Ref Range    GLUCOSE BY METER POCT 146 (H) 70 - 99 mg/dL   XR Chest Port 1 View    Narrative    Exam: XR CHEST PORT 1 VIEW, 5/29/2022 9:28 AM    Indication: interval follow-up s/p pulmonary endarterectomy    Comparison: 5/28/2022    Findings:   Postsurgical changes of the chest similar to prior. ET tube tip in the  lower thoracic trachea.  [place] projects over the right main  pulmonary artery. Chest tubes and mediastinal drain in place similar  to prior. Cardiac silhouette is unchanged. No significant change in  perihilar opacities. No pneumothorax or pleural effusion.      Impression    Impression: Postoperative chest with stable perihilar atelectasis or  edema. Endotracheal tube tip 1.7 cm superior to the man.    I have personally reviewed the examination and initial interpretation  and I agree with the findings.    YVETTE GRAY MD         SYSTEM ID:  [id number]   XR Chest Port 1 View    Narrative     Examination:  XR CHEST PORT 1 VIEW     Date:  5/29/2022 10:08 AM      Clinical Information: s/p ETT retraction     Additional Information: none    Comparison: Chest x-ray 5/29/2022 0840 hours    Findings:   Endotracheal tube pulled back slightly now 2.4 cm above the man.  Bilateral chest tubes and mediastinal drain unchanged. A left IJ  [place]amond catheter with tip in the right pulmonary artery. Patchy  opacities both lung bases. Patchy opacity in the right midlung  laterally. Otherwise lungs are relatively clear.   No acute osseous  injury.      Impression    Impression:  1. Endotracheal tube retracted now 2.4 cm from the man.   2. No significant change in small amount of atelectasis lung bases  bilaterally..    YVETTE GRAY MD         SYSTEM ID:  [id number]   Blood gas arterial   Result Value Ref Range    pH Arterial 7.46 (H) 7.35 - 7.45    pCO2 Arterial 40 35 - 45 mm Hg    pO2 Arterial 99 80 - 105 mm Hg    FIO2 40     Bicarbonate Arterial 28 21 - 28 mmol/L    Base Excess/Deficit (+/-) 3.8 (H) -9.0 - 1.8 mmol/L   Glucose by meter   Result Value Ref Range    GLUCOSE BY METER POCT 113 (H) 70 - 99 mg/dL   Blood gas venous with oxyhemoglobin   Result Value Ref Range    pH Venous 7.41 7.32 - 7.43    pCO2 Venous 47 40 - 50 mm Hg    pO2 Venous 33 25 - 47 mm Hg    Bicarbonate Venous 30 (H) 21 - 28 mmol/L    FIO2 40     Oxyhemoglobin Venous 60 (L) 70 - 75 %    Base Excess/Deficit (+/-) 4.2 (H) -7.7 - 1.9 mmol/L   Glucose by meter   Result Value Ref Range    GLUCOSE BY METER POCT 121 (H) 70 - 99 mg/dL   Blood gas arterial with oxyhemoglobin   Result Value Ref Range    pH Arterial 7.44 7.35 - 7.45    pCO2 Arterial 40 35 - 45 mm Hg    pO2 Arterial 65 (L) 80 - 105 mm Hg    Bicarbonate Arterial 27 21 - 28 mmol/L    Oxyhemoglobin Arterial 93 92 - 100 %    Base Excess/Deficit (+/-) 2.5 (H) -9.0 - 1.8 mmol/L    FIO2 40    Blood gas venous with oxyhemoglobin   Result Value Ref Range    pH Venous 7.41 7.32 - 7.43    pCO2 Venous 45 40 - 50 mm Hg    pO2 Venous 32 25 - 47 mm Hg    Bicarbonate Venous 29 (H) 21 - 28 mmol/L    FIO2 40     Oxyhemoglobin Venous 58 (L) 70 - 75 %    Base Excess/Deficit (+/-) 3.7 (H) -7.7 - 1.9 mmol/L   Glucose by meter   Result Value Ref Range    GLUCOSE BY METER POCT 112 (H) 70 - 99 mg/dL   Heparin Unfractionated Anti Xa Level   Result Value Ref Range    Anti Xa Unfractionated Heparin 0.27 For Reference Range, See Comment IU/mL    Narrative    Therapeutic Range: UFH: 0.25-0.50 IU/mL for low intensity  dosing,  0.30-0.70 IU/mL for high intensity dosing DVT and PE.  This test is not validated for other direct factor X inhibitors (e.g. rivaroxaban, apixaban, edoxaban, betrixaban, fondaparinux) and should not be used for monitoring of other medications.   Blood gas arterial   Result Value Ref Range    pH Arterial 7.42 7.35 - 7.45    pCO2 Arterial 41 35 - 45 mm Hg    pO2 Arterial 77 (L) 80 - 105 mm Hg    FIO2 40     Bicarbonate Arterial 27 21 - 28 mmol/L    Base Excess/Deficit (+/-) 1.9 (H) -9.0 - 1.8 mmol/L   Glucose by meter   Result Value Ref Range    GLUCOSE BY METER POCT 137 (H) 70 - 99 mg/dL   Blood gas venous with oxyhemoglobin   Result Value Ref Range    pH Venous 7.39 7.32 - 7.43    pCO2 Venous 47 40 - 50 mm Hg    pO2 Venous 32 25 - 47 mm Hg    Bicarbonate Venous 28 21 - 28 mmol/L    FIO2 40     Oxyhemoglobin Venous 56 (L) 70 - 75 %    Base Excess/Deficit (+/-) 2.7 (H) -7.7 - 1.9 mmol/L   Blood gas arterial   Result Value Ref Range    pH Arterial 7.44 7.35 - 7.45    pCO2 Arterial 40 35 - 45 mm Hg    pO2 Arterial 74 (L) 80 - 105 mm Hg    FIO2 40     Bicarbonate Arterial 28 21 - 28 mmol/L    Base Excess/Deficit (+/-) 3.1 (H) -9.0 - 1.8 mmol/L   Blood gas arterial   Result Value Ref Range    pH Arterial 7.42 7.35 - 7.45    pCO2 Arterial 43 35 - 45 mm Hg    pO2 Arterial 74 (L) 80 - 105 mm Hg    FIO2 40     Bicarbonate Arterial 27 21 - 28 mmol/L    Base Excess/Deficit (+/-) 2.6 (H) -9.0 - 1.8 mmol/L   Glucose by meter   Result Value Ref Range    GLUCOSE BY METER POCT 156 (H) 70 - 99 mg/dL   Blood gas arterial with oxyhemoglobin   Result Value Ref Range    pH Arterial 7.43 7.35 - 7.45    pCO2 Arterial 41 35 - 45 mm Hg    pO2 Arterial 77 (L) 80 - 105 mm Hg    Bicarbonate Arterial 27 21 - 28 mmol/L    Oxyhemoglobin Arterial 95 92 - 100 %    Base Excess/Deficit (+/-) 2.8 (H) -9.0 - 1.8 mmol/L    FIO2 40    Blood gas venous with oxyhemoglobin   Result Value Ref Range    pH Venous 7.40 7.32 - 7.43    pCO2 Venous 47 40 -  50 mm Hg    pO2 Venous 31 25 - 47 mm Hg    Bicarbonate Venous 29 (H) 21 - 28 mmol/L    FIO2 40     Oxyhemoglobin Venous 55 (L) 70 - 75 %    Base Excess/Deficit (+/-) 3.7 (H) -7.7 - 1.9 mmol/L   Heparin Unfractionated Anti Xa Level   Result Value Ref Range    Anti Xa Unfractionated Heparin 0.30 For Reference Range, See Comment IU/mL    Narrative    Therapeutic Range: UFH: 0.25-0.50 IU/mL for low intensity dosing,  0.30-0.70 IU/mL for high intensity dosing DVT and PE.  This test is not validated for other direct factor X inhibitors (e.g. rivaroxaban, apixaban, edoxaban, betrixaban, fondaparinux) and should not be used for monitoring of other medications.   Glucose by meter   Result Value Ref Range    GLUCOSE BY METER POCT 189 (H) 70 - 99 mg/dL   Blood gas venous with oxyhemoglobin   Result Value Ref Range    pH Venous 7.41 7.32 - 7.43    pCO2 Venous 47 40 - 50 mm Hg    pO2 Venous 33 25 - 47 mm Hg    Bicarbonate Venous 29 (H) 21 - 28 mmol/L    FIO2 40     Oxyhemoglobin Venous 60 (L) 70 - 75 %    Base Excess/Deficit (+/-) 4.0 (H) -7.7 - 1.9 mmol/L   Blood gas arterial   Result Value Ref Range    pH Arterial 7.42 7.35 - 7.45    pCO2 Arterial 43 35 - 45 mm Hg    pO2 Arterial 79 (L) 80 - 105 mm Hg    FIO2 40     Bicarbonate Arterial 28 21 - 28 mmol/L    Base Excess/Deficit (+/-) 3.3 (H) -9.0 - 1.8 mmol/L   CBC with platelets   Result Value Ref Range    WBC Count 13.6 (H) 4.0 - 11.0 10e3/uL    RBC Count 2.62 (L) 3.80 - 5.20 10e6/uL    Hemoglobin 7.3 (L) 11.7 - 15.7 g/dL    Hematocrit 23.0 (L) 35.0 - 47.0 %    MCV 88 78 - 100 fL    MCH 27.9 26.5 - 33.0 pg    MCHC 31.7 31.5 - 36.5 g/dL    RDW 17.3 (H) 10.0 - 15.0 %    Platelet Count 113 (L) 150 - 450 10e3/uL   Lactic acid whole blood   Result Value Ref Range    Lactic Acid 0.8 0.7 - 2.0 mmol/L   Ionized Calcium   Result Value Ref Range    Calcium Ionized 4.5 4.4 - 5.2 mg/dL   Basic metabolic panel   Result Value Ref Range    Sodium 144 133 - 144 mmol/L    Potassium 3.5 3.4 -  5.3 mmol/L    Chloride 110 (H) 94 - 109 mmol/L    Carbon Dioxide (CO2) 27 20 - 32 mmol/L    Anion Gap 7 3 - 14 mmol/L    Urea Nitrogen 29 7 - 30 mg/dL    Creatinine 1.19 (H) 0.52 - 1.04 mg/dL    Calcium 8.4 (L) 8.5 - 10.1 mg/dL    Glucose 162 (H) 70 - 99 mg/dL    GFR Estimate 47 (L) >60 mL/min/1.73m2   Magnesium   Result Value Ref Range    Magnesium 1.9 1.6 - 2.3 mg/dL   Heparin Unfractionated Anti Xa Level   Result Value Ref Range    Anti Xa Unfractionated Heparin 0.30 For Reference Range, See Comment IU/mL    Narrative    Therapeutic Range: UFH: 0.25-0.50 IU/mL for low intensity dosing,  0.30-0.70 IU/mL for high intensity dosing DVT and PE.  This test is not validated for other direct factor X inhibitors (e.g. rivaroxaban, apixaban, edoxaban, betrixaban, fondaparinux) and should not be used for monitoring of other medications.   CBC with platelets   Result Value Ref Range    WBC Count 12.6 (H) 4.0 - 11.0 10e3/uL    RBC Count 2.52 (L) 3.80 - 5.20 10e6/uL    Hemoglobin 6.9 (LL) 11.7 - 15.7 g/dL    Hematocrit 22.2 (L) 35.0 - 47.0 %    MCV 88 78 - 100 fL    MCH 27.4 26.5 - 33.0 pg    MCHC 31.1 (L) 31.5 - 36.5 g/dL    RDW 17.2 (H) 10.0 - 15.0 %    Platelet Count 106 (L) 150 - 450 10e3/uL   Blood gas venous with oxyhemoglobin   Result Value Ref Range    pH Venous 7.42 7.32 - 7.43    pCO2 Venous 46 40 - 50 mm Hg    pO2 Venous 30 25 - 47 mm Hg    Bicarbonate Venous 30 (H) 21 - 28 mmol/L    FIO2 40     Oxyhemoglobin Venous 56 (L) 70 - 75 %    Base Excess/Deficit (+/-) 4.8 (H) -7.7 - 1.9 mmol/L   ABO/Rh type and screen    Narrative    The following orders were created for panel order ABO/Rh type and screen.  Procedure                               Abnormality         Status                     ---------                               -----------         ------                     Adult Type and Screen[[id number]]                            Final result                 Please view results for these tests on the individual  orders.   Adult Type and Screen   Result Value Ref Range    ABO/RH(D) A POS     Antibody Screen Negative Negative    SPECIMEN EXPIRATION DATE [id number]    Blood gas arterial with oxyhemoglobin   Result Value Ref Range    pH Arterial 7.45 7.35 - 7.45    pCO2 Arterial 42 35 - 45 mm Hg    pO2 Arterial 97 80 - 105 mm Hg    Bicarbonate Arterial 29 (H) 21 - 28 mmol/L    Oxyhemoglobin Arterial 98 92 - 100 %    Base Excess/Deficit (+/-) 4.3 (H) -9.0 - 1.8 mmol/L    FIO2 40    Ionized Calcium   Result Value Ref Range    Calcium Ionized 4.6 4.4 - 5.2 mg/dL   Glucose by meter   Result Value Ref Range    GLUCOSE BY METER POCT 152 (H) 70 - 99 mg/dL   Prepare red blood cells (unit)   Result Value Ref Range    CROSSMATCH Compatible     UNIT ABO/RH A Pos     Unit Number [id number]     Unit Status Issued     Blood Component Type Red Blood Cells     Product Code I2615I97     CODING SYSTEM [id number]     UNIT TYPE ISBT 6200     ISSUE DATE AND TIME [id number]        Recent Labs   Lab Test 12/08/21  1221   CHOL 113   HDL 35*   LDL 59   TRIG 94       Hemoglobin A1C   Date Value Ref Range Status   12/08/2021 5.8 (H) 0.0 - 5.6 % Final     Comment:     Normal <5.7%   Prediabetes 5.7-6.4%    Diabetes 6.5% or higher     Note: Adopted from ADA consensus guidelines.       Results for orders placed or performed during the hospital encounter of 05/25/22   XR Chest Port 1 View    Impression    IMPRESSION: [place] catheter tip projects over the central right  pulmonary artery.    I have personally reviewed the examination and initial interpretation  and I agree with the findings.    CANDACE ALMONTE MD         SYSTEM ID:  [id number]   XR Abdomen Port 1 View    Impression    IMPRESSION: Gastric tube tip and sidehole project over the stomach.    I have personally reviewed the examination and initial interpretation  and I agree with the findings.    CANDACE ALMONTE MD         SYSTEM ID:  [id number]   XR Chest Port 1 View    Impression    Impression:  Postoperative chest with slightly improved perihilar  atelectasis or edema. Endotracheal tube tip in lower trachea 2.5 cm  above the man.    I have personally reviewed the examination and initial interpretation  and I agree with the findings.    YVETTE GRAY MD         SYSTEM ID:  [id number]       Physical Exam  Vitals:    05/30/22 0630 05/30/22 0645 05/30/22 0700 05/30/22 0726   BP:       BP Location:       Cuff Size:       Pulse: 65 63 63 67   Resp: 18 18 18 18   Temp: 99.3  F (37.4  C) 99.3  F (37.4  C) 99.3  F (37.4  C)    TempSrc:       SpO2: 96% 95% 95% 99%   Weight:       Height:         Physical Exam   Constitutional: No distress. She appears chronically ill.   Neck: No JVD present.   Pulmonary/Chest: Breath sounds normal. She has bibasilar rales.   Skin: Skin is warm and dry.        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     Changes today:   - wean pressors as tolerated  - please stop tirating pressors to CO/CI on Flowtrack  - please use [place] based CO/CI  - please consider discontinuing Flowtrack given unreliable CO/CI estimates  - goal CI 2.0-2.5 (at goal today)  - goal CVP < 10 (at goal today)  - follow post [facility] pulmonary endarterectomy protocols     # Pulmonary hypertension, Group IV  # [facility] s/p pulmonary endaterectomy  # DVTs  # Severe tricuspid insufficiency  Initially diagnosed with bilateral DVT, PE 2019. TTE showed dilated RV with reduced RV function and RVSP of 84mmHg suggestive of severe pulmonary hypertension with mild to moderate TR. Patient believes the above relates to a work related injury, unna boot early 2019. RHC 12/18/2021 with RA 12, PA 84/19/45, PCWP 2, TAHIRA CO/CI 2.8/1.6. Last dose eliquis 5/23 AM. Has oxygen at home but does not use this. Underwent endarterectomy  on 5/27. Post op on multiple pressors and briefly on milrinone.   - Goal CVP < 10, diuresis as needed to achieve  - Goal CI 2.0-2.5  - holding pHTN meds in ICU     # Volume overload  PTA on lasix 20mg qday (decreased from 40mg ~6 weeks ago). JVP elevated on initial exam. S/p 40mg IV lasix 5/25        # Anemia  Hgb 11.4, similar to prior last month.         # CKD  Baseline Cr ~1.3.     Dwain Ferrer MD, MSc  Cardiovascular Disease Fellow  [facility]    Discussed with Attending

## 2022-05-30 NOTE — PLAN OF CARE
Goal Outcome Evaluation: PEEP lowered from 10 to 8, P02 down to 70 from 89-team informed, Fi02 increased from 40-50%.  Pt arouses to voice, follows commands and nods yes/no to questions.  Minimal CT output. Lasix 40mg IV given once for CVP 10. Pt in SR with different conduction patterns this morning, occasional PVC's all shift.    Plan of Care Reviewed With: son, other (see comments) (daughter in law)     Overall Patient Progress: no change

## 2022-05-31 ENCOUNTER — APPOINTMENT (OUTPATIENT)
Dept: OCCUPATIONAL THERAPY | Facility: CLINIC | Age: 77
DRG: 270 | End: 2022-05-31
Attending: THORACIC SURGERY (CARDIOTHORACIC VASCULAR SURGERY)
Payer: MEDICARE

## 2022-05-31 ENCOUNTER — APPOINTMENT (OUTPATIENT)
Dept: GENERAL RADIOLOGY | Facility: CLINIC | Age: 77
DRG: 270 | End: 2022-05-31
Attending: STUDENT IN AN ORGANIZED HEALTH CARE EDUCATION/TRAINING PROGRAM
Payer: MEDICARE

## 2022-05-31 LAB
ANION GAP SERPL CALCULATED.3IONS-SCNC: 5 MMOL/L (ref 3–14)
ATRIAL RATE - MUSE: 58 BPM
ATRIAL RATE - MUSE: 70 BPM
BASE EXCESS BLDA CALC-SCNC: 5.8 MMOL/L (ref -9–1.8)
BASE EXCESS BLDA CALC-SCNC: 5.9 MMOL/L (ref -9–1.8)
BASE EXCESS BLDA CALC-SCNC: 6.1 MMOL/L (ref -9–1.8)
BASE EXCESS BLDA CALC-SCNC: 6.1 MMOL/L (ref -9–1.8)
BASE EXCESS BLDA CALC-SCNC: 6.6 MMOL/L (ref -9–1.8)
BASE EXCESS BLDA CALC-SCNC: 7.1 MMOL/L (ref -9–1.8)
BASE EXCESS BLDA CALC-SCNC: 8.3 MMOL/L (ref -9–1.8)
BASE EXCESS BLDV CALC-SCNC: 6.9 MMOL/L (ref -7.7–1.9)
BASE EXCESS BLDV CALC-SCNC: 6.9 MMOL/L (ref -7.7–1.9)
BASE EXCESS BLDV CALC-SCNC: 7.5 MMOL/L (ref -7.7–1.9)
BASE EXCESS BLDV CALC-SCNC: 7.9 MMOL/L (ref -7.7–1.9)
BUN SERPL-MCNC: 24 MG/DL (ref 7–30)
CALCIUM SERPL-MCNC: 8.5 MG/DL (ref 8.5–10.1)
CHLORIDE BLD-SCNC: 107 MMOL/L (ref 94–109)
CO2 SERPL-SCNC: 30 MMOL/L (ref 20–32)
CREAT SERPL-MCNC: 0.98 MG/DL (ref 0.52–1.04)
DIASTOLIC BLOOD PRESSURE - MUSE: NORMAL MMHG
DIASTOLIC BLOOD PRESSURE - MUSE: NORMAL MMHG
ERYTHROCYTE [DISTWIDTH] IN BLOOD BY AUTOMATED COUNT: 17 % (ref 10–15)
GFR SERPL CREATININE-BSD FRML MDRD: 59 ML/MIN/1.73M2
GLUCOSE BLD-MCNC: 144 MG/DL (ref 70–99)
GLUCOSE BLDC GLUCOMTR-MCNC: 109 MG/DL (ref 70–99)
GLUCOSE BLDC GLUCOMTR-MCNC: 110 MG/DL (ref 70–99)
GLUCOSE BLDC GLUCOMTR-MCNC: 111 MG/DL (ref 70–99)
GLUCOSE BLDC GLUCOMTR-MCNC: 122 MG/DL (ref 70–99)
GLUCOSE BLDC GLUCOMTR-MCNC: 128 MG/DL (ref 70–99)
GLUCOSE BLDC GLUCOMTR-MCNC: 134 MG/DL (ref 70–99)
GLUCOSE BLDC GLUCOMTR-MCNC: 144 MG/DL (ref 70–99)
HCO3 BLD-SCNC: 30 MMOL/L (ref 21–28)
HCO3 BLD-SCNC: 31 MMOL/L (ref 21–28)
HCO3 BLD-SCNC: 33 MMOL/L (ref 21–28)
HCO3 BLDV-SCNC: 31 MMOL/L (ref 21–28)
HCO3 BLDV-SCNC: 32 MMOL/L (ref 21–28)
HCO3 BLDV-SCNC: 33 MMOL/L (ref 21–28)
HCO3 BLDV-SCNC: 33 MMOL/L (ref 21–28)
HCT VFR BLD AUTO: 25.6 % (ref 35–47)
HGB BLD-MCNC: 8.2 G/DL (ref 11.7–15.7)
INTERPRETATION ECG - MUSE: NORMAL
INTERPRETATION ECG - MUSE: NORMAL
MAGNESIUM SERPL-MCNC: 1.8 MG/DL (ref 1.6–2.3)
MAGNESIUM SERPL-MCNC: 2.2 MG/DL (ref 1.6–2.3)
MCH RBC QN AUTO: 28.2 PG (ref 26.5–33)
MCHC RBC AUTO-ENTMCNC: 32 G/DL (ref 31.5–36.5)
MCV RBC AUTO: 88 FL (ref 78–100)
O2/TOTAL GAS SETTING VFR VENT: 40 %
O2/TOTAL GAS SETTING VFR VENT: 50 %
OXYHGB MFR BLD: 91 % (ref 92–100)
OXYHGB MFR BLD: 93 % (ref 92–100)
OXYHGB MFR BLD: 94 % (ref 92–100)
OXYHGB MFR BLD: 95 % (ref 92–100)
OXYHGB MFR BLD: 95 % (ref 92–100)
OXYHGB MFR BLD: 97 % (ref 92–100)
OXYHGB MFR BLD: 98 % (ref 92–100)
OXYHGB MFR BLDV: 52 % (ref 70–75)
OXYHGB MFR BLDV: 56 % (ref 70–75)
OXYHGB MFR BLDV: 57 % (ref 70–75)
OXYHGB MFR BLDV: 60 % (ref 70–75)
P AXIS - MUSE: 47 DEGREES
P AXIS - MUSE: 56 DEGREES
PCO2 BLD: 41 MM HG (ref 35–45)
PCO2 BLD: 42 MM HG (ref 35–45)
PCO2 BLD: 43 MM HG (ref 35–45)
PCO2 BLD: 43 MM HG (ref 35–45)
PCO2 BLD: 44 MM HG (ref 35–45)
PCO2 BLD: 45 MM HG (ref 35–45)
PCO2 BLD: 46 MM HG (ref 35–45)
PCO2 BLDV: 44 MM HG (ref 40–50)
PCO2 BLDV: 48 MM HG (ref 40–50)
PCO2 BLDV: 50 MM HG (ref 40–50)
PCO2 BLDV: 50 MM HG (ref 40–50)
PH BLD: 7.44 [PH] (ref 7.35–7.45)
PH BLD: 7.44 [PH] (ref 7.35–7.45)
PH BLD: 7.45 [PH] (ref 7.35–7.45)
PH BLD: 7.45 [PH] (ref 7.35–7.45)
PH BLD: 7.48 [PH] (ref 7.35–7.45)
PH BLD: 7.48 [PH] (ref 7.35–7.45)
PH BLD: 7.49 [PH] (ref 7.35–7.45)
PH BLDV: 7.42 [PH] (ref 7.32–7.43)
PH BLDV: 7.43 [PH] (ref 7.32–7.43)
PH BLDV: 7.45 [PH] (ref 7.32–7.43)
PH BLDV: 7.46 [PH] (ref 7.32–7.43)
PLATELET # BLD AUTO: 114 10E3/UL (ref 150–450)
PO2 BLD: 126 MM HG (ref 80–105)
PO2 BLD: 131 MM HG (ref 80–105)
PO2 BLD: 58 MM HG (ref 80–105)
PO2 BLD: 65 MM HG (ref 80–105)
PO2 BLD: 66 MM HG (ref 80–105)
PO2 BLD: 78 MM HG (ref 80–105)
PO2 BLD: 80 MM HG (ref 80–105)
PO2 BLDV: 28 MM HG (ref 25–47)
PO2 BLDV: 29 MM HG (ref 25–47)
PO2 BLDV: 30 MM HG (ref 25–47)
PO2 BLDV: 31 MM HG (ref 25–47)
POTASSIUM BLD-SCNC: 3.4 MMOL/L (ref 3.4–5.3)
POTASSIUM BLD-SCNC: 3.4 MMOL/L (ref 3.4–5.3)
PR INTERVAL - MUSE: 176 MS
PR INTERVAL - MUSE: 208 MS
QRS DURATION - MUSE: 74 MS
QRS DURATION - MUSE: 82 MS
QT - MUSE: 428 MS
QT - MUSE: 498 MS
QTC - MUSE: 462 MS
QTC - MUSE: 488 MS
R AXIS - MUSE: 104 DEGREES
R AXIS - MUSE: 99 DEGREES
RBC # BLD AUTO: 2.91 10E6/UL (ref 3.8–5.2)
SODIUM SERPL-SCNC: 142 MMOL/L (ref 133–144)
SYSTOLIC BLOOD PRESSURE - MUSE: NORMAL MMHG
SYSTOLIC BLOOD PRESSURE - MUSE: NORMAL MMHG
T AXIS - MUSE: 20 DEGREES
T AXIS - MUSE: 43 DEGREES
UFH PPP CHRO-ACNC: 0.3 IU/ML
VENTRICULAR RATE- MUSE: 58 BPM
VENTRICULAR RATE- MUSE: 70 BPM
WBC # BLD AUTO: 12 10E3/UL (ref 4–11)

## 2022-05-31 PROCEDURE — 250N000009 HC RX 250: Performed by: SURGERY

## 2022-05-31 PROCEDURE — 85027 COMPLETE CBC AUTOMATED: CPT | Performed by: STUDENT IN AN ORGANIZED HEALTH CARE EDUCATION/TRAINING PROGRAM

## 2022-05-31 PROCEDURE — 250N000009 HC RX 250

## 2022-05-31 PROCEDURE — 250N000011 HC RX IP 250 OP 636: Performed by: THORACIC SURGERY (CARDIOTHORACIC VASCULAR SURGERY)

## 2022-05-31 PROCEDURE — 94660 CPAP INITIATION&MGMT: CPT

## 2022-05-31 PROCEDURE — 82805 BLOOD GASES W/O2 SATURATION: CPT | Performed by: THORACIC SURGERY (CARDIOTHORACIC VASCULAR SURGERY)

## 2022-05-31 PROCEDURE — 83735 ASSAY OF MAGNESIUM: CPT | Performed by: THORACIC SURGERY (CARDIOTHORACIC VASCULAR SURGERY)

## 2022-05-31 PROCEDURE — 94640 AIRWAY INHALATION TREATMENT: CPT | Mod: 76

## 2022-05-31 PROCEDURE — 250N000009 HC RX 250: Performed by: THORACIC SURGERY (CARDIOTHORACIC VASCULAR SURGERY)

## 2022-05-31 PROCEDURE — 250N000011 HC RX IP 250 OP 636

## 2022-05-31 PROCEDURE — 83735 ASSAY OF MAGNESIUM: CPT | Performed by: STUDENT IN AN ORGANIZED HEALTH CARE EDUCATION/TRAINING PROGRAM

## 2022-05-31 PROCEDURE — 999N000045 HC STATISTIC DAILY SWAN MONITORING

## 2022-05-31 PROCEDURE — 999N000155 HC STATISTIC RAPCV CVP MONITORING

## 2022-05-31 PROCEDURE — 999N000157 HC STATISTIC RCP TIME EA 10 MIN

## 2022-05-31 PROCEDURE — 71045 X-RAY EXAM CHEST 1 VIEW: CPT | Mod: 26 | Performed by: RADIOLOGY

## 2022-05-31 PROCEDURE — 99291 CRITICAL CARE FIRST HOUR: CPT | Mod: 24 | Performed by: INTERNAL MEDICINE

## 2022-05-31 PROCEDURE — 250N000011 HC RX IP 250 OP 636: Performed by: SURGERY

## 2022-05-31 PROCEDURE — 200N000002 HC R&B ICU UMMC

## 2022-05-31 PROCEDURE — 250N000013 HC RX MED GY IP 250 OP 250 PS 637

## 2022-05-31 PROCEDURE — 80048 BASIC METABOLIC PNL TOTAL CA: CPT | Performed by: STUDENT IN AN ORGANIZED HEALTH CARE EDUCATION/TRAINING PROGRAM

## 2022-05-31 PROCEDURE — 84132 ASSAY OF SERUM POTASSIUM: CPT | Performed by: THORACIC SURGERY (CARDIOTHORACIC VASCULAR SURGERY)

## 2022-05-31 PROCEDURE — 82805 BLOOD GASES W/O2 SATURATION: CPT | Performed by: STUDENT IN AN ORGANIZED HEALTH CARE EDUCATION/TRAINING PROGRAM

## 2022-05-31 PROCEDURE — 999N000253 HC STATISTIC WEANING TRIALS

## 2022-05-31 PROCEDURE — 99291 CRITICAL CARE FIRST HOUR: CPT | Mod: GC | Performed by: INTERNAL MEDICINE

## 2022-05-31 PROCEDURE — 258N000003 HC RX IP 258 OP 636: Performed by: THORACIC SURGERY (CARDIOTHORACIC VASCULAR SURGERY)

## 2022-05-31 PROCEDURE — 250N000011 HC RX IP 250 OP 636: Performed by: STUDENT IN AN ORGANIZED HEALTH CARE EDUCATION/TRAINING PROGRAM

## 2022-05-31 PROCEDURE — 999N000015 HC STATISTIC ARTERIAL MONITORING DAILY

## 2022-05-31 PROCEDURE — 94003 VENT MGMT INPAT SUBQ DAY: CPT

## 2022-05-31 PROCEDURE — 85520 HEPARIN ASSAY: CPT | Performed by: THORACIC SURGERY (CARDIOTHORACIC VASCULAR SURGERY)

## 2022-05-31 PROCEDURE — 250N000013 HC RX MED GY IP 250 OP 250 PS 637: Performed by: THORACIC SURGERY (CARDIOTHORACIC VASCULAR SURGERY)

## 2022-05-31 PROCEDURE — 94640 AIRWAY INHALATION TREATMENT: CPT

## 2022-05-31 PROCEDURE — 999N000259 HC STATISTIC EXTUBATION

## 2022-05-31 PROCEDURE — 71045 X-RAY EXAM CHEST 1 VIEW: CPT

## 2022-05-31 PROCEDURE — 97535 SELF CARE MNGMENT TRAINING: CPT | Mod: GO | Performed by: OCCUPATIONAL THERAPIST

## 2022-05-31 PROCEDURE — 82805 BLOOD GASES W/O2 SATURATION: CPT

## 2022-05-31 PROCEDURE — 97165 OT EVAL LOW COMPLEX 30 MIN: CPT | Mod: GO | Performed by: OCCUPATIONAL THERAPIST

## 2022-05-31 RX ORDER — LEVOFLOXACIN 5 MG/ML
750 INJECTION, SOLUTION INTRAVENOUS EVERY 24 HOURS
Status: DISCONTINUED | OUTPATIENT
Start: 2022-05-31 | End: 2022-06-01

## 2022-05-31 RX ORDER — IPRATROPIUM BROMIDE AND ALBUTEROL SULFATE 2.5; .5 MG/3ML; MG/3ML
3 SOLUTION RESPIRATORY (INHALATION) EVERY 4 HOURS PRN
Status: DISCONTINUED | OUTPATIENT
Start: 2022-05-31 | End: 2022-06-25 | Stop reason: HOSPADM

## 2022-05-31 RX ORDER — FUROSEMIDE 10 MG/ML
60 INJECTION INTRAMUSCULAR; INTRAVENOUS 2 TIMES DAILY
Status: COMPLETED | OUTPATIENT
Start: 2022-05-31 | End: 2022-05-31

## 2022-05-31 RX ORDER — MAGNESIUM SULFATE HEPTAHYDRATE 40 MG/ML
2 INJECTION, SOLUTION INTRAVENOUS ONCE
Status: COMPLETED | OUTPATIENT
Start: 2022-05-31 | End: 2022-05-31

## 2022-05-31 RX ORDER — POTASSIUM CHLORIDE 29.8 MG/ML
20 INJECTION INTRAVENOUS
Status: COMPLETED | OUTPATIENT
Start: 2022-05-31 | End: 2022-05-31

## 2022-05-31 RX ORDER — PIPERACILLIN SODIUM, TAZOBACTAM SODIUM 4; .5 G/20ML; G/20ML
4.5 INJECTION, POWDER, LYOPHILIZED, FOR SOLUTION INTRAVENOUS EVERY 6 HOURS
Status: DISCONTINUED | OUTPATIENT
Start: 2022-05-31 | End: 2022-06-02

## 2022-05-31 RX ORDER — FUROSEMIDE 10 MG/ML
60 INJECTION INTRAMUSCULAR; INTRAVENOUS ONCE
Status: DISCONTINUED | OUTPATIENT
Start: 2022-05-31 | End: 2022-05-31

## 2022-05-31 RX ORDER — IPRATROPIUM BROMIDE AND ALBUTEROL SULFATE 2.5; .5 MG/3ML; MG/3ML
3 SOLUTION RESPIRATORY (INHALATION)
Status: DISCONTINUED | OUTPATIENT
Start: 2022-05-31 | End: 2022-06-03

## 2022-05-31 RX ADMIN — FUROSEMIDE 60 MG: 10 INJECTION, SOLUTION INTRAVENOUS at 12:19

## 2022-05-31 RX ADMIN — LEVOFLOXACIN 750 MG: 5 INJECTION, SOLUTION INTRAVENOUS at 12:20

## 2022-05-31 RX ADMIN — IPRATROPIUM BROMIDE AND ALBUTEROL SULFATE 3 ML: 2.5; .5 SOLUTION RESPIRATORY (INHALATION) at 16:01

## 2022-05-31 RX ADMIN — POTASSIUM CHLORIDE 20 MEQ: 29.8 INJECTION, SOLUTION INTRAVENOUS at 20:38

## 2022-05-31 RX ADMIN — IPRATROPIUM BROMIDE AND ALBUTEROL SULFATE 3 ML: 2.5; .5 SOLUTION RESPIRATORY (INHALATION) at 09:18

## 2022-05-31 RX ADMIN — HYDROMORPHONE HYDROCHLORIDE 0.2 MG: 0.2 INJECTION, SOLUTION INTRAMUSCULAR; INTRAVENOUS; SUBCUTANEOUS at 18:36

## 2022-05-31 RX ADMIN — PIPERACILLIN SODIUM AND TAZOBACTAM SODIUM 4.5 G: 4; .5 INJECTION, POWDER, LYOPHILIZED, FOR SOLUTION INTRAVENOUS at 22:08

## 2022-05-31 RX ADMIN — POTASSIUM CHLORIDE 20 MEQ: 29.8 INJECTION, SOLUTION INTRAVENOUS at 05:22

## 2022-05-31 RX ADMIN — ACETAMINOPHEN 650 MG: 325 TABLET ORAL at 00:41

## 2022-05-31 RX ADMIN — IPRATROPIUM BROMIDE AND ALBUTEROL SULFATE 3 ML: 2.5; .5 SOLUTION RESPIRATORY (INHALATION) at 19:32

## 2022-05-31 RX ADMIN — POLYETHYLENE GLYCOL 3350 17 G: 17 POWDER, FOR SOLUTION ORAL at 07:53

## 2022-05-31 RX ADMIN — IPRATROPIUM BROMIDE AND ALBUTEROL SULFATE 3 ML: 2.5; .5 SOLUTION RESPIRATORY (INHALATION) at 13:07

## 2022-05-31 RX ADMIN — DOCUSATE SODIUM 50 MG AND SENNOSIDES 8.6 MG 1 TABLET: 8.6; 5 TABLET, FILM COATED ORAL at 07:54

## 2022-05-31 RX ADMIN — PIPERACILLIN AND TAZOBACTAM 3.38 G: 3; .375 INJECTION, POWDER, LYOPHILIZED, FOR SOLUTION INTRAVENOUS at 09:31

## 2022-05-31 RX ADMIN — INSULIN ASPART 1 UNITS: 100 INJECTION, SOLUTION INTRAVENOUS; SUBCUTANEOUS at 04:29

## 2022-05-31 RX ADMIN — ACETAMINOPHEN 650 MG: 325 TABLET ORAL at 09:34

## 2022-05-31 RX ADMIN — PIPERACILLIN SODIUM AND TAZOBACTAM SODIUM 4.5 G: 4; .5 INJECTION, POWDER, LYOPHILIZED, FOR SOLUTION INTRAVENOUS at 15:27

## 2022-05-31 RX ADMIN — VANCOMYCIN HYDROCHLORIDE 1000 MG: 1 INJECTION, SOLUTION INTRAVENOUS at 10:09

## 2022-05-31 RX ADMIN — ACETAMINOPHEN 650 MG: 325 TABLET ORAL at 04:29

## 2022-05-31 RX ADMIN — ASPIRIN 81 MG 81 MG: 81 TABLET ORAL at 08:37

## 2022-05-31 RX ADMIN — Medication 3 UNITS/HR: at 17:43

## 2022-05-31 RX ADMIN — Medication 15 ML: at 07:52

## 2022-05-31 RX ADMIN — Medication 40 MG: at 07:53

## 2022-05-31 RX ADMIN — MUPIROCIN 0.5 G: 20 OINTMENT TOPICAL at 07:55

## 2022-05-31 RX ADMIN — HYDROMORPHONE HYDROCHLORIDE 0.2 MG: 0.2 INJECTION, SOLUTION INTRAMUSCULAR; INTRAVENOUS; SUBCUTANEOUS at 20:18

## 2022-05-31 RX ADMIN — PIPERACILLIN AND TAZOBACTAM 3.38 G: 3; .375 INJECTION, POWDER, LYOPHILIZED, FOR SOLUTION INTRAVENOUS at 04:29

## 2022-05-31 RX ADMIN — POTASSIUM CHLORIDE 20 MEQ: 29.8 INJECTION, SOLUTION INTRAVENOUS at 21:37

## 2022-05-31 RX ADMIN — Medication 1 PACKET: at 07:54

## 2022-05-31 RX ADMIN — FUROSEMIDE 60 MG: 10 INJECTION, SOLUTION INTRAVENOUS at 20:19

## 2022-05-31 RX ADMIN — POTASSIUM CHLORIDE 20 MEQ: 29.8 INJECTION, SOLUTION INTRAVENOUS at 06:29

## 2022-05-31 RX ADMIN — Medication 3 UNITS/HR: at 04:26

## 2022-05-31 RX ADMIN — MAGNESIUM SULFATE IN WATER 2 G: 40 INJECTION, SOLUTION INTRAVENOUS at 21:09

## 2022-05-31 RX ADMIN — HYDROMORPHONE HYDROCHLORIDE 0.2 MG: 0.2 INJECTION, SOLUTION INTRAMUSCULAR; INTRAVENOUS; SUBCUTANEOUS at 22:52

## 2022-05-31 ASSESSMENT — ACTIVITIES OF DAILY LIVING (ADL)
ADLS_ACUITY_SCORE: 34
ADLS_ACUITY_SCORE: 34
ADLS_ACUITY_SCORE: 38
ADLS_ACUITY_SCORE: 38
ADLS_ACUITY_SCORE: 34
ADLS_ACUITY_SCORE: 34
ADLS_ACUITY_SCORE: 38
ADLS_ACUITY_SCORE: 34
ADLS_ACUITY_SCORE: 34
ADLS_ACUITY_SCORE: 38
PREVIOUS_RESPONSIBILITIES: HOUSEKEEPING;MEAL PREP;LAUNDRY;SHOPPING;YARDWORK;MEDICATION MANAGEMENT;FINANCES;DRIVING
ADLS_ACUITY_SCORE: 34
ADLS_ACUITY_SCORE: 34

## 2022-05-31 NOTE — PROGRESS NOTES
[facility] PROGRESS NOTE  5/31/2022      CO-MORBIDITIES:   CTEPH (chronic thromboembolic pulmonary hypertension) (H)  (primary encounter diagnosis)  Primary pulmonary hypertension (H)  SOB (shortness of breath)  S/P coronary angiogram    ASSESSMENT: Debi Espinoza is a 76 year old female with PMH of HTN, nephrolithiasis, DVT (2019) and PE (2019, 2021) on chronic anticoagulation, and chronic thromboembolic pulmonary hypertension who underwent pulmonary artery thromboendarterectomy on 5/27 with Dr. Peterson.    TODAY'S PROGRESS:   - PST; plan for extubation today pending repeat ABG  - Lasix 60 mg BID today   - Discontinue Vancomycin  - Continue Zosyn  - Start Levofloxacin    PLAN:  Neuro/ pain/ sedation:  Acute Postoperative pain  - Monitor neurological status. Notify the MD for any acute changes in exam.  - Pain: fentanyl gtt.   - Sedation: propofol gtt  - Daily sedation vacation     Pulmonary care:   S/p Pulmonary artery thromboendarterectomy on 5/27/2022 by Dr. Peterson  Hx Chronic pulmonary thromboembolic disease  Hx PE (2019, 2021)  Hx Emphysema, moderate  PTA regimen: apixaban 5mg BID  CTA 05/2019 demonstrated underlying moderate emphysema  - Intubated, PST 5 over 5 this morning; potential extubation today  - Lung protective vent settings  - PEEP stable at 8  - Trend ABGs  - Will wean further tomorrow     Cardiovascular:    Hx Severe pulmonary hypertension  Hx RV dilation and reduced RV function  Hx Severe tricuspid insufficiency  Hx Essential HTN  Distributive shock  PTA regimen: furosemide 20mg qd, adempas 2.5mg TID, opsumit 10mg qd  Echo on 03/2021 with LVEF of 78%, severe pulmonary hypertension (82 mmHg), reduced RV function (TAPSE 16mm), severe tricuspid insufficiency  - Monitor hemodynamic status.   - ASA 81  - Phenylephrine, vasopressin, levophed, avoid epi if able  - CVP goal <10, MAP goal > 65  - Cardiac index goal 2.0-2.5  - V pacing back up 50 BPM  - Heparin gtt    GI care/ Nutrition:   - NPO   - Trickle  feeds via OGT  - Plan for post pyloric feeding tube if no extubation today  - PPI    Renal/ Fluid Balance/ Electrolytes:   Nephrolithiasis  PTA regimen: furosemide 20mg qd  BL creat appears to be ~ 1.14  - Goal for net negative today, no more than -1 L   - Will follow and diurese as needed with CVP goal <10  - Strict I/O, daily weights  - Avoid/limit nephrotoxins as able  - Replete lytes PRN per protocol     Endocrine:    Stress induced hyperglycemia  Preop A1c 5.8 (12/21)  - High sliding scale insulin   - Goal BG <180 for optimal healing     ID/ Antibiotics:  Stress induced leukocytosis  Fever to 38.5 on 5/28 - afebrile now  Distributive shock  5/28 Blood cultures negative  5/30 MRSA swab negative  5/31 Sputum culture with 4+ lactose fermenting gram negative rods  5/31 Repeat blood cultures NGTD @ 1 day  Afebrile today, WBC down trending.   - Continue to monitor fever curve, WBC and inflammatory markers as appropriate  - Discontinue Vancomycin  - Continue Zosyn  - Start Levofloxacin 750 daily x7 days     Heme:     Stress induced leukocytosis  Acute blood loss anemia  Acute blood loss thrombocytopenia  Hx DVT (2019), PE (2019, 2021) on chronic anticoagulation  PA tear intra-op s/p patch repair.  - Continue to monitor  - Low intensity heparin gtt, no bolus  - Daily CBC      MSK/ Skin:  Sternotomy  Surgical Incision  - Sternal precautions  - Postoperative incision management per protocol  - PT/OT/CR     Prophylaxis:    - Mechanical prophylaxis for DVT  - Chemical DVT prophylaxis - low intensity heparin gtt  - PPI     Lines/ tubes/ drains:  - Arterial Line, ETT, CTs, PA catheter, Left IJ, ly, OG     Disposition:  - [facility]    Patient seen, findings and plan discussed with [facility] ICU staff, Dr. Go.    Anuj Lozano MD  PGY1 N Surgery  5/31/2022  [phone]    ====================================    SUBJECTIVE:   Afebrile. NAEO.     OBJECTIVE:   1. VITAL SIGNS:   Temp:  [98.2  F (36.8  C)-100.2  F (37.9  C)]  98.96  F (37.2  C)  Pulse:  [54-68] 68  Resp:  [18-21] 21  MAP:  [57 mmHg-88 mmHg] 67 mmHg  Arterial Line BP: ()/(37-58) 107/45  FiO2 (%):  [40 %-60 %] 40 %  SpO2:  [90 %-100 %] 100 %  Vent Mode: CMV/AC  (Continuous Mandatory Ventilation/ Assist Control)  FiO2 (%): 40 %  Resp Rate (Set): 18 breaths/min  Tidal Volume (Set, mL): 360 mL  PEEP (cm H2O): 8 cmH2O  Resp: 21    2. INTAKE/ OUTPUT:   I/O last 3 completed shifts:  In: 3358.76 [I.V.:1912.76; NG/GT:480]  Out: 3492 [Urine:3312; Chest Tube:180]    3. PHYSICAL EXAMINATION:   General: lying in bed, appears comfortable  Neuro: intubated, sedated  Resp: Mechanically ventilated, chest tubes with serosanguinous output  CV: RRR  Abdomen: Soft, Non-distended, Non-tender  Incisions: clean dry intact  Extremities: warm and well perfused    4. INVESTIGATIONS:   Arterial Blood Gases   Recent Labs   Lab 05/31/22  0759 05/31/22  0545 05/31/22  0357 05/31/22  0205   PH 7.45 7.44 7.48* 7.45   PCO2 44 46* 42 43   PO2 78* 80 131* 126*   HCO3 31* 31* 31* 30*     Complete Blood Count   Recent Labs   Lab 05/31/22  0357 05/30/22  1244 05/30/22  0354 05/30/22  0058 05/29/22  0732   WBC 12.0*  --  12.6* 13.6* 12.6*   HGB 8.2* 8.2* 6.9* 7.3* 8.5*  8.5*   *  --  106* 113* 123*     Basic Metabolic Panel  Recent Labs   Lab 05/31/22  0842 05/31/22  0413 05/31/22  0357 05/31/22  0022 05/30/22  2210 05/30/22  1952 05/30/22  1806 05/30/22  0409 05/30/22  0354 05/29/22  0356 05/29/22  0343 05/28/22  0357 05/28/22  0349   NA  --   --  142  --   --   --   --   --  144  --  145*  --  146*   POTASSIUM  --   --  3.4  --  4.0  --  3.4  --  3.5  --  4.3   < > 4.4   CHLORIDE  --   --  107  --   --   --   --   --  110*  --  112*  --  113*   CO2  --   --  30  --   --   --   --   --  27  --  26  --  24   BUN  --   --  24  --   --   --   --   --  29  --  24  --  19   CR  --   --  0.98  --   --   --   --   --  1.19*  --  1.09*  --  1.06*   * 144* 144* 128*  --    < >  --    < > 162*   < >  129*   < > 151*    < > = values in this interval not displayed.     Liver Function Tests  Recent Labs   Lab 05/27/22 2156 05/27/22 1931 05/27/22 1758 05/27/22  1604 05/25/22  1622   AST 47* 36  --   --  16   ALT 14 11  --   --  16   ALKPHOS 48 37*  --   --  75   BILITOTAL 1.1 1.2  --   --  0.7   ALBUMIN 2.5* 1.8*  --   --  3.4   INR 1.52* 2.20* 2.40* 1.71* 1.13     Pancreatic Enzymes  No lab results found in last 7 days.  Coagulation Profile  Recent Labs   Lab 05/28/22  1057 05/28/22  0349 05/27/22 2156 05/27/22 1931 05/27/22 1758 05/27/22  1604   INR  --   --  1.52* 2.20* 2.40* 1.71*   PTT 59* 60* 60* 63* 76* 89*     5. RADIOLOGY:   Recent Results (from the past 24 hour(s))   XR Chest Port 1 View    Narrative    Exam: XR CHEST PORT 1 VIEW, 5/31/2022 2:24 AM    Indication: ETT and swan placement    Comparison: 5/30/2022    Findings:   Median sternotomy wires. Endotracheal tube tip is over the lower  thoracic trachea, unchanged. Enteric tube courses past the diaphragm  with tip excluded from the field-of-view. Pulmonary catheter tip  unchanged over the right pulmonary artery. Bilateral chest tubes and  mediastinal drain in place.    Cardiomediastinal silhouette is unchanged. Small bilateral pleural  effusions. No appreciable pneumothorax. Bibasilar predominant  opacities are unchanged.      Impression    Impression:   1. Unchanged small effusions and bibasilar predominant opacities.  2. Unchanged support devices including pulmonary artery catheter and  ET tube over the low thoracic trachea.    I have personally reviewed the examination and initial interpretation  and I agree with the findings.    JESSICA WALKER MD         SYSTEM ID:  [id number]       =========================================

## 2022-05-31 NOTE — PLAN OF CARE
Shift Summary [id number]      Major Shift Events:    Pt remains intubated/sedated on prop/fent, RASS -2, follows commands, PERRLA. PEEP increased to 10 d/t decreased PO2, FIO2 increased. CVP was 10, 40 lasix given, recheck gas revealed increase in PO2, PEEP decreased back to 8, continuing to wean FIO2 as tolerated. Levo, nasir, vaso remain for hemodynamic support. Pt bradycardic, but tolerating, team aware. Win remains w/adequate output, overall trending net negative. No BM, but passing gas. At start of shift pt increased in cough and appeared to have TF colored sputum, TF paused and restarted at have rate of 15 at 0000, team aware. No new skin issues present during shift. Pt didn't tolerate turning to right side, left side and supine turns done t/o night.    Potassium  replaced per protocol x1    Plan: continue current  For vital signs and complete assessments, please see documentation flowsheets.

## 2022-05-31 NOTE — PROGRESS NOTES
Patient suctioned and electively extubated per physician order. Placed on Oxyplus Mask @ 10 LPM, breath sounds were crackles, wheezes, coarse. Patient tolerated procedure well without any immediate complications.nebulizer treatment given post extubation.    Abril Lewis, RRT, RT  5/31/2022 1:20 PM

## 2022-05-31 NOTE — PROGRESS NOTES
05/31/22 1600   Quick Adds   Type of Visit Initial Occupational Therapy Evaluation   Living Environment   People in Home alone   Current Living Arrangements house   Home Accessibility stairs within home   Number of Stairs, Within Home, Primary one   Transportation Anticipated car, drives self   Self-Care   Usual Activity Tolerance good   Current Activity Tolerance poor   Regular Exercise Yes   Activity/Exercise Type walking   Exercise Amount/Frequency daily   Equipment Currently Used at Home none   Instrumental Activities of Daily Living (IADL)   Previous Responsibilities housekeeping;meal prep;laundry;shopping;yardwork;medication management;finances;driving   General Information   Referring Physician Geraldo Peterson   Patient/Family Therapy Goal Statement (OT) return to home   Additional Occupational Profile Info/Pertinent History of Current Problem Debi Espinoza is a 76 year old female with PMH of HTN, nephrolithiasis, DVT (2019) and PE (2019, 2021) on chronic anticoagulation, and chronic thromboembolic pulmonary hypertension who underwent pulmonary artery thromboendarterectomy on 5/27 with Dr. Peterson.   Existing Precautions/Restrictions fall;cardiac;sternal   General Observations and Info pt supine, 7 LPM oximask.   Cognitive Status Examination   Orientation Status orientation to person, place and time   Cognitive Status Comments pt moderately lethargic, following all commands and following POC, will continue to monitor   Visual Perception   Visual Impairment/Limitations WFL   Sensory   Sensory Quick Adds No deficits were identified   Range of Motion Comprehensive   General Range of Motion other (see comments)   Comment, General Range of Motion PROM B UE WFL   Strength Comprehensive (MMT)   General Manual Muscle Testing (MMT) Assessment other (see comments)   Comment, General Manual Muscle Testing (MMT) Assessment overall deconditioning.   Coordination   Coordination Comments NT   Bed Mobility   Bed Mobility  rolling left;rolling right   Rolling Left [place] (Bed Mobility) maximum assist (25% patient effort)   Rolling Right [place] (Bed Mobility) maximum assist (25% patient effort)   Activities of Daily Living   BADL Assessment/Intervention lower body dressing;toileting   Lower Body Dressing Assessment/Training   [place] Level (Lower Body Dressing) dependent (less than 25% patient effort)   Toileting   [place] Level (Toileting) dependent (less than 25% patient effort)   Clinical Impression   Criteria for Skilled Therapeutic Interventions Met (OT) Yes, treatment indicated   OT Diagnosis decreaserd ADL I   Assessment of Occupational Performance 5 or more Performance Deficits   Identified Performance Deficits dressing, bathing, toileting, housekeeping, leisure.   Planned Therapy Interventions (OT) ADL retraining;IADL retraining;bed mobility training;cognition;ROM;strengthening;transfer training;home program guidelines;progressive activity/exercise;risk factor education   Clinical Decision Making Complexity (OT) low complexity   Risk & Benefits of therapy have been explained evaluation/treatment results reviewed;care plan/treatment goals reviewed;risks/benefits reviewed;current/potential barriers reviewed;participants voiced agreement with care plan;participants included;patient;spouse/significant other   Clinical Impression Comments Pt presents to OT with post surgical precautions and deconditioning leading to decreased ADL I.   OT Discharge Planning   OT Discharge Recommendation (DC Rec) Transitional Care Facility   OT Rationale for DC Rec pt below baseline in ADL I   OT Brief overview of current status Pt not tolerating mobility in bed today, following commands.   Total Evaluation Time (Minutes)   Total Evaluation Time (Minutes) 5   OT Goals   Therapy Frequency (OT) 5 times/wk   OT Predicted Duration/Target Date for Goal Attainment 06/13/22   OT Goals Upper Body Dressing;Lower Body  Dressing;Hygiene/Grooming;Toilet Transfer/Toileting;Cardiac Phase 1   OT: Hygiene/Grooming modified independent;within precautions;while standing   OT: Upper Body Dressing Modified independent;within precautions;including set-up/clothing retrieval   OT: Lower Body Dressing Modified independent;within precautions;including set-up/clothing retrieval   OT: Toilet Transfer/Toileting Modified independent;toilet transfer;cleaning and garment management;within precautions   OT: Understanding of cardiac education to maximize quality of life, condition management, and health outcomes Patient   OT: Perform aerobic activity with stable cardiovascular response continuous;15 minutes;ambulation;NuStep   OT: Functional/aerobic ambulation tolerance with stable cardiovascular response in order to return to home and community environment Modified independent;Greater than 300 feet   OT: Navigation of stairs simulating home set up with stable cardiovascular response in order to return to home and community environment Modified independent;1 stair

## 2022-05-31 NOTE — PROGRESS NOTES
Cardiology Fellow Brief Progress Note  _________________________________         Interval Events  ----------------------  Pressor requirement weaned overnight  PEEP increased to 10, then decreased to 8    Current Medications  ----------------------  Current Facility-Administered Medications   Medication     acetaminophen (TYLENOL) tablet 650 mg     aspirin (ASA) chewable tablet 81 mg     bisacodyl (DULCOLAX) Suppository 10 mg     dextrose 10% infusion     glucose gel 15-30 g    Or     dextrose 50 % injection 25-50 mL    Or     glucagon injection 1 mg     fentaNYL (SUBLIMAZE) infusion     furosemide (LASIX) injection 60 mg     gabapentin (NEURONTIN) capsule 100 mg     heparin infusion 25,000 units in D5W 250 mL ANTICOAGULANT     hydrALAZINE (APRESOLINE) injection 10 mg     HYDROmorphone (DILAUDID) injection 0.2 mg     hydrOXYzine (ATARAX) tablet 25-50 mg     insulin aspart (NovoLOG) injection (RAPID ACTING)     ipratropium - albuterol 0.5 mg/2.5 mg/3 mL (DUONEB) neb solution 3 mL     ipratropium - albuterol 0.5 mg/2.5 mg/3 mL (DUONEB) neb solution 3 mL     levofloxacin (LEVAQUIN) infusion 750 mg     lidocaine (LMX4) cream     lidocaine (LMX4) cream     lidocaine 1 % 0.1-1 mL     lidocaine 1 % 0.1-1 mL     lidocaine-prilocaine (EMLA) cream     magnesium hydroxide (MILK OF MAGNESIA) suspension 30 mL     medication instruction     melatonin tablet 3 mg     methocarbamol (ROBAXIN) tablet 500 mg     multivitamins w/minerals liquid 15 mL     naloxone (NARCAN) injection 0.2 mg    Or     naloxone (NARCAN) injection 0.4 mg    Or     naloxone (NARCAN) injection 0.2 mg    Or     naloxone (NARCAN) injection 0.4 mg     norepinephrine (LEVOPHED) 16 mg in  mL infusion MAX CONC CENTRAL LINE     ondansetron (ZOFRAN ODT) ODT tab 4 mg    Or     ondansetron (ZOFRAN) injection 4 mg     oxyCODONE (ROXICODONE) tablet 5 mg    Or     oxyCODONE IR (ROXICODONE) tablet 10 mg     pantoprazole (PROTONIX) 2 mg/mL suspension 40 mg    Or      pantoprazole (PROTONIX) EC tablet 40 mg     phenylephrine (LUISITO-SYNEPHRINE) 50 mg in NaCl 0.9 % 250 mL infusion     piperacillin-tazobactam (ZOSYN) 4.5 g vial to attach to  mL bag     polyethylene glycol (MIRALAX) Packet 17 g     prochlorperazine (COMPAZINE) injection 5 mg    Or     prochlorperazine (COMPAZINE) tablet 5 mg     propofol (DIPRIVAN) infusion     protein modular (PROSOURCE TF) 1 packet     Reason beta blocker order not selected     senna-docusate (SENOKOT-S/PERICOLACE) 8.6-50 MG per tablet 1 tablet     sodium chloride (PF) 0.9% PF flush 3 mL     sodium chloride (PF) 0.9% PF flush 3 mL     sodium chloride (PF) 0.9% PF flush 3 mL     sodium chloride (PF) 0.9% PF flush 3 mL     vasopressin 1 unit/mL MAX Conc (PITRESSIN) infusion        Intake and Output  ----------------------    Intake/Output Summary (Last 24 hours) at 5/31/2022 1510  Last data filed at 5/31/2022 1400  Gross per 24 hour   Intake 2780.31 ml   Output 3835 ml   Net -1054.69 ml         Weight  ----------------------  Wt Readings from Last 2 Encounters:   05/31/22 68.9 kg (151 lb 14.4 oz)   04/29/22 67.4 kg (148 lb 8 oz)       Objective  ----------------------  Results for orders placed or performed during the hospital encounter of 05/25/22 (from the past 24 hour(s))   Blood gas venous with oxyhemoglobin   Result Value Ref Range    pH Venous 7.39 7.32 - 7.43    pCO2 Venous 48 40 - 50 mm Hg    pO2 Venous 34 25 - 47 mm Hg    Bicarbonate Venous 29 (H) 21 - 28 mmol/L    FIO2 40     Oxyhemoglobin Venous 63 (L) 70 - 75 %    Base Excess/Deficit (+/-) 3.2 (H) -7.7 - 1.9 mmol/L   Blood gas arterial   Result Value Ref Range    pH Arterial 7.42 7.35 - 7.45    pCO2 Arterial 44 35 - 45 mm Hg    pO2 Arterial 70 (L) 80 - 105 mm Hg    FIO2 40     Bicarbonate Arterial 28 21 - 28 mmol/L    Base Excess/Deficit (+/-) 3.0 (H) -9.0 - 1.8 mmol/L   Glucose by meter   Result Value Ref Range    GLUCOSE BY METER POCT 145 (H) 70 - 99 mg/dL   Blood gas arterial    Result Value Ref Range    pH Arterial 7.43 7.35 - 7.45    pCO2 Arterial 43 35 - 45 mm Hg    pO2 Arterial 101 80 - 105 mm Hg    FIO2 50     Bicarbonate Arterial 29 (H) 21 - 28 mmol/L    Base Excess/Deficit (+/-) 3.9 (H) -9.0 - 1.8 mmol/L   Potassium whole blood (1200)   Result Value Ref Range    Potassium 3.4 3.4 - 5.3 mmol/L   Blood gas arterial with oxyhemoglobin   Result Value Ref Range    pH Arterial 7.42 7.35 - 7.45    pCO2 Arterial 47 (H) 35 - 45 mm Hg    pO2 Arterial 87 80 - 105 mm Hg    Bicarbonate Arterial 30 (H) 21 - 28 mmol/L    Oxyhemoglobin Arterial 96 92 - 100 %    Base Excess/Deficit (+/-) 5.2 (H) -9.0 - 1.8 mmol/L    FIO2 50    Glucose by meter   Result Value Ref Range    GLUCOSE BY METER POCT 158 (H) 70 - 99 mg/dL   Blood gas venous with oxyhemoglobin   Result Value Ref Range    pH Venous 7.39 7.32 - 7.43    pCO2 Venous 53 (H) 40 - 50 mm Hg    pO2 Venous 33 25 - 47 mm Hg    Bicarbonate Venous 32 (H) 21 - 28 mmol/L    FIO2 50     Oxyhemoglobin Venous 60 (L) 70 - 75 %    Base Excess/Deficit (+/-) 6.3 (H) -7.7 - 1.9 mmol/L   Potassium   Result Value Ref Range    Potassium 4.0 3.4 - 5.3 mmol/L   Blood gas arterial with oxyhemoglobin   Result Value Ref Range    pH Arterial 7.44 7.35 - 7.45    pCO2 Arterial 45 35 - 45 mm Hg    pO2 Arterial 72 (L) 80 - 105 mm Hg    Bicarbonate Arterial 30 (H) 21 - 28 mmol/L    Oxyhemoglobin Arterial 94 92 - 100 %    Base Excess/Deficit (+/-) 5.6 (H) -9.0 - 1.8 mmol/L    FIO2 50    Blood gas arterial with oxyhemoglobin   Result Value Ref Range    pH Arterial 7.44 7.35 - 7.45    pCO2 Arterial 45 35 - 45 mm Hg    pO2 Arterial 66 (L) 80 - 105 mm Hg    Bicarbonate Arterial 31 (H) 21 - 28 mmol/L    Oxyhemoglobin Arterial 93 92 - 100 %    Base Excess/Deficit (+/-) 5.9 (H) -9.0 - 1.8 mmol/L    FIO2 50    Blood gas venous with oxyhemoglobin   Result Value Ref Range    pH Venous 7.42 7.32 - 7.43    pCO2 Venous 50 40 - 50 mm Hg    pO2 Venous 29 25 - 47 mm Hg    Bicarbonate Venous 32  (H) 21 - 28 mmol/L    FIO2 50     Oxyhemoglobin Venous 52 (L) 70 - 75 %    Base Excess/Deficit (+/-) 6.9 (H) -7.7 - 1.9 mmol/L   Glucose by meter   Result Value Ref Range    GLUCOSE BY METER POCT 128 (H) 70 - 99 mg/dL   Blood gas arterial with oxyhemoglobin   Result Value Ref Range    pH Arterial 7.45 7.35 - 7.45    pCO2 Arterial 43 35 - 45 mm Hg    pO2 Arterial 126 (H) 80 - 105 mm Hg    Bicarbonate Arterial 30 (H) 21 - 28 mmol/L    Oxyhemoglobin Arterial 97 92 - 100 %    Base Excess/Deficit (+/-) 5.8 (H) -9.0 - 1.8 mmol/L    FIO2 50    XR Chest Port 1 View    Narrative    Exam: XR CHEST PORT 1 VIEW, 5/31/2022 2:24 AM    Indication: ETT and swan placement    Comparison: 5/30/2022    Findings:   Median sternotomy wires. Endotracheal tube tip is over the lower  thoracic trachea, unchanged. Enteric tube courses past the diaphragm  with tip excluded from the field-of-view. Pulmonary catheter tip  unchanged over the right pulmonary artery. Bilateral chest tubes and  mediastinal drain in place.    Cardiomediastinal silhouette is unchanged. Small bilateral pleural  effusions. No appreciable pneumothorax. Bibasilar predominant  opacities are unchanged.      Impression    Impression:   1. Unchanged small effusions and bibasilar predominant opacities.  2. Unchanged support devices including pulmonary artery catheter and  ET tube over the low thoracic trachea.    I have personally reviewed the examination and initial interpretation  and I agree with the findings.    JESSICA WALKER MD         SYSTEM ID:  [id number]   Basic metabolic panel   Result Value Ref Range    Sodium 142 133 - 144 mmol/L    Potassium 3.4 3.4 - 5.3 mmol/L    Chloride 107 94 - 109 mmol/L    Carbon Dioxide (CO2) 30 20 - 32 mmol/L    Anion Gap 5 3 - 14 mmol/L    Urea Nitrogen 24 7 - 30 mg/dL    Creatinine 0.98 0.52 - 1.04 mg/dL    Calcium 8.5 8.5 - 10.1 mg/dL    Glucose 144 (H) 70 - 99 mg/dL    GFR Estimate 59 (L) >60 mL/min/1.73m2   Magnesium   Result  Value Ref Range    Magnesium 2.2 1.6 - 2.3 mg/dL   Blood gas arterial with oxyhemoglobin   Result Value Ref Range    pH Arterial 7.48 (H) 7.35 - 7.45    pCO2 Arterial 42 35 - 45 mm Hg    pO2 Arterial 131 (H) 80 - 105 mm Hg    Bicarbonate Arterial 31 (H) 21 - 28 mmol/L    Oxyhemoglobin Arterial 98 92 - 100 %    Base Excess/Deficit (+/-) 7.1 (H) -9.0 - 1.8 mmol/L    FIO2 50    Heparin Unfractionated Anti Xa Level   Result Value Ref Range    Anti Xa Unfractionated Heparin 0.30 For Reference Range, See Comment IU/mL    Narrative    Therapeutic Range: UFH: 0.25-0.50 IU/mL for low intensity dosing,  0.30-0.70 IU/mL for high intensity dosing DVT and PE.  This test is not validated for other direct factor X inhibitors (e.g. rivaroxaban, apixaban, edoxaban, betrixaban, fondaparinux) and should not be used for monitoring of other medications.   CBC with platelets   Result Value Ref Range    WBC Count 12.0 (H) 4.0 - 11.0 10e3/uL    RBC Count 2.91 (L) 3.80 - 5.20 10e6/uL    Hemoglobin 8.2 (L) 11.7 - 15.7 g/dL    Hematocrit 25.6 (L) 35.0 - 47.0 %    MCV 88 78 - 100 fL    MCH 28.2 26.5 - 33.0 pg    MCHC 32.0 31.5 - 36.5 g/dL    RDW 17.0 (H) 10.0 - 15.0 %    Platelet Count 114 (L) 150 - 450 10e3/uL   Blood gas venous with oxyhemoglobin   Result Value Ref Range    pH Venous 7.45 (H) 7.32 - 7.43    pCO2 Venous 48 40 - 50 mm Hg    pO2 Venous 31 25 - 47 mm Hg    Bicarbonate Venous 33 (H) 21 - 28 mmol/L    FIO2 40     Oxyhemoglobin Venous 60 (L) 70 - 75 %    Base Excess/Deficit (+/-) 7.9 (H) -7.7 - 1.9 mmol/L   Glucose by meter   Result Value Ref Range    GLUCOSE BY METER POCT 144 (H) 70 - 99 mg/dL   Blood gas arterial with oxyhemoglobin   Result Value Ref Range    pH Arterial 7.44 7.35 - 7.45    pCO2 Arterial 46 (H) 35 - 45 mm Hg    pO2 Arterial 80 80 - 105 mm Hg    Bicarbonate Arterial 31 (H) 21 - 28 mmol/L    Oxyhemoglobin Arterial 95 92 - 100 %    Base Excess/Deficit (+/-) 6.1 (H) -9.0 - 1.8 mmol/L    FIO2 40    Blood gas arterial  with oxyhemoglobin   Result Value Ref Range    pH Arterial 7.45 7.35 - 7.45    pCO2 Arterial 44 35 - 45 mm Hg    pO2 Arterial 78 (L) 80 - 105 mm Hg    Bicarbonate Arterial 31 (H) 21 - 28 mmol/L    Oxyhemoglobin Arterial 95 92 - 100 %    Base Excess/Deficit (+/-) 6.1 (H) -9.0 - 1.8 mmol/L    FIO2 40    Blood gas venous with oxyhemoglobin   Result Value Ref Range    pH Venous 7.43 7.32 - 7.43    pCO2 Venous 50 40 - 50 mm Hg    pO2 Venous 30 25 - 47 mm Hg    Bicarbonate Venous 33 (H) 21 - 28 mmol/L    FIO2 40     Oxyhemoglobin Venous 57 (L) 70 - 75 %    Base Excess/Deficit (+/-) 7.5 (H) -7.7 - 1.9 mmol/L   Glucose by meter   Result Value Ref Range    GLUCOSE BY METER POCT 122 (H) 70 - 99 mg/dL   Blood gas arterial with oxyhemoglobin   Result Value Ref Range    pH Arterial 7.48 (H) 7.35 - 7.45    pCO2 Arterial 41 35 - 45 mm Hg    pO2 Arterial 65 (L) 80 - 105 mm Hg    Bicarbonate Arterial 31 (H) 21 - 28 mmol/L    Oxyhemoglobin Arterial 94 92 - 100 %    Base Excess/Deficit (+/-) 6.6 (H) -9.0 - 1.8 mmol/L    FIO2 40    Glucose by meter   Result Value Ref Range    GLUCOSE BY METER POCT 134 (H) 70 - 99 mg/dL       Recent Labs   Lab Test 12/08/21  1221   CHOL 113   HDL 35*   LDL 59   TRIG 94       Hemoglobin A1C   Date Value Ref Range Status   12/08/2021 5.8 (H) 0.0 - 5.6 % Final     Comment:     Normal <5.7%   Prediabetes 5.7-6.4%    Diabetes 6.5% or higher     Note: Adopted from ADA consensus guidelines.       Results for orders placed or performed during the hospital encounter of 05/25/22   XR Chest Port 1 View    Impression    IMPRESSION: [place] catheter tip projects over the central right  pulmonary artery.    I have personally reviewed the examination and initial interpretation  and I agree with the findings.    CANDACE ALMONTE MD         SYSTEM ID:  [id number]   XR Abdomen Port 1 View    Impression    IMPRESSION: Gastric tube tip and sidehole project over the stomach.    I have personally reviewed the examination and  initial interpretation  and I agree with the findings.    CANDACE ALMONTE MD         SYSTEM ID:  [id number]   XR Chest Port 1 View    Impression    Impression: Postoperative chest with slightly improved perihilar  atelectasis or edema. Endotracheal tube tip in lower trachea 2.5 cm  above the man.    I have personally reviewed the examination and initial interpretation  and I agree with the findings.    YVETTE GRAY MD         SYSTEM ID:  [id number]       Physical Exam  Vitals:    05/31/22 1305 05/31/22 1307 05/31/22 1335 05/31/22 1400   BP:       BP Location:       Cuff Size:       Pulse:   80 83   Resp:       Temp:       TempSrc:       SpO2: 100% 94% 90% 93%   Weight:       Height:         Physical Exam   Constitutional: No distress. She appears chronically ill.   Neck: No JVD present.   Pulmonary/Chest: Breath sounds normal. She has bibasilar rales.   Skin: Skin is warm and dry.        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     Changes today:   - wean pressors as tolerated  - goal CI 2.0-2.5 (at goal today)  - goal CVP < 10 (at goal today)  - follow post CTEPH pulmonary endarterectomy protocols  - low dose diuresis as needed to obtain CVP goal  - remaining cares per surgical ICU team     # Pulmonary hypertension, Group IV  # CTEPH s/p pulmonary endaterectomy  # DVTs  # Severe tricuspid insufficiency  Initially diagnosed with bilateral DVT, PE 2019. TTE showed dilated RV with reduced RV function and RVSP of 84mmHg suggestive of severe pulmonary hypertension with mild to moderate TR. Patient believes the above relates to a work related injury, unna boot early 2019. RHC 12/18/2021 with RA 12, PA 84/19/45, PCWP 2, TAHIRA CO/CI 2.8/1.6. Last dose eliquis 5/23 AM. Has oxygen at home but does not use this.  Underwent endarterectomy on 5/27. Post op on multiple pressors and briefly on milrinone.   - Goal CVP < 10, diuresis as needed to achieve  - Goal CI 2.0-2.5  - holding pHTN meds in ICU     # Volume overload  PTA on lasix 20mg qday (decreased from 40mg ~6 weeks ago). JVP elevated on initial exam. S/p 40mg IV lasix 5/25; repeat as needed        # Anemia  Hgb 11.4, similar to prior last month.         # CKD  Baseline Cr ~1.3.     Dwain Ferrer MD, MSc  Cardiovascular Disease Fellow  [facility]    Discussed with Attending

## 2022-06-01 ENCOUNTER — APPOINTMENT (OUTPATIENT)
Dept: GENERAL RADIOLOGY | Facility: CLINIC | Age: 77
DRG: 270 | End: 2022-06-01
Attending: INTERNAL MEDICINE
Payer: MEDICARE

## 2022-06-01 ENCOUNTER — APPOINTMENT (OUTPATIENT)
Dept: SPEECH THERAPY | Facility: CLINIC | Age: 77
DRG: 270 | End: 2022-06-01
Attending: INTERNAL MEDICINE
Payer: MEDICARE

## 2022-06-01 ENCOUNTER — APPOINTMENT (OUTPATIENT)
Dept: GENERAL RADIOLOGY | Facility: CLINIC | Age: 77
DRG: 270 | End: 2022-06-01
Attending: PHYSICIAN ASSISTANT
Payer: MEDICARE

## 2022-06-01 LAB
ANION GAP SERPL CALCULATED.3IONS-SCNC: 6 MMOL/L (ref 3–14)
ANION GAP SERPL CALCULATED.3IONS-SCNC: 7 MMOL/L (ref 3–14)
BACTERIA SPT CULT: ABNORMAL
BASE EXCESS BLDA CALC-SCNC: 6.7 MMOL/L (ref -9–1.8)
BASE EXCESS BLDA CALC-SCNC: 9.1 MMOL/L (ref -9–1.8)
BASE EXCESS BLDA CALC-SCNC: 9.2 MMOL/L (ref -9–1.8)
BASE EXCESS BLDA CALC-SCNC: 9.7 MMOL/L (ref -9–1.8)
BASE EXCESS BLDA CALC-SCNC: 9.7 MMOL/L (ref -9–1.8)
BASE EXCESS BLDV CALC-SCNC: 9.7 MMOL/L (ref -7.7–1.9)
BUN SERPL-MCNC: 24 MG/DL (ref 7–30)
BUN SERPL-MCNC: 25 MG/DL (ref 7–30)
CALCIUM SERPL-MCNC: 8.9 MG/DL (ref 8.5–10.1)
CALCIUM SERPL-MCNC: 9 MG/DL (ref 8.5–10.1)
CHLORIDE BLD-SCNC: 104 MMOL/L (ref 94–109)
CHLORIDE BLD-SCNC: 104 MMOL/L (ref 94–109)
CO2 SERPL-SCNC: 31 MMOL/L (ref 20–32)
CO2 SERPL-SCNC: 33 MMOL/L (ref 20–32)
CREAT SERPL-MCNC: 1.09 MG/DL (ref 0.52–1.04)
CREAT SERPL-MCNC: 1.25 MG/DL (ref 0.52–1.04)
ERYTHROCYTE [DISTWIDTH] IN BLOOD BY AUTOMATED COUNT: 17.2 % (ref 10–15)
GFR SERPL CREATININE-BSD FRML MDRD: 44 ML/MIN/1.73M2
GFR SERPL CREATININE-BSD FRML MDRD: 52 ML/MIN/1.73M2
GLUCOSE BLD-MCNC: 105 MG/DL (ref 70–99)
GLUCOSE BLD-MCNC: 123 MG/DL (ref 70–99)
GLUCOSE BLDC GLUCOMTR-MCNC: 116 MG/DL (ref 70–99)
GLUCOSE BLDC GLUCOMTR-MCNC: 125 MG/DL (ref 70–99)
GLUCOSE BLDC GLUCOMTR-MCNC: 97 MG/DL (ref 70–99)
GLUCOSE BLDC GLUCOMTR-MCNC: 98 MG/DL (ref 70–99)
GLUCOSE BLDC GLUCOMTR-MCNC: 99 MG/DL (ref 70–99)
GRAM STAIN RESULT: ABNORMAL
GRAM STAIN RESULT: ABNORMAL
HCO3 BLD-SCNC: 30 MMOL/L (ref 21–28)
HCO3 BLD-SCNC: 33 MMOL/L (ref 21–28)
HCO3 BLDV-SCNC: 34 MMOL/L (ref 21–28)
HCT VFR BLD AUTO: 25.1 % (ref 35–47)
HGB BLD-MCNC: 8.1 G/DL (ref 11.7–15.7)
MAGNESIUM SERPL-MCNC: 2.4 MG/DL (ref 1.6–2.3)
MAGNESIUM SERPL-MCNC: 2.5 MG/DL (ref 1.6–2.3)
MCH RBC QN AUTO: 28.6 PG (ref 26.5–33)
MCHC RBC AUTO-ENTMCNC: 32.3 G/DL (ref 31.5–36.5)
MCV RBC AUTO: 89 FL (ref 78–100)
O2/TOTAL GAS SETTING VFR VENT: 0 %
O2/TOTAL GAS SETTING VFR VENT: 0 %
O2/TOTAL GAS SETTING VFR VENT: 10 %
O2/TOTAL GAS SETTING VFR VENT: 10 %
O2/TOTAL GAS SETTING VFR VENT: 44 %
O2/TOTAL GAS SETTING VFR VENT: 50 %
OXYHGB MFR BLD: 85 % (ref 92–100)
OXYHGB MFR BLD: 88 % (ref 92–100)
OXYHGB MFR BLD: 95 % (ref 92–100)
OXYHGB MFR BLD: 98 % (ref 92–100)
OXYHGB MFR BLDV: 59 % (ref 70–75)
PCO2 BLD: 39 MM HG (ref 35–45)
PCO2 BLD: 39 MM HG (ref 35–45)
PCO2 BLD: 41 MM HG (ref 35–45)
PCO2 BLD: 41 MM HG (ref 35–45)
PCO2 BLD: 43 MM HG (ref 35–45)
PCO2 BLDV: 47 MM HG (ref 40–50)
PH BLD: 7.5 [PH] (ref 7.35–7.45)
PH BLD: 7.5 [PH] (ref 7.35–7.45)
PH BLD: 7.52 [PH] (ref 7.35–7.45)
PH BLD: 7.52 [PH] (ref 7.35–7.45)
PH BLD: 7.53 [PH] (ref 7.35–7.45)
PH BLDV: 7.47 [PH] (ref 7.32–7.43)
PLATELET # BLD AUTO: 106 10E3/UL (ref 150–450)
PO2 BLD: 150 MM HG (ref 80–105)
PO2 BLD: 46 MM HG (ref 80–105)
PO2 BLD: 54 MM HG (ref 80–105)
PO2 BLD: 54 MM HG (ref 80–105)
PO2 BLD: 71 MM HG (ref 80–105)
PO2 BLDV: 31 MM HG (ref 25–47)
POTASSIUM BLD-SCNC: 3.2 MMOL/L (ref 3.4–5.3)
POTASSIUM BLD-SCNC: 3.2 MMOL/L (ref 3.4–5.3)
POTASSIUM BLD-SCNC: 3.9 MMOL/L (ref 3.4–5.3)
RBC # BLD AUTO: 2.83 10E6/UL (ref 3.8–5.2)
SODIUM SERPL-SCNC: 142 MMOL/L (ref 133–144)
SODIUM SERPL-SCNC: 143 MMOL/L (ref 133–144)
UFH PPP CHRO-ACNC: 0.24 IU/ML
UFH PPP CHRO-ACNC: 0.33 IU/ML
WBC # BLD AUTO: 13.7 10E3/UL (ref 4–11)

## 2022-06-01 PROCEDURE — 999N000157 HC STATISTIC RCP TIME EA 10 MIN

## 2022-06-01 PROCEDURE — 71045 X-RAY EXAM CHEST 1 VIEW: CPT | Mod: 26 | Performed by: RADIOLOGY

## 2022-06-01 PROCEDURE — 84132 ASSAY OF SERUM POTASSIUM: CPT | Performed by: STUDENT IN AN ORGANIZED HEALTH CARE EDUCATION/TRAINING PROGRAM

## 2022-06-01 PROCEDURE — 99291 CRITICAL CARE FIRST HOUR: CPT | Mod: GC | Performed by: INTERNAL MEDICINE

## 2022-06-01 PROCEDURE — 80048 BASIC METABOLIC PNL TOTAL CA: CPT | Performed by: PHYSICIAN ASSISTANT

## 2022-06-01 PROCEDURE — 999N000155 HC STATISTIC RAPCV CVP MONITORING

## 2022-06-01 PROCEDURE — 250N000011 HC RX IP 250 OP 636: Performed by: STUDENT IN AN ORGANIZED HEALTH CARE EDUCATION/TRAINING PROGRAM

## 2022-06-01 PROCEDURE — 200N000002 HC R&B ICU UMMC

## 2022-06-01 PROCEDURE — 250N000011 HC RX IP 250 OP 636: Performed by: THORACIC SURGERY (CARDIOTHORACIC VASCULAR SURGERY)

## 2022-06-01 PROCEDURE — 250N000013 HC RX MED GY IP 250 OP 250 PS 637: Performed by: STUDENT IN AN ORGANIZED HEALTH CARE EDUCATION/TRAINING PROGRAM

## 2022-06-01 PROCEDURE — 999N000015 HC STATISTIC ARTERIAL MONITORING DAILY

## 2022-06-01 PROCEDURE — 999N000045 HC STATISTIC DAILY SWAN MONITORING

## 2022-06-01 PROCEDURE — 74018 RADEX ABDOMEN 1 VIEW: CPT | Mod: 26 | Performed by: RADIOLOGY

## 2022-06-01 PROCEDURE — 999N000065 XR ABDOMEN PORT 1 VIEWS

## 2022-06-01 PROCEDURE — 250N000013 HC RX MED GY IP 250 OP 250 PS 637: Performed by: NURSE PRACTITIONER

## 2022-06-01 PROCEDURE — 82803 BLOOD GASES ANY COMBINATION: CPT | Performed by: STUDENT IN AN ORGANIZED HEALTH CARE EDUCATION/TRAINING PROGRAM

## 2022-06-01 PROCEDURE — 94640 AIRWAY INHALATION TREATMENT: CPT | Mod: 76

## 2022-06-01 PROCEDURE — 85520 HEPARIN ASSAY: CPT | Performed by: THORACIC SURGERY (CARDIOTHORACIC VASCULAR SURGERY)

## 2022-06-01 PROCEDURE — 250N000009 HC RX 250: Performed by: STUDENT IN AN ORGANIZED HEALTH CARE EDUCATION/TRAINING PROGRAM

## 2022-06-01 PROCEDURE — 82805 BLOOD GASES W/O2 SATURATION: CPT | Performed by: STUDENT IN AN ORGANIZED HEALTH CARE EDUCATION/TRAINING PROGRAM

## 2022-06-01 PROCEDURE — 250N000011 HC RX IP 250 OP 636

## 2022-06-01 PROCEDURE — 99291 CRITICAL CARE FIRST HOUR: CPT | Mod: 24 | Performed by: INTERNAL MEDICINE

## 2022-06-01 PROCEDURE — 82805 BLOOD GASES W/O2 SATURATION: CPT | Performed by: THORACIC SURGERY (CARDIOTHORACIC VASCULAR SURGERY)

## 2022-06-01 PROCEDURE — 44500 INTRO GASTROINTESTINAL TUBE: CPT

## 2022-06-01 PROCEDURE — 94640 AIRWAY INHALATION TREATMENT: CPT

## 2022-06-01 PROCEDURE — 250N000013 HC RX MED GY IP 250 OP 250 PS 637: Performed by: THORACIC SURGERY (CARDIOTHORACIC VASCULAR SURGERY)

## 2022-06-01 PROCEDURE — 85027 COMPLETE CBC AUTOMATED: CPT

## 2022-06-01 PROCEDURE — 94660 CPAP INITIATION&MGMT: CPT

## 2022-06-01 PROCEDURE — 92610 EVALUATE SWALLOWING FUNCTION: CPT | Mod: GN | Performed by: SPEECH-LANGUAGE PATHOLOGIST

## 2022-06-01 PROCEDURE — 84132 ASSAY OF SERUM POTASSIUM: CPT | Performed by: THORACIC SURGERY (CARDIOTHORACIC VASCULAR SURGERY)

## 2022-06-01 PROCEDURE — 92526 ORAL FUNCTION THERAPY: CPT | Mod: GN | Performed by: SPEECH-LANGUAGE PATHOLOGIST

## 2022-06-01 PROCEDURE — 83735 ASSAY OF MAGNESIUM: CPT | Performed by: PHYSICIAN ASSISTANT

## 2022-06-01 PROCEDURE — 250N000009 HC RX 250: Performed by: THORACIC SURGERY (CARDIOTHORACIC VASCULAR SURGERY)

## 2022-06-01 PROCEDURE — 71045 X-RAY EXAM CHEST 1 VIEW: CPT

## 2022-06-01 PROCEDURE — 250N000011 HC RX IP 250 OP 636: Performed by: PHYSICIAN ASSISTANT

## 2022-06-01 PROCEDURE — 83735 ASSAY OF MAGNESIUM: CPT | Performed by: STUDENT IN AN ORGANIZED HEALTH CARE EDUCATION/TRAINING PROGRAM

## 2022-06-01 PROCEDURE — 82805 BLOOD GASES W/O2 SATURATION: CPT

## 2022-06-01 RX ORDER — ACETAZOLAMIDE 500 MG/5ML
250 INJECTION, POWDER, LYOPHILIZED, FOR SOLUTION INTRAVENOUS EVERY 8 HOURS SCHEDULED
Status: COMPLETED | OUTPATIENT
Start: 2022-06-01 | End: 2022-06-02

## 2022-06-01 RX ORDER — METOCLOPRAMIDE HYDROCHLORIDE 5 MG/ML
5 INJECTION INTRAMUSCULAR; INTRAVENOUS
Status: ACTIVE | OUTPATIENT
Start: 2022-06-01 | End: 2022-06-02

## 2022-06-01 RX ORDER — OXYCODONE HYDROCHLORIDE 10 MG/1
10 TABLET ORAL EVERY 4 HOURS PRN
Status: DISCONTINUED | OUTPATIENT
Start: 2022-06-01 | End: 2022-06-25 | Stop reason: HOSPADM

## 2022-06-01 RX ORDER — LIDOCAINE HYDROCHLORIDE 20 MG/ML
5 SOLUTION OROPHARYNGEAL ONCE
Status: COMPLETED | OUTPATIENT
Start: 2022-06-01 | End: 2022-06-01

## 2022-06-01 RX ORDER — LEVOFLOXACIN 5 MG/ML
750 INJECTION, SOLUTION INTRAVENOUS
Status: DISCONTINUED | OUTPATIENT
Start: 2022-06-02 | End: 2022-06-01

## 2022-06-01 RX ORDER — POTASSIUM CHLORIDE 29.8 MG/ML
20 INJECTION INTRAVENOUS ONCE
Status: COMPLETED | OUTPATIENT
Start: 2022-06-01 | End: 2022-06-01

## 2022-06-01 RX ORDER — LANOLIN ALCOHOL/MO/W.PET/CERES
3 CREAM (GRAM) TOPICAL AT BEDTIME
Status: DISCONTINUED | OUTPATIENT
Start: 2022-06-01 | End: 2022-06-02

## 2022-06-01 RX ORDER — METHOCARBAMOL 500 MG/1
500 TABLET, FILM COATED ORAL EVERY 6 HOURS PRN
Status: DISCONTINUED | OUTPATIENT
Start: 2022-06-01 | End: 2022-06-25 | Stop reason: HOSPADM

## 2022-06-01 RX ORDER — DEXTROSE MONOHYDRATE 100 MG/ML
INJECTION, SOLUTION INTRAVENOUS CONTINUOUS PRN
Status: DISCONTINUED | OUTPATIENT
Start: 2022-06-01 | End: 2022-06-25 | Stop reason: HOSPADM

## 2022-06-01 RX ORDER — HYDROXYZINE HYDROCHLORIDE 25 MG/1
25-50 TABLET, FILM COATED ORAL EVERY 6 HOURS PRN
Status: DISCONTINUED | OUTPATIENT
Start: 2022-06-01 | End: 2022-06-25 | Stop reason: HOSPADM

## 2022-06-01 RX ORDER — OXYCODONE HYDROCHLORIDE 5 MG/1
5 TABLET ORAL EVERY 4 HOURS PRN
Status: DISCONTINUED | OUTPATIENT
Start: 2022-06-01 | End: 2022-06-25 | Stop reason: HOSPADM

## 2022-06-01 RX ORDER — ACETAMINOPHEN 325 MG/1
975 TABLET ORAL EVERY 8 HOURS SCHEDULED
Status: DISCONTINUED | OUTPATIENT
Start: 2022-06-01 | End: 2022-06-24

## 2022-06-01 RX ORDER — GABAPENTIN 250 MG/5ML
100 SOLUTION ORAL AT BEDTIME
Status: DISCONTINUED | OUTPATIENT
Start: 2022-06-01 | End: 2022-06-05

## 2022-06-01 RX ORDER — LIDOCAINE 4 G/G
1 PATCH TOPICAL
Status: DISCONTINUED | OUTPATIENT
Start: 2022-06-01 | End: 2022-06-25 | Stop reason: HOSPADM

## 2022-06-01 RX ORDER — LIDOCAINE 4 G/G
1 PATCH TOPICAL
Status: DISCONTINUED | OUTPATIENT
Start: 2022-06-01 | End: 2022-06-01

## 2022-06-01 RX ORDER — FUROSEMIDE 10 MG/ML
40 INJECTION INTRAMUSCULAR; INTRAVENOUS EVERY 6 HOURS
Status: COMPLETED | OUTPATIENT
Start: 2022-06-01 | End: 2022-06-01

## 2022-06-01 RX ORDER — PROCHLORPERAZINE MALEATE 5 MG
5 TABLET ORAL EVERY 6 HOURS PRN
Status: DISCONTINUED | OUTPATIENT
Start: 2022-06-01 | End: 2022-06-25 | Stop reason: HOSPADM

## 2022-06-01 RX ORDER — AMINO AC/PROTEIN HYDR/WHEY PRO 10G-100/30
1 LIQUID (ML) ORAL DAILY
Status: DISCONTINUED | OUTPATIENT
Start: 2022-06-01 | End: 2022-06-25 | Stop reason: HOSPADM

## 2022-06-01 RX ADMIN — OXYCODONE HYDROCHLORIDE 5 MG: 5 TABLET ORAL at 22:11

## 2022-06-01 RX ADMIN — FUROSEMIDE 40 MG: 10 INJECTION, SOLUTION INTRAVENOUS at 15:24

## 2022-06-01 RX ADMIN — IPRATROPIUM BROMIDE AND ALBUTEROL SULFATE 3 ML: 2.5; .5 SOLUTION RESPIRATORY (INHALATION) at 12:51

## 2022-06-01 RX ADMIN — ACETAZOLAMIDE 250 MG: 500 INJECTION, POWDER, LYOPHILIZED, FOR SOLUTION INTRAVENOUS at 22:08

## 2022-06-01 RX ADMIN — IPRATROPIUM BROMIDE AND ALBUTEROL SULFATE 3 ML: 2.5; .5 SOLUTION RESPIRATORY (INHALATION) at 15:47

## 2022-06-01 RX ADMIN — OXYCODONE HYDROCHLORIDE 5 MG: 5 TABLET ORAL at 17:57

## 2022-06-01 RX ADMIN — IPRATROPIUM BROMIDE AND ALBUTEROL SULFATE 3 ML: 2.5; .5 SOLUTION RESPIRATORY (INHALATION) at 09:31

## 2022-06-01 RX ADMIN — ACETAZOLAMIDE 250 MG: 500 INJECTION, POWDER, LYOPHILIZED, FOR SOLUTION INTRAVENOUS at 13:27

## 2022-06-01 RX ADMIN — HEPARIN SODIUM AND DEXTROSE 850 UNITS/HR: 10000; 5 INJECTION INTRAVENOUS at 14:00

## 2022-06-01 RX ADMIN — PIPERACILLIN SODIUM AND TAZOBACTAM SODIUM 4.5 G: 4; .5 INJECTION, POWDER, LYOPHILIZED, FOR SOLUTION INTRAVENOUS at 09:56

## 2022-06-01 RX ADMIN — IPRATROPIUM BROMIDE AND ALBUTEROL SULFATE 3 ML: 2.5; .5 SOLUTION RESPIRATORY (INHALATION) at 20:00

## 2022-06-01 RX ADMIN — ACETAMINOPHEN 325MG 975 MG: 325 TABLET ORAL at 17:52

## 2022-06-01 RX ADMIN — PIPERACILLIN SODIUM AND TAZOBACTAM SODIUM 4.5 G: 4; .5 INJECTION, POWDER, LYOPHILIZED, FOR SOLUTION INTRAVENOUS at 04:22

## 2022-06-01 RX ADMIN — HYDROMORPHONE HYDROCHLORIDE 0.2 MG: 0.2 INJECTION, SOLUTION INTRAMUSCULAR; INTRAVENOUS; SUBCUTANEOUS at 04:47

## 2022-06-01 RX ADMIN — HYDROMORPHONE HYDROCHLORIDE 0.2 MG: 0.2 INJECTION, SOLUTION INTRAMUSCULAR; INTRAVENOUS; SUBCUTANEOUS at 09:44

## 2022-06-01 RX ADMIN — PIPERACILLIN SODIUM AND TAZOBACTAM SODIUM 4.5 G: 4; .5 INJECTION, POWDER, LYOPHILIZED, FOR SOLUTION INTRAVENOUS at 21:24

## 2022-06-01 RX ADMIN — POTASSIUM CHLORIDE 20 MEQ: 29.8 INJECTION INTRAVENOUS at 22:11

## 2022-06-01 RX ADMIN — Medication 3 MG: at 22:10

## 2022-06-01 RX ADMIN — POTASSIUM CHLORIDE 20 MEQ: 29.8 INJECTION INTRAVENOUS at 17:09

## 2022-06-01 RX ADMIN — Medication 1 PACKET: at 17:51

## 2022-06-01 RX ADMIN — HYDROMORPHONE HYDROCHLORIDE 0.2 MG: 0.2 INJECTION, SOLUTION INTRAMUSCULAR; INTRAVENOUS; SUBCUTANEOUS at 02:21

## 2022-06-01 RX ADMIN — PIPERACILLIN SODIUM AND TAZOBACTAM SODIUM 4.5 G: 4; .5 INJECTION, POWDER, LYOPHILIZED, FOR SOLUTION INTRAVENOUS at 15:24

## 2022-06-01 RX ADMIN — LIDOCAINE PATCH 4% 1 PATCH: 40 PATCH TOPICAL at 12:55

## 2022-06-01 RX ADMIN — FUROSEMIDE 40 MG: 10 INJECTION, SOLUTION INTRAVENOUS at 09:39

## 2022-06-01 RX ADMIN — LIDOCAINE HYDROCHLORIDE 5 ML: 20 SOLUTION ORAL; TOPICAL at 15:30

## 2022-06-01 RX ADMIN — GABAPENTIN 100 MG: 250 SUSPENSION ORAL at 22:11

## 2022-06-01 ASSESSMENT — ACTIVITIES OF DAILY LIVING (ADL)
ADLS_ACUITY_SCORE: 34
ADLS_ACUITY_SCORE: 34
ADLS_ACUITY_SCORE: 36
ADLS_ACUITY_SCORE: 31
ADLS_ACUITY_SCORE: 34
ADLS_ACUITY_SCORE: 31
ADLS_ACUITY_SCORE: 34
ADLS_ACUITY_SCORE: 34
ADLS_ACUITY_SCORE: 36
ADLS_ACUITY_SCORE: 34
ADLS_ACUITY_SCORE: 34
ADLS_ACUITY_SCORE: 31

## 2022-06-01 ASSESSMENT — VISUAL ACUITY
OU: GLASSES
OU: GLASSES

## 2022-06-01 NOTE — PROGRESS NOTES
"   06/01/22 1411   General Information   Onset of Illness/Injury or Date of Surgery 05/25/22   Referring Physician Shahla Parker MD   Patient/Family Therapy Goal Statement (SLP) Patient did not state; agreed to swallow evaluation.   Pertinent History of Current Problem Per MD note: \"  Debi Espinoza is a 76 year old female with a history of renal stones, CKD, DVT/PE (on apixaban), 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\" CXR today: \"Slightly increased left pleural  effusion/basilar atelectasis. Atherosclerosis. Mild central  atelectasis/subtle positive fluid volume balance.\"   General Observations Patient appeared weak, fatigued with eyes mostly closed. Patient's daughter in law present.   Past History of Dysphagia None per EMR review.   Type of Evaluation   Type of Evaluation Swallow Evaluation   Oral Motor   Oral Musculature generally intact   Structural Abnormalities none present   Mucosal Quality dry   Dentition (Oral Motor)   Dentition (Oral Motor) natural dentition;adequate dentition   Facial Symmetry (Oral Motor)   Facial Symmetry (Oral Motor) WNL   Lip Function (Oral Motor)   Lip Range of Motion (Oral Motor) WNL   Lip Strength (Oral Motor) WFL   Tongue Function (Oral Motor)   Tongue ROM (Oral Motor) WNL   Jaw Function (Oral Motor)   Jaw Function (Oral Motor) WNL   Cough/Swallow/Gag Reflex (Oral Motor)   Soft Palate/Velum (Oral Motor) unable/difficult to assess   Volitional Throat Clear/Cough (Oral Motor) impaired;reduced strength   Vocal Quality/Secretion Management (Oral Motor)   Vocal Quality (Oral Motor) breathy;hoarse;hypophonic;impaired duration of phonation   General Swallowing Observations   Respiratory Support (General Swallowing Observations)   (HFNC 40L/70% fiO2; intubated 5/27-5/31/2022)   Current Diet/Method of Nutritional Intake (General Swallowing Observations, NIS) NPO   Swallowing Evaluation " Clinical swallow evaluation   Clinical Swallow Evaluation   Feeding Assistance dependent   Clinical Swallow Evaluation Textures Trialed thin liquids;mildly thick liquids   Clinical Swallow Eval: Thin Liquid Texture Trial   Mode of Presentation, Thin Liquids spoon;straw;fed by clinician   Volume of Liquid or Food Presented 3 small ice chips; 1 sip water   Oral Phase of Swallow delayed AP movement   Pharyngeal Phase of Swallow repeated swallows;coughing/choking  (coughing episode after sip thin water)   Diagnostic Statement Patient coughed up thick bloody mucous and also noted to gag at times; RN notified.   Clinical Swallow Eval: Mildly Thick Liquids   Mode of Presentation spoon;fed by clinician   Volume Presented 2 small sips   Oral Phase delayed AP movement   Pharyngeal Phase repeated swallows;coughing/choking   Diagnostic Statement Coughing after second sip; coughed up additional mucous and gagged. RN present.   Swallowing Recommendations   Diet Consistency Recommendations NPO;ice chips only;consider alternative means of nutrition  (limited ice chips with trained staff/family only)   Medication Administration Recommendations, Swallowing (SLP) via alternate route   General Therapy Interventions   Planned Therapy Interventions Dysphagia Treatment   Dysphagia treatment Oropharyngeal exercise training;Modified diet education;Instruction of safe swallow strategies;Compensatory strategies for swallowing   Clinical Impression   Criteria for Skilled Therapeutic Interventions Met (SLP Eval) Yes, treatment indicated   SLP Diagnosis Dysphagia   Risks & Benefits of therapy have been explained evaluation/treatment results reviewed;care plan/treatment goals reviewed;risks/benefits reviewed;current/potential barriers reviewed;participants voiced agreement with care plan;participants included;patient;daughter  (daughter in law)   Clinical Impression Comments Moderate-severe oropharyngeal dysphagia in setting of recent lengthy  intubation, weak throat clear/cough effort, dysphonia and generalized weakness characterized by reduced oral bolus control and AP transit, delayed-appearing swallows, repeated swallows, and coughing after limited oral intake of thin water and mildly thick water. Patient additionally gagging and coughing up thick bloody mucous after coughing episodes. Patient currently at high risk for aspiration. Recommend continue NPO with consideration of short-term alternate nutrition/hydration/medication. Please continue excellent oral cares. Patient may have limited single ice chips as tolerated given 1:1 trained staff/family assist. SLP to continue to follow for PO readiness.   SLP Discharge Planning   SLP Discharge Recommendation Transitional Care Facility   SLP Rationale for DC Rec Below baseline for swallow function; currently NPO.   SLP Brief overview of current status  Recommend continue NPO with consideration of short-term alternate nutrition/hydration/medication. Please continue excellent oral cares. Patient may have limited single ice chips as tolerated given 1:1 trained staff/family assist. SLP to continue to follow for PO readiness.    Total Evaluation Time   Total Evaluation Time (Minutes) 10   SLP Goals   Therapy Frequency (SLP Eval) daily   SLP Predicted Duration/Target Date for Goal Attainment 07/28/22   SLP Goals Swallow

## 2022-06-01 NOTE — PLAN OF CARE
Major Shift Events:  No acute events overnight. Pt following commands and oriented. Pain treated with Q2h Dilaudid. CI 2.5-3 and CVP <9. NSR all shift. Levo weaned to 0.03 and Vaso weaned to 2 with MAP sustaining >66. Bipap 12/6 45-50% with breaks on oxymask 12-13 Lpm. Good urine output after Lasix administration. Frequent liquid stool overnight.   Plan: Wean pressors as tolerated. CVP <9 and MAP >65. Notify CVTS with any acute changes.  For vital signs and complete assessments, please see documentation flowsheets.

## 2022-06-01 NOTE — PROGRESS NOTES
Major Shift Events:       Neuro:  Intact. Slow actions - general weakness. Soft spoken.  Resp: Lungs clear/diminished on high flow N/C. 50% O2. Two of the three CT removed today.   Cards: SR. BP stable with MAPs >65. Levophed infusing to maintain parameters. [place] d'cd. Edematous in lower extremities.   GI/: Failed swallow study. NJ small diameter placed. Feeds 20 ml/hr started at 1800. Increase to 40 ml/hr (goal rate) at 2am. Tolerating well. Urethral cath in place with good output. Lasix given. Hold bowel med d/t diarrhea. Incontinent, stooling frequently.   Labs/lines: Potassium replaced x2, recheck at 2330. Heparin gtt increased at 1,000 unit/hr with next recheck at 0000.  Pain: Oxy for chest discomfort. Started lidocaine patches.    Plan:  Continue to closely monitor.  For vital signs and complete assessments, please see documentation flowsheets.

## 2022-06-01 NOTE — PHARMACY-CONSULT NOTE
Pharmacy Tube Feeding Consult    Medication reviewed for administration by feeding tube and for potential food/drug interactions.    Recommendation: No changes are needed at this time.     Pharmacy will continue to follow as new medications are ordered.    Carolina Scherer, KalinaD

## 2022-06-01 NOTE — PROGRESS NOTES
CVTS:     Discussed with Dr Camacho.  Removed TPW and mediastinal chest tubes today.   Has R pleural tube remaining.   Has sm left pleural effusion.     Andrew Drew PA-C  Cardiothoracic Surgery  Pager [phone]    11:10 AM   June 1, 2022

## 2022-06-01 NOTE — PROGRESS NOTES
Cardiology Fellow Brief Progress Note  _________________________________         Interval Events  ----------------------  Extubated yesterday  Pressor requirements continued to wean overnight  Continued diuresis      Current Medications  ----------------------  Current Facility-Administered Medications   Medication     acetaminophen (TYLENOL) tablet 975 mg     acetaZOLAMIDE (DIAMOX) injection 250 mg     aspirin (ASA) chewable tablet 81 mg     bisacodyl (DULCOLAX) Suppository 10 mg     dextrose 10% infusion     glucose gel 15-30 g    Or     dextrose 50 % injection 25-50 mL    Or     glucagon injection 1 mg     fentaNYL (SUBLIMAZE) infusion     furosemide (LASIX) injection 40 mg     gabapentin (NEURONTIN) capsule 100 mg     [Held by provider] heparin infusion 25,000 units in D5W 250 mL ANTICOAGULANT     HOLD MEDICATION     hydrALAZINE (APRESOLINE) injection 10 mg     HYDROmorphone (DILAUDID) injection 0.2 mg     hydrOXYzine (ATARAX) tablet 25-50 mg     insulin aspart (NovoLOG) injection (RAPID ACTING)     ipratropium - albuterol 0.5 mg/2.5 mg/3 mL (DUONEB) neb solution 3 mL     ipratropium - albuterol 0.5 mg/2.5 mg/3 mL (DUONEB) neb solution 3 mL     Lidocaine (LIDOCARE) 4 % Patch 1 patch     lidocaine (LMX4) cream     lidocaine (LMX4) cream     lidocaine 1 % 0.1-1 mL     lidocaine 1 % 0.1-1 mL     lidocaine patch in PLACE     lidocaine-prilocaine (EMLA) cream     magnesium hydroxide (MILK OF MAGNESIA) suspension 30 mL     medication instruction     melatonin tablet 3 mg     methocarbamol (ROBAXIN) tablet 500 mg     multivitamins w/minerals liquid 15 mL     naloxone (NARCAN) injection 0.2 mg    Or     naloxone (NARCAN) injection 0.4 mg    Or     naloxone (NARCAN) injection 0.2 mg    Or     naloxone (NARCAN) injection 0.4 mg     norepinephrine (LEVOPHED) 16 mg in  mL infusion MAX CONC CENTRAL LINE     ondansetron (ZOFRAN ODT) ODT tab 4 mg    Or     ondansetron (ZOFRAN) injection 4 mg     oxyCODONE (ROXICODONE)  tablet 5 mg    Or     oxyCODONE IR (ROXICODONE) tablet 10 mg     pantoprazole (PROTONIX) 2 mg/mL suspension 40 mg    Or     pantoprazole (PROTONIX) EC tablet 40 mg     piperacillin-tazobactam (ZOSYN) 4.5 g vial to attach to  mL bag     polyethylene glycol (MIRALAX) Packet 17 g     prochlorperazine (COMPAZINE) injection 5 mg    Or     prochlorperazine (COMPAZINE) tablet 5 mg     propofol (DIPRIVAN) infusion     protein modular (PROSOURCE TF) 1 packet     Reason beta blocker order not selected     senna-docusate (SENOKOT-S/PERICOLACE) 8.6-50 MG per tablet 1 tablet     sodium chloride (PF) 0.9% PF flush 3 mL     sodium chloride (PF) 0.9% PF flush 3 mL     sodium chloride (PF) 0.9% PF flush 3 mL     sodium chloride (PF) 0.9% PF flush 3 mL     vasopressin 1 unit/mL MAX Conc (PITRESSIN) infusion        Intake and Output  ----------------------    Intake/Output Summary (Last 24 hours) at 6/1/2022 1250  Last data filed at 6/1/2022 1200  Gross per 24 hour   Intake 1138.6 ml   Output 4205 ml   Net -3066.4 ml           Weight  ----------------------  Wt Readings from Last 2 Encounters:   06/01/22 66 kg (145 lb 8.1 oz)   04/29/22 67.4 kg (148 lb 8 oz)       Objective  ----------------------  Results for orders placed or performed during the hospital encounter of 05/25/22 (from the past 24 hour(s))   Blood gas venous with oxyhemoglobin   Result Value Ref Range    pH Venous 7.46 (H) 7.32 - 7.43    pCO2 Venous 44 40 - 50 mm Hg    pO2 Venous 28 25 - 47 mm Hg    Bicarbonate Venous 31 (H) 21 - 28 mmol/L    FIO2 40     Oxyhemoglobin Venous 56 (L) 70 - 75 %    Base Excess/Deficit (+/-) 6.9 (H) -7.7 - 1.9 mmol/L   Glucose by meter   Result Value Ref Range    GLUCOSE BY METER POCT 111 (H) 70 - 99 mg/dL   Potassium   Result Value Ref Range    Potassium 3.4 3.4 - 5.3 mmol/L   Magnesium   Result Value Ref Range    Magnesium 1.8 1.6 - 2.3 mg/dL   Blood gas arterial with oxyhemoglobin   Result Value Ref Range    pH Arterial 7.49 (H)  7.35 - 7.45    pCO2 Arterial 43 35 - 45 mm Hg    pO2 Arterial 58 (L) 80 - 105 mm Hg    Bicarbonate Arterial 33 (H) 21 - 28 mmol/L    Oxyhemoglobin Arterial 91 (L) 92 - 100 %    Base Excess/Deficit (+/-) 8.3 (H) -9.0 - 1.8 mmol/L    FIO2 50    Glucose by meter   Result Value Ref Range    GLUCOSE BY METER POCT 110 (H) 70 - 99 mg/dL   Glucose by meter   Result Value Ref Range    GLUCOSE BY METER POCT 109 (H) 70 - 99 mg/dL   Blood gas venous with oxyhemoglobin   Result Value Ref Range    pH Venous 7.47 (H) 7.32 - 7.43    pCO2 Venous 47 40 - 50 mm Hg    pO2 Venous 31 25 - 47 mm Hg    Bicarbonate Venous 34 (H) 21 - 28 mmol/L    FIO2 0     Oxyhemoglobin Venous 59 (L) 70 - 75 %    Base Excess/Deficit (+/-) 9.7 (H) -7.7 - 1.9 mmol/L   Basic metabolic panel   Result Value Ref Range    Sodium 142 133 - 144 mmol/L    Potassium 3.9 3.4 - 5.3 mmol/L    Chloride 104 94 - 109 mmol/L    Carbon Dioxide (CO2) 31 20 - 32 mmol/L    Anion Gap 7 3 - 14 mmol/L    Urea Nitrogen 24 7 - 30 mg/dL    Creatinine 1.09 (H) 0.52 - 1.04 mg/dL    Calcium 8.9 8.5 - 10.1 mg/dL    Glucose 123 (H) 70 - 99 mg/dL    GFR Estimate 52 (L) >60 mL/min/1.73m2   Magnesium   Result Value Ref Range    Magnesium 2.5 (H) 1.6 - 2.3 mg/dL   Heparin Unfractionated Anti Xa Level   Result Value Ref Range    Anti Xa Unfractionated Heparin 0.33 For Reference Range, See Comment IU/mL    Narrative    Therapeutic Range: UFH: 0.25-0.50 IU/mL for low intensity dosing,  0.30-0.70 IU/mL for high intensity dosing DVT and PE.  This test is not validated for other direct factor X inhibitors (e.g. rivaroxaban, apixaban, edoxaban, betrixaban, fondaparinux) and should not be used for monitoring of other medications.   CBC with platelets   Result Value Ref Range    WBC Count 13.7 (H) 4.0 - 11.0 10e3/uL    RBC Count 2.83 (L) 3.80 - 5.20 10e6/uL    Hemoglobin 8.1 (L) 11.7 - 15.7 g/dL    Hematocrit 25.1 (L) 35.0 - 47.0 %    MCV 89 78 - 100 fL    MCH 28.6 26.5 - 33.0 pg    MCHC 32.3 31.5 -  36.5 g/dL    RDW 17.2 (H) 10.0 - 15.0 %    Platelet Count 106 (L) 150 - 450 10e3/uL   Blood gas arterial with oxyhemoglobin   Result Value Ref Range    pH Arterial 7.50 (H) 7.35 - 7.45    pCO2 Arterial 43 35 - 45 mm Hg    pO2 Arterial 150 (H) 80 - 105 mm Hg    Bicarbonate Arterial 33 (H) 21 - 28 mmol/L    Oxyhemoglobin Arterial 98 92 - 100 %    Base Excess/Deficit (+/-) 9.1 (H) -9.0 - 1.8 mmol/L    FIO2 0    Glucose by meter   Result Value Ref Range    GLUCOSE BY METER POCT 125 (H) 70 - 99 mg/dL   Glucose by meter   Result Value Ref Range    GLUCOSE BY METER POCT 99 70 - 99 mg/dL   Blood gas arterial with oxyhemoglobin   Result Value Ref Range    pH Arterial 7.53 (H) 7.35 - 7.45    pCO2 Arterial 39 35 - 45 mm Hg    pO2 Arterial 46 (L) 80 - 105 mm Hg    Bicarbonate Arterial 33 (H) 21 - 28 mmol/L    Oxyhemoglobin Arterial 85 (L) 92 - 100 %    Base Excess/Deficit (+/-) 9.2 (H) -9.0 - 1.8 mmol/L    FIO2 44    XR Chest Port 1 View    Narrative    Portable chest    INDICATION: Post pulmonary endarterectomy with chest tubes in place    COMPARISON: 5/31/2022    FINDINGS: Heart size normal. Haziness at left costophrenic angle  slightly increased. Unchanged mediastinal drain and right basilar  chest tube. Left IJ [place]ak[place]-Diamond catheter tip in the right main branch  pulmonary artery. Median sternotomy. Calcifications at the aortic  knob. Mild prominence of perihilar structures which may indicate  subtle atelectasis or edema. Interval extubation.      Impression    IMPRESSION: Interval extubation. Slightly increased left pleural  effusion/basilar atelectasis. Atherosclerosis. Mild central  atelectasis/subtle positive fluid volume balance.    MARK GUADARRAMA MD         SYSTEM ID:  [id number]   Glucose by meter   Result Value Ref Range    GLUCOSE BY METER POCT 97 70 - 99 mg/dL   Blood gas arterial with oxyhemoglobin   Result Value Ref Range    pH Arterial 7.52 (H) 7.35 - 7.45    pCO2 Arterial 41 35 - 45 mm Hg    pO2 Arterial 54 (L)  80 - 105 mm Hg    Bicarbonate Arterial 33 (H) 21 - 28 mmol/L    Oxyhemoglobin Arterial 88 (L) 92 - 100 %    Base Excess/Deficit (+/-) 9.7 (H) -9.0 - 1.8 mmol/L    FIO2 10    Blood gas arterial   Result Value Ref Range    pH Arterial 7.52 (H) 7.35 - 7.45    pCO2 Arterial 41 35 - 45 mm Hg    pO2 Arterial 54 (L) 80 - 105 mm Hg    FIO2 10     Bicarbonate Arterial 33 (H) 21 - 28 mmol/L    Base Excess/Deficit (+/-) 9.7 (H) -9.0 - 1.8 mmol/L       Recent Labs   Lab Test 12/08/21  1221   CHOL 113   HDL 35*   LDL 59   TRIG 94       Hemoglobin A1C   Date Value Ref Range Status   12/08/2021 5.8 (H) 0.0 - 5.6 % Final     Comment:     Normal <5.7%   Prediabetes 5.7-6.4%    Diabetes 6.5% or higher     Note: Adopted from ADA consensus guidelines.       Results for orders placed or performed during the hospital encounter of 05/25/22   XR Chest Port 1 View    Impression    IMPRESSION: [place]-Diamond catheter tip projects over the central right  pulmonary artery.    I have personally reviewed the examination and initial interpretation  and I agree with the findings.    CANDACE ALMONTE MD         SYSTEM ID:  [id number]   XR Abdomen Port 1 View    Impression    IMPRESSION: Gastric tube tip and sidehole project over the stomach.    I have personally reviewed the examination and initial interpretation  and I agree with the findings.    CANDACE ALMONTE MD         SYSTEM ID:  [id number]   XR Chest Port 1 View    Impression    Impression: Postoperative chest with slightly improved perihilar  atelectasis or edema. Endotracheal tube tip in lower trachea 2.5 cm  above the man.    I have personally reviewed the examination and initial interpretation  and I agree with the findings.    YVETTE GRAY MD         SYSTEM ID:  [id number]       Physical Exam  Vitals:    06/01/22 1015 06/01/22 1030 06/01/22 1045 06/01/22 1200   BP:       BP Location:       Cuff Size:       Pulse: 86 86 84 86   Resp:       Temp: 99.1  F (37.3  C) 99.1  F (37.3  C) 99  F (37.2   C) 98.96  F (37.2  C)   TempSrc:       SpO2: (!) 82% 98% 100% 90%   Weight:       Height:            Exam:  Gen: AOx3 NAD  HEET: moist oral mucosae  CV: rrr, no mrg  Pulm: CTAB  Abd: soft nt/nd  Ext: warm well perfused    Assessment & Plan   Debi Espinoza is a 76 year old female with a history of renal stones, CKD, DVT/PE (on apixaban), 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     Changes today:   - wean pressors as tolerated  - goal CI 2.0-2.5   - goal CVP < 10 (at goal today)  - follow post CTEPH pulmonary endarterectomy protocols  - continue diuresis CVP goal  - remaining cares per surgical ICU team     # Pulmonary hypertension, Group IV  # CTEPH s/p pulmonary endaterectomy  # DVTs  # Severe tricuspid insufficiency  Initially diagnosed with bilateral DVT, PE 2019. TTE showed dilated RV with reduced RV function and RVSP of 84mmHg suggestive of severe pulmonary hypertension with mild to moderate TR. Patient believes the above relates to a work related injury, unna boot early 2019. RHC 12/18/2021 with RA 12, PA 84/19/45, PCWP 2, TAHIRA CO/CI 2.8/1.6. Last dose eliquis 5/23 AM. Has oxygen at home but does not use this. Underwent endarterectomy on 5/27. Post op on multiple pressors and briefly on milrinone.   - Goal CVP < 10, diuresis as needed to achieve  - Goal CI 2.0-2.5  - holding pHTN meds in ICU     # Volume overload  PTA on lasix 20mg qday (decreased from 40mg ~6 weeks ago). JVP elevated on initial exam. S/p 40mg IV lasix 5/25; repeat as needed        # Anemia  Hgb 11.4, similar to prior last month.         # CKD  Baseline Cr ~1.3.     Dwain Ferrer MD, MSc  Cardiovascular Disease Fellow  [facility]    Discussed with Attending Dr Etienne who is in agreement

## 2022-06-01 NOTE — PROGRESS NOTES
CLINICAL NUTRITION SERVICES - BRIEF NOTE   (See RD note on 5/29 for full assessment)     Reason for RD note: Provider order for post-pyloric feeding tube placement     New Findings/Chart Review:  Nutrition support: Plan to start TF via post-pyloric feeding tube (pending AXR confirmation).    TFs were running through OGT from 5/29-5/31; Osmolite 1.5 @ 35 ml/hr     Enteral Access: placed ppFT (pending AXR)    Respiratory: HFNC    GI: BM x 6 so far today, holding bowel regimen     Labs: WNL     Meds: Reviewed    Interventions:  Ordered Osmolite 1.5 Mayur @ goal of 40ml/hr (960ml/day) + 1 pkt Prosource will provide: 1520 kcals (29 kcal/kg), 71 g PRO (1.4 g/kg), 731 ml free H20, 195 g CHO, and 0 g fiber daily.   --Initiate @ 20 ml/hr and advance by 20 ml/hr q8h (faster advancement as pt was previously on TF)   --Do not adv until Mg++, K+ >/= WNL and phos >1.9.    --H2O flushes 30 ml q4hr for tube patency   --Continue MVI daily     Future/Additional Recommendations:  Monitor stool output and tolerance to TF     Nutrition will continue to follow per protocol.    Liseth Francisco, MS, RD, LD  4E (CVICU) RD pager: [phone]  Ascom: [phone]  Weekend/Holiday RD pager: [phone]

## 2022-06-01 NOTE — PROGRESS NOTES
CV ICU PROGRESS NOTE  5/31/2022      CO-MORBIDITIES:   CTEPH (chronic thromboembolic pulmonary hypertension) (H)  (primary encounter diagnosis)  Primary pulmonary hypertension (H)  SOB (shortness of breath)  S/P coronary angiogram    ASSESSMENT: Debi Espinoza is a 76 year old female with PMH of HTN, nephrolithiasis, DVT (2019) and PE (2019, 2021) on chronic anticoagulation, and chronic thromboembolic pulmonary hypertension who underwent pulmonary artery thromboendarterectomy on 5/27 with Dr. Peterson.    TODAY'S PROGRESS:   - Will hold heparin to pull wires and CT  - Pull Slade catheter per CVTS  - Add tylenol and lidocaine patches  - Speech consult  - Will continue diuresis with lasix 40 q6h x2 doses  - Diamox 250 q8h  - Discontinue levaquin    PLAN:  Neuro/ pain/ sedation:  Acute Postoperative pain  - Monitor neurological status. Notify the MD for any acute changes in exam.  - Pain: S tylenol 975 TID, lidocaine patches. PRN oxycodone 5-10 q4h, robaxin 500 q6h, dilaudid 0.2 q2h  - Daily sedation vacation     Pulmonary care:   S/p Pulmonary artery thromboendarterectomy on 5/27/2022 by Dr. Peterson  Hx Chronic pulmonary thromboembolic disease  Hx PE (2019, 2021)  Hx Emphysema, moderate  PTA regimen: apixaban 5mg BID  CTA 05/2019 demonstrated underlying moderate emphysema  Extubated 5/31  - BiPAP for respiratory support as able  - Last ABG 7.5/43/150/33  - Plan to pull CT today    Cardiovascular:    Hx Severe pulmonary hypertension  Hx RV dilation and reduced RV function  Hx Severe tricuspid insufficiency  Hx Essential HTN  Distributive shock  PTA regimen: furosemide 20mg qd, adempas 2.5mg TID, opsumit 10mg qd  Echo on 03/2021 with LVEF of 78%, severe pulmonary hypertension (82 mmHg), reduced RV function (TAPSE 16mm), severe tricuspid insufficiency  - Monitor hemodynamic status.   - ASA 81  - Wean off levophed as able   - CVP goal <10, MAP goal > 65  - Cardiac index goal 2.0-2.5  - V pacing back up 50 BPM  - Heparin  gtt    GI care/ Nutrition:   - Speech consult  - Consider feeding tube placement if unable to take by mouth  - PPI    Renal/ Fluid Balance/ Electrolytes:   Nephrolithiasis  PTA regimen: furosemide 20mg qd  BL creat appears to be ~ 1.14  - Continue diuresis today  - Will give lasix 40 x2 today and reassess  - Diamox 250 q8 given for alkalosis, Bicarb 31  - Strict I/O, daily weights  - Avoid/limit nephrotoxins as able  - Replete lytes PRN per protocol     Endocrine:    Stress induced hyperglycemia  Preop A1c 5.8 (12/21)  - High sliding scale insulin   - Goal BG <180 for optimal healing     ID/ Antibiotics:  Stress induced leukocytosis  Fever to 38.5 on 5/28 - afebrile now  Distributive shock  5/28 Blood cultures negative  5/30 MRSA swab negative  5/31 Sputum culture with 4+ lactose fermenting gram negative rods  5/31 Repeat blood cultures NGTD @ 1 day  Afebrile today, WBC down trending.   - Continue to monitor fever curve, WBC and inflammatory markers as appropriate  - Continue Zosyn  - Discontinue levaquin    Cultures  5/30 Sputum - Klebsiella     Heme:     Stress induced leukocytosis  Acute blood loss anemia  Acute blood loss thrombocytopenia  Hx DVT (2019), PE (2019, 2021) on chronic anticoagulation  PA tear intra-op s/p patch repair.  - Continue to monitor  - Low intensity heparin gtt, no bolus  - Daily CBC      MSK/ Skin:  Sternotomy  Surgical Incision  - Sternal precautions  - Postoperative incision management per protocol  - PT/OT/CR     Prophylaxis:    - Mechanical prophylaxis for DVT  - Chemical DVT prophylaxis - low intensity heparin gtt  - PPI     Lines/ tubes/ drains:  - Arterial Line, CT Left IJ, ly, OG     Disposition:  - CVICU    Patient seen, findings and plan discussed with CV ICU staff, Dr. Go and CVTS, Dr. Vega and Dr. Edvin Corado MD  PGY-3, General Surgery  ====================================    SUBJECTIVE:   Afebrile. Doing okay. Somewhat tachypneic this AM but overall  okkhanh.    OBJECTIVE:   1. VITAL SIGNS:   Temp:  [98.24  F (36.8  C)-101.3  F (38.5  C)] 99.32  F (37.4  C)  Pulse:  [] 75  Resp:  [17-28] 17  BP: (88)/(45) 88/45  MAP:  [60 mmHg-83 mmHg] 69 mmHg  Arterial Line BP: ()/(41-55) 103/47  FiO2 (%):  [40 %-50 %] 50 %  SpO2:  [85 %-100 %] 98 %  Vent Mode: CMV/AC  (Continuous Mandatory Ventilation/ Assist Control)  FiO2 (%): 50 %  Resp Rate (Set): 18 breaths/min  Tidal Volume (Set, mL): 360 mL  PEEP (cm H2O): 8 cmH2O  Resp: 17    2. INTAKE/ OUTPUT:   I/O last 3 completed shifts:  In: 2067.86 [I.V.:1677.86; NG/GT:270]  Out: 3917 [Urine:3757; Chest Tube:160]    3. PHYSICAL EXAMINATION:   General: lying in bed, appears relatively comfortable  Neuro: answers to commands, open eyes to voice  Resp: Tachypneic, on nasal canula  CV: RRR  Abdomen: Soft, Non-distended, Non-tender  Incisions: clean dry intact  Extremities: warm and well perfused    4. INVESTIGATIONS:   Arterial Blood Gases   Recent Labs   Lab 06/01/22 0429 05/31/22 1941 05/31/22  1212 05/31/22  0759   PH 7.50* 7.49* 7.48* 7.45   PCO2 43 43 41 44   PO2 150* 58* 65* 78*   HCO3 33* 33* 31* 31*     Complete Blood Count   Recent Labs   Lab 06/01/22 0427 05/31/22  0357 05/30/22  1244 05/30/22  0354 05/30/22  0058   WBC 13.7* 12.0*  --  12.6* 13.6*   HGB 8.1* 8.2* 8.2* 6.9* 7.3*   * 114*  --  106* 113*     Basic Metabolic Panel  Recent Labs   Lab 06/01/22 0429 06/01/22 0427 05/31/22  2344 05/31/22 1943 05/31/22  1846 05/31/22  0413 05/31/22 0357 05/31/22  0022 05/30/22  2210 05/30/22  0409 05/30/22 0354 05/29/22 0356 05/29/22  0343   NA  --  142  --   --   --   --  142  --   --   --  144  --  145*   POTASSIUM  --  3.9  --   --  3.4  --  3.4  --  4.0   < > 3.5  --  4.3   CHLORIDE  --  104  --   --   --   --  107  --   --   --  110*  --  112*   CO2  --  31  --   --   --   --  30  --   --   --  27  --  26   BUN  --  24  --   --   --   --  24  --   --   --  29  --  24   CR  --  1.09*  --   --   --   --   0.98  --   --   --  1.19*  --  1.09*   * 123* 109* 110*  --    < > 144*   < >  --    < > 162*   < > 129*    < > = values in this interval not displayed.     Liver Function Tests  Recent Labs   Lab 05/27/22 2156 05/27/22 1931 05/27/22 1758 05/27/22  1604 05/25/22  1622   AST 47* 36  --   --  16   ALT 14 11  --   --  16   ALKPHOS 48 37*  --   --  75   BILITOTAL 1.1 1.2  --   --  0.7   ALBUMIN 2.5* 1.8*  --   --  3.4   INR 1.52* 2.20* 2.40* 1.71* 1.13     Pancreatic Enzymes  No lab results found in last 7 days.  Coagulation Profile  Recent Labs   Lab 05/28/22  1057 05/28/22  0349 05/27/22 2156 05/27/22 1931 05/27/22 1758 05/27/22  1604   INR  --   --  1.52* 2.20* 2.40* 1.71*   PTT 59* 60* 60* 63* 76* 89*     5. RADIOLOGY:   No results found for this or any previous visit (from the past 24 hour(s)).    =========================================

## 2022-06-01 NOTE — PROCEDURES
Small Bowel Feeding Tube Placement Assessment  Reason for Feeding Tube Placement: administration of nutrition and medication  Cortrak Start Time: 1510   Cortrak End Time: 1530  Medicine Delivered During Procedure: lubricating jelly  Placement Successful: yes, presumed post pyloric (pending AXR)    Procedure Complications: none  Final Placement Iron at exit of nare: 102 cm  Face to Face time with patient: 20 minutes    Bridle Placement:   Reason for bridle placement: securement of FT    Medicine delivered during procedure: lubricating jelly    Procedure: Successful  Location of top of clip on FT: @ 103 cm marker   Condition of nose/skin at time of bridle placement: Unremarkable   Face to Face time with patient: <5 minutes.    Liseth Francisco, MS, RD, LD  4E (CVICU) RD pager: [phone]  Ascom: [phone]  Weekend/Holiday RD pager: [phone]

## 2022-06-02 ENCOUNTER — APPOINTMENT (OUTPATIENT)
Dept: GENERAL RADIOLOGY | Facility: CLINIC | Age: 77
DRG: 270 | End: 2022-06-02
Attending: PHYSICIAN ASSISTANT
Payer: MEDICARE

## 2022-06-02 ENCOUNTER — APPOINTMENT (OUTPATIENT)
Dept: GENERAL RADIOLOGY | Facility: CLINIC | Age: 77
DRG: 270 | End: 2022-06-02
Attending: INTERNAL MEDICINE
Payer: MEDICARE

## 2022-06-02 ENCOUNTER — APPOINTMENT (OUTPATIENT)
Dept: OCCUPATIONAL THERAPY | Facility: CLINIC | Age: 77
DRG: 270 | End: 2022-06-02
Attending: INTERNAL MEDICINE
Payer: MEDICARE

## 2022-06-02 ENCOUNTER — APPOINTMENT (OUTPATIENT)
Dept: CARDIOLOGY | Facility: CLINIC | Age: 77
DRG: 270 | End: 2022-06-02
Attending: STUDENT IN AN ORGANIZED HEALTH CARE EDUCATION/TRAINING PROGRAM
Payer: MEDICARE

## 2022-06-02 ENCOUNTER — APPOINTMENT (OUTPATIENT)
Dept: SPEECH THERAPY | Facility: CLINIC | Age: 77
DRG: 270 | End: 2022-06-02
Attending: INTERNAL MEDICINE
Payer: MEDICARE

## 2022-06-02 ENCOUNTER — APPOINTMENT (OUTPATIENT)
Dept: GENERAL RADIOLOGY | Facility: CLINIC | Age: 77
DRG: 270 | End: 2022-06-02
Attending: STUDENT IN AN ORGANIZED HEALTH CARE EDUCATION/TRAINING PROGRAM
Payer: MEDICARE

## 2022-06-02 LAB
ANION GAP SERPL CALCULATED.3IONS-SCNC: 6 MMOL/L (ref 3–14)
ANION GAP SERPL CALCULATED.3IONS-SCNC: 7 MMOL/L (ref 3–14)
BACTERIA BLD CULT: NO GROWTH
BACTERIA BLD CULT: NO GROWTH
BASE EXCESS BLDA CALC-SCNC: 1.5 MMOL/L (ref -9–1.8)
BASE EXCESS BLDA CALC-SCNC: 1.9 MMOL/L (ref -9–1.8)
BASE EXCESS BLDA CALC-SCNC: 2.6 MMOL/L (ref -9–1.8)
BASE EXCESS BLDA CALC-SCNC: 4.1 MMOL/L (ref -9–1.8)
BASE EXCESS BLDV CALC-SCNC: 1.2 MMOL/L (ref -7.7–1.9)
BASE EXCESS BLDV CALC-SCNC: 2.7 MMOL/L (ref -7.7–1.9)
BUN SERPL-MCNC: 26 MG/DL (ref 7–30)
BUN SERPL-MCNC: 26 MG/DL (ref 7–30)
C PNEUM DNA SPEC QL NAA+PROBE: NOT DETECTED
CA-I BLD-MCNC: 4.8 MG/DL (ref 4.4–5.2)
CALCIUM SERPL-MCNC: 8.4 MG/DL (ref 8.5–10.1)
CALCIUM SERPL-MCNC: 8.7 MG/DL (ref 8.5–10.1)
CHLORIDE BLD-SCNC: 112 MMOL/L (ref 94–109)
CHLORIDE BLD-SCNC: 116 MMOL/L (ref 94–109)
CO2 SERPL-SCNC: 27 MMOL/L (ref 20–32)
CO2 SERPL-SCNC: 28 MMOL/L (ref 20–32)
CREAT SERPL-MCNC: 1.28 MG/DL (ref 0.52–1.04)
CREAT SERPL-MCNC: 1.3 MG/DL (ref 0.52–1.04)
ERYTHROCYTE [DISTWIDTH] IN BLOOD BY AUTOMATED COUNT: 18.4 % (ref 10–15)
ERYTHROCYTE [DISTWIDTH] IN BLOOD BY AUTOMATED COUNT: 18.4 % (ref 10–15)
FLUAV H1 2009 PAND RNA SPEC QL NAA+PROBE: NOT DETECTED
FLUAV H1 RNA SPEC QL NAA+PROBE: NOT DETECTED
FLUAV H3 RNA SPEC QL NAA+PROBE: NOT DETECTED
FLUAV RNA SPEC QL NAA+PROBE: NOT DETECTED
FLUBV RNA SPEC QL NAA+PROBE: NOT DETECTED
GFR SERPL CREATININE-BSD FRML MDRD: 42 ML/MIN/1.73M2
GFR SERPL CREATININE-BSD FRML MDRD: 43 ML/MIN/1.73M2
GLUCOSE BLD-MCNC: 123 MG/DL (ref 70–99)
GLUCOSE BLD-MCNC: 158 MG/DL (ref 70–99)
GLUCOSE BLDC GLUCOMTR-MCNC: 107 MG/DL (ref 70–99)
GLUCOSE BLDC GLUCOMTR-MCNC: 121 MG/DL (ref 70–99)
GLUCOSE BLDC GLUCOMTR-MCNC: 137 MG/DL (ref 70–99)
GLUCOSE BLDC GLUCOMTR-MCNC: 143 MG/DL (ref 70–99)
GLUCOSE BLDC GLUCOMTR-MCNC: 143 MG/DL (ref 70–99)
GLUCOSE BLDC GLUCOMTR-MCNC: 154 MG/DL (ref 70–99)
GLUCOSE BLDC GLUCOMTR-MCNC: 168 MG/DL (ref 70–99)
HADV DNA SPEC QL NAA+PROBE: NOT DETECTED
HCO3 BLD-SCNC: 26 MMOL/L (ref 21–28)
HCO3 BLD-SCNC: 26 MMOL/L (ref 21–28)
HCO3 BLD-SCNC: 27 MMOL/L (ref 21–28)
HCO3 BLD-SCNC: 29 MMOL/L (ref 21–28)
HCO3 BLDV-SCNC: 25 MMOL/L (ref 21–28)
HCO3 BLDV-SCNC: 27 MMOL/L (ref 21–28)
HCOV PNL SPEC NAA+PROBE: NOT DETECTED
HCT VFR BLD AUTO: 26.7 % (ref 35–47)
HCT VFR BLD AUTO: 27.1 % (ref 35–47)
HGB BLD-MCNC: 8.2 G/DL (ref 11.7–15.7)
HGB BLD-MCNC: 8.5 G/DL (ref 11.7–15.7)
HMPV RNA SPEC QL NAA+PROBE: NOT DETECTED
HPIV1 RNA SPEC QL NAA+PROBE: NOT DETECTED
HPIV2 RNA SPEC QL NAA+PROBE: NOT DETECTED
HPIV3 RNA SPEC QL NAA+PROBE: NOT DETECTED
HPIV4 RNA SPEC QL NAA+PROBE: NOT DETECTED
INR PPP: 1.21 (ref 0.85–1.15)
LACTATE SERPL-SCNC: 0.7 MMOL/L (ref 0.7–2)
LVEF ECHO: NORMAL
M PNEUMO DNA SPEC QL NAA+PROBE: NOT DETECTED
MAGNESIUM SERPL-MCNC: 2.4 MG/DL (ref 1.6–2.3)
MCH RBC QN AUTO: 27.9 PG (ref 26.5–33)
MCH RBC QN AUTO: 28.3 PG (ref 26.5–33)
MCHC RBC AUTO-ENTMCNC: 30.7 G/DL (ref 31.5–36.5)
MCHC RBC AUTO-ENTMCNC: 31.4 G/DL (ref 31.5–36.5)
MCV RBC AUTO: 90 FL (ref 78–100)
MCV RBC AUTO: 91 FL (ref 78–100)
O2/TOTAL GAS SETTING VFR VENT: 45 %
O2/TOTAL GAS SETTING VFR VENT: 50 %
O2/TOTAL GAS SETTING VFR VENT: 60 %
OXYHGB MFR BLD: 87 % (ref 92–100)
OXYHGB MFR BLD: 91 % (ref 92–100)
OXYHGB MFR BLD: 95 % (ref 92–100)
OXYHGB MFR BLD: 98 % (ref 92–100)
OXYHGB MFR BLDV: 58 % (ref 70–75)
OXYHGB MFR BLDV: 93 % (ref 70–75)
PCO2 BLD: 36 MM HG (ref 35–45)
PCO2 BLD: 37 MM HG (ref 35–45)
PCO2 BLD: 38 MM HG (ref 35–45)
PCO2 BLD: 41 MM HG (ref 35–45)
PCO2 BLDV: 37 MM HG (ref 40–50)
PCO2 BLDV: 42 MM HG (ref 40–50)
PH BLD: 7.45 [PH] (ref 7.35–7.45)
PH BLD: 7.46 [PH] (ref 7.35–7.45)
PH BLDV: 7.43 [PH] (ref 7.32–7.43)
PH BLDV: 7.44 [PH] (ref 7.32–7.43)
PHOSPHATE SERPL-MCNC: 2.5 MG/DL (ref 2.5–4.5)
PLATELET # BLD AUTO: 158 10E3/UL (ref 150–450)
PLATELET # BLD AUTO: 160 10E3/UL (ref 150–450)
PO2 BLD: 100 MM HG (ref 80–105)
PO2 BLD: 53 MM HG (ref 80–105)
PO2 BLD: 62 MM HG (ref 80–105)
PO2 BLD: 72 MM HG (ref 80–105)
PO2 BLDV: 32 MM HG (ref 25–47)
PO2 BLDV: 68 MM HG (ref 25–47)
POTASSIUM BLD-SCNC: 3.2 MMOL/L (ref 3.4–5.3)
POTASSIUM BLD-SCNC: 3.2 MMOL/L (ref 3.4–5.3)
POTASSIUM BLD-SCNC: 3.5 MMOL/L (ref 3.4–5.3)
RADIOLOGIST FLAGS: ABNORMAL
RBC # BLD AUTO: 2.94 10E6/UL (ref 3.8–5.2)
RBC # BLD AUTO: 3 10E6/UL (ref 3.8–5.2)
RSV RNA SPEC QL NAA+PROBE: NOT DETECTED
RSV RNA SPEC QL NAA+PROBE: NOT DETECTED
RV+EV RNA SPEC QL NAA+PROBE: NOT DETECTED
SARS-COV-2 RNA RESP QL NAA+PROBE: NEGATIVE
SODIUM SERPL-SCNC: 147 MMOL/L (ref 133–144)
SODIUM SERPL-SCNC: 149 MMOL/L (ref 133–144)
UFH PPP CHRO-ACNC: 0.27 IU/ML
UFH PPP CHRO-ACNC: 0.3 IU/ML
UFH PPP CHRO-ACNC: 0.33 IU/ML
WBC # BLD AUTO: 16.7 10E3/UL (ref 4–11)
WBC # BLD AUTO: 18 10E3/UL (ref 4–11)

## 2022-06-02 PROCEDURE — 85520 HEPARIN ASSAY: CPT | Performed by: STUDENT IN AN ORGANIZED HEALTH CARE EDUCATION/TRAINING PROGRAM

## 2022-06-02 PROCEDURE — 82805 BLOOD GASES W/O2 SATURATION: CPT | Performed by: THORACIC SURGERY (CARDIOTHORACIC VASCULAR SURGERY)

## 2022-06-02 PROCEDURE — 83605 ASSAY OF LACTIC ACID: CPT | Performed by: STUDENT IN AN ORGANIZED HEALTH CARE EDUCATION/TRAINING PROGRAM

## 2022-06-02 PROCEDURE — 93306 TTE W/DOPPLER COMPLETE: CPT

## 2022-06-02 PROCEDURE — 85610 PROTHROMBIN TIME: CPT | Performed by: STUDENT IN AN ORGANIZED HEALTH CARE EDUCATION/TRAINING PROGRAM

## 2022-06-02 PROCEDURE — 82330 ASSAY OF CALCIUM: CPT | Performed by: STUDENT IN AN ORGANIZED HEALTH CARE EDUCATION/TRAINING PROGRAM

## 2022-06-02 PROCEDURE — 250N000011 HC RX IP 250 OP 636: Performed by: SURGERY

## 2022-06-02 PROCEDURE — 71045 X-RAY EXAM CHEST 1 VIEW: CPT | Mod: 26 | Performed by: RADIOLOGY

## 2022-06-02 PROCEDURE — 87486 CHLMYD PNEUM DNA AMP PROBE: CPT | Performed by: STUDENT IN AN ORGANIZED HEALTH CARE EDUCATION/TRAINING PROGRAM

## 2022-06-02 PROCEDURE — 93306 TTE W/DOPPLER COMPLETE: CPT | Mod: 26 | Performed by: INTERNAL MEDICINE

## 2022-06-02 PROCEDURE — 85520 HEPARIN ASSAY: CPT | Performed by: THORACIC SURGERY (CARDIOTHORACIC VASCULAR SURGERY)

## 2022-06-02 PROCEDURE — 85520 HEPARIN ASSAY: CPT | Performed by: INTERNAL MEDICINE

## 2022-06-02 PROCEDURE — 999N000215 HC STATISTIC HFNC ADULT NON-CPAP

## 2022-06-02 PROCEDURE — 999N000015 HC STATISTIC ARTERIAL MONITORING DAILY

## 2022-06-02 PROCEDURE — 250N000013 HC RX MED GY IP 250 OP 250 PS 637: Performed by: STUDENT IN AN ORGANIZED HEALTH CARE EDUCATION/TRAINING PROGRAM

## 2022-06-02 PROCEDURE — 94640 AIRWAY INHALATION TREATMENT: CPT

## 2022-06-02 PROCEDURE — 84132 ASSAY OF SERUM POTASSIUM: CPT | Performed by: THORACIC SURGERY (CARDIOTHORACIC VASCULAR SURGERY)

## 2022-06-02 PROCEDURE — 82805 BLOOD GASES W/O2 SATURATION: CPT

## 2022-06-02 PROCEDURE — 99291 CRITICAL CARE FIRST HOUR: CPT | Mod: GC | Performed by: INTERNAL MEDICINE

## 2022-06-02 PROCEDURE — 99291 CRITICAL CARE FIRST HOUR: CPT | Mod: 24 | Performed by: INTERNAL MEDICINE

## 2022-06-02 PROCEDURE — 94660 CPAP INITIATION&MGMT: CPT

## 2022-06-02 PROCEDURE — 82805 BLOOD GASES W/O2 SATURATION: CPT | Performed by: SURGERY

## 2022-06-02 PROCEDURE — 85027 COMPLETE CBC AUTOMATED: CPT | Performed by: STUDENT IN AN ORGANIZED HEALTH CARE EDUCATION/TRAINING PROGRAM

## 2022-06-02 PROCEDURE — 71045 X-RAY EXAM CHEST 1 VIEW: CPT

## 2022-06-02 PROCEDURE — 250N000011 HC RX IP 250 OP 636: Performed by: STUDENT IN AN ORGANIZED HEALTH CARE EDUCATION/TRAINING PROGRAM

## 2022-06-02 PROCEDURE — 85610 PROTHROMBIN TIME: CPT | Performed by: THORACIC SURGERY (CARDIOTHORACIC VASCULAR SURGERY)

## 2022-06-02 PROCEDURE — 250N000011 HC RX IP 250 OP 636

## 2022-06-02 PROCEDURE — 250N000013 HC RX MED GY IP 250 OP 250 PS 637: Performed by: THORACIC SURGERY (CARDIOTHORACIC VASCULAR SURGERY)

## 2022-06-02 PROCEDURE — 250N000009 HC RX 250: Performed by: THORACIC SURGERY (CARDIOTHORACIC VASCULAR SURGERY)

## 2022-06-02 PROCEDURE — 83735 ASSAY OF MAGNESIUM: CPT | Performed by: STUDENT IN AN ORGANIZED HEALTH CARE EDUCATION/TRAINING PROGRAM

## 2022-06-02 PROCEDURE — 250N000011 HC RX IP 250 OP 636: Performed by: THORACIC SURGERY (CARDIOTHORACIC VASCULAR SURGERY)

## 2022-06-02 PROCEDURE — 999N000157 HC STATISTIC RCP TIME EA 10 MIN

## 2022-06-02 PROCEDURE — 71045 X-RAY EXAM CHEST 1 VIEW: CPT | Mod: 77

## 2022-06-02 PROCEDURE — 200N000002 HC R&B ICU UMMC

## 2022-06-02 PROCEDURE — 71045 X-RAY EXAM CHEST 1 VIEW: CPT | Mod: 26 | Performed by: STUDENT IN AN ORGANIZED HEALTH CARE EDUCATION/TRAINING PROGRAM

## 2022-06-02 PROCEDURE — 84132 ASSAY OF SERUM POTASSIUM: CPT | Performed by: STUDENT IN AN ORGANIZED HEALTH CARE EDUCATION/TRAINING PROGRAM

## 2022-06-02 PROCEDURE — 84100 ASSAY OF PHOSPHORUS: CPT | Performed by: THORACIC SURGERY (CARDIOTHORACIC VASCULAR SURGERY)

## 2022-06-02 PROCEDURE — 92526 ORAL FUNCTION THERAPY: CPT | Mod: GN

## 2022-06-02 PROCEDURE — 999N000208 ECHOCARDIOGRAM COMPLETE

## 2022-06-02 PROCEDURE — 999N000065 XR CHEST PORT 1 VIEW

## 2022-06-02 PROCEDURE — 94640 AIRWAY INHALATION TREATMENT: CPT | Mod: 76

## 2022-06-02 PROCEDURE — 97530 THERAPEUTIC ACTIVITIES: CPT | Mod: GO

## 2022-06-02 PROCEDURE — 250N000013 HC RX MED GY IP 250 OP 250 PS 637

## 2022-06-02 PROCEDURE — 97535 SELF CARE MNGMENT TRAINING: CPT | Mod: GO

## 2022-06-02 PROCEDURE — 82805 BLOOD GASES W/O2 SATURATION: CPT | Performed by: STUDENT IN AN ORGANIZED HEALTH CARE EDUCATION/TRAINING PROGRAM

## 2022-06-02 PROCEDURE — U0003 INFECTIOUS AGENT DETECTION BY NUCLEIC ACID (DNA OR RNA); SEVERE ACUTE RESPIRATORY SYNDROME CORONAVIRUS 2 (SARS-COV-2) (CORONAVIRUS DISEASE [COVID-19]), AMPLIFIED PROBE TECHNIQUE, MAKING USE OF HIGH THROUGHPUT TECHNOLOGIES AS DESCRIBED BY CMS-2020-01-R: HCPCS | Performed by: STUDENT IN AN ORGANIZED HEALTH CARE EDUCATION/TRAINING PROGRAM

## 2022-06-02 RX ORDER — WARFARIN SODIUM 2 MG/1
2 TABLET ORAL
Status: COMPLETED | OUTPATIENT
Start: 2022-06-02 | End: 2022-06-02

## 2022-06-02 RX ORDER — FUROSEMIDE 10 MG/ML
INJECTION INTRAMUSCULAR; INTRAVENOUS
Status: COMPLETED
Start: 2022-06-02 | End: 2022-06-02

## 2022-06-02 RX ORDER — HEPARIN SODIUM 10000 [USP'U]/100ML
0-5000 INJECTION, SOLUTION INTRAVENOUS CONTINUOUS
Status: DISPENSED | OUTPATIENT
Start: 2022-06-02 | End: 2022-06-08

## 2022-06-02 RX ORDER — POTASSIUM CHLORIDE 20MEQ/15ML
20 LIQUID (ML) ORAL ONCE
Status: COMPLETED | OUTPATIENT
Start: 2022-06-02 | End: 2022-06-02

## 2022-06-02 RX ORDER — FUROSEMIDE 10 MG/ML
40 INJECTION INTRAMUSCULAR; INTRAVENOUS
Status: DISCONTINUED | OUTPATIENT
Start: 2022-06-02 | End: 2022-06-02

## 2022-06-02 RX ORDER — PIPERACILLIN SODIUM, TAZOBACTAM SODIUM 3; .375 G/15ML; G/15ML
3.38 INJECTION, POWDER, LYOPHILIZED, FOR SOLUTION INTRAVENOUS EVERY 6 HOURS
Status: DISCONTINUED | OUTPATIENT
Start: 2022-06-02 | End: 2022-06-02

## 2022-06-02 RX ORDER — POTASSIUM CHLORIDE 1.5 G/1.58G
40 POWDER, FOR SOLUTION ORAL 3 TIMES DAILY
Status: DISCONTINUED | OUTPATIENT
Start: 2022-06-02 | End: 2022-06-04

## 2022-06-02 RX ORDER — POTASSIUM CHLORIDE 29.8 MG/ML
20 INJECTION INTRAVENOUS ONCE
Status: COMPLETED | OUTPATIENT
Start: 2022-06-02 | End: 2022-06-02

## 2022-06-02 RX ORDER — POTASSIUM CHLORIDE 1.5 G/1.58G
20 POWDER, FOR SOLUTION ORAL ONCE
Status: COMPLETED | OUTPATIENT
Start: 2022-06-02 | End: 2022-06-02

## 2022-06-02 RX ORDER — FUROSEMIDE 10 MG/ML
40 INJECTION INTRAMUSCULAR; INTRAVENOUS
Status: COMPLETED | OUTPATIENT
Start: 2022-06-02 | End: 2022-06-03

## 2022-06-02 RX ORDER — ACYCLOVIR 200 MG/1
10 CAPSULE ORAL ONCE
Status: DISCONTINUED | OUTPATIENT
Start: 2022-06-02 | End: 2022-06-17

## 2022-06-02 RX ORDER — MEROPENEM 1 G/1
1 INJECTION, POWDER, FOR SOLUTION INTRAVENOUS EVERY 12 HOURS
Status: DISCONTINUED | OUTPATIENT
Start: 2022-06-02 | End: 2022-06-03

## 2022-06-02 RX ADMIN — POTASSIUM & SODIUM PHOSPHATES POWDER PACK 280-160-250 MG 1 PACKET: 280-160-250 PACK at 08:18

## 2022-06-02 RX ADMIN — ACETAMINOPHEN 325MG 975 MG: 325 TABLET ORAL at 08:18

## 2022-06-02 RX ADMIN — PIPERACILLIN SODIUM AND TAZOBACTAM SODIUM 4.5 G: 4; .5 INJECTION, POWDER, LYOPHILIZED, FOR SOLUTION INTRAVENOUS at 09:40

## 2022-06-02 RX ADMIN — OXYCODONE HYDROCHLORIDE 5 MG: 5 TABLET ORAL at 06:07

## 2022-06-02 RX ADMIN — PIPERACILLIN SODIUM AND TAZOBACTAM SODIUM 4.5 G: 4; .5 INJECTION, POWDER, LYOPHILIZED, FOR SOLUTION INTRAVENOUS at 04:54

## 2022-06-02 RX ADMIN — Medication 1 PACKET: at 08:19

## 2022-06-02 RX ADMIN — Medication 0.1 MCG/KG/MIN: at 13:36

## 2022-06-02 RX ADMIN — FUROSEMIDE 40 MG: 10 INJECTION, SOLUTION INTRAVENOUS at 17:26

## 2022-06-02 RX ADMIN — POTASSIUM CHLORIDE 20 MEQ: 40 SOLUTION ORAL at 05:59

## 2022-06-02 RX ADMIN — LIDOCAINE PATCH 4% 1 PATCH: 40 PATCH TOPICAL at 08:20

## 2022-06-02 RX ADMIN — WARFARIN SODIUM 2 MG: 2 TABLET ORAL at 17:26

## 2022-06-02 RX ADMIN — IPRATROPIUM BROMIDE AND ALBUTEROL SULFATE 3 ML: 2.5; .5 SOLUTION RESPIRATORY (INHALATION) at 20:26

## 2022-06-02 RX ADMIN — OXYCODONE HYDROCHLORIDE 5 MG: 5 TABLET ORAL at 19:50

## 2022-06-02 RX ADMIN — FUROSEMIDE 40 MG: 10 INJECTION, SOLUTION INTRAVENOUS at 15:11

## 2022-06-02 RX ADMIN — POTASSIUM CHLORIDE 40 MEQ: 1.5 POWDER, FOR SOLUTION ORAL at 19:50

## 2022-06-02 RX ADMIN — HEPARIN SODIUM AND DEXTROSE 1200 UNITS/HR: 10000; 5 INJECTION INTRAVENOUS at 13:35

## 2022-06-02 RX ADMIN — ACETAMINOPHEN 325MG 975 MG: 325 TABLET ORAL at 01:11

## 2022-06-02 RX ADMIN — OXYCODONE HYDROCHLORIDE 5 MG: 5 TABLET ORAL at 23:34

## 2022-06-02 RX ADMIN — POTASSIUM CHLORIDE 40 MEQ: 1.5 POWDER, FOR SOLUTION ORAL at 15:11

## 2022-06-02 RX ADMIN — PIPERACILLIN AND TAZOBACTAM 3.38 G: 3; .375 INJECTION, POWDER, LYOPHILIZED, FOR SOLUTION INTRAVENOUS at 15:48

## 2022-06-02 RX ADMIN — Medication 15 ML: at 08:18

## 2022-06-02 RX ADMIN — INSULIN ASPART 2 UNITS: 100 INJECTION, SOLUTION INTRAVENOUS; SUBCUTANEOUS at 23:34

## 2022-06-02 RX ADMIN — Medication 40 MG: at 08:18

## 2022-06-02 RX ADMIN — POTASSIUM CHLORIDE 20 MEQ: 29.8 INJECTION, SOLUTION INTRAVENOUS at 10:19

## 2022-06-02 RX ADMIN — Medication 10 MG: at 19:50

## 2022-06-02 RX ADMIN — FUROSEMIDE 40 MG: 10 INJECTION, SOLUTION INTRAMUSCULAR; INTRAVENOUS at 15:11

## 2022-06-02 RX ADMIN — ASPIRIN 81 MG 81 MG: 81 TABLET ORAL at 08:18

## 2022-06-02 RX ADMIN — OXYCODONE HYDROCHLORIDE 5 MG: 5 TABLET ORAL at 12:06

## 2022-06-02 RX ADMIN — IPRATROPIUM BROMIDE AND ALBUTEROL SULFATE 3 ML: 2.5; .5 SOLUTION RESPIRATORY (INHALATION) at 11:49

## 2022-06-02 RX ADMIN — GABAPENTIN 100 MG: 250 SUSPENSION ORAL at 23:34

## 2022-06-02 RX ADMIN — POTASSIUM & SODIUM PHOSPHATES POWDER PACK 280-160-250 MG 1 PACKET: 280-160-250 PACK at 19:50

## 2022-06-02 RX ADMIN — INSULIN ASPART 1 UNITS: 100 INJECTION, SOLUTION INTRAVENOUS; SUBCUTANEOUS at 12:02

## 2022-06-02 RX ADMIN — INSULIN ASPART 1 UNITS: 100 INJECTION, SOLUTION INTRAVENOUS; SUBCUTANEOUS at 08:18

## 2022-06-02 RX ADMIN — ACETAMINOPHEN 325MG 975 MG: 325 TABLET ORAL at 13:34

## 2022-06-02 RX ADMIN — IPRATROPIUM BROMIDE AND ALBUTEROL SULFATE 3 ML: 2.5; .5 SOLUTION RESPIRATORY (INHALATION) at 16:20

## 2022-06-02 RX ADMIN — ACETAZOLAMIDE 250 MG: 500 INJECTION, POWDER, LYOPHILIZED, FOR SOLUTION INTRAVENOUS at 06:00

## 2022-06-02 RX ADMIN — POTASSIUM CHLORIDE 20 MEQ: 1.5 POWDER, FOR SOLUTION ORAL at 08:22

## 2022-06-02 RX ADMIN — MEROPENEM 1 G: 1 INJECTION, POWDER, FOR SOLUTION INTRAVENOUS at 17:58

## 2022-06-02 RX ADMIN — POTASSIUM CHLORIDE 20 MEQ: 40 SOLUTION ORAL at 01:12

## 2022-06-02 ASSESSMENT — ACTIVITIES OF DAILY LIVING (ADL)
ADLS_ACUITY_SCORE: 35
ADLS_ACUITY_SCORE: 35
ADLS_ACUITY_SCORE: 36
ADLS_ACUITY_SCORE: 35
ADLS_ACUITY_SCORE: 36
ADLS_ACUITY_SCORE: 35
ADLS_ACUITY_SCORE: 36

## 2022-06-02 NOTE — PROGRESS NOTES
CV ICU PROGRESS NOTE  6/2/2022      CO-MORBIDITIES:   CTEPH (chronic thromboembolic pulmonary hypertension) (H)  (primary encounter diagnosis)  Primary pulmonary hypertension (H)  SOB (shortness of breath)  S/P coronary angiogram    ASSESSMENT: Debi Espinoza is a 76 year old female with PMH of HTN, nephrolithiasis, DVT (2019) and PE (2019, 2021) on chronic anticoagulation, and chronic thromboembolic pulmonary hypertension who underwent pulmonary artery thromboendarterectomy on 5/27 with Dr. Peterson.    TODAY'S PROGRESS:   - K repletion recheck @ 1400  - iCa and LA ordered  - Melatonin at night  - COVID repeat  - Respiratory panel   - ECHO   - start warfarin  - remove pleural chest tube  - BiPAP for support      PLAN:  Neuro/ pain/ sedation:  Acute Postoperative pain  - Monitor neurological status. Notify the MD for any acute changes in exam.  - Pain: S tylenol 975 TID, lidocaine patches. PRN oxycodone 5-10 q4h, robaxin 500 q6h, dilaudid 0.2 q2h     Pulmonary care:   S/p Pulmonary artery thromboendarterectomy on 5/27/2022 by Dr. Peterson  Hx Chronic pulmonary thromboembolic disease  Hx PE (2019, 2021)  Hx Emphysema, moderate  PTA regimen: apixaban 5mg BID  CTA 05/2019 demonstrated underlying moderate emphysema  Extubated 5/31  - BiPAP for respiratory support as able  - Last ABG 7.5/43/150/33  - Plan to pull CT today    Cardiovascular:    Hx Severe pulmonary hypertension  Hx RV dilation and reduced RV function  Hx Severe tricuspid insufficiency  Hx Essential HTN  Distributive shock  PTA regimen: furosemide 20mg qd, adempas 2.5mg TID, opsumit 10mg qd  Echo on 03/2021 with LVEF of 78%, severe pulmonary hypertension (82 mmHg), reduced RV function (TAPSE 16mm), severe tricuspid insufficiency  - Monitor hemodynamic status.   - ASA 81  - Wean off levophed as able   - CVP goal <10, MAP goal > 65  - V pacing back up 50 BPM  - high intensity Heparin gtt  -ECHO     GI care/ Nutrition:   - Speech consult  - NJ feeding - advance  TF to goal  - PPI    Renal/ Fluid Balance/ Electrolytes:   Nephrolithiasis  PTA regimen: furosemide 20mg qd  BL creat appears to be ~ 1.14  - Continue diuresis today  - Will give lasix 40 x 3  today and reassess  - Strict I/O, daily weights  - Avoid/limit nephrotoxins as able  - Replete lytes PRN per protocol     Endocrine:    Stress induced hyperglycemia  Preop A1c 5.8 (12/21)  - High sliding scale insulin   - Goal BG <180 for optimal healing      ID/ Antibiotics:  Stress induced leukocytosis  Fever to 38.5 on 5/28 - afebrile now  Distributive shock  5/28 Blood cultures negative  5/30 MRSA swab negative  5/31 Sputum culture with 4+ lactose fermenting gram negative rods  5/31 Repeat blood cultures NGTD @ 1 day  Afebrile today, WBC down trending.   - Continue to monitor fever curve, WBC and inflammatory markers as appropriate  - Continue Zosyn  - Discontinue levaquin    Cultures  5/30 Sputum - Klebsiella     Heme:     Stress induced leukocytosis  Acute blood loss anemia  Acute blood loss thrombocytopenia  Hx DVT (2019), PE (2019, 2021) on chronic anticoagulation  PA tear intra-op s/p patch repair.  - Continue to monitor  - Low intensity heparin gtt, no bolus  - Daily CBC      MSK/ Skin:  Sternotomy  Surgical Incision  - Sternal precautions  - Postoperative incision management per protocol  - PT/OT/CR     Prophylaxis:    - Mechanical prophylaxis for DVT  - Chemical DVT prophylaxis - low intensity heparin gtt  - PPI     Lines/ tubes/ drains:  - Arterial Line, Left IJ, ly, NJ     Disposition:  - CVICU    Patient seen, findings and plan discussed with CV ICU staff, Dr. Go and CVTS, Dr. Vega and VIOLETTA Kaplan  SCC fellow, [employer]  ====================================    SUBJECTIVE:   Afebrile. Doing okay. Somewhat tachypneic this AM but overall okay.    OBJECTIVE:   1. VITAL SIGNS:   Temp:  [97.9  F (36.6  C)-99.68  F (37.6  C)] 99.14  F (37.3  C)  Pulse:  [] 83  Resp:  [20-26] 24  MAP:   [59 mmHg-85 mmHg] 65 mmHg  Arterial Line BP: ()/(42-59) 97/45  FiO2 (%):  [50 %-70 %] 60 %  SpO2:  [82 %-100 %] 100 %  FiO2 (%): 60 %  Resp: 24    2. INTAKE/ OUTPUT:   I/O last 3 completed shifts:  In: 1576.3 [I.V.:936.3; NG/GT:300]  Out: 2380 [Urine:2310; Chest Tube:70]    3. PHYSICAL EXAMINATION:   General: lying in bed, appears relatively comfortable  Neuro: answers to commands, open eyes to voice  Resp: Tachypneic, on BiPAP  CV: RRR  Abdomen: Soft, Non-distended, Non-tender  Incisions: clean dry intact  Extremities: warm and well perfused    4. INVESTIGATIONS:   Arterial Blood Gases   Recent Labs   Lab 06/02/22  0542 06/01/22  1931 06/01/22  1233 06/01/22  0955   PH 7.45 7.50* 7.52*  7.52* 7.53*   PCO2 41 39 41  41 39   PO2 62* 71* 54*  54* 46*   HCO3 29* 30* 33*  33* 33*     Complete Blood Count   Recent Labs   Lab 06/02/22  0809 06/01/22  0427 05/31/22  0357 05/30/22  1244 05/30/22  0354   WBC 16.7* 13.7* 12.0*  --  12.6*   HGB 8.5* 8.1* 8.2* 8.2* 6.9*    106* 114*  --  106*     Basic Metabolic Panel  Recent Labs   Lab 06/02/22  0814 06/02/22  0438 06/02/22  0433 06/02/22  0008 06/02/22  0004 06/01/22  2008 06/01/22  1926 06/01/22  1512 06/01/22  0429 06/01/22  0427 05/31/22  0413 05/31/22  0357   NA  --   --  147*  --   --   --   --  143  --  142  --  142   POTASSIUM  --   --  3.2*  --  3.2* 3.2*  --  3.2*  --  3.9   < > 3.4   CHLORIDE  --   --  112*  --   --   --   --  104  --  104  --  107   CO2  --   --  28  --   --   --   --  33*  --  31  --  30   BUN  --   --  26  --   --   --   --  25  --  24  --  24   CR  --   --  1.28*  --   --   --   --  1.25*  --  1.09*  --  0.98   * 121* 123* 107*  --   --    < > 105*   < > 123*   < > 144*    < > = values in this interval not displayed.     Liver Function Tests  Recent Labs   Lab 05/27/22  2156 05/27/22  1931 05/27/22  1758 05/27/22  1604   AST 47* 36  --   --    ALT 14 11  --   --    ALKPHOS 48 37*  --   --    BILITOTAL 1.1 1.2  --   --     ALBUMIN 2.5* 1.8*  --   --    INR 1.52* 2.20* 2.40* 1.71*     Pancreatic Enzymes  No lab results found in last 7 days.  Coagulation Profile  Recent Labs   Lab 05/28/22  1057 05/28/22  0349 05/27/22  2156 05/27/22  1931 05/27/22  1758 05/27/22  1604   INR  --   --  1.52* 2.20* 2.40* 1.71*   PTT 59* 60* 60* 63* 76* 89*     5. RADIOLOGY:   Recent Results (from the past 24 hour(s))   XR Chest Port 1 View    Narrative    Portable chest    INDICATION: Post pulmonary endarterectomy with chest tubes in place    COMPARISON: 5/31/2022    FINDINGS: Heart size normal. Haziness at left costophrenic angle  slightly increased. Unchanged mediastinal drain and right basilar  chest tube. Left IJ Foley-Diamond catheter tip in the right main branch  pulmonary artery. Median sternotomy. Calcifications at the aortic  knob. Mild prominence of perihilar structures which may indicate  subtle atelectasis or edema. Interval extubation.      Impression    IMPRESSION: Interval extubation. Slightly increased left pleural  effusion/basilar atelectasis. Atherosclerosis. Mild central  atelectasis/subtle positive fluid volume balance.    MARK GUADARRAMA MD         SYSTEM ID:  [id number]   XR Abdomen Port 1 View    Narrative    Exam: XR ABDOMEN PORT 1 VIEWS, 6/1/2022 5:14 PM    Indication: Verify small bowel feeding tube bedside placement    Comparison: Radiograph of the abdomen dated 5/27/2022, Same day chest  radiograph at 0924    Findings:   Enteric tube is subdiaphragmatic with tip in the left pelvis.  Nonobstructive bowel gas pattern. Stable position of the right basilar  chest tube. Stable right basilar pulmonary opacities.      Impression    Impression: Enteric tube is subdiaphragmatic with tip in the left  pelvis, presumably within the jejunum.    I have personally reviewed the examination and initial interpretation  and I agree with the findings.    BEBE CLIFFORD MD         SYSTEM ID:  [id number]   XR Chest Port 1 View    Narrative    Exam:  XR CHEST PORT 1 VIEW, 6/2/2022 12:47 AM    Indication: s/p pulmonary endartarectomies    Comparison: Chest x-ray 6/1/2022    Findings:   Median sternotomy wires. Pulmonary artery catheter removed with left  IJ sheath over the left innominate vein. Right basilar chest tube  remains; mediastinal drain and left chest tube have been removed.  Placement of feeding tube coursing past the level of the diaphragm  with tip excluded from the field-of-view.    Unchanged cardiomediastinal silhouette. Trace right and small left  effusions with left basilar atelectasis. The lung apices are not  completely included in the field-of-view; small pneumothoraces would  be difficult to exclude. Increasing streaky perihilar opacities.      Impression    Impression:   1. Unchanged effusions with increased left basilar atelectasis and  interstitial pulmonary edema.  2. Removal of pulmonary artery catheter, mediastinal drain and left  chest tube. Given that lung apices are not entirely included in the  field-of-view, small pneumothoraces could be missed.    I have personally reviewed the examination and initial interpretation  and I agree with the findings.    MARK GUADARRAMA MD         SYSTEM ID:  [id number]       =========================================

## 2022-06-02 NOTE — PLAN OF CARE
Major Shift Events:  Pt VSS, afebrile and A&Ox4. Pt switching between HF and bipap. Pt is on levo at 0.08 to maintain MAP >60. Pt has tf running at goal and is tolerating it well. Pt has heparin gtt running. Pt having adequate UO with ly. Pt getting lasix x3 today. Pt had rectal tube placed for frequent loose stools. Pt had CT removed. Pt had echo today. Pt worked with PT and stood and pivoted to the chair.      Plan: Continue to monitor and wean levo as tolerated.      For vital signs and complete assessments, please see documentation flowsheets.

## 2022-06-02 NOTE — PROGRESS NOTES
Cardiology Fellow Brief Progress Note  _________________________________         Interval Events  ----------------------  Remains on high-flow  Pressor requirements increased overnight  Diuresed well yesterday after lasix and acetazolamide x3.   Hosford-Diamond Catheter removed yesterday.       Current Medications  ----------------------  Current Facility-Administered Medications   Medication     acetaminophen (TYLENOL) tablet 975 mg     aspirin (ASA) chewable tablet 81 mg     bisacodyl (DULCOLAX) Suppository 10 mg     dextrose 10% infusion     dextrose 10% infusion     glucose gel 15-30 g    Or     dextrose 50 % injection 25-50 mL    Or     glucagon injection 1 mg     fentaNYL (SUBLIMAZE) infusion     gabapentin (NEURONTIN) solution 100 mg     heparin infusion 25,000 units in D5W 250 mL ANTICOAGULANT     HOLD MEDICATION     hydrALAZINE (APRESOLINE) injection 10 mg     HYDROmorphone (DILAUDID) injection 0.2 mg     hydrOXYzine (ATARAX) tablet 25-50 mg     insulin aspart (NovoLOG) injection (RAPID ACTING)     ipratropium - albuterol 0.5 mg/2.5 mg/3 mL (DUONEB) neb solution 3 mL     ipratropium - albuterol 0.5 mg/2.5 mg/3 mL (DUONEB) neb solution 3 mL     Lidocaine (LIDOCARE) 4 % Patch 1 patch     lidocaine (LMX4) cream     lidocaine (LMX4) cream     lidocaine 1 % 0.1-1 mL     lidocaine 1 % 0.1-1 mL     lidocaine patch in PLACE     lidocaine-prilocaine (EMLA) cream     magnesium hydroxide (MILK OF MAGNESIA) suspension 30 mL     medication instruction     melatonin tablet 3 mg     methocarbamol (ROBAXIN) tablet 500 mg     metoclopramide (REGLAN) injection 5 mg     multivitamins w/minerals liquid 15 mL     naloxone (NARCAN) injection 0.2 mg    Or     naloxone (NARCAN) injection 0.4 mg    Or     naloxone (NARCAN) injection 0.2 mg    Or     naloxone (NARCAN) injection 0.4 mg     norepinephrine (LEVOPHED) 16 mg in  mL infusion MAX CONC CENTRAL LINE     ondansetron (ZOFRAN ODT) ODT tab 4 mg    Or     ondansetron (ZOFRAN)  injection 4 mg     oxyCODONE (ROXICODONE) tablet 5 mg    Or     oxyCODONE IR (ROXICODONE) tablet 10 mg     pantoprazole (PROTONIX) 2 mg/mL suspension 40 mg    Or     pantoprazole (PROTONIX) EC tablet 40 mg     piperacillin-tazobactam (ZOSYN) 4.5 g vial to attach to  mL bag     [Held by provider] polyethylene glycol (MIRALAX) Packet 17 g     potassium & sodium phosphates (NEUTRA-PHOS) Packet 1 packet     prochlorperazine (COMPAZINE) tablet 5 mg    Or     prochlorperazine (COMPAZINE) injection 5 mg     propofol (DIPRIVAN) infusion     protein modular (PROSOURCE TF) 1 packet     Reason beta blocker order not selected     [Held by provider] senna-docusate (SENOKOT-S/PERICOLACE) 8.6-50 MG per tablet 1 tablet     sodium chloride (PF) 0.9% PF flush 3 mL     sodium chloride (PF) 0.9% PF flush 3 mL     sodium chloride (PF) 0.9% PF flush 3 mL     sodium chloride (PF) 0.9% PF flush 3 mL     vasopressin 1 unit/mL MAX Conc (PITRESSIN) infusion        Intake and Output  ----------------------    Intake/Output Summary (Last 24 hours) at 6/2/2022 0728  Last data filed at 6/2/2022 0600  Gross per 24 hour   Intake 1550.4 ml   Output 2380 ml   Net -829.6 ml             Weight  ----------------------  Wt Readings from Last 2 Encounters:   06/01/22 66 kg (145 lb 8.1 oz)   04/29/22 67.4 kg (148 lb 8 oz)       Objective  ----------------------  Results for orders placed or performed during the hospital encounter of 05/25/22 (from the past 24 hour(s))   Glucose by meter   Result Value Ref Range    GLUCOSE BY METER POCT 99 70 - 99 mg/dL   Blood gas arterial with oxyhemoglobin   Result Value Ref Range    pH Arterial 7.53 (H) 7.35 - 7.45    pCO2 Arterial 39 35 - 45 mm Hg    pO2 Arterial 46 (L) 80 - 105 mm Hg    Bicarbonate Arterial 33 (H) 21 - 28 mmol/L    Oxyhemoglobin Arterial 85 (L) 92 - 100 %    Base Excess/Deficit (+/-) 9.2 (H) -9.0 - 1.8 mmol/L    FIO2 44    XR Chest Port 1 View    Narrative    Portable chest    INDICATION: Post  pulmonary endarterectomy with chest tubes in place    COMPARISON: 5/31/2022    FINDINGS: Heart size normal. Haziness at left costophrenic angle  slightly increased. Unchanged mediastinal drain and right basilar  chest tube. Left IJ Pollock-Diamond catheter tip in the right main branch  pulmonary artery. Median sternotomy. Calcifications at the aortic  knob. Mild prominence of perihilar structures which may indicate  subtle atelectasis or edema. Interval extubation.      Impression    IMPRESSION: Interval extubation. Slightly increased left pleural  effusion/basilar atelectasis. Atherosclerosis. Mild central  atelectasis/subtle positive fluid volume balance.    MARK GUADARRAMA MD         SYSTEM ID:  [id number]   Glucose by meter   Result Value Ref Range    GLUCOSE BY METER POCT 97 70 - 99 mg/dL   Blood gas arterial with oxyhemoglobin   Result Value Ref Range    pH Arterial 7.52 (H) 7.35 - 7.45    pCO2 Arterial 41 35 - 45 mm Hg    pO2 Arterial 54 (L) 80 - 105 mm Hg    Bicarbonate Arterial 33 (H) 21 - 28 mmol/L    Oxyhemoglobin Arterial 88 (L) 92 - 100 %    Base Excess/Deficit (+/-) 9.7 (H) -9.0 - 1.8 mmol/L    FIO2 10    Blood gas arterial   Result Value Ref Range    pH Arterial 7.52 (H) 7.35 - 7.45    pCO2 Arterial 41 35 - 45 mm Hg    pO2 Arterial 54 (L) 80 - 105 mm Hg    FIO2 10     Bicarbonate Arterial 33 (H) 21 - 28 mmol/L    Base Excess/Deficit (+/-) 9.7 (H) -9.0 - 1.8 mmol/L   Glucose by meter   Result Value Ref Range    GLUCOSE BY METER POCT 98 70 - 99 mg/dL   Basic metabolic panel   Result Value Ref Range    Sodium 143 133 - 144 mmol/L    Potassium 3.2 (L) 3.4 - 5.3 mmol/L    Chloride 104 94 - 109 mmol/L    Carbon Dioxide (CO2) 33 (H) 20 - 32 mmol/L    Anion Gap 6 3 - 14 mmol/L    Urea Nitrogen 25 7 - 30 mg/dL    Creatinine 1.25 (H) 0.52 - 1.04 mg/dL    Calcium 9.0 8.5 - 10.1 mg/dL    Glucose 105 (H) 70 - 99 mg/dL    GFR Estimate 44 (L) >60 mL/min/1.73m2   Magnesium   Result Value Ref Range    Magnesium 2.4 (H)  1.6 - 2.3 mg/dL   XR Abdomen Port 1 View    Narrative    Exam: XR ABDOMEN PORT 1 VIEWS, 6/1/2022 5:14 PM    Indication: Verify small bowel feeding tube bedside placement    Comparison: Radiograph of the abdomen dated 5/27/2022, Same day chest  radiograph at 0924    Findings:   Enteric tube is subdiaphragmatic with tip in the left pelvis.  Nonobstructive bowel gas pattern. Stable position of the right basilar  chest tube. Stable right basilar pulmonary opacities.      Impression    Impression: Enteric tube is subdiaphragmatic with tip in the left  pelvis, presumably within the jejunum.    I have personally reviewed the examination and initial interpretation  and I agree with the findings.    BEBE CLIFFORD MD         SYSTEM ID:  [id number]   Glucose by meter   Result Value Ref Range    GLUCOSE BY METER POCT 116 (H) 70 - 99 mg/dL   Blood gas arterial with oxyhemoglobin   Result Value Ref Range    pH Arterial 7.50 (H) 7.35 - 7.45    pCO2 Arterial 39 35 - 45 mm Hg    pO2 Arterial 71 (L) 80 - 105 mm Hg    Bicarbonate Arterial 30 (H) 21 - 28 mmol/L    Oxyhemoglobin Arterial 95 92 - 100 %    Base Excess/Deficit (+/-) 6.7 (H) -9.0 - 1.8 mmol/L    FIO2 50    Heparin Unfractionated Anti Xa Level   Result Value Ref Range    Anti Xa Unfractionated Heparin 0.24 For Reference Range, See Comment IU/mL    Narrative    Therapeutic Range: UFH: 0.25-0.50 IU/mL for low intensity dosing,  0.30-0.70 IU/mL for high intensity dosing DVT and PE.  This test is not validated for other direct factor X inhibitors (e.g. rivaroxaban, apixaban, edoxaban, betrixaban, fondaparinux) and should not be used for monitoring of other medications.   Potassium   Result Value Ref Range    Potassium 3.2 (L) 3.4 - 5.3 mmol/L   Potassium   Result Value Ref Range    Potassium 3.2 (L) 3.4 - 5.3 mmol/L   Glucose by meter   Result Value Ref Range    GLUCOSE BY METER POCT 107 (H) 70 - 99 mg/dL   Heparin Unfractionated Anti Xa Level   Result Value Ref Range    Anti Xa  Unfractionated Heparin 0.30 For Reference Range, See Comment IU/mL    Narrative    Therapeutic Range: UFH: 0.25-0.50 IU/mL for low intensity dosing,  0.30-0.70 IU/mL for high intensity dosing DVT and PE.  This test is not validated for other direct factor X inhibitors (e.g. rivaroxaban, apixaban, edoxaban, betrixaban, fondaparinux) and should not be used for monitoring of other medications.   Basic metabolic panel   Result Value Ref Range    Sodium 147 (H) 133 - 144 mmol/L    Potassium 3.2 (L) 3.4 - 5.3 mmol/L    Chloride 112 (H) 94 - 109 mmol/L    Carbon Dioxide (CO2) 28 20 - 32 mmol/L    Anion Gap 7 3 - 14 mmol/L    Urea Nitrogen 26 7 - 30 mg/dL    Creatinine 1.28 (H) 0.52 - 1.04 mg/dL    Calcium 8.4 (L) 8.5 - 10.1 mg/dL    Glucose 123 (H) 70 - 99 mg/dL    GFR Estimate 43 (L) >60 mL/min/1.73m2   Magnesium   Result Value Ref Range    Magnesium 2.4 (H) 1.6 - 2.3 mg/dL   Phosphorus   Result Value Ref Range    Phosphorus 2.5 2.5 - 4.5 mg/dL   Glucose by meter   Result Value Ref Range    GLUCOSE BY METER POCT 121 (H) 70 - 99 mg/dL   Blood gas arterial with oxyhemoglobin   Result Value Ref Range    pH Arterial 7.45 7.35 - 7.45    pCO2 Arterial 41 35 - 45 mm Hg    pO2 Arterial 62 (L) 80 - 105 mm Hg    Bicarbonate Arterial 29 (H) 21 - 28 mmol/L    Oxyhemoglobin Arterial 91 (L) 92 - 100 %    Base Excess/Deficit (+/-) 4.1 (H) -9.0 - 1.8 mmol/L    FIO2 60        Recent Labs   Lab Test 12/08/21  1221   CHOL 113   HDL 35*   LDL 59   TRIG 94       Hemoglobin A1C   Date Value Ref Range Status   12/08/2021 5.8 (H) 0.0 - 5.6 % Final     Comment:     Normal <5.7%   Prediabetes 5.7-6.4%    Diabetes 6.5% or higher     Note: Adopted from ADA consensus guidelines.       Results for orders placed or performed during the hospital encounter of 05/25/22   XR Chest Port 1 View    Impression    IMPRESSION: Suches-Diamond catheter tip projects over the central right  pulmonary artery.    I have personally reviewed the examination and initial  interpretation  and I agree with the findings.    CANDACE ALMONTE MD         SYSTEM ID:  [id number]   XR Abdomen Port 1 View    Impression    IMPRESSION: Gastric tube tip and sidehole project over the stomach.    I have personally reviewed the examination and initial interpretation  and I agree with the findings.    CANDACE ALMONTE MD         SYSTEM ID:  [id number]   XR Chest Port 1 View    Impression    Impression: Postoperative chest with slightly improved perihilar  atelectasis or edema. Endotracheal tube tip in lower trachea 2.5 cm  above the man.    I have personally reviewed the examination and initial interpretation  and I agree with the findings.    YVETTE GRAY MD         SYSTEM ID:  [id number]       Physical Exam  Vitals:    06/02/22 0300 06/02/22 0400 06/02/22 0500 06/02/22 0600   BP:       BP Location:       Cuff Size:       Pulse: 94 92 101 83   Resp: 23 26 24 24   Temp: 98.96  F (37.2  C) 98.96  F (37.2  C) 98.96  F (37.2  C) 99.14  F (37.3  C)   TempSrc:       SpO2: 91% 90% 95% 100%   Weight:       Height:            Exam:  Gen: AOx3 NAD  HEET: moist oral mucosae  CV: rrr, no mrg  Pulm: CTAB  Abd: soft nt/nd  Ext: warm well perfused    Assessment & Plan   Debi Espinoza is a 76 year old female with a history of renal stones, CKD, DVT/PE (on apixaban), 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     Changes today:   - wean pressors as tolerated  - holding   - follow post CTEPH pulmonary endarterectomy protocols  - remaining cares per surgical ICU team      # Pulmonary hypertension, Group IV  # CTEPH s/p pulmonary endaterectomy  # DVTs  # Severe tricuspid insufficiency  Initially diagnosed with bilateral DVT, PE 2019. TTE showed dilated RV with reduced RV function and RVSP of 84mmHg suggestive of severe pulmonary hypertension with mild to moderate TR. Patient believes the above relates to a work related injury,  unna boot early 2019. [facility] 12/18/2021 with RA 12, PA 84/19/45, PCWP 2, TAHIRA CO/CI 2.8/1.6. Last dose eliquis 5/23 AM. Has oxygen at home but does not use this. Underwent endarterectomy on 5/27. Post op on multiple pressors and briefly on milrinone.   - Goal CVP < 10, diuresis as needed to achieve  - Goal CI 2.0-2.5  - holding pHTN meds in ICU     # Volume overload  PTA on lasix 20mg qday (decreased from 40mg ~6 weeks ago). JVP elevated on initial exam. S/p 40mg IV lasix 5/25; repeat as needed        # Anemia  Hgb 11.4, similar to prior last month.         # CKD  Baseline Cr ~1.3.     Dwain Ferrer MD, MSc  Cardiovascular Disease Fellow  [facility]    Discussed with Attending Dr Etienne who is in agreement.

## 2022-06-02 NOTE — PROGRESS NOTES
No acute events overnight. Patient saturating >93% on high flow 40L/60%. MAP >65 on levophed 0.04-0.08. Medicated once for pain to right chest with PRN oxycodone with good effect.  Tube feeding advanced to goal rate 40ml/hr, tolerating well. Patient continues to have loose stools, incontinent. Win with adequate urine output.  Heparin drip therapeutic x 1, next xa level to be drawn at 1115. Potassium replaced per protocol.

## 2022-06-03 ENCOUNTER — APPOINTMENT (OUTPATIENT)
Dept: SPEECH THERAPY | Facility: CLINIC | Age: 77
DRG: 270 | End: 2022-06-03
Attending: INTERNAL MEDICINE
Payer: MEDICARE

## 2022-06-03 ENCOUNTER — APPOINTMENT (OUTPATIENT)
Dept: GENERAL RADIOLOGY | Facility: CLINIC | Age: 77
DRG: 270 | End: 2022-06-03
Attending: STUDENT IN AN ORGANIZED HEALTH CARE EDUCATION/TRAINING PROGRAM
Payer: MEDICARE

## 2022-06-03 ENCOUNTER — APPOINTMENT (OUTPATIENT)
Dept: PHYSICAL THERAPY | Facility: CLINIC | Age: 77
DRG: 270 | End: 2022-06-03
Attending: THORACIC SURGERY (CARDIOTHORACIC VASCULAR SURGERY)
Payer: MEDICARE

## 2022-06-03 ENCOUNTER — APPOINTMENT (OUTPATIENT)
Dept: OCCUPATIONAL THERAPY | Facility: CLINIC | Age: 77
DRG: 270 | End: 2022-06-03
Attending: INTERNAL MEDICINE
Payer: MEDICARE

## 2022-06-03 LAB
ANION GAP SERPL CALCULATED.3IONS-SCNC: 5 MMOL/L (ref 3–14)
BASE EXCESS BLDA CALC-SCNC: 0.8 MMOL/L (ref -9–1.8)
BUN SERPL-MCNC: 29 MG/DL (ref 7–30)
CA-I BLD-MCNC: 4.7 MG/DL (ref 4.4–5.2)
CA-I BLD-MCNC: 4.8 MG/DL (ref 4.4–5.2)
CA-I BLD-MCNC: 4.9 MG/DL (ref 4.4–5.2)
CALCIUM SERPL-MCNC: 8.8 MG/DL (ref 8.5–10.1)
CHLORIDE BLD-SCNC: 117 MMOL/L (ref 94–109)
CO2 SERPL-SCNC: 27 MMOL/L (ref 20–32)
CREAT SERPL-MCNC: 1.35 MG/DL (ref 0.52–1.04)
ERYTHROCYTE [DISTWIDTH] IN BLOOD BY AUTOMATED COUNT: 18.6 % (ref 10–15)
GFR SERPL CREATININE-BSD FRML MDRD: 40 ML/MIN/1.73M2
GLUCOSE BLD-MCNC: 162 MG/DL (ref 70–99)
GLUCOSE BLDC GLUCOMTR-MCNC: 129 MG/DL (ref 70–99)
GLUCOSE BLDC GLUCOMTR-MCNC: 135 MG/DL (ref 70–99)
GLUCOSE BLDC GLUCOMTR-MCNC: 137 MG/DL (ref 70–99)
GLUCOSE BLDC GLUCOMTR-MCNC: 137 MG/DL (ref 70–99)
GLUCOSE BLDC GLUCOMTR-MCNC: 141 MG/DL (ref 70–99)
HCO3 BLD-SCNC: 26 MMOL/L (ref 21–28)
HCT VFR BLD AUTO: 25.6 % (ref 35–47)
HGB BLD-MCNC: 8 G/DL (ref 11.7–15.7)
INR PPP: 1.2 (ref 0.85–1.15)
INR PPP: 1.25 (ref 0.85–1.15)
LACTATE SERPL-SCNC: 0.6 MMOL/L (ref 0.7–2)
LACTATE SERPL-SCNC: 0.8 MMOL/L (ref 0.7–2)
LACTATE SERPL-SCNC: 1.2 MMOL/L (ref 0.7–2)
MAGNESIUM SERPL-MCNC: 2.4 MG/DL (ref 1.6–2.3)
MCH RBC QN AUTO: 28.6 PG (ref 26.5–33)
MCHC RBC AUTO-ENTMCNC: 31.3 G/DL (ref 31.5–36.5)
MCV RBC AUTO: 91 FL (ref 78–100)
O2/TOTAL GAS SETTING VFR VENT: 60 %
OXYHGB MFR BLD: 96 % (ref 92–100)
PCO2 BLD: 43 MM HG (ref 35–45)
PH BLD: 7.39 [PH] (ref 7.35–7.45)
PHOSPHATE SERPL-MCNC: 3.1 MG/DL (ref 2.5–4.5)
PLATELET # BLD AUTO: 172 10E3/UL (ref 150–450)
PO2 BLD: 87 MM HG (ref 80–105)
POTASSIUM BLD-SCNC: 3.9 MMOL/L (ref 3.4–5.3)
RBC # BLD AUTO: 2.8 10E6/UL (ref 3.8–5.2)
SODIUM SERPL-SCNC: 149 MMOL/L (ref 133–144)
UFH PPP CHRO-ACNC: 0.29 IU/ML
UFH PPP CHRO-ACNC: 0.53 IU/ML
UFH PPP CHRO-ACNC: 0.66 IU/ML
WBC # BLD AUTO: 17.8 10E3/UL (ref 4–11)

## 2022-06-03 PROCEDURE — 200N000002 HC R&B ICU UMMC

## 2022-06-03 PROCEDURE — 250N000011 HC RX IP 250 OP 636: Performed by: STUDENT IN AN ORGANIZED HEALTH CARE EDUCATION/TRAINING PROGRAM

## 2022-06-03 PROCEDURE — 250N000011 HC RX IP 250 OP 636: Performed by: SURGERY

## 2022-06-03 PROCEDURE — 83735 ASSAY OF MAGNESIUM: CPT | Performed by: STUDENT IN AN ORGANIZED HEALTH CARE EDUCATION/TRAINING PROGRAM

## 2022-06-03 PROCEDURE — 258N000003 HC RX IP 258 OP 636: Performed by: STUDENT IN AN ORGANIZED HEALTH CARE EDUCATION/TRAINING PROGRAM

## 2022-06-03 PROCEDURE — 85610 PROTHROMBIN TIME: CPT | Performed by: THORACIC SURGERY (CARDIOTHORACIC VASCULAR SURGERY)

## 2022-06-03 PROCEDURE — 999N000157 HC STATISTIC RCP TIME EA 10 MIN

## 2022-06-03 PROCEDURE — 97530 THERAPEUTIC ACTIVITIES: CPT | Mod: GO | Performed by: OCCUPATIONAL THERAPIST

## 2022-06-03 PROCEDURE — 999N000015 HC STATISTIC ARTERIAL MONITORING DAILY

## 2022-06-03 PROCEDURE — 84100 ASSAY OF PHOSPHORUS: CPT | Performed by: STUDENT IN AN ORGANIZED HEALTH CARE EDUCATION/TRAINING PROGRAM

## 2022-06-03 PROCEDURE — 99291 CRITICAL CARE FIRST HOUR: CPT | Mod: 24 | Performed by: INTERNAL MEDICINE

## 2022-06-03 PROCEDURE — 250N000013 HC RX MED GY IP 250 OP 250 PS 637: Performed by: THORACIC SURGERY (CARDIOTHORACIC VASCULAR SURGERY)

## 2022-06-03 PROCEDURE — 999N000215 HC STATISTIC HFNC ADULT NON-CPAP

## 2022-06-03 PROCEDURE — 97161 PT EVAL LOW COMPLEX 20 MIN: CPT | Mod: GP

## 2022-06-03 PROCEDURE — 97530 THERAPEUTIC ACTIVITIES: CPT | Mod: GP

## 2022-06-03 PROCEDURE — 250N000013 HC RX MED GY IP 250 OP 250 PS 637

## 2022-06-03 PROCEDURE — 94640 AIRWAY INHALATION TREATMENT: CPT

## 2022-06-03 PROCEDURE — 92526 ORAL FUNCTION THERAPY: CPT | Mod: GN

## 2022-06-03 PROCEDURE — 71045 X-RAY EXAM CHEST 1 VIEW: CPT | Mod: 26 | Performed by: RADIOLOGY

## 2022-06-03 PROCEDURE — 250N000009 HC RX 250: Performed by: THORACIC SURGERY (CARDIOTHORACIC VASCULAR SURGERY)

## 2022-06-03 PROCEDURE — 250N000013 HC RX MED GY IP 250 OP 250 PS 637: Performed by: STUDENT IN AN ORGANIZED HEALTH CARE EDUCATION/TRAINING PROGRAM

## 2022-06-03 PROCEDURE — 80048 BASIC METABOLIC PNL TOTAL CA: CPT | Performed by: STUDENT IN AN ORGANIZED HEALTH CARE EDUCATION/TRAINING PROGRAM

## 2022-06-03 PROCEDURE — 99232 SBSQ HOSP IP/OBS MODERATE 35: CPT | Mod: GC | Performed by: INTERNAL MEDICINE

## 2022-06-03 PROCEDURE — 85520 HEPARIN ASSAY: CPT | Performed by: STUDENT IN AN ORGANIZED HEALTH CARE EDUCATION/TRAINING PROGRAM

## 2022-06-03 PROCEDURE — 94640 AIRWAY INHALATION TREATMENT: CPT | Mod: 76

## 2022-06-03 PROCEDURE — 83605 ASSAY OF LACTIC ACID: CPT | Performed by: STUDENT IN AN ORGANIZED HEALTH CARE EDUCATION/TRAINING PROGRAM

## 2022-06-03 PROCEDURE — C1751 CATH, INF, PER/CENT/MIDLINE: HCPCS

## 2022-06-03 PROCEDURE — 94660 CPAP INITIATION&MGMT: CPT

## 2022-06-03 PROCEDURE — 250N000009 HC RX 250

## 2022-06-03 PROCEDURE — 82330 ASSAY OF CALCIUM: CPT | Performed by: STUDENT IN AN ORGANIZED HEALTH CARE EDUCATION/TRAINING PROGRAM

## 2022-06-03 PROCEDURE — 71045 X-RAY EXAM CHEST 1 VIEW: CPT

## 2022-06-03 PROCEDURE — 85520 HEPARIN ASSAY: CPT | Performed by: THORACIC SURGERY (CARDIOTHORACIC VASCULAR SURGERY)

## 2022-06-03 PROCEDURE — 82805 BLOOD GASES W/O2 SATURATION: CPT | Performed by: THORACIC SURGERY (CARDIOTHORACIC VASCULAR SURGERY)

## 2022-06-03 PROCEDURE — 85027 COMPLETE CBC AUTOMATED: CPT | Performed by: STUDENT IN AN ORGANIZED HEALTH CARE EDUCATION/TRAINING PROGRAM

## 2022-06-03 RX ORDER — PIPERACILLIN SODIUM, TAZOBACTAM SODIUM 3; .375 G/15ML; G/15ML
3.38 INJECTION, POWDER, LYOPHILIZED, FOR SOLUTION INTRAVENOUS EVERY 6 HOURS
Status: COMPLETED | OUTPATIENT
Start: 2022-06-03 | End: 2022-06-06

## 2022-06-03 RX ORDER — MIDODRINE HYDROCHLORIDE 5 MG/1
20 TABLET ORAL EVERY 8 HOURS
Status: DISCONTINUED | OUTPATIENT
Start: 2022-06-03 | End: 2022-06-24

## 2022-06-03 RX ORDER — MIDODRINE HYDROCHLORIDE 5 MG/1
10 TABLET ORAL EVERY 8 HOURS
Status: DISCONTINUED | OUTPATIENT
Start: 2022-06-03 | End: 2022-06-03

## 2022-06-03 RX ORDER — IPRATROPIUM BROMIDE AND ALBUTEROL SULFATE 2.5; .5 MG/3ML; MG/3ML
3 SOLUTION RESPIRATORY (INHALATION)
Status: DISCONTINUED | OUTPATIENT
Start: 2022-06-03 | End: 2022-06-04

## 2022-06-03 RX ORDER — WARFARIN SODIUM 2 MG/1
2 TABLET ORAL
Status: COMPLETED | OUTPATIENT
Start: 2022-06-03 | End: 2022-06-03

## 2022-06-03 RX ADMIN — IPRATROPIUM BROMIDE AND ALBUTEROL SULFATE 3 ML: 2.5; .5 SOLUTION RESPIRATORY (INHALATION) at 12:40

## 2022-06-03 RX ADMIN — POTASSIUM CHLORIDE 40 MEQ: 1.5 POWDER, FOR SOLUTION ORAL at 19:48

## 2022-06-03 RX ADMIN — ACETAMINOPHEN 325MG 975 MG: 325 TABLET ORAL at 00:44

## 2022-06-03 RX ADMIN — POTASSIUM CHLORIDE 40 MEQ: 1.5 POWDER, FOR SOLUTION ORAL at 13:37

## 2022-06-03 RX ADMIN — Medication 10 MG: at 19:48

## 2022-06-03 RX ADMIN — Medication 1 PACKET: at 08:48

## 2022-06-03 RX ADMIN — PIPERACILLIN AND TAZOBACTAM 3.38 G: 3; .375 INJECTION, POWDER, LYOPHILIZED, FOR SOLUTION INTRAVENOUS at 14:52

## 2022-06-03 RX ADMIN — OXYCODONE HYDROCHLORIDE 5 MG: 5 TABLET ORAL at 16:52

## 2022-06-03 RX ADMIN — POTASSIUM CHLORIDE 40 MEQ: 1.5 POWDER, FOR SOLUTION ORAL at 08:44

## 2022-06-03 RX ADMIN — OXYCODONE HYDROCHLORIDE 5 MG: 5 TABLET ORAL at 05:05

## 2022-06-03 RX ADMIN — WARFARIN SODIUM 2 MG: 2 TABLET ORAL at 18:23

## 2022-06-03 RX ADMIN — ACETAMINOPHEN 325MG 975 MG: 325 TABLET ORAL at 08:44

## 2022-06-03 RX ADMIN — SODIUM CHLORIDE, POTASSIUM CHLORIDE, SODIUM LACTATE AND CALCIUM CHLORIDE 500 ML: 600; 310; 30; 20 INJECTION, SOLUTION INTRAVENOUS at 02:13

## 2022-06-03 RX ADMIN — ASPIRIN 81 MG 81 MG: 81 TABLET ORAL at 08:44

## 2022-06-03 RX ADMIN — IPRATROPIUM BROMIDE AND ALBUTEROL SULFATE 3 ML: 2.5; .5 SOLUTION RESPIRATORY (INHALATION) at 04:10

## 2022-06-03 RX ADMIN — PIPERACILLIN AND TAZOBACTAM 3.38 G: 3; .375 INJECTION, POWDER, LYOPHILIZED, FOR SOLUTION INTRAVENOUS at 19:48

## 2022-06-03 RX ADMIN — MEROPENEM 1 G: 1 INJECTION, POWDER, FOR SOLUTION INTRAVENOUS at 05:08

## 2022-06-03 RX ADMIN — ACETAMINOPHEN 325MG 975 MG: 325 TABLET ORAL at 15:43

## 2022-06-03 RX ADMIN — GABAPENTIN 100 MG: 250 SUSPENSION ORAL at 22:16

## 2022-06-03 RX ADMIN — SODIUM CHLORIDE, POTASSIUM CHLORIDE, SODIUM LACTATE AND CALCIUM CHLORIDE 500 ML: 600; 310; 30; 20 INJECTION, SOLUTION INTRAVENOUS at 03:19

## 2022-06-03 RX ADMIN — Medication 40 MG: at 08:44

## 2022-06-03 RX ADMIN — IPRATROPIUM BROMIDE AND ALBUTEROL SULFATE 3 ML: .5; 3 SOLUTION RESPIRATORY (INHALATION) at 21:10

## 2022-06-03 RX ADMIN — LIDOCAINE PATCH 4% 1 PATCH: 40 PATCH TOPICAL at 08:44

## 2022-06-03 RX ADMIN — MIDODRINE HYDROCHLORIDE 20 MG: 5 TABLET ORAL at 15:43

## 2022-06-03 RX ADMIN — FUROSEMIDE 40 MG: 10 INJECTION, SOLUTION INTRAVENOUS at 08:44

## 2022-06-03 RX ADMIN — IPRATROPIUM BROMIDE AND ALBUTEROL SULFATE 3 ML: 2.5; .5 SOLUTION RESPIRATORY (INHALATION) at 16:33

## 2022-06-03 RX ADMIN — Medication 15 ML: at 08:44

## 2022-06-03 RX ADMIN — IPRATROPIUM BROMIDE AND ALBUTEROL SULFATE 3 ML: 2.5; .5 SOLUTION RESPIRATORY (INHALATION) at 09:24

## 2022-06-03 ASSESSMENT — ACTIVITIES OF DAILY LIVING (ADL)
ADLS_ACUITY_SCORE: 40
ADLS_ACUITY_SCORE: 38
ADLS_ACUITY_SCORE: 40
ADLS_ACUITY_SCORE: 38
ADLS_ACUITY_SCORE: 40
ADLS_ACUITY_SCORE: 38
ADLS_ACUITY_SCORE: 40
ADLS_ACUITY_SCORE: 40
ADLS_ACUITY_SCORE: 38

## 2022-06-03 NOTE — PROGRESS NOTES
I personally saw and examined this patient who is doing well following CTEPH surgery.  I would assume transfer might occur in next 48 hours.        Cardiology Fellow Brief Progress Note  _________________________________         Interval Events  ----------------------  Remains on high-flow  Pressor requirements overall unchanged.  Diuresed well yesterday.    Remaining cares per surgical ICU team.      Current Medications  ----------------------  Current Facility-Administered Medications   Medication     acetaminophen (TYLENOL) tablet 975 mg     aspirin (ASA) chewable tablet 81 mg     bisacodyl (DULCOLAX) Suppository 10 mg     dextrose 10% infusion     glucose gel 15-30 g    Or     dextrose 50 % injection 25-50 mL    Or     glucagon injection 1 mg     gabapentin (NEURONTIN) solution 100 mg     heparin infusion 25,000 units in D5W 250 mL ANTICOAGULANT     HOLD MEDICATION     hydrALAZINE (APRESOLINE) injection 10 mg     HYDROmorphone (DILAUDID) injection 0.2 mg     hydrOXYzine (ATARAX) tablet 25-50 mg     insulin aspart (NovoLOG) injection (RAPID ACTING)     ipratropium - albuterol 0.5 mg/2.5 mg/3 mL (DUONEB) neb solution 3 mL     ipratropium - albuterol 0.5 mg/2.5 mg/3 mL (DUONEB) neb solution 3 mL     Lidocaine (LIDOCARE) 4 % Patch 1 patch     lidocaine (LMX4) cream     lidocaine (LMX4) cream     lidocaine 1 % 0.1-1 mL     lidocaine 1 % 0.1-1 mL     lidocaine patch in PLACE     lidocaine-prilocaine (EMLA) cream     magnesium hydroxide (MILK OF MAGNESIA) suspension 30 mL     medication instruction     melatonin tablet 10 mg     methocarbamol (ROBAXIN) tablet 500 mg     midodrine (PROAMATINE) tablet 20 mg     multivitamins w/minerals liquid 15 mL     naloxone (NARCAN) injection 0.2 mg    Or     naloxone (NARCAN) injection 0.4 mg    Or     naloxone (NARCAN) injection 0.2 mg    Or     naloxone (NARCAN) injection 0.4 mg     norepinephrine (LEVOPHED) 16 mg in  mL infusion MAX CONC CENTRAL LINE     ondansetron (ZOFRAN  ODT) ODT tab 4 mg    Or     ondansetron (ZOFRAN) injection 4 mg     oxyCODONE (ROXICODONE) tablet 5 mg    Or     oxyCODONE IR (ROXICODONE) tablet 10 mg     pantoprazole (PROTONIX) 2 mg/mL suspension 40 mg    Or     pantoprazole (PROTONIX) EC tablet 40 mg     piperacillin-tazobactam (ZOSYN) 3.375 g vial to attach to  mL bag     [Held by provider] polyethylene glycol (MIRALAX) Packet 17 g     potassium chloride (KLOR-CON) Packet 40 mEq     prochlorperazine (COMPAZINE) tablet 5 mg    Or     prochlorperazine (COMPAZINE) injection 5 mg     protein modular (PROSOURCE TF) 1 packet     Reason beta blocker order not selected     [Held by provider] senna-docusate (SENOKOT-S/PERICOLACE) 8.6-50 MG per tablet 1 tablet     sodium chloride (PF) 0.9% PF flush 3 mL     sodium chloride (PF) 0.9% PF flush 3 mL     sodium chloride (PF) 0.9% PF flush 3 mL     sodium chloride (PF) 0.9% PF flush 3 mL     sodium chloride bacteriostatic 0.9 % flush 10 mL     vasopressin 1 unit/mL MAX Conc (PITRESSIN) infusion     Warfarin Therapy Reminder (Check START DATE - warfarin may be starting in the FUTURE)        Intake and Output  ----------------------      Intake/Output Summary (Last 24 hours) at 6/3/2022 1557  Last data filed at 6/3/2022 1400  Gross per 24 hour   Intake 3452.24 ml   Output 3210 ml   Net 242.24 ml         Weight  ----------------------  Wt Readings from Last 2 Encounters:   06/03/22 63.1 kg (139 lb 1.8 oz)   04/29/22 67.4 kg (148 lb 8 oz)       Objective  ----------------------  Results for orders placed or performed during the hospital encounter of 05/25/22 (from the past 24 hour(s))   Heparin Unfractionated Anti Xa Level   Result Value Ref Range    Anti Xa Unfractionated Heparin 0.33 For Reference Range, See Comment IU/mL    Narrative    Therapeutic Range: UFH: 0.25-0.50 IU/mL for low intensity dosing,  0.30-0.70 IU/mL for high intensity dosing DVT and PE.  This test is not validated for other direct factor X inhibitors  (e.g. rivaroxaban, apixaban, edoxaban, betrixaban, fondaparinux) and should not be used for monitoring of other medications.   Blood gas venous with oxyhemoglobin   Result Value Ref Range    pH Venous 7.44 (H) 7.32 - 7.43    pCO2 Venous 37 (L) 40 - 50 mm Hg    pO2 Venous 68 (H) 25 - 47 mm Hg    Bicarbonate Venous 25 21 - 28 mmol/L    FIO2 60     Oxyhemoglobin Venous 93 (H) 70 - 75 %    Base Excess/Deficit (+/-) 1.2 -7.7 - 1.9 mmol/L   XR Chest Port 1 View   Result Value Ref Range    Radiologist flags New moderate right-sided pneumothorax status post (AA)     Narrative    Exam: XR CHEST PORT 1 VIEW, 6/2/2022 6:10 PM    Comparison: 6/2/2022 at 12:47 AM.    History: Post chest tube removal    Findings:  AP portable semiupright view of the chest. Interval removal of right  chest tube. Left central venous catheter with tip projecting over the  upper SVC.    Trachea is midline. Stable cardiomediastinal silhouette. Increasing  perihilar streaky opacities. Moderate new right-sided pneumothorax.  Possible small left-sided pneumothorax. The right costophrenic angle  is not visualized. There is mild blunting of the left costophrenic  angle.      Impression    Impression:   1. New moderate right-sided pneumothorax.  2. Possible trace left pneumothorax, attention on follow-up.  3. Increasing atelectasis/pulmonary edema.  4. Small left pleural effusion.  5. Interval removal of right basilar chest tube.    [Critical Result: New moderate right-sided pneumothorax status post  chest tube removal]    Finding was identified on 6/2/2022 6:21 PM.     Raz Rodgers was contacted by Dr. Wyatt at 6/2/2022 6:36 PM and  verbalized understanding of the critical finding.     I have personally reviewed the examination and initial interpretation  and I agree with the findings.    BK DUPREE MD         SYSTEM ID:  [id number]   Blood gas arterial with oxyhemoglobin   Result Value Ref Range    pH Arterial 7.46 (H) 7.35 - 7.45    pCO2 Arterial  36 35 - 45 mm Hg    pO2 Arterial 72 (L) 80 - 105 mm Hg    Bicarbonate Arterial 26 21 - 28 mmol/L    Oxyhemoglobin Arterial 95 92 - 100 %    Base Excess/Deficit (+/-) 1.9 (H) -9.0 - 1.8 mmol/L    FIO2 60    Blood gas venous with oxyhemoglobin   Result Value Ref Range    pH Venous 7.43 7.32 - 7.43    pCO2 Venous 42 40 - 50 mm Hg    pO2 Venous 32 25 - 47 mm Hg    Bicarbonate Venous 27 21 - 28 mmol/L    FIO2 60     Oxyhemoglobin Venous 58 (L) 70 - 75 %    Base Excess/Deficit (+/-) 2.7 (H) -7.7 - 1.9 mmol/L   XR Chest Port 1 View    Narrative    Exam: XR CHEST PORT 1 VIEW, 6/2/2022 7:18 PM    Comparison: 6/2/2022    History: pneumo s/p chest tube removal need repeat- poor positioning  on previous films    Findings:  A single portable semiupright view of the chest is obtained. Feeding  tube projects over the stomach before coursing off the inferior edge  of the film. Left internal jugular central venous catheter tip  terminates over the left innominate vein. Postoperative changes of  sternotomy, wires appear intact.    Trachea is midline. Mediastinum is within normal limits. Heart size is  unchanged. Similar appearance of mixed pulmonary opacities. Small  bilateral apical pneumothoraces. Trace right and probable trace left  effusions. The upper abdomen is unremarkable.      Impression    Impression:   1. Unchanged appearance of mixed pulmonary opacities.  2. Small biapical pneumothoraces, right more than left.  3. Trace bilateral pleural effusions.    I have personally reviewed the examination and initial interpretation  and I agree with the findings.    BK DUPREE MD         SYSTEM ID:  [id number]   Blood gas arterial with oxyhemoglobin   Result Value Ref Range    pH Arterial 7.45 7.35 - 7.45    pCO2 Arterial 37 35 - 45 mm Hg    pO2 Arterial 100 80 - 105 mm Hg    Bicarbonate Arterial 26 21 - 28 mmol/L    Oxyhemoglobin Arterial 98 92 - 100 %    Base Excess/Deficit (+/-) 1.5 -9.0 - 1.8 mmol/L    FIO2 50    Ionized  Calcium   Result Value Ref Range    Calcium Ionized 4.8 4.4 - 5.2 mg/dL   Lactic acid whole blood   Result Value Ref Range    Lactic Acid 1.2 0.7 - 2.0 mmol/L   Glucose by meter   Result Value Ref Range    GLUCOSE BY METER POCT 143 (H) 70 - 99 mg/dL   Heparin Unfractionated Anti Xa Level   Result Value Ref Range    Anti Xa Unfractionated Heparin 0.29 For Reference Range, See Comment IU/mL    Narrative    Therapeutic Range: UFH: 0.25-0.50 IU/mL for low intensity dosing,  0.30-0.70 IU/mL for high intensity dosing DVT and PE.  This test is not validated for other direct factor X inhibitors (e.g. rivaroxaban, apixaban, edoxaban, betrixaban, fondaparinux) and should not be used for monitoring of other medications.   INR   Result Value Ref Range    INR 1.20 (H) 0.85 - 1.15   Glucose by meter   Result Value Ref Range    GLUCOSE BY METER POCT 168 (H) 70 - 99 mg/dL   Basic metabolic panel   Result Value Ref Range    Sodium 149 (H) 133 - 144 mmol/L    Potassium 3.9 3.4 - 5.3 mmol/L    Chloride 117 (H) 94 - 109 mmol/L    Carbon Dioxide (CO2) 27 20 - 32 mmol/L    Anion Gap 5 3 - 14 mmol/L    Urea Nitrogen 29 7 - 30 mg/dL    Creatinine 1.35 (H) 0.52 - 1.04 mg/dL    Calcium 8.8 8.5 - 10.1 mg/dL    Glucose 162 (H) 70 - 99 mg/dL    GFR Estimate 40 (L) >60 mL/min/1.73m2   Magnesium   Result Value Ref Range    Magnesium 2.4 (H) 1.6 - 2.3 mg/dL   CBC with platelets   Result Value Ref Range    WBC Count 17.8 (H) 4.0 - 11.0 10e3/uL    RBC Count 2.80 (L) 3.80 - 5.20 10e6/uL    Hemoglobin 8.0 (L) 11.7 - 15.7 g/dL    Hematocrit 25.6 (L) 35.0 - 47.0 %    MCV 91 78 - 100 fL    MCH 28.6 26.5 - 33.0 pg    MCHC 31.3 (L) 31.5 - 36.5 g/dL    RDW 18.6 (H) 10.0 - 15.0 %    Platelet Count 172 150 - 450 10e3/uL   Heparin Unfractionated Anti Xa Level   Result Value Ref Range    Anti Xa Unfractionated Heparin 0.66 For Reference Range, See Comment IU/mL    Narrative    Therapeutic Range: UFH: 0.25-0.50 IU/mL for low intensity dosing,  0.30-0.70 IU/mL  for high intensity dosing DVT and PE.  This test is not validated for other direct factor X inhibitors (e.g. rivaroxaban, apixaban, edoxaban, betrixaban, fondaparinux) and should not be used for monitoring of other medications.   Phosphorus   Result Value Ref Range    Phosphorus 3.1 2.5 - 4.5 mg/dL   INR   Result Value Ref Range    INR 1.25 (H) 0.85 - 1.15   Blood gas arterial with oxyhemoglobin   Result Value Ref Range    pH Arterial 7.39 7.35 - 7.45    pCO2 Arterial 43 35 - 45 mm Hg    pO2 Arterial 87 80 - 105 mm Hg    Bicarbonate Arterial 26 21 - 28 mmol/L    Oxyhemoglobin Arterial 96 92 - 100 %    Base Excess/Deficit (+/-) 0.8 -9.0 - 1.8 mmol/L    FIO2 60    Ionized Calcium   Result Value Ref Range    Calcium Ionized 4.9 4.4 - 5.2 mg/dL   Lactic acid whole blood   Result Value Ref Range    Lactic Acid 0.8 0.7 - 2.0 mmol/L   Glucose by meter   Result Value Ref Range    GLUCOSE BY METER POCT 141 (H) 70 - 99 mg/dL   XR Chest Port 1 View    Narrative    EXAM:  XR CHEST PORT 1 VIEW    INDICATION: s/p CTEPH assess support devices    COMPARISON:  6/2/2022    HISTORY:  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 Left IJ catheter over the left innominate  vein. Postsurgical chest with intact sternotomy wires. Mediastinal  surgical clips. Enteric tubing courses out of view.    Cardiomediastinal silhouette stable. Bilateral perihilar opacities,  stable. Retrocardiac opacity, decreased. Stable trace biapical  pneumothoraces. No pleural effusion. Unremarkable upper abdomen. No  acute bony lesions. Aortic calcifications.       Impression    IMPRESSION:  1.  Lines and tubes, as above.  2.  Stable perihilar opacities, likely edema and atelectasis. Slightly  decreased retrocardiac opacity, likely decreasing atelectasis.   3.  Stable trace biapical pneumothoraces.    I have personally reviewed the examination and initial interpretation  and I agree with the  findings.    ZEE FISHMAN MD         SYSTEM ID:  [id number]   Glucose by meter   Result Value Ref Range    GLUCOSE BY METER POCT 129 (H) 70 - 99 mg/dL   Heparin Unfractionated Anti Xa Level   Result Value Ref Range    Anti Xa Unfractionated Heparin 0.53 For Reference Range, See Comment IU/mL    Narrative    Therapeutic Range: UFH: 0.25-0.50 IU/mL for low intensity dosing,  0.30-0.70 IU/mL for high intensity dosing DVT and PE.  This test is not validated for other direct factor X inhibitors (e.g. rivaroxaban, apixaban, edoxaban, betrixaban, fondaparinux) and should not be used for monitoring of other medications.   Ionized Calcium   Result Value Ref Range    Calcium Ionized 4.7 4.4 - 5.2 mg/dL   Lactic acid whole blood   Result Value Ref Range    Lactic Acid 0.6 (L) 0.7 - 2.0 mmol/L   Glucose by meter   Result Value Ref Range    GLUCOSE BY METER POCT 137 (H) 70 - 99 mg/dL   Glucose by meter   Result Value Ref Range    GLUCOSE BY METER POCT 135 (H) 70 - 99 mg/dL       Recent Labs   Lab Test 12/08/21  1221   CHOL 113   HDL 35*   LDL 59   TRIG 94       Hemoglobin A1C   Date Value Ref Range Status   12/08/2021 5.8 (H) 0.0 - 5.6 % Final     Comment:     Normal <5.7%   Prediabetes 5.7-6.4%    Diabetes 6.5% or higher     Note: Adopted from ADA consensus guidelines.       Results for orders placed or performed during the hospital encounter of 05/25/22   XR Chest Port 1 View    Impression    IMPRESSION: [place] catheter tip projects over the central right  pulmonary artery.    I have personally reviewed the examination and initial interpretation  and I agree with the findings.    CANDACE ALMONTE MD         SYSTEM ID:  [id number]   XR Abdomen Port 1 View    Impression    IMPRESSION: Gastric tube tip and sidehole project over the stomach.    I have personally reviewed the examination and initial interpretation  and I agree with the findings.    CANDACE ALMONTE MD         SYSTEM ID:  [id number]   XR Chest Port 1 View    Impression     Impression: Postoperative chest with slightly improved perihilar  atelectasis or edema. Endotracheal tube tip in lower trachea 2.5 cm  above the man.    I have personally reviewed the examination and initial interpretation  and I agree with the findings.    YVETTE GRAY MD         SYSTEM ID:  [id number]       Physical Exam  Vitals:    06/03/22 1400 06/03/22 1415 06/03/22 1430 06/03/22 1445   BP:       BP Location:       Cuff Size:       Pulse: 119 119 113 116   Resp: 22      Temp: 99.7  F (37.6  C) 99.9  F (37.7  C) 100  F (37.8  C) 100.2  F (37.9  C)   TempSrc:       SpO2: 97% 97% 100% 94%   Weight:       Height:            Exam:  Gen: AOx3 NAD  HEET: moist oral mucosae  CV: rrr, no mrg  Pulm: CTAB  Abd: soft nt/nd  Ext: warm well perfused    Assessment & Plan   Debi Espinoza is a 76 year old female with a history of renal stones, CKD, DVT/PE (on apixaban), 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        # Pulmonary hypertension, Group IV  # CTEPH s/p pulmonary endaterectomy  # DVTs  # Severe tricuspid insufficiency  Initially diagnosed with bilateral DVT, PE 2019. TTE showed dilated RV with reduced RV function and RVSP of 84mmHg suggestive of severe pulmonary hypertension with mild to moderate TR. Patient believes the above relates to a work related injury, unna boot early 2019. RHC 12/18/2021 with RA 12, PA 84/19/45, PCWP 2, TAHIRA CO/CI 2.8/1.6. Last dose eliquis 5/23 AM. Has oxygen at home but does not use this. Underwent endarterectomy on 5/27. Post op on multiple pressors and briefly on milrinone.   - Goal CVP < 10, diuresis as needed to achieve  - Goal CI 2.0-2.5  - holding pHTN meds in ICU     # Volume overload  PTA on lasix 20mg qday (decreased from 40mg ~6 weeks ago). JVP elevated on initial exam. S/p 40mg IV lasix 5/25; repeat as needed        # Anemia  Hgb 11.4, similar to prior last month.         #  CKD  Baseline Cr ~1.3.     Dwain Ferrer MD, MSc  Cardiovascular Disease Fellow  [facility]    Discussed with Attending Dr Etienne who is in agreement.

## 2022-06-03 NOTE — PLAN OF CARE
Neuro: A&Ox4. Somnolent. PERRLA. Neuros grossly intact  Cardiac: SR. Labile pressures. Maps 50s to 90s. Levo titrated per MAP goal >65. Tmaz 100.3  Respiratory: Sating >92% on HFNC @ 60% alternating BiPap 10/6 @ 50% as tolerated.  GI/: Adequate urine output via ly. Minimal rectal tube output.    Diet/appetite: NJ. Tolerating TF @ 40 mL/hr 30 mL FWF q4h.  Activity:  In bed this shift  Pain: C/O incisional pain. PRN oxy w/relief  Skin: No new deficits noted.  LDA's: 2x PIV. 2L internal jugular 1x CVP. Ly. Rectal tube NJ tube.     Mg protocol: Hi protocol. WNL. Recheck pending.  K+ protocol: Hi protocol. Replaced with 40 mEq. Recheck pending.  Phos protocol: Hi protocol. WNL. Recheck pending.  Hep protocol: Theraeputic.     Plan: Continue with POC. Notify primary team with changes.

## 2022-06-03 NOTE — PROGRESS NOTES
CV ICU PROGRESS NOTE  6/3/2022      CO-MORBIDITIES:   CTEPH (chronic thromboembolic pulmonary hypertension) (H)  (primary encounter diagnosis)  Primary pulmonary hypertension (H)  SOB (shortness of breath)  S/P coronary angiogram    ASSESSMENT: Debi Espinoza is a 76 year old female with PMH of HTN, nephrolithiasis, DVT (2019) and PE (2019, 2021) on chronic anticoagulation, and chronic thromboembolic pulmonary hypertension who underwent pulmonary artery thromboendarterectomy on 5/27 with Dr. Peterson.    TODAY'S PROGRESS:   - Mental status seems improved from prior  - Will discuss possible utility of Digoxin with cardiolgoy  - Holding diuresis today  - wean Levo as tolerated   - BiPAP for support     PLAN:  Neuro/ pain/ sedation:  Acute Postoperative pain  - Monitor neurological status. Notify the MD for any acute changes in exam.  - Pain: S tylenol 975 TID, lidocaine patches. PRN oxycodone 5-10 q4h, robaxin 500 q6h, dilaudid 0.2 q2h     Pulmonary care:   S/p Pulmonary artery thromboendarterectomy on 5/27/2022 by Dr. Peterson  Hx Chronic pulmonary thromboembolic disease  Hx PE (2019, 2021)  Hx Emphysema, moderate  PTA regimen: apixaban 5mg BID  CTA 05/2019 demonstrated underlying moderate emphysema  Extubated 5/31  - BiPAP for respiratory support as able  - 6/3 CXR with small biapical pneumothoraces, and bilateral pleural effusions - appear stable today    Cardiovascular:    Hx Severe pulmonary hypertension  Hx RV dilation and reduced RV function  Hx Severe tricuspid insufficiency  Hx Essential HTN  Distributive shock  PTA regimen: furosemide 20mg qd, adempas 2.5mg TID, opsumit 10mg qd  Echo on 03/2021 with LVEF of 78%, severe pulmonary hypertension (82 mmHg), reduced RV function (TAPSE 16mm), severe tricuspid insufficiency  - Monitor hemodynamic status.   - ASA 81  - Wean off levophed as able   - CVP goal <10, MAP goal > 65  - V pacing back up 50 BPM  - high intensity Heparin gtt  - 6/2 ECHO   Global and regional LV  function is normal: EF of 60-65%. LV appears underfilled. Flattened septum is consistent with right ventricular pressure overload. Moderate right ventricular dilation with severely reduced global right ventricular function. Mod TR. Severe pulmonary hypertension. Estimated pulmonary artery systolic pressure is 85 mmHg plus right atrial pressure. Unable to visualize IVC. No pericardial effusion.  - Compared to prior TTE, RV is slightly less dilated, function appears similar.    GI care/ Nutrition:   - Speech consult, appreciate recs   - NJ feeding - TF to goal  - PPI    Renal/ Fluid Balance/ Electrolytes:   Nephrolithiasis  PTA regimen: furosemide 20mg qd  BL creat appears to be ~ 1.14  - holding diuresis today  - Strict I/O, daily weights  - Avoid/limit nephrotoxins as able  - Replete lytes PRN per protocol     Endocrine:    Stress induced hyperglycemia  Preop A1c 5.8 (12/21)  - High sliding scale insulin   - Goal BG <180 for optimal healing      ID/ Antibiotics:  Stress induced leukocytosis  Fever to 38.5 on 5/28 - afebrile now  Distributive shock  5/28 Blood cultures negative  5/30 MRSA swab negative  5/31 Sputum culture with 4+ lactose fermenting gram negative rods  5/31 Repeat blood cultures NGTD @ 1 day  febrile today, WBC uptrending.   - Continue to monitor fever curve, WBC and inflammatory markers as appropriate  - Zosyn restarted (6/3- )  - Discontinue Meropenem (6/2-6/3)    Cultures  5/30 Sputum - Klebsiella     Heme:     Stress induced leukocytosis  Acute blood loss anemia  Acute blood loss thrombocytopenia  Hx DVT (2019), PE (2019, 2021) on chronic anticoagulation  PA tear intra-op s/p patch repair.  - Continue to monitor  - High intensity heparin gtt  - Daily CBC      MSK/ Skin:  Sternotomy  Surgical Incision  - Sternal precautions  - Postoperative incision management per protocol  - PT/OT/CR     Prophylaxis:    - Mechanical prophylaxis for DVT  - Chemical DVT prophylaxis - low intensity heparin gtt  -  PPI     Lines/ tubes/ drains:  - Arterial Line, Left IJ, YOMI ly     Disposition:  - CVICU    Patient seen, findings and plan discussed with CV ICU staff, Dr. Go and CVTS, Dr. Silvia Corado MD  PGY-3, General Surgery  ====================================    SUBJECTIVE:   Afebrile. Doing okay. Much more engagin. Breathing more relaxed    OBJECTIVE:   1. VITAL SIGNS:   Temp:  [97.7  F (36.5  C)-100.58  F (38.1  C)] 98.78  F (37.1  C)  Pulse:  [] 103  Resp:  [14-36] 18  BP: (105)/(53) 105/53  MAP:  [52 mmHg-91 mmHg] 76 mmHg  Arterial Line BP: ()/(38-69) 103/57  FiO2 (%):  [45 %-63 %] 50 %  SpO2:  [50 %-100 %] 100 %  FiO2 (%): 50 %  Resp: 18    2. INTAKE/ OUTPUT:   I/O last 3 completed shifts:  In: 3712.76 [I.V.:1022.76; NG/GT:730; IV Piggyback:1000]  Out: 3020 [Urine:2450; Stool:550; Chest Tube:20]    3. PHYSICAL EXAMINATION:   General: lying in bed, appears relatively comfortable  Neuro: answers to commands, open eyes to voice  Resp: Breathing more relaxed, on high flow nasal canula   CV: RRR  Abdomen: Soft, Non-distended, Non-tender  Incisions: clean dry intact  Extremities: warm and well perfused    4. INVESTIGATIONS:   Arterial Blood Gases   Recent Labs   Lab 06/03/22  0302 06/02/22  1957 06/02/22  1843 06/02/22  1128   PH 7.39 7.45 7.46* 7.45   PCO2 43 37 36 38   PO2 87 100 72* 53*   HCO3 26 26 26 27     Complete Blood Count   Recent Labs   Lab 06/03/22  0300 06/02/22  1139 06/02/22  0809 06/01/22  0427   WBC 17.8* 18.0* 16.7* 13.7*   HGB 8.0* 8.2* 8.5* 8.1*    160 158 106*     Basic Metabolic Panel  Recent Labs   Lab 06/03/22  0309 06/03/22  0300 06/02/22  2332 06/02/22 2004 06/02/22  1547 06/02/22  1433 06/02/22  0438 06/02/22  0433 06/02/22  0008 06/02/22  0004 06/01/22  1926 06/01/22  1512   NA  --  149*  --   --   --  149*  --  147*  --   --   --  143   POTASSIUM  --  3.9  --   --   --  3.5  --  3.2*  --  3.2*   < > 3.2*   CHLORIDE  --  117*  --   --   --  116*  --   112*  --   --   --  104   CO2  --  27  --   --   --  27  --  28  --   --   --  33*   BUN  --  29  --   --   --  26  --  26  --   --   --  25   CR  --  1.35*  --   --   --  1.30*  --  1.28*  --   --   --  1.25*   * 162* 168* 143*   < > 158*   < > 123*   < >  --    < > 105*    < > = values in this interval not displayed.     Liver Function Tests  Recent Labs   Lab 22   AST  --   --  47* 36   ALT  --   --  14 11   ALKPHOS  --   --  48 37*   BILITOTAL  --   --  1.1 1.2   ALBUMIN  --   --  2.5* 1.8*   INR 1.20* 1.21* 1.52* 2.20*     Pancreatic Enzymes  No lab results found in last 7 days.  Coagulation Profile  Recent Labs   Lab 22  1057 22  0349 22   INR 1.20* 1.21*  --   --  1.52* 2.20*   PTT  --   --  59* 60* 60* 63*     5. RADIOLOGY:   Recent Results (from the past 24 hour(s))   Echo Complete   Result Value    LVEF  60-65%    Narrative    [id number]  [id number]  [id number]  [id number]^ANDRE^PEREZ^MILLER     [facility],[place]  Echocardiography Laboratory  [address] [place] [id number]     Name: CARLOS LEGER  MRN: [id number]  : 1945  Study Date: 2022 01:08 PM  Age: 76 yrs  Gender: Female  Patient Location: [facility]  Reason For Study: Dyspnea  Ordering Physician: PEREZ POWELL  Performed By: Naresh Bonner RONAN     BSA: 1.6 m2  Height: 60 in  Weight: 145 lb  HR: 101  BP: 93/40 mmHg  ______________________________________________________________________________  Procedure  Complete Portable Echo Adult.  ______________________________________________________________________________  Interpretation Summary  Global and regional left ventricular function is normal with an EF of 60-65%.  LV appears underfilled. Flattened septum is consistent with right ventricular  pressure overload.  Moderate right ventricular dilation with severely reduced global  right  ventricular function.  Moderate tricuspid insufficiency.  Severe pulmonary hypertension. Estimated pulmonary artery systolic pressure is  85 mmHg plus right atrial pressure.  Unable to visualize IVC.  No pericardial effusion.     Compared to prior TTE, RV is slightly less dilated, function appears similar.  ______________________________________________________________________________  Left Ventricle  Global and regional left ventricular function is normal with an EF of 60-65%.  Left ventricular wall thickness is normal. Left ventricle is small and appears  underfilled. Left ventricular diastolic function is normal. Flattened septum  is consistent with right ventricular pressure overload.     Right Ventricle  Moderate right ventricular dilation is present. Global right ventricular  function is severely reduced.     Atria  Both atria appear normal. The atrial septum is intact as assessed by agitated  saline bubble study .     Mitral Valve  The mitral valve is normal.     Aortic Valve  The aortic valve is tricuspid. On Doppler interrogation, there is no  significant stenosis or regurgitation.     Tricuspid Valve  Moderate tricuspid insufficiency is present. Estimated pulmonary artery  systolic pressure is 85 mmHg plus right atrial pressure. Severe pulmonary  hypertension is present.     Pulmonic Valve  The pulmonic valve is normal. Trace to mild pulmonic insufficiency is present.     Vessels  The aorta root is normal. The thoracic aorta cannot be assessed. The inferior  vena cava cannot be assessed.     Pericardium  No pericardial effusion is present.     Miscellaneous  D-shaped interventricular septum and septal bounce present signifying right  ventricular pressure and  volume overload.     Compared to Previous Study  This study was compared with the study from 12/8/2021 . Compared to prior TTE  TR, RV is slightly less dilated, function appears similar.     Conor Mendoza  Terry.  ______________________________________________________________________________  MMode/2D Measurements & Calculations  IVSd: 1.0 cm  LVIDd: 3.0 cm  LVIDs: 1.6 cm  LVPWd: 0.84 cm  FS: 48.3 %  LV mass(C)d: 74.6 grams  LV mass(C)dI: 45.8 grams/m2  Ao root diam: 2.9 cm  LVOT diam: 1.9 cm  LVOT area: 2.8 cm2  LA Volume (BP): 37.8 ml     LA Volume Index (BP): 23.2 ml/m2  RWT: 0.56     Doppler Measurements & Calculations  MV E max gurwinder: 105.0 cm/sec  MV A max gurwinder: 110.0 cm/sec  MV E/A: 0.95  MV dec slope: 1166 cm/sec2  MV dec time: 0.09 sec  TR max gurwinder: 398.5 cm/sec  TR max P.2 mmHg     ______________________________________________________________________________  Report approved by: Mariama Juares 2022 02:52 PM         XR Chest Port 1 View   Result Value    Radiologist flags New moderate right-sided pneumothorax status post (AA)    Narrative    Exam: XR CHEST PORT 1 VIEW, 2022 6:10 PM    Comparison: 2022 at 12:47 AM.    History: Post chest tube removal    Findings:  AP portable semiupright view of the chest. Interval removal of right  chest tube. Left central venous catheter with tip projecting over the  upper SVC.    Trachea is midline. Stable cardiomediastinal silhouette. Increasing  perihilar streaky opacities. Moderate new right-sided pneumothorax.  Possible small left-sided pneumothorax. The right costophrenic angle  is not visualized. There is mild blunting of the left costophrenic  angle.      Impression    Impression:   1. New moderate right-sided pneumothorax.  2. Possible trace left pneumothorax, attention on follow-up.  3. Increasing atelectasis/pulmonary edema.  4. Small left pleural effusion.  5. Interval removal of right basilar chest tube.    [Critical Result: New moderate right-sided pneumothorax status post  chest tube removal]    Finding was identified on 2022 6:21 PM.     Raz Rodgers was contacted by Dr. Wyatt at 2022 6:36 PM and  verbalized understanding of  the critical finding.     I have personally reviewed the examination and initial interpretation  and I agree with the findings.    BK DUPREE MD         SYSTEM ID:  [id number]   XR Chest Port 1 View    Narrative    Exam: XR CHEST PORT 1 VIEW, 6/2/2022 7:18 PM    Comparison: 6/2/2022    History: pneumo s/p chest tube removal need repeat- poor positioning  on previous films    Findings:  A single portable semiupright view of the chest is obtained. Feeding  tube projects over the stomach before coursing off the inferior edge  of the film. Left internal jugular central venous catheter tip  terminates over the left innominate vein. Postoperative changes of  sternotomy, wires appear intact.    Trachea is midline. Mediastinum is within normal limits. Heart size is  unchanged. Similar appearance of mixed pulmonary opacities. Small  bilateral apical pneumothoraces. Trace right and probable trace left  effusions. The upper abdomen is unremarkable.      Impression    Impression:   1. Unchanged appearance of mixed pulmonary opacities.  2. Small biapical pneumothoraces, right more than left.  3. Trace bilateral pleural effusions.       =========================================

## 2022-06-03 NOTE — PROGRESS NOTES
06/03/22 1100   Quick Adds   Type of Visit Initial PT Evaluation   Living Environment   People in Home alone   Current Living Arrangements house   Home Accessibility stairs within home;stairs to enter home   Number of Stairs, Main Entrance 1   Stair Railings, Main Entrance railing on left side (ascending)   Number of Stairs, Within Home, Primary one   Transportation Anticipated car, drives self   Living Environment Comments Pt reports she lives alone in her home, canhave friends/family pop in to assist with laundry for example but pt will not have consistent or much assist and needs to be I to return home.   Self-Care   Usual Activity Tolerance good   Current Activity Tolerance poor   Regular Exercise Yes   Activity/Exercise Type walking   Equipment Currently Used at Home walker, rolling  (Pt reports would use 4WW at times outside home, not needing all the time)   Activity/Exercise/Self-Care Comment Pt reports was I with ADLs prior to procedure   General Information   Onset of Illness/Injury or Date of Surgery 05/25/22   Referring Physician Connor Peterson MD   Patient/Family Therapy Goals Statement (PT) return home ultimately   Pertinent History of Current Problem (include personal factors and/or comorbidities that impact the POC) 76 year old female with PMH of HTN, nephrolithiasis, DVT (2019) and PE (2019, 2021) on chronic anticoagulation, and chronic thromboembolic pulmonary hypertension who underwent pulmonary artery thromboendarterectomy on 5/27 with Dr. Peterson.   Existing Precautions/Restrictions sternal   Heart Disease Risk Factors Medical history   Cognition   Affect/Mental Status (Cognition) flat/blunted affect   Orientation Status (Cognition) oriented to;person;place;situation   Follows Commands (Cognition) follows one-step commands;75-90% accuracy;delayed response/completion;increased processing time needed;physical/tactile prompts required;verbal cues/prompting required   Behavioral Issues withdrawn    Posture    Posture Forward head position;Protracted shoulders   Range of Motion (ROM)   ROM Comment B LEs WFL   Strength (Manual Muscle Testing)   Strength Comments B LEs decreased consistent with procedure, medical course   Bed Mobility   Comment, (Bed Mobility) Mod/max A rolling per RN   Transfers   Comment, (Transfers) Heavy mod/max A sit<>stand   Gait/Stairs (Locomotion)   Comment, (Gait/Stairs) Stands min/mod A at walker, Gait Dependent currently, lift vs squat pivot to chair   Balance   Balance Comments Mod A at  walker   Clinical Impression   Criteria for Skilled Therapeutic Intervention Yes, treatment indicated   PT Diagnosis (PT) impaired functional mobility   Influenced by the following impairments  strength, balance, act tolerance   Functional limitations due to impairments  I with transfers, gait, bed mob, barrier navigation, ADLs   Clinical Presentation (PT Evaluation Complexity) Stable/Uncomplicated   Clinical Presentation Rationale clinical judgement   Clinical Decision Making (Complexity) moderate complexity   Planned Therapy Interventions (PT) balance training;bed mobility training;gait training;home exercise program;patient/family education;stair training;strengthening;transfer training;progressive activity/exercise;risk factor education;home program guidelines   Risk & Benefits of therapy have been explained evaluation/treatment results reviewed;care plan/treatment goals reviewed;risks/benefits reviewed;current/potential barriers reviewed;participants voiced agreement with care plan;participants included;patient   PT Discharge Planning   PT Discharge Recommendation (DC Rec) Transitional Care Facility   PT Rationale for DC Rec PT needing Ax2 or lift for safe bed<>chair, anticipate pt will need TCU stay to progress functional I with mobility and ADLs. Pt lives alone, has family that can pop in to assist here and there but pt will need ot be I to return home.   PT Brief overview  of current status Staff lift to chair. Max A or mod Ax2 sit<>stand, difficult to take steps yet   Plan of Care Review   Plan of Care Reviewed With patient   Total Evaluation Time   Total Evaluation Time (Minutes) 8   Physical Therapy Goals   PT Frequency 5x/week   PT Predicted Duration/Target Date for Goal Attainment 06/23/22   PT Goals Bed Mobility;Transfers;Gait;Stairs   PT: Bed Mobility Modified independent;Supine to/from sit;Rolling;Within precautions   PT: Transfers Modified independent;Sit to/from stand;Bed to/from chair;Assistive device;Within precautions   PT: Gait Modified independent;Assistive device;Within precautions;Greater than 200 feet   PT: Stairs Modified independent;Assistive device;1 stair;Rail on left

## 2022-06-03 NOTE — PROGRESS NOTES
Shift Summary:  Pt aox4, intermittently c/o incisional pain but is otherwise comfortable while up in the chair. Pt remains on norepinephrine for MAP support. HFNC weaned to 40% today, well tolerated. Large amounts of output from rectal tube with one leakage episode. Tube left in place.     For detailed assessments and vitals, see flowsheets    Plan: Continue to monitor hemodynamics and wean pressors as able. Continue Pulmonary toileting and cardiac rehab

## 2022-06-03 NOTE — PROGRESS NOTES
CLINICAL NUTRITION SERVICES - REASSESSMENT NOTE     Nutrition Prescription    RECOMMENDATIONS FOR MDs/PROVIDERS TO ORDER:  Daily fluid adjustments per MD    Malnutrition Status:    Severe malnutrition in the context of acute on chronic illness    Recommendations already ordered by Registered Dietitian (RD):  -Continue Osmolite 1.5 Mayur @ goal of 40ml/hr (960ml/day) + 1 pkt Prosource will provide: 1520 kcals (29 kcal/kg), 71 g PRO (1.4 g/kg), 731 ml free H20, 195 g CHO, and 0 g fiber daily.   -Continue MVI daily    Future/Additional Recommendations:  Monitor weight loss   Monitor for diet advancements per SLP recommendation     EVALUATION OF THE PROGRESS TOWARD GOALS   Diet: NPO  Nutrition Support: Pt seems to be tolerating TF at goal of 40 ml/hr. TF currently running at goal.     5/29-5/31: Osmolite 1.5 Mayur @ goal of  35ml/hr (840ml/day) will provide: 1260 kcals, 52 g PRO, 640 ml free H20, 171 g CHO, and 0 g fiber daily. + 1 pkt prosource TF = 1300 kcal (25 kcal/kg) and 63 g protein (1.2 g/kg)     6/1-current: Osmolite 1.5 Mayur @ goal of 40ml/hr (960ml/day) + 1 pkt Prosource will provide: 1520 kcals (29 kcal/kg), 71 g PRO (1.4 g/kg), 731 ml free H20, 195 g CHO, and 0 g fiber daily    Pt received an average of 398 ml/d over the past 5 days. This provided an average of 597 kcal/d (11 kcal/kg) and 25 g protein/d (0.5 g/kg). This provided 46% of estimated energy needs and 38% estimated protein needs. Prosource was not started until 6/1.      NEW FINDINGS   Pt extubated 5/31, per SLP recommendations on 6/1, pt needs continued NPO diet.     GI: Loose/watery stools, holding bowel regimen.    Labs: Electrolytes and BG WNL.    Medications: Reviewed    Weights: Pt down 5.2 kg over the past 9 days, will continue to monitor weight loss  06/03/22 0400 63.1 kg (139 lb 1.8 oz)   06/01/22 0000 66 kg (145 lb 8.1 oz)   05/31/22 0000 68.9 kg (151 lb 14.4 oz)   05/30/22 0000 67.4 kg (148 lb 9.4 oz)   05/29/22 0400 70.2 kg (154 lb 12.2  oz)   05/28/22 0400 71.5 kg (157 lb 10.1 oz)   05/27/22 0301 65.9 kg (145 lb 4.5 oz)   05/26/22 0027 66.7 kg (147 lb 0.8 oz)   05/25/22 1323 68.3 kg (150 lb 9.6 oz)     MALNUTRITION  % Intake: </= 50% for >/= 5 days (severe)  % Weight Loss: > 2% in 1 week (severe)  Subcutaneous Fat Loss: None observed  Muscle Loss: None observed  Fluid Accumulation/Edema: Mild  Malnutrition Diagnosis: Severe malnutrition in the context of acute on chronic illness    Previous Goals   Diet adv v nutrition support within 2-3 days.  Evaluation: Met    Previous Nutrition Diagnosis  Inadequate oral intake related to ventilation as evidenced by NPO order.  Evaluation: No longer applicable, nutrition diagnosis changed below    CURRENT NUTRITION DIAGNOSIS  Inadequate oral intake related to NPO status as evidenced by SLP recommendations and continued need for EN to meet 100% nutrition needs.       INTERVENTIONS  Implementation  Enteral Nutrition - continue as ordered     Goals  Total avg nutritional intake to meet a minimum of 25 kcal/kg and 1.2 g PRO/kg daily (per dosing wt 52 kg).    Monitoring/Evaluation  Progress toward goals will be monitored and evaluated per protocol.    Liseth Francisco, MS, RD, LD  4E (CVICU) RD pager: [phone]  Ascom: [phone]  Weekend/Holiday RD pager: [phone]

## 2022-06-04 ENCOUNTER — APPOINTMENT (OUTPATIENT)
Dept: SPEECH THERAPY | Facility: CLINIC | Age: 77
DRG: 270 | End: 2022-06-04
Attending: INTERNAL MEDICINE
Payer: MEDICARE

## 2022-06-04 ENCOUNTER — APPOINTMENT (OUTPATIENT)
Dept: GENERAL RADIOLOGY | Facility: CLINIC | Age: 77
DRG: 270 | End: 2022-06-04
Attending: STUDENT IN AN ORGANIZED HEALTH CARE EDUCATION/TRAINING PROGRAM
Payer: MEDICARE

## 2022-06-04 LAB
ANION GAP SERPL CALCULATED.3IONS-SCNC: 6 MMOL/L (ref 3–14)
BACTERIA BLD CULT: NO GROWTH
BACTERIA BLD CULT: NO GROWTH
BUN SERPL-MCNC: 35 MG/DL (ref 7–30)
C DIFF TOX B STL QL: NEGATIVE
CALCIUM SERPL-MCNC: 8.8 MG/DL (ref 8.5–10.1)
CHLORIDE BLD-SCNC: 120 MMOL/L (ref 94–109)
CO2 SERPL-SCNC: 24 MMOL/L (ref 20–32)
CREAT SERPL-MCNC: 1.21 MG/DL (ref 0.52–1.04)
ERYTHROCYTE [DISTWIDTH] IN BLOOD BY AUTOMATED COUNT: 18.8 % (ref 10–15)
GFR SERPL CREATININE-BSD FRML MDRD: 46 ML/MIN/1.73M2
GLUCOSE BLD-MCNC: 147 MG/DL (ref 70–99)
GLUCOSE BLDC GLUCOMTR-MCNC: 128 MG/DL (ref 70–99)
GLUCOSE BLDC GLUCOMTR-MCNC: 135 MG/DL (ref 70–99)
GLUCOSE BLDC GLUCOMTR-MCNC: 147 MG/DL (ref 70–99)
GLUCOSE BLDC GLUCOMTR-MCNC: 165 MG/DL (ref 70–99)
GLUCOSE BLDC GLUCOMTR-MCNC: 167 MG/DL (ref 70–99)
GLUCOSE BLDC GLUCOMTR-MCNC: 63 MG/DL (ref 70–99)
HCT VFR BLD AUTO: 23.2 % (ref 35–47)
HGB BLD-MCNC: 7.1 G/DL (ref 11.7–15.7)
INR PPP: 1.2 (ref 0.85–1.15)
MAGNESIUM SERPL-MCNC: 2.5 MG/DL (ref 1.6–2.3)
MCH RBC QN AUTO: 28.4 PG (ref 26.5–33)
MCHC RBC AUTO-ENTMCNC: 30.6 G/DL (ref 31.5–36.5)
MCV RBC AUTO: 93 FL (ref 78–100)
PLATELET # BLD AUTO: 216 10E3/UL (ref 150–450)
POTASSIUM BLD-SCNC: 4.8 MMOL/L (ref 3.4–5.3)
RBC # BLD AUTO: 2.5 10E6/UL (ref 3.8–5.2)
SODIUM SERPL-SCNC: 150 MMOL/L (ref 133–144)
UFH PPP CHRO-ACNC: 0.42 IU/ML
WBC # BLD AUTO: 15.1 10E3/UL (ref 4–11)

## 2022-06-04 PROCEDURE — 250N000013 HC RX MED GY IP 250 OP 250 PS 637: Performed by: STUDENT IN AN ORGANIZED HEALTH CARE EDUCATION/TRAINING PROGRAM

## 2022-06-04 PROCEDURE — 250N000013 HC RX MED GY IP 250 OP 250 PS 637

## 2022-06-04 PROCEDURE — 94640 AIRWAY INHALATION TREATMENT: CPT

## 2022-06-04 PROCEDURE — 80048 BASIC METABOLIC PNL TOTAL CA: CPT | Performed by: STUDENT IN AN ORGANIZED HEALTH CARE EDUCATION/TRAINING PROGRAM

## 2022-06-04 PROCEDURE — 83735 ASSAY OF MAGNESIUM: CPT | Performed by: STUDENT IN AN ORGANIZED HEALTH CARE EDUCATION/TRAINING PROGRAM

## 2022-06-04 PROCEDURE — 250N000011 HC RX IP 250 OP 636: Performed by: STUDENT IN AN ORGANIZED HEALTH CARE EDUCATION/TRAINING PROGRAM

## 2022-06-04 PROCEDURE — 85027 COMPLETE CBC AUTOMATED: CPT | Performed by: STUDENT IN AN ORGANIZED HEALTH CARE EDUCATION/TRAINING PROGRAM

## 2022-06-04 PROCEDURE — 250N000009 HC RX 250: Performed by: THORACIC SURGERY (CARDIOTHORACIC VASCULAR SURGERY)

## 2022-06-04 PROCEDURE — 999N000157 HC STATISTIC RCP TIME EA 10 MIN

## 2022-06-04 PROCEDURE — 71045 X-RAY EXAM CHEST 1 VIEW: CPT | Mod: 26 | Performed by: RADIOLOGY

## 2022-06-04 PROCEDURE — 99291 CRITICAL CARE FIRST HOUR: CPT | Mod: 24 | Performed by: INTERNAL MEDICINE

## 2022-06-04 PROCEDURE — 36415 COLL VENOUS BLD VENIPUNCTURE: CPT | Performed by: THORACIC SURGERY (CARDIOTHORACIC VASCULAR SURGERY)

## 2022-06-04 PROCEDURE — 87493 C DIFF AMPLIFIED PROBE: CPT | Performed by: STUDENT IN AN ORGANIZED HEALTH CARE EDUCATION/TRAINING PROGRAM

## 2022-06-04 PROCEDURE — 92526 ORAL FUNCTION THERAPY: CPT | Mod: GN

## 2022-06-04 PROCEDURE — 87040 BLOOD CULTURE FOR BACTERIA: CPT | Performed by: THORACIC SURGERY (CARDIOTHORACIC VASCULAR SURGERY)

## 2022-06-04 PROCEDURE — 99233 SBSQ HOSP IP/OBS HIGH 50: CPT | Mod: GC | Performed by: INTERNAL MEDICINE

## 2022-06-04 PROCEDURE — 85610 PROTHROMBIN TIME: CPT | Performed by: STUDENT IN AN ORGANIZED HEALTH CARE EDUCATION/TRAINING PROGRAM

## 2022-06-04 PROCEDURE — 71045 X-RAY EXAM CHEST 1 VIEW: CPT

## 2022-06-04 PROCEDURE — 85520 HEPARIN ASSAY: CPT | Performed by: INTERNAL MEDICINE

## 2022-06-04 PROCEDURE — 2894A VOIDCORRECT: CPT | Mod: 25 | Performed by: INTERNAL MEDICINE

## 2022-06-04 PROCEDURE — 200N000002 HC R&B ICU UMMC

## 2022-06-04 PROCEDURE — 250N000013 HC RX MED GY IP 250 OP 250 PS 637: Performed by: THORACIC SURGERY (CARDIOTHORACIC VASCULAR SURGERY)

## 2022-06-04 RX ORDER — WARFARIN SODIUM 4 MG/1
4 TABLET ORAL
Status: COMPLETED | OUTPATIENT
Start: 2022-06-04 | End: 2022-06-04

## 2022-06-04 RX ADMIN — PIPERACILLIN AND TAZOBACTAM 3.38 G: 3; .375 INJECTION, POWDER, LYOPHILIZED, FOR SOLUTION INTRAVENOUS at 02:44

## 2022-06-04 RX ADMIN — ACETAMINOPHEN 325MG 975 MG: 325 TABLET ORAL at 08:17

## 2022-06-04 RX ADMIN — MIDODRINE HYDROCHLORIDE 20 MG: 5 TABLET ORAL at 08:17

## 2022-06-04 RX ADMIN — ACETAMINOPHEN 325MG 975 MG: 325 TABLET ORAL at 17:05

## 2022-06-04 RX ADMIN — PIPERACILLIN AND TAZOBACTAM 3.38 G: 3; .375 INJECTION, POWDER, LYOPHILIZED, FOR SOLUTION INTRAVENOUS at 08:32

## 2022-06-04 RX ADMIN — INSULIN ASPART 1 UNITS: 100 INJECTION, SOLUTION INTRAVENOUS; SUBCUTANEOUS at 20:53

## 2022-06-04 RX ADMIN — ACETAMINOPHEN 325MG 975 MG: 325 TABLET ORAL at 00:07

## 2022-06-04 RX ADMIN — HEPARIN SODIUM AND DEXTROSE 1350 UNITS/HR: 10000; 5 INJECTION INTRAVENOUS at 00:02

## 2022-06-04 RX ADMIN — PIPERACILLIN AND TAZOBACTAM 3.38 G: 3; .375 INJECTION, POWDER, LYOPHILIZED, FOR SOLUTION INTRAVENOUS at 14:36

## 2022-06-04 RX ADMIN — PIPERACILLIN AND TAZOBACTAM 3.38 G: 3; .375 INJECTION, POWDER, LYOPHILIZED, FOR SOLUTION INTRAVENOUS at 20:44

## 2022-06-04 RX ADMIN — Medication 10 MG: at 20:44

## 2022-06-04 RX ADMIN — Medication 15 ML: at 08:18

## 2022-06-04 RX ADMIN — LIDOCAINE PATCH 4% 1 PATCH: 40 PATCH TOPICAL at 08:18

## 2022-06-04 RX ADMIN — POTASSIUM CHLORIDE 40 MEQ: 1.5 POWDER, FOR SOLUTION ORAL at 08:17

## 2022-06-04 RX ADMIN — GABAPENTIN 100 MG: 250 SUSPENSION ORAL at 21:33

## 2022-06-04 RX ADMIN — IPRATROPIUM BROMIDE AND ALBUTEROL SULFATE 3 ML: .5; 3 SOLUTION RESPIRATORY (INHALATION) at 08:33

## 2022-06-04 RX ADMIN — Medication 1 PACKET: at 08:22

## 2022-06-04 RX ADMIN — WARFARIN SODIUM 4 MG: 4 TABLET ORAL at 18:36

## 2022-06-04 RX ADMIN — MIDODRINE HYDROCHLORIDE 20 MG: 5 TABLET ORAL at 17:05

## 2022-06-04 RX ADMIN — ASPIRIN 81 MG 81 MG: 81 TABLET ORAL at 08:17

## 2022-06-04 RX ADMIN — Medication 40 MG: at 08:17

## 2022-06-04 RX ADMIN — HEPARIN SODIUM AND DEXTROSE 1350 UNITS/HR: 10000; 5 INJECTION INTRAVENOUS at 18:35

## 2022-06-04 RX ADMIN — MIDODRINE HYDROCHLORIDE 20 MG: 5 TABLET ORAL at 00:07

## 2022-06-04 ASSESSMENT — ACTIVITIES OF DAILY LIVING (ADL)
ADLS_ACUITY_SCORE: 38
ADLS_ACUITY_SCORE: 34
ADLS_ACUITY_SCORE: 38
ADLS_ACUITY_SCORE: 38
ADLS_ACUITY_SCORE: 34
ADLS_ACUITY_SCORE: 38
ADLS_ACUITY_SCORE: 34
ADLS_ACUITY_SCORE: 38
ADLS_ACUITY_SCORE: 38

## 2022-06-04 NOTE — PROGRESS NOTES
Overnight no acute events. Transferred to 4A from  for overflow at 0300. Resting comfortably on minimal levo. Expressing frustration with inability to eat and drink but understands her issues swallowing.

## 2022-06-04 NOTE — PLAN OF CARE
Transferred to: Unit 4A around 0215  Belongings: sent with pt  Win removed? No:   Central line removed? No,   Chart and medications sent with patient Yes  Family notified: No: middle of the night and lateral transfer    Pt tachycardic and on levo for MAP >65. Temp up to 100s with scheduled tylenol given. No changes to HHFOT. Neuros intact, still has soft voice. Good uop, watery diarrhea in rectal tube. Asking for food and drink, but still coughing on swabs, ice chips okay; TF infusing.     Transferred to 4A to make room for another patient. Continue to wean oxygen, pressors, and improve swallowing.

## 2022-06-04 NOTE — PROGRESS NOTES
Impression:  1. CTEPH  2 Thrombendarterectomy  3. High flow oxygen  4. Anemia      Wt Readings from Last 24 Encounters:   06/03/22 63.1 kg (139 lb 1.8 oz)   04/29/22 67.4 kg (148 lb 8 oz)   03/16/22 69.4 kg (153 lb)   12/08/21 67.6 kg (149 lb)   11/29/21 67.7 kg (149 lb 3.2 oz)       Current Facility-Administered Medications   Medication     acetaminophen (TYLENOL) tablet 975 mg     aspirin (ASA) chewable tablet 81 mg     bisacodyl (DULCOLAX) Suppository 10 mg     dextrose 10% infusion     glucose gel 15-30 g    Or     dextrose 50 % injection 25-50 mL    Or     glucagon injection 1 mg     gabapentin (NEURONTIN) solution 100 mg     heparin infusion 25,000 units in D5W 250 mL ANTICOAGULANT     HOLD MEDICATION     hydrALAZINE (APRESOLINE) injection 10 mg     HYDROmorphone (DILAUDID) injection 0.2 mg     hydrOXYzine (ATARAX) tablet 25-50 mg     insulin aspart (NovoLOG) injection (RAPID ACTING)     ipratropium - albuterol 0.5 mg/2.5 mg/3 mL (DUONEB) neb solution 3 mL     Lidocaine (LIDOCARE) 4 % Patch 1 patch     lidocaine (LMX4) cream     lidocaine (LMX4) cream     lidocaine 1 % 0.1-1 mL     lidocaine 1 % 0.1-1 mL     lidocaine patch in PLACE     lidocaine-prilocaine (EMLA) cream     magnesium hydroxide (MILK OF MAGNESIA) suspension 30 mL     medication instruction     melatonin tablet 10 mg     methocarbamol (ROBAXIN) tablet 500 mg     midodrine (PROAMATINE) tablet 20 mg     multivitamins w/minerals liquid 15 mL     naloxone (NARCAN) injection 0.2 mg    Or     naloxone (NARCAN) injection 0.4 mg    Or     naloxone (NARCAN) injection 0.2 mg    Or     naloxone (NARCAN) injection 0.4 mg     norepinephrine (LEVOPHED) 16 mg in  mL infusion MAX CONC CENTRAL LINE     ondansetron (ZOFRAN ODT) ODT tab 4 mg    Or     ondansetron (ZOFRAN) injection 4 mg     oxyCODONE (ROXICODONE) tablet 5 mg    Or     oxyCODONE IR (ROXICODONE) tablet 10 mg     pantoprazole (PROTONIX) 2 mg/mL suspension 40 mg    Or     pantoprazole  (PROTONIX) EC tablet 40 mg     piperacillin-tazobactam (ZOSYN) 3.375 g vial to attach to  mL bag     [Held by provider] polyethylene glycol (MIRALAX) Packet 17 g     prochlorperazine (COMPAZINE) tablet 5 mg    Or     prochlorperazine (COMPAZINE) injection 5 mg     protein modular (PROSOURCE TF) 1 packet     Reason beta blocker order not selected     [Held by provider] senna-docusate (SENOKOT-S/PERICOLACE) 8.6-50 MG per tablet 1 tablet     sodium chloride (PF) 0.9% PF flush 3 mL     sodium chloride (PF) 0.9% PF flush 3 mL     sodium chloride (PF) 0.9% PF flush 3 mL     sodium chloride (PF) 0.9% PF flush 3 mL     sodium chloride bacteriostatic 0.9 % flush 10 mL     warfarin ANTICOAGULANT (COUMADIN) tablet 4 mg     Warfarin Therapy Reminder (Check START DATE - warfarin may be starting in the FUTURE)       Cardiology Fellow Brief Progress Note  _________________________________         Interval Events  ----------------------  Continues to improve, require less pressors  Diuresed well.  Continues to do well on 40 LPM FiO2.         Current Medications  ----------------------  Current Facility-Administered Medications   Medication     acetaminophen (TYLENOL) tablet 975 mg     aspirin (ASA) chewable tablet 81 mg     bisacodyl (DULCOLAX) Suppository 10 mg     dextrose 10% infusion     glucose gel 15-30 g    Or     dextrose 50 % injection 25-50 mL    Or     glucagon injection 1 mg     gabapentin (NEURONTIN) solution 100 mg     heparin infusion 25,000 units in D5W 250 mL ANTICOAGULANT     HOLD MEDICATION     hydrALAZINE (APRESOLINE) injection 10 mg     HYDROmorphone (DILAUDID) injection 0.2 mg     hydrOXYzine (ATARAX) tablet 25-50 mg     insulin aspart (NovoLOG) injection (RAPID ACTING)     ipratropium - albuterol 0.5 mg/2.5 mg/3 mL (DUONEB) neb solution 3 mL     Lidocaine (LIDOCARE) 4 % Patch 1 patch     lidocaine (LMX4) cream     lidocaine (LMX4) cream     lidocaine 1 % 0.1-1 mL     lidocaine 1 % 0.1-1 mL     lidocaine  patch in PLACE     lidocaine-prilocaine (EMLA) cream     magnesium hydroxide (MILK OF MAGNESIA) suspension 30 mL     medication instruction     melatonin tablet 10 mg     methocarbamol (ROBAXIN) tablet 500 mg     midodrine (PROAMATINE) tablet 20 mg     multivitamins w/minerals liquid 15 mL     naloxone (NARCAN) injection 0.2 mg    Or     naloxone (NARCAN) injection 0.4 mg    Or     naloxone (NARCAN) injection 0.2 mg    Or     naloxone (NARCAN) injection 0.4 mg     norepinephrine (LEVOPHED) 16 mg in  mL infusion MAX CONC CENTRAL LINE     ondansetron (ZOFRAN ODT) ODT tab 4 mg    Or     ondansetron (ZOFRAN) injection 4 mg     oxyCODONE (ROXICODONE) tablet 5 mg    Or     oxyCODONE IR (ROXICODONE) tablet 10 mg     pantoprazole (PROTONIX) 2 mg/mL suspension 40 mg    Or     pantoprazole (PROTONIX) EC tablet 40 mg     piperacillin-tazobactam (ZOSYN) 3.375 g vial to attach to  mL bag     [Held by provider] polyethylene glycol (MIRALAX) Packet 17 g     prochlorperazine (COMPAZINE) tablet 5 mg    Or     prochlorperazine (COMPAZINE) injection 5 mg     protein modular (PROSOURCE TF) 1 packet     Reason beta blocker order not selected     [Held by provider] senna-docusate (SENOKOT-S/PERICOLACE) 8.6-50 MG per tablet 1 tablet     sodium chloride (PF) 0.9% PF flush 3 mL     sodium chloride (PF) 0.9% PF flush 3 mL     sodium chloride (PF) 0.9% PF flush 3 mL     sodium chloride (PF) 0.9% PF flush 3 mL     sodium chloride bacteriostatic 0.9 % flush 10 mL     warfarin ANTICOAGULANT (COUMADIN) tablet 4 mg     Warfarin Therapy Reminder (Check START DATE - warfarin may be starting in the FUTURE)        Intake and Output  ----------------------      Intake/Output Summary (Last 24 hours) at 6/3/2022 0116  Last data filed at 6/3/2022 1400  Gross per 24 hour   Intake 3452.24 ml   Output 3210 ml   Net 242.24 ml         Weight  ----------------------  Wt Readings from Last 2 Encounters:   06/03/22 63.1 kg (139 lb 1.8 oz)   04/29/22  67.4 kg (148 lb 8 oz)       Objective  ----------------------  Results for orders placed or performed during the hospital encounter of 05/25/22 (from the past 24 hour(s))   Glucose by meter   Result Value Ref Range    GLUCOSE BY METER POCT 137 (H) 70 - 99 mg/dL   Glucose by meter   Result Value Ref Range    GLUCOSE BY METER POCT 128 (H) 70 - 99 mg/dL   Basic metabolic panel   Result Value Ref Range    Sodium 150 (H) 133 - 144 mmol/L    Potassium 4.8 3.4 - 5.3 mmol/L    Chloride 120 (H) 94 - 109 mmol/L    Carbon Dioxide (CO2) 24 20 - 32 mmol/L    Anion Gap 6 3 - 14 mmol/L    Urea Nitrogen 35 (H) 7 - 30 mg/dL    Creatinine 1.21 (H) 0.52 - 1.04 mg/dL    Calcium 8.8 8.5 - 10.1 mg/dL    Glucose 147 (H) 70 - 99 mg/dL    GFR Estimate 46 (L) >60 mL/min/1.73m2   Magnesium   Result Value Ref Range    Magnesium 2.5 (H) 1.6 - 2.3 mg/dL   CBC with platelets   Result Value Ref Range    WBC Count 15.1 (H) 4.0 - 11.0 10e3/uL    RBC Count 2.50 (L) 3.80 - 5.20 10e6/uL    Hemoglobin 7.1 (L) 11.7 - 15.7 g/dL    Hematocrit 23.2 (L) 35.0 - 47.0 %    MCV 93 78 - 100 fL    MCH 28.4 26.5 - 33.0 pg    MCHC 30.6 (L) 31.5 - 36.5 g/dL    RDW 18.8 (H) 10.0 - 15.0 %    Platelet Count 216 150 - 450 10e3/uL   INR   Result Value Ref Range    INR 1.20 (H) 0.85 - 1.15   Heparin Unfractionated Anti Xa Level   Result Value Ref Range    Anti Xa Unfractionated Heparin 0.42 For Reference Range, See Comment IU/mL    Narrative    Therapeutic Range: UFH: 0.25-0.50 IU/mL for low intensity dosing,  0.30-0.70 IU/mL for high intensity dosing DVT and PE.  This test is not validated for other direct factor X inhibitors (e.g. rivaroxaban, apixaban, edoxaban, betrixaban, fondaparinux) and should not be used for monitoring of other medications.   XR Chest Port 1 View    Narrative    Exam: XR CHEST PORT 1 VIEW, 6/4/2022 3:45 AM    Indication: s/p CTEPH assess support devices    Comparison: Chest x-ray 6/3/2022    Findings:   Median sternotomy wires. Feeding tube  courses past the diaphragm with  tip excluded from the field-of-view. Left IJ catheter tip projects  over the midline, likely in the left innominate vein. Unchanged mildly  enlarged cardiomediastinal silhouette. Decreasing trace biapical  pneumothoraces. Unchanged small effusions. Unchanged mixed opacities.      Impression    Impression:   1. Unchanged left IJ catheter projecting over the innominate vein.  2. Unchanged effusions and mixed airspace opacities.  3. Decreasing biapical pneumothoraces.    I have personally reviewed the examination and initial interpretation  and I agree with the findings.    JESSICA WALKER MD         SYSTEM ID:  [id number]   Glucose by meter   Result Value Ref Range    GLUCOSE BY METER POCT 135 (H) 70 - 99 mg/dL   Clostridium difficile toxin B PCR    Specimen: Per Rectum; Stool   Result Value Ref Range    C Difficile Toxin B by PCR Negative Negative    Narrative    The [employer] Xpert C. difficile Assay, performed on the [employer], is a qualitative in vitro diagnostic test for rapid detection of toxin B gene sequences from unformed (liquid or soft) stool specimens collected from patients suspected of having Clostridioides difficile infection (CDI). The test utilizes automated real-time polymerase chain reaction (PCR) to detect toxin gene sequences associated with toxin producing C. difficile. The Xpert C. difficile Assay is intended as an aid in the diagnosis of CDI.   Glucose by meter   Result Value Ref Range    GLUCOSE BY METER POCT 63 (L) 70 - 99 mg/dL   Glucose by meter   Result Value Ref Range    GLUCOSE BY METER POCT 167 (H) 70 - 99 mg/dL       Recent Labs   Lab Test 12/08/21  1221   CHOL 113   HDL 35*   LDL 59   TRIG 94       Hemoglobin A1C   Date Value Ref Range Status   12/08/2021 5.8 (H) 0.0 - 5.6 % Final     Comment:     Normal <5.7%   Prediabetes 5.7-6.4%    Diabetes 6.5% or higher     Note: Adopted from ADA consensus guidelines.       Results for  orders placed or performed during the hospital encounter of 05/25/22   XR Chest Port 1 View    Impression    IMPRESSION: [place] catheter tip projects over the central right  pulmonary artery.    I have personally reviewed the examination and initial interpretation  and I agree with the findings.    CANDACE ALMONTE MD         SYSTEM ID:  [id number]   XR Abdomen Port 1 View    Impression    IMPRESSION: Gastric tube tip and sidehole project over the stomach.    I have personally reviewed the examination and initial interpretation  and I agree with the findings.    CANDACE ALMONTE MD         SYSTEM ID:  [id number]   XR Chest Port 1 View    Impression    Impression: Postoperative chest with slightly improved perihilar  atelectasis or edema. Endotracheal tube tip in lower trachea 2.5 cm  above the man.    I have personally reviewed the examination and initial interpretation  and I agree with the findings.    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 Continues to recover and improve daily.     Changes Today:   - Continue to diuresis as needed for CVP < 10  - Continue HiFlow/BiPAP  - Continue Warfarin  - Continue to follow post CTEPH protocol      # Pulmonary hypertension, Group IV  # CTEPH s/p pulmonary endaterectomy  # DVTs  # Severe tricuspid insufficiency  Initially diagnosed with bilateral DVT, PE  2019. TTE showed dilated RV with reduced RV function and RVSP of 84mmHg suggestive of severe pulmonary hypertension with mild to moderate TR. Patient believes the above relates to a work related injury, unna boot early 2019. RHC 12/18/2021 with RA 12, PA 84/19/45, PCWP 2, TAHIRA CO/CI 2.8/1.6. Last dose eliquis 5/23 AM. Has oxygen at home but does not use this. Underwent endarterectomy on 5/27. Post op on multiple pressors and briefly on milrinone.   - Goal CVP < 10, diuresis as needed to achieve  - holding pHTN meds in ICU       # Volume overload  PTA on lasix 20mg qday (decreased from 40mg ~6 weeks ago). JVP elevated on initial exam. S/p 40mg IV lasix 5/25; repeat as needed        # Anemia  Hgb 11.4, similar to prior last month.         # CKD  Baseline Cr ~1.3.     Dwain Ferrer MD, MSc  Cardiovascular Disease Fellow  [facility]

## 2022-06-04 NOTE — PLAN OF CARE
D/I: Pt alert and oriented. Sats 95% on high flow. Levophed weaned off. Passed swallow study. Tolerating full liquid diet. Cdiff culture sent. Negative results. Adequate UO. Heparin  drip continues.   A: Pt getting stronger and more alert today.   P: Continue with current plan of care. Update MD with concerns.

## 2022-06-04 NOTE — PROGRESS NOTES
CV ICU PROGRESS NOTE  6/3/2022      CO-MORBIDITIES:   CTEPH (chronic thromboembolic pulmonary hypertension) (H)  (primary encounter diagnosis)  Primary pulmonary hypertension (H)  SOB (shortness of breath)  S/P coronary angiogram    ASSESSMENT: Debi Espinoza is a 76 year old female with PMH of HTN, nephrolithiasis, DVT (2019) and PE (2019, 2021) on chronic anticoagulation, and chronic thromboembolic pulmonary hypertension who underwent pulmonary artery thromboendarterectomy on 5/27 with Dr. Peterson.    TODAY'S PROGRESS:   - Okay for full liquid diet today  - Will cycle tube feeds tonight  - Check C. Diff with fever and multiple BMs  - Increase free water 100 q4h  - Monitor for diuresis need    PLAN:  Neuro/ pain/ sedation:  Acute Postoperative pain  - Monitor neurological status. Notify the MD for any acute changes in exam.  - Pain: S tylenol 975 TID, lidocaine patches. PRN oxycodone 5-10 q4h, robaxin 500 q6h, dilaudid 0.2 q2h     Pulmonary care:   S/p Pulmonary artery thromboendarterectomy on 5/27/2022 by Dr. Peterson  Hx Chronic pulmonary thromboembolic disease  Hx PE (2019, 2021)  Hx Emphysema, moderate  PTA regimen: apixaban 5mg BID  CTA 05/2019 demonstrated underlying moderate emphysema  Extubated 5/31  - BiPAP for respiratory support as able, HFNC available as well    Cardiovascular:    Hx Severe pulmonary hypertension  Hx RV dilation and reduced RV function  Hx Severe tricuspid insufficiency  Hx Essential HTN  Distributive shock  PTA regimen: furosemide 20mg qd, adempas 2.5mg TID, opsumit 10mg qd  Echo on 03/2021 with LVEF of 78%, severe pulmonary hypertension (82 mmHg), reduced RV function (TAPSE 16mm), severe tricuspid insufficiency  - Monitor hemodynamic status.   - ASA 81  - Wean off levophed as able   - CVP goal <10, MAP goal > 65  - V pacing back up 50 BPM  - high intensity Heparin gtt  - 6/2 ECHO   Global and regional LV function is normal: EF of 60-65%. LV appears underfilled. Flattened septum is  consistent with right ventricular pressure overload. Moderate right ventricular dilation with severely reduced global right ventricular function. Mod TR. Severe pulmonary hypertension. Estimated pulmonary artery systolic pressure is 85 mmHg plus right atrial pressure. Unable to visualize IVC. No pericardial effusion.  - Compared to prior TTE, RV is slightly less dilated, function appears similar.    GI care/ Nutrition:   - Speech consult, appreciate recs   - Okay for full liquid diet  - NJ feeding - TF to goal  - PPI    Renal/ Fluid Balance/ Electrolytes:   Nephrolithiasis  Hypernatremia  PTA regimen: furosemide 20mg qd  BL creat appears to be ~ 1.14  - holding diuresis today  - Strict I/O, daily weights  - Avoid/limit nephrotoxins as able  - Replete lytes PRN per protocol  - Sodium 150 today, increase  ml q4h     Endocrine:    Stress induced hyperglycemia  Preop A1c 5.8 (12/21)  - High sliding scale insulin   - Goal BG <180 for optimal healing      ID/ Antibiotics:  Stress induced leukocytosis  Distributive shock  5/28 Blood cultures negative  5/30 MRSA swab negative  5/31 Sputum culture with 4+ lactose fermenting gram negative rods  5/31 Repeat blood cultures NGTD @ 1 day  febrile today, WBC uptrending.   - Continue to monitor fever curve, WBC and inflammatory markers as appropriate  - Zosyn restarted (6/3- )  - Discontinue Meropenem (6/2-6/3)  - Febrile, will check cultures    Cultures  6/4 - blood cultures x2 - pend  5/30 Sputum - Klebsiella     Heme:     Stress induced leukocytosis  Acute blood loss anemia  Acute blood loss thrombocytopenia  Hx DVT (2019), PE (2019, 2021) on chronic anticoagulation  PA tear intra-op s/p patch repair.  - Continue to monitor  - High intensity heparin gtt  - Daily CBC      MSK/ Skin:  Sternotomy  Surgical Incision  - Sternal precautions  - Postoperative incision management per protocol  - PT/OT/CR     Prophylaxis:    - Mechanical prophylaxis for DVT  - Chemical DVT  prophylaxis - low intensity heparin gtt  - PPI     Lines/ tubes/ drains:  - Arterial Line, Left IJ, malachi, NJ     Disposition:  - CVICU    Patient seen, findings and plan discussed with CV ICU staff, Dr. Go and CVTS, Dr. Silvia Corado MD  PGY-3, General Surgery  ====================================    SUBJECTIVE:   Fever overnight but afebrile now. Doing okay. Pain is controlled    OBJECTIVE:   1. VITAL SIGNS:   Temp:  [98.6  F (37  C)-100.9  F (38.3  C)] 99.9  F (37.7  C)  Pulse:  [] 101  Resp:  [13-28] 25  BP: (104-139)/(51-67) 104/56  MAP:  [59 mmHg-103 mmHg] 72 mmHg  Arterial Line BP: ()/(42-86) 103/51  FiO2 (%):  [40 %-50 %] 40 %  SpO2:  [83 %-100 %] 97 %  FiO2 (%): 40 %  Resp: 25    2. INTAKE/ OUTPUT:   I/O last 3 completed shifts:  In: 3182.19 [I.V.:702.19; NG/GT:560; IV Piggyback:1000]  Out: 1940 [Urine:1590; Stool:350]    3. PHYSICAL EXAMINATION:   General: lying in bed, appears relatively comfortable  Neuro: answers to commands, open eyes to voice  Resp: Breathing more relaxed, on high flow nasal canula   CV: RRR  Abdomen: Soft, Non-distended, Non-tender  Incisions: clean dry intact  Extremities: warm and well perfused    4. INVESTIGATIONS:   Arterial Blood Gases   Recent Labs   Lab 06/03/22  0302 06/02/22  1957 06/02/22  1843 06/02/22  1128   PH 7.39 7.45 7.46* 7.45   PCO2 43 37 36 38   PO2 87 100 72* 53*   HCO3 26 26 26 27     Complete Blood Count   Recent Labs   Lab 06/04/22  0300 06/03/22  0300 06/02/22  1139 06/02/22  0809   WBC 15.1* 17.8* 18.0* 16.7*   HGB 7.1* 8.0* 8.2* 8.5*    172 160 158     Basic Metabolic Panel  Recent Labs   Lab 06/04/22  0300 06/04/22  0011 06/03/22  1947 06/03/22  1506 06/03/22  0309 06/03/22  0300 06/02/22  1547 06/02/22  1433 06/02/22  0438 06/02/22  0433   *  --   --   --   --  149*  --  149*  --  147*   POTASSIUM 4.8  --   --   --   --  3.9  --  3.5  --  3.2*   CHLORIDE 120*  --   --   --   --  117*  --  116*  --  112*   CO2 24   --   --   --   --  27  --  27  --  28   BUN 35*  --   --   --   --  29  --  26  --  26   CR 1.21*  --   --   --   --  1.35*  --  1.30*  --  1.28*   * 128* 137* 135*   < > 162*   < > 158*   < > 123*    < > = values in this interval not displayed.     Liver Function Tests  Recent Labs   Lab 06/04/22 0300 06/03/22 0300 06/02/22 2326 06/02/22  0241   INR 1.20* 1.25* 1.20* 1.21*     Pancreatic Enzymes  No lab results found in last 7 days.  Coagulation Profile  Recent Labs   Lab 06/04/22 0300 06/03/22 0300 06/02/22 2326 06/02/22 0241 05/28/22  1057   INR 1.20* 1.25* 1.20* 1.21*  --    PTT  --   --   --   --  59*     5. RADIOLOGY:   No results found for this or any previous visit (from the past 24 hour(s)).    =========================================

## 2022-06-05 ENCOUNTER — APPOINTMENT (OUTPATIENT)
Dept: SPEECH THERAPY | Facility: CLINIC | Age: 77
DRG: 270 | End: 2022-06-05
Attending: INTERNAL MEDICINE
Payer: MEDICARE

## 2022-06-05 ENCOUNTER — APPOINTMENT (OUTPATIENT)
Dept: GENERAL RADIOLOGY | Facility: CLINIC | Age: 77
DRG: 270 | End: 2022-06-05
Attending: STUDENT IN AN ORGANIZED HEALTH CARE EDUCATION/TRAINING PROGRAM
Payer: MEDICARE

## 2022-06-05 LAB
ABO/RH(D): NORMAL
ANION GAP SERPL CALCULATED.3IONS-SCNC: 4 MMOL/L (ref 3–14)
ANION GAP SERPL CALCULATED.3IONS-SCNC: 5 MMOL/L (ref 3–14)
ANTIBODY SCREEN: NEGATIVE
BLD PROD TYP BPU: NORMAL
BLOOD COMPONENT TYPE: NORMAL
BUN SERPL-MCNC: 38 MG/DL (ref 7–30)
BUN SERPL-MCNC: 38 MG/DL (ref 7–30)
CALCIUM SERPL-MCNC: 8.2 MG/DL (ref 8.5–10.1)
CALCIUM SERPL-MCNC: 8.4 MG/DL (ref 8.5–10.1)
CHLORIDE BLD-SCNC: 116 MMOL/L (ref 94–109)
CHLORIDE BLD-SCNC: 119 MMOL/L (ref 94–109)
CO2 SERPL-SCNC: 22 MMOL/L (ref 20–32)
CO2 SERPL-SCNC: 23 MMOL/L (ref 20–32)
CODING SYSTEM: NORMAL
CREAT SERPL-MCNC: 1.13 MG/DL (ref 0.52–1.04)
CREAT SERPL-MCNC: 1.16 MG/DL (ref 0.52–1.04)
CROSSMATCH: NORMAL
ERYTHROCYTE [DISTWIDTH] IN BLOOD BY AUTOMATED COUNT: 18.9 % (ref 10–15)
GFR SERPL CREATININE-BSD FRML MDRD: 48 ML/MIN/1.73M2
GFR SERPL CREATININE-BSD FRML MDRD: 50 ML/MIN/1.73M2
GLUCOSE BLD-MCNC: 100 MG/DL (ref 70–99)
GLUCOSE BLD-MCNC: 128 MG/DL (ref 70–99)
GLUCOSE BLDC GLUCOMTR-MCNC: 110 MG/DL (ref 70–99)
GLUCOSE BLDC GLUCOMTR-MCNC: 120 MG/DL (ref 70–99)
GLUCOSE BLDC GLUCOMTR-MCNC: 139 MG/DL (ref 70–99)
GLUCOSE BLDC GLUCOMTR-MCNC: 154 MG/DL (ref 70–99)
GLUCOSE BLDC GLUCOMTR-MCNC: 91 MG/DL (ref 70–99)
HCT VFR BLD AUTO: 22.1 % (ref 35–47)
HGB BLD-MCNC: 6.4 G/DL (ref 11.7–15.7)
INR PPP: 1.27 (ref 0.85–1.15)
ISSUE DATE AND TIME: NORMAL
MAGNESIUM SERPL-MCNC: 2.3 MG/DL (ref 1.6–2.3)
MCH RBC QN AUTO: 27.4 PG (ref 26.5–33)
MCHC RBC AUTO-ENTMCNC: 29 G/DL (ref 31.5–36.5)
MCV RBC AUTO: 94 FL (ref 78–100)
PLATELET # BLD AUTO: 274 10E3/UL (ref 150–450)
POTASSIUM BLD-SCNC: 3.8 MMOL/L (ref 3.4–5.3)
POTASSIUM BLD-SCNC: 4.5 MMOL/L (ref 3.4–5.3)
RBC # BLD AUTO: 2.34 10E6/UL (ref 3.8–5.2)
SODIUM SERPL-SCNC: 144 MMOL/L (ref 133–144)
SODIUM SERPL-SCNC: 145 MMOL/L (ref 133–144)
SPECIMEN EXPIRATION DATE: NORMAL
UFH PPP CHRO-ACNC: 0.31 IU/ML
UNIT ABO/RH: NORMAL
UNIT NUMBER: NORMAL
UNIT STATUS: NORMAL
UNIT TYPE ISBT: 6200
WBC # BLD AUTO: 17.5 10E3/UL (ref 4–11)

## 2022-06-05 PROCEDURE — 86850 RBC ANTIBODY SCREEN: CPT | Performed by: THORACIC SURGERY (CARDIOTHORACIC VASCULAR SURGERY)

## 2022-06-05 PROCEDURE — 71045 X-RAY EXAM CHEST 1 VIEW: CPT

## 2022-06-05 PROCEDURE — 82310 ASSAY OF CALCIUM: CPT | Performed by: STUDENT IN AN ORGANIZED HEALTH CARE EDUCATION/TRAINING PROGRAM

## 2022-06-05 PROCEDURE — 71045 X-RAY EXAM CHEST 1 VIEW: CPT | Mod: 26 | Performed by: RADIOLOGY

## 2022-06-05 PROCEDURE — 200N000002 HC R&B ICU UMMC

## 2022-06-05 PROCEDURE — P9016 RBC LEUKOCYTES REDUCED: HCPCS | Performed by: STUDENT IN AN ORGANIZED HEALTH CARE EDUCATION/TRAINING PROGRAM

## 2022-06-05 PROCEDURE — 86923 COMPATIBILITY TEST ELECTRIC: CPT

## 2022-06-05 PROCEDURE — 86901 BLOOD TYPING SEROLOGIC RH(D): CPT | Performed by: THORACIC SURGERY (CARDIOTHORACIC VASCULAR SURGERY)

## 2022-06-05 PROCEDURE — 272N000201 ZZ HC ADHESIVE SKIN CLOSURE, DERMABOND

## 2022-06-05 PROCEDURE — 250N000013 HC RX MED GY IP 250 OP 250 PS 637: Performed by: THORACIC SURGERY (CARDIOTHORACIC VASCULAR SURGERY)

## 2022-06-05 PROCEDURE — 250N000013 HC RX MED GY IP 250 OP 250 PS 637: Performed by: STUDENT IN AN ORGANIZED HEALTH CARE EDUCATION/TRAINING PROGRAM

## 2022-06-05 PROCEDURE — 80048 BASIC METABOLIC PNL TOTAL CA: CPT | Performed by: STUDENT IN AN ORGANIZED HEALTH CARE EDUCATION/TRAINING PROGRAM

## 2022-06-05 PROCEDURE — 85027 COMPLETE CBC AUTOMATED: CPT | Performed by: STUDENT IN AN ORGANIZED HEALTH CARE EDUCATION/TRAINING PROGRAM

## 2022-06-05 PROCEDURE — 83735 ASSAY OF MAGNESIUM: CPT | Performed by: STUDENT IN AN ORGANIZED HEALTH CARE EDUCATION/TRAINING PROGRAM

## 2022-06-05 PROCEDURE — 99232 SBSQ HOSP IP/OBS MODERATE 35: CPT | Mod: 25 | Performed by: INTERNAL MEDICINE

## 2022-06-05 PROCEDURE — 85520 HEPARIN ASSAY: CPT | Performed by: THORACIC SURGERY (CARDIOTHORACIC VASCULAR SURGERY)

## 2022-06-05 PROCEDURE — 36569 INSJ PICC 5 YR+ W/O IMAGING: CPT

## 2022-06-05 PROCEDURE — 250N000011 HC RX IP 250 OP 636: Performed by: STUDENT IN AN ORGANIZED HEALTH CARE EDUCATION/TRAINING PROGRAM

## 2022-06-05 PROCEDURE — 85041 AUTOMATED RBC COUNT: CPT | Performed by: STUDENT IN AN ORGANIZED HEALTH CARE EDUCATION/TRAINING PROGRAM

## 2022-06-05 PROCEDURE — 86923 COMPATIBILITY TEST ELECTRIC: CPT | Performed by: STUDENT IN AN ORGANIZED HEALTH CARE EDUCATION/TRAINING PROGRAM

## 2022-06-05 PROCEDURE — 272N000473 HC KIT, VPS RHYTHM STYLET

## 2022-06-05 PROCEDURE — 99232 SBSQ HOSP IP/OBS MODERATE 35: CPT | Mod: 24 | Performed by: ANESTHESIOLOGY

## 2022-06-05 PROCEDURE — 250N000013 HC RX MED GY IP 250 OP 250 PS 637

## 2022-06-05 PROCEDURE — 999N000157 HC STATISTIC RCP TIME EA 10 MIN

## 2022-06-05 PROCEDURE — 92526 ORAL FUNCTION THERAPY: CPT | Mod: GN

## 2022-06-05 PROCEDURE — 85610 PROTHROMBIN TIME: CPT | Performed by: STUDENT IN AN ORGANIZED HEALTH CARE EDUCATION/TRAINING PROGRAM

## 2022-06-05 PROCEDURE — 999N000043 HC STATISTIC CTO2 CONT OXYGEN TECH TIME EA 90 MIN

## 2022-06-05 PROCEDURE — 272N000451 HC KIT SHRLOCK 5FR POWER PICC DOUBLE LUMEN

## 2022-06-05 RX ORDER — WARFARIN SODIUM 4 MG/1
4 TABLET ORAL
Status: COMPLETED | OUTPATIENT
Start: 2022-06-05 | End: 2022-06-05

## 2022-06-05 RX ORDER — LIDOCAINE 40 MG/G
CREAM TOPICAL
Status: ACTIVE | OUTPATIENT
Start: 2022-06-05 | End: 2022-06-08

## 2022-06-05 RX ORDER — HEPARIN SODIUM,PORCINE 10 UNIT/ML
5-20 VIAL (ML) INTRAVENOUS EVERY 24 HOURS
Status: DISCONTINUED | OUTPATIENT
Start: 2022-06-05 | End: 2022-06-25 | Stop reason: HOSPADM

## 2022-06-05 RX ORDER — POTASSIUM CHLORIDE 20MEQ/15ML
10 LIQUID (ML) ORAL ONCE
Status: COMPLETED | OUTPATIENT
Start: 2022-06-05 | End: 2022-06-05

## 2022-06-05 RX ORDER — HEPARIN SODIUM,PORCINE 10 UNIT/ML
5-20 VIAL (ML) INTRAVENOUS
Status: DISCONTINUED | OUTPATIENT
Start: 2022-06-05 | End: 2022-06-25 | Stop reason: HOSPADM

## 2022-06-05 RX ORDER — FUROSEMIDE 10 MG/ML
40 INJECTION INTRAMUSCULAR; INTRAVENOUS ONCE
Status: COMPLETED | OUTPATIENT
Start: 2022-06-05 | End: 2022-06-05

## 2022-06-05 RX ADMIN — ASPIRIN 81 MG 81 MG: 81 TABLET ORAL at 07:59

## 2022-06-05 RX ADMIN — PIPERACILLIN AND TAZOBACTAM 3.38 G: 3; .375 INJECTION, POWDER, LYOPHILIZED, FOR SOLUTION INTRAVENOUS at 01:52

## 2022-06-05 RX ADMIN — Medication 1 PACKET: at 08:02

## 2022-06-05 RX ADMIN — MIDODRINE HYDROCHLORIDE 20 MG: 5 TABLET ORAL at 07:58

## 2022-06-05 RX ADMIN — RIOCIGUAT 0.5 MG: 0.5 TABLET, FILM COATED ORAL at 21:12

## 2022-06-05 RX ADMIN — ACETAMINOPHEN 325MG 975 MG: 325 TABLET ORAL at 00:05

## 2022-06-05 RX ADMIN — PIPERACILLIN AND TAZOBACTAM 3.38 G: 3; .375 INJECTION, POWDER, LYOPHILIZED, FOR SOLUTION INTRAVENOUS at 07:59

## 2022-06-05 RX ADMIN — HEPARIN SODIUM AND DEXTROSE 1350 UNITS/HR: 10000; 5 INJECTION INTRAVENOUS at 13:21

## 2022-06-05 RX ADMIN — POTASSIUM CHLORIDE 10 MEQ: 20 SOLUTION ORAL at 17:10

## 2022-06-05 RX ADMIN — RIOCIGUAT 0.5 MG: 0.5 TABLET, FILM COATED ORAL at 17:10

## 2022-06-05 RX ADMIN — PIPERACILLIN AND TAZOBACTAM 3.38 G: 3; .375 INJECTION, POWDER, LYOPHILIZED, FOR SOLUTION INTRAVENOUS at 21:08

## 2022-06-05 RX ADMIN — MIDODRINE HYDROCHLORIDE 20 MG: 5 TABLET ORAL at 00:05

## 2022-06-05 RX ADMIN — FUROSEMIDE 40 MG: 10 INJECTION, SOLUTION INTRAMUSCULAR; INTRAVENOUS at 07:52

## 2022-06-05 RX ADMIN — INSULIN ASPART 1 UNITS: 100 INJECTION, SOLUTION INTRAVENOUS; SUBCUTANEOUS at 08:13

## 2022-06-05 RX ADMIN — PIPERACILLIN AND TAZOBACTAM 3.38 G: 3; .375 INJECTION, POWDER, LYOPHILIZED, FOR SOLUTION INTRAVENOUS at 15:20

## 2022-06-05 RX ADMIN — Medication 40 MG: at 07:59

## 2022-06-05 RX ADMIN — ACETAMINOPHEN 325MG 975 MG: 325 TABLET ORAL at 07:59

## 2022-06-05 RX ADMIN — WARFARIN SODIUM 4 MG: 4 TABLET ORAL at 18:23

## 2022-06-05 RX ADMIN — ACETAMINOPHEN 325MG 975 MG: 325 TABLET ORAL at 17:02

## 2022-06-05 RX ADMIN — MIDODRINE HYDROCHLORIDE 20 MG: 5 TABLET ORAL at 17:02

## 2022-06-05 RX ADMIN — Medication 15 ML: at 07:59

## 2022-06-05 RX ADMIN — Medication 10 MG: at 21:08

## 2022-06-05 ASSESSMENT — ACTIVITIES OF DAILY LIVING (ADL)
ADLS_ACUITY_SCORE: 38
ADLS_ACUITY_SCORE: 40
ADLS_ACUITY_SCORE: 38
ADLS_ACUITY_SCORE: 40
ADLS_ACUITY_SCORE: 38
ADLS_ACUITY_SCORE: 40
ADLS_ACUITY_SCORE: 38
ADLS_ACUITY_SCORE: 38
ADLS_ACUITY_SCORE: 40
ADLS_ACUITY_SCORE: 40

## 2022-06-05 NOTE — PLAN OF CARE
Major Shift Events:  Pt remains on high flow 40%/40lpm, sats 96%. Pt stats breathing much better after lasix dose. Tolerating new diet. Electrolytes replaced per protocol. Picc line placed.   Plan: Continue with current plan of care. Update MD with concerns.   For vital signs and complete assessments, please see documentation flowsheets.

## 2022-06-05 NOTE — PLAN OF CARE
Major Shift Events:      Baseline sats in low 90s on HF NC 40lpm 40%; 1 unit PRBC transfused for a Hgb of 6.4.      For vital signs and complete assessments, please see documentation flowsheets.     Problem: Gas Exchange Impaired  Goal: Optimal Gas Exchange  Intervention: Optimize Oxygenation and Ventilation  Recent Flowsheet Documentation  Taken 6/5/2022 0200 by Toñito Madsen, RN  Head of Bed (HOB) Positioning: HOB at 30-45 degrees  Taken 6/4/2022 2200 by Toñito Madsen, RN  Head of Bed (HOB) Positioning: HOB at 30-45 degrees

## 2022-06-05 NOTE — PROCEDURES
[facility]    Double Lumen PICC Placement    Date/Time: 6/5/2022 3:52 PM  Performed by: Stephanie Horne RN  Authorized by: Kalpana Silva MD   Indications: vascular access      UNIVERSAL PROTOCOL   Site Marked: Yes  Prior Images Obtained and Reviewed:  Yes  Required items: Required blood products, implants, devices and special equipment available    Patient identity confirmed:  Verbally with patient and arm band  NA - No sedation, light sedation, or local anesthesia  Confirmation Checklist:  Patient's identity using two indicators, relevant allergies, procedure was appropriate and matched the consent or emergent situation and correct equipment/implants were available  Time out: Immediately prior to the procedure a time out was called    Universal Protocol: the Joint Commission Universal Protocol was followed    Preparation: Patient was prepped and draped in usual sterile fashion       ANESTHESIA    Anesthesia: Local infiltration  Local Anesthetic:  Lidocaine 1% without epinephrine  Anesthetic Total (mL):  3.5      SEDATION    Patient Sedated: No        Preparation: skin prepped with ChloraPrep  Skin prep agent: skin prep agent completely dried prior to procedure  Sterile barriers: maximum sterile barriers were used: cap, mask, sterile gown, sterile gloves, and large sterile sheet  Hand hygiene: hand hygiene performed prior to central venous catheter insertion  Type of line used: PICC and Power PICC  Catheter type: double lumen  Lumen type: non-valved  Catheter size: 5 Fr  Brand: Bard  Lot number: [id number]  Placement method: venipuncture, MST, ultrasound and tip navigation system  Number of attempts: 1  Difficulty threading catheter: no  Successful placement: yes  Orientation: right    Location: basilic vein (0.71 cm vein diameter)  Tip Location: superior vena cava  Arm circumference: adults 10 cm  Extremity circumference: 27  Visible catheter length: 2  Total  catheter length: 39  Dressing and securement: adhesive securement device, alcohol impregnated caps, blood cleaned with CHG, chlorhexidine disc applied, glue, site cleansed, statlock and sterile dressing applied  Post procedure assessment: blood return through all ports, free fluid flow and placement verified by 3CG technology  PROCEDURE   Patient Tolerance:  Patient tolerated the procedure well with no immediate complications

## 2022-06-05 NOTE — CONSULTS
Cardiology Fellow Brief Progress Note  _________________________________        Faculty Attestation  Dillan Davila M.D.    I personally saw and examined this patient, reviewed recent laboratories and imaging studies, discussed the case with the housestaff, and conveyed plan to the patient.  I answered all questions from patient and/or family. I agree with the examination, assessment and plan outlined here.        June 5, 2022    Interval Events  ----------------------  NAEON  Restarting home pHTN medications     Current Medications  ----------------------  Current Facility-Administered Medications   Medication     acetaminophen (TYLENOL) tablet 975 mg     aspirin (ASA) chewable tablet 81 mg     bisacodyl (DULCOLAX) Suppository 10 mg     dextrose 10% infusion     glucose gel 15-30 g    Or     dextrose 50 % injection 25-50 mL    Or     glucagon injection 1 mg     furosemide (LASIX) injection 40 mg     gabapentin (NEURONTIN) solution 100 mg     heparin infusion 25,000 units in D5W 250 mL ANTICOAGULANT     HOLD MEDICATION     hydrALAZINE (APRESOLINE) injection 10 mg     HYDROmorphone (DILAUDID) injection 0.2 mg     hydrOXYzine (ATARAX) tablet 25-50 mg     insulin aspart (NovoLOG) injection (RAPID ACTING)     ipratropium - albuterol 0.5 mg/2.5 mg/3 mL (DUONEB) neb solution 3 mL     Lidocaine (LIDOCARE) 4 % Patch 1 patch     lidocaine (LMX4) cream     lidocaine (LMX4) cream     lidocaine 1 % 0.1-1 mL     lidocaine 1 % 0.1-1 mL     lidocaine patch in PLACE     lidocaine-prilocaine (EMLA) cream     magnesium hydroxide (MILK OF MAGNESIA) suspension 30 mL     medication instruction     melatonin tablet 10 mg     methocarbamol (ROBAXIN) tablet 500 mg     midodrine (PROAMATINE) tablet 20 mg     multivitamins w/minerals liquid 15 mL     naloxone (NARCAN) injection 0.2 mg    Or     naloxone (NARCAN) injection 0.4 mg    Or     naloxone (NARCAN) injection 0.2 mg    Or     naloxone (NARCAN) injection 0.4 mg     norepinephrine  (LEVOPHED) 16 mg in  mL infusion MAX CONC CENTRAL LINE     ondansetron (ZOFRAN ODT) ODT tab 4 mg    Or     ondansetron (ZOFRAN) injection 4 mg     oxyCODONE (ROXICODONE) tablet 5 mg    Or     oxyCODONE IR (ROXICODONE) tablet 10 mg     pantoprazole (PROTONIX) 2 mg/mL suspension 40 mg    Or     pantoprazole (PROTONIX) EC tablet 40 mg     piperacillin-tazobactam (ZOSYN) 3.375 g vial to attach to  mL bag     [Held by provider] polyethylene glycol (MIRALAX) Packet 17 g     prochlorperazine (COMPAZINE) tablet 5 mg    Or     prochlorperazine (COMPAZINE) injection 5 mg     protein modular (PROSOURCE TF) 1 packet     Reason beta blocker order not selected     [Held by provider] senna-docusate (SENOKOT-S/PERICOLACE) 8.6-50 MG per tablet 1 tablet     sodium chloride (PF) 0.9% PF flush 3 mL     sodium chloride (PF) 0.9% PF flush 3 mL     sodium chloride (PF) 0.9% PF flush 3 mL     sodium chloride (PF) 0.9% PF flush 3 mL     sodium chloride bacteriostatic 0.9 % flush 10 mL     Warfarin Therapy Reminder (Check START DATE - warfarin may be starting in the FUTURE)        Intake and Output  ----------------------    Intake/Output Summary (Last 24 hours) at 6/5/2022 0750  Last data filed at 6/5/2022 0733  Gross per 24 hour   Intake 2610.39 ml   Output 1475 ml   Net 1135.39 ml       Weight  ----------------------  Wt Readings from Last 2 Encounters:   06/05/22 66.6 kg (146 lb 13.2 oz)   04/29/22 67.4 kg (148 lb 8 oz)       Objective  ----------------------  Results for orders placed or performed during the hospital encounter of 05/25/22 (from the past 24 hour(s))   Clostridium difficile toxin B PCR    Specimen: Per Rectum; Stool   Result Value Ref Range    C Difficile Toxin B by PCR Negative Negative    Narrative    The [employer] Xpert C. difficile Assay, performed on the [employer]  Instrument Systems, is a qualitative in vitro diagnostic test for rapid detection of toxin B gene sequences from unformed (liquid or  soft) stool specimens collected from patients suspected of having Clostridioides difficile infection (CDI). The test utilizes automated real-time polymerase chain reaction (PCR) to detect toxin gene sequences associated with toxin producing C. difficile. The Xpert C. difficile Assay is intended as an aid in the diagnosis of CDI.   Glucose by meter   Result Value Ref Range    GLUCOSE BY METER POCT 63 (L) 70 - 99 mg/dL   Glucose by meter   Result Value Ref Range    GLUCOSE BY METER POCT 167 (H) 70 - 99 mg/dL   Blood Culture Hand, Right    Specimen: Hand, Right; Blood   Result Value Ref Range    Culture No growth after 12 hours    Blood Culture Hand, Left    Specimen: Hand, Left; Blood   Result Value Ref Range    Culture No growth after 12 hours    Glucose by meter   Result Value Ref Range    GLUCOSE BY METER POCT 147 (H) 70 - 99 mg/dL   Glucose by meter   Result Value Ref Range    GLUCOSE BY METER POCT 165 (H) 70 - 99 mg/dL   Glucose by meter   Result Value Ref Range    GLUCOSE BY METER POCT 139 (H) 70 - 99 mg/dL   XR Chest Port 1 View    Narrative    Exam: XR CHEST PORT 1 VIEW, 6/5/2022 2:59 AM    Indication: s/p CTEPH assess support devices    Comparison: 6/4/2022    Findings:   Feeding tube courses past the diaphragm with tip excluded from the  field-of-view. Left IJ catheter unchanged over the left innominate  vein. Unchanged right mediastinal silhouette. Small left greater than  right effusions unchanged. Hazy perihilar mixed opacities unchanged.  No appreciable pneumothorax.      Impression    Impression:   1. Unchanged left IJ catheter tip over innominate vein.  2. No appreciable pneumothoraces.  3. Unchanged effusions and mixed bilateral opacities.    I have personally reviewed the examination and initial interpretation  and I agree with the findings.    JESSICA WALKER MD         SYSTEM ID:  [id number]   Basic metabolic panel   Result Value Ref Range    Sodium 145 (H) 133 - 144 mmol/L    Potassium 4.5 3.4 -  5.3 mmol/L    Chloride 119 (H) 94 - 109 mmol/L    Carbon Dioxide (CO2) 22 20 - 32 mmol/L    Anion Gap 4 3 - 14 mmol/L    Urea Nitrogen 38 (H) 7 - 30 mg/dL    Creatinine 1.13 (H) 0.52 - 1.04 mg/dL    Calcium 8.4 (L) 8.5 - 10.1 mg/dL    Glucose 128 (H) 70 - 99 mg/dL    GFR Estimate 50 (L) >60 mL/min/1.73m2   Magnesium   Result Value Ref Range    Magnesium 2.3 1.6 - 2.3 mg/dL   CBC with platelets   Result Value Ref Range    WBC Count 17.5 (H) 4.0 - 11.0 10e3/uL    RBC Count 2.34 (L) 3.80 - 5.20 10e6/uL    Hemoglobin 6.4 (LL) 11.7 - 15.7 g/dL    Hematocrit 22.1 (L) 35.0 - 47.0 %    MCV 94 78 - 100 fL    MCH 27.4 26.5 - 33.0 pg    MCHC 29.0 (L) 31.5 - 36.5 g/dL    RDW 18.9 (H) 10.0 - 15.0 %    Platelet Count 274 150 - 450 10e3/uL   INR   Result Value Ref Range    INR 1.27 (H) 0.85 - 1.15   Heparin Unfractionated Anti Xa Level   Result Value Ref Range    Anti Xa Unfractionated Heparin 0.31 For Reference Range, See Comment IU/mL    Narrative    Therapeutic Range: UFH: 0.25-0.50 IU/mL for low intensity dosing,  0.30-0.70 IU/mL for high intensity dosing DVT and PE.  This test is not validated for other direct factor X inhibitors (e.g. rivaroxaban, apixaban, edoxaban, betrixaban, fondaparinux) and should not be used for monitoring of other medications.   ABO/Rh type and screen    Narrative    The following orders were created for panel order ABO/Rh type and screen.  Procedure                               Abnormality         Status                     ---------                               -----------         ------                     Adult Type and Screen[[id number]]                            Final result                 Please view results for these tests on the individual orders.   Adult Type and Screen   Result Value Ref Range    ABO/RH(D) A POS     Antibody Screen Negative Negative    SPECIMEN EXPIRATION DATE [id number]    Prepare red blood cells (unit)   Result Value Ref Range    CROSSMATCH Compatible     UNIT  ABO/RH A Pos     Unit Number [id number]     Unit Status Issued     Blood Component Type Red Blood Cells     Product Code H4604R62     CODING SYSTEM [id number]     UNIT TYPE ISBT 6200     ISSUE DATE AND TIME [id number]    ABO/Rh type and screen *Canceled*    Narrative    The following orders were created for panel order ABO/Rh type and screen.  Procedure                               Abnormality         Status                     ---------                               -----------         ------                       Please view results for these tests on the individual orders.   Glucose by meter   Result Value Ref Range    GLUCOSE BY METER POCT 154 (H) 70 - 99 mg/dL       Recent Labs   Lab Test 12/08/21  1221   CHOL 113   HDL 35*   LDL 59   TRIG 94       Hemoglobin A1C   Date Value Ref Range Status   12/08/2021 5.8 (H) 0.0 - 5.6 % Final     Comment:     Normal <5.7%   Prediabetes 5.7-6.4%    Diabetes 6.5% or higher     Note: Adopted from ADA consensus guidelines.       Results for orders placed or performed during the hospital encounter of 05/25/22   XR Chest Port 1 View    Impression    IMPRESSION: McKenzie-Diamond catheter tip projects over the central right  pulmonary artery.    I have personally reviewed the examination and initial interpretation  and I agree with the findings.    CANDACE ALMONTE MD         SYSTEM ID:  [id number]   XR Abdomen Port 1 View    Impression    IMPRESSION: Gastric tube tip and sidehole project over the stomach.    I have personally reviewed the examination and initial interpretation  and I agree with the findings.    CNADACE ALMONTE MD         SYSTEM ID:  [id number]   XR Chest Port 1 View    Impression    Impression: Postoperative chest with slightly improved perihilar  atelectasis or edema. Endotracheal tube tip in lower trachea 2.5 cm  above the man.    I have personally reviewed the examination and initial interpretation  and I agree with the findings.    YVETTE GRAY MD          SYSTEM ID:  [id number]   XR Chest Port 1 View    Impression    Impression: Postoperative chest with stable perihilar atelectasis or  edema. Endotracheal tube tip 1.7 cm superior to the man.    I have personally reviewed the examination and initial interpretation  and I agree with the findings.    YVETTE GRAY MD         SYSTEM ID:  [id number]   XR Chest Port 1 View    Impression    Impression:  1. Endotracheal tube retracted now 2.4 cm from the man.   2. No significant change in small amount of atelectasis lung bases  bilaterally..    YVETTE GRAY MD         SYSTEM ID:  [id number]   XR Chest Port 1 View    Impression    Impression:   1. Unchanged support devices as described above.  2. Unchanged bibasilar atelectasis. No new focal pulmonary opacities.    I have personally reviewed the examination and initial interpretation  and I agree with the findings.    CANDACE ALMONTE MD         SYSTEM ID:  [id number]   XR Chest Port 1 View    Impression    Impression:   1. Unchanged small effusions and bibasilar predominant opacities.  2. Unchanged support devices including pulmonary artery catheter and  ET tube over the low thoracic trachea.    I have personally reviewed the examination and initial interpretation  and I agree with the findings.    JESSICA WALKER MD         SYSTEM ID:  [id number]   XR Chest Port 1 View    Impression    IMPRESSION: Interval extubation. Slightly increased left pleural  effusion/basilar atelectasis. Atherosclerosis. Mild central  atelectasis/subtle positive fluid volume balance.    MARK GUADARRAMA MD         SYSTEM ID:  [id number]   XR Abdomen Port 1 View    Impression    Impression: Enteric tube is subdiaphragmatic with tip in the left  pelvis, presumably within the jejunum.    I have personally reviewed the examination and initial interpretation  and I agree with the findings.    BEBE CLIFFORD MD         SYSTEM ID:  [id number]   XR Chest Port 1 View    Impression    Impression:    1. Unchanged effusions with increased left basilar atelectasis and  interstitial pulmonary edema.  2. Removal of pulmonary artery catheter, mediastinal drain and left  chest tube. Given that lung apices are not entirely included in the  field-of-view, small pneumothoraces could be missed.    I have personally reviewed the examination and initial interpretation  and I agree with the findings.    MARK GUADARRAMA MD         SYSTEM ID:  [id number]   XR Chest Port 1 View   Result Value Ref Range    Radiologist flags New moderate right-sided pneumothorax status post (AA)     Impression    Impression:   1. New moderate right-sided pneumothorax.  2. Possible trace left pneumothorax, attention on follow-up.  3. Increasing atelectasis/pulmonary edema.  4. Small left pleural effusion.  5. Interval removal of right basilar chest tube.    [Critical Result: New moderate right-sided pneumothorax status post  chest tube removal]    Finding was identified on 6/2/2022 6:21 PM.     Raz Rodgers was contacted by Dr. Wyatt at 6/2/2022 6:36 PM and  verbalized understanding of the critical finding.     I have personally reviewed the examination and initial interpretation  and I agree with the findings.    BK DUPREE MD         SYSTEM ID:  [id number]   XR Chest Port 1 View    Impression    Impression:   1. Unchanged appearance of mixed pulmonary opacities.  2. Small biapical pneumothoraces, right more than left.  3. Trace bilateral pleural effusions.    I have personally reviewed the examination and initial interpretation  and I agree with the findings.    BK DUPREE MD         SYSTEM ID:  [id number]   XR Chest Port 1 View    Impression    IMPRESSION:  1.  Lines and tubes, as above.  2.  Stable perihilar opacities, likely edema and atelectasis. Slightly  decreased retrocardiac opacity, likely decreasing atelectasis.   3.  Stable trace biapical pneumothoraces.    I have personally reviewed the examination and initial  interpretation  and I agree with the findings.    ZEE FISHMNA MD         SYSTEM ID:  [id number]   XR Chest Port 1 View    Impression    Impression:   1. Unchanged left IJ catheter projecting over the innominate vein.  2. Unchanged effusions and mixed airspace opacities.  3. Decreasing biapical pneumothoraces.    I have personally reviewed the examination and initial interpretation  and I agree with the findings.    JESSICA WALKER MD         SYSTEM ID:  [id number]   Echo Complete   Result Value Ref Range    LVEF  60-65%        Physical Exam  Vitals:    06/05/22 0700 06/05/22 0715 06/05/22 0730 06/05/22 0733   BP:       Pulse: 94 93 94 94   Resp: (!) 36 (!) 33 (!) 31 30   Temp:    98.1  F (36.7  C)   TempSrc:    Oral   SpO2: 96% 99% 97% 96%   Weight:       Height:         Physical Exam   Constitutional: No distress. She appears chronically ill.   Pulmonary/Chest: Effort normal.   Abdominal: Soft. Bowel sounds are normal. She exhibits no distension.   Neurological: She is alert and oriented to person, place, and time.   Skin: Skin is warm and dry.        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 Continues to recover and improve daily.     Changes Today:   - Continue to diuresis as needed for CVP 8-12  - Continue HiFlow/BiPAP  - Continue Warfarin  - Continue to follow post CTEPH protocol  -Start home pHTN medications       # Pulmonary hypertension, Group IV  # CTEPH s/p pulmonary endaterectomy  # DVTs  # Severe tricuspid insufficiency  Initially diagnosed with bilateral DVT, PE 2019. TTE showed dilated RV with reduced RV function and RVSP of 84mmHg suggestive of severe pulmonary hypertension with mild to moderate TR. Patient believes the above relates to a work related injury, unna boot early 2019. RHC  12/18/2021 with RA 12, PA 84/19/45, PCWP 2, TAHIRA CO/CI 2.8/1.6. Last dose eliquis 5/23 AM. Has oxygen at home but does not use this. Underwent endarterectomy on 5/27. Post op on multiple pressors and briefly on milrinone.   - Goal CVP < 10, diuresis as needed to achieve  - continue home pHTN meds        # Volume overload  PTA on lasix 20mg qday (decreased from 40mg ~6 weeks ago). JVP elevated on initial exam. S/p 40mg IV lasix 5/25; repeat as needed     # Anemia  Hgb 11.4, similar to prior last month.   Has been trending down with HgB today at 6.4 HgB would recommend workup for acute blood loss anemia       # CKD  Baseline Cr ~1.1.     Benjamín Jimenez MD  Fellow Physician PGY-4  Division of Cardiovascular Disease    Discussed with Attending, Dr Davila

## 2022-06-05 NOTE — PROGRESS NOTES
CV ICU PROGRESS NOTE  6/5/2022      CO-MORBIDITIES:   CTEPH (chronic thromboembolic pulmonary hypertension) (H)  (primary encounter diagnosis)  Primary pulmonary hypertension (H)  SOB (shortness of breath)  S/P coronary angiogram    ASSESSMENT: Debi Espinoza is a 76 year old female with PMH of HTN, nephrolithiasis, DVT (2019) and PE (2019, 2021) on chronic anticoagulation, and chronic thromboembolic pulmonary hypertension who underwent pulmonary artery thromboendarterectomy on 5/27 with Dr. Peterson.    TODAY'S PROGRESS:   - advance diet to minced  - 40 mg Lasix this AM  - BMP @1400  - Monitor for diuresis need this afternoon (goal net -1L as tolerated)  - consult for PICC placement after which we can remove  MAC  - restart PTA Opsumit and Adempas    PLAN:  Neuro/ pain/ sedation:  Acute Postoperative pain  - Monitor neurological status. Notify the MD for any acute changes in exam.  - Pain: S tylenol 975 TID, lidocaine patches. PRN oxycodone 5-10 q4h, robaxin 500 q6h, PRN dilaudid 0.2 q2h  - Discontinued PRN gabapentin     Pulmonary care:   S/p Pulmonary artery thromboendarterectomy on 5/27/2022 by Dr. Peterson  Hx Chronic pulmonary thromboembolic disease  Hx PE (2019, 2021)  Hx Emphysema, moderate  PTA regimen: apixaban 5mg BID  CTA 05/2019 demonstrated underlying moderate emphysema  Extubated 5/31  - BiPAP for respiratory support as able, HFNC available as well  On  - restart PTA Opsumit and Adempas     Cardiovascular:    Hx Severe pulmonary hypertension  Hx RV dilation and reduced RV function  Hx Severe tricuspid insufficiency  Hx Essential HTN  Distributive shock  PTA regimen: furosemide 20mg qd, adempas 2.5mg TID, opsumit 10mg qd  Echo on 03/2021 with LVEF of 78%, severe pulmonary hypertension (82 mmHg), reduced RV function (TAPSE 16mm), severe tricuspid insufficiency  - Monitor hemodynamic status.   - Discontinued ASA 81, no indication for ASA use  - Wean off levophed as able   - CVP goal <10, MAP goal > 65  -  Diuresis as needed  - V pacing back up 50 BPM  - high intensity Heparin gtt  - could consider milrinone in the future given decreased RV function   - 6/2 ECHO   Global and regional LV function is normal: EF of 60-65%. LV appears underfilled. Flattened septum is consistent with right ventricular pressure overload. Moderate right ventricular dilation with severely reduced global right ventricular function. Mod TR. Severe pulmonary hypertension. Estimated pulmonary artery systolic pressure is 85 mmHg plus right atrial pressure. Unable to visualize IVC. No pericardial effusion.  - Compared to prior TTE, RV is slightly less dilated, function appears similar.    GI care/ Nutrition:   - Speech consult, appreciate recs   - Advance to minced diet  - NJ feeding - TF to goal  - PPI    Renal/ Fluid Balance/ Electrolytes:   Nephrolithiasis  Hypernatremia  PTA regimen: furosemide 20mg qd  BL creat appears to be ~ 1.14  - Monitor for diuresis need this afternoon (goal net -1L as tolerated)  - Strict I/O, daily weights  - Avoid/limit nephrotoxins as able  - Replete lytes PRN per protocol  - Sodium 145 today (From 150), increase  ml q4h     Endocrine:    Stress induced hyperglycemia  Preop A1c 5.8 (12/21)  - High sliding scale insulin   - Goal BG <180 for optimal healing      ID/ Antibiotics:  Stress induced leukocytosis  Distributive shock  5/28 Blood cultures negative  5/30 MRSA swab negative  5/31 Sputum culture with 4+ lactose fermenting gram negative rods  5/31 Repeat blood cultures NGTD @ 1 day  Afebrile since 5/5, WBC still uptrending.   - Continue to monitor fever curve, WBC and inflammatory markers as appropriate  - Zosyn restarted (6/3- 6/6)  - Discontinue Meropenem (6/2-6/3)  - Febrile, will check cultures     Cultures  6/4 - blood cultures x2 - pend  5/30 Sputum - Klebsiella     Heme:     Stress induced leukocytosis  Acute blood loss anemia  Acute blood loss thrombocytopenia  Hx DVT (2019), PE (2019, 2021) on  chronic anticoagulation  PA tear intra-op s/p patch repair.  - Continue to monitor  - High intensity heparin gtt --> bridge to coumadin  - Daily CBC      MSK/ Skin:  Sternotomy  Surgical Incision  - Sternal precautions  - Postoperative incision management per protocol  - PT/OT/CR     Prophylaxis:    - Mechanical prophylaxis for DVT  - Chemical DVT prophylaxis - low intensity heparin gtt  - PPI     Lines/ tubes/ drains:  - Arterial Line   - Consult for PICC line placement  - Left internal jugular MAC, remove after PICC line placement  - ly  - NJ     Disposition:  - CVICU    Patient seen, findings and plan discussed with CV ICU staff, Dr. Keith Medley, MS4  CV SURG    RESIDENT ATTESTATION:   I saw and examined the patient with the medical student and agree with the assessment and plan as documented above. Edits have been made as needed.     Kalpana Silva, CA-2  [phone]      ====================================    SUBJECTIVE:   Mild in creased in work of breathing. Remained afebrile since 6/4. Doing okay. Pain is controlled. BM via rectal tube with no evidence of melena     OBJECTIVE:   1. VITAL SIGNS:   Temp:  [98.1  F (36.7  C)-99.4  F (37.4  C)] 98.1  F (36.7  C)  Pulse:  [] 80  Resp:  [19-36] 21  BP: (117)/(50) 117/50  MAP:  [65 mmHg-114 mmHg] 114 mmHg  Arterial Line BP: ()/(44-81) 157/81  FiO2 (%):  [40 %] 40 %  SpO2:  [87 %-100 %] 97 %  FiO2 (%): 40 %  Resp: 21    2. INTAKE/ OUTPUT:   I/O last 3 completed shifts:  In: 2141.89 [I.V.:691.89; NG/GT:410]  Out: 1550 [Urine:1325; Stool:225]    3. PHYSICAL EXAMINATION:   General: Sitting upright in bed, appears relatively comfortable  Neuro: Responsive and follows commands  Resp: Breathing more relaxed, on high flow nasal canula   CV: RRR  Abdomen: Soft, Non-distended, Non-tender  Incisions: clean dry intact  Extremities: warm and well perfused    4. INVESTIGATIONS:   Arterial Blood Gases   Recent Labs   Lab 06/03/22  0306  06/02/22  1957 06/02/22  1843 06/02/22  1128   PH 7.39 7.45 7.46* 7.45   PCO2 43 37 36 38   PO2 87 100 72* 53*   HCO3 26 26 26 27     Complete Blood Count   Recent Labs   Lab 06/05/22 0333 06/04/22 0300 06/03/22  0300 06/02/22  1139   WBC 17.5* 15.1* 17.8* 18.0*   HGB 6.4* 7.1* 8.0* 8.2*    216 172 160     Basic Metabolic Panel  Recent Labs   Lab 06/05/22  0812 06/05/22 0333 06/05/22  0006 06/04/22 2052 06/04/22 0808 06/04/22 0300 06/03/22 0309 06/03/22 0300 06/02/22  1547 06/02/22  1433   NA  --  145*  --   --   --  150*  --  149*  --  149*   POTASSIUM  --  4.5  --   --   --  4.8  --  3.9  --  3.5   CHLORIDE  --  119*  --   --   --  120*  --  117*  --  116*   CO2  --  22  --   --   --  24  --  27  --  27   BUN  --  38*  --   --   --  35*  --  29  --  26   CR  --  1.13*  --   --   --  1.21*  --  1.35*  --  1.30*   * 128* 139* 165*   < > 147*   < > 162*   < > 158*    < > = values in this interval not displayed.     Liver Function Tests  Recent Labs   Lab 06/05/22 0333 06/04/22 0300 06/03/22 0300 06/02/22  2326   INR 1.27* 1.20* 1.25* 1.20*     Pancreatic Enzymes  No lab results found in last 7 days.  Coagulation Profile  Recent Labs   Lab 06/05/22 0333 06/04/22 0300 06/03/22 0300 06/02/22 2326   INR 1.27* 1.20* 1.25* 1.20*     5. RADIOLOGY:   Recent Results (from the past 24 hour(s))   XR Chest Port 1 View    Narrative    Exam: XR CHEST PORT 1 VIEW, 6/5/2022 2:59 AM    Indication: s/p CTEPH assess support devices    Comparison: 6/4/2022    Findings:   Feeding tube courses past the diaphragm with tip excluded from the  field-of-view. Left IJ catheter unchanged over the left innominate  vein. Unchanged right mediastinal silhouette. Small left greater than  right effusions unchanged. Hazy perihilar mixed opacities unchanged.  No appreciable pneumothorax.      Impression    Impression:   1. Unchanged left IJ catheter tip over innominate vein.  2. No appreciable pneumothoraces.  3. Unchanged  effusions and mixed bilateral opacities.    I have personally reviewed the examination and initial interpretation  and I agree with the findings.    JESSICA WALKER MD         SYSTEM ID:  [id number]       =========================================

## 2022-06-06 ENCOUNTER — APPOINTMENT (OUTPATIENT)
Dept: SPEECH THERAPY | Facility: CLINIC | Age: 77
DRG: 270 | End: 2022-06-06
Attending: INTERNAL MEDICINE
Payer: MEDICARE

## 2022-06-06 ENCOUNTER — APPOINTMENT (OUTPATIENT)
Dept: PHYSICAL THERAPY | Facility: CLINIC | Age: 77
DRG: 270 | End: 2022-06-06
Attending: INTERNAL MEDICINE
Payer: MEDICARE

## 2022-06-06 ENCOUNTER — APPOINTMENT (OUTPATIENT)
Dept: OCCUPATIONAL THERAPY | Facility: CLINIC | Age: 77
DRG: 270 | End: 2022-06-06
Attending: INTERNAL MEDICINE
Payer: MEDICARE

## 2022-06-06 ENCOUNTER — APPOINTMENT (OUTPATIENT)
Dept: GENERAL RADIOLOGY | Facility: CLINIC | Age: 77
DRG: 270 | End: 2022-06-06
Attending: STUDENT IN AN ORGANIZED HEALTH CARE EDUCATION/TRAINING PROGRAM
Payer: MEDICARE

## 2022-06-06 LAB
ANION GAP SERPL CALCULATED.3IONS-SCNC: 6 MMOL/L (ref 3–14)
ANION GAP SERPL CALCULATED.3IONS-SCNC: 6 MMOL/L (ref 3–14)
BASE EXCESS BLDV CALC-SCNC: -0.9 MMOL/L (ref -7.7–1.9)
BASE EXCESS BLDV CALC-SCNC: 0.2 MMOL/L (ref -7.7–1.9)
BLD PROD TYP BPU: NORMAL
BLOOD COMPONENT TYPE: NORMAL
BUN SERPL-MCNC: 34 MG/DL (ref 7–30)
BUN SERPL-MCNC: 35 MG/DL (ref 7–30)
CA-I BLD-MCNC: 4.8 MG/DL (ref 4.4–5.2)
CALCIUM SERPL-MCNC: 8.2 MG/DL (ref 8.5–10.1)
CALCIUM SERPL-MCNC: 8.2 MG/DL (ref 8.5–10.1)
CHLORIDE BLD-SCNC: 109 MMOL/L (ref 94–109)
CHLORIDE BLD-SCNC: 112 MMOL/L (ref 94–109)
CO2 SERPL-SCNC: 24 MMOL/L (ref 20–32)
CO2 SERPL-SCNC: 26 MMOL/L (ref 20–32)
CODING SYSTEM: NORMAL
CREAT SERPL-MCNC: 1.1 MG/DL (ref 0.52–1.04)
CREAT SERPL-MCNC: 1.33 MG/DL (ref 0.52–1.04)
CROSSMATCH: NORMAL
ERYTHROCYTE [DISTWIDTH] IN BLOOD BY AUTOMATED COUNT: 17.8 % (ref 10–15)
GFR SERPL CREATININE-BSD FRML MDRD: 41 ML/MIN/1.73M2
GFR SERPL CREATININE-BSD FRML MDRD: 51 ML/MIN/1.73M2
GLUCOSE BLD-MCNC: 109 MG/DL (ref 70–99)
GLUCOSE BLD-MCNC: 127 MG/DL (ref 70–99)
GLUCOSE BLDC GLUCOMTR-MCNC: 112 MG/DL (ref 70–99)
GLUCOSE BLDC GLUCOMTR-MCNC: 116 MG/DL (ref 70–99)
GLUCOSE BLDC GLUCOMTR-MCNC: 120 MG/DL (ref 70–99)
GLUCOSE BLDC GLUCOMTR-MCNC: 133 MG/DL (ref 70–99)
GLUCOSE BLDC GLUCOMTR-MCNC: 138 MG/DL (ref 70–99)
GLUCOSE BLDC GLUCOMTR-MCNC: 173 MG/DL (ref 70–99)
HCO3 BLDV-SCNC: 24 MMOL/L (ref 21–28)
HCO3 BLDV-SCNC: 25 MMOL/L (ref 21–28)
HCT VFR BLD AUTO: 22.9 % (ref 35–47)
HGB BLD-MCNC: 7.1 G/DL (ref 11.7–15.7)
INR PPP: 1.3 (ref 0.85–1.15)
ISSUE DATE AND TIME: NORMAL
MAGNESIUM SERPL-MCNC: 2 MG/DL (ref 1.6–2.3)
MCH RBC QN AUTO: 28.1 PG (ref 26.5–33)
MCHC RBC AUTO-ENTMCNC: 31 G/DL (ref 31.5–36.5)
MCV RBC AUTO: 91 FL (ref 78–100)
O2/TOTAL GAS SETTING VFR VENT: 40 %
O2/TOTAL GAS SETTING VFR VENT: 40 %
PCO2 BLDV: 37 MM HG (ref 40–50)
PCO2 BLDV: 39 MM HG (ref 40–50)
PH BLDV: 7.4 [PH] (ref 7.32–7.43)
PH BLDV: 7.43 [PH] (ref 7.32–7.43)
PHOSPHATE SERPL-MCNC: 3.1 MG/DL (ref 2.5–4.5)
PLATELET # BLD AUTO: 303 10E3/UL (ref 150–450)
PO2 BLDV: 28 MM HG (ref 25–47)
PO2 BLDV: 31 MM HG (ref 25–47)
POTASSIUM BLD-SCNC: 3.6 MMOL/L (ref 3.4–5.3)
POTASSIUM BLD-SCNC: 3.8 MMOL/L (ref 3.4–5.3)
RBC # BLD AUTO: 2.53 10E6/UL (ref 3.8–5.2)
SODIUM SERPL-SCNC: 141 MMOL/L (ref 133–144)
SODIUM SERPL-SCNC: 142 MMOL/L (ref 133–144)
UFH PPP CHRO-ACNC: 0.22 IU/ML
UFH PPP CHRO-ACNC: 0.34 IU/ML
UFH PPP CHRO-ACNC: 0.37 IU/ML
UNIT ABO/RH: NORMAL
UNIT NUMBER: NORMAL
UNIT STATUS: NORMAL
UNIT TYPE ISBT: 6200
WBC # BLD AUTO: 16.3 10E3/UL (ref 4–11)

## 2022-06-06 PROCEDURE — 250N000013 HC RX MED GY IP 250 OP 250 PS 637: Performed by: THORACIC SURGERY (CARDIOTHORACIC VASCULAR SURGERY)

## 2022-06-06 PROCEDURE — 250N000011 HC RX IP 250 OP 636: Performed by: THORACIC SURGERY (CARDIOTHORACIC VASCULAR SURGERY)

## 2022-06-06 PROCEDURE — 999N000157 HC STATISTIC RCP TIME EA 10 MIN

## 2022-06-06 PROCEDURE — 85520 HEPARIN ASSAY: CPT | Performed by: THORACIC SURGERY (CARDIOTHORACIC VASCULAR SURGERY)

## 2022-06-06 PROCEDURE — 250N000013 HC RX MED GY IP 250 OP 250 PS 637: Performed by: STUDENT IN AN ORGANIZED HEALTH CARE EDUCATION/TRAINING PROGRAM

## 2022-06-06 PROCEDURE — 84295 ASSAY OF SERUM SODIUM: CPT | Performed by: STUDENT IN AN ORGANIZED HEALTH CARE EDUCATION/TRAINING PROGRAM

## 2022-06-06 PROCEDURE — 94660 CPAP INITIATION&MGMT: CPT

## 2022-06-06 PROCEDURE — 999N000043 HC STATISTIC CTO2 CONT OXYGEN TECH TIME EA 90 MIN

## 2022-06-06 PROCEDURE — 250N000013 HC RX MED GY IP 250 OP 250 PS 637

## 2022-06-06 PROCEDURE — 97530 THERAPEUTIC ACTIVITIES: CPT | Mod: GP

## 2022-06-06 PROCEDURE — 250N000011 HC RX IP 250 OP 636: Performed by: STUDENT IN AN ORGANIZED HEALTH CARE EDUCATION/TRAINING PROGRAM

## 2022-06-06 PROCEDURE — 250N000009 HC RX 250: Performed by: THORACIC SURGERY (CARDIOTHORACIC VASCULAR SURGERY)

## 2022-06-06 PROCEDURE — 82803 BLOOD GASES ANY COMBINATION: CPT | Performed by: STUDENT IN AN ORGANIZED HEALTH CARE EDUCATION/TRAINING PROGRAM

## 2022-06-06 PROCEDURE — 85027 COMPLETE CBC AUTOMATED: CPT | Performed by: STUDENT IN AN ORGANIZED HEALTH CARE EDUCATION/TRAINING PROGRAM

## 2022-06-06 PROCEDURE — 99233 SBSQ HOSP IP/OBS HIGH 50: CPT | Mod: 24 | Performed by: INTERNAL MEDICINE

## 2022-06-06 PROCEDURE — 71045 X-RAY EXAM CHEST 1 VIEW: CPT

## 2022-06-06 PROCEDURE — P9016 RBC LEUKOCYTES REDUCED: HCPCS

## 2022-06-06 PROCEDURE — 82330 ASSAY OF CALCIUM: CPT | Performed by: THORACIC SURGERY (CARDIOTHORACIC VASCULAR SURGERY)

## 2022-06-06 PROCEDURE — 97530 THERAPEUTIC ACTIVITIES: CPT | Mod: GO

## 2022-06-06 PROCEDURE — 71045 X-RAY EXAM CHEST 1 VIEW: CPT | Mod: 26 | Performed by: RADIOLOGY

## 2022-06-06 PROCEDURE — 85610 PROTHROMBIN TIME: CPT | Performed by: STUDENT IN AN ORGANIZED HEALTH CARE EDUCATION/TRAINING PROGRAM

## 2022-06-06 PROCEDURE — 83735 ASSAY OF MAGNESIUM: CPT | Performed by: STUDENT IN AN ORGANIZED HEALTH CARE EDUCATION/TRAINING PROGRAM

## 2022-06-06 PROCEDURE — 84100 ASSAY OF PHOSPHORUS: CPT | Performed by: THORACIC SURGERY (CARDIOTHORACIC VASCULAR SURGERY)

## 2022-06-06 PROCEDURE — P9041 ALBUMIN (HUMAN),5%, 50ML: HCPCS | Performed by: STUDENT IN AN ORGANIZED HEALTH CARE EDUCATION/TRAINING PROGRAM

## 2022-06-06 PROCEDURE — 80048 BASIC METABOLIC PNL TOTAL CA: CPT | Performed by: STUDENT IN AN ORGANIZED HEALTH CARE EDUCATION/TRAINING PROGRAM

## 2022-06-06 PROCEDURE — 97535 SELF CARE MNGMENT TRAINING: CPT | Mod: GO

## 2022-06-06 PROCEDURE — 200N000002 HC R&B ICU UMMC

## 2022-06-06 PROCEDURE — 92526 ORAL FUNCTION THERAPY: CPT | Mod: GN

## 2022-06-06 RX ORDER — POTASSIUM CHLORIDE 20MEQ/15ML
10 LIQUID (ML) ORAL ONCE
Status: COMPLETED | OUTPATIENT
Start: 2022-06-06 | End: 2022-06-06

## 2022-06-06 RX ORDER — ALBUMIN, HUMAN INJ 5% 5 %
12.5 SOLUTION INTRAVENOUS ONCE
Status: COMPLETED | OUTPATIENT
Start: 2022-06-06 | End: 2022-06-06

## 2022-06-06 RX ORDER — LOPERAMIDE HCL 2 MG
2 CAPSULE ORAL 3 TIMES DAILY
Status: DISCONTINUED | OUTPATIENT
Start: 2022-06-06 | End: 2022-06-07

## 2022-06-06 RX ORDER — MAGNESIUM SULFATE HEPTAHYDRATE 40 MG/ML
2 INJECTION, SOLUTION INTRAVENOUS ONCE
Status: COMPLETED | OUTPATIENT
Start: 2022-06-06 | End: 2022-06-06

## 2022-06-06 RX ORDER — FUROSEMIDE 10 MG/ML
40 INJECTION INTRAMUSCULAR; INTRAVENOUS ONCE
Status: COMPLETED | OUTPATIENT
Start: 2022-06-06 | End: 2022-06-06

## 2022-06-06 RX ORDER — WARFARIN SODIUM 4 MG/1
4 TABLET ORAL
Status: COMPLETED | OUTPATIENT
Start: 2022-06-06 | End: 2022-06-06

## 2022-06-06 RX ORDER — ALBUMIN, HUMAN INJ 5% 5 %
SOLUTION INTRAVENOUS
Status: DISCONTINUED
Start: 2022-06-06 | End: 2022-06-06 | Stop reason: HOSPADM

## 2022-06-06 RX ADMIN — RIOCIGUAT 0.5 MG: 0.5 TABLET, FILM COATED ORAL at 08:59

## 2022-06-06 RX ADMIN — PIPERACILLIN AND TAZOBACTAM 3.38 G: 3; .375 INJECTION, POWDER, LYOPHILIZED, FOR SOLUTION INTRAVENOUS at 14:21

## 2022-06-06 RX ADMIN — RIOCIGUAT 0.5 MG: 0.5 TABLET, FILM COATED ORAL at 14:21

## 2022-06-06 RX ADMIN — PIPERACILLIN AND TAZOBACTAM 3.38 G: 3; .375 INJECTION, POWDER, LYOPHILIZED, FOR SOLUTION INTRAVENOUS at 02:18

## 2022-06-06 RX ADMIN — WARFARIN SODIUM 4 MG: 4 TABLET ORAL at 18:43

## 2022-06-06 RX ADMIN — LOPERAMIDE HYDROCHLORIDE 2 MG: 2 CAPSULE ORAL at 20:18

## 2022-06-06 RX ADMIN — MAGNESIUM SULFATE IN WATER 2 G: 40 INJECTION, SOLUTION INTRAVENOUS at 05:21

## 2022-06-06 RX ADMIN — ACETAMINOPHEN 325MG 975 MG: 325 TABLET ORAL at 16:25

## 2022-06-06 RX ADMIN — ACETAMINOPHEN 325MG 975 MG: 325 TABLET ORAL at 00:28

## 2022-06-06 RX ADMIN — HEPARIN SODIUM AND DEXTROSE 1500 UNITS/HR: 10000; 5 INJECTION INTRAVENOUS at 06:31

## 2022-06-06 RX ADMIN — Medication 40 MG: at 08:52

## 2022-06-06 RX ADMIN — POTASSIUM CHLORIDE 10 MEQ: 20 SOLUTION ORAL at 06:16

## 2022-06-06 RX ADMIN — ALBUMIN HUMAN 12.5 G: 0.05 INJECTION, SOLUTION INTRAVENOUS at 00:52

## 2022-06-06 RX ADMIN — Medication 15 ML: at 08:52

## 2022-06-06 RX ADMIN — ALBUMIN HUMAN 12.5 G: 0.05 INJECTION, SOLUTION INTRAVENOUS at 02:48

## 2022-06-06 RX ADMIN — METHOCARBAMOL 500 MG: 500 TABLET ORAL at 08:52

## 2022-06-06 RX ADMIN — LOPERAMIDE HYDROCHLORIDE 2 MG: 2 CAPSULE ORAL at 14:21

## 2022-06-06 RX ADMIN — Medication 1 PACKET: at 08:54

## 2022-06-06 RX ADMIN — MIDODRINE HYDROCHLORIDE 20 MG: 5 TABLET ORAL at 16:25

## 2022-06-06 RX ADMIN — RIOCIGUAT 0.5 MG: 0.5 TABLET, FILM COATED ORAL at 20:19

## 2022-06-06 RX ADMIN — PIPERACILLIN AND TAZOBACTAM 3.38 G: 3; .375 INJECTION, POWDER, LYOPHILIZED, FOR SOLUTION INTRAVENOUS at 08:53

## 2022-06-06 RX ADMIN — POTASSIUM CHLORIDE 10 MEQ: 20 SOLUTION ORAL at 18:43

## 2022-06-06 RX ADMIN — INSULIN ASPART 2 UNITS: 100 INJECTION, SOLUTION INTRAVENOUS; SUBCUTANEOUS at 09:17

## 2022-06-06 RX ADMIN — Medication 10 MG: at 20:18

## 2022-06-06 RX ADMIN — LIDOCAINE PATCH 4% 1 PATCH: 40 PATCH TOPICAL at 08:53

## 2022-06-06 RX ADMIN — PIPERACILLIN AND TAZOBACTAM 3.38 G: 3; .375 INJECTION, POWDER, LYOPHILIZED, FOR SOLUTION INTRAVENOUS at 20:18

## 2022-06-06 RX ADMIN — ACETAMINOPHEN 325MG 975 MG: 325 TABLET ORAL at 08:52

## 2022-06-06 RX ADMIN — MIDODRINE HYDROCHLORIDE 20 MG: 5 TABLET ORAL at 00:28

## 2022-06-06 RX ADMIN — METHOCARBAMOL 500 MG: 500 TABLET ORAL at 16:25

## 2022-06-06 RX ADMIN — MIDODRINE HYDROCHLORIDE 20 MG: 5 TABLET ORAL at 08:52

## 2022-06-06 RX ADMIN — FUROSEMIDE 40 MG: 10 INJECTION, SOLUTION INTRAVENOUS at 10:55

## 2022-06-06 ASSESSMENT — ACTIVITIES OF DAILY LIVING (ADL)
ADLS_ACUITY_SCORE: 34
ADLS_ACUITY_SCORE: 40
ADLS_ACUITY_SCORE: 34
ADLS_ACUITY_SCORE: 34
ADLS_ACUITY_SCORE: 31
ADLS_ACUITY_SCORE: 31
ADLS_ACUITY_SCORE: 34
ADLS_ACUITY_SCORE: 34
ADLS_ACUITY_SCORE: 31
ADLS_ACUITY_SCORE: 31

## 2022-06-06 NOTE — PLAN OF CARE
Neuro: A&Ox4, lethargic. GARZA, up with A2+gait belt. PERRLA.     Pain/Sedation: Denies pain.    Cardiac: SR in 80s. Notified team of soft MAPs < 65 - Albumin 5%, 250 mL given x2. Restarted Levophed gtt at 0600 to achieve MAP >65.     Respiratory: HFNC 40%/40 LPM.     GI: Rectal tube in place. Initiated overnight TF at 2000 for goal rate 80 mL/hr with 100 mL FWF/Q4.    : Win in place. UOP tapered down after midnight, notified team - continue to monitor.    Skin: Midsternal incision dressing c/d/I. Sacral mepilex in place.     Lines: L-MAC removed 0400. R-PICC. R-PIV.     Plan: Wean pressors to maintain MAP >65. Continue to monitor patient and treat per plan of care.       Goal Outcome Evaluation:  Plan of Care Reviewed With: patient   Overall Patient Progress: improving

## 2022-06-06 NOTE — PLAN OF CARE
Neuro: A/Ox4, GARZA, weak. Pain controlled with PRN medication.  Cardio: SR, stable BP, afebrile.  Resp: HiFlo 40%, LS diminished, increased WOB this AM- diuretic given with improvement.  GI: Cyclic TF off at 0800. Diet advanced to level 6 per speech therapy. Moderate appetite- did eat some breakfast and dinner. RT with large liquid output- imodium started.  : Win- lasix given with good response.  Skin: Dressings CDI.  Plan: Continue current POC, notify MD with changes in patient condition.

## 2022-06-06 NOTE — PROGRESS NOTES
CV ICU PROGRESS NOTE  6/5/2022      CO-MORBIDITIES:   CTEPH (chronic thromboembolic pulmonary hypertension) (H)  (primary encounter diagnosis)  Primary pulmonary hypertension (H)  SOB (shortness of breath)  S/P coronary angiogram    ASSESSMENT: Debi Espinoza is a 76 year old female with PMH of HTN, nephrolithiasis, DVT (2019) and PE (2019, 2021) on chronic anticoagulation, and chronic thromboembolic pulmonary hypertension who underwent pulmonary artery thromboendarterectomy on 5/27 with Dr. Peterson.    TODAY'S PROGRESS:   - Arterial line pulled out last night, no need to place for now  - Now off of NE, if requiring pressors will consider Vasopressin instead  - 1 unit PRBC ordered this am  - 40mg lasix this am  - Follow-up lytes  - Will plan for net goal - 500 mL to even  - Monitor CVP and SvO2 via PICC q4h as surrogate of RV function  - If needed will consider digoxin for RV support  - Will continue tube feeds cycled overnight, plan to half rate overnight tomorrow  - FWF 50 q4h  - Schedule Imodium 2 mg TID    PLAN:  Neuro/ pain/ sedation:  Acute Postoperative pain  - Monitor neurological status. Notify the MD for any acute changes in exam.  - Pain: S tylenol 975 TID, lidocaine patches. PRN oxycodone 5-10 q4h, robaxin 500 q6h, PRN dilaudid 0.2 q2h  - Discontinued PRN gabapentin     Pulmonary care:   S/p Pulmonary artery thromboendarterectomy on 5/27/2022 by Dr. Peterson  Hx Chronic pulmonary thromboembolic disease  Hx PE (2019, 2021)  Hx Emphysema, moderate  PTA regimen: apixaban 5mg BID  CTA 05/2019 demonstrated underlying moderate emphysema  Extubated 5/31  - BiPAP for respiratory support as able, HFNC currently at 40L   - PTA Opsumit and Adempas     Cardiovascular:    Hx Severe pulmonary hypertension  Hx RV dilation and reduced RV function  Hx Severe tricuspid insufficiency  Hx Essential HTN  Distributive shock  PTA regimen: furosemide 20mg qd, adempas 2.5mg TID, opsumit 10mg qd  Echo on 03/2021 with LVEF of 78%,  severe pulmonary hypertension (82 mmHg), reduced RV function (TAPSE 16mm), severe tricuspid insufficiency  - Monitor hemodynamic status.   - Discontinued ASA 81, no indication for ASA use  - Off norepinephrine, if requiring pressors will start vasopressin  - Continue midodrine 20 q8h  - Will check CVP, VBG from PICC q6h for Boris calculations to estimate RV function  - Consider digoxin for RV support if necessary  - CVP goal <10, MAP goal > 65  - V pacing back up 50 BPM  - high intensity Heparin gtt  - 6/2 ECHO   Global and regional LV function is normal: EF of 60-65%. LV appears underfilled. Flattened septum is consistent with right ventricular pressure overload. Moderate right ventricular dilation with severely reduced global right ventricular function. Mod TR. Severe pulmonary hypertension. Estimated pulmonary artery systolic pressure is 85 mmHg plus right atrial pressure. Unable to visualize IVC. No pericardial effusion.  - Compared to prior TTE, RV is slightly less dilated, function appears similar.    GI care/ Nutrition:   - Speech consult, appreciate recs   - Advance to minced diet  - NJ feeding - cycled TF at night, will begin weaning (half TF's) tomorrow night if continues to tolerate her diet.  - PPI  - Schedule imodium 2 mg TID    Renal/ Fluid Balance/ Electrolytes:   Nephrolithiasis  Hypernatremia  PTA regimen: furosemide 20mg qd  BL creat appears to be ~ 1.14  - Strict I/O, daily weights  - Avoid/limit nephrotoxins as able  - Replete lytes PRN per protocol  - Sodium improved after increasing FWF to 100 ml q4h--> decrease to 50 ml q4h today.  - Diurese today- lasix 40mg once  - Diurese, goal net even to -500 as tolerated  - Follow-up lytes at 16:00     Endocrine:    Stress induced hyperglycemia  Preop A1c 5.8 (12/21)  - High sliding scale insulin   - Goal BG <180 for optimal healing      ID/ Antibiotics:  Stress induced leukocytosis  Distributive shock  5/28 Blood cultures negative  5/30 MRSA swab  negative  5/31 Sputum culture with 4+ lactose fermenting gram negative rods  5/31 Repeat blood cultures NGTD @ 1 day  Afebrile since 5/5, WBC still uptrending.   - Continue to monitor fever curve, WBC and inflammatory markers as appropriate  - Zosyn restarted (6/3- 6/6)  - Discontinue Meropenem (6/2-6/3)     Cultures  6/4 - blood cultures x2 - NGTD  5/30 Sputum - Klebsiella     Heme:     Stress induced leukocytosis  Acute blood loss anemia  Acute blood loss thrombocytopenia  Hx DVT (2019), PE (2019, 2021) on chronic anticoagulation  PA tear intra-op s/p patch repair.  - Continue to monitor  - High intensity heparin gtt --> bridge to coumadin   - Daily CBC   - 1 unit PRBC ordered this morning for Hb 7.1`     MSK/ Skin:  Sternotomy  Surgical Incision  - Sternal precautions  - Postoperative incision management per protocol  - PT/OT/CR     Prophylaxis:    - Mechanical prophylaxis for DVT  - Chemical DVT prophylaxis - low intensity heparin gtt  - PPI     Lines/ tubes/ drains:  - Arterial Line   - PICC line  - ly  - NJ     Disposition:  - CVICU    Patient seen, findings and plan discussed with CV ICU staff, Dr. Pinon; CVTS staff Dr. Vega, CVTS fellow Dr. Edvin Corado MD  PGY-3, General Surgery    ====================================    SUBJECTIVE:   Feeling okay this morning. Breathing is somewhat better than earlier. Starting to eat some.     OBJECTIVE:   1. VITAL SIGNS:   Temp:  [97.6  F (36.4  C)-99.2  F (37.3  C)] 98.3  F (36.8  C)  Pulse:  [] 82  Resp:  [13-46] 25  BP: ()/(29-87) 105/53  MAP:  [78 mmHg-114 mmHg] 99 mmHg  Arterial Line BP: (116-167)/(55-81) 151/71  FiO2 (%):  [40 %] 40 %  SpO2:  [88 %-100 %] 93 %  FiO2 (%): 40 %  Resp: 25    2. INTAKE/ OUTPUT:   I/O last 3 completed shifts:  In: 4339.2 [I.V.:2219.2; NG/GT:230]  Out: 2510 [Urine:1935; Stool:575]    3. PHYSICAL EXAMINATION:   General: Sitting upright in bed, appears relatively comfortable  Neuro: Responsive and follows  commands  Resp: Breathing more relaxed, on high flow nasal canula   CV: RRR  Abdomen: Soft, Non-distended, Non-tender  Incisions: clean dry intact  Extremities: warm and well perfused    4. INVESTIGATIONS:   Arterial Blood Gases   Recent Labs   Lab 06/03/22 0302 06/02/22 1957 06/02/22  1843 06/02/22  1128   PH 7.39 7.45 7.46* 7.45   PCO2 43 37 36 38   PO2 87 100 72* 53*   HCO3 26 26 26 27     Complete Blood Count   Recent Labs   Lab 06/06/22 0317 06/05/22  0333 06/04/22  0300 06/03/22  0300   WBC 16.3* 17.5* 15.1* 17.8*   HGB 7.1* 6.4* 7.1* 8.0*    274 216 172     Basic Metabolic Panel  Recent Labs   Lab 06/06/22  0902 06/06/22 0319 06/06/22 0317 06/06/22  0031 06/05/22  1717 06/05/22  1522 06/05/22  0812 06/05/22  0333 06/04/22  0808 06/04/22  0300   NA  --   --  142  --   --  144  --  145*  --  150*   POTASSIUM  --   --  3.6  --   --  3.8  --  4.5  --  4.8   CHLORIDE  --   --  112*  --   --  116*  --  119*  --  120*   CO2  --   --  24  --   --  23  --  22  --  24   BUN  --   --  35*  --   --  38*  --  38*  --  35*   CR  --   --  1.33*  --   --  1.16*  --  1.13*  --  1.21*   * 138* 127* 120*   < > 100*   < > 128*   < > 147*    < > = values in this interval not displayed.     Liver Function Tests  Recent Labs   Lab 06/06/22 0317 06/05/22 0333 06/04/22  0300 06/03/22  0300   INR 1.30* 1.27* 1.20* 1.25*     Pancreatic Enzymes  No lab results found in last 7 days.  Coagulation Profile  Recent Labs   Lab 06/06/22 0317 06/05/22 0333 06/04/22  0300 06/03/22  0300   INR 1.30* 1.27* 1.20* 1.25*     5. RADIOLOGY:   Recent Results (from the past 24 hour(s))   XR Chest Port 1 View    Narrative    EXAM: XR CHEST PORT 1 VIEW  6/6/2022 1:29 AM      HISTORY: s/p CTEPH assess support devices    COMPARISON: 6/5/2022    FINDINGS: Portable semiupright AP view of the chest. Left IJ central  venous catheter tip near the confluence of the left innominate vein  with the SVC. Right arm PICC tip at the superior  cavoatrial junction.  Partially visualized feeding tube. Prior median sternotomy with intact  cerclage wires. Unchanged size of the cardiomediastinal silhouette.  Blunting of the costophrenic angle similar to prior. Unchanged mixed  opacities bilaterally. No appreciable pneumothorax.      Impression    IMPRESSION:  1. Interval placement of right arm PICC with tip at the superior  cavoatrial junction. Remaining support improvement in stable position.  2. Stable small effusions and mixed opacities bilaterally.    I have personally reviewed the examination and initial interpretation  and I agree with the findings.    JESSICA WALKER MD         SYSTEM ID:  [id number]       =========================================

## 2022-06-07 ENCOUNTER — APPOINTMENT (OUTPATIENT)
Dept: SPEECH THERAPY | Facility: CLINIC | Age: 77
DRG: 270 | End: 2022-06-07
Attending: INTERNAL MEDICINE
Payer: MEDICARE

## 2022-06-07 ENCOUNTER — APPOINTMENT (OUTPATIENT)
Dept: OCCUPATIONAL THERAPY | Facility: CLINIC | Age: 77
DRG: 270 | End: 2022-06-07
Attending: INTERNAL MEDICINE
Payer: MEDICARE

## 2022-06-07 ENCOUNTER — APPOINTMENT (OUTPATIENT)
Dept: GENERAL RADIOLOGY | Facility: CLINIC | Age: 77
DRG: 270 | End: 2022-06-07
Attending: STUDENT IN AN ORGANIZED HEALTH CARE EDUCATION/TRAINING PROGRAM
Payer: MEDICARE

## 2022-06-07 LAB
ANION GAP SERPL CALCULATED.3IONS-SCNC: 6 MMOL/L (ref 3–14)
BASE EXCESS BLDV CALC-SCNC: -0.9 MMOL/L (ref -7.7–1.9)
BUN SERPL-MCNC: 33 MG/DL (ref 7–30)
CALCIUM SERPL-MCNC: 8.8 MG/DL (ref 8.5–10.1)
CHLORIDE BLD-SCNC: 109 MMOL/L (ref 94–109)
CO2 SERPL-SCNC: 24 MMOL/L (ref 20–32)
CREAT SERPL-MCNC: 1.04 MG/DL (ref 0.52–1.04)
ERYTHROCYTE [DISTWIDTH] IN BLOOD BY AUTOMATED COUNT: 17.1 % (ref 10–15)
GFR SERPL CREATININE-BSD FRML MDRD: 55 ML/MIN/1.73M2
GLUCOSE BLD-MCNC: 128 MG/DL (ref 70–99)
GLUCOSE BLDC GLUCOMTR-MCNC: 113 MG/DL (ref 70–99)
GLUCOSE BLDC GLUCOMTR-MCNC: 115 MG/DL (ref 70–99)
GLUCOSE BLDC GLUCOMTR-MCNC: 138 MG/DL (ref 70–99)
GLUCOSE BLDC GLUCOMTR-MCNC: 151 MG/DL (ref 70–99)
GLUCOSE BLDC GLUCOMTR-MCNC: 96 MG/DL (ref 70–99)
GLUCOSE BLDC GLUCOMTR-MCNC: 98 MG/DL (ref 70–99)
HCO3 BLDV-SCNC: 25 MMOL/L (ref 21–28)
HCT VFR BLD AUTO: 26.6 % (ref 35–47)
HGB BLD-MCNC: 8.5 G/DL (ref 11.7–15.7)
INR PPP: 1.35 (ref 0.85–1.15)
MAGNESIUM SERPL-MCNC: 2.4 MG/DL (ref 1.6–2.3)
MCH RBC QN AUTO: 28.9 PG (ref 26.5–33)
MCHC RBC AUTO-ENTMCNC: 32 G/DL (ref 31.5–36.5)
MCV RBC AUTO: 91 FL (ref 78–100)
O2/TOTAL GAS SETTING VFR VENT: 40 %
PCO2 BLDV: 43 MM HG (ref 40–50)
PH BLDV: 7.37 [PH] (ref 7.32–7.43)
PHOSPHATE SERPL-MCNC: 3.5 MG/DL (ref 2.5–4.5)
PLATELET # BLD AUTO: 343 10E3/UL (ref 150–450)
PO2 BLDV: 32 MM HG (ref 25–47)
POTASSIUM BLD-SCNC: 3.8 MMOL/L (ref 3.4–5.3)
POTASSIUM BLD-SCNC: 3.8 MMOL/L (ref 3.4–5.3)
RBC # BLD AUTO: 2.94 10E6/UL (ref 3.8–5.2)
SODIUM SERPL-SCNC: 139 MMOL/L (ref 133–144)
UFH PPP CHRO-ACNC: 0.32 IU/ML
WBC # BLD AUTO: 17.9 10E3/UL (ref 4–11)

## 2022-06-07 PROCEDURE — 200N000002 HC R&B ICU UMMC

## 2022-06-07 PROCEDURE — 999N000157 HC STATISTIC RCP TIME EA 10 MIN

## 2022-06-07 PROCEDURE — 250N000013 HC RX MED GY IP 250 OP 250 PS 637: Performed by: STUDENT IN AN ORGANIZED HEALTH CARE EDUCATION/TRAINING PROGRAM

## 2022-06-07 PROCEDURE — 97110 THERAPEUTIC EXERCISES: CPT | Mod: GO

## 2022-06-07 PROCEDURE — 92526 ORAL FUNCTION THERAPY: CPT | Mod: GN

## 2022-06-07 PROCEDURE — 85014 HEMATOCRIT: CPT | Performed by: STUDENT IN AN ORGANIZED HEALTH CARE EDUCATION/TRAINING PROGRAM

## 2022-06-07 PROCEDURE — 84132 ASSAY OF SERUM POTASSIUM: CPT | Performed by: STUDENT IN AN ORGANIZED HEALTH CARE EDUCATION/TRAINING PROGRAM

## 2022-06-07 PROCEDURE — 83735 ASSAY OF MAGNESIUM: CPT | Performed by: STUDENT IN AN ORGANIZED HEALTH CARE EDUCATION/TRAINING PROGRAM

## 2022-06-07 PROCEDURE — 250N000011 HC RX IP 250 OP 636: Performed by: THORACIC SURGERY (CARDIOTHORACIC VASCULAR SURGERY)

## 2022-06-07 PROCEDURE — 250N000011 HC RX IP 250 OP 636: Performed by: STUDENT IN AN ORGANIZED HEALTH CARE EDUCATION/TRAINING PROGRAM

## 2022-06-07 PROCEDURE — 71045 X-RAY EXAM CHEST 1 VIEW: CPT

## 2022-06-07 PROCEDURE — 99233 SBSQ HOSP IP/OBS HIGH 50: CPT | Mod: 24 | Performed by: INTERNAL MEDICINE

## 2022-06-07 PROCEDURE — 250N000013 HC RX MED GY IP 250 OP 250 PS 637

## 2022-06-07 PROCEDURE — 82803 BLOOD GASES ANY COMBINATION: CPT | Performed by: STUDENT IN AN ORGANIZED HEALTH CARE EDUCATION/TRAINING PROGRAM

## 2022-06-07 PROCEDURE — 250N000013 HC RX MED GY IP 250 OP 250 PS 637: Performed by: THORACIC SURGERY (CARDIOTHORACIC VASCULAR SURGERY)

## 2022-06-07 PROCEDURE — 94660 CPAP INITIATION&MGMT: CPT

## 2022-06-07 PROCEDURE — 80048 BASIC METABOLIC PNL TOTAL CA: CPT | Performed by: STUDENT IN AN ORGANIZED HEALTH CARE EDUCATION/TRAINING PROGRAM

## 2022-06-07 PROCEDURE — P9041 ALBUMIN (HUMAN),5%, 50ML: HCPCS | Performed by: STUDENT IN AN ORGANIZED HEALTH CARE EDUCATION/TRAINING PROGRAM

## 2022-06-07 PROCEDURE — 85610 PROTHROMBIN TIME: CPT | Performed by: STUDENT IN AN ORGANIZED HEALTH CARE EDUCATION/TRAINING PROGRAM

## 2022-06-07 PROCEDURE — 99232 SBSQ HOSP IP/OBS MODERATE 35: CPT | Mod: GC | Performed by: INTERNAL MEDICINE

## 2022-06-07 PROCEDURE — 84100 ASSAY OF PHOSPHORUS: CPT | Performed by: THORACIC SURGERY (CARDIOTHORACIC VASCULAR SURGERY)

## 2022-06-07 PROCEDURE — 85520 HEPARIN ASSAY: CPT | Performed by: THORACIC SURGERY (CARDIOTHORACIC VASCULAR SURGERY)

## 2022-06-07 PROCEDURE — 71045 X-RAY EXAM CHEST 1 VIEW: CPT | Mod: 26 | Performed by: RADIOLOGY

## 2022-06-07 RX ORDER — ALBUMIN, HUMAN INJ 5% 5 %
12.5 SOLUTION INTRAVENOUS ONCE
Status: COMPLETED | OUTPATIENT
Start: 2022-06-07 | End: 2022-06-07

## 2022-06-07 RX ORDER — POTASSIUM CHLORIDE 7.45 MG/ML
10 INJECTION INTRAVENOUS ONCE
Status: COMPLETED | OUTPATIENT
Start: 2022-06-07 | End: 2022-06-07

## 2022-06-07 RX ORDER — WARFARIN SODIUM 5 MG/1
5 TABLET ORAL
Status: COMPLETED | OUTPATIENT
Start: 2022-06-07 | End: 2022-06-07

## 2022-06-07 RX ORDER — LOPERAMIDE HCL 2 MG
2 CAPSULE ORAL 4 TIMES DAILY
Status: DISCONTINUED | OUTPATIENT
Start: 2022-06-07 | End: 2022-06-08

## 2022-06-07 RX ORDER — FUROSEMIDE 10 MG/ML
40 INJECTION INTRAMUSCULAR; INTRAVENOUS ONCE
Status: COMPLETED | OUTPATIENT
Start: 2022-06-07 | End: 2022-06-07

## 2022-06-07 RX ADMIN — LIDOCAINE PATCH 4% 1 PATCH: 40 PATCH TOPICAL at 08:39

## 2022-06-07 RX ADMIN — MIDODRINE HYDROCHLORIDE 20 MG: 5 TABLET ORAL at 08:38

## 2022-06-07 RX ADMIN — LOPERAMIDE HYDROCHLORIDE 2 MG: 2 CAPSULE ORAL at 12:09

## 2022-06-07 RX ADMIN — MIDODRINE HYDROCHLORIDE 20 MG: 5 TABLET ORAL at 23:59

## 2022-06-07 RX ADMIN — METHOCARBAMOL 500 MG: 500 TABLET ORAL at 05:19

## 2022-06-07 RX ADMIN — WARFARIN SODIUM 5 MG: 5 TABLET ORAL at 17:15

## 2022-06-07 RX ADMIN — RIOCIGUAT 0.5 MG: 0.5 TABLET, FILM COATED ORAL at 08:40

## 2022-06-07 RX ADMIN — ACETAMINOPHEN 325MG 975 MG: 325 TABLET ORAL at 17:16

## 2022-06-07 RX ADMIN — HEPARIN SODIUM AND DEXTROSE 1500 UNITS/HR: 10000; 5 INJECTION INTRAVENOUS at 00:21

## 2022-06-07 RX ADMIN — MIDODRINE HYDROCHLORIDE 20 MG: 5 TABLET ORAL at 00:21

## 2022-06-07 RX ADMIN — INSULIN ASPART 1 UNITS: 100 INJECTION, SOLUTION INTRAVENOUS; SUBCUTANEOUS at 09:02

## 2022-06-07 RX ADMIN — LOPERAMIDE HYDROCHLORIDE 2 MG: 2 CAPSULE ORAL at 17:16

## 2022-06-07 RX ADMIN — LOPERAMIDE HYDROCHLORIDE 2 MG: 2 CAPSULE ORAL at 08:38

## 2022-06-07 RX ADMIN — ALBUMIN HUMAN 12.5 G: 0.05 INJECTION, SOLUTION INTRAVENOUS at 23:32

## 2022-06-07 RX ADMIN — PANTOPRAZOLE SODIUM 40 MG: 40 TABLET, DELAYED RELEASE ORAL at 08:38

## 2022-06-07 RX ADMIN — RIOCIGUAT 0.5 MG: 0.5 TABLET, FILM COATED ORAL at 14:18

## 2022-06-07 RX ADMIN — ACETAMINOPHEN 325MG 975 MG: 325 TABLET ORAL at 08:38

## 2022-06-07 RX ADMIN — POTASSIUM CHLORIDE 10 MEQ: 7.46 INJECTION, SOLUTION INTRAVENOUS at 05:19

## 2022-06-07 RX ADMIN — ACETAMINOPHEN 325MG 975 MG: 325 TABLET ORAL at 00:21

## 2022-06-07 RX ADMIN — Medication 15 ML: at 08:38

## 2022-06-07 RX ADMIN — LOPERAMIDE HYDROCHLORIDE 2 MG: 2 CAPSULE ORAL at 19:44

## 2022-06-07 RX ADMIN — Medication 10 MG: at 19:44

## 2022-06-07 RX ADMIN — HEPARIN SODIUM AND DEXTROSE 15 UNITS/HR: 10000; 5 INJECTION INTRAVENOUS at 15:47

## 2022-06-07 RX ADMIN — POTASSIUM CHLORIDE 10 MEQ: 7.46 INJECTION, SOLUTION INTRAVENOUS at 19:44

## 2022-06-07 RX ADMIN — MIDODRINE HYDROCHLORIDE 20 MG: 5 TABLET ORAL at 17:16

## 2022-06-07 RX ADMIN — Medication 1 PACKET: at 09:17

## 2022-06-07 RX ADMIN — FUROSEMIDE 40 MG: 10 INJECTION, SOLUTION INTRAMUSCULAR; INTRAVENOUS at 08:38

## 2022-06-07 RX ADMIN — RIOCIGUAT 0.5 MG: 0.5 TABLET, FILM COATED ORAL at 19:44

## 2022-06-07 ASSESSMENT — ACTIVITIES OF DAILY LIVING (ADL)
ADLS_ACUITY_SCORE: 37
ADLS_ACUITY_SCORE: 37
ADLS_ACUITY_SCORE: 31
ADLS_ACUITY_SCORE: 31
ADLS_ACUITY_SCORE: 33
ADLS_ACUITY_SCORE: 31
ADLS_ACUITY_SCORE: 37
ADLS_ACUITY_SCORE: 37
ADLS_ACUITY_SCORE: 31
ADLS_ACUITY_SCORE: 31
ADLS_ACUITY_SCORE: 37
ADLS_ACUITY_SCORE: 34

## 2022-06-07 NOTE — PROGRESS NOTES
CV ICU PROGRESS NOTE  6/7/2022      CO-MORBIDITIES:   CTEPH (chronic thromboembolic pulmonary hypertension) (H)  (primary encounter diagnosis)  Primary pulmonary hypertension (H)  SOB (shortness of breath)  S/P coronary angiogram    ASSESSMENT: Debi Espinoza is a 76 year old female with PMH of HTN, nephrolithiasis, DVT (2019) and PE (2019, 2021) on chronic anticoagulation, and chronic thromboembolic pulmonary hypertension who underwent pulmonary artery thromboendarterectomy on 5/27 with Dr. Peterson.    Overnight Events: received 500 Albumin for soft pressures. Remained off of pressors.     TODAY'S PROGRESS:   - Lasix 40mg this AM  - Will plan for net goal - 500 mL to even  - f/u I&O   - Follow-up lytes  - Will continue tube feeds cycled overnight, plan to half rate overnight tomorrow  - very gentle fluid resuscitation overnight (250) if soft pressures   - Transfer to the Floor     PLAN:  Neuro/ pain/ sedation:  Acute Postoperative pain  - Monitor neurological status. Notify the MD for any acute changes in exam.  - Pain: S tylenol 975 TID, lidocaine patches. PRN oxycodone 5-10 q4h, robaxin 500 q6h, PRN dilaudid 0.2 q2h     Pulmonary care:   S/p Pulmonary artery thromboendarterectomy on 5/27/2022 by Dr. Peterson  Hx Chronic pulmonary thromboembolic disease  Hx PE (2019, 2021)  Hx Emphysema, moderate  PTA regimen: apixaban 5mg BID  CTA 05/2019 demonstrated underlying moderate emphysema  Extubated 5/31  - BiPAP for respiratory support as able, HFNC currently at 40L   - PTA Opsumit and Adempas     Cardiovascular:    Hx Severe pulmonary hypertension  Hx RV dilation and reduced RV function  Hx Severe tricuspid insufficiency  Hx Essential HTN  Distributive shock  PTA regimen: furosemide 20mg qd, adempas 2.5mg TID, opsumit 10mg qd  Echo on 03/2021 with LVEF of 78%, severe pulmonary hypertension (82 mmHg), reduced RV function (TAPSE 16mm), severe tricuspid insufficiency  - 6/2 ECHO: Global and regional LV function is normal:  EF of 60-65%. Flattened septum c/w RV pressure overload. Moderate RV dilation with severely reduced global right ventricular function. Mod TR. Severe p-HTN. Estimated pulmonary artery systolic pressure is 85 mmHg plus right atrial pressure. Unable to visualize IVC. No pericardial effusions. Compared to prior TTE, RV is slightly less dilated, function appears similar.    - Monitor hemodynamic status.   - has been stable off norepinephrine, if requiring pressors will start vasopressin  - Consider digoxin for RV support if necessary  - MAP goal > 65  - no indication for ASA use  - on p-HTN meds as above    - V pacing back up 50 BPM  - Continue midodrine 20 q8h  - high intensity Heparin gtt    GI care/ Nutrition:   - Speech consult, appreciate recs   - Advance to minced diet  - NJ feeding - cycled TF at night, will begin weaning if continues to tolerate her diet.  - PPI  - Schedule imodium 2 mg TID    Renal/ Fluid Balance/ Electrolytes:   Nephrolithiasis  Hypernatremia  PTA regimen: furosemide 20mg qd  BL creat appears to be ~ 1.14  - Strict I/O, daily weights  - Avoid/limit nephrotoxins as able  - Replete lytes PRN per protocol  - Sodium stable with FWF at 50 ml q4h.     - Diurese today - lasix 40mg once  - Diurese, goal net even to -500 as tolerated  - Follow-up K at 16:00     Endocrine:    Stress induced hyperglycemia  Preop A1c 5.8 (12/21)  - High sliding scale insulin   - Goal BG <180 for optimal healing      ID/ Antibiotics:  Stress induced leukocytosis  Distributive shock  5/30 MRSA swab negative  5/30 Sputum - Klebsiella  5/31 Sputum culture with 4+ lactose fermenting gram negative rods  5/31 Repeat blood cultures NGTD  6/4 Blood culture x2: NGTD    Remains afebrile since 6/4, WBC appears to have plateau 16-17  - s/p Meropenem (6/2-6/3) and Zosyn (6/3- 6/6)  - Continue to monitor fever curve, WBC and inflammatory markers as appropriate  - No indication for further antimicrobial coverage at this time    Heme:      Stress induced leukocytosis  Acute blood loss anemia  Acute blood loss thrombocytopenia  Hx DVT (2019), PE (2019, 2021) on chronic anticoagulation  PA tear intra-op s/p patch repair.  - Continue to monitor  - High intensity heparin gtt - bridge to coumadin   - Daily CBC   - last transfusion 6/5. Has been stable 7->8 since      MSK/ Skin:  Sternotomy  Surgical Incision  - Sternal precautions  - Postoperative incision management per protocol  - PT/OT/CR     Prophylaxis:    - Mechanical prophylaxis for DVT  - Chemical DVT prophylaxis - low intensity heparin gtt  - PPI     Lines/ tubes/ drains:  - Arterial Line - remove today  - PICC line  - ly - remove today  - NJ     Disposition:  - Transfer to the floor    Patient seen, findings and plan discussed with CV ICU staff, Dr. Pinon and CVTS staff     Kalpana Silva MD  Anesthesiology Resident, CA2  [id number]    ====================================    SUBJECTIVE:   Feeling okay this morning. Breathing is somewhat worse this morning. Started overnight and states she was not able to tolerate bipap, though feels like she slept and is eating well.    OBJECTIVE:   1. VITAL SIGNS:   Temp:  [97.9  F (36.6  C)-99.2  F (37.3  C)] 97.9  F (36.6  C)  Pulse:  [] 83  Resp:  [15-58] 29  BP: ()/(39-87) 97/49  FiO2 (%):  [40 %-60 %] 40 %  SpO2:  [88 %-100 %] 96 %  FiO2 (%): 40 %  Resp: 29    2. INTAKE/ OUTPUT:   I/O last 3 completed shifts:  In: 5524.52 [P.O.:980; I.V.:2244.52; NG/GT:410]  Out: 3310 [Urine:2310; Stool:1000]    3. PHYSICAL EXAMINATION:   General: Sitting upright in bed, appears relatively comfortable  Neuro: Responsive and follows commands  Resp: Breathing more moderately labored. Tachpneic, on HFNC. Crackles throughout lung fields bilaterally. CV: RRR. peirpheral pulses intact  Abdomen: Soft, Non-distended, Non-tender  Incisions: clean dry intact  Extremities: warm and well perfused    4. INVESTIGATIONS:   Arterial Blood Gases   Recent Labs   Lab 06/03/22  1853  06/02/22 1957 06/02/22  1843 06/02/22  1128   PH 7.39 7.45 7.46* 7.45   PCO2 43 37 36 38   PO2 87 100 72* 53*   HCO3 26 26 26 27     Complete Blood Count   Recent Labs   Lab 06/07/22  0359 06/06/22 0317 06/05/22  0333 06/04/22  0300   WBC 17.9* 16.3* 17.5* 15.1*   HGB 8.5* 7.1* 6.4* 7.1*    303 274 216     Basic Metabolic Panel  Recent Labs   Lab 06/07/22  0402 06/07/22 0359 06/07/22 0039 06/06/22 2037 06/06/22  1636 06/06/22 0319 06/06/22 0317 06/05/22  1717 06/05/22  1522   NA  --  139  --   --  141  --  142  --  144   POTASSIUM  --  3.8  --   --  3.8  --  3.6  --  3.8   CHLORIDE  --  109  --   --  109  --  112*  --  116*   CO2  --  24  --   --  26  --  24  --  23   BUN  --  33*  --   --  34*  --  35*  --  38*   CR  --  1.04  --   --  1.10*  --  1.33*  --  1.16*   * 128* 115* 112* 109*  116*   < > 127*   < > 100*    < > = values in this interval not displayed.     Liver Function Tests  Recent Labs   Lab 06/07/22 0359 06/06/22 0317 06/05/22  0333 06/04/22  0300   INR 1.35* 1.30* 1.27* 1.20*     Pancreatic Enzymes  No lab results found in last 7 days.  Coagulation Profile  Recent Labs   Lab 06/07/22  0359 06/06/22 0317 06/05/22  0333 06/04/22  0300   INR 1.35* 1.30* 1.27* 1.20*     5. RADIOLOGY:   Recent Results (from the past 24 hour(s))   XR Chest Port 1 View    Narrative    EXAM: XR CHEST PORT 1 VIEW  6/7/2022 1:00 AM      HISTORY: s/p CTEPH assess support devices    COMPARISON: 6/6/2022    FINDINGS: Single view of the chest. Interval removal of the left  internal jugular central venous catheter. Right arm PICC tip in the  low SVC. Feeding tube projects towards the stomach. Postoperative  changes of median sternotomy with intact cerclage wires.    Stable appearance of the cardiomediastinal silhouette. Continued mixed  interstitial and airspace opacities bilaterally. Left greater than  right small pleural effusions. No pneumothorax. Osseous structures are  unchanged.      Impression     IMPRESSION:  Interval removal of the left internal jugular central venous catheter.  Otherwise, stable life support lines and tubes, mild cardiomegaly,  mixed opacities bilaterally, and small left greater than right pleural  effusions.    I have personally reviewed the examination and initial interpretation  and I agree with the findings.    RENE ASTUDILLO MD         SYSTEM ID:  [id number]       =========================================

## 2022-06-07 NOTE — PLAN OF CARE
Major Shift Events: Pt c/o incisional chest pain, managed with PRNs + heat/cold packs. SR, SBPs/MAPs stable, cuff pressures positional. CVP transduced via PICC, 10-12 mmHg.  HFNC 40%/40 LPM, refused BiPAP overnight. Tachypneic baseline, LS clear/dim. Cyclic TFs at goal rate 80 mL/hr with 50 ml FWF Q4. Immodium given, rectal tube output minimal. Win in place, adequate UOP. Midsternal + abd incisions WDL, dressing changed and C/D/I. Heparin gtt within therapeutic range. Electrolytes (K+) replaced per protocol.     Plan: Continue to monitor and treat pt per plan of care. Notify team of concerns or changes in hemodynamics.     For vital signs and complete assessments, please see documentation flowsheets.       Goal Outcome Evaluation:  Plan of Care Reviewed With: patient   Overall Patient Progress: improving

## 2022-06-07 NOTE — CONSULTS
Cardiology Fellow Progress Note  _________________________________        06/07/22    Interval Events  ----------------------  NAEON  Tolerating home pHTN medications well    Remains in ICU for ongoing post-op cares  Pressors off since yesterday    Current Medications  ----------------------  Current Facility-Administered Medications   Medication     acetaminophen (TYLENOL) tablet 975 mg     bisacodyl (DULCOLAX) Suppository 10 mg     dextrose 10% infusion     glucose gel 15-30 g    Or     dextrose 50 % injection 25-50 mL    Or     glucagon injection 1 mg     heparin infusion 25,000 units in D5W 250 mL ANTICOAGULANT     heparin lock flush 10 UNIT/ML injection 5-20 mL     heparin lock flush 10 UNIT/ML injection 5-20 mL     hydrALAZINE (APRESOLINE) injection 10 mg     HYDROmorphone (DILAUDID) injection 0.2 mg     hydrOXYzine (ATARAX) tablet 25-50 mg     insulin aspart (NovoLOG) injection (RAPID ACTING)     ipratropium - albuterol 0.5 mg/2.5 mg/3 mL (DUONEB) neb solution 3 mL     Lidocaine (LIDOCARE) 4 % Patch 1 patch     lidocaine (LMX4) cream     lidocaine (LMX4) cream     lidocaine 1 % 0.1-1 mL     lidocaine 1 % 0.1-5 mL     lidocaine patch in PLACE     lidocaine-prilocaine (EMLA) cream     loperamide (IMODIUM) capsule 2 mg     macitentan (OPSUMIT) tablet 10 mg     magnesium hydroxide (MILK OF MAGNESIA) suspension 30 mL     medication instruction     melatonin tablet 10 mg     methocarbamol (ROBAXIN) tablet 500 mg     midodrine (PROAMATINE) tablet 20 mg     multivitamins w/minerals liquid 15 mL     naloxone (NARCAN) injection 0.2 mg    Or     naloxone (NARCAN) injection 0.4 mg    Or     naloxone (NARCAN) injection 0.2 mg    Or     naloxone (NARCAN) injection 0.4 mg     norepinephrine (LEVOPHED) 16 mg in  mL infusion MAX CONC CENTRAL LINE     ondansetron (ZOFRAN ODT) ODT tab 4 mg    Or     ondansetron (ZOFRAN) injection 4 mg     oxyCODONE (ROXICODONE) tablet 5 mg    Or     oxyCODONE IR (ROXICODONE) tablet 10 mg      pantoprazole (PROTONIX) 2 mg/mL suspension 40 mg    Or     pantoprazole (PROTONIX) EC tablet 40 mg     [Held by provider] polyethylene glycol (MIRALAX) Packet 17 g     prochlorperazine (COMPAZINE) tablet 5 mg    Or     prochlorperazine (COMPAZINE) injection 5 mg     protein modular (PROSOURCE TF) 1 packet     Reason beta blocker order not selected     riociguat (ADEMPAS) tablet 0.5 mg     [Held by provider] senna-docusate (SENOKOT-S/PERICOLACE) 8.6-50 MG per tablet 1 tablet     sodium chloride (PF) 0.9% PF flush 10-20 mL     sodium chloride (PF) 0.9% PF flush 10-20 mL     sodium chloride (PF) 0.9% PF flush 10-40 mL     sodium chloride (PF) 0.9% PF flush 10-40 mL     sodium chloride (PF) 0.9% PF flush 10-40 mL     sodium chloride (PF) 0.9% PF flush 3 mL     sodium chloride (PF) 0.9% PF flush 3 mL     sodium chloride (PF) 0.9% PF flush 3 mL     sodium chloride (PF) 0.9% PF flush 3 mL     sodium chloride bacteriostatic 0.9 % flush 10 mL     Warfarin Therapy Reminder (Check START DATE - warfarin may be starting in the FUTURE)        Intake and Output  ----------------------      Intake/Output Summary (Last 24 hours) at 6/7/2022 0721  Last data filed at 6/7/2022 0700  Gross per 24 hour   Intake 3622.82 ml   Output 3230 ml   Net 392.82 ml         Weight  ----------------------  Wt Readings from Last 2 Encounters:   06/05/22 66.6 kg (146 lb 13.2 oz)   04/29/22 67.4 kg (148 lb 8 oz)       Objective  ----------------------  Results for orders placed or performed during the hospital encounter of 05/25/22 (from the past 24 hour(s))   Prepare red blood cells (unit)   Result Value Ref Range    CROSSMATCH Compatible     UNIT ABO/RH A Pos     Unit Number [id number]     Unit Status Transfused     Blood Component Type Red Blood Cells     Product Code [id number]     CODING SYSTEM [id number]     UNIT TYPE ISBT 6200     ISSUE DATE AND TIME [id number]    Glucose by meter   Result Value Ref Range    GLUCOSE BY METER POCT 173 (H) 70  - 99 mg/dL   Heparin Unfractionated Anti Xa Level   Result Value Ref Range    Anti Xa Unfractionated Heparin 0.37 For Reference Range, See Comment IU/mL    Narrative    Therapeutic Range: UFH: 0.25-0.50 IU/mL for low intensity dosing,  0.30-0.70 IU/mL for high intensity dosing DVT and PE.  This test is not validated for other direct factor X inhibitors (e.g. rivaroxaban, apixaban, edoxaban, betrixaban, fondaparinux) and should not be used for monitoring of other medications.   Glucose by meter   Result Value Ref Range    GLUCOSE BY METER POCT 133 (H) 70 - 99 mg/dL   Basic metabolic panel   Result Value Ref Range    Sodium 141 133 - 144 mmol/L    Potassium 3.8 3.4 - 5.3 mmol/L    Chloride 109 94 - 109 mmol/L    Carbon Dioxide (CO2) 26 20 - 32 mmol/L    Anion Gap 6 3 - 14 mmol/L    Urea Nitrogen 34 (H) 7 - 30 mg/dL    Creatinine 1.10 (H) 0.52 - 1.04 mg/dL    Calcium 8.2 (L) 8.5 - 10.1 mg/dL    Glucose 109 (H) 70 - 99 mg/dL    GFR Estimate 51 (L) >60 mL/min/1.73m2   Blood gas venous   Result Value Ref Range    pH Venous 7.43 7.32 - 7.43    pCO2 Venous 37 (L) 40 - 50 mm Hg    pO2 Venous 28 25 - 47 mm Hg    Bicarbonate Venous 25 21 - 28 mmol/L    Base Excess/Deficit (+/-) 0.2 -7.7 - 1.9 mmol/L    FIO2 40    Glucose by meter   Result Value Ref Range    GLUCOSE BY METER POCT 116 (H) 70 - 99 mg/dL   Heparin Unfractionated Anti Xa Level   Result Value Ref Range    Anti Xa Unfractionated Heparin 0.34 For Reference Range, See Comment IU/mL    Narrative    Therapeutic Range: UFH: 0.25-0.50 IU/mL for low intensity dosing,  0.30-0.70 IU/mL for high intensity dosing DVT and PE.  This test is not validated for other direct factor X inhibitors (e.g. rivaroxaban, apixaban, edoxaban, betrixaban, fondaparinux) and should not be used for monitoring of other medications.   Glucose by meter   Result Value Ref Range    GLUCOSE BY METER POCT 112 (H) 70 - 99 mg/dL   Blood gas venous   Result Value Ref Range    pH Venous 7.40 7.32 - 7.43     pCO2 Venous 39 (L) 40 - 50 mm Hg    pO2 Venous 31 25 - 47 mm Hg    Bicarbonate Venous 24 21 - 28 mmol/L    Base Excess/Deficit (+/-) -0.9 -7.7 - 1.9 mmol/L    FIO2 40    Glucose by meter   Result Value Ref Range    GLUCOSE BY METER POCT 115 (H) 70 - 99 mg/dL   Basic metabolic panel   Result Value Ref Range    Sodium 139 133 - 144 mmol/L    Potassium 3.8 3.4 - 5.3 mmol/L    Chloride 109 94 - 109 mmol/L    Carbon Dioxide (CO2) 24 20 - 32 mmol/L    Anion Gap 6 3 - 14 mmol/L    Urea Nitrogen 33 (H) 7 - 30 mg/dL    Creatinine 1.04 0.52 - 1.04 mg/dL    Calcium 8.8 8.5 - 10.1 mg/dL    Glucose 128 (H) 70 - 99 mg/dL    GFR Estimate 55 (L) >60 mL/min/1.73m2   Magnesium   Result Value Ref Range    Magnesium 2.4 (H) 1.6 - 2.3 mg/dL   CBC with platelets   Result Value Ref Range    WBC Count 17.9 (H) 4.0 - 11.0 10e3/uL    RBC Count 2.94 (L) 3.80 - 5.20 10e6/uL    Hemoglobin 8.5 (L) 11.7 - 15.7 g/dL    Hematocrit 26.6 (L) 35.0 - 47.0 %    MCV 91 78 - 100 fL    MCH 28.9 26.5 - 33.0 pg    MCHC 32.0 31.5 - 36.5 g/dL    RDW 17.1 (H) 10.0 - 15.0 %    Platelet Count 343 150 - 450 10e3/uL   INR   Result Value Ref Range    INR 1.35 (H) 0.85 - 1.15   Blood gas venous   Result Value Ref Range    pH Venous 7.37 7.32 - 7.43    pCO2 Venous 43 40 - 50 mm Hg    pO2 Venous 32 25 - 47 mm Hg    Bicarbonate Venous 25 21 - 28 mmol/L    Base Excess/Deficit (+/-) -0.9 -7.7 - 1.9 mmol/L    FIO2 40    Heparin Unfractionated Anti Xa Level   Result Value Ref Range    Anti Xa Unfractionated Heparin 0.32 For Reference Range, See Comment IU/mL    Narrative    Therapeutic Range: UFH: 0.25-0.50 IU/mL for low intensity dosing,  0.30-0.70 IU/mL for high intensity dosing DVT and PE.  This test is not validated for other direct factor X inhibitors (e.g. rivaroxaban, apixaban, edoxaban, betrixaban, fondaparinux) and should not be used for monitoring of other medications.   Phosphorus   Result Value Ref Range    Phosphorus 3.5 2.5 - 4.5 mg/dL   Glucose by meter    Result Value Ref Range    GLUCOSE BY METER POCT 138 (H) 70 - 99 mg/dL       Recent Labs   Lab Test 12/08/21  1221   CHOL 113   HDL 35*   LDL 59   TRIG 94       Hemoglobin A1C   Date Value Ref Range Status   12/08/2021 5.8 (H) 0.0 - 5.6 % Final     Comment:     Normal <5.7%   Prediabetes 5.7-6.4%    Diabetes 6.5% or higher     Note: Adopted from ADA consensus guidelines.       Results for orders placed or performed during the hospital encounter of 05/25/22   XR Chest Port 1 View    Impression    IMPRESSION: [place] catheter tip projects over the central right  pulmonary artery.    I have personally reviewed the examination and initial interpretation  and I agree with the findings.    CANDACE ALMONTE MD         SYSTEM ID:  [id number]   XR Abdomen Port 1 View    Impression    IMPRESSION: Gastric tube tip and sidehole project over the stomach.    I have personally reviewed the examination and initial interpretation  and I agree with the findings.    CANDACE ALMONTE MD         SYSTEM ID:  [id number]   XR Chest Port 1 View    Impression    Impression: Postoperative chest with slightly improved perihilar  atelectasis or edema. Endotracheal tube tip in lower trachea 2.5 cm  above the man.    I have personally reviewed the examination and initial interpretation  and I agree with the findings.    YVETTE GRAY MD         SYSTEM ID:  [id number]   XR Chest Port 1 View    Impression    Impression: Postoperative chest with stable perihilar atelectasis or  edema. Endotracheal tube tip 1.7 cm superior to the man.    I have personally reviewed the examination and initial interpretation  and I agree with the findings.    YVETTE GRAY MD         SYSTEM ID:  [id number]   XR Chest Port 1 View    Impression    Impression:  1. Endotracheal tube retracted now 2.4 cm from the man.   2. No significant change in small amount of atelectasis lung bases  bilaterally..    YVETTE GRAY MD         SYSTEM ID:  [id number]   XR  Chest Port 1 View    Impression    Impression:   1. Unchanged support devices as described above.  2. Unchanged bibasilar atelectasis. No new focal pulmonary opacities.    I have personally reviewed the examination and initial interpretation  and I agree with the findings.    CANDACE ALMONTE MD         SYSTEM ID:  [id number]   XR Chest Port 1 View    Impression    Impression:   1. Unchanged small effusions and bibasilar predominant opacities.  2. Unchanged support devices including pulmonary artery catheter and  ET tube over the low thoracic trachea.    I have personally reviewed the examination and initial interpretation  and I agree with the findings.    JESSICA WALKER MD         SYSTEM ID:  [id number]   XR Chest Port 1 View    Impression    IMPRESSION: Interval extubation. Slightly increased left pleural  effusion/basilar atelectasis. Atherosclerosis. Mild central  atelectasis/subtle positive fluid volume balance.    MARK GUADARRAMA MD         SYSTEM ID:  [id number]   XR Abdomen Port 1 View    Impression    Impression: Enteric tube is subdiaphragmatic with tip in the left  pelvis, presumably within the jejunum.    I have personally reviewed the examination and initial interpretation  and I agree with the findings.    BEBE CLIFFORD MD         SYSTEM ID:  [id number]   XR Chest Port 1 View    Impression    Impression:   1. Unchanged effusions with increased left basilar atelectasis and  interstitial pulmonary edema.  2. Removal of pulmonary artery catheter, mediastinal drain and left  chest tube. Given that lung apices are not entirely included in the  field-of-view, small pneumothoraces could be missed.    I have personally reviewed the examination and initial interpretation  and I agree with the findings.    MARK GUADARRAMA MD         SYSTEM ID:  [id number]   XR Chest Port 1 View   Result Value Ref Range    Radiologist flags New moderate right-sided pneumothorax status post (AA)     Impression    Impression:   1.  New moderate right-sided pneumothorax.  2. Possible trace left pneumothorax, attention on follow-up.  3. Increasing atelectasis/pulmonary edema.  4. Small left pleural effusion.  5. Interval removal of right basilar chest tube.    [Critical Result: New moderate right-sided pneumothorax status post  chest tube removal]    Finding was identified on 6/2/2022 6:21 PM.     Raz Rodgers was contacted by Dr. Wyatt at 6/2/2022 6:36 PM and  verbalized understanding of the critical finding.     I have personally reviewed the examination and initial interpretation  and I agree with the findings.    BK DUPREE MD         SYSTEM ID:  [id number]   XR Chest Port 1 View    Impression    Impression:   1. Unchanged appearance of mixed pulmonary opacities.  2. Small biapical pneumothoraces, right more than left.  3. Trace bilateral pleural effusions.    I have personally reviewed the examination and initial interpretation  and I agree with the findings.    BK DUPREE MD         SYSTEM ID:  [id number]   XR Chest Port 1 View    Impression    IMPRESSION:  1.  Lines and tubes, as above.  2.  Stable perihilar opacities, likely edema and atelectasis. Slightly  decreased retrocardiac opacity, likely decreasing atelectasis.   3.  Stable trace biapical pneumothoraces.    I have personally reviewed the examination and initial interpretation  and I agree with the findings.    EZE FISHMAN MD         SYSTEM ID:  [id number]   XR Chest Port 1 View    Impression    Impression:   1. Unchanged left IJ catheter projecting over the innominate vein.  2. Unchanged effusions and mixed airspace opacities.  3. Decreasing biapical pneumothoraces.    I have personally reviewed the examination and initial interpretation  and I agree with the findings.    JESSICA WALKER MD         SYSTEM ID:  [id number]   Echo Complete   Result Value Ref Range    LVEF  60-65%        Physical Exam  Vitals:    06/07/22 0400 06/07/22 0500 06/07/22 0600 06/07/22 0700    BP: 97/45 100/47 97/49 102/48   BP Location: Left arm      Cuff Size: Adult Regular      Pulse: 82 85 83 89   Resp: 28 28 29 (!) 31   Temp: 97.9  F (36.6  C)      TempSrc: Oral      SpO2: 100% 96% 96% 96%   Weight:       Height:         Physical Exam   Constitutional: No distress. She appears chronically ill.   Pulmonary/Chest: Effort normal.   Abdominal: Soft. Bowel sounds are normal. She exhibits no distension.   Neurological: She is alert and oriented to person, place, and time.   Skin: Skin is warm and dry.        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 Continues to recover and improve daily.     Changes Today:   - Continue to diuresis as needed for CVP 8-12  - Continue HiFlow/BiPAP  - Continue Warfarin  - Continue to follow post CTEPH protocol  - continue home pHTN medications as tolerated; will plan to reduce dose if significant hypotension      # Pulmonary hypertension, Group IV  # CTEPH s/p pulmonary endaterectomy  # DVTs  # Severe tricuspid insufficiency  Initially diagnosed with bilateral DVT, PE 2019. TTE showed dilated RV with reduced RV function and RVSP of 84mmHg suggestive of severe pulmonary hypertension with mild to moderate TR. Patient believes the above relates to a work related injury, unna boot early 2019. RHC 12/18/2021 with RA 12, PA 84/19/45, PCWP 2, TAHIRA CO/CI 2.8/1.6. Last dose eliquis 5/23 AM. Has oxygen at home but does not use this. Underwent endarterectomy on 5/27. Post op on multiple pressors and briefly on milrinone.   - Goal CVP < 10, diuresis as needed to achieve  - continue home pHTN meds        # Volume overload  PTA on lasix 20mg qday (decreased from 40mg ~6 weeks ago). JVP elevated on initial exam. S/p 40mg IV lasix 5/25; repeat as needed     # Anemia  Hgb 11.4,  similar to prior last month.   Has been trending down with HgB today at 6.4 HgB would recommend workup for acute blood loss anemia       # CKD  Baseline Cr ~1.1.     Dwain Ferrer MD, MSc  Cardiovascular Disease Fellow  [facility]

## 2022-06-07 NOTE — PROGRESS NOTES
Cardiology Critical Care  40 minute    76 year old female s/p CTEPH surgery who remains in the ICU (prolonged stays) secondary inability to achieve equipose with fluids and blood pressure.      Interestingly, patient has had fluctuating systemic blood pressures necessitating intermittent flush back of fluids.  Echocardiogram obtained earlier this month demonstrated of persistent pulmonary hypertension with septal displacement, underfilling of left ventricle as well as severe right ventricular dysfunction.  This behavior may explain the fluctuating fluid responsive blood pressure problems.  I would suggest that we repeat echo and right heart catheterization to assess need for further medication and volume adjustment.      I personally discussed the case with ICU nurses and residents  Alert, oriented, basilar rales bilateral, increased P2,.     Wt Readings from Last 24 Encounters:   06/05/22 66.6 kg (146 lb 13.2 oz)   04/29/22 67.4 kg (148 lb 8 oz)   03/16/22 69.4 kg (153 lb)   12/08/21 67.6 kg (149 lb)   11/29/21 67.7 kg (149 lb 3.2 oz)     Current Facility-Administered Medications   Medication     acetaminophen (TYLENOL) tablet 975 mg     bisacodyl (DULCOLAX) Suppository 10 mg     dextrose 10% infusion     glucose gel 15-30 g    Or     dextrose 50 % injection 25-50 mL    Or     glucagon injection 1 mg     heparin infusion 25,000 units in D5W 250 mL ANTICOAGULANT     heparin lock flush 10 UNIT/ML injection 5-20 mL     heparin lock flush 10 UNIT/ML injection 5-20 mL     hydrALAZINE (APRESOLINE) injection 10 mg     HYDROmorphone (DILAUDID) injection 0.2 mg     hydrOXYzine (ATARAX) tablet 25-50 mg     insulin aspart (NovoLOG) injection (RAPID ACTING)     ipratropium - albuterol 0.5 mg/2.5 mg/3 mL (DUONEB) neb solution 3 mL     Lidocaine (LIDOCARE) 4 % Patch 1 patch     lidocaine (LMX4) cream     lidocaine (LMX4) cream     lidocaine 1 % 0.1-1 mL     lidocaine 1 % 0.1-5 mL     lidocaine patch in PLACE      lidocaine-prilocaine (EMLA) cream     loperamide (IMODIUM) capsule 2 mg     macitentan (OPSUMIT) tablet 10 mg     magnesium hydroxide (MILK OF MAGNESIA) suspension 30 mL     medication instruction     melatonin tablet 10 mg     methocarbamol (ROBAXIN) tablet 500 mg     midodrine (PROAMATINE) tablet 20 mg     multivitamins w/minerals liquid 15 mL     naloxone (NARCAN) injection 0.2 mg    Or     naloxone (NARCAN) injection 0.4 mg    Or     naloxone (NARCAN) injection 0.2 mg    Or     naloxone (NARCAN) injection 0.4 mg     norepinephrine (LEVOPHED) 16 mg in  mL infusion MAX CONC CENTRAL LINE     ondansetron (ZOFRAN ODT) ODT tab 4 mg    Or     ondansetron (ZOFRAN) injection 4 mg     oxyCODONE (ROXICODONE) tablet 5 mg    Or     oxyCODONE IR (ROXICODONE) tablet 10 mg     pantoprazole (PROTONIX) 2 mg/mL suspension 40 mg    Or     pantoprazole (PROTONIX) EC tablet 40 mg     [Held by provider] polyethylene glycol (MIRALAX) Packet 17 g     prochlorperazine (COMPAZINE) tablet 5 mg    Or     prochlorperazine (COMPAZINE) injection 5 mg     protein modular (PROSOURCE TF) 1 packet     Reason beta blocker order not selected     riociguat (ADEMPAS) tablet 0.5 mg     [Held by provider] senna-docusate (SENOKOT-S/PERICOLACE) 8.6-50 MG per tablet 1 tablet     sodium chloride (PF) 0.9% PF flush 10-20 mL     sodium chloride (PF) 0.9% PF flush 10-20 mL     sodium chloride (PF) 0.9% PF flush 10-40 mL     sodium chloride (PF) 0.9% PF flush 10-40 mL     sodium chloride (PF) 0.9% PF flush 10-40 mL     sodium chloride (PF) 0.9% PF flush 3 mL     sodium chloride (PF) 0.9% PF flush 3 mL     sodium chloride (PF) 0.9% PF flush 3 mL     sodium chloride (PF) 0.9% PF flush 3 mL     sodium chloride bacteriostatic 0.9 % flush 10 mL     Warfarin Therapy Reminder (Check START DATE - warfarin may be starting in the FUTURE)        Latest Reference Range & Units 06/07/22 03:59 06/07/22 04:02   Sodium 133 - 144 mmol/L 139    Potassium 3.4 - 5.3 mmol/L  3.8    Chloride 94 - 109 mmol/L 109    Carbon Dioxide 20 - 32 mmol/L 24    Urea Nitrogen 7 - 30 mg/dL 33 (H)    Creatinine 0.52 - 1.04 mg/dL 1.04    GFR Estimate >60 mL/min/1.73m2 55 (L) [1]    Calcium 8.5 - 10.1 mg/dL 8.8    Anion Gap 3 - 14 mmol/L 6    Magnesium 1.6 - 2.3 mg/dL 2.4 (H)    Phosphorus 2.5 - 4.5 mg/dL 3.5    Glucose 70 - 99 mg/dL 128 (H)    GLUCOSE BY METER POCT 70 - 99 mg/dL  138 (H)   FIO2  40    Ph Venous 7.32 - 7.43  7.37    PCO2 Venous 40 - 50 mm Hg 43    PO2 Venous 25 - 47 mm Hg 32    Bicarbonate Venous 21 - 28 mmol/L 25    Base Excess Venous -7.7 - 1.9 mmol/L -0.9    WBC 4.0 - 11.0 10e3/uL 17.9 (H)    Hemoglobin 11.7 - 15.7 g/dL 8.5 (L)    Hematocrit 35.0 - 47.0 % 26.6 (L)    Platelet Count 150 - 450 10e3/uL 343    RBC Count 3.80 - 5.20 10e6/uL 2.94 (L)    MCV 78 - 100 fL 91    MCH 26.5 - 33.0 pg 28.9    MCHC 31.5 - 36.5 g/dL 32.0    RDW 10.0 - 15.0 % 17.1 (H)    INR 0.85 - 1.15  1.35 (H)    Heparin Unfractionated Anti Xa Level For Reference Range, See Comment IU/mL 0.32            EXAM: XR CHEST PORT 1 VIEW  6/7/2022 1:00 AM       HISTORY: s/p CTEPH assess support devices     COMPARISON: 6/6/2022     FINDINGS: Single view of the chest. Interval removal of the left  internal jugular central venous catheter. Right arm PICC tip in the  low SVC. Feeding tube projects towards the stomach. Postoperative  changes of median sternotomy with intact cerclage wires.     Stable appearance of the cardiomediastinal silhouette. Continued mixed  interstitial and airspace opacities bilaterally. Left greater than  right small pleural effusions. No pneumothorax. Osseous structures are  unchanged.                                                                      IMPRESSION:  Interval removal of the left internal jugular central venous catheter.  Otherwise, stable life support lines and tubes, mild cardiomegaly,  mixed opacities bilaterally, and small left greater than right pleural  effusions.     I have  personally reviewed the examination and initial interpretation  and I agree with the findings.      Name: CARLOS LEGER  MRN: [id number]  : 1945  Study Date: 2022 01:08 PM  Age: 76 yrs  Gender: Female  Patient Location: [facility]  Reason For Study: Dyspnea  Ordering Physician: PEREZ POWELL  Performed By: Naresh Bonner RDCS     BSA: 1.6 m2  Height: 60 in  Weight: 145 lb  HR: 101  BP: 93/40 mmHg  ______________________________________________________________________________  Procedure  Complete Portable Echo Adult.  ______________________________________________________________________________  Interpretation Summary  Global and regional left ventricular function is normal with an EF of 60-65%.  LV appears underfilled. Flattened septum is consistent with right ventricular  pressure overload.  Moderate right ventricular dilation with severely reduced global right  ventricular function.  Moderate tricuspid insufficiency.  Severe pulmonary hypertension. Estimated pulmonary artery systolic pressure is  85 mmHg plus right atrial pressure.  Unable to visualize IVC.  No pericardial effusion.     Compared to prior TTE, RV is slightly less dilated, function appears similar.  ______________________________________________________________________________  Left Ventricle  Global and regional left ventricular function is normal with an EF of 60-65%.  Left ventricular wall thickness is normal. Left ventricle is small and appears  underfilled. Left ventricular diastolic function is normal. Flattened septum  is consistent with right ventricular pressure overload.     Right Ventricle  Moderate right ventricular dilation is present. Global right ventricular  function is severely reduced.     Atria  Both atria appear normal. The atrial septum is intact as assessed by agitated  saline bubble study .     Mitral Valve  The mitral valve is normal.     Aortic Valve  The aortic valve is tricuspid. On Doppler interrogation,  there is no  significant stenosis or regurgitation.     Tricuspid Valve  Moderate tricuspid insufficiency is present. Estimated pulmonary artery  systolic pressure is 85 mmHg plus right atrial pressure. Severe pulmonary  hypertension is present.     Pulmonic Valve  The pulmonic valve is normal. Trace to mild pulmonic insufficiency is present.     Vessels  The aorta root is normal. The thoracic aorta cannot be assessed. The inferior  vena cava cannot be assessed.     Pericardium  No pericardial effusion is present.     Miscellaneous  D-shaped interventricular septum and septal bounce present signifying right  ventricular pressure and  volume overload.     Compared to Previous Study  This study was compared with the study from 2021 . Compared to prior TTE  TR, RV is slightly less dilated, function appears similar.     Attestation  Reji Stewart.  ______________________________________________________________________________  MMode/2D Measurements & Calculations  IVSd: 1.0 cm  LVIDd: 3.0 cm  LVIDs: 1.6 cm  LVPWd: 0.84 cm  FS: 48.3 %  LV mass(C)d: 74.6 grams  LV mass(C)dI: 45.8 grams/m2  Ao root diam: 2.9 cm  LVOT diam: 1.9 cm  LVOT area: 2.8 cm2  LA Volume (BP): 37.8 ml     LA Volume Index (BP): 23.2 ml/m2  RWT: 0.56     Doppler Measurements & Calculations  MV E max gurwinder: 105.0 cm/sec  MV A max gurwinder: 110.0 cm/sec  MV E/A: 0.95  MV dec slope: 1166 cm/sec2  MV dec time: 0.09 sec  TR max gurwinder: 398.5 cm/sec  TR max P.2 mmHg

## 2022-06-07 NOTE — PLAN OF CARE
Major Shift Events:    Neuro: A&Ox4, able to move all extremities. Reporting neck & incisional pain.    CV: Sinus rhythm. MAP goal of >65. 40mg IV lasix given this AM.   Resp: HF nasal cannula 40L/35%. Tachypneic. LS clear/diminished.   GI: Regular diet. Rectal tube in place, minimal output.   : Win removed at 1800.   Skin: Midsternal incision & abd dressing CDI.   IV: Right double lumen PICC. Heparin gtt within therapeutic range.     Plan: has transfer orders to  when bed available    For vital signs and complete assessments, please see documentation flowsheets.

## 2022-06-08 ENCOUNTER — APPOINTMENT (OUTPATIENT)
Dept: CARDIOLOGY | Facility: CLINIC | Age: 77
DRG: 270 | End: 2022-06-08
Attending: INTERNAL MEDICINE
Payer: MEDICARE

## 2022-06-08 ENCOUNTER — APPOINTMENT (OUTPATIENT)
Dept: GENERAL RADIOLOGY | Facility: CLINIC | Age: 77
DRG: 270 | End: 2022-06-08
Attending: STUDENT IN AN ORGANIZED HEALTH CARE EDUCATION/TRAINING PROGRAM
Payer: MEDICARE

## 2022-06-08 ENCOUNTER — APPOINTMENT (OUTPATIENT)
Dept: SPEECH THERAPY | Facility: CLINIC | Age: 77
DRG: 270 | End: 2022-06-08
Attending: INTERNAL MEDICINE
Payer: MEDICARE

## 2022-06-08 ENCOUNTER — APPOINTMENT (OUTPATIENT)
Dept: PHYSICAL THERAPY | Facility: CLINIC | Age: 77
DRG: 270 | End: 2022-06-08
Attending: INTERNAL MEDICINE
Payer: MEDICARE

## 2022-06-08 LAB
ANION GAP SERPL CALCULATED.3IONS-SCNC: 6 MMOL/L (ref 3–14)
ANION GAP SERPL CALCULATED.3IONS-SCNC: 8 MMOL/L (ref 3–14)
BASE EXCESS BLDV CALC-SCNC: 2.1 MMOL/L (ref -7.7–1.9)
BUN SERPL-MCNC: 33 MG/DL (ref 7–30)
BUN SERPL-MCNC: 34 MG/DL (ref 7–30)
CALCIUM SERPL-MCNC: 8.5 MG/DL (ref 8.5–10.1)
CALCIUM SERPL-MCNC: 8.7 MG/DL (ref 8.5–10.1)
CHLORIDE BLD-SCNC: 106 MMOL/L (ref 94–109)
CHLORIDE BLD-SCNC: 107 MMOL/L (ref 94–109)
CO2 SERPL-SCNC: 24 MMOL/L (ref 20–32)
CO2 SERPL-SCNC: 24 MMOL/L (ref 20–32)
CREAT SERPL-MCNC: 0.82 MG/DL (ref 0.52–1.04)
CREAT SERPL-MCNC: 0.9 MG/DL (ref 0.52–1.04)
ERYTHROCYTE [DISTWIDTH] IN BLOOD BY AUTOMATED COUNT: 18 % (ref 10–15)
GFR SERPL CREATININE-BSD FRML MDRD: 66 ML/MIN/1.73M2
GFR SERPL CREATININE-BSD FRML MDRD: 73 ML/MIN/1.73M2
GLUCOSE BLD-MCNC: 125 MG/DL (ref 70–99)
GLUCOSE BLD-MCNC: 86 MG/DL (ref 70–99)
GLUCOSE BLDC GLUCOMTR-MCNC: 105 MG/DL (ref 70–99)
GLUCOSE BLDC GLUCOMTR-MCNC: 108 MG/DL (ref 70–99)
GLUCOSE BLDC GLUCOMTR-MCNC: 124 MG/DL (ref 70–99)
GLUCOSE BLDC GLUCOMTR-MCNC: 127 MG/DL (ref 70–99)
GLUCOSE BLDC GLUCOMTR-MCNC: 141 MG/DL (ref 70–99)
GLUCOSE BLDC GLUCOMTR-MCNC: 94 MG/DL (ref 70–99)
HCO3 BLDV-SCNC: 27 MMOL/L (ref 21–28)
HCT VFR BLD AUTO: 24.9 % (ref 35–47)
HGB BLD-MCNC: 8 G/DL (ref 11.7–15.7)
INR PPP: 1.39 (ref 0.85–1.15)
LVEF ECHO: NORMAL
MAGNESIUM SERPL-MCNC: 2.3 MG/DL (ref 1.6–2.3)
MCH RBC QN AUTO: 29.3 PG (ref 26.5–33)
MCHC RBC AUTO-ENTMCNC: 32.1 G/DL (ref 31.5–36.5)
MCV RBC AUTO: 91 FL (ref 78–100)
O2/TOTAL GAS SETTING VFR VENT: 30 %
OXYHGB MFR BLDV: 60 % (ref 70–75)
PCO2 BLDV: 40 MM HG (ref 40–50)
PH BLDV: 7.43 [PH] (ref 7.32–7.43)
PHOSPHATE SERPL-MCNC: 4 MG/DL (ref 2.5–4.5)
PLATELET # BLD AUTO: 353 10E3/UL (ref 150–450)
PO2 BLDV: 32 MM HG (ref 25–47)
POTASSIUM BLD-SCNC: 4 MMOL/L (ref 3.4–5.3)
POTASSIUM BLD-SCNC: 4.1 MMOL/L (ref 3.4–5.3)
POTASSIUM BLD-SCNC: 4.1 MMOL/L (ref 3.4–5.3)
RBC # BLD AUTO: 2.73 10E6/UL (ref 3.8–5.2)
SODIUM SERPL-SCNC: 137 MMOL/L (ref 133–144)
SODIUM SERPL-SCNC: 138 MMOL/L (ref 133–144)
UFH PPP CHRO-ACNC: 0.36 IU/ML
WBC # BLD AUTO: 15 10E3/UL (ref 4–11)

## 2022-06-08 PROCEDURE — 99233 SBSQ HOSP IP/OBS HIGH 50: CPT | Mod: 24 | Performed by: INTERNAL MEDICINE

## 2022-06-08 PROCEDURE — 83735 ASSAY OF MAGNESIUM: CPT | Performed by: STUDENT IN AN ORGANIZED HEALTH CARE EDUCATION/TRAINING PROGRAM

## 2022-06-08 PROCEDURE — 250N000011 HC RX IP 250 OP 636: Performed by: STUDENT IN AN ORGANIZED HEALTH CARE EDUCATION/TRAINING PROGRAM

## 2022-06-08 PROCEDURE — 82310 ASSAY OF CALCIUM: CPT | Performed by: STUDENT IN AN ORGANIZED HEALTH CARE EDUCATION/TRAINING PROGRAM

## 2022-06-08 PROCEDURE — 92526 ORAL FUNCTION THERAPY: CPT | Mod: GN

## 2022-06-08 PROCEDURE — 93325 DOPPLER ECHO COLOR FLOW MAPG: CPT | Mod: 26 | Performed by: INTERNAL MEDICINE

## 2022-06-08 PROCEDURE — 71045 X-RAY EXAM CHEST 1 VIEW: CPT

## 2022-06-08 PROCEDURE — 85027 COMPLETE CBC AUTOMATED: CPT | Performed by: STUDENT IN AN ORGANIZED HEALTH CARE EDUCATION/TRAINING PROGRAM

## 2022-06-08 PROCEDURE — 93308 TTE F-UP OR LMTD: CPT | Mod: 26 | Performed by: INTERNAL MEDICINE

## 2022-06-08 PROCEDURE — 71045 X-RAY EXAM CHEST 1 VIEW: CPT | Mod: 26 | Performed by: RADIOLOGY

## 2022-06-08 PROCEDURE — 250N000013 HC RX MED GY IP 250 OP 250 PS 637: Performed by: STUDENT IN AN ORGANIZED HEALTH CARE EDUCATION/TRAINING PROGRAM

## 2022-06-08 PROCEDURE — 85610 PROTHROMBIN TIME: CPT | Performed by: STUDENT IN AN ORGANIZED HEALTH CARE EDUCATION/TRAINING PROGRAM

## 2022-06-08 PROCEDURE — 84100 ASSAY OF PHOSPHORUS: CPT | Performed by: THORACIC SURGERY (CARDIOTHORACIC VASCULAR SURGERY)

## 2022-06-08 PROCEDURE — 82805 BLOOD GASES W/O2 SATURATION: CPT

## 2022-06-08 PROCEDURE — 85520 HEPARIN ASSAY: CPT | Performed by: THORACIC SURGERY (CARDIOTHORACIC VASCULAR SURGERY)

## 2022-06-08 PROCEDURE — 999N000215 HC STATISTIC HFNC ADULT NON-CPAP

## 2022-06-08 PROCEDURE — 93308 TTE F-UP OR LMTD: CPT

## 2022-06-08 PROCEDURE — 93325 DOPPLER ECHO COLOR FLOW MAPG: CPT

## 2022-06-08 PROCEDURE — 99232 SBSQ HOSP IP/OBS MODERATE 35: CPT | Mod: GC | Performed by: INTERNAL MEDICINE

## 2022-06-08 PROCEDURE — 97530 THERAPEUTIC ACTIVITIES: CPT | Mod: GP

## 2022-06-08 PROCEDURE — 200N000002 HC R&B ICU UMMC

## 2022-06-08 PROCEDURE — 250N000013 HC RX MED GY IP 250 OP 250 PS 637

## 2022-06-08 PROCEDURE — 84132 ASSAY OF SERUM POTASSIUM: CPT | Performed by: INTERNAL MEDICINE

## 2022-06-08 PROCEDURE — 250N000013 HC RX MED GY IP 250 OP 250 PS 637: Performed by: THORACIC SURGERY (CARDIOTHORACIC VASCULAR SURGERY)

## 2022-06-08 PROCEDURE — 93321 DOPPLER ECHO F-UP/LMTD STD: CPT | Mod: 26 | Performed by: INTERNAL MEDICINE

## 2022-06-08 PROCEDURE — 258N000003 HC RX IP 258 OP 636: Performed by: STUDENT IN AN ORGANIZED HEALTH CARE EDUCATION/TRAINING PROGRAM

## 2022-06-08 PROCEDURE — 999N000157 HC STATISTIC RCP TIME EA 10 MIN

## 2022-06-08 RX ORDER — FUROSEMIDE 10 MG/ML
40 INJECTION INTRAMUSCULAR; INTRAVENOUS
Status: COMPLETED | OUTPATIENT
Start: 2022-06-08 | End: 2022-06-08

## 2022-06-08 RX ORDER — DIGOXIN 250 MCG
250 TABLET ORAL EVERY 8 HOURS
Status: COMPLETED | OUTPATIENT
Start: 2022-06-08 | End: 2022-06-09

## 2022-06-08 RX ORDER — DIGOXIN 250 MCG
500 TABLET ORAL ONCE
Status: COMPLETED | OUTPATIENT
Start: 2022-06-08 | End: 2022-06-08

## 2022-06-08 RX ADMIN — FUROSEMIDE 40 MG: 10 INJECTION, SOLUTION INTRAMUSCULAR; INTRAVENOUS at 18:40

## 2022-06-08 RX ADMIN — RIOCIGUAT 0.5 MG: 0.5 TABLET, FILM COATED ORAL at 14:09

## 2022-06-08 RX ADMIN — DIGOXIN 500 MCG: 250 TABLET ORAL at 14:18

## 2022-06-08 RX ADMIN — RIOCIGUAT 0.5 MG: 0.5 TABLET, FILM COATED ORAL at 20:37

## 2022-06-08 RX ADMIN — OXYCODONE HYDROCHLORIDE 5 MG: 5 TABLET ORAL at 06:49

## 2022-06-08 RX ADMIN — MIDODRINE HYDROCHLORIDE 20 MG: 5 TABLET ORAL at 08:22

## 2022-06-08 RX ADMIN — Medication 10 MG: at 20:37

## 2022-06-08 RX ADMIN — ACETAMINOPHEN 325MG 975 MG: 325 TABLET ORAL at 07:39

## 2022-06-08 RX ADMIN — SODIUM CHLORIDE, POTASSIUM CHLORIDE, SODIUM LACTATE AND CALCIUM CHLORIDE 250 ML: 600; 310; 30; 20 INJECTION, SOLUTION INTRAVENOUS at 05:30

## 2022-06-08 RX ADMIN — FUROSEMIDE 40 MG: 10 INJECTION, SOLUTION INTRAMUSCULAR; INTRAVENOUS at 14:00

## 2022-06-08 RX ADMIN — APIXABAN 5 MG: 5 TABLET, FILM COATED ORAL at 20:37

## 2022-06-08 RX ADMIN — ACETAMINOPHEN 325MG 975 MG: 325 TABLET ORAL at 16:12

## 2022-06-08 RX ADMIN — DIGOXIN 250 MCG: 250 TABLET ORAL at 21:45

## 2022-06-08 RX ADMIN — HEPARIN SODIUM AND DEXTROSE 1500 UNITS/HR: 10000; 5 INJECTION INTRAVENOUS at 08:55

## 2022-06-08 RX ADMIN — LIDOCAINE PATCH 4% 1 PATCH: 40 PATCH TOPICAL at 08:33

## 2022-06-08 RX ADMIN — Medication 1 PACKET: at 08:30

## 2022-06-08 RX ADMIN — MIDODRINE HYDROCHLORIDE 20 MG: 5 TABLET ORAL at 18:44

## 2022-06-08 RX ADMIN — INSULIN ASPART 1 UNITS: 100 INJECTION, SOLUTION INTRAVENOUS; SUBCUTANEOUS at 04:01

## 2022-06-08 RX ADMIN — Medication 15 ML: at 08:00

## 2022-06-08 RX ADMIN — LOPERAMIDE HYDROCHLORIDE 2 MG: 2 CAPSULE ORAL at 08:24

## 2022-06-08 RX ADMIN — ACETAMINOPHEN 325MG 975 MG: 325 TABLET ORAL at 00:00

## 2022-06-08 RX ADMIN — PANTOPRAZOLE SODIUM 40 MG: 40 TABLET, DELAYED RELEASE ORAL at 07:39

## 2022-06-08 RX ADMIN — RIOCIGUAT 0.5 MG: 0.5 TABLET, FILM COATED ORAL at 08:23

## 2022-06-08 RX ADMIN — LOPERAMIDE HYDROCHLORIDE 2 MG: 2 CAPSULE ORAL at 11:58

## 2022-06-08 ASSESSMENT — ACTIVITIES OF DAILY LIVING (ADL)
ADLS_ACUITY_SCORE: 37
ADLS_ACUITY_SCORE: 33
DEPENDENT_IADLS:: INDEPENDENT
ADLS_ACUITY_SCORE: 33

## 2022-06-08 NOTE — PLAN OF CARE
Speech Language Therapy Discharge Summary    Reason for therapy discharge:    All goals and outcomes met, no further needs identified.    Progress towards therapy goal(s). See goals on Care Plan in Epic electronic health record for goal details.  Goals met    Therapy recommendation(s):    No further therapy is recommended.      Recommend pt continue regular textures and thin liquids. Pt should self-select softer textures, sit fully upright for all PO, take small sips/bites, and slow pacing. Swallowing goals met; will complete orders.

## 2022-06-08 NOTE — CONSULTS
Faculty Attestation  Dillan Davila M.D.    I personally saw and examined this patient with fellow, reviewed recent laboratories and imaging studies, discussed the case with the housestaff, and conveyed plan to the patient.  I answered all questions from patient and/or family. I agree with the examination, assessment and plan outlined here.  Continue diuresis review new echo to assess RV/LV function and coupling.                    Cardiology Fellow Progress Note  _________________________________        06/08/22    Interval Events  ----------------------  NAEON  Stable for transfer to floor  Continues to require HiFlow NC  Complains of shortness of breath, although improving    Current Medications  ----------------------  Current Facility-Administered Medications   Medication     acetaminophen (TYLENOL) tablet 975 mg     bisacodyl (DULCOLAX) Suppository 10 mg     dextrose 10% infusion     glucose gel 15-30 g    Or     dextrose 50 % injection 25-50 mL    Or     glucagon injection 1 mg     digoxin (LANOXIN) tablet 250 mcg     furosemide (LASIX) injection 40 mg     heparin infusion 25,000 units in D5W 250 mL ANTICOAGULANT     heparin lock flush 10 UNIT/ML injection 5-20 mL     heparin lock flush 10 UNIT/ML injection 5-20 mL     hydrALAZINE (APRESOLINE) injection 10 mg     HYDROmorphone (DILAUDID) injection 0.2 mg     hydrOXYzine (ATARAX) tablet 25-50 mg     insulin aspart (NovoLOG) injection (RAPID ACTING)     ipratropium - albuterol 0.5 mg/2.5 mg/3 mL (DUONEB) neb solution 3 mL     Lidocaine (LIDOCARE) 4 % Patch 1 patch     lidocaine (LMX4) cream     lidocaine 1 % 0.1-1 mL     lidocaine patch in PLACE     lidocaine-prilocaine (EMLA) cream     macitentan (OPSUMIT) tablet 10 mg     magnesium hydroxide (MILK OF MAGNESIA) suspension 30 mL     medication instruction     melatonin tablet 10 mg     methocarbamol (ROBAXIN) tablet 500 mg     midodrine (PROAMATINE) tablet 20 mg     multivitamins w/minerals liquid 15 mL      naloxone (NARCAN) injection 0.2 mg    Or     naloxone (NARCAN) injection 0.4 mg    Or     naloxone (NARCAN) injection 0.2 mg    Or     naloxone (NARCAN) injection 0.4 mg     ondansetron (ZOFRAN ODT) ODT tab 4 mg    Or     ondansetron (ZOFRAN) injection 4 mg     oxyCODONE (ROXICODONE) tablet 5 mg    Or     oxyCODONE IR (ROXICODONE) tablet 10 mg     pantoprazole (PROTONIX) 2 mg/mL suspension 40 mg    Or     pantoprazole (PROTONIX) EC tablet 40 mg     [Held by provider] polyethylene glycol (MIRALAX) Packet 17 g     prochlorperazine (COMPAZINE) tablet 5 mg    Or     prochlorperazine (COMPAZINE) injection 5 mg     protein modular (PROSOURCE TF) 1 packet     Reason beta blocker order not selected     riociguat (ADEMPAS) tablet 0.5 mg     [Held by provider] senna-docusate (SENOKOT-S/PERICOLACE) 8.6-50 MG per tablet 1 tablet     sodium chloride (PF) 0.9% PF flush 10-20 mL     sodium chloride (PF) 0.9% PF flush 10-20 mL     sodium chloride (PF) 0.9% PF flush 10-40 mL     sodium chloride (PF) 0.9% PF flush 10-40 mL     sodium chloride (PF) 0.9% PF flush 3 mL     sodium chloride (PF) 0.9% PF flush 3 mL     sodium chloride (PF) 0.9% PF flush 3 mL     sodium chloride bacteriostatic 0.9 % flush 10 mL     vasopressin 1 unit/mL MAX Conc (PITRESSIN) infusion        Intake and Output  ----------------------      Intake/Output Summary (Last 24 hours) at 6/7/2022 0721  Last data filed at 6/7/2022 0700  Gross per 24 hour   Intake 3622.82 ml   Output 3230 ml   Net 392.82 ml         Weight  ----------------------  Wt Readings from Last 2 Encounters:   06/08/22 68.6 kg (151 lb 3.8 oz)   04/29/22 67.4 kg (148 lb 8 oz)       Objective  ----------------------  Results for orders placed or performed during the hospital encounter of 05/25/22 (from the past 24 hour(s))   Potassium   Result Value Ref Range    Potassium 3.8 3.4 - 5.3 mmol/L   Glucose by meter   Result Value Ref Range    GLUCOSE BY METER POCT 98 70 - 99 mg/dL   Glucose by meter    Result Value Ref Range    GLUCOSE BY METER POCT 96 70 - 99 mg/dL   Glucose by meter   Result Value Ref Range    GLUCOSE BY METER POCT 124 (H) 70 - 99 mg/dL   XR Chest Port 1 View    Narrative    Exam: Chest x-ray single view, 6/8/2022 1:19 AM    Indication: s/p CTEPH assess support devices    Comparison: 6/7/2022    Findings:   Portable AP semiupright view of the chest. Right arm PICC tip at the  superior cavoatrial junction. Feeding tube tip courses into the  stomach. Prior median sternotomy with intact cerclage wires. Trachea  is midline. Stable size of the cardiomediastinal silhouette. Small  bilateral pleural effusions greater on the left are slightly increased  in size from prior. Stable mixed opacities. No appreciable  pneumothorax.      Impression    Impression:   1. Stable tubes and lines.  2. Slightly increased small bilateral pleural effusions.  3. Stable mixed opacities bilaterally representing edema, infection,  or atelectasis.     I have personally reviewed the examination and initial interpretation  and I agree with the findings.    BEBE CLIFFORD MD         SYSTEM ID:  [id number]   Glucose by meter   Result Value Ref Range    GLUCOSE BY METER POCT 141 (H) 70 - 99 mg/dL   Basic metabolic panel   Result Value Ref Range    Sodium 137 133 - 144 mmol/L    Potassium 4.0 3.4 - 5.3 mmol/L    Chloride 107 94 - 109 mmol/L    Carbon Dioxide (CO2) 24 20 - 32 mmol/L    Anion Gap 6 3 - 14 mmol/L    Urea Nitrogen 34 (H) 7 - 30 mg/dL    Creatinine 0.90 0.52 - 1.04 mg/dL    Calcium 8.5 8.5 - 10.1 mg/dL    Glucose 125 (H) 70 - 99 mg/dL    GFR Estimate 66 >60 mL/min/1.73m2   Magnesium   Result Value Ref Range    Magnesium 2.3 1.6 - 2.3 mg/dL   CBC with platelets   Result Value Ref Range    WBC Count 15.0 (H) 4.0 - 11.0 10e3/uL    RBC Count 2.73 (L) 3.80 - 5.20 10e6/uL    Hemoglobin 8.0 (L) 11.7 - 15.7 g/dL    Hematocrit 24.9 (L) 35.0 - 47.0 %    MCV 91 78 - 100 fL    MCH 29.3 26.5 - 33.0 pg    MCHC 32.1 31.5 - 36.5 g/dL     RDW 18.0 (H) 10.0 - 15.0 %    Platelet Count 353 150 - 450 10e3/uL   INR   Result Value Ref Range    INR 1.39 (H) 0.85 - 1.15   Heparin Unfractionated Anti Xa Level   Result Value Ref Range    Anti Xa Unfractionated Heparin 0.36 For Reference Range, See Comment IU/mL    Narrative    Therapeutic Range: UFH: 0.25-0.50 IU/mL for low intensity dosing,  0.30-0.70 IU/mL for high intensity dosing DVT and PE.  This test is not validated for other direct factor X inhibitors (e.g. rivaroxaban, apixaban, edoxaban, betrixaban, fondaparinux) and should not be used for monitoring of other medications.   Phosphorus   Result Value Ref Range    Phosphorus 4.0 2.5 - 4.5 mg/dL   Glucose by meter   Result Value Ref Range    GLUCOSE BY METER POCT 127 (H) 70 - 99 mg/dL   Echo Limited   Result Value Ref Range    LVEF  65-70%     Narrative    [id number]  [id number]  [id number]  [id number]^NIRANJAN^LILIBETH     [facility],[place]  Echocardiography Laboratory  [address] [place] [id number]     Name: CARLOS LEGER  MRN: [id number]  : 1945  Study Date: 2022 08:02 AM  Age: 76 yrs  Gender: Female  Patient Location: [facility]  Reason For Study: Heart Failure  Ordering Physician: LILIBETH UREÑA  Performed By: Yane Locke     BSA: 1.6 m2  Height: 60 in  Weight: 146 lb  HR: 82  BP: 106/54 mmHg  ______________________________________________________________________________  Procedure  Limited Portable Echo Adult.  ______________________________________________________________________________  Interpretation Summary  Global and regional left ventricular function is hyperkinetic with an EF of  65-70%.  Moderate right ventricular dilation with severely reduced global right  ventricular function.  Moderate tricuspid insufficiency.  Estimated pulmonary artery systolic pressure is 57 mmHg plus right atrial  pressure.     This study was compared with the study from 22.  Estimated PA pressure is lower. No change in  LV/RV function.  ______________________________________________________________________________  Left Ventricle  Global and regional left ventricular function is hyperkinetic with an EF of  65-70%. Flattened septum is consistent with right ventricular pressure  overload.     Right Ventricle  Moderate right ventricular dilation is present. Global right ventricular  function is severely reduced.     Mitral Valve  The mitral valve is normal. Trace mitral insufficiency is present.     Tricuspid Valve  Moderate tricuspid insufficiency is present. Estimated pulmonary artery  systolic pressure is 57 mmHg plus right atrial pressure.     Pulmonic Valve  The pulmonic valve is normal. Mild pulmonic insufficiency is present.     Vessels  Unable to assess mean RA pressure given the patient is on a ventilator.     Pericardium  No pericardial effusion is present.     Compared to Previous Study  This study was compared with the study from 22 .     ______________________________________________________________________________  Doppler Measurements & Calculations  TR max gurwinder: 367.6 cm/sec  TR max P.8 mmHg     ______________________________________________________________________________  Report approved by: Mariama Juares 2022 09:54 AM         Glucose by meter   Result Value Ref Range    GLUCOSE BY METER POCT 108 (H) 70 - 99 mg/dL       Recent Labs   Lab Test 21  1221   CHOL 113   HDL 35*   LDL 59   TRIG 94       Hemoglobin A1C   Date Value Ref Range Status   2021 5.8 (H) 0.0 - 5.6 % Final     Comment:     Normal <5.7%   Prediabetes 5.7-6.4%    Diabetes 6.5% or higher     Note: Adopted from ADA consensus guidelines.       Results for orders placed or performed during the hospital encounter of 22   XR Chest Port 1 View    Impression    IMPRESSION: [place] catheter tip projects over the central right  pulmonary artery.    I have personally reviewed the examination and initial  interpretation  and I agree with the findings.    CANDACE ALMONTE MD         SYSTEM ID:  [id number]   XR Abdomen Port 1 View    Impression    IMPRESSION: Gastric tube tip and sidehole project over the stomach.    I have personally reviewed the examination and initial interpretation  and I agree with the findings.    CANDACE ALMONTE MD         SYSTEM ID:  [id number]   XR Chest Port 1 View    Impression    Impression: Postoperative chest with slightly improved perihilar  atelectasis or edema. Endotracheal tube tip in lower trachea 2.5 cm  above the man.    I have personally reviewed the examination and initial interpretation  and I agree with the findings.    YVETTE GRAY MD         SYSTEM ID:  [id number]   XR Chest Port 1 View    Impression    Impression: Postoperative chest with stable perihilar atelectasis or  edema. Endotracheal tube tip 1.7 cm superior to the man.    I have personally reviewed the examination and initial interpretation  and I agree with the findings.    YVETTE GRAY MD         SYSTEM ID:  [id number]   XR Chest Port 1 View    Impression    Impression:  1. Endotracheal tube retracted now 2.4 cm from the man.   2. No significant change in small amount of atelectasis lung bases  bilaterally..    YVETTE GRAY MD         SYSTEM ID:  [id number]   XR Chest Port 1 View    Impression    Impression:   1. Unchanged support devices as described above.  2. Unchanged bibasilar atelectasis. No new focal pulmonary opacities.    I have personally reviewed the examination and initial interpretation  and I agree with the findings.    CANDACE ALMONTE MD         SYSTEM ID:  [id number]   XR Chest Port 1 View    Impression    Impression:   1. Unchanged small effusions and bibasilar predominant opacities.  2. Unchanged support devices including pulmonary artery catheter and  ET tube over the low thoracic trachea.    I have personally reviewed the examination and initial interpretation  and I agree with the  findings.    JESSICA WALKER MD         SYSTEM ID:  [id number]   XR Chest Port 1 View    Impression    IMPRESSION: Interval extubation. Slightly increased left pleural  effusion/basilar atelectasis. Atherosclerosis. Mild central  atelectasis/subtle positive fluid volume balance.    MARK GUADARRAMA MD         SYSTEM ID:  [id number]   XR Abdomen Port 1 View    Impression    Impression: Enteric tube is subdiaphragmatic with tip in the left  pelvis, presumably within the jejunum.    I have personally reviewed the examination and initial interpretation  and I agree with the findings.    BEBE CLIFFORD MD         SYSTEM ID:  [id number]   XR Chest Port 1 View    Impression    Impression:   1. Unchanged effusions with increased left basilar atelectasis and  interstitial pulmonary edema.  2. Removal of pulmonary artery catheter, mediastinal drain and left  chest tube. Given that lung apices are not entirely included in the  field-of-view, small pneumothoraces could be missed.    I have personally reviewed the examination and initial interpretation  and I agree with the findings.    MARK GUADARRAMA MD         SYSTEM ID:  [id number]   XR Chest Port 1 View   Result Value Ref Range    Radiologist flags New moderate right-sided pneumothorax status post (AA)     Impression    Impression:   1. New moderate right-sided pneumothorax.  2. Possible trace left pneumothorax, attention on follow-up.  3. Increasing atelectasis/pulmonary edema.  4. Small left pleural effusion.  5. Interval removal of right basilar chest tube.    [Critical Result: New moderate right-sided pneumothorax status post  chest tube removal]    Finding was identified on 6/2/2022 6:21 PM.     Raz Rodgers was contacted by Dr. Wyatt at 6/2/2022 6:36 PM and  verbalized understanding of the critical finding.     I have personally reviewed the examination and initial interpretation  and I agree with the findings.    BK DUPREE MD         SYSTEM ID:  [id number]    XR Chest Port 1 View    Impression    Impression:   1. Unchanged appearance of mixed pulmonary opacities.  2. Small biapical pneumothoraces, right more than left.  3. Trace bilateral pleural effusions.    I have personally reviewed the examination and initial interpretation  and I agree with the findings.    BK DUPREE MD         SYSTEM ID:  [id number]   XR Chest Port 1 View    Impression    IMPRESSION:  1.  Lines and tubes, as above.  2.  Stable perihilar opacities, likely edema and atelectasis. Slightly  decreased retrocardiac opacity, likely decreasing atelectasis.   3.  Stable trace biapical pneumothoraces.    I have personally reviewed the examination and initial interpretation  and I agree with the findings.    ZEE FISHMAN MD         SYSTEM ID:  [id number]   XR Chest Port 1 View    Impression    Impression:   1. Unchanged left IJ catheter projecting over the innominate vein.  2. Unchanged effusions and mixed airspace opacities.  3. Decreasing biapical pneumothoraces.    I have personally reviewed the examination and initial interpretation  and I agree with the findings.    JESSICA WALKER MD         SYSTEM ID:  [id number]   Echo Complete   Result Value Ref Range    LVEF  60-65%        Physical Exam  Vitals:    06/08/22 1000 06/08/22 1200 06/08/22 1400 06/08/22 1530   BP: 96/50 110/57 109/51 113/54   BP Location:  Left arm     Cuff Size:       Pulse: 72 80 86 96   Resp: 24 24     Temp:  97.6  F (36.4  C)     TempSrc:  Axillary     SpO2: 93% 94% 97% 100%   Weight:       Height:         Physical Exam   Constitutional: No distress. She appears chronically ill.   Pulmonary/Chest: Effort normal.   Abdominal: Soft. Bowel sounds are normal. She exhibits no distension.   Neurological: She is alert and oriented to person, place, and time.   Skin: Skin is warm and dry.        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 Continues to recover and improve daily.     Changes Today:   - Continue to diuresis as needed for CVP 8-12  - Continue HiFlow/BiPAP  - Continue Warfarin  - Consider RHC for evaluation of PA pressures, additional therapies  - continue home pHTN medications as tolerated; may reduce dose if significant hypotension      # Pulmonary hypertension, Group IV  # CTEPH s/p pulmonary endaterectomy  # DVTs  # Severe tricuspid insufficiency  Initially diagnosed with bilateral DVT, PE 2019. TTE showed dilated RV with reduced RV function and RVSP of 84mmHg suggestive of severe pulmonary hypertension with mild to moderate TR. Patient believes the above relates to a work related injury, unna boot early 2019. RHC 12/18/2021 with RA 12, PA 84/19/45, PCWP 2, TAHIRA CO/CI 2.8/1.6. Last dose eliquis 5/23 AM. Has oxygen at home but does not use this. Underwent endarterectomy on 5/27. Post op on multiple pressors and briefly on milrinone.   - Goal CVP < 10, diuresis as needed to achieve  - continue home pHTN meds        # Volume overload  PTA on lasix 20mg qday (decreased from 40mg ~6 weeks ago). JVP elevated on initial exam. S/p 40mg IV lasix 5/25; repeat as needed     # Anemia  Hgb 11.4, similar to prior last month.   Has been trending down with HgB today at 6.4 HgB would recommend workup for acute blood loss anemia       # CKD  Baseline Cr ~1.1.     Dwain Ferrer MD, MSc  Cardiovascular Disease Fellow  [facility]

## 2022-06-08 NOTE — PROGRESS NOTES
Major Shift Events:  Neuro intact. Incisional pain managed w/ scheduled Tylenol, PRN oxy x1. Ice packs applied for neck discomfort. MD notified of MAPs in low 60s; 250 ml albumin bolus, 250 ml LR bolus given. Remains on HFNC 46%/40L. Desats into mid 80s w/ activity. NJ w/ cycled TF at goal. Rectal tube patent, no output. Straight cath x1 for 525 ml. Heparin gtt at 1500 U/hr; anti Xa therapeutic. CXR completed.  Plan: Transfer to  when bed available.  For vital signs and complete assessments, please see documentation flowsheets.

## 2022-06-08 NOTE — CONSULTS
Care Management Initial Consult    General Information  Assessment completed with: Patient,    Type of CM/SW Visit: Initial Assessment    Primary Care Provider verified and updated as needed: Yes   Readmission within the last 30 days:        Reason for Consult: discharge planning  Advance Care Planning: Advance Care Planning Reviewed: no concerns identified          Communication Assessment  Patient's communication style: spoken language (English or Bilingual)    Hearing Difficulty or Deaf: no   Wear Glasses or Blind: yes    Cognitive  Cognitive/Neuro/Behavioral: .WDL except  Level of Consciousness: lethargic  Arousal Level: opens eyes spontaneously  Orientation: oriented x 4  Mood/Behavior: calm, cooperative  Best Language: 0 - No aphasia  Speech: whispers    Living Environment:   People in home: alone     Current living Arrangements: house      Able to return to prior arrangements:         Family/Social Support:  Care provided by: self  Provides care for: no one  Marital Status:   Children          Description of Support System: Supportive, Involved    Support Assessment: Adequate family and caregiver support, Adequate social supports    Current Resources:   Patient receiving home care services: No     Community Resources: None  Equipment currently used at home: walker, rolling  Supplies currently used at home: None    Employment/Financial:  Employment Status:          Financial Concerns: insurance, none, No concerns identified           Lifestyle & Psychosocial Needs:  Social Determinants of Health     Tobacco Use: Low Risk      Smoking Tobacco Use: Never Smoker     Smokeless Tobacco Use: Never Used   Alcohol Use: Not on file   Financial Resource Strain: Not on file   Food Insecurity: Not on file   Transportation Needs: Not on file   Physical Activity: Not on file   Stress: Not on file   Social Connections: Not on file   Intimate Partner Violence: Not on file   Depression: Not at risk     PHQ-2 Score: 1    Housing Stability: Not on file       Functional Status:  Prior to admission patient needed assistance:   Dependent ADLs:: Independent  Dependent IADLs:: Independent       Mental Health Status:  Mental Health Status: No Current Concerns       Chemical Dependency Status:                Values/Beliefs:  Spiritual, Cultural Beliefs, Mu-ism Practices, Values that affect care: no               Additional Information:  This writer met and spoke with patient at the bedside. Debi lives at home by herself, but has family and friends support at home. She is able to function independently, she has some assistance with grocery shoppping that she sets up on her own. She has a walker at home, she does not use any home therapies or community services.   Therapy has worked with Debi and recommends rehabilitation at discharge. Debi was agreeable to this and would like a referral to a facility close to [place] ([facility]).     Care management team will continue to follow for referrals to facilities when Debi is ready to be discharged.       MIKE Jorgensen RN Care Coordinator  Unit RNCC pager: [phone]     For Weekend & Holiday on call RN Care Coordinator:  (Tasks: Home care, home infusion, medical equipment/oxygen, transportation, IMM & MOON forms, etc.)     Text Paging in [employer] Smart [employer] is the preferred method of contact for these teams     [place] [facility] [place] ([id number]) Saturday & Sunday; ([id number]) [facility] Recognized Holidays  Pager #1: [phone] Units: 4A, 4C, 4E, 5A & 5B   Pager #2: [phone] Units: 6A, 6B, 6C, 6D  Pager #3: [phone] Units: 7A, 7B, 7C, 7D & 5C   Pager #4: [phone] Units: 5 Ortho, 8A, 10 ICU, & [facility]      For Weekend & Holiday on call Social Work:  (Tasks: TCU, transportation, Hospice, adjustment to illness counseling, Health Care Directives, Child Protection and Domestic Violence concerns, Vulnerable Adult, IMM forms, etc.)     Text Paging in [employer] Smart Web is  the preferred method of contact for these teams    [place] (0800 - 1630) Saturday and Sunday  Pager: [phone] Units: 4A, 4C, 4E, 5A and 5B   Pager: [phone] Units: 6A, 6B, 6C, 6D   Pager: [phone]: 7A, 7B, 7C, 7D, and 5C      [facility] ([id number]) Saturday and Sunday  Units: 5 Ortho, 8A, and 10 ICU   Pager: [phone]   ______________________________________________     After hours for all units everyday- (only the  is available after hours until midnight)  Pager [phone]

## 2022-06-08 NOTE — PROGRESS NOTES
CV ICU PROGRESS NOTE      CO-MORBIDITIES:   CTEPH (chronic thromboembolic pulmonary hypertension) (H)  (primary encounter diagnosis)  Primary pulmonary hypertension (H)  SOB (shortness of breath)  S/P coronary angiogram    ASSESSMENT: Debi Espinoza is a 76 year old female with PMH of HTN, nephrolithiasis, DVT (2019) and PE (2019, 2021) on chronic anticoagulation, and chronic thromboembolic pulmonary hypertension who underwent pulmonary artery thromboendarterectomy on 5/27 with Dr. Peterson.    Overnight Events: received 500 Albumin for soft pressures. Remained off of pressors.     TODAY'S PROGRESS:   - Lasix 40 BID  - Will plan for net goal - 500 mL to -1L  - Follow-up lytes  - Start Vasopressin if requiring pressors, avoid fluid boluses  - Will start digoxin for RV support - 500 mcg now, 250 mcg q8 x2 after  - Will continue tube feeds cycled overnight, plan to half rate overnight  - Start calorie counts  - Discontinue Imodium  - Decrease FWF 30 q4h  - Stop coumadin, continue heparin gtt  - Will plan to start Apixiban when closer to discharge    PLAN:  Neuro/ pain/ sedation:  Acute Postoperative pain  - Monitor neurological status. Notify the MD for any acute changes in exam.  - Pain: S tylenol 975 TID, lidocaine patches. PRN oxycodone 5-10 q4h, robaxin 500 q6h, PRN dilaudid 0.2 q2h     Pulmonary care:   S/p Pulmonary artery thromboendarterectomy on 5/27/2022 by Dr. Peterson  Hx Chronic pulmonary thromboembolic disease  Hx PE (2019, 2021)  Hx Emphysema, moderate  PTA regimen: apixaban 5mg BID  CTA 05/2019 demonstrated underlying moderate emphysema  Extubated 5/31  - BiPAP for respiratory support as able, HFNC currently at 40L   - PTA Opsumit and Adempas     Cardiovascular:    Hx Severe pulmonary hypertension  Hx RV dilation and reduced RV function  Hx Severe tricuspid insufficiency  Hx Essential HTN  Distributive shock  PTA regimen: furosemide 20mg qd, adempas 2.5mg TID, opsumit 10mg qd  Echo on 03/2021 with LVEF of  78%, severe pulmonary hypertension (82 mmHg), reduced RV function (TAPSE 16mm), severe tricuspid insufficiency  - 6/2 ECHO: Global and regional LV function is normal: EF of 60-65%. Flattened septum c/w RV pressure overload. Moderate RV dilation with severely reduced global right ventricular function. Mod TR. Severe p-HTN. Estimated pulmonary artery systolic pressure is 85 mmHg plus right atrial pressure. Unable to visualize IVC. No pericardial effusions. Compared to prior TTE, RV is slightly less dilated, function appears similar.    - 6/8 ECHO: Interpretation Summary  Global and regional left ventricular function is hyperkinetic with an EF of  65-70%.  Moderate right ventricular dilation with severely reduced global right  ventricular function.  Moderate tricuspid insufficiency.  Estimated pulmonary artery systolic pressure is 57 mmHg plus right atrial  pressure.  This study was compared with the study from 6/2/22.  Estimated PA pressure is lower. No change in LV/RV function.      - Monitor hemodynamic status.   - Currently off pressors however requiring intermittent fluid boluses after diuresis  - If hypotensive, start vasopressin and avoid additional fluid boluses  - MAP goal > 65  - no indication for ASA use  - on p-HTN meds as above  - V pacing back up 50 BPM  - Continue midodrine 20 q8h  - high intensity Heparin gtt  - 6/8 Start digoxin - 500 mcg now, 250 q8h x2 and then 250 mcg daily    GI care/ Nutrition:   - Speech consult, appreciate recs   - Regular diet  - NJ feeding - cycled TF at night, half rate today  - Calorie counts  - PPI  - Schedule imodium 2 mg TID- discontinuing     Renal/ Fluid Balance/ Electrolytes:   Nephrolithiasis  Hypernatremia  PTA regimen: furosemide 20mg qd  BL creat appears to be ~ 1.14  - Strict I/O, daily weights  - Avoid/limit nephrotoxins as able  - Replete lytes PRN per protocol  - FWF at 30 ml q4h  - Diurese today - lasix 40mg once now once this evening  - Diurese, goal net -500  to -1L today     Endocrine:    Stress induced hyperglycemia  Preop A1c 5.8 (12/21)  - High sliding scale insulin   - Goal BG <180 for optimal healing      ID/ Antibiotics:  Stress induced leukocytosis  Distributive shock  5/30 MRSA swab negative  5/30 Sputum - Klebsiella  5/31 Sputum culture with 4+ lactose fermenting gram negative rods  5/31 Repeat blood cultures NGTD  6/4 Blood culture x2: NGTD    Remains afebrile since 6/4, WBC appears to have plateau 16-17  - s/p Meropenem (6/2-6/3) and Zosyn (6/3- 6/6)  - Continue to monitor fever curve, WBC and inflammatory markers as appropriate  - No indication for further antimicrobial coverage at this time    Heme:     Stress induced leukocytosis  Acute blood loss anemia  Acute blood loss thrombocytopenia  Hx DVT (2019), PE (2019, 2021) on chronic anticoagulation (Apixaban)  PA tear intra-op s/p patch repair.  - Continue to monitor  - High intensity heparin gtt - STOP coumadin. Once closer to discharge will begin home apixiban  - Daily CBC   - last transfusion 6/5. Has been stable 7->8 since      MSK/ Skin:  Sternotomy  Surgical Incision  - Sternal precautions  - Postoperative incision management per protocol  - PT/OT/CR     Prophylaxis:    - Mechanical prophylaxis for DVT  - Chemical DVT prophylaxis - low intensity heparin gtt  - PPI     Lines/ tubes/ drains:  - PICC line  - NJ     Disposition:  - Continue CVICU    Patient seen, findings and plan discussed with CV ICU staff, Dr. Pinon and CVTS staff Dr. Power Corado MD  PGY-3, General Surgery  ---    ====================================    SUBJECTIVE:   Feeling okay this morning. Breathing is okay right now. Required 250 albumin and 250 LR.     OBJECTIVE:   1. VITAL SIGNS:   Temp:  [97.7  F (36.5  C)-98.4  F (36.9  C)] 98.1  F (36.7  C)  Pulse:  [70-93] 86  Resp:  [12-32] 18  BP: ()/(42-66) 97/54  FiO2 (%):  [35 %-50 %] 46 %  SpO2:  [92 %-100 %] 97 %  FiO2 (%): 46 %  Resp: 18    2. INTAKE/ OUTPUT:   I/O  last 3 completed shifts:  In: 1832.59 [I.V.:572.59; NG/GT:220]  Out: 1805 [Urine:1730; Stool:75]    3. PHYSICAL EXAMINATION:   General: Sitting upright in bed, appears relatively comfortable  Neuro: Responsive and follows commands  Resp: Breathing less labored. Regular respiratory effort, on HFNC. Crackles throughout lung fields bilaterally.   CV: RRR. peirpheral pulses intact  Abdomen: Soft, Non-distended, Non-tender  Incisions: clean dry intact  Extremities: warm and well perfused    4. INVESTIGATIONS:   Arterial Blood Gases   Recent Labs   Lab 06/03/22  0302 06/02/22  1957 06/02/22  1843 06/02/22  1128   PH 7.39 7.45 7.46* 7.45   PCO2 43 37 36 38   PO2 87 100 72* 53*   HCO3 26 26 26 27     Complete Blood Count   Recent Labs   Lab 06/08/22  0416 06/07/22  0359 06/06/22  0317 06/05/22  0333   WBC 15.0* 17.9* 16.3* 17.5*   HGB 8.0* 8.5* 7.1* 6.4*    343 303 274     Basic Metabolic Panel  Recent Labs   Lab 06/08/22  0416 06/08/22  0400 06/08/22  0002 06/07/22  1942 06/07/22  1713 06/07/22  1708 06/07/22  0402 06/07/22  0359 06/06/22  2037 06/06/22  1636 06/06/22  0319 06/06/22  0317     --   --   --   --   --   --  139  --  141  --  142   POTASSIUM 4.0  --   --   --   --  3.8  --  3.8  --  3.8  --  3.6   CHLORIDE 107  --   --   --   --   --   --  109  --  109  --  112*   CO2 24  --   --   --   --   --   --  24  --  26  --  24   BUN 34*  --   --   --   --   --   --  33*  --  34*  --  35*   CR 0.90  --   --   --   --   --   --  1.04  --  1.10*  --  1.33*   * 141* 124* 96   < >  --    < > 128*   < > 109*  116*   < > 127*    < > = values in this interval not displayed.     Liver Function Tests  Recent Labs   Lab 06/08/22  0416 06/07/22  0359 06/06/22 0317 06/05/22  0333   INR 1.39* 1.35* 1.30* 1.27*     Pancreatic Enzymes  No lab results found in last 7 days.  Coagulation Profile  Recent Labs   Lab 06/08/22  0416 06/07/22  0359 06/06/22 0317 06/05/22  0333   INR 1.39* 1.35* 1.30* 1.27*     5.  RADIOLOGY:   No results found for this or any previous visit (from the past 24 hour(s)).    =========================================

## 2022-06-08 NOTE — PLAN OF CARE
Major Shift Events:  Pt more tired this AM. Remains neuro intact. Up in chair this afternoon. CVP 14-24 while flat in bed. Meeting MAP goal of >65 on her own. Calorie count initiated. Boost shakes ordered. Straight cathed this AM as patient unable to void. Lasix given this PM and voided spontaneously. Rectal tube removed.   Plan: Remain in ICU  For vital signs and complete assessments, please see documentation flowsheets.       Problem: Respiratory Compromise (Cardiovascular Surgery)  Goal: Effective Oxygenation and Ventilation (Cardiovascular Surgery)  Outcome: Ongoing, Not Progressing  Intervention: Promote Airway Secretion Clearance

## 2022-06-09 ENCOUNTER — APPOINTMENT (OUTPATIENT)
Dept: GENERAL RADIOLOGY | Facility: CLINIC | Age: 77
DRG: 270 | End: 2022-06-09
Attending: STUDENT IN AN ORGANIZED HEALTH CARE EDUCATION/TRAINING PROGRAM
Payer: MEDICARE

## 2022-06-09 ENCOUNTER — APPOINTMENT (OUTPATIENT)
Dept: CT IMAGING | Facility: CLINIC | Age: 77
DRG: 270 | End: 2022-06-09
Attending: STUDENT IN AN ORGANIZED HEALTH CARE EDUCATION/TRAINING PROGRAM
Payer: MEDICARE

## 2022-06-09 LAB
ANION GAP SERPL CALCULATED.3IONS-SCNC: 6 MMOL/L (ref 3–14)
BACTERIA BLD CULT: NO GROWTH
BACTERIA BLD CULT: NO GROWTH
BUN SERPL-MCNC: 36 MG/DL (ref 7–30)
CALCIUM SERPL-MCNC: 8.9 MG/DL (ref 8.5–10.1)
CHLORIDE BLD-SCNC: 104 MMOL/L (ref 94–109)
CO2 SERPL-SCNC: 26 MMOL/L (ref 20–32)
CREAT SERPL-MCNC: 0.99 MG/DL (ref 0.52–1.04)
ERYTHROCYTE [DISTWIDTH] IN BLOOD BY AUTOMATED COUNT: 18.6 % (ref 10–15)
GFR SERPL CREATININE-BSD FRML MDRD: 58 ML/MIN/1.73M2
GLUCOSE BLD-MCNC: 111 MG/DL (ref 70–99)
GLUCOSE BLDC GLUCOMTR-MCNC: 101 MG/DL (ref 70–99)
GLUCOSE BLDC GLUCOMTR-MCNC: 102 MG/DL (ref 70–99)
GLUCOSE BLDC GLUCOMTR-MCNC: 106 MG/DL (ref 70–99)
GLUCOSE BLDC GLUCOMTR-MCNC: 116 MG/DL (ref 70–99)
GLUCOSE BLDC GLUCOMTR-MCNC: 120 MG/DL (ref 70–99)
GLUCOSE BLDC GLUCOMTR-MCNC: 131 MG/DL (ref 70–99)
GLUCOSE BLDC GLUCOMTR-MCNC: 86 MG/DL (ref 70–99)
HCT VFR BLD AUTO: 25.5 % (ref 35–47)
HGB BLD-MCNC: 8 G/DL (ref 11.7–15.7)
INR PPP: 1.58 (ref 0.85–1.15)
MAGNESIUM SERPL-MCNC: 2.4 MG/DL (ref 1.6–2.3)
MAGNESIUM SERPL-MCNC: 2.4 MG/DL (ref 1.6–2.3)
MCH RBC QN AUTO: 28.8 PG (ref 26.5–33)
MCHC RBC AUTO-ENTMCNC: 31.4 G/DL (ref 31.5–36.5)
MCV RBC AUTO: 92 FL (ref 78–100)
PATH REPORT.COMMENTS IMP SPEC: NORMAL
PATH REPORT.COMMENTS IMP SPEC: NORMAL
PATH REPORT.FINAL DX SPEC: NORMAL
PATH REPORT.GROSS SPEC: NORMAL
PATH REPORT.MICROSCOPIC SPEC OTHER STN: NORMAL
PATH REPORT.RELEVANT HX SPEC: NORMAL
PHOSPHATE SERPL-MCNC: 4.2 MG/DL (ref 2.5–4.5)
PHOSPHATE SERPL-MCNC: 4.4 MG/DL (ref 2.5–4.5)
PHOTO IMAGE: NORMAL
PLATELET # BLD AUTO: 394 10E3/UL (ref 150–450)
POTASSIUM BLD-SCNC: 4.5 MMOL/L (ref 3.4–5.3)
POTASSIUM BLD-SCNC: 4.8 MMOL/L (ref 3.4–5.3)
RADIOLOGIST FLAGS: ABNORMAL
RBC # BLD AUTO: 2.78 10E6/UL (ref 3.8–5.2)
SODIUM SERPL-SCNC: 136 MMOL/L (ref 133–144)
UFH PPP CHRO-ACNC: >1.1 IU/ML
WBC # BLD AUTO: 13.2 10E3/UL (ref 4–11)

## 2022-06-09 PROCEDURE — 71250 CT THORAX DX C-: CPT | Mod: 26 | Performed by: RADIOLOGY

## 2022-06-09 PROCEDURE — 250N000013 HC RX MED GY IP 250 OP 250 PS 637: Performed by: STUDENT IN AN ORGANIZED HEALTH CARE EDUCATION/TRAINING PROGRAM

## 2022-06-09 PROCEDURE — 93010 ELECTROCARDIOGRAM REPORT: CPT | Performed by: INTERNAL MEDICINE

## 2022-06-09 PROCEDURE — 250N000011 HC RX IP 250 OP 636

## 2022-06-09 PROCEDURE — 250N000013 HC RX MED GY IP 250 OP 250 PS 637

## 2022-06-09 PROCEDURE — 80048 BASIC METABOLIC PNL TOTAL CA: CPT | Performed by: STUDENT IN AN ORGANIZED HEALTH CARE EDUCATION/TRAINING PROGRAM

## 2022-06-09 PROCEDURE — 99232 SBSQ HOSP IP/OBS MODERATE 35: CPT | Mod: GC | Performed by: INTERNAL MEDICINE

## 2022-06-09 PROCEDURE — 93005 ELECTROCARDIOGRAM TRACING: CPT

## 2022-06-09 PROCEDURE — 250N000013 HC RX MED GY IP 250 OP 250 PS 637: Performed by: THORACIC SURGERY (CARDIOTHORACIC VASCULAR SURGERY)

## 2022-06-09 PROCEDURE — 200N000002 HC R&B ICU UMMC

## 2022-06-09 PROCEDURE — 85027 COMPLETE CBC AUTOMATED: CPT | Performed by: STUDENT IN AN ORGANIZED HEALTH CARE EDUCATION/TRAINING PROGRAM

## 2022-06-09 PROCEDURE — 250N000011 HC RX IP 250 OP 636: Performed by: STUDENT IN AN ORGANIZED HEALTH CARE EDUCATION/TRAINING PROGRAM

## 2022-06-09 PROCEDURE — 84100 ASSAY OF PHOSPHORUS: CPT | Performed by: THORACIC SURGERY (CARDIOTHORACIC VASCULAR SURGERY)

## 2022-06-09 PROCEDURE — 85520 HEPARIN ASSAY: CPT | Performed by: THORACIC SURGERY (CARDIOTHORACIC VASCULAR SURGERY)

## 2022-06-09 PROCEDURE — 88304 TISSUE EXAM BY PATHOLOGIST: CPT | Mod: 26 | Performed by: PATHOLOGY

## 2022-06-09 PROCEDURE — 71045 X-RAY EXAM CHEST 1 VIEW: CPT

## 2022-06-09 PROCEDURE — 99233 SBSQ HOSP IP/OBS HIGH 50: CPT | Mod: 24 | Performed by: INTERNAL MEDICINE

## 2022-06-09 PROCEDURE — 71250 CT THORAX DX C-: CPT

## 2022-06-09 PROCEDURE — 71045 X-RAY EXAM CHEST 1 VIEW: CPT | Mod: 26 | Performed by: RADIOLOGY

## 2022-06-09 PROCEDURE — 84132 ASSAY OF SERUM POTASSIUM: CPT | Performed by: THORACIC SURGERY (CARDIOTHORACIC VASCULAR SURGERY)

## 2022-06-09 PROCEDURE — 83735 ASSAY OF MAGNESIUM: CPT | Performed by: THORACIC SURGERY (CARDIOTHORACIC VASCULAR SURGERY)

## 2022-06-09 PROCEDURE — 85610 PROTHROMBIN TIME: CPT | Performed by: STUDENT IN AN ORGANIZED HEALTH CARE EDUCATION/TRAINING PROGRAM

## 2022-06-09 PROCEDURE — 83735 ASSAY OF MAGNESIUM: CPT | Performed by: STUDENT IN AN ORGANIZED HEALTH CARE EDUCATION/TRAINING PROGRAM

## 2022-06-09 RX ORDER — FUROSEMIDE 10 MG/ML
20 INJECTION INTRAMUSCULAR; INTRAVENOUS EVERY 6 HOURS
Status: DISCONTINUED | OUTPATIENT
Start: 2022-06-09 | End: 2022-06-10

## 2022-06-09 RX ORDER — FUROSEMIDE 10 MG/ML
40 INJECTION INTRAMUSCULAR; INTRAVENOUS ONCE
Status: COMPLETED | OUTPATIENT
Start: 2022-06-09 | End: 2022-06-09

## 2022-06-09 RX ORDER — FUROSEMIDE 10 MG/ML
40 INJECTION INTRAMUSCULAR; INTRAVENOUS ONCE
Status: CANCELLED | OUTPATIENT
Start: 2022-06-09

## 2022-06-09 RX ORDER — DIGOXIN 0.25 MG/ML
250 INJECTION INTRAMUSCULAR; INTRAVENOUS DAILY
Status: CANCELLED | OUTPATIENT
Start: 2022-06-10

## 2022-06-09 RX ORDER — FUROSEMIDE 10 MG/ML
40 INJECTION INTRAMUSCULAR; INTRAVENOUS EVERY 6 HOURS
Status: DISCONTINUED | OUTPATIENT
Start: 2022-06-09 | End: 2022-06-09

## 2022-06-09 RX ADMIN — HYDROXYZINE HYDROCHLORIDE 25 MG: 25 TABLET ORAL at 11:06

## 2022-06-09 RX ADMIN — FUROSEMIDE 40 MG: 10 INJECTION, SOLUTION INTRAMUSCULAR; INTRAVENOUS at 13:36

## 2022-06-09 RX ADMIN — Medication 10 MG: at 19:25

## 2022-06-09 RX ADMIN — Medication 1 PACKET: at 08:13

## 2022-06-09 RX ADMIN — APIXABAN 5 MG: 5 TABLET, FILM COATED ORAL at 08:09

## 2022-06-09 RX ADMIN — APIXABAN 5 MG: 5 TABLET, FILM COATED ORAL at 19:25

## 2022-06-09 RX ADMIN — MIDODRINE HYDROCHLORIDE 20 MG: 5 TABLET ORAL at 15:00

## 2022-06-09 RX ADMIN — FUROSEMIDE 40 MG: 10 INJECTION, SOLUTION INTRAMUSCULAR; INTRAVENOUS at 08:12

## 2022-06-09 RX ADMIN — ACETAMINOPHEN 325MG 975 MG: 325 TABLET ORAL at 23:49

## 2022-06-09 RX ADMIN — RIOCIGUAT 0.5 MG: 0.5 TABLET, FILM COATED ORAL at 19:25

## 2022-06-09 RX ADMIN — LIDOCAINE PATCH 4% 1 PATCH: 40 PATCH TOPICAL at 08:10

## 2022-06-09 RX ADMIN — RIOCIGUAT 0.5 MG: 0.5 TABLET, FILM COATED ORAL at 08:11

## 2022-06-09 RX ADMIN — ACETAMINOPHEN 325MG 975 MG: 325 TABLET ORAL at 15:00

## 2022-06-09 RX ADMIN — MIDODRINE HYDROCHLORIDE 20 MG: 5 TABLET ORAL at 23:50

## 2022-06-09 RX ADMIN — Medication 15 ML: at 08:12

## 2022-06-09 RX ADMIN — ACETAMINOPHEN 325MG 975 MG: 325 TABLET ORAL at 08:09

## 2022-06-09 RX ADMIN — METHOCARBAMOL 500 MG: 500 TABLET ORAL at 13:36

## 2022-06-09 RX ADMIN — MIDODRINE HYDROCHLORIDE 20 MG: 5 TABLET ORAL at 00:23

## 2022-06-09 RX ADMIN — DIGOXIN 250 MCG: 250 TABLET ORAL at 05:57

## 2022-06-09 RX ADMIN — ACETAMINOPHEN 325MG 975 MG: 325 TABLET ORAL at 00:23

## 2022-06-09 RX ADMIN — MIDODRINE HYDROCHLORIDE 20 MG: 5 TABLET ORAL at 08:09

## 2022-06-09 RX ADMIN — FUROSEMIDE 20 MG: 10 INJECTION, SOLUTION INTRAMUSCULAR; INTRAVENOUS at 19:26

## 2022-06-09 RX ADMIN — PANTOPRAZOLE SODIUM 40 MG: 40 TABLET, DELAYED RELEASE ORAL at 08:09

## 2022-06-09 RX ADMIN — RIOCIGUAT 0.5 MG: 0.5 TABLET, FILM COATED ORAL at 13:46

## 2022-06-09 RX ADMIN — OXYCODONE HYDROCHLORIDE 5 MG: 5 TABLET ORAL at 18:55

## 2022-06-09 ASSESSMENT — ACTIVITIES OF DAILY LIVING (ADL)
ADLS_ACUITY_SCORE: 37
ADLS_ACUITY_SCORE: 37
ADLS_ACUITY_SCORE: 41
ADLS_ACUITY_SCORE: 36
ADLS_ACUITY_SCORE: 37
ADLS_ACUITY_SCORE: 36
ADLS_ACUITY_SCORE: 37

## 2022-06-09 ASSESSMENT — VISUAL ACUITY
OU: GLASSES;BASELINE

## 2022-06-09 NOTE — PROGRESS NOTES
CV ICU PROGRESS NOTE      CO-MORBIDITIES:   CTEPH (chronic thromboembolic pulmonary hypertension) (H)  (primary encounter diagnosis)  Primary pulmonary hypertension (H)  SOB (shortness of breath)  S/P coronary angiogram    ASSESSMENT: Debi Espinoza is a 76 year old female with PMH of HTN, nephrolithiasis, DVT (2019) and PE (2019, 2021) on chronic anticoagulation, and chronic thromboembolic pulmonary hypertension who underwent pulmonary artery thromboendarterectomy on 5/27 with Dr. Peterson.    Overnight Events: NAEO    TODAY'S PROGRESS:   - Continue digoxin 250 mcg daily (maintinence dosing starting 6/10)  - Lasix 40 mg x2 today (she had a robust response to first dose)  - Reassess diuresis plan in the afternoon   - Finish Digoxin load today, start maintenance 250 mcg daily tomorrow  - CT chest w/o contrast today assess for fluid collection   - Stop TF overnight tonight  - follow up appetite and calorie counts tomorrow   - Will plan for net goal - 500 mL to -1L  - Follow-up lytes @ 1700  - Apixiban started 6/8    - Start Vasopressin if requiring pressors, avoid fluid boluses      PLAN:  Neuro/ pain/ sedation:  Acute Postoperative pain  - Monitor neurological status. Notify the MD for any acute changes in exam.  - Pain: S tylenol 975 TID, lidocaine patches. PRN oxycodone 5-10 q4h, robaxin 500 q6h, PRN dilaudid 0.2 q2h     Pulmonary care:   S/p Pulmonary artery thromboendarterectomy on 5/27/2022 by Dr. Peterson  Hx Chronic pulmonary thromboembolic disease  Hx PE (2019, 2021)  Hx Emphysema, moderate  PTA regimen: apixaban 5mg BID  CTA 05/2019 demonstrated underlying moderate emphysema  Extubated 5/31  - BiPAP for respiratory support as able, HFNC currently at 40L   - PTA Opsumit and Adempas   - CT chest w/o contrast today to assess for fluid collection    Cardiovascular:    Hx Severe pulmonary hypertension  Hx RV dilation and reduced RV function  Hx Severe tricuspid insufficiency  Hx Essential HTN  Distributive  shock  PTA regimen: furosemide 20mg qd, adempas 2.5mg TID, opsumit 10mg qd  Echo on 03/2021 with LVEF of 78%, severe pulmonary hypertension (82 mmHg), reduced RV function (TAPSE 16mm), severe tricuspid insufficiency  - 6/2 ECHO: Global and regional LV function is normal: EF of 60-65%. Flattened septum c/w RV pressure overload. Moderate RV dilation with severely reduced global right ventricular function. Mod TR. Severe p-HTN. Estimated pulmonary artery systolic pressure is 85 mmHg plus right atrial pressure. Unable to visualize IVC. No pericardial effusions. Compared to prior TTE, RV is slightly less dilated, function appears similar.    6/8 ECHO: Global and regional LV function is hyperkinetic EF of 65-70%.  Mod LV dilation w/ severely reduced global RV function. Moderate TR. Estimated pulmonary artery systolic pressure is 57 mmHg plus right atrial pressure. Compared with the study from 6/2/22. Estimated PA pressure is lower. No change in LV/RV function.    - Monitor hemodynamic status.   - Currently off pressors however requiring intermittent fluid boluses after diuresis  - If hypotensive, start vasopressin and avoid additional fluid boluses  - MAP goal > 65  - no indication for ASA use  - on p-HTN meds as above  - V pacing back up 50 BPM  - Continue midodrine 20 q8h  - Apixiban started 6/8  - 6/8-9 digoxin loaded - 500 mcg, followed by 250 q8h x2   - 6/10 start Digoxin maintenance: 250 mcg daily    GI care/ Nutrition:   - Speech consult, appreciate recs   - Regular diet  - NJ feeding - cycled TF at night, half rate yesterday   - Stop TF overnight tonight follow up appetite and calorie counts tomorrow   - Calorie counts  - PPI    Renal/ Fluid Balance/ Electrolytes:   Nephrolithiasis  Hypernatremia  PTA regimen: furosemide 20mg qd  BL creat appears to be ~ 1.14  - Strict I/O, daily weights  - Avoid/limit nephrotoxins as able  - Replete lytes PRN per protocol  - FWF at 30 ml q4h  - Lasix 40 mg x2 today (she had a  robust response to first dose)  - Reassess diuresis plan in the afternoon   - Diurese, goal net -500 to -1L today     Endocrine:    Stress induced hyperglycemia  Preop A1c 5.8 (12/21)  - High sliding scale insulin   - Goal BG <180 for optimal healing      ID/ Antibiotics:  Stress induced leukocytosis  Distributive shock  5/30 MRSA swab negative  5/30 Sputum - Klebsiella  5/31 Sputum culture with 4+ lactose fermenting gram negative rods  5/31 Repeat blood cultures NGTD  6/4 Blood culture x2: NGTD    Remains afebrile since 6/4, WBC appears to have plateau 16-17  - s/p Meropenem (6/2-6/3) and Zosyn (6/3- 6/6)  - Continue to monitor fever curve, WBC and inflammatory markers as appropriate  - No indication for further antimicrobial coverage at this time    Heme:     Stress induced leukocytosis  Acute blood loss anemia  Acute blood loss thrombocytopenia  Hx DVT (2019), PE (2019, 2021) on chronic anticoagulation (Apixaban)  PA tear intra-op s/p patch repair.  - Continue to monitor  - High intensity heparin gtt - STOP coumadin. Once closer to discharge will begin home apixiban  - Daily CBC   - last transfusion 6/5. Has been stable 7->8 since      MSK/ Skin:  Sternotomy  Surgical Incision  - Sternal precautions  - Postoperative incision management per protocol  - PT/OT/CR     Prophylaxis:    - Mechanical prophylaxis for DVT  - Chemical DVT prophylaxis - Apixiban started 6/8  - PPI     Lines/ tubes/ drains:  - PICC line  - NJ     Disposition:  - Continue CVICU, potentially transfer tomorrow    Patient seen, findings and plan discussed with CV ICU staff, Dr. Pinon and CVTS staff Dr. Power Corado MD  PGY-3, General Surgery  ---    ====================================    SUBJECTIVE:   Doing well today. NAEO. Tolerating diuresis. No hypotension.     OBJECTIVE:   1. VITAL SIGNS:   Temp:  [97.6  F (36.4  C)-98.2  F (36.8  C)] 97.8  F (36.6  C)  Pulse:  [72-96] 75  Resp:  [19-24] 24  BP: ()/(38-57) 109/51  FiO2 (%):   [35 %-50 %] 40 %  SpO2:  [90 %-100 %] 96 %  FiO2 (%): 40 %  Resp: 24    2. INTAKE/ OUTPUT:   I/O last 3 completed shifts:  In: 2735 [P.O.:636; I.V.:439; NG/GT:320; IV Piggyback:250]  Out: 2600 [Urine:2600]    3. PHYSICAL EXAMINATION:   General: Sitting upright in bed, appears relatively comfortable  Neuro: Responsive and follows commands  Resp: Breathing less labored. Regular respiratory effort, on HFNC.   CV: RRR. peirpheral pulses intact  Abdomen: Soft, Non-distended, Non-tender  Incisions: clean dry intact  Extremities: warm and well perfused    4. INVESTIGATIONS:   Arterial Blood Gases   Recent Labs   Lab 06/03/22  0302 06/02/22  1957 06/02/22  1843 06/02/22  1128   PH 7.39 7.45 7.46* 7.45   PCO2 43 37 36 38   PO2 87 100 72* 53*   HCO3 26 26 26 27     Complete Blood Count   Recent Labs   Lab 06/09/22  0355 06/08/22  0416 06/07/22  0359 06/06/22  0317   WBC 13.2* 15.0* 17.9* 16.3*   HGB 8.0* 8.0* 8.5* 7.1*    353 343 303     Basic Metabolic Panel  Recent Labs   Lab 06/09/22  0355 06/09/22  0352 06/09/22  0022 06/08/22  2035 06/08/22  1725 06/08/22  0838 06/08/22  0416 06/07/22  1713 06/07/22  1708 06/07/22  0402 06/07/22  0359     --   --   --  138  --  137  --   --   --  139   POTASSIUM 4.8  --   --   --  4.1  4.1  --  4.0  --  3.8  --  3.8   CHLORIDE 104  --   --   --  106  --  107  --   --   --  109   CO2 26  --   --   --  24  --  24  --   --   --  24   BUN 36*  --   --   --  33*  --  34*  --   --   --  33*   CR 0.99  --   --   --  0.82  --  0.90  --   --   --  1.04   * 120* 116* 105* 86   < > 125*   < >  --    < > 128*    < > = values in this interval not displayed.     Liver Function Tests  Recent Labs   Lab 06/09/22  0355 06/08/22 0416 06/07/22 0359 06/06/22 0317   INR 1.58* 1.39* 1.35* 1.30*     Pancreatic Enzymes  No lab results found in last 7 days.  Coagulation Profile  Recent Labs   Lab 06/09/22  0355 06/08/22  0416 06/07/22  0359 06/06/22 0317   INR 1.58* 1.39* 1.35* 1.30*      5. RADIOLOGY:   Recent Results (from the past 24 hour(s))   Echo Limited   Result Value    LVEF  65-70%    Located within [facility]    [id number]  [id number]  [id number]  [id number]^NIRANJAN^LILIBETH     [facility],[place]  Echocardiography Laboratory  [address] [place] [address]     Name: CARLOS LEGER  MRN: [id number]  : 1945  Study Date: 2022 08:02 AM  Age: 76 yrs  Gender: Female  Patient Location: [facility]  Reason For Study: Heart Failure  Ordering Physician: LILIBETH UREÑA  Performed By: Yane Locke     BSA: 1.6 m2  Height: 60 in  Weight: 146 lb  HR: 82  BP: 106/54 mmHg  ______________________________________________________________________________  Procedure  Limited Portable Echo Adult.  ______________________________________________________________________________  Interpretation Summary  Global and regional left ventricular function is hyperkinetic with an EF of  65-70%.  Moderate right ventricular dilation with severely reduced global right  ventricular function.  Moderate tricuspid insufficiency.  Estimated pulmonary artery systolic pressure is 57 mmHg plus right atrial  pressure.     This study was compared with the study from 22.  Estimated PA pressure is lower. No change in LV/RV function.  ______________________________________________________________________________  Left Ventricle  Global and regional left ventricular function is hyperkinetic with an EF of  65-70%. Flattened septum is consistent with right ventricular pressure  overload.     Right Ventricle  Moderate right ventricular dilation is present. Global right ventricular  function is severely reduced.     Mitral Valve  The mitral valve is normal. Trace mitral insufficiency is present.     Tricuspid Valve  Moderate tricuspid insufficiency is present. Estimated pulmonary artery  systolic pressure is 57 mmHg plus right atrial pressure.     Pulmonic Valve  The pulmonic valve is normal. Mild pulmonic  insufficiency is present.     Vessels  Unable to assess mean RA pressure given the patient is on a ventilator.     Pericardium  No pericardial effusion is present.     Compared to Previous Study  This study was compared with the study from 22 .     ______________________________________________________________________________  Doppler Measurements & Calculations  TR max gurwinder: 367.6 cm/sec  TR max P.8 mmHg     ______________________________________________________________________________  Report approved by: Mariama Juares 2022 09:54 AM             =========================================

## 2022-06-09 NOTE — PROGRESS NOTES
Cared for this patient [id number]      Major Shift Events:  Patient up in chair. A&OX4. Using Tylenol for pain control with relief. Receiving Lasix for net negative 500-1L. BPs stable. Gave midodrine at 1800 for soft BPs. CVP 12.  Afebrile. UO adequate. No BM. Regular diet. Fair appetite.       Plan: Monitor I&O. Monitor CVP.   For vital signs and complete assessments, please see documentation flowsheets.

## 2022-06-09 NOTE — PROVIDER NOTIFICATION
Notified CVTS of new 1st degree AV block. Blood pressures unchanged. MD verbalized understanding, will order EKG.

## 2022-06-09 NOTE — PLAN OF CARE
Major Shift Events:  CT chest, results pending. Titrated FiO2 from 40% 40 LPM to 4L Oxymask. Diuresed with Lasix X2 with good response.    Neuro: A&Ox4. PERRLA. Moves all extremities. C/o neck stiffness--improves with Robaxin & alternating heat & ice packs. Also on scheduled Tylenol.  Pulm: Lungs clear. Currently on 4L oxymask w/O2S 99%. Incentive spirometer at bedside--needs encouragement. Dry, weak cough.  CV: HR 80s SR, no ectopy. Goal MAP>65 maintained without use of pressors. Afebrile. CVP down to 7 from 13.  PV: 2+ radial and 1+ DP pulses. Mild edema BUE, moderate BLE. Needs encouragement to wear PCDs--frequently refuses.  GI: Soft abdomen, +BS. 2 smears today, incontinent. On regular diet, very poor appetite. NJT in place. Tube feeds currently on hold per CVTS.  : Incontinent, Purewick in place. Received 40mg Lasix X2 today with good response.  Skin: L lip bleeding/scabbed. See flowsheets.  MS: Up to chair for several hours this afternoon with assist of 2+ walker and gait belt.  Endo: Q4h BG checks. No Insulin required.  Lines: R UA DL PICC, R PIV. Capped.  Labs: No supplementations given.  Psych/Social: Updated patient's daughter-in-law at bedside, supportive of patient and POC.  Goal Outcome Evaluation: Plan of Care Reviewed With: patient, family. Overall Patient Progress: improving    Plan: Continue to monitor, notify CVTS of any concerns.  For vital signs and complete assessments, please see documentation flowsheets.

## 2022-06-09 NOTE — CONSULTS
Cardiology Fellow Progress Note  _________________________________    Critical Care Cardiology  1. CTEPH  2. CTEPH surgery  3. Post operative persistence of right heart failure and impaired ventricular filling.  4. Persistent pulmonary arterial hypertension  5. Post operative respiratory failure, non-intubation    Examination: alert, obviously short of breath, nasal prongs in place, decrease breath sounds bilateral based, regular rhythm, murmur TR, edema     Latest Reference Range & Units 06/09/22 03:55 06/09/22 08:29   Sodium 133 - 144 mmol/L 136    Potassium 3.4 - 5.3 mmol/L 4.8    Chloride 94 - 109 mmol/L 104    Carbon Dioxide 20 - 32 mmol/L 26    Urea Nitrogen 7 - 30 mg/dL 36 (H)    Creatinine 0.52 - 1.04 mg/dL 0.99    GFR Estimate >60 mL/min/1.73m2 58 (L) [1]    Calcium 8.5 - 10.1 mg/dL 8.9    Anion Gap 3 - 14 mmol/L 6    Magnesium 1.6 - 2.3 mg/dL 2.4 (H)    Phosphorus 2.5 - 4.5 mg/dL 4.4    Glucose 70 - 99 mg/dL 111 (H)    GLUCOSE BY METER POCT 70 - 99 mg/dL  131 (H)   WBC 4.0 - 11.0 10e3/uL 13.2 (H)    Hemoglobin 11.7 - 15.7 g/dL 8.0 (L)    Hematocrit 35.0 - 47.0 % 25.5 (L)    Platelet Count 150 - 450 10e3/uL 394    RBC Count 3.80 - 5.20 10e6/uL 2.78 (L)    MCV 78 - 100 fL 92    MCH 26.5 - 33.0 pg 28.8    MCHC 31.5 - 36.5 g/dL 31.4 (L)    RDW 10.0 - 15.0 % 18.6 (H)    INR 0.85 - 1.15  1.58 (H)    Heparin Unfractionated Anti Xa Level For Reference Range, See Comment IU/mL >1.10 (HH)        I spent 45 minutes of critical care time evaluating patient, formulating plan to improve RV LV coupling, discussing with surgeon and primary cardiologist, and bedside nurse.  Consider addition of inotrope or parenteral prostacyclin. Patient needs nutritional assessment.          06/08/22    Interval Events  ----------------------  NAEON  Remains in ICU in anticipation of pressors given hypotension with diuresis  Continues to require HiFlow NC  Complains of shortness of breath, although improving    Current  Medications  ----------------------  Current Facility-Administered Medications   Medication     acetaminophen (TYLENOL) tablet 975 mg     apixaban ANTICOAGULANT (ELIQUIS) tablet 5 mg     bisacodyl (DULCOLAX) Suppository 10 mg     dextrose 10% infusion     glucose gel 15-30 g    Or     dextrose 50 % injection 25-50 mL    Or     glucagon injection 1 mg     furosemide (LASIX) injection 40 mg     heparin lock flush 10 UNIT/ML injection 5-20 mL     heparin lock flush 10 UNIT/ML injection 5-20 mL     hydrALAZINE (APRESOLINE) injection 10 mg     HYDROmorphone (DILAUDID) injection 0.2 mg     hydrOXYzine (ATARAX) tablet 25-50 mg     insulin aspart (NovoLOG) injection (RAPID ACTING)     ipratropium - albuterol 0.5 mg/2.5 mg/3 mL (DUONEB) neb solution 3 mL     Lidocaine (LIDOCARE) 4 % Patch 1 patch     lidocaine (LMX4) cream     lidocaine 1 % 0.1-1 mL     lidocaine patch in PLACE     lidocaine-prilocaine (EMLA) cream     macitentan (OPSUMIT) tablet 10 mg     magnesium hydroxide (MILK OF MAGNESIA) suspension 30 mL     medication instruction     melatonin tablet 10 mg     methocarbamol (ROBAXIN) tablet 500 mg     midodrine (PROAMATINE) tablet 20 mg     multivitamins w/minerals liquid 15 mL     naloxone (NARCAN) injection 0.2 mg    Or     naloxone (NARCAN) injection 0.4 mg    Or     naloxone (NARCAN) injection 0.2 mg    Or     naloxone (NARCAN) injection 0.4 mg     ondansetron (ZOFRAN ODT) ODT tab 4 mg    Or     ondansetron (ZOFRAN) injection 4 mg     oxyCODONE (ROXICODONE) tablet 5 mg    Or     oxyCODONE IR (ROXICODONE) tablet 10 mg     pantoprazole (PROTONIX) 2 mg/mL suspension 40 mg    Or     pantoprazole (PROTONIX) EC tablet 40 mg     [Held by provider] polyethylene glycol (MIRALAX) Packet 17 g     prochlorperazine (COMPAZINE) tablet 5 mg    Or     prochlorperazine (COMPAZINE) injection 5 mg     protein modular (PROSOURCE TF) 1 packet     Reason beta blocker order not selected     riociguat (ADEMPAS) tablet 0.5 mg     [Held  by provider] senna-docusate (SENOKOT-S/PERICOLACE) 8.6-50 MG per tablet 1 tablet     sodium chloride (PF) 0.9% PF flush 10-20 mL     sodium chloride (PF) 0.9% PF flush 10-20 mL     sodium chloride (PF) 0.9% PF flush 10-40 mL     sodium chloride (PF) 0.9% PF flush 10-40 mL     sodium chloride (PF) 0.9% PF flush 3 mL     sodium chloride (PF) 0.9% PF flush 3 mL     sodium chloride (PF) 0.9% PF flush 3 mL     sodium chloride bacteriostatic 0.9 % flush 10 mL     vasopressin 1 unit/mL MAX Conc (PITRESSIN) infusion        Intake and Output  ----------------------      Intake/Output Summary (Last 24 hours) at 2022 0721  Last data filed at 2022 0700  Gross per 24 hour   Intake 3622.82 ml   Output 3230 ml   Net 392.82 ml         Weight  ----------------------  Wt Readings from Last 2 Encounters:   22 68.6 kg (151 lb 3.8 oz)   22 67.4 kg (148 lb 8 oz)       Objective  ----------------------  Results for orders placed or performed during the hospital encounter of 22 (from the past 24 hour(s))   Glucose by meter   Result Value Ref Range    GLUCOSE BY METER POCT 127 (H) 70 - 99 mg/dL   Echo Limited   Result Value Ref Range    LVEF  65-70%     [facility]    [id number]  [id number]  [id number]  [id number]^NIRANJAN^LILIBETH     [facility],[place]  Echocardiography Laboratory  [address] [place] [id number]     Name: CARLOS LEGER  MRN: [id number]  : 1945  Study Date: 2022 08:02 AM  Age: 76 yrs  Gender: Female  Patient Location: [facility]  Reason For Study: Heart Failure  Ordering Physician: LILIBETH UREÑA  Performed By: Yane Locke     BSA: 1.6 m2  Height: 60 in  Weight: 146 lb  HR: 82  BP: 106/54 mmHg  ______________________________________________________________________________  Procedure  Limited Portable Echo Adult.  ______________________________________________________________________________  Interpretation Summary  Global and regional left ventricular function is  hyperkinetic with an EF of  65-70%.  Moderate right ventricular dilation with severely reduced global right  ventricular function.  Moderate tricuspid insufficiency.  Estimated pulmonary artery systolic pressure is 57 mmHg plus right atrial  pressure.     This study was compared with the study from 22.  Estimated PA pressure is lower. No change in LV/RV function.  ______________________________________________________________________________  Left Ventricle  Global and regional left ventricular function is hyperkinetic with an EF of  65-70%. Flattened septum is consistent with right ventricular pressure  overload.     Right Ventricle  Moderate right ventricular dilation is present. Global right ventricular  function is severely reduced.     Mitral Valve  The mitral valve is normal. Trace mitral insufficiency is present.     Tricuspid Valve  Moderate tricuspid insufficiency is present. Estimated pulmonary artery  systolic pressure is 57 mmHg plus right atrial pressure.     Pulmonic Valve  The pulmonic valve is normal. Mild pulmonic insufficiency is present.     Vessels  Unable to assess mean RA pressure given the patient is on a ventilator.     Pericardium  No pericardial effusion is present.     Compared to Previous Study  This study was compared with the study from 22 .     ______________________________________________________________________________  Doppler Measurements & Calculations  TR max gurwinder: 367.6 cm/sec  TR max P.8 mmHg     ______________________________________________________________________________  Report approved by: Mariama Juares 2022 09:54 AM         Glucose by meter   Result Value Ref Range    GLUCOSE BY METER POCT 108 (H) 70 - 99 mg/dL   Glucose by meter   Result Value Ref Range    GLUCOSE BY METER POCT 94 70 - 99 mg/dL   Basic metabolic panel   Result Value Ref Range    Sodium 138 133 - 144 mmol/L    Potassium 4.1 3.4 - 5.3 mmol/L    Chloride 106 94 - 109 mmol/L     Carbon Dioxide (CO2) 24 20 - 32 mmol/L    Anion Gap 8 3 - 14 mmol/L    Urea Nitrogen 33 (H) 7 - 30 mg/dL    Creatinine 0.82 0.52 - 1.04 mg/dL    Calcium 8.7 8.5 - 10.1 mg/dL    Glucose 86 70 - 99 mg/dL    GFR Estimate 73 >60 mL/min/1.73m2   Blood gas venous with oxyhemoglobin   Result Value Ref Range    pH Venous 7.43 7.32 - 7.43    pCO2 Venous 40 40 - 50 mm Hg    pO2 Venous 32 25 - 47 mm Hg    Bicarbonate Venous 27 21 - 28 mmol/L    FIO2 30     Oxyhemoglobin Venous 60 (L) 70 - 75 %    Base Excess/Deficit (+/-) 2.1 (H) -7.7 - 1.9 mmol/L   Potassium   Result Value Ref Range    Potassium 4.1 3.4 - 5.3 mmol/L   Glucose by meter   Result Value Ref Range    GLUCOSE BY METER POCT 105 (H) 70 - 99 mg/dL   Glucose by meter   Result Value Ref Range    GLUCOSE BY METER POCT 116 (H) 70 - 99 mg/dL   Glucose by meter   Result Value Ref Range    GLUCOSE BY METER POCT 120 (H) 70 - 99 mg/dL   Basic metabolic panel   Result Value Ref Range    Sodium 136 133 - 144 mmol/L    Potassium 4.8 3.4 - 5.3 mmol/L    Chloride 104 94 - 109 mmol/L    Carbon Dioxide (CO2) 26 20 - 32 mmol/L    Anion Gap 6 3 - 14 mmol/L    Urea Nitrogen 36 (H) 7 - 30 mg/dL    Creatinine 0.99 0.52 - 1.04 mg/dL    Calcium 8.9 8.5 - 10.1 mg/dL    Glucose 111 (H) 70 - 99 mg/dL    GFR Estimate 58 (L) >60 mL/min/1.73m2   Magnesium   Result Value Ref Range    Magnesium 2.4 (H) 1.6 - 2.3 mg/dL   CBC with platelets   Result Value Ref Range    WBC Count 13.2 (H) 4.0 - 11.0 10e3/uL    RBC Count 2.78 (L) 3.80 - 5.20 10e6/uL    Hemoglobin 8.0 (L) 11.7 - 15.7 g/dL    Hematocrit 25.5 (L) 35.0 - 47.0 %    MCV 92 78 - 100 fL    MCH 28.8 26.5 - 33.0 pg    MCHC 31.4 (L) 31.5 - 36.5 g/dL    RDW 18.6 (H) 10.0 - 15.0 %    Platelet Count 394 150 - 450 10e3/uL   INR   Result Value Ref Range    INR 1.58 (H) 0.85 - 1.15   Heparin Unfractionated Anti Xa Level   Result Value Ref Range    Anti Xa Unfractionated Heparin >1.10 (HH) For Reference Range, See Comment IU/mL    Narrative     Therapeutic Range: UFH: 0.25-0.50 IU/mL for low intensity dosing,  0.30-0.70 IU/mL for high intensity dosing DVT and PE.  This test is not validated for other direct factor X inhibitors (e.g. rivaroxaban, apixaban, edoxaban, betrixaban, fondaparinux) and should not be used for monitoring of other medications.   Phosphorus   Result Value Ref Range    Phosphorus 4.4 2.5 - 4.5 mg/dL       Recent Labs   Lab Test 12/08/21  1221   CHOL 113   HDL 35*   LDL 59   TRIG 94       Hemoglobin A1C   Date Value Ref Range Status   12/08/2021 5.8 (H) 0.0 - 5.6 % Final     Comment:     Normal <5.7%   Prediabetes 5.7-6.4%    Diabetes 6.5% or higher     Note: Adopted from ADA consensus guidelines.       Results for orders placed or performed during the hospital encounter of 05/25/22   XR Chest Port 1 View    Impression    IMPRESSION: [place] catheter tip projects over the central right  pulmonary artery.    I have personally reviewed the examination and initial interpretation  and I agree with the findings.    CANDACE ALMONTE MD         SYSTEM ID:  [id number]   XR Abdomen Port 1 View    Impression    IMPRESSION: Gastric tube tip and sidehole project over the stomach.    I have personally reviewed the examination and initial interpretation  and I agree with the findings.    CANDACE ALMONTE MD         SYSTEM ID:  [id number]   XR Chest Port 1 View    Impression    Impression: Postoperative chest with slightly improved perihilar  atelectasis or edema. Endotracheal tube tip in lower trachea 2.5 cm  above the man.    I have personally reviewed the examination and initial interpretation  and I agree with the findings.    YVETTE GRAY MD         SYSTEM ID:  [id number]   XR Chest Port 1 View    Impression    Impression: Postoperative chest with stable perihilar atelectasis or  edema. Endotracheal tube tip 1.7 cm superior to the man.    I have personally reviewed the examination and initial interpretation  and I agree with the  findings.    YVETTE GRAY MD         SYSTEM ID:  [id number]   XR Chest Port 1 View    Impression    Impression:  1. Endotracheal tube retracted now 2.4 cm from the man.   2. No significant change in small amount of atelectasis lung bases  bilaterally..    YVETTE GRAY MD         SYSTEM ID:  [id number]   XR Chest Port 1 View    Impression    Impression:   1. Unchanged support devices as described above.  2. Unchanged bibasilar atelectasis. No new focal pulmonary opacities.    I have personally reviewed the examination and initial interpretation  and I agree with the findings.    CANDACE ALMONTE MD         SYSTEM ID:  [id number]   XR Chest Port 1 View    Impression    Impression:   1. Unchanged small effusions and bibasilar predominant opacities.  2. Unchanged support devices including pulmonary artery catheter and  ET tube over the low thoracic trachea.    I have personally reviewed the examination and initial interpretation  and I agree with the findings.    JESSICA WALKER MD         SYSTEM ID:  [id number]   XR Chest Port 1 View    Impression    IMPRESSION: Interval extubation. Slightly increased left pleural  effusion/basilar atelectasis. Atherosclerosis. Mild central  atelectasis/subtle positive fluid volume balance.    MARK GUADARRAMA MD         SYSTEM ID:  [id number]   XR Abdomen Port 1 View    Impression    Impression: Enteric tube is subdiaphragmatic with tip in the left  pelvis, presumably within the jejunum.    I have personally reviewed the examination and initial interpretation  and I agree with the findings.    BEBE CLIFFORD MD         SYSTEM ID:  [id number]   XR Chest Port 1 View    Impression    Impression:   1. Unchanged effusions with increased left basilar atelectasis and  interstitial pulmonary edema.  2. Removal of pulmonary artery catheter, mediastinal drain and left  chest tube. Given that lung apices are not entirely included in the  field-of-view, small pneumothoraces could be  missed.    I have personally reviewed the examination and initial interpretation  and I agree with the findings.    MARK GUADARRAMA MD         SYSTEM ID:  [id number]   XR Chest Port 1 View   Result Value Ref Range    Radiologist flags New moderate right-sided pneumothorax status post (AA)     Impression    Impression:   1. New moderate right-sided pneumothorax.  2. Possible trace left pneumothorax, attention on follow-up.  3. Increasing atelectasis/pulmonary edema.  4. Small left pleural effusion.  5. Interval removal of right basilar chest tube.    [Critical Result: New moderate right-sided pneumothorax status post  chest tube removal]    Finding was identified on 6/2/2022 6:21 PM.     Raz Rodgers was contacted by Dr. Wyatt at 6/2/2022 6:36 PM and  verbalized understanding of the critical finding.     I have personally reviewed the examination and initial interpretation  and I agree with the findings.    BK DUPREE MD         SYSTEM ID:  [id number]   XR Chest Port 1 View    Impression    Impression:   1. Unchanged appearance of mixed pulmonary opacities.  2. Small biapical pneumothoraces, right more than left.  3. Trace bilateral pleural effusions.    I have personally reviewed the examination and initial interpretation  and I agree with the findings.    BK DUPREE MD         SYSTEM ID:  [id number]   XR Chest Port 1 View    Impression    IMPRESSION:  1.  Lines and tubes, as above.  2.  Stable perihilar opacities, likely edema and atelectasis. Slightly  decreased retrocardiac opacity, likely decreasing atelectasis.   3.  Stable trace biapical pneumothoraces.    I have personally reviewed the examination and initial interpretation  and I agree with the findings.    ZEE FISHMAN MD         SYSTEM ID:  [id number]   XR Chest Port 1 View    Impression    Impression:   1. Unchanged left IJ catheter projecting over the innominate vein.  2. Unchanged effusions and mixed airspace opacities.  3. Decreasing  biapical pneumothoraces.    I have personally reviewed the examination and initial interpretation  and I agree with the findings.    JESSICA WALKER MD         SYSTEM ID:  [id number]   Echo Complete   Result Value Ref Range    LVEF  60-65%        Physical Exam  Vitals:    06/09/22 0400 06/09/22 0500 06/09/22 0600 06/09/22 0700   BP: 110/54 108/49 109/51 114/52   BP Location: Left arm      Cuff Size: Adult Regular      Pulse: 79 78 75 81   Resp:       Temp: 97.8  F (36.6  C)      TempSrc: Oral      SpO2: 95% 98% 96% 97%   Weight:       Height:         Physical Exam   Constitutional: No distress. She appears chronically ill.   Pulmonary/Chest: Effort normal.   Abdominal: Soft. Bowel sounds are normal. She exhibits no distension.   Neurological: She is alert and oriented to person, place, and time.   Skin: Skin is warm and dry.        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 Continues to recover and improve daily.     Changes Today:   - Continue to diuresis as needed for CVP 8-12  - Continue HiFlow/BiPAP  - Continue Warfarin  - Consider RHC for evaluation of PA pressures, additional therapies  - continue home pHTN medications as tolerated; may reduce dose if significant hypotension      # Pulmonary hypertension, Group IV  # CTEPH s/p pulmonary endaterectomy  # DVTs  # Severe tricuspid insufficiency  Initially diagnosed with bilateral DVT, PE 2019. TTE showed dilated RV with reduced RV function and RVSP of 84mmHg suggestive of severe pulmonary hypertension with mild to moderate TR. Patient believes the above relates to a work related injury, unna boot early 2019. RHC 12/18/2021 with RA 12, PA 84/19/45, PCWP 2, TAHIRA CO/CI 2.8/1.6. Last dose eliquis 5/23 AM. Has oxygen at home but does not use this. Underwent  endarterectomy on 5/27. Post op on multiple pressors and briefly on milrinone.   - Goal CVP < 10, diuresis as needed to achieve  - continue home pHTN meds        # Volume overload  PTA on lasix 20mg qday (decreased from 40mg ~6 weeks ago). JVP elevated on initial exam. S/p 40mg IV lasix 5/25; repeat as needed     # Anemia  Hgb 11.4, similar to prior last month.   Has been trending down with HgB today at 6.4 HgB would recommend workup for acute blood loss anemia       # CKD  Baseline Cr ~1.1.     Dwain Ferrer MD, MSc  Cardiovascular Disease Fellow  [facility]

## 2022-06-09 NOTE — PROGRESS NOTES
Major Shift Events:  Neuro intact. Incisional pain managed w/ scheduled Tylenol. MAP goal > 65 achieved w/o intervention. CVP 10-14. Remains on HFNC 10%/40L. SOB w/ activity. Refused BiPAP overnight. NJ w/ cycled TF @ 40 ml/hr. Incontinent BM x2. Purewick in place, adequate output. Heparin gtt off per orders. CVTS notified of Heparin Anti Xa > 1.10.  Plan: Continue to monitor I&O, CVP.  For vital signs and complete assessments, please see documentation flowsheets.

## 2022-06-10 ENCOUNTER — APPOINTMENT (OUTPATIENT)
Dept: INTERVENTIONAL RADIOLOGY/VASCULAR | Facility: CLINIC | Age: 77
DRG: 270 | End: 2022-06-10
Attending: PHYSICIAN ASSISTANT
Payer: MEDICARE

## 2022-06-10 ENCOUNTER — TELEPHONE (OUTPATIENT)
Dept: CARDIOLOGY | Facility: CLINIC | Age: 77
End: 2022-06-10

## 2022-06-10 ENCOUNTER — APPOINTMENT (OUTPATIENT)
Dept: ULTRASOUND IMAGING | Facility: CLINIC | Age: 77
DRG: 270 | End: 2022-06-10
Attending: PHYSICIAN ASSISTANT
Payer: MEDICARE

## 2022-06-10 ENCOUNTER — APPOINTMENT (OUTPATIENT)
Dept: GENERAL RADIOLOGY | Facility: CLINIC | Age: 77
DRG: 270 | End: 2022-06-10
Attending: THORACIC SURGERY (CARDIOTHORACIC VASCULAR SURGERY)
Payer: MEDICARE

## 2022-06-10 LAB
ANION GAP SERPL CALCULATED.3IONS-SCNC: 7 MMOL/L (ref 3–14)
ANION GAP SERPL CALCULATED.3IONS-SCNC: 7 MMOL/L (ref 3–14)
ATRIAL RATE - MUSE: 83 BPM
BUN SERPL-MCNC: 38 MG/DL (ref 7–30)
BUN SERPL-MCNC: 38 MG/DL (ref 7–30)
CALCIUM SERPL-MCNC: 9.2 MG/DL (ref 8.5–10.1)
CALCIUM SERPL-MCNC: 9.2 MG/DL (ref 8.5–10.1)
CHLORIDE BLD-SCNC: 102 MMOL/L (ref 94–109)
CHLORIDE BLD-SCNC: 103 MMOL/L (ref 94–109)
CO2 SERPL-SCNC: 29 MMOL/L (ref 20–32)
CO2 SERPL-SCNC: 29 MMOL/L (ref 20–32)
CREAT SERPL-MCNC: 0.98 MG/DL (ref 0.52–1.04)
CREAT SERPL-MCNC: 1.09 MG/DL (ref 0.52–1.04)
DIASTOLIC BLOOD PRESSURE - MUSE: NORMAL MMHG
ERYTHROCYTE [DISTWIDTH] IN BLOOD BY AUTOMATED COUNT: 18.1 % (ref 10–15)
GFR SERPL CREATININE-BSD FRML MDRD: 52 ML/MIN/1.73M2
GFR SERPL CREATININE-BSD FRML MDRD: 59 ML/MIN/1.73M2
GLUCOSE BLD-MCNC: 111 MG/DL (ref 70–99)
GLUCOSE BLD-MCNC: 81 MG/DL (ref 70–99)
GLUCOSE BLDC GLUCOMTR-MCNC: 106 MG/DL (ref 70–99)
GLUCOSE BLDC GLUCOMTR-MCNC: 86 MG/DL (ref 70–99)
GLUCOSE BLDC GLUCOMTR-MCNC: 90 MG/DL (ref 70–99)
GLUCOSE BLDC GLUCOMTR-MCNC: 91 MG/DL (ref 70–99)
GLUCOSE BLDC GLUCOMTR-MCNC: 93 MG/DL (ref 70–99)
GLUCOSE BLDC GLUCOMTR-MCNC: 98 MG/DL (ref 70–99)
HCT VFR BLD AUTO: 26.2 % (ref 35–47)
HGB BLD-MCNC: 8.4 G/DL (ref 11.7–15.7)
INR PPP: 1.75 (ref 0.85–1.15)
INTERPRETATION ECG - MUSE: NORMAL
MAGNESIUM SERPL-MCNC: 2.4 MG/DL (ref 1.6–2.3)
MAGNESIUM SERPL-MCNC: 2.4 MG/DL (ref 1.6–2.3)
MCH RBC QN AUTO: 29.8 PG (ref 26.5–33)
MCHC RBC AUTO-ENTMCNC: 32.1 G/DL (ref 31.5–36.5)
MCV RBC AUTO: 93 FL (ref 78–100)
P AXIS - MUSE: 37 DEGREES
PHOSPHATE SERPL-MCNC: 4.3 MG/DL (ref 2.5–4.5)
PLATELET # BLD AUTO: 426 10E3/UL (ref 150–450)
POTASSIUM BLD-SCNC: 4.3 MMOL/L (ref 3.4–5.3)
POTASSIUM BLD-SCNC: 4.4 MMOL/L (ref 3.4–5.3)
PR INTERVAL - MUSE: 220 MS
QRS DURATION - MUSE: 86 MS
QT - MUSE: 360 MS
QTC - MUSE: 423 MS
R AXIS - MUSE: 105 DEGREES
RBC # BLD AUTO: 2.82 10E6/UL (ref 3.8–5.2)
SODIUM SERPL-SCNC: 138 MMOL/L (ref 133–144)
SODIUM SERPL-SCNC: 139 MMOL/L (ref 133–144)
SYSTOLIC BLOOD PRESSURE - MUSE: NORMAL MMHG
T AXIS - MUSE: -21 DEGREES
VENTRICULAR RATE- MUSE: 83 BPM
WBC # BLD AUTO: 11.3 10E3/UL (ref 4–11)

## 2022-06-10 PROCEDURE — 250N000009 HC RX 250: Performed by: STUDENT IN AN ORGANIZED HEALTH CARE EDUCATION/TRAINING PROGRAM

## 2022-06-10 PROCEDURE — 250N000011 HC RX IP 250 OP 636: Performed by: PHYSICIAN ASSISTANT

## 2022-06-10 PROCEDURE — 93970 EXTREMITY STUDY: CPT

## 2022-06-10 PROCEDURE — 250N000013 HC RX MED GY IP 250 OP 250 PS 637: Performed by: STUDENT IN AN ORGANIZED HEALTH CARE EDUCATION/TRAINING PROGRAM

## 2022-06-10 PROCEDURE — 85610 PROTHROMBIN TIME: CPT | Performed by: STUDENT IN AN ORGANIZED HEALTH CARE EDUCATION/TRAINING PROGRAM

## 2022-06-10 PROCEDURE — 999N000065 XR CHEST PORT 1 VIEW

## 2022-06-10 PROCEDURE — 250N000013 HC RX MED GY IP 250 OP 250 PS 637

## 2022-06-10 PROCEDURE — 80048 BASIC METABOLIC PNL TOTAL CA: CPT | Performed by: STUDENT IN AN ORGANIZED HEALTH CARE EDUCATION/TRAINING PROGRAM

## 2022-06-10 PROCEDURE — 250N000011 HC RX IP 250 OP 636: Performed by: STUDENT IN AN ORGANIZED HEALTH CARE EDUCATION/TRAINING PROGRAM

## 2022-06-10 PROCEDURE — 999N000215 HC STATISTIC HFNC ADULT NON-CPAP

## 2022-06-10 PROCEDURE — 32557 INSERT CATH PLEURA W/ IMAGE: CPT

## 2022-06-10 PROCEDURE — 272N000196 HC ACCESSORY CR5

## 2022-06-10 PROCEDURE — 83735 ASSAY OF MAGNESIUM: CPT | Performed by: STUDENT IN AN ORGANIZED HEALTH CARE EDUCATION/TRAINING PROGRAM

## 2022-06-10 PROCEDURE — 71045 X-RAY EXAM CHEST 1 VIEW: CPT | Mod: 26 | Performed by: RADIOLOGY

## 2022-06-10 PROCEDURE — 258N000003 HC RX IP 258 OP 636: Performed by: STUDENT IN AN ORGANIZED HEALTH CARE EDUCATION/TRAINING PROGRAM

## 2022-06-10 PROCEDURE — 250N000013 HC RX MED GY IP 250 OP 250 PS 637: Performed by: THORACIC SURGERY (CARDIOTHORACIC VASCULAR SURGERY)

## 2022-06-10 PROCEDURE — 83735 ASSAY OF MAGNESIUM: CPT | Performed by: PHYSICIAN ASSISTANT

## 2022-06-10 PROCEDURE — 99231 SBSQ HOSP IP/OBS SF/LOW 25: CPT | Mod: 25 | Performed by: INTERNAL MEDICINE

## 2022-06-10 PROCEDURE — 32557 INSERT CATH PLEURA W/ IMAGE: CPT | Mod: RT | Performed by: RADIOLOGY

## 2022-06-10 PROCEDURE — 200N000002 HC R&B ICU UMMC

## 2022-06-10 PROCEDURE — 272N000192 HC ACCESSORY CR2

## 2022-06-10 PROCEDURE — 99233 SBSQ HOSP IP/OBS HIGH 50: CPT | Mod: 24 | Performed by: INTERNAL MEDICINE

## 2022-06-10 PROCEDURE — 999N000157 HC STATISTIC RCP TIME EA 10 MIN

## 2022-06-10 PROCEDURE — 85027 COMPLETE CBC AUTOMATED: CPT | Performed by: STUDENT IN AN ORGANIZED HEALTH CARE EDUCATION/TRAINING PROGRAM

## 2022-06-10 PROCEDURE — 80048 BASIC METABOLIC PNL TOTAL CA: CPT | Performed by: PHYSICIAN ASSISTANT

## 2022-06-10 PROCEDURE — 84100 ASSAY OF PHOSPHORUS: CPT | Performed by: THORACIC SURGERY (CARDIOTHORACIC VASCULAR SURGERY)

## 2022-06-10 PROCEDURE — C1769 GUIDE WIRE: HCPCS

## 2022-06-10 PROCEDURE — 0W9930Z DRAINAGE OF RIGHT PLEURAL CAVITY WITH DRAINAGE DEVICE, PERCUTANEOUS APPROACH: ICD-10-PCS | Performed by: RADIOLOGY

## 2022-06-10 PROCEDURE — 272N000500 HC NEEDLE CR2

## 2022-06-10 PROCEDURE — C1729 CATH, DRAINAGE: HCPCS

## 2022-06-10 PROCEDURE — 93970 EXTREMITY STUDY: CPT | Mod: 26 | Performed by: RADIOLOGY

## 2022-06-10 RX ORDER — FUROSEMIDE 10 MG/ML
10 INJECTION INTRAMUSCULAR; INTRAVENOUS EVERY 8 HOURS
Status: DISCONTINUED | OUTPATIENT
Start: 2022-06-10 | End: 2022-06-12

## 2022-06-10 RX ORDER — LIDOCAINE 40 MG/G
CREAM TOPICAL
Status: DISCONTINUED | OUTPATIENT
Start: 2022-06-10 | End: 2022-06-16

## 2022-06-10 RX ORDER — FUROSEMIDE 10 MG/ML
10 INJECTION INTRAMUSCULAR; INTRAVENOUS ONCE
Status: COMPLETED | OUTPATIENT
Start: 2022-06-10 | End: 2022-06-10

## 2022-06-10 RX ORDER — LIDOCAINE HYDROCHLORIDE 10 MG/ML
1-30 INJECTION, SOLUTION EPIDURAL; INFILTRATION; INTRACAUDAL; PERINEURAL
Status: COMPLETED | OUTPATIENT
Start: 2022-06-10 | End: 2022-06-10

## 2022-06-10 RX ORDER — DIGOXIN 0.25 MG/ML
125 INJECTION INTRAMUSCULAR; INTRAVENOUS DAILY
Status: DISCONTINUED | OUTPATIENT
Start: 2022-06-10 | End: 2022-06-11

## 2022-06-10 RX ADMIN — APIXABAN 5 MG: 5 TABLET, FILM COATED ORAL at 08:23

## 2022-06-10 RX ADMIN — METHOCARBAMOL 500 MG: 500 TABLET ORAL at 16:41

## 2022-06-10 RX ADMIN — Medication 15 ML: at 08:24

## 2022-06-10 RX ADMIN — RIOCIGUAT 0.5 MG: 0.5 TABLET, FILM COATED ORAL at 13:44

## 2022-06-10 RX ADMIN — OXYCODONE HYDROCHLORIDE 5 MG: 5 TABLET ORAL at 16:41

## 2022-06-10 RX ADMIN — ACETAMINOPHEN 325MG 975 MG: 325 TABLET ORAL at 08:23

## 2022-06-10 RX ADMIN — RIOCIGUAT 0.5 MG: 0.5 TABLET, FILM COATED ORAL at 19:51

## 2022-06-10 RX ADMIN — APIXABAN 5 MG: 5 TABLET, FILM COATED ORAL at 19:52

## 2022-06-10 RX ADMIN — FUROSEMIDE 10 MG: 10 INJECTION, SOLUTION INTRAVENOUS at 16:41

## 2022-06-10 RX ADMIN — Medication 1 PACKET: at 08:28

## 2022-06-10 RX ADMIN — FUROSEMIDE 10 MG: 10 INJECTION, SOLUTION INTRAVENOUS at 18:22

## 2022-06-10 RX ADMIN — LIDOCAINE HYDROCHLORIDE 9 ML: 10 INJECTION, SOLUTION EPIDURAL; INFILTRATION; INTRACAUDAL; PERINEURAL at 14:47

## 2022-06-10 RX ADMIN — Medication 10 MG: at 19:51

## 2022-06-10 RX ADMIN — Medication 1 UNITS/HR: at 09:26

## 2022-06-10 RX ADMIN — ACETAMINOPHEN 325MG 975 MG: 325 TABLET ORAL at 16:41

## 2022-06-10 RX ADMIN — FUROSEMIDE 20 MG: 10 INJECTION, SOLUTION INTRAMUSCULAR; INTRAVENOUS at 08:34

## 2022-06-10 RX ADMIN — MIDODRINE HYDROCHLORIDE 20 MG: 5 TABLET ORAL at 08:24

## 2022-06-10 RX ADMIN — OXYCODONE HYDROCHLORIDE 5 MG: 5 TABLET ORAL at 20:45

## 2022-06-10 RX ADMIN — DIGOXIN 125 MCG: 0.25 INJECTION INTRAMUSCULAR; INTRAVENOUS at 13:45

## 2022-06-10 RX ADMIN — FUROSEMIDE 20 MG: 10 INJECTION, SOLUTION INTRAMUSCULAR; INTRAVENOUS at 01:47

## 2022-06-10 RX ADMIN — MIDODRINE HYDROCHLORIDE 20 MG: 5 TABLET ORAL at 16:41

## 2022-06-10 RX ADMIN — RIOCIGUAT 0.5 MG: 0.5 TABLET, FILM COATED ORAL at 08:24

## 2022-06-10 RX ADMIN — LIDOCAINE PATCH 4% 1 PATCH: 40 PATCH TOPICAL at 08:37

## 2022-06-10 RX ADMIN — PANTOPRAZOLE SODIUM 40 MG: 40 TABLET, DELAYED RELEASE ORAL at 08:30

## 2022-06-10 RX ADMIN — OXYCODONE HYDROCHLORIDE 5 MG: 5 TABLET ORAL at 08:24

## 2022-06-10 ASSESSMENT — VISUAL ACUITY
OU: GLASSES;BASELINE
OU: GLASSES;BASELINE

## 2022-06-10 ASSESSMENT — ACTIVITIES OF DAILY LIVING (ADL)
ADLS_ACUITY_SCORE: 31
ADLS_ACUITY_SCORE: 35
ADLS_ACUITY_SCORE: 37
ADLS_ACUITY_SCORE: 35
ADLS_ACUITY_SCORE: 37
ADLS_ACUITY_SCORE: 33
ADLS_ACUITY_SCORE: 31

## 2022-06-10 NOTE — PROGRESS NOTES
CV ICU PROGRESS NOTE      CO-MORBIDITIES:   CTEPH (chronic thromboembolic pulmonary hypertension) (H)  (primary encounter diagnosis)  Primary pulmonary hypertension (H)  SOB (shortness of breath)  S/P coronary angiogram    ASSESSMENT: Debi Espinoza is a 76 year old female with PMH of HTN, nephrolithiasis, DVT (2019) and PE (2019, 2021) on chronic anticoagulation, and chronic thromboembolic pulmonary hypertension who underwent pulmonary artery thromboendarterectomy on 5/27 with Dr. Peterson.    Overnight Events: Vasopressin on Briefly for hypotension 2/2 aggressive diuresis yesterday    TODAY'S PROGRESS:   - Bilateral venous duplex for ongoing BL LE edema   - Decrease digoxin dosing to 125 mcg  - holding TF pending calorie counts, reassess if need to resume over night this afternoon  - Hold on further diuresis this morning   - Will re-evaluate this afternoon, net goal negative 2-3 L at midnight  - Wean vasopressin as able  - IR for possible drainage of right pleural effusion  - f/u chest x-ray if IR does drain effusion  - f/u BMP @ 1600    PLAN:  Neuro/ pain/ sedation:  Acute Postoperative pain  - Monitor neurological status. Notify the MD for any acute changes in exam.  - Pain: S tylenol 975 TID, lidocaine patches. PRN oxycodone 5-10 q4h, robaxin 500 q6h, PRN dilaudid 0.2 q2h     Pulmonary care:   S/p Pulmonary artery thromboendarterectomy on 5/27/2022 by Dr. Peterson  Hx Chronic pulmonary thromboembolic disease  Hx PE (2019, 2021)  Hx Emphysema, moderate  Small right pleural effusion - seen on CT chest 6/9  PTA regimen: apixaban 5mg BID  CTA 05/2019 demonstrated underlying moderate emphysema  Extubated 5/31  - Supplemental oxygen: stable on NC, wean as tolerated  - PTA Opsumit and Adempas   - 6/9 CT chest with small right pleural effusion -> IR for possible aspiration  - IR consulted for potential right thoracentesis today, will need f/u CXR if they perform drain    Cardiovascular:    Hx Severe pulmonary  hypertension  Hx RV dilation and reduced RV function  Hx Severe tricuspid insufficiency  Hx Essential HTN  Distributive shock  PTA regimen: furosemide 20mg qd, adempas 2.5mg TID, opsumit 10mg qd  Echo on 03/2021 with LVEF of 78%, severe pulmonary hypertension (82 mmHg), reduced RV function (TAPSE 16mm), severe tricuspid insufficiency    - 6/2 ECHO: Global and regional LV function is normal: EF of 60-65%. Flattened septum c/w RV pressure overload. Moderate RV dilation with severely reduced global right ventricular function. Mod TR. Severe p-HTN. Estimated pulmonary artery systolic pressure is 85 mmHg plus right atrial pressure. Unable to visualize IVC. No pericardial effusions. Compared to prior TTE, RV is slightly less dilated, function appears similar.    6/8 ECHO: Global and regional LV function is hyperkinetic EF of 65-70%.  Mod LV dilation w/ severely reduced global RV function. Moderate TR. Estimated pulmonary artery systolic pressure is 57 mmHg plus right atrial pressure. Compared with the study from 6/2/22. Estimated PA pressure is lower. No change in LV/RV function.    - Monitor hemodynamic status.   - Currently off pressors however requiring intermittent fluid boluses after diuresis  - If hypotensive, start vasopressin and avoid additional fluid boluses  - MAP goal > 65  - no indication for ASA use  - on p-HTN meds as above  - V pacing back up 50 BPM  - Continue midodrine 20 q8h  - Apixiban started 6/8  - 6/8-9 digoxin loaded - 500 mcg, followed by 250 q8h x2   - Decrease digoxin maintenance dose 125 mcg daily (per pharmacy for renal/weight dosing)  - 6/10 Bilateral venous duplex for ongoing BL LE edema - No evidence of thrombus     GI care/ Nutrition:   - Speech consult, appreciate recs   - Regular diet  - Calorie counts  - holding NJ feeding for now pending calorie counts, reassess nutrition status this afternoon  - PPI    Renal/ Fluid Balance/ Electrolytes:   Nephrolithiasis  Hypernatremia  PTA regimen:  furosemide 20mg qd  BL creat appears to be ~ 1.14  - Strict I/O, daily weights  - Avoid/limit nephrotoxins as able  - Replete lytes PRN per protocol  - FWF at 30 ml q4h  - Reassess diuresis plan in the afternoon   - Goal net negative 2-3 L by midnight (yesterday -1.7L, since midnight -2.3L)     Endocrine:    Stress induced hyperglycemia  Preop A1c 5.8 (12/21)  - High sliding scale insulin   - Goal BG <180 for optimal healing      ID/ Antibiotics:  Stress induced leukocytosis  Distributive shock  5/30 MRSA swab negative  5/30 Sputum - Klebsiella  5/31 Sputum culture with 4+ lactose fermenting gram negative rods  5/31 Repeat blood cultures NGTD  6/4 Blood culture x2: NGTD    Remains afebrile since 6/4, WBC appears to have plateau 16-17  - s/p Meropenem (6/2-6/3) and Zosyn (6/3- 6/6)  - Continue to monitor fever curve, WBC and inflammatory markers as appropriate  - No indication for further antimicrobial coverage at this time    Heme:     Stress induced leukocytosis  Acute blood loss anemia  Acute blood loss thrombocytopenia  Hx DVT (2019), PE (2019, 2021) on chronic anticoagulation (Apixaban)  PA tear intra-op s/p patch repair.  - Continue to monitor  - Apixiban for prophylaxis  - Daily CBC   - last transfusion 6/5. Has been stable 7->8 since      MSK/ Skin:  Sternotomy  Surgical Incision  - Sternal precautions  - Postoperative incision management per protocol  - PT/OT/CR     Prophylaxis:    - Mechanical prophylaxis for DVT  - Chemical DVT prophylaxis - Apixiban  - PPI     Lines/ tubes/ drains:  - PICC line  - NJ     Disposition:  - Continue CVICU, potentially transfer tomorrow    Patient seen, findings and plan discussed with CV ICU staff, Dr. Pinon and CVTS staff Dr. Tierney Corado MD  PGY-3, General Surgery  ---    ====================================    SUBJECTIVE:   Doing well today. Overnight, hypotensive with significant diuresis. Currently on vasopressin.     OBJECTIVE:   1. VITAL SIGNS:   Temp:   [96.6  F (35.9  C)-99  F (37.2  C)] 98  F (36.7  C)  Pulse:  [] 78  Resp:  [12-26] 20  BP: ()/(44-77) 108/60  FiO2 (%):  [30 %-40 %] 35 %  SpO2:  [85 %-100 %] 97 %  FiO2 (%): 35 %  Resp: 20    2. INTAKE/ OUTPUT:   I/O last 3 completed shifts:  In: 1437 [P.O.:585; I.V.:92; NG/GT:300]  Out: 3150 [Urine:3150]    3. PHYSICAL EXAMINATION:   General: Sitting upright in bed, appears relatively comfortable  Neuro: Responsive and follows commands  Resp: Breathing less labored. Regular respiratory effort, on NC.   CV: RRR. peirpheral pulses intact  Abdomen: Soft, Non-distended, Non-tender  Incisions: clean dry intact  Extremities: warm and well perfused    4. INVESTIGATIONS:   Arterial Blood Gases   No lab results found in last 7 days.  Complete Blood Count   Recent Labs   Lab 06/10/22  0343 06/09/22  0355 06/08/22  0416 06/07/22  0359   WBC 11.3* 13.2* 15.0* 17.9*   HGB 8.4* 8.0* 8.0* 8.5*    394 353 343     Basic Metabolic Panel  Recent Labs   Lab 06/10/22  0346 06/10/22  0343 06/09/22  2357 06/09/22  1952 06/09/22  1756 06/09/22  0829 06/09/22  0355 06/08/22  2035 06/08/22  1725 06/08/22  0838 06/08/22  0416   NA  --  139  --   --   --   --  136  --  138  --  137   POTASSIUM  --  4.3  --   --  4.5  --  4.8  --  4.1  4.1  --  4.0   CHLORIDE  --  103  --   --   --   --  104  --  106  --  107   CO2  --  29  --   --   --   --  26  --  24  --  24   BUN  --  38*  --   --   --   --  36*  --  33*  --  34*   CR  --  1.09*  --   --   --   --  0.99  --  0.82  --  0.90   GLC 86 81 93 101*  --    < > 111*   < > 86   < > 125*    < > = values in this interval not displayed.     Liver Function Tests  Recent Labs   Lab 06/10/22  0343 06/09/22  0355 06/08/22  0416 06/07/22  0359   INR 1.75* 1.58* 1.39* 1.35*     Pancreatic Enzymes  No lab results found in last 7 days.  Coagulation Profile  Recent Labs   Lab 06/10/22  0343 06/09/22  0355 06/08/22  0416 06/07/22  0359   INR 1.75* 1.58* 1.39* 1.35*     5. RADIOLOGY:    Recent Results (from the past 24 hour(s))   CT Chest w/o Contrast   Result Value    Radiologist flags Pericardial hematoma (Urgent)    Narrative    CT CHEST W/O CONTRAST 6/9/2022 4:38 PM    History: Pneumonia, effusion or abscess suspected, xray done    Comparison: Chest x-ray same date    Technique: CT of the chest was obtained without intravenous contrast.  Axial, coronal, and sagittal reconstructions were obtained and  reviewed.    Contrast: none.    Findings: Postsurgical changes of pulmonary artery endarterectomy,  median sternotomy wires are intact.    Lungs: Patchy groundglass opacities most significant within the  bilateral upper lobes with mild interlobular septal thickening and  focal consolidative opacities. Trace simple left effusion and small  right partially loculated effusion. No pneumothorax. No suspicious  nodule is visualized although sensitivity is reduced in the setting of  active pulmonary consolidation.  Airways: Central tracheobronchial tree is clear.  Vessels: Main pulmonary artery and aorta are normal in caliber. Normal  three-vessel arch. Right upper extremity PICC tip terminates in the  superior cavoatrial junction.  Heart: Heart size is normal without pericardial effusion adjacent to  the right heart border is a hyperdense fluid collection measuring 8.3  x 2.4 cm exerting a mild amount of mass effect on the right atria  would be consistent with pericardial hematoma.  Lymph nodes: Multiple prominent mediastinal and hilar lymph nodes are  likely reactive.  Thyroid: Within normal limits.  Esophagus: Feeding tube courses through the esophagus, tip not  visualized.    Upper abdomen: Nonobstructing right-sided nephrolithiasis measuring 8  mm.    Bones and soft tissues: No suspicious axillary lymphadenopathy or soft  tissue mass. No suspicious osseous lesion.      Impression    Impression:   1. Dense pericardial hematoma about the right atria measuring up to  8.3 x 2.4 cm in maximum dimension,  CTA is recommended to further  evaluate if there is clinical concern for active bleed.  2. Multifocal groundglass and patchy airspace opacities suspicious for  infectious etiology.  3. Trace simple left and small partially loculated right pleural  effusions.    [Urgent Result: Pericardial hematoma]    Finding was identified on 6/9/2022 4:45 PM.     Dr Silva was contacted by Dr. Cale Elaine at 6/9/2022 5:01 PM and  verbalized understanding of the urgent finding.     I have personally reviewed the examination and initial interpretation  and I agree with the findings.    BEBE CLIFFORD MD         SYSTEM ID:  [id number]       =========================================

## 2022-06-10 NOTE — PLAN OF CARE
ICU End of Shift Summary. See flowsheets for vital signs and detailed assessment.    Changes this shift: Provided cares between 0700 and 1500.    Neuro: Alert and oriented x 4, able to make needs know, call light appropriate. SAMANTHA, moving all extremities when generalized weakness.  Complained of chest and midsternal incisional pain.  PRN oxycodone given, fully effective.      Cardiac: 1  AV block, SR, HR 70-80s.  CVP 7.  Vasopressin restarted this morning for MAPs 55s, currently on vaso to maintain MAPs >65.  Afebrile.  US of BLE for severe edema (see results).  Avoid SCD/stockings for now per CVTS.    Resp: 3L NC, O2 sat >92%. Dyspnea with exertion and desats with activity, but recovers quickly.  Was on HFNC overnight.  Down to IR for therapeutic thoracentesis this afternoon.    GI: Regular diet, tolerating oral intake. Ate >75% of brunch today.  On calorie count, possible TF overnight.    : Diurese x1 lasix 20 mg IV, good response (see I&Os).  Due to hypotension this morning, holding afternoon dose. Team will reassess this evening.  Voiding spontaneously via external catheter.      Plan: Monitor fluid status and hemodynamics and follow POC.

## 2022-06-10 NOTE — PROGRESS NOTES
Dillan Davila M.D.  Cardiovascular Medicine    I personally saw and examined this patient, discussed care with housestaff and other consultants, reviewed current laboratories and imaging studies, and conveyed impression and diagnostic/therapeutic plan to patient.    Problem List  1. Pulmonary arterial hypertension  2. CTEPH  3. Right ventricular failure  4. Chronic renal failure  5. Anemia  6. Respiratory failure requiring oxygen supplementation  7. Volume overload  8. Pericardial hematoma  9. Hypotension requiring midodrine    History  No events overnight. Nursing notes reviewed.    Objective  /50   Pulse 76   Temp 98  F (36.7  C) (Oral)   Resp 18   Ht 1.524 m (5')   Wt 68.2 kg (150 lb 5.7 oz)   SpO2 97%   BMI 29.36 kg/m      Intake/Output Summary (Last 24 hours) at 6/10/2022 0538  Last data filed at 6/10/2022 0500  Gross per 24 hour   Intake 952 ml   Output 3950 ml   Net -2998 ml     Wt Readings from Last 5 Encounters:   06/10/22 68.2 kg (150 lb 5.7 oz)   04/29/22 67.4 kg (148 lb 8 oz)   03/16/22 69.4 kg (153 lb)   12/08/21 67.6 kg (149 lb)   11/29/21 67.7 kg (149 lb 3.2 oz)       Meds    acetaminophen  975 mg Oral or Feeding Tube Q8H ORLANDO     apixaban ANTICOAGULANT  5 mg Oral BID     furosemide  20 mg Intravenous Q6H     heparin lock flush  5-20 mL Intracatheter Q24H     insulin aspart  1-12 Units Subcutaneous Q4H     lidocaine  1 patch Transdermal Q24H     lidocaine   Transdermal Q8H ORLANDO     macitentan  10 mg Oral Daily with lunch     melatonin  10 mg Oral or Feeding Tube QPM     midodrine  20 mg Oral Q8H     multivitamins w/minerals  15 mL Per Feeding Tube Daily     pantoprazole  40 mg Oral Daily     [Held by provider] polyethylene glycol  17 g Oral or Feeding Tube BID     protein modular  1 packet Per Feeding Tube Daily     riociguat  0.5 mg Oral TID     [Held by provider] senna-docusate  1 tablet Oral or Feeding Tube BID     sodium chloride (PF)  10-40 mL Intracatheter Q8H     sodium chloride  (PF)  10-40 mL Intracatheter Q8H     sodium chloride (PF)  3 mL Intracatheter Q8H     sodium chloride bacteriostatic  10 mL Intravenous Once       Labs  CMP  Recent Labs   Lab 06/10/22  0346 06/10/22  0343 06/09/22  2357 06/09/22  1952 06/09/22  1756 06/09/22  0829 06/09/22 0355 06/08/22 2035 06/08/22  1725 06/08/22  0838 06/08/22 0416   NA  --  139  --   --   --   --  136  --  138  --  137   POTASSIUM  --  4.3  --   --  4.5  --  4.8  --  4.1  4.1  --  4.0   CHLORIDE  --  103  --   --   --   --  104  --  106  --  107   CO2  --  29  --   --   --   --  26  --  24  --  24   ANIONGAP  --  7  --   --   --   --  6  --  8  --  6   GLC 86 81 93 101*  --    < > 111*   < > 86   < > 125*   BUN  --  38*  --   --   --   --  36*  --  33*  --  34*   CR  --  1.09*  --   --   --   --  0.99  --  0.82  --  0.90   GFRESTIMATED  --  52*  --   --   --   --  58*  --  73  --  66   CHELE  --  9.2  --   --   --   --  8.9  --  8.7  --  8.5   MAG  --  2.4*  --   --  2.4*  --  2.4*  --   --   --  2.3   PHOS  --  4.3  --   --  4.2  --  4.4  --   --   --  4.0    < > = values in this interval not displayed.     CBC  Recent Labs   Lab 06/10/22  0343 06/09/22  0355 06/08/22  0416 06/07/22  0359   WBC 11.3* 13.2* 15.0* 17.9*   RBC 2.82* 2.78* 2.73* 2.94*   HGB 8.4* 8.0* 8.0* 8.5*   HCT 26.2* 25.5* 24.9* 26.6*   MCV 93 92 91 91   MCH 29.8 28.8 29.3 28.9   MCHC 32.1 31.4* 32.1 32.0   RDW 18.1* 18.6* 18.0* 17.1*    394 353 343     INR  Recent Labs   Lab 06/10/22  0343 06/09/22  0355 06/08/22  0416 06/07/22  0359   INR 1.75* 1.58* 1.39* 1.35*     Arterial Blood Gas  Recent Labs   Lab 06/08/22  1725 06/07/22  0359 06/06/22  2150 06/06/22  1636   O2PER 30 40 40 40       Imaging   CT CHEST W/O CONTRAST 6/9/2022 4:38 PM     History: Pneumonia, effusion or abscess suspected, xray done     Comparison: Chest x-ray same date     Technique: CT of the chest was obtained without intravenous contrast.  Axial, coronal, and sagittal reconstructions were  Detail Level: Simple obtained and  reviewed.     Contrast: none.     Findings: Postsurgical changes of pulmonary artery endarterectomy,  median sternotomy wires are intact.     Lungs: Patchy groundglass opacities most significant within the  bilateral upper lobes with mild interlobular septal thickening and  focal consolidative opacities. Trace simple left effusion and small  right partially loculated effusion. No pneumothorax. No suspicious  nodule is visualized although sensitivity is reduced in the setting of  active pulmonary consolidation.  Airways: Central tracheobronchial tree is clear.  Vessels: Main pulmonary artery and aorta are normal in caliber. Normal  three-vessel arch. Right upper extremity PICC tip terminates in the  superior cavoatrial junction.  Heart: Heart size is normal without pericardial effusion adjacent to  the right heart border is a hyperdense fluid collection measuring 8.3  x 2.4 cm exerting a mild amount of mass effect on the right atria  would be consistent with pericardial hematoma.  Lymph nodes: Multiple prominent mediastinal and hilar lymph nodes are  likely reactive.  Thyroid: Within normal limits.  Esophagus: Feeding tube courses through the esophagus, tip not  visualized.     Upper abdomen: Nonobstructing right-sided nephrolithiasis measuring 8  mm.     Bones and soft tissues: No suspicious axillary lymphadenopathy or soft  tissue mass. No suspicious osseous lesion.                                                                      Impression:   1. Dense pericardial hematoma about the right atria measuring up to  8.3 x 2.4 cm in maximum dimension, CTA is recommended to further  evaluate if there is clinical concern for active bleed.  2. Multifocal groundglass and patchy airspace opacities suspicious for  infectious etiology.  3. Trace simple left and small partially loculated right pleural  Effusions.            Name: CARLOS LEGER  MRN: [id number]  : 1945  Study Date: 2022 08:02  AM  Age: 76 yrs  Gender: Female  Patient Location: [facility]  Reason For Study: Heart Failure  Ordering Physician: LILIBETH UREÑA  Performed By: Yane Locke     BSA: 1.6 m2  Height: 60 in  Weight: 146 lb  HR: 82  BP: 106/54 mmHg  ______________________________________________________________________________  Procedure  Limited Portable Echo Adult.  ______________________________________________________________________________  Interpretation Summary  Global and regional left ventricular function is hyperkinetic with an EF of  65-70%.  Moderate right ventricular dilation with severely reduced global right  ventricular function.  Moderate tricuspid insufficiency.  Estimated pulmonary artery systolic pressure is 57 mmHg plus right atrial  pressure.     This study was compared with the study from 6/2/22.  Estimated PA pressure is lower. No change in LV/RV function.  ______________________________________________________________________________  Left Ventricle  Global and regional left ventricular function is hyperkinetic with an EF of  65-70%. Flattened septum is consistent with right ventricular pressure  overload.     Right Ventricle  Moderate right ventricular dilation is present. Global right ventricular  function is severely reduced.     Mitral Valve  The mitral valve is normal. Trace mitral insufficiency is present.     Tricuspid Valve  Moderate tricuspid insufficiency is present. Estimated pulmonary artery  systolic pressure is 57 mmHg plus right atrial pressure.     Pulmonic Valve  The pulmonic valve is normal. Mild pulmonic insufficiency is present.     Vessels  Unable to assess mean RA pressure given the patient is on a ventilator.     Pericardium  No pericardial effusion is present.     Compared to Previous Study  This study was compared with the study from 6/2/22 .     ______________________________________________________________________________  Doppler Measurements & Calculations  TR max gurwinder: 367.6  cm/sec  TR max P.8 mmHg

## 2022-06-10 NOTE — PROGRESS NOTES
CLINICAL NUTRITION SERVICES - REASSESSMENT NOTE     Nutrition Prescription    RECOMMENDATIONS FOR MDs/PROVIDERS TO ORDER:  -Recommend continuing cyclic TFs overnight as pt is not taking enough PO throughout the day to meet >50% estimated energy needs. (Discussed with MD via phone call)  -Continue cyclic TFs until pt eating >50% of 3 adequate meals x 1 day.  -Daily fluid adjustments per MD    Malnutrition Status:    Patient does not meet two of the established criteria necessary for diagnosing malnutrition    Recommendations already ordered by Registered Dietitian (RD):  -Continue Osmolite 1.5 Mayur @ goal of 40ml/hr x 12 hours per day (480ml/day) + 1 pkt Prosource will provide: 760 kcals (15 kcal/kg), 46 g PRO (0.9 g/kg), 366 ml free H20, 98 g CHO, and 0 g fiber daily.  -Continue MVI daily  -Continue Ensure Enlive for snacks     Future/Additional Recommendations:  Monitor PO intake/adequacy and ability to transition off of TFs.      EVALUATION OF THE PROGRESS TOWARD GOALS   Diet: Regular  Nutrition Support: Pt tolerating cyclic TFs.     5/29-5/31: Osmolite 1.5 Mayur @ goal of  35ml/hr (840ml/day) will provide: 1260 kcals, 52 g PRO, 640 ml free H20, 171 g CHO, and 0 g fiber daily. + 1 pkt prosource TF = 1300 kcal (25 kcal/kg) and 63 g protein (1.2 g/kg)     6/1-6/3: Osmolite 1.5 Mayur @ goal of 40ml/hr (960ml/day) + 1 pkt Prosource will provide: 1520 kcals (29 kcal/kg), 71 g PRO (1.4 g/kg), 731 ml free H20, 195 g CHO, and 0 g fiber daily.     6/4-6/7: Osmolite 1.5 Mayur @ goal of 80ml/hr x 12 hours per day (960ml/day) + 1 pkt Prosource will provide: 1520 kcals (29 kcal/kg), 71 g PRO (1.4 g/kg), 731 ml free H20, 195 g CHO, and 0 g fiber daily.     6/8-current: Osmolite 1.5 Mayur @ goal of 40ml/hr x 12 hours per day (480ml/day) + 1 pkt Prosource will provide: 760 kcals (15 kcal/kg), 46 g PRO (0.9 g/kg), 366 ml free H20, 98 g CHO, and 0 g fiber daily.    -Pt advanced to regular diet on 6/7. Not taking much PO. Started on calorie  counts 6/9-6/11. Per calorie counts yesterday, pt consumed 408 kcal and 11 g protein. Pt ate 75% lunch today, ordered cheerios, chocolate pudding, and diet pepsi.     -Pt received an average of 860 ml/d over the past 7 days + an average of 1 pkt Prosource daily. This provided an average of 1330 kcal/d (26 kcal/kg) and 65 g protein (1.3 g/kg). This met 100% estimated energy and protein needs.      NEW FINDINGS     GI: Soft stools, holding bowel regimen    Labs: elevated BUN and Cr, low GFR    Medications: Reviewed    Weights: No weight loss over the past week.   06/10/22 0000 68.2 kg (150 lb 5.7 oz)   06/09/22 0000 68.6 kg (151 lb 3.8 oz)   06/08/22 0400 68.6 kg (151 lb 3.8 oz)   06/05/22 0000 66.6 kg (146 lb 13.2 oz)   06/03/22 0400 63.1 kg (139 lb 1.8 oz)   06/01/22 0000 66 kg (145 lb 8.1 oz)     MALNUTRITION  % Intake: No decreased intake noted  % Weight Loss: None noted  Subcutaneous Fat Loss: None observed  Muscle Loss: None observed  Fluid Accumulation/Edema: Mild  Malnutrition Diagnosis: Patient does not meet two of the established criteria necessary for diagnosing malnutrition    Previous Goals   Total avg nutritional intake to meet a minimum of 25 kcal/kg and 1.2 g PRO/kg daily (per dosing wt 52 kg).  Evaluation: Met    Previous Nutrition Diagnosis  Inadequate oral intake related to NPO status as evidenced by SLP recommendations and continued need for EN to meet 100% nutrition needs.   Evaluation: No longer applicable, nutrition diagnosis changed below    CURRENT NUTRITION DIAGNOSIS  Inadequate oral intake related to poor appetite, poor PO intake as evidenced by RN documentation, calorie counts, and need for EN to meet ~50% of estimated energy needs.     INTERVENTIONS  Implementation  Enteral Nutrition - continue cyclic overnight TFs until pt eating >50% of all meals x 1 day    Goals  Total avg nutritional intake to meet a minimum of 25 kcal/kg and 1.2 g PRO/kg daily (per dosing wt 52  kg).    Monitoring/Evaluation  Progress toward goals will be monitored and evaluated per protocol.    Liseth Francisco MS, RD, LD  4E (CVICU) RD pager: [phone]  Ascom: [phone]  Weekend/Holiday RD pager: [phone]

## 2022-06-10 NOTE — PLAN OF CARE
Major Shift Events: No acute changes.     Intact. A&Ox4. Reports pain at incisional site. Heat pack utilized. SR 70s. MAP goal > 65 met. CVP 10. Afebrile. HFNC 35% 40L. Regular diet with calorie count. TF remain held per CVTS. No BM. Purewick in place with adequate output. Lasix 20mg given x2 overnight.     Plan: Continue to diurese and monitor BP status. Monitor and assess VS and labs. Notify team of any changes. For vital signs and complete assessments, please see documentation flowsheets.      Plan of Care Reviewed With: patient     Overall Patient Progress: no change

## 2022-06-10 NOTE — PROGRESS NOTES
Calorie Count  Intake recorded for: 6/9  Total Kcals: 408 Total Protein: 11g  Kcals from Hospital Food: 408  Protein: 11g  Kcals from Outside Food (average):0 Protein: 0g  # Meals Ordered from Kitchen: 2 meals   # Meals Recorded: 2 meals (First - 100% 2 brandt strips, 50% coffee, pancake w/ butter & syrup)      (Second - 100% saltines, 50% chicken noodle soup, 25% fruit plate)  # Supplements Recorded: 0

## 2022-06-10 NOTE — PROGRESS NOTES
Patient Name: Debi Espinoza  Medical Record Number: [id number]  Today's Date: 6/10/2022    Procedure: Right Sided Thoracentesis; Diagnostic/Therpeutic; possible chest tube placement  Proceduralist: Dr. Kidd  Pathology present: N/A Called CVICU team for diagnostic orders. Team stated there is no need for labs. No diagnostic samples taken.     Procedure Start: 1447  Procedure end: 1500  Sedation medications administered: N/A, Local only     Report given to: Taryn MCGOWAN RN  : N/A    Other Notes: Pt arrived to IR room 1 from . Consent reviewed. Pt denies any questions or concerns regarding procedure. Pt positioned sitting and monitored per protocol. Pt tolerated procedure without any noted complications. Pt transferred back to .

## 2022-06-10 NOTE — PROCEDURES
[facility]    Procedure: IR Procedure Note    Date/Time: 6/10/2022 3:22 PM  Performed by: Jg Kidd MD  Authorized by: Jg Kidd MD       UNIVERSAL PROTOCOL   Site Marked: NA  Prior Images Obtained and Reviewed:  Yes  Required items: Required blood products, implants, devices and special equipment available    Patient identity confirmed:  Verbally with patient, arm band, provided demographic data and hospital-assigned identification number  NA - No sedation, light sedation, or local anesthesia  Confirmation Checklist:  Patient's identity using two indicators, relevant allergies, procedure was appropriate and matched the consent or emergent situation and correct equipment/implants were available  Time out: Immediately prior to the procedure a time out was called    Universal Protocol: the Joint Commission Universal Protocol was followed    Preparation: Patient was prepped and draped in usual sterile fashion    ESBL (mL):  2     ANESTHESIA    Anesthesia: Local infiltration  Local Anesthetic:  Lidocaine 1% without epinephrine  Anesthetic Total (mL):  10      SEDATION    Patient Sedated: No    See dictated procedure note for full details.  Findings: Ultrasound demonstrated a multiloculated right effusion.     5 Ivorian Yueh catheter placed into right effusion under ultrasound guidance. Poor aspiration.    Guidewire advanced through the cathter to attempt to disrupt loculations. Still poor aspirated.    Placed 12 Ivorian Skater non locking pigtail catheter as right chest tube. Catheter to water seal chest drainage. No immediate complication.    Specimens: none    Complications: None    Condition: Stable      PROCEDURE    Patient Tolerance:  Patient tolerated the procedure well with no immediate complications  Length of time physician/provider present for 1:1 monitoring during sedation: 0

## 2022-06-10 NOTE — CONSULTS
Patient is on IR schedule 6/10/2022 for a image guided diagnostic and therapeutic right thoracentesis. Patient has tenuous respiratory status and anything we can do to improve breathing would be helpful. Team to place diagnostic labs.   Labs WNL for procedure.    No NPO required.  Medications to be held include: none  Consent will be done prior to procedure.     Please contact IR [place] nurse at [phone] for estimated time of procedure.     Case discussed with IR staff Dr. Kidd and the ICU team.    Santiago Khan PA-C  Interventional Radiology  Phone: [phone]  Pager: [phone]

## 2022-06-11 LAB
ALBUMIN UR-MCNC: 20 MG/DL
ANION GAP SERPL CALCULATED.3IONS-SCNC: 7 MMOL/L (ref 3–14)
APPEARANCE UR: CLEAR
BACTERIA SPT CULT: NORMAL
BILIRUB UR QL STRIP: NEGATIVE
BUN SERPL-MCNC: 40 MG/DL (ref 7–30)
C DIFF TOX B STL QL: POSITIVE
CALCIUM SERPL-MCNC: 9.2 MG/DL (ref 8.5–10.1)
CHLORIDE BLD-SCNC: 104 MMOL/L (ref 94–109)
CO2 SERPL-SCNC: 28 MMOL/L (ref 20–32)
COLOR UR AUTO: YELLOW
CREAT SERPL-MCNC: 1.06 MG/DL (ref 0.52–1.04)
ERYTHROCYTE [DISTWIDTH] IN BLOOD BY AUTOMATED COUNT: 18 % (ref 10–15)
GFR SERPL CREATININE-BSD FRML MDRD: 54 ML/MIN/1.73M2
GLUCOSE BLD-MCNC: 127 MG/DL (ref 70–99)
GLUCOSE BLDC GLUCOMTR-MCNC: 131 MG/DL (ref 70–99)
GLUCOSE BLDC GLUCOMTR-MCNC: 133 MG/DL (ref 70–99)
GLUCOSE BLDC GLUCOMTR-MCNC: 134 MG/DL (ref 70–99)
GLUCOSE BLDC GLUCOMTR-MCNC: 161 MG/DL (ref 70–99)
GLUCOSE BLDC GLUCOMTR-MCNC: 166 MG/DL (ref 70–99)
GLUCOSE BLDC GLUCOMTR-MCNC: 170 MG/DL (ref 70–99)
GLUCOSE UR STRIP-MCNC: NEGATIVE MG/DL
GRAM STAIN RESULT: NORMAL
HCT VFR BLD AUTO: 26.6 % (ref 35–47)
HGB BLD-MCNC: 8.8 G/DL (ref 11.7–15.7)
HGB UR QL STRIP: ABNORMAL
HYALINE CASTS: 3 /LPF
INR PPP: 1.84 (ref 0.85–1.15)
KETONES UR STRIP-MCNC: NEGATIVE MG/DL
LACTATE SERPL-SCNC: 1.4 MMOL/L (ref 0.7–2)
LEUKOCYTE ESTERASE UR QL STRIP: NEGATIVE
MAGNESIUM SERPL-MCNC: 2.5 MG/DL (ref 1.6–2.3)
MCH RBC QN AUTO: 30.7 PG (ref 26.5–33)
MCHC RBC AUTO-ENTMCNC: 33.1 G/DL (ref 31.5–36.5)
MCV RBC AUTO: 93 FL (ref 78–100)
MRSA DNA SPEC QL NAA+PROBE: NEGATIVE
MUCOUS THREADS #/AREA URNS LPF: PRESENT /LPF
NITRATE UR QL: NEGATIVE
PH UR STRIP: 5.5 [PH] (ref 5–7)
PHOSPHATE SERPL-MCNC: 3.8 MG/DL (ref 2.5–4.5)
PLATELET # BLD AUTO: 457 10E3/UL (ref 150–450)
POTASSIUM BLD-SCNC: 4.6 MMOL/L (ref 3.4–5.3)
RBC # BLD AUTO: 2.87 10E6/UL (ref 3.8–5.2)
RBC URINE: 57 /HPF
SA TARGET DNA: NEGATIVE
SODIUM SERPL-SCNC: 139 MMOL/L (ref 133–144)
SP GR UR STRIP: 1.02 (ref 1–1.03)
SQUAMOUS EPITHELIAL: 1 /HPF
UROBILINOGEN UR STRIP-MCNC: NORMAL MG/DL
WBC # BLD AUTO: 31 10E3/UL (ref 4–11)
WBC URINE: 8 /HPF

## 2022-06-11 PROCEDURE — 84100 ASSAY OF PHOSPHORUS: CPT | Performed by: THORACIC SURGERY (CARDIOTHORACIC VASCULAR SURGERY)

## 2022-06-11 PROCEDURE — 200N000002 HC R&B ICU UMMC

## 2022-06-11 PROCEDURE — 250N000011 HC RX IP 250 OP 636: Performed by: PHYSICIAN ASSISTANT

## 2022-06-11 PROCEDURE — 83735 ASSAY OF MAGNESIUM: CPT | Performed by: STUDENT IN AN ORGANIZED HEALTH CARE EDUCATION/TRAINING PROGRAM

## 2022-06-11 PROCEDURE — 250N000011 HC RX IP 250 OP 636: Performed by: STUDENT IN AN ORGANIZED HEALTH CARE EDUCATION/TRAINING PROGRAM

## 2022-06-11 PROCEDURE — 250N000013 HC RX MED GY IP 250 OP 250 PS 637: Performed by: THORACIC SURGERY (CARDIOTHORACIC VASCULAR SURGERY)

## 2022-06-11 PROCEDURE — 87205 SMEAR GRAM STAIN: CPT | Performed by: STUDENT IN AN ORGANIZED HEALTH CARE EDUCATION/TRAINING PROGRAM

## 2022-06-11 PROCEDURE — 250N000013 HC RX MED GY IP 250 OP 250 PS 637: Performed by: STUDENT IN AN ORGANIZED HEALTH CARE EDUCATION/TRAINING PROGRAM

## 2022-06-11 PROCEDURE — 99233 SBSQ HOSP IP/OBS HIGH 50: CPT | Mod: 24 | Performed by: INTERNAL MEDICINE

## 2022-06-11 PROCEDURE — 81001 URINALYSIS AUTO W/SCOPE: CPT | Performed by: STUDENT IN AN ORGANIZED HEALTH CARE EDUCATION/TRAINING PROGRAM

## 2022-06-11 PROCEDURE — 80048 BASIC METABOLIC PNL TOTAL CA: CPT | Performed by: STUDENT IN AN ORGANIZED HEALTH CARE EDUCATION/TRAINING PROGRAM

## 2022-06-11 PROCEDURE — 85014 HEMATOCRIT: CPT | Performed by: STUDENT IN AN ORGANIZED HEALTH CARE EDUCATION/TRAINING PROGRAM

## 2022-06-11 PROCEDURE — 36415 COLL VENOUS BLD VENIPUNCTURE: CPT | Performed by: THORACIC SURGERY (CARDIOTHORACIC VASCULAR SURGERY)

## 2022-06-11 PROCEDURE — 87641 MR-STAPH DNA AMP PROBE: CPT | Performed by: THORACIC SURGERY (CARDIOTHORACIC VASCULAR SURGERY)

## 2022-06-11 PROCEDURE — 250N000013 HC RX MED GY IP 250 OP 250 PS 637

## 2022-06-11 PROCEDURE — 99232 SBSQ HOSP IP/OBS MODERATE 35: CPT | Mod: 25 | Performed by: INTERNAL MEDICINE

## 2022-06-11 PROCEDURE — 250N000011 HC RX IP 250 OP 636: Performed by: THORACIC SURGERY (CARDIOTHORACIC VASCULAR SURGERY)

## 2022-06-11 PROCEDURE — 83605 ASSAY OF LACTIC ACID: CPT | Performed by: STUDENT IN AN ORGANIZED HEALTH CARE EDUCATION/TRAINING PROGRAM

## 2022-06-11 PROCEDURE — 87070 CULTURE OTHR SPECIMN AEROBIC: CPT | Performed by: STUDENT IN AN ORGANIZED HEALTH CARE EDUCATION/TRAINING PROGRAM

## 2022-06-11 PROCEDURE — 258N000003 HC RX IP 258 OP 636: Performed by: THORACIC SURGERY (CARDIOTHORACIC VASCULAR SURGERY)

## 2022-06-11 PROCEDURE — 85610 PROTHROMBIN TIME: CPT | Performed by: STUDENT IN AN ORGANIZED HEALTH CARE EDUCATION/TRAINING PROGRAM

## 2022-06-11 PROCEDURE — 87493 C DIFF AMPLIFIED PROBE: CPT | Performed by: STUDENT IN AN ORGANIZED HEALTH CARE EDUCATION/TRAINING PROGRAM

## 2022-06-11 PROCEDURE — 999N000157 HC STATISTIC RCP TIME EA 10 MIN

## 2022-06-11 PROCEDURE — 258N000003 HC RX IP 258 OP 636: Performed by: STUDENT IN AN ORGANIZED HEALTH CARE EDUCATION/TRAINING PROGRAM

## 2022-06-11 PROCEDURE — 87040 BLOOD CULTURE FOR BACTERIA: CPT | Performed by: THORACIC SURGERY (CARDIOTHORACIC VASCULAR SURGERY)

## 2022-06-11 RX ORDER — VANCOMYCIN HYDROCHLORIDE 1 G/200ML
1000 INJECTION, SOLUTION INTRAVENOUS EVERY 24 HOURS
Status: DISCONTINUED | OUTPATIENT
Start: 2022-06-12 | End: 2022-06-13

## 2022-06-11 RX ORDER — DIGOXIN 125 MCG
125 TABLET ORAL DAILY
Status: DISCONTINUED | OUTPATIENT
Start: 2022-06-12 | End: 2022-06-23

## 2022-06-11 RX ORDER — VANCOMYCIN HYDROCHLORIDE 125 MG/1
125 CAPSULE ORAL DAILY
Status: DISCONTINUED | OUTPATIENT
Start: 2022-07-03 | End: 2022-06-11

## 2022-06-11 RX ORDER — VANCOMYCIN HYDROCHLORIDE 125 MG/1
125 CAPSULE ORAL 4 TIMES DAILY
Status: DISCONTINUED | OUTPATIENT
Start: 2022-06-11 | End: 2022-06-11

## 2022-06-11 RX ORDER — VANCOMYCIN HYDROCHLORIDE 125 MG/1
125 CAPSULE ORAL
Status: DISCONTINUED | OUTPATIENT
Start: 2022-07-10 | End: 2022-06-11

## 2022-06-11 RX ORDER — VANCOMYCIN HYDROCHLORIDE 125 MG/1
125 CAPSULE ORAL 2 TIMES DAILY
Status: DISCONTINUED | OUTPATIENT
Start: 2022-06-25 | End: 2022-06-11

## 2022-06-11 RX ORDER — VANCOMYCIN HYDROCHLORIDE 50 MG/ML
125 KIT ORAL 4 TIMES DAILY
Status: COMPLETED | OUTPATIENT
Start: 2022-06-11 | End: 2022-06-21

## 2022-06-11 RX ADMIN — HYDROXYZINE HYDROCHLORIDE 25 MG: 25 TABLET ORAL at 00:11

## 2022-06-11 RX ADMIN — APIXABAN 5 MG: 5 TABLET, FILM COATED ORAL at 19:42

## 2022-06-11 RX ADMIN — APIXABAN 5 MG: 5 TABLET, FILM COATED ORAL at 08:33

## 2022-06-11 RX ADMIN — ACETAMINOPHEN 325MG 975 MG: 325 TABLET ORAL at 15:46

## 2022-06-11 RX ADMIN — FUROSEMIDE 10 MG: 10 INJECTION, SOLUTION INTRAVENOUS at 10:12

## 2022-06-11 RX ADMIN — ACETAMINOPHEN 325MG 975 MG: 325 TABLET ORAL at 00:11

## 2022-06-11 RX ADMIN — CEFEPIME 2 G: 2 INJECTION, POWDER, FOR SOLUTION INTRAVENOUS at 12:06

## 2022-06-11 RX ADMIN — INSULIN ASPART 1 UNITS: 100 INJECTION, SOLUTION INTRAVENOUS; SUBCUTANEOUS at 15:50

## 2022-06-11 RX ADMIN — PANTOPRAZOLE SODIUM 40 MG: 40 TABLET, DELAYED RELEASE ORAL at 08:33

## 2022-06-11 RX ADMIN — VANCOMYCIN HYDROCHLORIDE 125 MG: KIT at 19:42

## 2022-06-11 RX ADMIN — Medication 1 PACKET: at 08:37

## 2022-06-11 RX ADMIN — OXYCODONE HYDROCHLORIDE 10 MG: 10 TABLET ORAL at 14:48

## 2022-06-11 RX ADMIN — INSULIN ASPART 2 UNITS: 100 INJECTION, SOLUTION INTRAVENOUS; SUBCUTANEOUS at 19:42

## 2022-06-11 RX ADMIN — VANCOMYCIN HYDROCHLORIDE 125 MG: KIT at 15:47

## 2022-06-11 RX ADMIN — Medication 15 ML: at 08:33

## 2022-06-11 RX ADMIN — METHOCARBAMOL 500 MG: 500 TABLET ORAL at 08:33

## 2022-06-11 RX ADMIN — VANCOMYCIN HYDROCHLORIDE 125 MG: KIT at 13:27

## 2022-06-11 RX ADMIN — OXYCODONE HYDROCHLORIDE 5 MG: 5 TABLET ORAL at 08:33

## 2022-06-11 RX ADMIN — RIOCIGUAT 0.5 MG: 0.5 TABLET, FILM COATED ORAL at 08:36

## 2022-06-11 RX ADMIN — MIDODRINE HYDROCHLORIDE 20 MG: 5 TABLET ORAL at 00:12

## 2022-06-11 RX ADMIN — INSULIN ASPART 2 UNITS: 100 INJECTION, SOLUTION INTRAVENOUS; SUBCUTANEOUS at 12:34

## 2022-06-11 RX ADMIN — LIDOCAINE PATCH 4% 1 PATCH: 40 PATCH TOPICAL at 08:33

## 2022-06-11 RX ADMIN — DIGOXIN 125 MCG: 0.25 INJECTION INTRAMUSCULAR; INTRAVENOUS at 09:23

## 2022-06-11 RX ADMIN — OXYCODONE HYDROCHLORIDE 5 MG: 5 TABLET ORAL at 00:11

## 2022-06-11 RX ADMIN — Medication 2.5 UNITS/HR: at 21:31

## 2022-06-11 RX ADMIN — FUROSEMIDE 10 MG: 10 INJECTION, SOLUTION INTRAVENOUS at 17:44

## 2022-06-11 RX ADMIN — MIDODRINE HYDROCHLORIDE 20 MG: 5 TABLET ORAL at 15:47

## 2022-06-11 RX ADMIN — HYDROXYZINE HYDROCHLORIDE 25 MG: 25 TABLET ORAL at 12:03

## 2022-06-11 RX ADMIN — MIDODRINE HYDROCHLORIDE 20 MG: 5 TABLET ORAL at 08:33

## 2022-06-11 RX ADMIN — FUROSEMIDE 10 MG: 10 INJECTION, SOLUTION INTRAVENOUS at 02:19

## 2022-06-11 RX ADMIN — VANCOMYCIN HYDROCHLORIDE 1500 MG: 10 INJECTION, POWDER, LYOPHILIZED, FOR SOLUTION INTRAVENOUS at 09:52

## 2022-06-11 RX ADMIN — Medication 1 UNITS/HR: at 10:09

## 2022-06-11 RX ADMIN — ACETAMINOPHEN 325MG 975 MG: 325 TABLET ORAL at 08:33

## 2022-06-11 RX ADMIN — Medication 10 MG: at 19:42

## 2022-06-11 ASSESSMENT — ACTIVITIES OF DAILY LIVING (ADL)
ADLS_ACUITY_SCORE: 40
ADLS_ACUITY_SCORE: 40
ADLS_ACUITY_SCORE: 33
ADLS_ACUITY_SCORE: 40
ADLS_ACUITY_SCORE: 39
ADLS_ACUITY_SCORE: 33
ADLS_ACUITY_SCORE: 35
ADLS_ACUITY_SCORE: 36
ADLS_ACUITY_SCORE: 33
ADLS_ACUITY_SCORE: 36
ADLS_ACUITY_SCORE: 36
ADLS_ACUITY_SCORE: 33

## 2022-06-11 NOTE — CONSULTS
Cardiology Fellow Progress Note  _________________________________  Dillan Davila M.D.  Cardiovascular Medicine    I personally saw and examined this patient, discussed care with housestaff and other consultants, reviewed current laboratories and imaging studies, and conveyed impression and diagnostic/therapeutic plan to patient.                 06/10/22    Interval Events  ----------------------  TRISTIAN  Remains in ICU for pressors given hypotension with diuresis  Continues to require HiFlow NC  Feels short of breath, but improved overall since prior to admission    Current Medications  ----------------------  Current Facility-Administered Medications   Medication     acetaminophen (TYLENOL) tablet 975 mg     apixaban ANTICOAGULANT (ELIQUIS) tablet 5 mg     bisacodyl (DULCOLAX) Suppository 10 mg     dextrose 10% infusion     glucose gel 15-30 g    Or     dextrose 50 % injection 25-50 mL    Or     glucagon injection 1 mg     digoxin (LANOXIN) injection 125 mcg     furosemide (LASIX) injection 10 mg     heparin lock flush 10 UNIT/ML injection 5-20 mL     heparin lock flush 10 UNIT/ML injection 5-20 mL     hydrALAZINE (APRESOLINE) injection 10 mg     HYDROmorphone (DILAUDID) injection 0.2 mg     hydrOXYzine (ATARAX) tablet 25-50 mg     insulin aspart (NovoLOG) injection (RAPID ACTING)     ipratropium - albuterol 0.5 mg/2.5 mg/3 mL (DUONEB) neb solution 3 mL     Lidocaine (LIDOCARE) 4 % Patch 1 patch     lidocaine (LMX4) cream     lidocaine (LMX4) cream     lidocaine 1 % 0.1-1 mL     lidocaine 1 % 0.1-1 mL     lidocaine patch in PLACE     lidocaine-prilocaine (EMLA) cream     macitentan (OPSUMIT) tablet 10 mg     magnesium hydroxide (MILK OF MAGNESIA) suspension 30 mL     medication instruction     melatonin tablet 10 mg     methocarbamol (ROBAXIN) tablet 500 mg     midodrine (PROAMATINE) tablet 20 mg     multivitamins w/minerals liquid 15 mL     naloxone (NARCAN) injection 0.2 mg    Or     naloxone (NARCAN) injection  0.4 mg    Or     naloxone (NARCAN) injection 0.2 mg    Or     naloxone (NARCAN) injection 0.4 mg     ondansetron (ZOFRAN ODT) ODT tab 4 mg    Or     ondansetron (ZOFRAN) injection 4 mg     oxyCODONE (ROXICODONE) tablet 5 mg    Or     oxyCODONE IR (ROXICODONE) tablet 10 mg     pantoprazole (PROTONIX) EC tablet 40 mg     [Held by provider] polyethylene glycol (MIRALAX) Packet 17 g     prochlorperazine (COMPAZINE) tablet 5 mg    Or     prochlorperazine (COMPAZINE) injection 5 mg     protein modular (PROSOURCE TF) 1 packet     Reason beta blocker order not selected     riociguat (ADEMPAS) tablet 0.5 mg     [Held by provider] senna-docusate (SENOKOT-S/PERICOLACE) 8.6-50 MG per tablet 1 tablet     sodium chloride (PF) 0.9% PF flush 10-20 mL     sodium chloride (PF) 0.9% PF flush 10-20 mL     sodium chloride (PF) 0.9% PF flush 10-40 mL     sodium chloride (PF) 0.9% PF flush 10-40 mL     sodium chloride (PF) 0.9% PF flush 3 mL     sodium chloride (PF) 0.9% PF flush 3 mL     sodium chloride (PF) 0.9% PF flush 3 mL     sodium chloride (PF) 0.9% PF flush 3 mL     sodium chloride (PF) 0.9% PF flush 3 mL     sodium chloride bacteriostatic 0.9 % flush 10 mL     vasopressin 1 unit/mL MAX Conc (PITRESSIN) infusion        Intake and Output  ----------------------      Intake/Output Summary (Last 24 hours) at 6/7/2022 0721  Last data filed at 6/7/2022 0700  Gross per 24 hour   Intake 3622.82 ml   Output 3230 ml   Net 392.82 ml         Weight  ----------------------  Wt Readings from Last 2 Encounters:   06/11/22 68.4 kg (150 lb 12.7 oz)   04/29/22 67.4 kg (148 lb 8 oz)       Objective  ----------------------  Results for orders placed or performed during the hospital encounter of 05/25/22 (from the past 24 hour(s))   Glucose by meter   Result Value Ref Range    GLUCOSE BY METER POCT 90 70 - 99 mg/dL   US Lower Extremity Venous Bilateral Port    Narrative    EXAMINATION: DOPPLER VENOUS ULTRASOUND OF BILATERAL LOWER  EXTREMITIES,  6/10/2022 10:05 AM     COMPARISON: None.    HISTORY: Swelling.    TECHNIQUE:  Gray-scale evaluation with compression, spectral flow and  color Doppler assessment of the deep venous system of both legs from  groin to knee, and then at the ankles.    FINDINGS:  In both lower extremities, the common femoral, femoral, popliteal and  posterior tibial veins demonstrate normal compressibility and blood  flow.      Impression    IMPRESSION:  No evidence of deep venous thrombosis in either lower extremity.    JESSICA WALKER MD         SYSTEM ID:  [id number]   Glucose by meter   Result Value Ref Range    GLUCOSE BY METER POCT 91 70 - 99 mg/dL   IR Chest Tube Place Non Tunneled Right    Narrative    PROCEDURES 6/10/2022 3:11 PM:  1. Attempted ultrasound guided thoracentesis.  2. Ultrasound guided right chest tube placement.    CLINICAL HISTORY: image guided right diagnostic and therapeutic  thoracentesis. Dyspnea. Right larger than left effusion.    COMPARISONS: CT 6/9/2022    ATTENDING RADIOLOGIST: JERRELL Ojeda MD    I, MIGUEL OJEDA MD, attest that I was present in the procedure room for  the entire procedure.    MEDICATIONS: The patient was placed on continuous monitoring. Vital  signs were monitored by the Interventional Radiology nurse under the  Attending Physician's supervision. No intravenous sedation  administered. The patient remained stable throughout the procedure.    ATTENDING FACE-TO-FACE SEDATION TIME: No intravenous sedation  administered.    PROCEDURE: The patient understood the limitations, alternatives, and  risks of the procedure and requested the procedure be performed. Both  written and verbal consent were obtained.    Patient placed in the upright sitting position. The posterior lateral  right back was prepped and draped in the usual sterile fashion. 1%  lidocaine without epinephrine was used for local anesthesia.    Under ultrasound guidance, a 5 Kyrgyz Connollyesis  catheter  needle was advanced into the loculated right pleural  effusion. Ultrasound image documenting catheter needle placement was  saved in the patient's record.    Catheter advanced over the needle. Needle removed. Poor drainage was  noted. Despite repositioning the catheter, the effusion would not  drain easily.    Guidewire was advanced through the catheter in an attempt to disrupt  the loculations. Catheter still did not drain well.    We discussed with the team and agreed to place a chest tube for  drainage and possible lysis of loculations.    Catheter removed over guidewire. Track dilated over guidewire to 12  Maltese size. 12 Maltese [employer] Skater non locking pigtail  catheter advanced over guidewire and placed as a right chest tube.  Ultrasound image documenting tube placement was saved in the patient's  record.    Guidewire removed. 2-0 nylon catheter retaining suture and sterile  dressing applied. Catheter to water seal chest drainage. No immediate  complication.      Impression    IMPRESSION:  1. Loculated right effusion would not easily drain through the 5  Maltese Yueh catheter. Attempts to disrupt the loculations with a  guidewire did not improve drainage.    2. 12 Maltese [employer] non locking pigtail catheter placed as a  right chest tube to facilitate drainage and for possible lysis of  loculations. Tube to water seal chest drainage. Tube to 20 cm  underwater suction when the patient returns to her unit. Further chest  tube orders per pulmonology or patient's primary team.    MIGUEL OJDEA MD         SYSTEM ID:  [id number]   IR Procedure Note    Narrative    Miguel Ojeda MD     6/10/2022  3:23 PM  [facility]    Procedure: IR Procedure Note    Date/Time: 6/10/2022 3:22 PM  Performed by: Miguel Ojeda MD  Authorized by: Miguel Ojeda MD       UNIVERSAL PROTOCOL   Site Marked: NA  Prior Images Obtained and Reviewed:  Yes  Required items:  Required blood products, implants, devices and special   equipment available    Patient identity confirmed:  Verbally with patient, arm band, provided   demographic data and hospital-assigned identification number  NA - No sedation, light sedation, or local anesthesia  Confirmation Checklist:  Patient's identity using two indicators, relevant   allergies, procedure was appropriate and matched the consent or emergent   situation and correct equipment/implants were available  Time out: Immediately prior to the procedure a time out was called    Universal Protocol: the Joint Commission Universal Protocol was followed    Preparation: Patient was prepped and draped in usual sterile fashion    ESBL (mL):  2     ANESTHESIA    Anesthesia: Local infiltration  Local Anesthetic:  Lidocaine 1% without epinephrine  Anesthetic Total (mL):  10      SEDATION    Patient Sedated: No    See dictated procedure note for full details.  Findings: Ultrasound demonstrated a multiloculated right effusion.     5 Gabonese Yueh catheter placed into right effusion under ultrasound   guidance. Poor aspiration.    Guidewire advanced through the cathter to attempt to disrupt loculations.   Still poor aspirated.    Placed 12 Gabonese Skater non locking pigtail catheter as right chest tube.   Catheter to water seal chest drainage. No immediate complication.    Specimens: none    Complications: None    Condition: Stable      PROCEDURE    Patient Tolerance:  Patient tolerated the procedure well with no immediate   complications  Length of time physician/provider present for 1:1 monitoring during   sedation: 0   XR Chest Port 1 View    Narrative    EXAM: XR CHEST PORT 1 VIEW  6/10/2022 4:23 PM      HISTORY: new chest tube    COMPARISON: 6/9/2022    FINDINGS: Portable upright view of the chest.   Right basilar pigtail chest tube. Right arm PICC tip at the superior  cavoatrial junction. Feeding tube projects towards the stomach.  Postoperative changes of  prior median sternotomy with intact  sternotomy wires. Stable heart size. Stable bilateral small effusions.  Slightly increased perihilar and bibasilar diffuse mixed opacities,  with decreased lung volumes. No appreciable pneumothorax.      Impression    IMPRESSION:  1. New right basilar pigtail chest tube with stable small bilateral  pleural effusions. No pneumothorax.  2. Slightly decreased lung volumes, with increased perihilar and  bibasilar mixed opacities likely representing atelectasis and  pulmonary edema.    I have personally reviewed the examination and initial interpretation  and I agree with the findings.    ZEE FISHMAN MD         SYSTEM ID:  [id number]   Basic metabolic panel   Result Value Ref Range    Sodium 138 133 - 144 mmol/L    Potassium 4.4 3.4 - 5.3 mmol/L    Chloride 102 94 - 109 mmol/L    Carbon Dioxide (CO2) 29 20 - 32 mmol/L    Anion Gap 7 3 - 14 mmol/L    Urea Nitrogen 38 (H) 7 - 30 mg/dL    Creatinine 0.98 0.52 - 1.04 mg/dL    Calcium 9.2 8.5 - 10.1 mg/dL    Glucose 111 (H) 70 - 99 mg/dL    GFR Estimate 59 (L) >60 mL/min/1.73m2   Magnesium   Result Value Ref Range    Magnesium 2.4 (H) 1.6 - 2.3 mg/dL   Glucose by meter   Result Value Ref Range    GLUCOSE BY METER POCT 106 (H) 70 - 99 mg/dL   Glucose by meter   Result Value Ref Range    GLUCOSE BY METER POCT 98 70 - 99 mg/dL   Glucose by meter   Result Value Ref Range    GLUCOSE BY METER POCT 131 (H) 70 - 99 mg/dL   Basic metabolic panel   Result Value Ref Range    Sodium 139 133 - 144 mmol/L    Potassium 4.6 3.4 - 5.3 mmol/L    Chloride 104 94 - 109 mmol/L    Carbon Dioxide (CO2) 28 20 - 32 mmol/L    Anion Gap 7 3 - 14 mmol/L    Urea Nitrogen 40 (H) 7 - 30 mg/dL    Creatinine 1.06 (H) 0.52 - 1.04 mg/dL    Calcium 9.2 8.5 - 10.1 mg/dL    Glucose 127 (H) 70 - 99 mg/dL    GFR Estimate 54 (L) >60 mL/min/1.73m2   Magnesium   Result Value Ref Range    Magnesium 2.5 (H) 1.6 - 2.3 mg/dL   CBC with platelets   Result Value Ref Range    WBC Count  31.0 (H) 4.0 - 11.0 10e3/uL    RBC Count 2.87 (L) 3.80 - 5.20 10e6/uL    Hemoglobin 8.8 (L) 11.7 - 15.7 g/dL    Hematocrit 26.6 (L) 35.0 - 47.0 %    MCV 93 78 - 100 fL    MCH 30.7 26.5 - 33.0 pg    MCHC 33.1 31.5 - 36.5 g/dL    RDW 18.0 (H) 10.0 - 15.0 %    Platelet Count 457 (H) 150 - 450 10e3/uL   INR   Result Value Ref Range    INR 1.84 (H) 0.85 - 1.15   Phosphorus   Result Value Ref Range    Phosphorus 3.8 2.5 - 4.5 mg/dL   Glucose by meter   Result Value Ref Range    GLUCOSE BY METER POCT 134 (H) 70 - 99 mg/dL       Recent Labs   Lab Test 12/08/21  1221   CHOL 113   HDL 35*   LDL 59   TRIG 94       Hemoglobin A1C   Date Value Ref Range Status   12/08/2021 5.8 (H) 0.0 - 5.6 % Final     Comment:     Normal <5.7%   Prediabetes 5.7-6.4%    Diabetes 6.5% or higher     Note: Adopted from ADA consensus guidelines.       Results for orders placed or performed during the hospital encounter of 05/25/22   XR Chest Port 1 View    Impression    IMPRESSION: [place] catheter tip projects over the central right  pulmonary artery.    I have personally reviewed the examination and initial interpretation  and I agree with the findings.    CANDACE ALMONTE MD         SYSTEM ID:  [id number]   XR Abdomen Port 1 View    Impression    IMPRESSION: Gastric tube tip and sidehole project over the stomach.    I have personally reviewed the examination and initial interpretation  and I agree with the findings.    CANDACE ALMONTE MD         SYSTEM ID:  [id number]   XR Chest Port 1 View    Impression    Impression: Postoperative chest with slightly improved perihilar  atelectasis or edema. Endotracheal tube tip in lower trachea 2.5 cm  above the man.    I have personally reviewed the examination and initial interpretation  and I agree with the findings.    YVETTE GRAY MD         SYSTEM ID:  [id number]   XR Chest Port 1 View    Impression    Impression: Postoperative chest with stable perihilar atelectasis or  edema. Endotracheal tube tip  1.7 cm superior to the man.    I have personally reviewed the examination and initial interpretation  and I agree with the findings.    YVETTE GRAY MD         SYSTEM ID:  [id number]   XR Chest Port 1 View    Impression    Impression:  1. Endotracheal tube retracted now 2.4 cm from the man.   2. No significant change in small amount of atelectasis lung bases  bilaterally..    YVETTE GRAY MD         SYSTEM ID:  [id number]   XR Chest Port 1 View    Impression    Impression:   1. Unchanged support devices as described above.  2. Unchanged bibasilar atelectasis. No new focal pulmonary opacities.    I have personally reviewed the examination and initial interpretation  and I agree with the findings.    CANDACE ALMONTE MD         SYSTEM ID:  [id number]   XR Chest Port 1 View    Impression    Impression:   1. Unchanged small effusions and bibasilar predominant opacities.  2. Unchanged support devices including pulmonary artery catheter and  ET tube over the low thoracic trachea.    I have personally reviewed the examination and initial interpretation  and I agree with the findings.    JESSICA WALKER MD         SYSTEM ID:  [id number]   XR Chest Port 1 View    Impression    IMPRESSION: Interval extubation. Slightly increased left pleural  effusion/basilar atelectasis. Atherosclerosis. Mild central  atelectasis/subtle positive fluid volume balance.    MARK GUADARRAMA MD         SYSTEM ID:  [id number]   XR Abdomen Port 1 View    Impression    Impression: Enteric tube is subdiaphragmatic with tip in the left  pelvis, presumably within the jejunum.    I have personally reviewed the examination and initial interpretation  and I agree with the findings.    BEBE CLIFFORD MD         SYSTEM ID:  [id number]   XR Chest Port 1 View    Impression    Impression:   1. Unchanged effusions with increased left basilar atelectasis and  interstitial pulmonary edema.  2. Removal of pulmonary artery catheter, mediastinal drain and  left  chest tube. Given that lung apices are not entirely included in the  field-of-view, small pneumothoraces could be missed.    I have personally reviewed the examination and initial interpretation  and I agree with the findings.    MARK GUADARRAMA MD         SYSTEM ID:  [id number]   XR Chest Port 1 View   Result Value Ref Range    Radiologist flags New moderate right-sided pneumothorax status post (AA)     Impression    Impression:   1. New moderate right-sided pneumothorax.  2. Possible trace left pneumothorax, attention on follow-up.  3. Increasing atelectasis/pulmonary edema.  4. Small left pleural effusion.  5. Interval removal of right basilar chest tube.    [Critical Result: New moderate right-sided pneumothorax status post  chest tube removal]    Finding was identified on 6/2/2022 6:21 PM.     Raz Rodgers was contacted by Dr. Wyatt at 6/2/2022 6:36 PM and  verbalized understanding of the critical finding.     I have personally reviewed the examination and initial interpretation  and I agree with the findings.    BK DUPREE MD         SYSTEM ID:  [id number]   XR Chest Port 1 View    Impression    Impression:   1. Unchanged appearance of mixed pulmonary opacities.  2. Small biapical pneumothoraces, right more than left.  3. Trace bilateral pleural effusions.    I have personally reviewed the examination and initial interpretation  and I agree with the findings.    BK DUPREE MD         SYSTEM ID:  [id number]   XR Chest Port 1 View    Impression    IMPRESSION:  1.  Lines and tubes, as above.  2.  Stable perihilar opacities, likely edema and atelectasis. Slightly  decreased retrocardiac opacity, likely decreasing atelectasis.   3.  Stable trace biapical pneumothoraces.    I have personally reviewed the examination and initial interpretation  and I agree with the findings.    ZEE FISHMAN MD         SYSTEM ID:  [id number]   XR Chest Port 1 View    Impression    Impression:   1. Unchanged left IJ  catheter projecting over the innominate vein.  2. Unchanged effusions and mixed airspace opacities.  3. Decreasing biapical pneumothoraces.    I have personally reviewed the examination and initial interpretation  and I agree with the findings.    JESSICA WALKER MD         SYSTEM ID:  [id number]   Echo Complete   Result Value Ref Range    LVEF  60-65%        Physical Exam  Vitals:    06/11/22 0330 06/11/22 0400 06/11/22 0430 06/11/22 0500   BP: 102/46 109/49 103/44 113/47   BP Location:  Left arm     Cuff Size:  Adult Regular     Pulse: 104 107 102 111   Resp: 18 27 18   Temp:  (!) 102.2  F (39  C)     TempSrc:  Axillary     SpO2: 99% 96% 98% 97%   Weight:  68.4 kg (150 lb 12.7 oz)     Height:         Physical Exam   Constitutional: No distress. She appears chronically ill.   Pulmonary/Chest: Effort normal.   Abdominal: Soft. Bowel sounds are normal. She exhibits no distension.   Neurological: She is alert and oriented to person, place, and time.   Skin: Skin is warm and dry.        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 Continues to recover and improve daily.     Changes Today:   - Continue to diuresis as needed for CVP 8-12  - Agree with low dose vasopressin for hypotension as needed  - Continue HiFlow/BiPAP  - Continue Warfarin  - Consider RHC for evaluation of PA pressures, additional therapies  - continue home pHTN medications as tolerated; may reduce dose if significant hypotension      # Pulmonary hypertension, Group IV  # CTEPH s/p pulmonary endaterectomy  # DVTs  # Severe tricuspid insufficiency  Initially diagnosed with bilateral DVT, PE 2019. TTE showed dilated RV with reduced RV function and RVSP of 84mmHg suggestive of severe pulmonary hypertension with mild to moderate TR. Patient believes  the above relates to a work related injury, unna boot early 2019. RHC 12/18/2021 with RA 12, PA 84/19/45, PCWP 2, TAHIRA CO/CI 2.8/1.6. Last dose eliquis 5/23 AM. Has oxygen at home but does not use this. Underwent endarterectomy on 5/27. Post op on multiple pressors and briefly on milrinone.   - Goal CVP < 10, diuresis as needed to achieve  - continue home pHTN meds        # Volume overload  PTA on lasix 20mg qday (decreased from 40mg ~6 weeks ago). JVP elevated on initial exam. S/p 40mg IV lasix 5/25; repeat as needed     # Anemia  Hgb 11.4, similar to prior last month.   Has been trending down with HgB today at 6.4 HgB would recommend workup for acute blood loss anemia       # CKD  Baseline Cr ~1.1.     Dwain Ferrer MD, MSc  Cardiovascular Disease Fellow  [facility]

## 2022-06-11 NOTE — PROGRESS NOTES
CV ICU PROGRESS NOTE      CO-MORBIDITIES:   CTEPH (chronic thromboembolic pulmonary hypertension) (H)  (primary encounter diagnosis)  Primary pulmonary hypertension (H)  SOB (shortness of breath)  S/P coronary angiogram    ASSESSMENT: Debi Espinoza is a 76 year old female with PMH of HTN, nephrolithiasis, DVT (2019) and PE (2019, 2021) on chronic anticoagulation, and chronic thromboembolic pulmonary hypertension who underwent pulmonary artery thromboendarterectomy on 5/27 with Dr. Peterson.    Overnight Events: Vasopressin on Briefly for hypotension 2/2 aggressive diuresis yesterday    TODAY'S PROGRESS:   - Start empiric vancomycin, cefepime  - Follow-up BCx, UA/UC, sputum  - Check C. Diff  - Follow-up lactate  - Switch to continuous tube feeds  - Check CMP, pre-albumin in AM  - Continue gentle diuresis with lasix 10 q8h -> goal net even    PLAN:  Neuro/ pain/ sedation:  Acute Postoperative pain  - Monitor neurological status. Notify the MD for any acute changes in exam.  - Pain: S tylenol 975 TID, lidocaine patches. PRN oxycodone 5-10 q4h, robaxin 500 q6h, PRN dilaudid 0.2 q2h     Pulmonary care:   S/p Pulmonary artery thromboendarterectomy on 5/27/2022 by Dr. Peterson  Hx Chronic pulmonary thromboembolic disease  Hx PE (2019, 2021)  Hx Emphysema, moderate  Small right pleural effusion - seen on CT chest 6/9  S/p right pigtail catheter placement on 6/10  PTA regimen: apixaban 5mg BID  CTA 05/2019 demonstrated underlying moderate emphysema  Extubated 5/31  - Supplemental oxygen: stable on NC, wean as tolerated  - PTA Opsumit and Adempas   - 6/9 CT chest with small right pleural effusion  - 6/10 right pigtail catheter placement with IR, noted to be loculated    Cardiovascular:    Hx Severe pulmonary hypertension  Hx RV dilation and reduced RV function  Hx Severe tricuspid insufficiency  Hx Essential HTN  Distributive shock  PTA regimen: furosemide 20mg qd, adempas 2.5mg TID, opsumit 10mg qd  Echo on 03/2021 with LVEF  of 78%, severe pulmonary hypertension (82 mmHg), reduced RV function (TAPSE 16mm), severe tricuspid insufficiency    - 6/2 ECHO: Global and regional LV function is normal: EF of 60-65%. Flattened septum c/w RV pressure overload. Moderate RV dilation with severely reduced global right ventricular function. Mod TR. Severe p-HTN. Estimated pulmonary artery systolic pressure is 85 mmHg plus right atrial pressure. Unable to visualize IVC. No pericardial effusions. Compared to prior TTE, RV is slightly less dilated, function appears similar.    6/8 ECHO: Global and regional LV function is hyperkinetic EF of 65-70%.  Mod LV dilation w/ severely reduced global RV function. Moderate TR. Estimated pulmonary artery systolic pressure is 57 mmHg plus right atrial pressure. Compared with the study from 6/2/22. Estimated PA pressure is lower. No change in LV/RV function.    - Monitor hemodynamic status.   - Currently off pressors however requiring intermittent fluid boluses after diuresis  - If hypotensive, start vasopressin and avoid additional fluid boluses  - MAP goal > 65  - no indication for ASA use  - on p-HTN meds as above  - V pacing back up 50 BPM  - Continue midodrine 20 q8h  - Apixiban started 6/8  - 6/8-9 digoxin loaded - 500 mcg, followed by 250 q8h x2   - Digoxin maintenance dose 125 mcg daily (per pharmacy for renal/weight dosing)  - 6/10 Bilateral venous duplex for ongoing BL LE edema - No evidence of thrombus     GI care/ Nutrition:   - Speech consult, appreciate recs   - Regular diet  - Continuous tube feeds  - Check nutrition labs tomorrow  - PPI    Renal/ Fluid Balance/ Electrolytes:   Nephrolithiasis  Hypernatremia  PTA regimen: furosemide 20mg qd  BL creat appears to be ~ 1.14  - Strict I/O, daily weights  - Avoid/limit nephrotoxins as able  - Replete lytes PRN per protocol  - FWF at 30 ml q4h  - Goal net even  - Continue with lasix 10 q8h     Endocrine:    Stress induced hyperglycemia  Preop A1c 5.8  (12/21)  - High sliding scale insulin   - Goal BG <180 for optimal healing      ID/ Antibiotics:  Stress induced leukocytosis  Distributive shock  5/30 MRSA swab negative  5/30 Sputum - Klebsiella  5/31 Sputum culture with 4+ lactose fermenting gram negative rods  5/31 Repeat blood cultures NGTD  6/4 Blood culture x2: NGTD    - s/p Meropenem (6/2-6/3) and Zosyn (6/3- 6/6)  - Continue to monitor fever curve, WBC and inflammatory markers as appropriate  - 6/10 Febrile, WBC 31  - Empiric vanc, cefepime  - Follow-up cultures    Cultures  6/11 Bcx, UA/UC, Sputum  6/11 C. diff    Heme:     Stress induced leukocytosis  Acute blood loss anemia  Acute blood loss thrombocytopenia  Hx DVT (2019), PE (2019, 2021) on chronic anticoagulation (Apixaban)  PA tear intra-op s/p patch repair.  - Continue to monitor  - Apixiban for prophylaxis  - Daily CBC   - last transfusion 6/5. Has been stable 7->8 since      MSK/ Skin:  Sternotomy  Surgical Incision  - Sternal precautions  - Postoperative incision management per protocol  - PT/OT/CR     Prophylaxis:    - Mechanical prophylaxis for DVT  - Chemical DVT prophylaxis - Apixiban  - PPI     Lines/ tubes/ drains:  - PICC line  - NJ     Disposition:  - Continue CVICU    Patient seen, findings and plan discussed with CV ICU staff, Dr. Kathrin Corado MD  PGY-3, General Surgery  ---    ====================================    SUBJECTIVE:   Doing well overall. Spiked fever overnight. WBC 31. Empirically started on abx    OBJECTIVE:   1. VITAL SIGNS:   Temp:  [98  F (36.7  C)-102.6  F (39.2  C)] 102.2  F (39  C)  Pulse:  [] 111  Resp:  [12-34] 18  BP: ()/(39-61) 113/47  FiO2 (%):  [35 %] 35 %  SpO2:  [90 %-100 %] 97 %  FiO2 (%): 35 %  Resp: 18    2. INTAKE/ OUTPUT:   I/O last 3 completed shifts:  In: 661.32 [P.O.:180; I.V.:66.32; NG/GT:295]  Out: 2890 [Urine:2550; Chest Tube:340]    3. PHYSICAL EXAMINATION:   General: Sitting upright in bed, appears relatively  comfortable  Neuro: Responsive and follows commands  Resp: Breathing less labored. Regular respiratory effort, on NC.   CV: RRR. peirpheral pulses intact  Abdomen: Soft, Non-distended, Non-tender  Incisions: clean dry intact  Extremities: warm and well perfused    4. INVESTIGATIONS:   Arterial Blood Gases   No lab results found in last 7 days.  Complete Blood Count   Recent Labs   Lab 06/11/22  0356 06/10/22  0343 06/09/22  0355 06/08/22  0416   WBC 31.0* 11.3* 13.2* 15.0*   HGB 8.8* 8.4* 8.0* 8.0*   * 426 394 353     Basic Metabolic Panel  Recent Labs   Lab 06/11/22  0401 06/11/22  0356 06/11/22  0009 06/10/22  1942 06/10/22  1654 06/10/22  1627 06/10/22  0346 06/10/22  0343 06/09/22  1952 06/09/22  1756 06/09/22  0829 06/09/22 0355   NA  --  139  --   --   --  138  --  139  --   --   --  136   POTASSIUM  --  4.6  --   --   --  4.4  --  4.3  --  4.5  --  4.8   CHLORIDE  --  104  --   --   --  102  --  103  --   --   --  104   CO2  --  28  --   --   --  29  --  29  --   --   --  26   BUN  --  40*  --   --   --  38*  --  38*  --   --   --  36*   CR  --  1.06*  --   --   --  0.98  --  1.09*  --   --   --  0.99   * 127* 131* 98   < > 111*   < > 81   < >  --    < > 111*    < > = values in this interval not displayed.     Liver Function Tests  Recent Labs   Lab 06/11/22  0356 06/10/22  0343 06/09/22  0355 06/08/22  0416   INR 1.84* 1.75* 1.58* 1.39*     Pancreatic Enzymes  No lab results found in last 7 days.  Coagulation Profile  Recent Labs   Lab 06/11/22  0356 06/10/22  0343 06/09/22  0355 06/08/22  0416   INR 1.84* 1.75* 1.58* 1.39*     5. RADIOLOGY:   Recent Results (from the past 24 hour(s))   US Lower Extremity Venous Bilateral Port    Narrative    EXAMINATION: DOPPLER VENOUS ULTRASOUND OF BILATERAL LOWER EXTREMITIES,  6/10/2022 10:05 AM     COMPARISON: None.    HISTORY: Swelling.    TECHNIQUE:  Gray-scale evaluation with compression, spectral flow and  color Doppler assessment of the deep venous  system of both legs from  groin to knee, and then at the ankles.    FINDINGS:  In both lower extremities, the common femoral, femoral, popliteal and  posterior tibial veins demonstrate normal compressibility and blood  flow.      Impression    IMPRESSION:  No evidence of deep venous thrombosis in either lower extremity.    JESSICA WALKER MD         SYSTEM ID:  [id number]   IR Chest Tube Place Non Tunneled Right    Narrative    PROCEDURES 6/10/2022 3:11 PM:  1. Attempted ultrasound guided thoracentesis.  2. Ultrasound guided right chest tube placement.    CLINICAL HISTORY: image guided right diagnostic and therapeutic  thoracentesis. Dyspnea. Right larger than left effusion.    COMPARISONS: CT 6/9/2022    ATTENDING RADIOLOGIST: MD JS Swann, MIGUEL OJEDA MD, attest that I was present in the procedure room for  the entire procedure.    MEDICATIONS: The patient was placed on continuous monitoring. Vital  signs were monitored by the Interventional Radiology nurse under the  Attending Physician's supervision. No intravenous sedation  administered. The patient remained stable throughout the procedure.    ATTENDING FACE-TO-FACE SEDATION TIME: No intravenous sedation  administered.    PROCEDURE: The patient understood the limitations, alternatives, and  risks of the procedure and requested the procedure be performed. Both  written and verbal consent were obtained.    Patient placed in the upright sitting position. The posterior lateral  right back was prepped and draped in the usual sterile fashion. 1%  lidocaine without epinephrine was used for local anesthesia.    Under ultrasound guidance, a 5 Kinyarwanda Cvgram.me centesis  catheter needle was advanced into the loculated right pleural  effusion. Ultrasound image documenting catheter needle placement was  saved in the patient's record.    Catheter advanced over the needle. Needle removed. Poor drainage was  noted. Despite repositioning the catheter, the effusion  would not  drain easily.    Guidewire was advanced through the catheter in an attempt to disrupt  the loculations. Catheter still did not drain well.    We discussed with the team and agreed to place a chest tube for  drainage and possible lysis of loculations.    Catheter removed over guidewire. Track dilated over guidewire to 12  Yi size. 12 Yi [employer] Skater non locking pigtail  catheter advanced over guidewire and placed as a right chest tube.  Ultrasound image documenting tube placement was saved in the patient's  record.    Guidewire removed. 2-0 nylon catheter retaining suture and sterile  dressing applied. Catheter to water seal chest drainage. No immediate  complication.      Impression    IMPRESSION:  1. Loculated right effusion would not easily drain through the 5  Yi Yueh catheter. Attempts to disrupt the loculations with a  guidewire did not improve drainage.    2. 12 Yi [employer] non locking pigtail catheter placed as a  right chest tube to facilitate drainage and for possible lysis of  loculations. Tube to water seal chest drainage. Tube to 20 cm  underwater suction when the patient returns to her unit. Further chest  tube orders per pulmonology or patient's primary team.    MIGUEL OJEDA MD         SYSTEM ID:  [id number]   XR Chest Port 1 View    Narrative    EXAM: XR CHEST PORT 1 VIEW  6/10/2022 4:23 PM      HISTORY: new chest tube    COMPARISON: 6/9/2022    FINDINGS: Portable upright view of the chest.   Right basilar pigtail chest tube. Right arm PICC tip at the superior  cavoatrial junction. Feeding tube projects towards the stomach.  Postoperative changes of prior median sternotomy with intact  sternotomy wires. Stable heart size. Stable bilateral small effusions.  Slightly increased perihilar and bibasilar diffuse mixed opacities,  with decreased lung volumes. No appreciable pneumothorax.      Impression    IMPRESSION:  1. New right basilar pigtail chest tube with stable  small bilateral  pleural effusions. No pneumothorax.  2. Slightly decreased lung volumes, with increased perihilar and  bibasilar mixed opacities likely representing atelectasis and  pulmonary edema.    I have personally reviewed the examination and initial interpretation  and I agree with the findings.    ZEE FISHMAN MD         SYSTEM ID:  [id number]       =========================================

## 2022-06-11 NOTE — PHARMACY-VANCOMYCIN DOSING SERVICE
Pharmacy Vancomycin Initial Note  Date of Service 2022  Patient's  1945  76 year old, female    Indication: Sepsis    Current estimated CrCl = Estimated Creatinine Clearance: 38.4 mL/min (A) (based on SCr of 1.06 mg/dL (H)).    Creatinine for last 3 days  2022:  5:25 PM Creatinine 0.82 mg/dL  2022:  3:55 AM Creatinine 0.99 mg/dL  6/10/2022:  3:43 AM Creatinine 1.09 mg/dL;  4:27 PM Creatinine 0.98 mg/dL  2022:  3:56 AM Creatinine 1.06 mg/dL    Recent Vancomycin Level(s) for last 3 days  No results found for requested labs within last 72 hours.      Vancomycin IV Administrations (past 72 hours)      No vancomycin orders with administrations in past 72 hours.                Nephrotoxins and other renal medications (From now, onward)    Start     Dose/Rate Route Frequency Ordered Stop    22 0930  vancomycin 1500 mg in 0.9% NaCl 250 ml intermittent infusion 1,500 mg         1,500 mg  over 90 Minutes Intravenous ONCE 22 0917      06/10/22 1800  furosemide (LASIX) injection 10 mg         10 mg  over 1-3 Minutes Intravenous EVERY 8 HOURS 06/10/22 1749      22 1330  vasopressin 1 unit/mL MAX Conc (PITRESSIN) infusion         0.5-4 Units/hr  0.5-4 mL/hr  Intravenous CONTINUOUS 22 1303            Contrast Orders - past 72 hours (72h ago, onward)    None          [employer] Prediction of Planned Initial Vancomycin Regimen    Loading dose: 1500 mg once  Regimen: 1000 mg IV every 24 hours.  Start time: 10:00 on 2022  Exposure target: AUC24 (range)400-600 mg/L.hr   AUC24,ss: 445 mg/L.hr  Probability of AUC24 > 400: 62 %  Ctrough,ss: 13.6 mg/L  Probability of Ctrough,ss > 20: 17 %  Probability of nephrotoxicity (Lodise SUNNY ): 9 %        Plan:  1. Start vancomycin  1000 mg IV q24h.   2. Vancomycin monitoring method: AUC  3. Vancomycin therapeutic monitoring goal: 400-600 mg*h/L  4. Pharmacy will check vancomycin levels as appropriate in 1-3 Days.    5. Serum creatinine  levels will be ordered daily for the first week of therapy and at least twice weekly for subsequent weeks.      Nora Sahu, KalinaD

## 2022-06-11 NOTE — PLAN OF CARE
Major Shift Events: Neuro intact, on the lethargic side in the AM but has become more alert throughout the day, feelings of hopelessness reported, 10/10 pain everywhere- PRNs given around the clock. Tmax of 100.6, SR-ST 70s-100s with 1st degree AV block, MAPs dropped to 40s this AM and restarted vaso running at 2. Frequently desat early in the shift and one time had longer recovery where lips turned blue and required 15 LPM oxymask, switched back to HFNC 60% 40 LPM. Minimal output per pigtail. TF switched to continuous at goal rate of 40 with standard FWF. Numerous loose mucous-like stools, tested positive for cdiff, placed a rectal pouch. Minimal UOP despite diuretics. Pan cultured for full infection work up.     Plan: Titrate pressors, address pain. Update primary team with changes in POC.    For vital signs and complete assessments, please see flowsheets.

## 2022-06-11 NOTE — PROGRESS NOTES
Calorie Count  Intake recorded for: 6/10  Total Kcals: 369 Total Protein: 13g  Kcals from Hospital Food: 369   Protein: 13g  Kcals from Outside Food (average):0 Protein: 0g  # Meals Ordered from Kitchen: 1 meal  # Meals Recorded: 1 meal (100% cheerios, chocolate pudding, 8oz whole milk)  # Supplements Recorded: 0

## 2022-06-11 NOTE — PLAN OF CARE
Neuro: Alert and oriented x 4, able to make needs know, call light appropriate. SAMANTHA, moving all extremities when generalized weakness.  Complained of chest and midsternal incisional pain.  PRN oxycodone      Cardiac: 1  AV block, SR, HR 100s-110s  CVP 7.  MAPs >65. Vaso off overnight. TMax 102.6- scheduled tylenol given and refusing ice packs, cardiothoracic team notified and continuing to monitor.  US of BLE for severe edema (see results).  Avoid SCD/stockings for now per CVTS.     Resp: 6L oxymask, O2 sat >92%. Dyspnea with exertion and at rest, desats with activity, but recovers quickly. Pigtail chest tube to suction.      GI: Regular diet, poor oral intake. TF overnight     : Diurese x1 lasix 10 mg IV, good response (see I&Os).  Voiding spontaneously via external catheter.       Plan: Refusing many cares from nursing inclusing repositioning. Monitor fluid status and hemodynamics and follow POC.

## 2022-06-12 ENCOUNTER — APPOINTMENT (OUTPATIENT)
Dept: GENERAL RADIOLOGY | Facility: CLINIC | Age: 77
DRG: 270 | End: 2022-06-12
Attending: INTERNAL MEDICINE
Payer: MEDICARE

## 2022-06-12 LAB
ALBUMIN SERPL-MCNC: 2 G/DL (ref 3.4–5)
ALP SERPL-CCNC: 111 U/L (ref 40–150)
ALT SERPL W P-5'-P-CCNC: 16 U/L (ref 0–50)
ANION GAP SERPL CALCULATED.3IONS-SCNC: 3 MMOL/L (ref 3–14)
ANION GAP SERPL CALCULATED.3IONS-SCNC: 7 MMOL/L (ref 3–14)
AST SERPL W P-5'-P-CCNC: 25 U/L (ref 0–45)
BILIRUB SERPL-MCNC: 1.1 MG/DL (ref 0.2–1.3)
BUN SERPL-MCNC: 41 MG/DL (ref 7–30)
BUN SERPL-MCNC: 41 MG/DL (ref 7–30)
CALCIUM SERPL-MCNC: 8.6 MG/DL (ref 8.5–10.1)
CALCIUM SERPL-MCNC: 9.1 MG/DL (ref 8.5–10.1)
CHLORIDE BLD-SCNC: 107 MMOL/L (ref 94–109)
CHLORIDE BLD-SCNC: 108 MMOL/L (ref 94–109)
CO2 SERPL-SCNC: 27 MMOL/L (ref 20–32)
CO2 SERPL-SCNC: 30 MMOL/L (ref 20–32)
CREAT SERPL-MCNC: 0.97 MG/DL (ref 0.52–1.04)
CREAT SERPL-MCNC: 1.13 MG/DL (ref 0.52–1.04)
ERYTHROCYTE [DISTWIDTH] IN BLOOD BY AUTOMATED COUNT: 18.3 % (ref 10–15)
GFR SERPL CREATININE-BSD FRML MDRD: 50 ML/MIN/1.73M2
GFR SERPL CREATININE-BSD FRML MDRD: 60 ML/MIN/1.73M2
GLUCOSE BLD-MCNC: 159 MG/DL (ref 70–99)
GLUCOSE BLD-MCNC: 170 MG/DL (ref 70–99)
GLUCOSE BLDC GLUCOMTR-MCNC: 124 MG/DL (ref 70–99)
GLUCOSE BLDC GLUCOMTR-MCNC: 138 MG/DL (ref 70–99)
GLUCOSE BLDC GLUCOMTR-MCNC: 154 MG/DL (ref 70–99)
GLUCOSE BLDC GLUCOMTR-MCNC: 155 MG/DL (ref 70–99)
GLUCOSE BLDC GLUCOMTR-MCNC: 162 MG/DL (ref 70–99)
GLUCOSE BLDC GLUCOMTR-MCNC: 177 MG/DL (ref 70–99)
HCT VFR BLD AUTO: 24.6 % (ref 35–47)
HGB BLD-MCNC: 7.6 G/DL (ref 11.7–15.7)
INR PPP: 2.11 (ref 0.85–1.15)
MAGNESIUM SERPL-MCNC: 2.6 MG/DL (ref 1.6–2.3)
MCH RBC QN AUTO: 29.1 PG (ref 26.5–33)
MCHC RBC AUTO-ENTMCNC: 30.9 G/DL (ref 31.5–36.5)
MCV RBC AUTO: 94 FL (ref 78–100)
PLATELET # BLD AUTO: 386 10E3/UL (ref 150–450)
POTASSIUM BLD-SCNC: 4.4 MMOL/L (ref 3.4–5.3)
POTASSIUM BLD-SCNC: 4.7 MMOL/L (ref 3.4–5.3)
PROCALCITONIN SERPL-MCNC: 2.49 NG/ML
PROT SERPL-MCNC: 6.1 G/DL (ref 6.8–8.8)
RBC # BLD AUTO: 2.61 10E6/UL (ref 3.8–5.2)
SODIUM SERPL-SCNC: 141 MMOL/L (ref 133–144)
SODIUM SERPL-SCNC: 141 MMOL/L (ref 133–144)
WBC # BLD AUTO: 30.4 10E3/UL (ref 4–11)

## 2022-06-12 PROCEDURE — 250N000011 HC RX IP 250 OP 636: Performed by: STUDENT IN AN ORGANIZED HEALTH CARE EDUCATION/TRAINING PROGRAM

## 2022-06-12 PROCEDURE — 258N000003 HC RX IP 258 OP 636: Performed by: INTERNAL MEDICINE

## 2022-06-12 PROCEDURE — 250N000011 HC RX IP 250 OP 636: Performed by: THORACIC SURGERY (CARDIOTHORACIC VASCULAR SURGERY)

## 2022-06-12 PROCEDURE — 999N000215 HC STATISTIC HFNC ADULT NON-CPAP

## 2022-06-12 PROCEDURE — 80053 COMPREHEN METABOLIC PANEL: CPT | Performed by: STUDENT IN AN ORGANIZED HEALTH CARE EDUCATION/TRAINING PROGRAM

## 2022-06-12 PROCEDURE — 84134 ASSAY OF PREALBUMIN: CPT | Performed by: STUDENT IN AN ORGANIZED HEALTH CARE EDUCATION/TRAINING PROGRAM

## 2022-06-12 PROCEDURE — 99233 SBSQ HOSP IP/OBS HIGH 50: CPT | Mod: 24 | Performed by: INTERNAL MEDICINE

## 2022-06-12 PROCEDURE — 250N000013 HC RX MED GY IP 250 OP 250 PS 637: Performed by: THORACIC SURGERY (CARDIOTHORACIC VASCULAR SURGERY)

## 2022-06-12 PROCEDURE — 85027 COMPLETE CBC AUTOMATED: CPT | Performed by: STUDENT IN AN ORGANIZED HEALTH CARE EDUCATION/TRAINING PROGRAM

## 2022-06-12 PROCEDURE — 85610 PROTHROMBIN TIME: CPT | Performed by: STUDENT IN AN ORGANIZED HEALTH CARE EDUCATION/TRAINING PROGRAM

## 2022-06-12 PROCEDURE — 250N000013 HC RX MED GY IP 250 OP 250 PS 637: Performed by: STUDENT IN AN ORGANIZED HEALTH CARE EDUCATION/TRAINING PROGRAM

## 2022-06-12 PROCEDURE — 71045 X-RAY EXAM CHEST 1 VIEW: CPT

## 2022-06-12 PROCEDURE — 200N000002 HC R&B ICU UMMC

## 2022-06-12 PROCEDURE — 258N000003 HC RX IP 258 OP 636: Performed by: STUDENT IN AN ORGANIZED HEALTH CARE EDUCATION/TRAINING PROGRAM

## 2022-06-12 PROCEDURE — 83735 ASSAY OF MAGNESIUM: CPT | Performed by: STUDENT IN AN ORGANIZED HEALTH CARE EDUCATION/TRAINING PROGRAM

## 2022-06-12 PROCEDURE — 250N000013 HC RX MED GY IP 250 OP 250 PS 637

## 2022-06-12 PROCEDURE — 84145 PROCALCITONIN (PCT): CPT | Performed by: INTERNAL MEDICINE

## 2022-06-12 PROCEDURE — 999N000157 HC STATISTIC RCP TIME EA 10 MIN

## 2022-06-12 PROCEDURE — 71045 X-RAY EXAM CHEST 1 VIEW: CPT | Mod: 26 | Performed by: RADIOLOGY

## 2022-06-12 RX ADMIN — HYDROMORPHONE HYDROCHLORIDE 0.2 MG: 0.2 INJECTION, SOLUTION INTRAMUSCULAR; INTRAVENOUS; SUBCUTANEOUS at 18:11

## 2022-06-12 RX ADMIN — APIXABAN 5 MG: 5 TABLET, FILM COATED ORAL at 20:13

## 2022-06-12 RX ADMIN — CEFEPIME 2 G: 2 INJECTION, POWDER, FOR SOLUTION INTRAVENOUS at 13:40

## 2022-06-12 RX ADMIN — ACETAMINOPHEN 325MG 975 MG: 325 TABLET ORAL at 09:04

## 2022-06-12 RX ADMIN — METHOCARBAMOL 500 MG: 500 TABLET ORAL at 17:14

## 2022-06-12 RX ADMIN — CEFEPIME 2 G: 2 INJECTION, POWDER, FOR SOLUTION INTRAVENOUS at 00:08

## 2022-06-12 RX ADMIN — MIDODRINE HYDROCHLORIDE 20 MG: 5 TABLET ORAL at 16:36

## 2022-06-12 RX ADMIN — MIDODRINE HYDROCHLORIDE 20 MG: 5 TABLET ORAL at 09:04

## 2022-06-12 RX ADMIN — OXYCODONE HYDROCHLORIDE 5 MG: 5 TABLET ORAL at 14:29

## 2022-06-12 RX ADMIN — SODIUM CHLORIDE, POTASSIUM CHLORIDE, SODIUM LACTATE AND CALCIUM CHLORIDE 500 ML: 600; 310; 30; 20 INJECTION, SOLUTION INTRAVENOUS at 13:29

## 2022-06-12 RX ADMIN — FUROSEMIDE 10 MG: 10 INJECTION, SOLUTION INTRAVENOUS at 02:46

## 2022-06-12 RX ADMIN — Medication 1 PACKET: at 09:08

## 2022-06-12 RX ADMIN — Medication 10 MG: at 20:13

## 2022-06-12 RX ADMIN — Medication 15 ML: at 09:05

## 2022-06-12 RX ADMIN — INSULIN ASPART 1 UNITS: 100 INJECTION, SOLUTION INTRAVENOUS; SUBCUTANEOUS at 00:08

## 2022-06-12 RX ADMIN — Medication 3.5 UNITS/HR: at 09:27

## 2022-06-12 RX ADMIN — INSULIN ASPART 2 UNITS: 100 INJECTION, SOLUTION INTRAVENOUS; SUBCUTANEOUS at 09:25

## 2022-06-12 RX ADMIN — MIDODRINE HYDROCHLORIDE 20 MG: 5 TABLET ORAL at 00:07

## 2022-06-12 RX ADMIN — FUROSEMIDE 10 MG: 10 INJECTION, SOLUTION INTRAVENOUS at 10:56

## 2022-06-12 RX ADMIN — APIXABAN 5 MG: 5 TABLET, FILM COATED ORAL at 09:05

## 2022-06-12 RX ADMIN — VANCOMYCIN HYDROCHLORIDE 1000 MG: 1 INJECTION, SOLUTION INTRAVENOUS at 10:56

## 2022-06-12 RX ADMIN — ACETAMINOPHEN 325MG 975 MG: 325 TABLET ORAL at 16:37

## 2022-06-12 RX ADMIN — VANCOMYCIN HYDROCHLORIDE 125 MG: KIT at 16:36

## 2022-06-12 RX ADMIN — ACETAMINOPHEN 325MG 975 MG: 325 TABLET ORAL at 00:08

## 2022-06-12 RX ADMIN — INSULIN ASPART 1 UNITS: 100 INJECTION, SOLUTION INTRAVENOUS; SUBCUTANEOUS at 04:54

## 2022-06-12 RX ADMIN — Medication 40 MG: at 09:03

## 2022-06-12 RX ADMIN — VANCOMYCIN HYDROCHLORIDE 125 MG: KIT at 11:12

## 2022-06-12 RX ADMIN — DIGOXIN 125 MCG: 125 TABLET ORAL at 09:07

## 2022-06-12 RX ADMIN — HYDROXYZINE HYDROCHLORIDE 25 MG: 25 TABLET ORAL at 00:07

## 2022-06-12 RX ADMIN — OXYCODONE HYDROCHLORIDE 5 MG: 5 TABLET ORAL at 00:07

## 2022-06-12 RX ADMIN — SODIUM CHLORIDE, POTASSIUM CHLORIDE, SODIUM LACTATE AND CALCIUM CHLORIDE 500 ML: 600; 310; 30; 20 INJECTION, SOLUTION INTRAVENOUS at 14:29

## 2022-06-12 RX ADMIN — VANCOMYCIN HYDROCHLORIDE 125 MG: KIT at 20:13

## 2022-06-12 RX ADMIN — OXYCODONE HYDROCHLORIDE 10 MG: 10 TABLET ORAL at 20:13

## 2022-06-12 RX ADMIN — OXYCODONE HYDROCHLORIDE 5 MG: 5 TABLET ORAL at 05:09

## 2022-06-12 RX ADMIN — VANCOMYCIN HYDROCHLORIDE 125 MG: KIT at 09:05

## 2022-06-12 RX ADMIN — LIDOCAINE PATCH 4% 1 PATCH: 40 PATCH TOPICAL at 09:04

## 2022-06-12 RX ADMIN — INSULIN ASPART 1 UNITS: 100 INJECTION, SOLUTION INTRAVENOUS; SUBCUTANEOUS at 16:35

## 2022-06-12 RX ADMIN — METHOCARBAMOL 500 MG: 500 TABLET ORAL at 10:56

## 2022-06-12 ASSESSMENT — ACTIVITIES OF DAILY LIVING (ADL)
ADLS_ACUITY_SCORE: 39

## 2022-06-12 NOTE — INTERIM SUMMARY
Dillan Davila M.D.  Cardiovascular Medicine  Interim Summary    I personally saw and examined this patient, discussed care with housestaff and other consultants, reviewed current laboratories and imaging studies, and conveyed impression and diagnostic/therapeutic plan to patient.  15th post-operative day  Problem List  1. Pulmonary arterial hypertension associated with chronic thromboemboli  2. Persistent respiratory failure  3. Hypertension  4. Post-operative fluid overload  5. Anemia  6. Post-operative hypotension requiring midodrine and vasopressin  7. Hematome adjacent to right atrium  8. Newly elevated WBC 30,000 6/12  9. Hypoproteinemia with calorie depletion  10. Chronic renal failure  11. C diff  12. Non-warfarin oral anticoagulant  13. Diabetes  History  76 year old female who underwent pulmonary, surgical thrombendarterectomy.  She remains in ICU on parenteral vasopressin and oral midodrine      Objective  /50   Pulse 77   Temp 98.3  F (36.8  C) (Oral)   Resp 20   Ht 1.524 m (5')   Wt 64.4 kg (141 lb 15.6 oz)   SpO2 97%   BMI 27.73 kg/m              06/12/22 0400 64.4 kg (141 lb 15.6 oz) --?   06/11/22 0400 68.4 kg (150 lb 12.7 oz) --   06/10/22 0000 68.2 kg (150 lb 5.7 oz) Bed scale   06/09/22 0000 68.6 kg (151 lb 3.8 oz) Bed scale   06/08/22 0400 68.6 kg (151 lb 3.8 oz) Bed scale   06/05/22 0000 66.6 kg (146 lb 13.2 oz) --   06/03/22 0400 63.1 kg (139 lb 1.8 oz) --   06/01/22 0000 66 kg (145 lb 8.1 oz) Bed scale   05/31/22 0000 68.9 kg (151 lb 14.4 oz) Bed scale   05/30/22 0000 67.4 kg (148 lb 9.4 oz) Bed scale   05/29/22 0400 70.2 kg (154 lb 12.2 oz) Bed scale   05/28/22 0400 71.5 kg (157 lb 10.1 oz) Bed scale   05/27/22 0301 65.9 kg (145 lb 4.5 oz) Standing scale   05/26/22 0027 66.7 kg (147 lb 0.8 oz) Standing scale         Meds    acetaminophen  975 mg Oral or Feeding Tube Q8H ORLANDO     apixaban ANTICOAGULANT  5 mg Oral BID     ceFEPIme (MAXIPIME) IV  2 g Intravenous Q12H     digoxin  125  mcg Oral or Feeding Tube Daily     furosemide  10 mg Intravenous Q8H     heparin lock flush  5-20 mL Intracatheter Q24H     insulin aspart  1-12 Units Subcutaneous Q4H     lidocaine  1 patch Transdermal Q24H     lidocaine   Transdermal Q8H ORLANDO     macitentan  10 mg Oral Daily with lunch     melatonin  10 mg Oral or Feeding Tube QPM     midodrine  20 mg Oral Q8H     multivitamins w/minerals  15 mL Per Feeding Tube Daily     pantoprazole  40 mg Oral QAM AC     [Held by provider] polyethylene glycol  17 g Oral or Feeding Tube BID     protein modular  1 packet Per Feeding Tube Daily     [Held by provider] riociguat  0.5 mg Oral TID     [Held by provider] senna-docusate  1 tablet Oral or Feeding Tube BID     sodium chloride (PF)  10-40 mL Intracatheter Q8H     sodium chloride (PF)  10-40 mL Intracatheter Q8H     sodium chloride (PF)  3 mL Intracatheter Q8H     sodium chloride (PF)  3 mL Intracatheter Q8H     sodium chloride bacteriostatic  10 mL Intravenous Once     vancomycin  125 mg Oral or Feeding Tube 4x Daily     vancomycin  1,000 mg Intravenous Q24H       Labs  CMP  Recent Labs   Lab 06/12/22  0924 06/12/22  0452 06/12/22  0448 06/12/22  0007 06/11/22  0401 06/11/22  0356 06/10/22  1654 06/10/22  1627 06/10/22  0346 06/10/22  0343 06/09/22  1952 06/09/22  1756 06/09/22  0829 06/09/22  0355   NA  --   --  141  --   --  139  --  138  --  139  --   --   --  136   POTASSIUM  --   --  4.7  --   --  4.6  --  4.4  --  4.3  --  4.5  --  4.8   CHLORIDE  --   --  108  --   --  104  --  102  --  103  --   --   --  104   CO2  --   --  30  --   --  28  --  29  --  29  --   --   --  26   ANIONGAP  --   --  3  --   --  7  --  7  --  7  --   --   --  6   * 154* 159* 162*   < > 127*   < > 111*   < > 81   < >  --    < > 111*   BUN  --   --  41*  --   --  40*  --  38*  --  38*  --   --   --  36*   CR  --   --  1.13*  --   --  1.06*  --  0.98  --  1.09*  --   --   --  0.99   GFRESTIMATED  --   --  50*  --   --  54*  --  59*  --   52*  --   --   --  58*   CHELE  --   --  9.1  --   --  9.2  --  9.2  --  9.2  --   --   --  8.9   MAG  --   --  2.6*  --   --  2.5*  --  2.4*  --  2.4*  --  2.4*  --  2.4*   PHOS  --   --   --   --   --  3.8  --   --   --  4.3  --  4.2  --  4.4   PROTTOTAL  --   --  6.1*  --   --   --   --   --   --   --   --   --   --   --    ALBUMIN  --   --  2.0*  --   --   --   --   --   --   --   --   --   --   --    BILITOTAL  --   --  1.1  --   --   --   --   --   --   --   --   --   --   --    ALKPHOS  --   --  111  --   --   --   --   --   --   --   --   --   --   --    AST  --   --  25  --   --   --   --   --   --   --   --   --   --   --    ALT  --   --  16  --   --   --   --   --   --   --   --   --   --   --     < > = values in this interval not displayed.     CBC  Recent Labs   Lab 06/12/22  0448 06/11/22  0356 06/10/22  0343 06/09/22  0355   WBC 30.4* 31.0* 11.3* 13.2*   RBC 2.61* 2.87* 2.82* 2.78*   HGB 7.6* 8.8* 8.4* 8.0*   HCT 24.6* 26.6* 26.2* 25.5*   MCV 94 93 93 92   MCH 29.1 30.7 29.8 28.8   MCHC 30.9* 33.1 32.1 31.4*   RDW 18.3* 18.0* 18.1* 18.6*    457* 426 394     INR  Recent Labs   Lab 06/12/22  0448 06/11/22  0356 06/10/22  0343 06/09/22  0355   INR 2.11* 1.84* 1.75* 1.58*     Arterial Blood Gas  Recent Labs   Lab 22  1725 22  0359 22  2150 22  1636   O2PER 30 40 40 40       Imaging     Echocardiogram:  Name: CARLOS LEGER  MRN: [id number]  : 1945  Study Date: 2022 08:02 AM  Age: 76 yrs  Gender: Female  Patient Location: [facility]  Reason For Study: Heart Failure  Ordering Physician: LILIBETH UREÑA  Performed By: Yane Locke     BSA: 1.6 m2  Height: 60 in  Weight: 146 lb  HR: 82  BP: 106/54 mmHg  ______________________________________________________________________________  Procedure  Limited Portable Echo Adult.  ______________________________________________________________________________  Interpretation Summary  Global and regional left ventricular  function is hyperkinetic with an EF of  65-70%.  Moderate right ventricular dilation with severely reduced global right  ventricular function.  Moderate tricuspid insufficiency.  Estimated pulmonary artery systolic pressure is 57 mmHg plus right atrial  pressure.     This study was compared with the study from 22.  Estimated PA pressure is lower. No change in LV/RV function.  ______________________________________________________________________________  Left Ventricle  Global and regional left ventricular function is hyperkinetic with an EF of  65-70%. Flattened septum is consistent with right ventricular pressure  overload.     Right Ventricle  Moderate right ventricular dilation is present. Global right ventricular  function is severely reduced.     Mitral Valve  The mitral valve is normal. Trace mitral insufficiency is present.     Tricuspid Valve  Moderate tricuspid insufficiency is present. Estimated pulmonary artery  systolic pressure is 57 mmHg plus right atrial pressure.     Pulmonic Valve  The pulmonic valve is normal. Mild pulmonic insufficiency is present.     Vessels  Unable to assess mean RA pressure given the patient is on a ventilator.     Pericardium  No pericardial effusion is present.     Compared to Previous Study  This study was compared with the study from 22 .     ______________________________________________________________________________  Doppler Measurements & Calculations  TR max gurwinder: 367.6 cm/sec  TR max P.8 mmHg     ______________________________________________________________________________  Report approved by: Mariama Juares 2022 09:54 AM       CT chest:

## 2022-06-12 NOTE — PROGRESS NOTES
Calorie Count  Intake recorded for: 6/11  Total Kcals: 0 Total Protein: 0g  Kcals from Hospital Food: 0   Protein: 0g  Kcals from Outside Food (average):0 Protein: 0g  # Meals Ordered from Kitchen: 0 meals  # Meals Recorded: No food intake recorded  # Supplements Recorded: No food intake recorded

## 2022-06-12 NOTE — CARE PLAN
Neuro: Alert and oriented x 4, able to make needs know, call light appropriate. SAMANTHA, moving all extremities,  generalized weakness.  Complained of chest and midsternal incisional pain. Intermittent anxiety PRN oxycodone      Cardiac: 1  AV block, SR, HR 70s-90s  CVP 7.  MAPs >65. Vaso increased to 3.5 overnight. afebrile.     Resp: HFNC 60% Fio2, 40 LPM. Dyspnea with exertion and at rest, desats with activity, but recovers quickly. Pigtail chest tube to suction with no output      GI: TF at goal at 40/hr, standard flushes. Rectal tube order obtained for continuous incontinent bowel movements     : Diurese x1 lasix 10 mg IV, good response (see I&Os).  Voiding spontaneously incontinent  Plan: Refusing many cares from nursing including repositioning. Monitor fluid status and hemodynamics and follow POC.

## 2022-06-12 NOTE — PROGRESS NOTES
CV ICU PROGRESS NOTE      CO-MORBIDITIES:   CTEPH (chronic thromboembolic pulmonary hypertension) (H)  (primary encounter diagnosis)  Primary pulmonary hypertension (H)  SOB (shortness of breath)  S/P coronary angiogram    ASSESSMENT: Debi Espinoza is a 76 year old female with PMH of HTN, nephrolithiasis, DVT (2019) and PE (2019, 2021) on chronic anticoagulation, and chronic thromboembolic pulmonary hypertension who underwent pulmonary artery thromboendarterectomy on 5/27 with Dr. Peterson.        TODAY'S PROGRESS:   - Continue IV vanco due to clinical status even tho neg MRSA nares swab  - Cont PO vanco for CDIFF (+) on 6/11  - Cont TF's at goal, encourage PO intake (ensure)  - discontinue scheduled lasix  - CVP 10 with RHF, will give 500 + 500cc LR bolus today, also for low UOP  - bladder scan first (1.1 UOP after diuresis)  - Resp cx likely bad, repeat today, blood culture NGTD  - follow-up CXR today  - complaining of depression, feelings of hopelessness, possible psych consult Monday    PLAN:  Neuro/ pain/ sedation:  Acute Postoperative pain  - Monitor neurological status. Notify the MD for any acute changes in exam.  - Pain: S tylenol 975 TID, lidocaine patches. PRN oxycodone 5-10 q4h, robaxin 500 q6h, PRN dilaudid 0.2 q2h-  - complaining of diffuse pain overnight, likely linked to depression/ stated feelings of haplessness, consider psych consult tm  - Pain improved this am     Pulmonary care:   S/p Pulmonary artery thromboendarterectomy on 5/27/2022 by Dr. Peterson  Hx Chronic pulmonary thromboembolic disease  Hx PE (2019, 2021)  Hx Emphysema, moderate  Small right pleural effusion - seen on CT chest 6/9  S/p right pigtail catheter placement on 6/10  PTA regimen: apixaban 5mg BID  CTA 05/2019 demonstrated underlying moderate emphysema  Extubated 5/31  - Supplemental oxygen: stable on NC, wean as tolerated  - PTA Opsumit and Adempas   - 6/9 CT chest with small right pleural effusion  - 6/10 right pigtail  catheter placement with IR, noted to be loculated    - Overnight desaturated and dropped pressures, requiring vaso to be restarted and restart HFNC- continue to wean HFNC/ oxymask as tolerated  - follow-up cxr today    Cardiovascular:    Hx Severe pulmonary hypertension  Hx RV dilation and reduced RV function  Hx Severe tricuspid insufficiency  Hx Essential HTN  Distributive shock  PTA regimen: furosemide 20mg qd, adempas 2.5mg TID, opsumit 10mg qd  Echo on 03/2021 with LVEF of 78%, severe pulmonary hypertension (82 mmHg), reduced RV function (TAPSE 16mm), severe tricuspid insufficiency    - 6/2 ECHO: Global and regional LV function is normal: EF of 60-65%. Flattened septum c/w RV pressure overload. Moderate RV dilation with severely reduced global right ventricular function. Mod TR. Severe p-HTN. Estimated pulmonary artery systolic pressure is 85 mmHg plus right atrial pressure. Unable to visualize IVC. No pericardial effusions. Compared to prior TTE, RV is slightly less dilated, function appears similar.    6/8 ECHO: Global and regional LV function is hyperkinetic EF of 65-70%.  Mod LV dilation w/ severely reduced global RV function. Moderate TR. Estimated pulmonary artery systolic pressure is 57 mmHg plus right atrial pressure. Compared with the study from 6/2/22. Estimated PA pressure is lower.     - Monitor hemodynamic status.   - Currently off pressors however requiring intermittent fluid boluses after diuresis  - If hypotensive, start vasopressin and avoid additional fluid boluses  - MAP goal > 65  - no indication for ASA use  - on p-HTN meds as above  - V pacing back up 50 BPM- been in sinus with 1st degree AV-block  - Continue midodrine 20 q8h  - Apixiban started 6/8  - 6/8-9 digoxin loaded - 500 mcg, followed by 250 q8h x2   - Digoxin maintenance dose 125 mcg daily (per pharmacy for renal/weight dosing)  - 6/10 Bilateral venous duplex for ongoing BL LE edema - No evidence of thrombus     - 6/12- Giving 500  "+ 500 fluid slowly for lower MAP's and decreased UOP    GI care/ Nutrition:   - Speech consult, appreciate recs   - Regular diet- decreased appetite, encourage PO nutrition/ ensure  - Continuous tube feeds now (stopped cycling)  - Check nutrition labs tomorrow  - PPI  - CDIFF (+) 6/11- hold all bowel regimen, started PO vanco     Renal/ Fluid Balance/ Electrolytes:   Nephrolithiasis  Hypernatremia  PTA regimen: furosemide 20mg qd  BL creat appears to be ~ 1.14  - Strict I/O, daily weights  - Avoid/limit nephrotoxins as able  - Replete lytes PRN per protocol  - FWF at 30 ml q4h  - Lasix 10 q8h (stopped 6/12 due to low CVP's and UOP)  - Giving 1L LR today spaced out and slowly for low CVP and UOP  - \"auto bolus\" via ace wraps to lower legs      Endocrine:    Stress induced hyperglycemia  Preop A1c 5.8 (12/21)  - High sliding scale insulin   - Goal BG <180 for optimal healing      ID/ Antibiotics:  Stress induced leukocytosis  Distributive shock  5/30 MRSA swab negative  5/30 Sputum - Klebsiella  5/31 Sputum culture with 4+ lactose fermenting gram negative rods  5/31 Repeat blood cultures NGTD  6/4 Blood culture x2: NGTD    - s/p Meropenem (6/2-6/3) and Zosyn (6/3- 6/6)  - Continue to monitor fever curve, WBC and inflammatory markers as appropriate  - 6/10 Febrile, WBC 31  - Empiric vanc (cont due to clinical status leukocytosis, mild fever, even tho negative MRSA nares), cefepime (resp pathogens)  - Cont PO vanc (Cdiff)  - Follow-up cultures    Cultures  6/11 Bcx, UA/UC, Sputum (follow-up 6/12 sputum as 6/11 cx's bad)  6/11 C. Diff (+)     Heme:     Stress induced leukocytosis  Acute blood loss anemia  Acute blood loss thrombocytopenia  Hx DVT (2019), PE (2019, 2021) on chronic anticoagulation (Apixaban)  PA tear intra-op s/p patch repair.  - Continue to monitor  - Apixiban for prophylaxis  - Daily CBC   - last transfusion 6/5. Has been stable 7->8 since      MSK/ Skin:  Sternotomy  Surgical Incision  - Sternal " precautions  - Postoperative incision management per protocol  - PT/OT/CR     Prophylaxis:    - Mechanical prophylaxis for DVT  - Chemical DVT prophylaxis - Apixiban  - PPI     Lines/ tubes/ drains:  - PICC line  - NJ  - PIV     Disposition:  - Continue CVICU    Patient seen, findings and plan discussed with CV ICU staff, Dr. Kathrin Deluca MD, CA-2  ---    ====================================    SUBJECTIVE:   Episode hypoxia and hypotension and lower UOP overnight, restarted HFNC and vasopressin. Spiked fever overnight. WBC 31. Empirically started on abx. Feels hopeless last night, increased pain better in am.     OBJECTIVE:   1. VITAL SIGNS:   Temp:  [97.7  F (36.5  C)-99.1  F (37.3  C)] 97.7  F (36.5  C)  Pulse:  [] 77  Resp:  [10-37] 20  BP: ()/(35-75) 117/50  FiO2 (%):  [55 %-60 %] 60 %  SpO2:  [86 %-100 %] 97 %  FiO2 (%): 60 %  Resp: 20    2. INTAKE/ OUTPUT:   I/O last 3 completed shifts:  In: 2196.23 [P.O.:240; I.V.:616.23; NG/GT:420]  Out: 430 [Urine:410; Chest Tube:20]    3. PHYSICAL EXAMINATION:   General: Sitting upright in bed, appears relatively comfortable, sad, withdrawn  Neuro: Responsive and follows commands  Resp: Breathing less labored. Regular respiratory effort, on NC.   CV: RRR. peirpheral pulses intact  Abdomen: Soft, Non-distended, Non-tender  Incisions: clean dry intact  Extremities: warm and well perfused    4. INVESTIGATIONS:   Arterial Blood Gases   No lab results found in last 7 days.  Complete Blood Count   Recent Labs   Lab 06/12/22  0448 06/11/22  0356 06/10/22  0343 06/09/22  0355   WBC 30.4* 31.0* 11.3* 13.2*   HGB 7.6* 8.8* 8.4* 8.0*    457* 426 394     Basic Metabolic Panel  Recent Labs   Lab 06/12/22  1128 06/12/22  0924 06/12/22  0452 06/12/22  0448 06/11/22  0401 06/11/22  0356 06/10/22  1654 06/10/22  1627 06/10/22  0346 06/10/22  0343   NA  --   --   --  141  --  139  --  138  --  139   POTASSIUM  --   --   --  4.7  --  4.6  --  4.4  --  4.3    CHLORIDE  --   --   --  108  --  104  --  102  --  103   CO2  --   --   --  30  --  28  --  29  --  29   BUN  --   --   --  41*  --  40*  --  38*  --  38*   CR  --   --   --  1.13*  --  1.06*  --  0.98  --  1.09*   * 177* 154* 159*   < > 127*   < > 111*   < > 81    < > = values in this interval not displayed.     Liver Function Tests  Recent Labs   Lab 06/12/22  0448 06/11/22  0356 06/10/22  0343 06/09/22  0355   AST 25  --   --   --    ALT 16  --   --   --    ALKPHOS 111  --   --   --    BILITOTAL 1.1  --   --   --    ALBUMIN 2.0*  --   --   --    INR 2.11* 1.84* 1.75* 1.58*     Pancreatic Enzymes  No lab results found in last 7 days.  Coagulation Profile  Recent Labs   Lab 06/12/22  0448 06/11/22  0356 06/10/22  0343 06/09/22  0355   INR 2.11* 1.84* 1.75* 1.58*     5. RADIOLOGY:   No results found for this or any previous visit (from the past 24 hour(s)).    =========================================

## 2022-06-13 ENCOUNTER — APPOINTMENT (OUTPATIENT)
Dept: OCCUPATIONAL THERAPY | Facility: CLINIC | Age: 77
DRG: 270 | End: 2022-06-13
Attending: INTERNAL MEDICINE
Payer: MEDICARE

## 2022-06-13 ENCOUNTER — APPOINTMENT (OUTPATIENT)
Dept: GENERAL RADIOLOGY | Facility: CLINIC | Age: 77
DRG: 270 | End: 2022-06-13
Attending: INTERNAL MEDICINE
Payer: MEDICARE

## 2022-06-13 LAB
ANION GAP SERPL CALCULATED.3IONS-SCNC: 5 MMOL/L (ref 3–14)
BASE EXCESS BLDV CALC-SCNC: 5.6 MMOL/L (ref -7.7–1.9)
BUN SERPL-MCNC: 41 MG/DL (ref 7–30)
CALCIUM SERPL-MCNC: 9 MG/DL (ref 8.5–10.1)
CHLORIDE BLD-SCNC: 108 MMOL/L (ref 94–109)
CO2 SERPL-SCNC: 28 MMOL/L (ref 20–32)
CREAT SERPL-MCNC: 1.07 MG/DL (ref 0.52–1.04)
ERYTHROCYTE [DISTWIDTH] IN BLOOD BY AUTOMATED COUNT: 18.4 % (ref 10–15)
GFR SERPL CREATININE-BSD FRML MDRD: 53 ML/MIN/1.73M2
GLUCOSE BLD-MCNC: 115 MG/DL (ref 70–99)
GLUCOSE BLDC GLUCOMTR-MCNC: 115 MG/DL (ref 70–99)
GLUCOSE BLDC GLUCOMTR-MCNC: 123 MG/DL (ref 70–99)
GLUCOSE BLDC GLUCOMTR-MCNC: 128 MG/DL (ref 70–99)
GLUCOSE BLDC GLUCOMTR-MCNC: 128 MG/DL (ref 70–99)
GLUCOSE BLDC GLUCOMTR-MCNC: 133 MG/DL (ref 70–99)
HCO3 BLDV-SCNC: 30 MMOL/L (ref 21–28)
HCT VFR BLD AUTO: 25 % (ref 35–47)
HGB BLD-MCNC: 7.7 G/DL (ref 11.7–15.7)
INR PPP: 1.89 (ref 0.85–1.15)
MAGNESIUM SERPL-MCNC: 2.6 MG/DL (ref 1.6–2.3)
MCH RBC QN AUTO: 29.6 PG (ref 26.5–33)
MCHC RBC AUTO-ENTMCNC: 30.8 G/DL (ref 31.5–36.5)
MCV RBC AUTO: 96 FL (ref 78–100)
O2/TOTAL GAS SETTING VFR VENT: 5 %
OXYHGB MFR BLDV: 57 % (ref 70–75)
PCO2 BLDV: 40 MM HG (ref 40–50)
PH BLDV: 7.47 [PH] (ref 7.32–7.43)
PLATELET # BLD AUTO: 409 10E3/UL (ref 150–450)
PO2 BLDV: 27 MM HG (ref 25–47)
POTASSIUM BLD-SCNC: 4.6 MMOL/L (ref 3.4–5.3)
PREALB SERPL IA-MCNC: 11 MG/DL (ref 15–45)
RBC # BLD AUTO: 2.6 10E6/UL (ref 3.8–5.2)
SODIUM SERPL-SCNC: 141 MMOL/L (ref 133–144)
WBC # BLD AUTO: 25.9 10E3/UL (ref 4–11)

## 2022-06-13 PROCEDURE — 999N000157 HC STATISTIC RCP TIME EA 10 MIN

## 2022-06-13 PROCEDURE — 85014 HEMATOCRIT: CPT | Performed by: STUDENT IN AN ORGANIZED HEALTH CARE EDUCATION/TRAINING PROGRAM

## 2022-06-13 PROCEDURE — 85610 PROTHROMBIN TIME: CPT | Performed by: STUDENT IN AN ORGANIZED HEALTH CARE EDUCATION/TRAINING PROGRAM

## 2022-06-13 PROCEDURE — 250N000011 HC RX IP 250 OP 636: Performed by: STUDENT IN AN ORGANIZED HEALTH CARE EDUCATION/TRAINING PROGRAM

## 2022-06-13 PROCEDURE — 250N000011 HC RX IP 250 OP 636: Performed by: THORACIC SURGERY (CARDIOTHORACIC VASCULAR SURGERY)

## 2022-06-13 PROCEDURE — 250N000013 HC RX MED GY IP 250 OP 250 PS 637: Performed by: STUDENT IN AN ORGANIZED HEALTH CARE EDUCATION/TRAINING PROGRAM

## 2022-06-13 PROCEDURE — 71045 X-RAY EXAM CHEST 1 VIEW: CPT | Mod: 26 | Performed by: RADIOLOGY

## 2022-06-13 PROCEDURE — 80048 BASIC METABOLIC PNL TOTAL CA: CPT | Performed by: STUDENT IN AN ORGANIZED HEALTH CARE EDUCATION/TRAINING PROGRAM

## 2022-06-13 PROCEDURE — 99233 SBSQ HOSP IP/OBS HIGH 50: CPT | Mod: 25 | Performed by: INTERNAL MEDICINE

## 2022-06-13 PROCEDURE — 94660 CPAP INITIATION&MGMT: CPT

## 2022-06-13 PROCEDURE — 71045 X-RAY EXAM CHEST 1 VIEW: CPT

## 2022-06-13 PROCEDURE — 250N000013 HC RX MED GY IP 250 OP 250 PS 637: Performed by: THORACIC SURGERY (CARDIOTHORACIC VASCULAR SURGERY)

## 2022-06-13 PROCEDURE — 999N000215 HC STATISTIC HFNC ADULT NON-CPAP

## 2022-06-13 PROCEDURE — 83735 ASSAY OF MAGNESIUM: CPT | Performed by: STUDENT IN AN ORGANIZED HEALTH CARE EDUCATION/TRAINING PROGRAM

## 2022-06-13 PROCEDURE — 200N000002 HC R&B ICU UMMC

## 2022-06-13 PROCEDURE — 97530 THERAPEUTIC ACTIVITIES: CPT | Mod: GO

## 2022-06-13 PROCEDURE — 250N000013 HC RX MED GY IP 250 OP 250 PS 637

## 2022-06-13 PROCEDURE — 99291 CRITICAL CARE FIRST HOUR: CPT | Mod: 24 | Performed by: INTERNAL MEDICINE

## 2022-06-13 PROCEDURE — 82805 BLOOD GASES W/O2 SATURATION: CPT

## 2022-06-13 RX ORDER — FUROSEMIDE 10 MG/ML
20 INJECTION INTRAMUSCULAR; INTRAVENOUS ONCE
Status: COMPLETED | OUTPATIENT
Start: 2022-06-13 | End: 2022-06-13

## 2022-06-13 RX ADMIN — Medication 15 ML: at 08:03

## 2022-06-13 RX ADMIN — VANCOMYCIN HYDROCHLORIDE 125 MG: KIT at 20:28

## 2022-06-13 RX ADMIN — MIDODRINE HYDROCHLORIDE 20 MG: 5 TABLET ORAL at 23:44

## 2022-06-13 RX ADMIN — DIGOXIN 125 MCG: 125 TABLET ORAL at 08:04

## 2022-06-13 RX ADMIN — ACETAMINOPHEN 325MG 975 MG: 325 TABLET ORAL at 23:44

## 2022-06-13 RX ADMIN — ACETAMINOPHEN 325MG 975 MG: 325 TABLET ORAL at 00:31

## 2022-06-13 RX ADMIN — CEFEPIME 2 G: 2 INJECTION, POWDER, FOR SOLUTION INTRAVENOUS at 00:30

## 2022-06-13 RX ADMIN — Medication 40 MG: at 08:08

## 2022-06-13 RX ADMIN — MIDODRINE HYDROCHLORIDE 20 MG: 5 TABLET ORAL at 08:04

## 2022-06-13 RX ADMIN — Medication 1 PACKET: at 08:09

## 2022-06-13 RX ADMIN — VANCOMYCIN HYDROCHLORIDE 125 MG: KIT at 16:28

## 2022-06-13 RX ADMIN — MIDODRINE HYDROCHLORIDE 20 MG: 5 TABLET ORAL at 16:27

## 2022-06-13 RX ADMIN — Medication 10 MG: at 20:28

## 2022-06-13 RX ADMIN — ACETAMINOPHEN 325MG 975 MG: 325 TABLET ORAL at 08:03

## 2022-06-13 RX ADMIN — MIDODRINE HYDROCHLORIDE 20 MG: 5 TABLET ORAL at 00:31

## 2022-06-13 RX ADMIN — HYDROXYZINE HYDROCHLORIDE 25 MG: 25 TABLET ORAL at 13:42

## 2022-06-13 RX ADMIN — VANCOMYCIN HYDROCHLORIDE 1000 MG: 1 INJECTION, SOLUTION INTRAVENOUS at 10:14

## 2022-06-13 RX ADMIN — VANCOMYCIN HYDROCHLORIDE 125 MG: KIT at 12:26

## 2022-06-13 RX ADMIN — VANCOMYCIN HYDROCHLORIDE 125 MG: KIT at 08:08

## 2022-06-13 RX ADMIN — CEFEPIME 2 G: 2 INJECTION, POWDER, FOR SOLUTION INTRAVENOUS at 13:42

## 2022-06-13 RX ADMIN — LIDOCAINE PATCH 4% 1 PATCH: 40 PATCH TOPICAL at 08:03

## 2022-06-13 RX ADMIN — APIXABAN 5 MG: 5 TABLET, FILM COATED ORAL at 08:03

## 2022-06-13 RX ADMIN — OXYCODONE HYDROCHLORIDE 10 MG: 10 TABLET ORAL at 23:46

## 2022-06-13 RX ADMIN — HYDROXYZINE HYDROCHLORIDE 25 MG: 25 TABLET ORAL at 00:31

## 2022-06-13 RX ADMIN — ACETAMINOPHEN 325MG 975 MG: 325 TABLET ORAL at 16:27

## 2022-06-13 RX ADMIN — FUROSEMIDE 20 MG: 10 INJECTION, SOLUTION INTRAVENOUS at 08:07

## 2022-06-13 RX ADMIN — FUROSEMIDE 20 MG: 10 INJECTION, SOLUTION INTRAMUSCULAR; INTRAVENOUS at 13:49

## 2022-06-13 RX ADMIN — APIXABAN 5 MG: 5 TABLET, FILM COATED ORAL at 20:28

## 2022-06-13 RX ADMIN — OXYCODONE HYDROCHLORIDE 5 MG: 5 TABLET ORAL at 00:33

## 2022-06-13 ASSESSMENT — ACTIVITIES OF DAILY LIVING (ADL)
ADLS_ACUITY_SCORE: 39
ADLS_ACUITY_SCORE: 41
ADLS_ACUITY_SCORE: 39
ADLS_ACUITY_SCORE: 41
ADLS_ACUITY_SCORE: 39
ADLS_ACUITY_SCORE: 39

## 2022-06-13 NOTE — PLAN OF CARE
Major Shift Events: Neuro intact alternating between anxiety with SOB and lethargy with flat affect. VSS NSR with 1st degree AV block. BP stable, off pressors. CVP 16. 20 lasix given x2 with good UOP. Oxygen device switched to oxymask and titrated down to 6-8L. TF at goal, pt refusing anything PO. R CT with minimal output. PRN Atarax given x1. Pt up to chair Ax1-2.    Plan: Continue to monitor respiratory status and hemodynamics. Continue to encourage activity and PO intake. IR consult for possible placement of a second drain.    For vital signs and complete assessments, please see documentation flowsheets.

## 2022-06-13 NOTE — CARE PLAN
Neuro: Alert and oriented x 4. SAMANTHA, moving all extremities,  generalized weakness.  Complained of back, chest and midsternal incisional pain. Intermittent anxiety, PRN oxycodone and atarax     Cardiac: 1  AV block, SR, HR 70s-90s  CVP 7-10.  MAPs >65. Vaso off. Afebrile     Resp: HFNC 55% Fio2, 40 LPM. Dyspnea with exertion and at rest, desats with activity, but recovers quickly. Pigtail chest tube to suction with no output      GI: TF at goal at 40/hr, standard flushes. Rectal tube for continuous incontinent bowel movements     :  Voiding spontaneously incontinent    Plan: Refusing many cares from nursing including repositioning. Monitor fluid status and hemodynamics and follow POC.

## 2022-06-13 NOTE — PLAN OF CARE
"RN care from [id number]    Major Shift Events: Neuro intact. Refusing to wear Bipap and complains she can't breath. Education completed about the importance of the bipap. On High flow nasal cannula now. States she \"wants us to let her die\". See flowsheet for rectal tube, purewick, and chest tube output. VBG completed.    Plan: Continue with current POC    For vital signs and complete assessments, please see documentation flowsheets.     "

## 2022-06-13 NOTE — PLAN OF CARE
Goal Outcome Evaluation:  Plan of Care Reviewed With: patient   Overall Patient Progress: no change  Outcome Evaluation: Pt feels short of breath and remains on 60% 40 L High Flow Nasal Cannula.    Major Shift Events: Pt is oriented x4. Pt complains of pain around sternal incision and Right chest tube. PRN oxycodone, robaxin and dilaudid were given. Pt reports feeling short of breath. For duration of shift patient remained on 60% 40L High Flow Nasal Cannula. X-ray was done. Pt received 1 L of LR. Pt had minimal urine output. Pt had 100 mL of loose stools.     Plan: Continue to monitor respiratory status. Reassess and treat pain.     For vital signs and complete assessments, please see documentation flowsheets.

## 2022-06-13 NOTE — CONSULTS
Cardiology Fellow Progress Note    06/13/22    Interval Events  ----------------------  TRISTIAN  Remains in ICU for intermittented pressor needs to treat hypotension after diuresis  Continues to require HiFlow NC  Continues to feel short of breath, but improved overall since prior to admission  C diff positive    Current Medications  ----------------------  Current Facility-Administered Medications   Medication     acetaminophen (TYLENOL) tablet 975 mg     apixaban ANTICOAGULANT (ELIQUIS) tablet 5 mg     bisacodyl (DULCOLAX) Suppository 10 mg     ceFEPIme (MAXIPIME) 2 g vial to attach to  ml bag for ADULTS or 50 ml bag for PEDS     dextrose 10% infusion     glucose gel 15-30 g    Or     dextrose 50 % injection 25-50 mL    Or     glucagon injection 1 mg     digoxin (LANOXIN) tablet 125 mcg     heparin lock flush 10 UNIT/ML injection 5-20 mL     heparin lock flush 10 UNIT/ML injection 5-20 mL     hydrALAZINE (APRESOLINE) injection 10 mg     HYDROmorphone (DILAUDID) injection 0.2 mg     hydrOXYzine (ATARAX) tablet 25-50 mg     insulin aspart (NovoLOG) injection (RAPID ACTING)     ipratropium - albuterol 0.5 mg/2.5 mg/3 mL (DUONEB) neb solution 3 mL     Lidocaine (LIDOCARE) 4 % Patch 1 patch     lidocaine (LMX4) cream     lidocaine (LMX4) cream     lidocaine 1 % 0.1-1 mL     lidocaine 1 % 0.1-1 mL     lidocaine patch in PLACE     lidocaine-prilocaine (EMLA) cream     macitentan (OPSUMIT) tablet 10 mg     magnesium hydroxide (MILK OF MAGNESIA) suspension 30 mL     medication instruction     melatonin tablet 10 mg     methocarbamol (ROBAXIN) tablet 500 mg     midodrine (PROAMATINE) tablet 20 mg     multivitamins w/minerals liquid 15 mL     naloxone (NARCAN) injection 0.2 mg    Or     naloxone (NARCAN) injection 0.4 mg    Or     naloxone (NARCAN) injection 0.2 mg    Or     naloxone (NARCAN) injection 0.4 mg     ondansetron (ZOFRAN ODT) ODT tab 4 mg    Or     ondansetron (ZOFRAN) injection 4 mg     oxyCODONE  (ROXICODONE) tablet 5 mg    Or     oxyCODONE IR (ROXICODONE) tablet 10 mg     pantoprazole (PROTONIX) 2 mg/mL suspension 40 mg     [Held by provider] polyethylene glycol (MIRALAX) Packet 17 g     prochlorperazine (COMPAZINE) tablet 5 mg    Or     prochlorperazine (COMPAZINE) injection 5 mg     protein modular (PROSOURCE TF) 1 packet     Reason beta blocker order not selected     [Held by provider] senna-docusate (SENOKOT-S/PERICOLACE) 8.6-50 MG per tablet 1 tablet     sodium chloride (PF) 0.9% PF flush 10 mL     sodium chloride (PF) 0.9% PF flush 10-20 mL     sodium chloride (PF) 0.9% PF flush 10-20 mL     sodium chloride (PF) 0.9% PF flush 10-40 mL     sodium chloride (PF) 0.9% PF flush 10-40 mL     sodium chloride (PF) 0.9% PF flush 3 mL     sodium chloride (PF) 0.9% PF flush 3 mL     sodium chloride (PF) 0.9% PF flush 3 mL     sodium chloride (PF) 0.9% PF flush 3 mL     sodium chloride (PF) 0.9% PF flush 3 mL     sodium chloride bacteriostatic 0.9 % flush 10 mL     vancomycin (FIRVANQ) oral solution 125 mg     vasopressin 1 unit/mL MAX Conc (PITRESSIN) infusion        Intake and Output  ----------------------      Intake/Output Summary (Last 24 hours) at 6/7/2022 0721  Last data filed at 6/7/2022 0700  Gross per 24 hour   Intake 3622.82 ml   Output 3230 ml   Net 392.82 ml         Weight  ----------------------  Wt Readings from Last 2 Encounters:   06/13/22 63.9 kg (140 lb 14 oz)   04/29/22 67.4 kg (148 lb 8 oz)       Objective  ----------------------  Results for orders placed or performed during the hospital encounter of 05/25/22 (from the past 24 hour(s))   Glucose by meter   Result Value Ref Range    GLUCOSE BY METER POCT 155 (H) 70 - 99 mg/dL   Glucose by meter   Result Value Ref Range    GLUCOSE BY METER POCT 124 (H) 70 - 99 mg/dL   XR Chest Port 1 View    Narrative    EXAM: XR Chest 1 view 6/13/2022 1:05 AM      HISTORY: Ongoing hypoxia in setting of CTEPH s/p pulmonary  endarterectomy.    COMPARISON:  Previous day.     TECHNIQUE: Frontal view of the chest.    FINDINGS: Right-sided PICC with tip in the atrium. Enteric tube is  subdiaphragmatic with tip coming from the study. Right basilar chest  tube. Postsurgical changes of the chest with median sternotomy wires  intact.  The trachea is midline. Heart and mediastinal silhouette is partially  obscured. Increased patchy interstitial and airspace opacities  bilaterally. Small left pleural effusion. Small right pleural  effusion. No pneumothoraces.      Impression    IMPRESSION:   1. New patchy airspace and interstitial pulmonary opacities, which may  represent infection versus edema.  2. Small bilateral pleural effusions.    I have personally reviewed the examination and initial interpretation  and I agree with the findings.    CANDACE ALMONTE MD         SYSTEM ID:  [id number]   Basic metabolic panel   Result Value Ref Range    Sodium 141 133 - 144 mmol/L    Potassium 4.6 3.4 - 5.3 mmol/L    Chloride 108 94 - 109 mmol/L    Carbon Dioxide (CO2) 28 20 - 32 mmol/L    Anion Gap 5 3 - 14 mmol/L    Urea Nitrogen 41 (H) 7 - 30 mg/dL    Creatinine 1.07 (H) 0.52 - 1.04 mg/dL    Calcium 9.0 8.5 - 10.1 mg/dL    Glucose 115 (H) 70 - 99 mg/dL    GFR Estimate 53 (L) >60 mL/min/1.73m2   Magnesium   Result Value Ref Range    Magnesium 2.6 (H) 1.6 - 2.3 mg/dL   CBC with platelets   Result Value Ref Range    WBC Count 25.9 (H) 4.0 - 11.0 10e3/uL    RBC Count 2.60 (L) 3.80 - 5.20 10e6/uL    Hemoglobin 7.7 (L) 11.7 - 15.7 g/dL    Hematocrit 25.0 (L) 35.0 - 47.0 %    MCV 96 78 - 100 fL    MCH 29.6 26.5 - 33.0 pg    MCHC 30.8 (L) 31.5 - 36.5 g/dL    RDW 18.4 (H) 10.0 - 15.0 %    Platelet Count 409 150 - 450 10e3/uL   INR   Result Value Ref Range    INR 1.89 (H) 0.85 - 1.15   Glucose by meter   Result Value Ref Range    GLUCOSE BY METER POCT 115 (H) 70 - 99 mg/dL   Glucose by meter   Result Value Ref Range    GLUCOSE BY METER POCT 128 (H) 70 - 99 mg/dL   Glucose by meter   Result Value Ref  Range    GLUCOSE BY METER POCT 133 (H) 70 - 99 mg/dL       Recent Labs   Lab Test 12/08/21  1221   CHOL 113   HDL 35*   LDL 59   TRIG 94       Hemoglobin A1C   Date Value Ref Range Status   12/08/2021 5.8 (H) 0.0 - 5.6 % Final     Comment:     Normal <5.7%   Prediabetes 5.7-6.4%    Diabetes 6.5% or higher     Note: Adopted from ADA consensus guidelines.       Results for orders placed or performed during the hospital encounter of 05/25/22   XR Chest Port 1 View    Impression    IMPRESSION: [place] catheter tip projects over the central right  pulmonary artery.    I have personally reviewed the examination and initial interpretation  and I agree with the findings.    CANDACE ALMONTE MD         SYSTEM ID:  [id number]   XR Abdomen Port 1 View    Impression    IMPRESSION: Gastric tube tip and sidehole project over the stomach.    I have personally reviewed the examination and initial interpretation  and I agree with the findings.    CANDACE ALMONTE MD         SYSTEM ID:  [id number]   XR Chest Port 1 View    Impression    Impression: Postoperative chest with slightly improved perihilar  atelectasis or edema. Endotracheal tube tip in lower trachea 2.5 cm  above the man.    I have personally reviewed the examination and initial interpretation  and I agree with the findings.    YVETTE GRAY MD         SYSTEM ID:  [id number]   XR Chest Port 1 View    Impression    Impression: Postoperative chest with stable perihilar atelectasis or  edema. Endotracheal tube tip 1.7 cm superior to the man.    I have personally reviewed the examination and initial interpretation  and I agree with the findings.    YVETTE GRAY MD         SYSTEM ID:  [id number]   XR Chest Port 1 View    Impression    Impression:  1. Endotracheal tube retracted now 2.4 cm from the man.   2. No significant change in small amount of atelectasis lung bases  bilaterally..    YVETTE GRAY MD         SYSTEM ID:  [id number]   XR Chest Port 1 View     Impression    Impression:   1. Unchanged support devices as described above.  2. Unchanged bibasilar atelectasis. No new focal pulmonary opacities.    I have personally reviewed the examination and initial interpretation  and I agree with the findings.    CANDACE ALMONTE MD         SYSTEM ID:  [id number]   XR Chest Port 1 View    Impression    Impression:   1. Unchanged small effusions and bibasilar predominant opacities.  2. Unchanged support devices including pulmonary artery catheter and  ET tube over the low thoracic trachea.    I have personally reviewed the examination and initial interpretation  and I agree with the findings.    JESSICA WALKER MD         SYSTEM ID:  [id number]   XR Chest Port 1 View    Impression    IMPRESSION: Interval extubation. Slightly increased left pleural  effusion/basilar atelectasis. Atherosclerosis. Mild central  atelectasis/subtle positive fluid volume balance.    MARK GUADARRAMA MD         SYSTEM ID:  [id number]   XR Abdomen Port 1 View    Impression    Impression: Enteric tube is subdiaphragmatic with tip in the left  pelvis, presumably within the jejunum.    I have personally reviewed the examination and initial interpretation  and I agree with the findings.    BEBE CLIFFORD MD         SYSTEM ID:  [id number]   XR Chest Port 1 View    Impression    Impression:   1. Unchanged effusions with increased left basilar atelectasis and  interstitial pulmonary edema.  2. Removal of pulmonary artery catheter, mediastinal drain and left  chest tube. Given that lung apices are not entirely included in the  field-of-view, small pneumothoraces could be missed.    I have personally reviewed the examination and initial interpretation  and I agree with the findings.    MARK GUADARRAMA MD         SYSTEM ID:  [id number]   XR Chest Port 1 View   Result Value Ref Range    Radiologist flags New moderate right-sided pneumothorax status post (AA)     Impression    Impression:   1. New moderate  right-sided pneumothorax.  2. Possible trace left pneumothorax, attention on follow-up.  3. Increasing atelectasis/pulmonary edema.  4. Small left pleural effusion.  5. Interval removal of right basilar chest tube.    [Critical Result: New moderate right-sided pneumothorax status post  chest tube removal]    Finding was identified on 6/2/2022 6:21 PM.     Raz Rodgers was contacted by Dr. Wyatt at 6/2/2022 6:36 PM and  verbalized understanding of the critical finding.     I have personally reviewed the examination and initial interpretation  and I agree with the findings.    BK DUPREE MD         SYSTEM ID:  [id number]   XR Chest Port 1 View    Impression    Impression:   1. Unchanged appearance of mixed pulmonary opacities.  2. Small biapical pneumothoraces, right more than left.  3. Trace bilateral pleural effusions.    I have personally reviewed the examination and initial interpretation  and I agree with the findings.    BK DUPREE MD         SYSTEM ID:  [id number]   XR Chest Port 1 View    Impression    IMPRESSION:  1.  Lines and tubes, as above.  2.  Stable perihilar opacities, likely edema and atelectasis. Slightly  decreased retrocardiac opacity, likely decreasing atelectasis.   3.  Stable trace biapical pneumothoraces.    I have personally reviewed the examination and initial interpretation  and I agree with the findings.    ZEE FISHMAN MD         SYSTEM ID:  [id number]   XR Chest Port 1 View    Impression    Impression:   1. Unchanged left IJ catheter projecting over the innominate vein.  2. Unchanged effusions and mixed airspace opacities.  3. Decreasing biapical pneumothoraces.    I have personally reviewed the examination and initial interpretation  and I agree with the findings.    JESSICA WALKER MD         SYSTEM ID:  [id number]   Echo Complete   Result Value Ref Range    LVEF  60-65%        Physical Exam  Vitals:    06/13/22 1000 06/13/22 1100 06/13/22 1200 06/13/22 1300   BP: 112/47  125/56 114/47 106/43   BP Location:   Left arm    Cuff Size:   Adult Regular    Pulse: 85 90 96 92   Resp: 21 20 13 23   Temp:   97.6  F (36.4  C)    TempSrc:   Oral    SpO2: 100% 100% 99% 100%   Weight:       Height:         Physical Exam   Constitutional: No distress. She appears chronically ill.   Pulmonary/Chest: Effort normal.   Abdominal: Soft. Bowel sounds are normal. She exhibits no distension.   Neurological: She is alert and oriented to person, place, and time.   Skin: Skin is warm and dry.        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 Continues to recover and improve daily.     Changes Today:   - Continue to diuresis with caution to maintain euvolemia  - Agree with midodrine and low dose vasopressin for hypotension  - Continue HiFlow/BiPAP as needed  - Continue Warfarin  - continue home pHTN medications as tolerated      # Pulmonary hypertension, Group IV  # CTEPH s/p pulmonary endaterectomy  # DVTs  # Severe tricuspid insufficiency  Initially diagnosed with bilateral DVT, PE 2019. TTE showed dilated RV with reduced RV function and RVSP of 84mmHg suggestive of severe pulmonary hypertension with mild to moderate TR. Patient believes the above relates to a work related injury, unna boot early 2019. RHC 12/18/2021 with RA 12, PA 84/19/45, PCWP 2, TAHIRA CO/CI 2.8/1.6. Last dose eliquis 5/23 AM. Has oxygen at home but does not use this. Underwent endarterectomy on 5/27. Post op on multiple pressors and briefly on milrinone.   - Goal CVP < 10, diuresis as needed to achieve  - continue home pHTN meds        # Volume overload  PTA on lasix 20mg qday (decreased from 40mg ~6 weeks ago). JVP elevated on initial exam. S/p 40mg IV lasix 5/25; repeat as needed     # Anemia  Hgb 11.4, similar to prior last  month.   Has been trending down with HgB today at 6.4 HgB would recommend workup for acute blood loss anemia       # CKD  Baseline Cr ~1.1.     Dwain Ferrer MD, MSc  Cardiovascular Disease Fellow  [facility]

## 2022-06-13 NOTE — CONSULTS
Chronic thromboembolic pulmonary hypertension  SOB  S/p pulmonary artery thromboendarterectomy 5/27  Right chest tube placed 6/10  Still with need for pressors, Aminah PEREIRA, feels short of breath  Bedside US in ICU shows moderate left effusion so IR consulted  Will place left chest tube tomorrow, 6/14  No need for labs per ICU    Imer Arteaga PA-C  Interventional Radiology  [phone]

## 2022-06-13 NOTE — PROGRESS NOTES
"  CV ICU PROGRESS NOTE      CO-MORBIDITIES:   CTEPH (chronic thromboembolic pulmonary hypertension) (H)  (primary encounter diagnosis)  Primary pulmonary hypertension (H)  SOB (shortness of breath)  S/P coronary angiogram    ASSESSMENT: Debi Espinoza is a 76 year old female with PMH of HTN, nephrolithiasis, DVT (2019) and PE (2019, 2021) on chronic anticoagulation, and chronic thromboembolic pulmonary hypertension who underwent pulmonary artery thromboendarterectomy on 5/27 with Dr. Peterson.      TODAY'S PROGRESS:   - Continue diuresis, lasix 20 BID  - Will reassess for further diuresis need  - Left pleural effusion on bedside US -> IR consult for drainage  - Will remove right pigtail catheter   - Psych consult given depression, hopelessness   - Stop IV vancomycin    PLAN:  Neuro/ pain/ sedation:  Acute Postoperative pain  - Monitor neurological status. Notify the MD for any acute changes in exam.  - Pain: S tylenol 975 TID, lidocaine patches. PRN oxycodone 5-10 q4h, robaxin 500 q6h, PRN dilaudid 0.2 q2h-  - Psych consult today, making statements such as \"I just want to die\", \"what's the point\", etc     Pulmonary care:   S/p Pulmonary artery thromboendarterectomy on 5/27/2022 by Dr. Peterson  Hx Chronic pulmonary thromboembolic disease  Hx PE (2019, 2021)  Hx Emphysema, moderate  Small right pleural effusion - seen on CT chest 6/9  S/p right pigtail catheter placement on 6/10  PTA regimen: apixaban 5mg BID  CTA 05/2019 demonstrated underlying moderate emphysema  Extubated 5/31  - Supplemental oxygen: stable on NC, wean as tolerated  - PTA Opsumit, hold Adempas  - 6/9 CT chest with small right pleural effusion  - 6/10 right pigtail catheter placement with IR, noted to be loculated -> will remove  - Bedside US today demonstrating left pleural effusion -> IR consult for drainage    Cardiovascular:    Hx Severe pulmonary hypertension  Hx RV dilation and reduced RV function  Hx Severe tricuspid insufficiency  Hx Essential " HTN  Distributive shock  PTA regimen: furosemide 20mg qd, adempas 2.5mg TID, opsumit 10mg qd  Echo on 03/2021 with LVEF of 78%, severe pulmonary hypertension (82 mmHg), reduced RV function (TAPSE 16mm), severe tricuspid insufficiency    - 6/2 ECHO: Global and regional LV function is normal: EF of 60-65%. Flattened septum c/w RV pressure overload. Moderate RV dilation with severely reduced global right ventricular function. Mod TR. Severe p-HTN. Estimated pulmonary artery systolic pressure is 85 mmHg plus right atrial pressure. Unable to visualize IVC. No pericardial effusions. Compared to prior TTE, RV is slightly less dilated, function appears similar.    6/8 ECHO: Global and regional LV function is hyperkinetic EF of 65-70%.  Mod LV dilation w/ severely reduced global RV function. Moderate TR. Estimated pulmonary artery systolic pressure is 57 mmHg plus right atrial pressure. Compared with the study from 6/2/22. Estimated PA pressure is lower.     - Monitor hemodynamic status.   - If hypotensive, start vasopressin and avoid additional fluid boluses  - MAP goal > 65  - no indication for ASA use  - on p-HTN meds as above  - V pacing back up 50 BPM- been in sinus with 1st degree AV-block  - Continue midodrine 20 q8h  - Apixiban started 6/8  - 6/8-9 digoxin loaded - 500 mcg, followed by 250 q8h x2   - Digoxin maintenance dose 125 mcg daily (per pharmacy for renal/weight dosing)    GI care/ Nutrition:   - Speech consult, appreciate recs   - Regular diet- decreased appetite, encourage PO nutrition/ ensure  - Continuous tube feeds  - PPI  - CDIFF (+) 6/11- hold all bowel regimen, started PO vanco     Renal/ Fluid Balance/ Electrolytes:   Nephrolithiasis  Hypernatremia  PTA regimen: furosemide 20mg qd  BL creat appears to be ~ 1.14  - Strict I/O, daily weights  - Avoid/limit nephrotoxins as able  - Replete lytes PRN per protocol  - FWF at 30 ml q4h  - Net +2.9L overnight  - Lasix 20 x2, reassess for further diuresis needs  "-> goal net even     Endocrine:    Stress induced hyperglycemia  Preop A1c 5.8 (12/21)  - High sliding scale insulin   - Goal BG <180 for optimal healing      ID/ Antibiotics:  Stress induced leukocytosis  Distributive shock  5/30 MRSA swab negative  5/30 Sputum - Klebsiella  5/31 Sputum culture with 4+ lactose fermenting gram negative rods  5/31 Repeat blood cultures NGTD  6/4 Blood culture x2: NGTD    - s/p Meropenem (6/2-6/3) and Zosyn (6/3- 6/6)  - Continue to monitor fever curve, WBC and inflammatory markers as appropriate  - 6/10 Febrile, WBC 31  - Emperic cefepime (6/10 - ), Vanc (6/10 - 6/13), stopped with neg MRSA swab  - Cont PO vanc (Cdiff)    Cultures  6/11 Bcx, UA/UC, Sputum (follow-up 6/12 sputum as 6/11 cx's bad)  6/11 C. Diff (+)     Heme:     Stress induced leukocytosis  Acute blood loss anemia  Acute blood loss thrombocytopenia  Hx DVT (2019), PE (2019, 2021) on chronic anticoagulation (Apixaban)  PA tear intra-op s/p patch repair.  - Continue to monitor  - Apixiban for prophylaxis  - Daily CBC   - last transfusion 6/5. Has been stable 7->8 since      MSK/ Skin:  Sternotomy  Surgical Incision  - Sternal precautions  - Postoperative incision management per protocol  - PT/OT/CR     Prophylaxis:    - Mechanical prophylaxis for DVT  - Chemical DVT prophylaxis - Apixiban  - PPI     Lines/ tubes/ drains:  - PICC line  - NJ  - PIV     Disposition:  - Continue CVICU    Patient seen, findings and plan discussed with CV ICU staff, Dr. Abbi Corado MD  PGY-3, General Surgery  ---    ====================================    SUBJECTIVE:   No acute events overnight. [place] was on pressors and got 1L LR. Expressing sadness/depression today. Making statements like \"I just want to die\". Etc.     OBJECTIVE:   1. VITAL SIGNS:   Temp:  [97.7  F (36.5  C)-98.8  F (37.1  C)] 98.8  F (37.1  C)  Pulse:  [] 84  Resp:  [8-35] 14  BP: (100-142)/(39-80) 115/50  FiO2 (%):  [50 %-60 %] 55 %  SpO2:  [88 " %-100 %] 100 %  FiO2 (%): 55 %  Resp: 14    2. INTAKE/ OUTPUT:   I/O last 3 completed shifts:  In: 3574.11 [P.O.:400; I.V.:649.11; NG/GT:565; IV Piggyback:1000]  Out: 710 [Urine:600; Stool:100; Chest Tube:10]    3. PHYSICAL EXAMINATION:   General: Sitting upright in bed, appears relatively comfortable, sad, withdrawn  Neuro: Responsive and follows commands  Resp: Breathing less labored. Regular respiratory effort, on NC.   CV: RRR. peirpheral pulses intact  Abdomen: Soft, Non-distended, Non-tender  Incisions: clean dry intact  Extremities: warm and well perfused    4. INVESTIGATIONS:   Arterial Blood Gases   No lab results found in last 7 days.  Complete Blood Count   Recent Labs   Lab 06/13/22  0429 06/12/22  0448 06/11/22  0356 06/10/22  0343   WBC 25.9* 30.4* 31.0* 11.3*   HGB 7.7* 7.6* 8.8* 8.4*    386 457* 426     Basic Metabolic Panel  Recent Labs   Lab 06/13/22  0437 06/13/22 0429 06/12/22 2012 06/12/22  1634 06/12/22  1436 06/12/22 0452 06/12/22 0448 06/11/22 0401 06/11/22 0356   NA  --  141  --   --  141  --  141  --  139   POTASSIUM  --  4.6  --   --  4.4  --  4.7  --  4.6   CHLORIDE  --  108  --   --  107  --  108  --  104   CO2  --  28  --   --  27  --  30  --  28   BUN  --  41*  --   --  41*  --  41*  --  40*   CR  --  1.07*  --   --  0.97  --  1.13*  --  1.06*   * 115* 124* 155* 170*   < > 159*   < > 127*    < > = values in this interval not displayed.     Liver Function Tests  Recent Labs   Lab 06/13/22  0429 06/12/22  0448 06/11/22  0356 06/10/22  0343   AST  --  25  --   --    ALT  --  16  --   --    ALKPHOS  --  111  --   --    BILITOTAL  --  1.1  --   --    ALBUMIN  --  2.0*  --   --    INR 1.89* 2.11* 1.84* 1.75*     Pancreatic Enzymes  No lab results found in last 7 days.  Coagulation Profile  Recent Labs   Lab 06/13/22  0429 06/12/22  0448 06/11/22  0356 06/10/22  0343   INR 1.89* 2.11* 1.84* 1.75*     5. RADIOLOGY:   Recent Results (from the past 24 hour(s))   XR Chest Port  1 View    Narrative    Exam: XR CHEST PORT 1 VIEW, 6/12/2022 1:22 PM    Indication: hypoxia    Comparison: 6/10/2022    Findings:   Sternotomy wires. Feeding tube courses past the diaphragm with tip  excluded from the field-of-view. Right PICC line tip near superior  cavoatrial junction. Unchanged right pleural chest tube.    Unchanged cardiac mediastinal silhouette. Unchanged right and mildly  decreased left pleural effusions. No appreciable pneumothorax.  Continued streaky perihilar opacities with increased right basilar  opacities.      Impression    Impression:   1. Unchanged right abnormality decreased left effusions with right  pleural chest tube in place.  2. Increasing right basilar opacities, favor atelectasis though  developing infection is not excluded.  3. Interstitial pulmonary edema.    I have personally reviewed the examination and initial interpretation  and I agree with the findings.    JESSICA WALKER MD         SYSTEM ID:  [id number]   XR Chest Port 1 View    Impression    RESIDENT PRELIMINARY INTERPRETATION  IMPRESSION:   1. New patchy airspace and interstitial pulmonary opacities, which may  represent infection versus edema.  2. Small bilateral pleural effusions.       =========================================

## 2022-06-13 NOTE — CONSULTS
First attempt pt being placed on Bipap 2nd pt sleeping soundly. She will need to be evaluated tomorrow

## 2022-06-14 ENCOUNTER — APPOINTMENT (OUTPATIENT)
Dept: INTERVENTIONAL RADIOLOGY/VASCULAR | Facility: CLINIC | Age: 77
DRG: 270 | End: 2022-06-14
Attending: PHYSICIAN ASSISTANT
Payer: MEDICARE

## 2022-06-14 ENCOUNTER — APPOINTMENT (OUTPATIENT)
Dept: GENERAL RADIOLOGY | Facility: CLINIC | Age: 77
DRG: 270 | End: 2022-06-14
Attending: INTERNAL MEDICINE
Payer: MEDICARE

## 2022-06-14 ENCOUNTER — APPOINTMENT (OUTPATIENT)
Dept: OCCUPATIONAL THERAPY | Facility: CLINIC | Age: 77
DRG: 270 | End: 2022-06-14
Attending: INTERNAL MEDICINE
Payer: MEDICARE

## 2022-06-14 LAB
ANION GAP SERPL CALCULATED.3IONS-SCNC: 4 MMOL/L (ref 3–14)
BUN SERPL-MCNC: 40 MG/DL (ref 7–30)
CALCIUM SERPL-MCNC: 9 MG/DL (ref 8.5–10.1)
CHLORIDE BLD-SCNC: 109 MMOL/L (ref 94–109)
CO2 SERPL-SCNC: 27 MMOL/L (ref 20–32)
CREAT SERPL-MCNC: 1.03 MG/DL (ref 0.52–1.04)
ERYTHROCYTE [DISTWIDTH] IN BLOOD BY AUTOMATED COUNT: 17.9 % (ref 10–15)
GFR SERPL CREATININE-BSD FRML MDRD: 56 ML/MIN/1.73M2
GLUCOSE BLD-MCNC: 120 MG/DL (ref 70–99)
GLUCOSE BLDC GLUCOMTR-MCNC: 107 MG/DL (ref 70–99)
GLUCOSE BLDC GLUCOMTR-MCNC: 114 MG/DL (ref 70–99)
GLUCOSE BLDC GLUCOMTR-MCNC: 121 MG/DL (ref 70–99)
GLUCOSE BLDC GLUCOMTR-MCNC: 123 MG/DL (ref 70–99)
GLUCOSE BLDC GLUCOMTR-MCNC: 131 MG/DL (ref 70–99)
GLUCOSE BLDC GLUCOMTR-MCNC: 139 MG/DL (ref 70–99)
HCT VFR BLD AUTO: 24.1 % (ref 35–47)
HGB BLD-MCNC: 7.4 G/DL (ref 11.7–15.7)
INR PPP: 1.9 (ref 0.85–1.15)
MAGNESIUM SERPL-MCNC: 2.5 MG/DL (ref 1.6–2.3)
MCH RBC QN AUTO: 28.6 PG (ref 26.5–33)
MCHC RBC AUTO-ENTMCNC: 30.7 G/DL (ref 31.5–36.5)
MCV RBC AUTO: 93 FL (ref 78–100)
PLATELET # BLD AUTO: 428 10E3/UL (ref 150–450)
POTASSIUM BLD-SCNC: 4.1 MMOL/L (ref 3.4–5.3)
RBC # BLD AUTO: 2.59 10E6/UL (ref 3.8–5.2)
SODIUM SERPL-SCNC: 140 MMOL/L (ref 133–144)
WBC # BLD AUTO: 17.3 10E3/UL (ref 4–11)

## 2022-06-14 PROCEDURE — 250N000013 HC RX MED GY IP 250 OP 250 PS 637: Performed by: STUDENT IN AN ORGANIZED HEALTH CARE EDUCATION/TRAINING PROGRAM

## 2022-06-14 PROCEDURE — 97140 MANUAL THERAPY 1/> REGIONS: CPT | Mod: GO

## 2022-06-14 PROCEDURE — 71045 X-RAY EXAM CHEST 1 VIEW: CPT

## 2022-06-14 PROCEDURE — 99233 SBSQ HOSP IP/OBS HIGH 50: CPT | Mod: 25 | Performed by: INTERNAL MEDICINE

## 2022-06-14 PROCEDURE — 99222 1ST HOSP IP/OBS MODERATE 55: CPT | Performed by: PSYCHIATRY & NEUROLOGY

## 2022-06-14 PROCEDURE — 200N000002 HC R&B ICU UMMC

## 2022-06-14 PROCEDURE — C1729 CATH, DRAINAGE: HCPCS

## 2022-06-14 PROCEDURE — 82310 ASSAY OF CALCIUM: CPT | Performed by: STUDENT IN AN ORGANIZED HEALTH CARE EDUCATION/TRAINING PROGRAM

## 2022-06-14 PROCEDURE — 99291 CRITICAL CARE FIRST HOUR: CPT | Mod: 24 | Performed by: INTERNAL MEDICINE

## 2022-06-14 PROCEDURE — 85014 HEMATOCRIT: CPT | Performed by: STUDENT IN AN ORGANIZED HEALTH CARE EDUCATION/TRAINING PROGRAM

## 2022-06-14 PROCEDURE — C1769 GUIDE WIRE: HCPCS

## 2022-06-14 PROCEDURE — 32557 INSERT CATH PLEURA W/ IMAGE: CPT | Mod: LT | Performed by: RADIOLOGY

## 2022-06-14 PROCEDURE — 999N000157 HC STATISTIC RCP TIME EA 10 MIN

## 2022-06-14 PROCEDURE — 250N000009 HC RX 250: Performed by: STUDENT IN AN ORGANIZED HEALTH CARE EDUCATION/TRAINING PROGRAM

## 2022-06-14 PROCEDURE — 250N000011 HC RX IP 250 OP 636: Performed by: STUDENT IN AN ORGANIZED HEALTH CARE EDUCATION/TRAINING PROGRAM

## 2022-06-14 PROCEDURE — 250N000013 HC RX MED GY IP 250 OP 250 PS 637: Performed by: THORACIC SURGERY (CARDIOTHORACIC VASCULAR SURGERY)

## 2022-06-14 PROCEDURE — 999N000185 HC STATISTIC TRANSPORT TIME EA 15 MIN

## 2022-06-14 PROCEDURE — 999N000215 HC STATISTIC HFNC ADULT NON-CPAP

## 2022-06-14 PROCEDURE — 0W9B30Z DRAINAGE OF LEFT PLEURAL CAVITY WITH DRAINAGE DEVICE, PERCUTANEOUS APPROACH: ICD-10-PCS | Performed by: RADIOLOGY

## 2022-06-14 PROCEDURE — 71045 X-RAY EXAM CHEST 1 VIEW: CPT | Mod: 26 | Performed by: RADIOLOGY

## 2022-06-14 PROCEDURE — 83735 ASSAY OF MAGNESIUM: CPT | Performed by: STUDENT IN AN ORGANIZED HEALTH CARE EDUCATION/TRAINING PROGRAM

## 2022-06-14 PROCEDURE — 32557 INSERT CATH PLEURA W/ IMAGE: CPT

## 2022-06-14 PROCEDURE — 85610 PROTHROMBIN TIME: CPT | Performed by: STUDENT IN AN ORGANIZED HEALTH CARE EDUCATION/TRAINING PROGRAM

## 2022-06-14 PROCEDURE — 250N000013 HC RX MED GY IP 250 OP 250 PS 637

## 2022-06-14 RX ORDER — FUROSEMIDE 10 MG/ML
20 INJECTION INTRAMUSCULAR; INTRAVENOUS 3 TIMES DAILY
Status: DISCONTINUED | OUTPATIENT
Start: 2022-06-14 | End: 2022-06-18

## 2022-06-14 RX ORDER — LIDOCAINE HYDROCHLORIDE 10 MG/ML
1-30 INJECTION, SOLUTION EPIDURAL; INFILTRATION; INTRACAUDAL; PERINEURAL
Status: DISCONTINUED | OUTPATIENT
Start: 2022-06-14 | End: 2022-06-14

## 2022-06-14 RX ORDER — NOREPINEPHRINE BITARTRATE 0.06 MG/ML
INJECTION, SOLUTION INTRAVENOUS
Status: DISCONTINUED
Start: 2022-06-14 | End: 2022-06-15 | Stop reason: HOSPADM

## 2022-06-14 RX ORDER — ESCITALOPRAM OXALATE 10 MG/1
10 TABLET ORAL DAILY
Status: DISCONTINUED | OUTPATIENT
Start: 2022-06-14 | End: 2022-06-25 | Stop reason: HOSPADM

## 2022-06-14 RX ORDER — FUROSEMIDE 10 MG/ML
20 INJECTION INTRAMUSCULAR; INTRAVENOUS ONCE
Status: COMPLETED | OUTPATIENT
Start: 2022-06-14 | End: 2022-06-14

## 2022-06-14 RX ADMIN — ACETAMINOPHEN 325MG 975 MG: 325 TABLET ORAL at 07:59

## 2022-06-14 RX ADMIN — Medication 1 PACKET: at 08:02

## 2022-06-14 RX ADMIN — DIGOXIN 125 MCG: 125 TABLET ORAL at 07:59

## 2022-06-14 RX ADMIN — OXYCODONE HYDROCHLORIDE 10 MG: 10 TABLET ORAL at 16:38

## 2022-06-14 RX ADMIN — Medication 40 MG: at 08:00

## 2022-06-14 RX ADMIN — VANCOMYCIN HYDROCHLORIDE 125 MG: KIT at 08:00

## 2022-06-14 RX ADMIN — ESCITALOPRAM OXALATE 10 MG: 10 TABLET ORAL at 13:51

## 2022-06-14 RX ADMIN — Medication 15 ML: at 07:59

## 2022-06-14 RX ADMIN — MIDODRINE HYDROCHLORIDE 20 MG: 5 TABLET ORAL at 16:38

## 2022-06-14 RX ADMIN — MIDODRINE HYDROCHLORIDE 20 MG: 5 TABLET ORAL at 07:59

## 2022-06-14 RX ADMIN — CEFEPIME 2 G: 2 INJECTION, POWDER, FOR SOLUTION INTRAVENOUS at 01:48

## 2022-06-14 RX ADMIN — APIXABAN 5 MG: 5 TABLET, FILM COATED ORAL at 07:59

## 2022-06-14 RX ADMIN — VANCOMYCIN HYDROCHLORIDE 125 MG: KIT at 21:00

## 2022-06-14 RX ADMIN — FUROSEMIDE 20 MG: 10 INJECTION, SOLUTION INTRAMUSCULAR; INTRAVENOUS at 21:00

## 2022-06-14 RX ADMIN — LIDOCAINE PATCH 4% 1 PATCH: 40 PATCH TOPICAL at 08:12

## 2022-06-14 RX ADMIN — VANCOMYCIN HYDROCHLORIDE 125 MG: KIT at 16:39

## 2022-06-14 RX ADMIN — ACETAMINOPHEN 325MG 975 MG: 325 TABLET ORAL at 16:38

## 2022-06-14 RX ADMIN — VANCOMYCIN HYDROCHLORIDE 125 MG: KIT at 12:12

## 2022-06-14 RX ADMIN — FUROSEMIDE 20 MG: 10 INJECTION, SOLUTION INTRAMUSCULAR; INTRAVENOUS at 13:51

## 2022-06-14 RX ADMIN — Medication 10 MG: at 21:00

## 2022-06-14 RX ADMIN — LIDOCAINE HYDROCHLORIDE 10 ML: 10 INJECTION, SOLUTION EPIDURAL; INFILTRATION; INTRACAUDAL; PERINEURAL at 15:34

## 2022-06-14 RX ADMIN — CEFEPIME 2 G: 2 INJECTION, POWDER, FOR SOLUTION INTRAVENOUS at 13:53

## 2022-06-14 RX ADMIN — FUROSEMIDE 20 MG: 10 INJECTION, SOLUTION INTRAVENOUS at 04:15

## 2022-06-14 RX ADMIN — APIXABAN 5 MG: 5 TABLET, FILM COATED ORAL at 21:00

## 2022-06-14 RX ADMIN — OXYCODONE HYDROCHLORIDE 10 MG: 10 TABLET ORAL at 22:31

## 2022-06-14 ASSESSMENT — ACTIVITIES OF DAILY LIVING (ADL)
ADLS_ACUITY_SCORE: 35
ADLS_ACUITY_SCORE: 39
ADLS_ACUITY_SCORE: 35
ADLS_ACUITY_SCORE: 35
ADLS_ACUITY_SCORE: 39
ADLS_ACUITY_SCORE: 35
ADLS_ACUITY_SCORE: 41
ADLS_ACUITY_SCORE: 39

## 2022-06-14 NOTE — CONSULTS
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 She isn't sure if she has had any benefit from the hydroxizine. Denies feeling depressed, but feels hopeless at times. Also with sleep difficulties, loss of interests. Says that she wants to live, no passive death wish or SI. No history of post op delirium.   I see that she has been working with PT and OT and is making some progress.     She says, \"this time feels different\" and notes that her   from a heart attack 15 years ago. Denies visual or auditory hallucinations. Reports difficulty sleeping.     She refused to sign consent for chest tube placement this morning; \"I want to think about it\". She also plans to talk to her son about it.         Past Psychiatric History:   Debi reports that she had depression and anxiety after her   15 year ago.   Previously on fluoxetine ([id number]?) 20 mg daily; trazodone 50 mg at bedtime as needed for sleep       Substance Use and History:   No history of tobacco, alcohol, or drug use.         Past Medical History:   PAST MEDICAL HISTORY:   Past Medical History:   Diagnosis Date     History of deep venous thrombosis     2019     History of pulmonary embolism     2021     HTN (hypertension)      Nephrolithiasis      Osteoarthritis        PAST SURGICAL HISTORY:   Past Surgical History:   Procedure Laterality Date     CV CORONARY ANGIOGRAM N/A 2022    Procedure: Coronary Angiogram;  " Surgeon: Federico Morel MD;  [facility]     CV PULMONARY ANGIOGRAM N/A 12/08/2021    Procedure: Pulmonary Angiogram;  Surgeon: Lisette Knox MD;  Location:  [facility]     CV RIGHT HEART CATH MEASUREMENTS RECORDED N/A 12/08/2021    Procedure: CV RIGHT HEART CATH;  Surgeon: Lisette Knox MD;  Location:  [facility]     CV RIGHT HEART CATH MEASUREMENTS RECORDED N/A 05/26/2022    Procedure: Right Heart Catheterization;  Surgeon: Federico Morel MD;  Location:  [facility]     ENDARTERECTOMY ARTERY PULMONARY N/A 05/27/2022    Procedure: Median Sternotomy, Pulmonary Thromboendarterectomy, on cardiopulmonary bypass, Transesophageal Echocardiogram by Anesthesia;  Surgeon: Connor Peterson MD;  Location: [facility]     IR CHEST TUBE PLACEMENT NON-TUNNELED RIGHT  6/10/2022     PICC DOUBLE LUMEN PLACEMENT Right 06/05/2022    Right basilic, 39 cm 2 cm external length           Family History:   FAMILY HISTORY: History reviewed. No pertinent family history.        Social History:   Currently lives alone in [place], [place]. Has been on her own since her husbands death 15 years ago. Has had a variety of jobs. Has one son who lives close by. He did call her while we were in the room.          Physical ROS:   The 10 point Review of Systems is negative other than noted in the HPI or here.           Medications:       acetaminophen  975 mg Oral or Feeding Tube Q8H OR[place]O     apixaban ANTICOAGULANT  5 mg Oral BID     ceFEPIme (MAXIPIME) IV  2 g Intravenous Q12H     digoxin  125 mcg Oral or Feeding Tube Daily     heparin lock flush  5-20 mL Intracatheter Q24H     insulin aspart  1-12 Units Subcutaneous Q4H     lidocaine  1 patch Transdermal Q24H     lidocaine   Transdermal Q8H [place]O     macitentan  10 mg Oral Daily with lunch     melatonin  10 mg Oral or Feeding Tube QPM     midodrine  20 mg Oral Q8H     multivitamins w/minerals  15 mL Per  Feeding Tube Daily     pantoprazole  40 mg Oral QAM AC     [Held by provider] polyethylene glycol  17 g Oral or Feeding Tube BID     protein modular  1 packet Per Feeding Tube Daily     [Held by provider] senna-docusate  1 tablet Oral or Feeding Tube BID     sodium chloride (PF)  10 mL Intracatheter Q8H     sodium chloride (PF)  10-40 mL Intracatheter Q8H     sodium chloride (PF)  10-40 mL Intracatheter Q8H     sodium chloride (PF)  3 mL Intracatheter Q8H     sodium chloride (PF)  3 mL Intracatheter Q8H     sodium chloride bacteriostatic  10 mL Intravenous Once     vancomycin  125 mg Oral or Feeding Tube 4x Daily              Allergies:     Allergies   Allergen Reactions     Lisinopril Cough          Labs:     Recent Results (from the past 48 hour(s))   Glucose by meter    Collection Time: 06/12/22 11:28 AM   Result Value Ref Range    GLUCOSE BY METER POCT 138 (H) 70 - 99 mg/dL   Basic metabolic panel    Collection Time: 06/12/22  2:36 PM   Result Value Ref Range    Sodium 141 133 - 144 mmol/L    Potassium 4.4 3.4 - 5.3 mmol/L    Chloride 107 94 - 109 mmol/L    Carbon Dioxide (CO2) 27 20 - 32 mmol/L    Anion Gap 7 3 - 14 mmol/L    Urea Nitrogen 41 (H) 7 - 30 mg/dL    Creatinine 0.97 0.52 - 1.04 mg/dL    Calcium 8.6 8.5 - 10.1 mg/dL    Glucose 170 (H) 70 - 99 mg/dL    GFR Estimate 60 (L) >60 mL/min/1.73m2   Procalcitonin    Collection Time: 06/12/22  2:36 PM   Result Value Ref Range    Procalcitonin 2.49 (H) <0.05 ng/mL   Glucose by meter    Collection Time: 06/12/22  4:34 PM   Result Value Ref Range    GLUCOSE BY METER POCT 155 (H) 70 - 99 mg/dL   Glucose by meter    Collection Time: 06/12/22  8:12 PM   Result Value Ref Range    GLUCOSE BY METER POCT 124 (H) 70 - 99 mg/dL   Basic metabolic panel    Collection Time: 06/13/22  4:29 AM   Result Value Ref Range    Sodium 141 133 - 144 mmol/L    Potassium 4.6 3.4 - 5.3 mmol/L    Chloride 108 94 - 109 mmol/L    Carbon Dioxide (CO2) 28 20 - 32 mmol/L    Anion Gap 5 3 - 14  mmol/L    Urea Nitrogen 41 (H) 7 - 30 mg/dL    Creatinine 1.07 (H) 0.52 - 1.04 mg/dL    Calcium 9.0 8.5 - 10.1 mg/dL    Glucose 115 (H) 70 - 99 mg/dL    GFR Estimate 53 (L) >60 mL/min/1.73m2   Magnesium    Collection Time: 06/13/22  4:29 AM   Result Value Ref Range    Magnesium 2.6 (H) 1.6 - 2.3 mg/dL   CBC with platelets    Collection Time: 06/13/22  4:29 AM   Result Value Ref Range    WBC Count 25.9 (H) 4.0 - 11.0 10e3/uL    RBC Count 2.60 (L) 3.80 - 5.20 10e6/uL    Hemoglobin 7.7 (L) 11.7 - 15.7 g/dL    Hematocrit 25.0 (L) 35.0 - 47.0 %    MCV 96 78 - 100 fL    MCH 29.6 26.5 - 33.0 pg    MCHC 30.8 (L) 31.5 - 36.5 g/dL    RDW 18.4 (H) 10.0 - 15.0 %    Platelet Count 409 150 - 450 10e3/uL   INR    Collection Time: 06/13/22  4:29 AM   Result Value Ref Range    INR 1.89 (H) 0.85 - 1.15   Glucose by meter    Collection Time: 06/13/22  4:37 AM   Result Value Ref Range    GLUCOSE BY METER POCT 115 (H) 70 - 99 mg/dL   Glucose by meter    Collection Time: 06/13/22  8:06 AM   Result Value Ref Range    GLUCOSE BY METER POCT 128 (H) 70 - 99 mg/dL   Glucose by meter    Collection Time: 06/13/22 12:32 PM   Result Value Ref Range    GLUCOSE BY METER POCT 133 (H) 70 - 99 mg/dL   Blood gas venous with oxyhemoglobin    Collection Time: 06/13/22  4:07 PM   Result Value Ref Range    pH Venous 7.47 (H) 7.32 - 7.43    pCO2 Venous 40 40 - 50 mm Hg    pO2 Venous 27 25 - 47 mm Hg    Bicarbonate Venous 30 (H) 21 - 28 mmol/L    FIO2 5     Oxyhemoglobin Venous 57 (L) 70 - 75 %    Base Excess/Deficit (+/-) 5.6 (H) -7.7 - 1.9 mmol/L   Glucose by meter    Collection Time: 06/13/22  4:09 PM   Result Value Ref Range    GLUCOSE BY METER POCT 128 (H) 70 - 99 mg/dL   Glucose by meter    Collection Time: 06/13/22  8:26 PM   Result Value Ref Range    GLUCOSE BY METER POCT 123 (H) 70 - 99 mg/dL   Glucose by meter    Collection Time: 06/13/22 11:58 PM   Result Value Ref Range    GLUCOSE BY METER POCT 139 (H) 70 - 99 mg/dL   Glucose by meter     "Collection Time: 06/14/22  3:58 AM   Result Value Ref Range    GLUCOSE BY METER POCT 114 (H) 70 - 99 mg/dL   Basic metabolic panel    Collection Time: 06/14/22  4:23 AM   Result Value Ref Range    Sodium 140 133 - 144 mmol/L    Potassium 4.1 3.4 - 5.3 mmol/L    Chloride 109 94 - 109 mmol/L    Carbon Dioxide (CO2) 27 20 - 32 mmol/L    Anion Gap 4 3 - 14 mmol/L    Urea Nitrogen 40 (H) 7 - 30 mg/dL    Creatinine 1.03 0.52 - 1.04 mg/dL    Calcium 9.0 8.5 - 10.1 mg/dL    Glucose 120 (H) 70 - 99 mg/dL    GFR Estimate 56 (L) >60 mL/min/1.73m2   Magnesium    Collection Time: 06/14/22  4:23 AM   Result Value Ref Range    Magnesium 2.5 (H) 1.6 - 2.3 mg/dL   CBC with platelets    Collection Time: 06/14/22  4:23 AM   Result Value Ref Range    WBC Count 17.3 (H) 4.0 - 11.0 10e3/uL    RBC Count 2.59 (L) 3.80 - 5.20 10e6/uL    Hemoglobin 7.4 (L) 11.7 - 15.7 g/dL    Hematocrit 24.1 (L) 35.0 - 47.0 %    MCV 93 78 - 100 fL    MCH 28.6 26.5 - 33.0 pg    MCHC 30.7 (L) 31.5 - 36.5 g/dL    RDW 17.9 (H) 10.0 - 15.0 %    Platelet Count 428 150 - 450 10e3/uL   INR    Collection Time: 06/14/22  4:23 AM   Result Value Ref Range    INR 1.90 (H) 0.85 - 1.15   Glucose by meter    Collection Time: 06/14/22  8:12 AM   Result Value Ref Range    GLUCOSE BY METER POCT 121 (H) 70 - 99 mg/dL          Physical and Psychiatric Examination:     /49   Pulse 91   Temp 97.9  F (36.6  C) (Axillary)   Resp 24   Ht 1.524 m (5')   Wt 66.8 kg (147 lb 4.3 oz)   SpO2 96%   BMI 28.76 kg/m    Weight is 147 lbs 4.28 oz  Body mass index is 28.76 kg/m .    Physical Exam:  I have reviewed the physical exam as documented by by the medical team and agree with findings and assessment and have no additional findings to add at this time.    Mental Status Exam:  Lying quietly in the hospital bed, is well groomed, pleasant, cooperative.Appears dyspneic. Speech fluent. Moves upper extremities without difficulty. Associations tight. Mood is \"anxious.\"  Affect quite " "restricted. Thought process logical, linear. Thought content negative for suicidal thoughts or delusions. Oriented x3.  Recent and remote memory, attention span and concentration are not formally assessed. Fund of knowledge, use of language appear appropriate. Insight and judgment fair.              DSM-5 Diagnosis:   296.32 (F33.1) Major Depressive Disorder, Recurrent Episode, Moderate With anxious distress   Anxiety secondary to a general medical condition.           Assessment:   First off I think that she would benefit from a selective serotonin reuptake inhibitor. Lexapro or Zoloft are reasonable choices. Also Seroquel may help. Her QTc is ok.  Also, Seroquel may work better than hydroxyzine (and less anticholinergic burden).   She is interesting in talking to a therapist.   I have not assessed her for capacity, but it seems reasonable to involve her son.           Summary of Recommendations:   Start Lexapro 10 mg per day  Seroquel 12.5-25 mg BID scheduled, hold if somnolent. Also 25-50 mg HS  May compare efficacy of hydroxyzine vs Seroquel   I ordered a health psychology consult   A cognitive assessment by OT may be helpful prior to discharge.   Page me or re-consult psychiatry as needed (psychiatry is signing off).     Jhon Ngo M.D.   [facility]   Contact information available via [facility] Paging/Directory.  If I am not available, then [facility] intake ([phone]) should know who   Is on call            \"This dictation was performed with voice recognition software and may contain errors,  omissions and inadvertent word substitution.\"           "

## 2022-06-14 NOTE — PLAN OF CARE
D/I: No change in pt neuro status. On High flow 35%, 40lpm, new left chest tube placed in IR. Blood pressure and heart rate stable (see flow sheets). Tube feeds at goal. Lasix for diuresis.   A: New left chest tube. No major events this shift.   P: Continue with current plan of care. Update MD with concerns.

## 2022-06-14 NOTE — PLAN OF CARE
"Neuro: Lethargic, but fully oriented when awake. Occasionally confused about date. Follows all commands, strength 4 in uppers, 3 in lowers. Pain well controlled with 1x dose of PRN Oxy 10mg and scheduled tylenol.  Resp: HFNC @ 40LPM. Weaned from 45% to 35% Fio2 over shift, O2 sats still above goal of 90%. Tachypneic. Refused Bipap, b/c it makes her feel like she's being \"smothered.\" LS clear/dim.   CV: NSR in 80s-90s w/ no ectopy seen. MAPs mostly in 70s, dropped to high 50s briefly when sleeping. Chest tube output 30mL total, serosang. Moderate dependent edema, lisa in lowers. Afebrile.    : Purewick in place. Zero output until ~0400 (CVTS MD notified) voided 300mL, then 20mg lasix given w/ good response.   GI: TF @ 40mL/hr w/ std FWF. NJ in place with blanchable redness in R nare - pressure alleviated by placing HFNC btwn NJT and skin. Rect tube ~250mL out.   Skin: See NJT concern above. Gio redness on coccyx. Sternal incision WNL w/ liquid bandage.  Activity: Able to help turn in bed.   Labs: No EL replacement nec. Hgb 7.4.   Misc: Pt spoke to andrew Eric via phone last PM.      Plan: Continue to wean O2 as able. Scheduled L chest tube pigtail placement today in IR.    Goal Outcome Evaluation:    Plan of Care Reviewed With: patient     Overall Patient Progress: improving    Outcome Evaluation: Fio2 weaned to 35%, 40LPM HFNC overnight. Pain well controlled with meds.      "

## 2022-06-14 NOTE — PROGRESS NOTES
Antimicrobial Stewardship Team Note    Antimicrobial Stewardship Program - A joint venture between [facility] and  Physicians to optimize antibiotic management.  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    History of Present Illness: Prior to admission, patient reported feeling generally stable at home with baseline dyspnea upon exertion, occasionally accompanied by light-headedness, palpitations. Underwent previously mentioned procedures and received perioperative cefazolin. Post-operatively, she experienced respiratory failure requiring intubation and possible sepsis. Patient was afebrile and hypotensive (82/45) with HR 73 and RR 20. Labs notable for leukocytosis (12.6). Empiric Zosyn and vancomycin were started for sepsis likely 2/2 pneumonia. 5/28 Bcx had no growth. Sputum cultures 5/30 were positive for 4+ pan-susceptible Klebsiella oxytoca x2 and 2+ Strep constellatus. 5/30 Bcx had no growth and MRSA swab returned negative. CXR revealed unchanged bibasilar atelectasis, and no new focal pulmonary opacities. Patient was extubated on 5/31. Vancomycin was stopped and a single dose of levaquin was given on 5/31. Patient spiked slight fevers on 6/2 and 6/3 (100.9). Zosyn was switched to meropenem for 2 doses on 6/2-6/3 then transitioned back to Zosyn. WBC plateaued at 16-17 during this course. 6/4 Bcx NGTD. CXR 6/6 with stable patchy opacities. Zosyn 7-day course was completed on 6/6. Patient received no abx from 6/6-6/10 and had unremarkable vitals and improving WBC (11.3 on 6/10). Respiratory status remained stable on HF NC.     On  6/11, patient spiked fever of 102.6 with tachycardia (109) and RR 14 on oxymask. WBC elevated to 31. Empiric vancomycin and cefepime were started for sepsis of unclear source . UA was unremarkable.  Bcx 6/11 NGTD, MRSA swab negative, sputum culture contaminated, and Clostridium difficile PCR positive. PO vancomycin was started for C. Diff infection. Rectal tube output increased from 0 mL to 500 mL between 6/11-6/13 and stool incontinence was noted with no laxatives being administered since 6/8. Stool was reported to be loose and brown.    Patient remains in the ICU. No acute increase in oxygenation needs was noted. Continues to require HiFlow NC. Feels short of breath but overall improved from admission. WBC has steadily trended down to 17 today. IV Vancomycin was stopped on today, 6/14.         Active Anti-infective Medications   (From admission, onward)                 Start     Stop    05/31/22 1030  vancomycin in dextrose  1,000 mg,   Intravenous,   200 mL/hr,   EVERY 24 HOURS        Sepsis        --    05/30/22 1000  piperacillin-tazobactam  3.375 g,   Intravenous,   EVERY 6 HOURS        Bacteremia        --                  Assessment:   Sepsis 2/2 C. diff infection  The spike in WBC and fever on 6/11 are concerning for sepsis. Increased stool output, incontinence and positive C. diff PCR indicate severe CDI is the cause of sepsis. Additional infectious work-up was negative with no signs of CNS infection (headache, neck stiffness), stable respiratory status following treatment for pneumonia 5/30-6/6, unremarkable UA, and negative blood cultures. In the setting of CDI, agree with stopping IV vancomycin and recommend stopping cefepime as broad-spectrum antibiotics can exacerbate CDI. Recommend continuing PO vancomycin for a total duration of 10 days from the last day of cefepime. If cefepime is continued, favor extending the total duration of PO vancomycin by 2-3 days.     Presumed Hospital-Acquired  Pneumonia  Patient was adequately treatment for Klebsiella oxytoca pneumonia with a 7-day course of Zosyn earlier in admission. Since then, patient has had a relatively stable respiratory status with no symptoms of pneumonia (wheezing, cough, sputum production) aside from SOB likely 2/2 sepsis vs pleural effusion. Imaging has shown stable airspace opacities with no consolidations noted. Original sputum cultures were contaminated, so there is no organism identified, but repeat cultures unlikely to have growth given that antibiotics have already been given for a few days. Would favor stopping cefepime as there is low suspicion for bacterial pneumonia and cefepime will further exacerbate CDI.     Recommendations:  Stop cefepime  Continue PO vancomycin 125 mg four times daily for 10 days    Discussed with ID Staff: Tato Riley MD and Nikki Valdivia, PharmD, BCIDP  Kalina YepezD candidate 2023    Vital Signs/Clinical Features:  Vitals  Report        06/12 0700  06/13 0659 06/13 0700  06/14 0659 06/14 0700  06/14 1417   Most Recent      Temp ( F) 97.7 -  98.8    97.6 -  99.1    97.9 -  98.4     98.4 (36.9) 06/14 1200    Pulse 74 -  103    75 -  103    79 -  91     84 06/14 1400    Resp 8 -  35    12 -  28    17 -  32     25 06/14 1400    /45 -  142/79    89/41 -  131/55    99/43 -  114/53     114/53 06/14 1400    SpO2 (%) 88 -  100    92 -  100    90 -  98     96 06/14 1400            Labs  Estimated Creatinine Clearance: 39 mL/min (based on SCr of 1.03 mg/dL).  Recent Labs   Lab Test 06/10/22  1627 06/11/22  0356 06/12/22  0448 06/12/22  1436 06/13/22  0429 06/14/22  0423   CR 0.98 1.06* 1.13* 0.97 1.07* 1.03       Recent Labs   Lab Test 06/09/22  0355 06/10/22  0343 06/11/22  0356 06/12/22  0448 06/13/22  0429 06/14/22  0423   WBC 13.2* 11.3* 31.0* 30.4* 25.9* 17.3*   HGB 8.0* 8.4* 8.8* 7.6* 7.7* 7.4*   HCT 25.5* 26.2* 26.6* 24.6* 25.0* 24.1*   MCV 92 93 93 94 96 93    426 457* 386 409 428        Recent Labs   Lab Test 12/08/21  1221 05/25/22  1622 05/27/22  1931 05/27/22  2156 06/12/22  0448   BILITOTAL 0.9 0.7 1.2 1.1 1.1   ALKPHOS 87 75 37* 48 111   ALBUMIN 3.3* 3.4 1.8* 2.5* 2.0*   AST 22 16 36 47* 25   ALT 19 16 11 14 16       Recent Labs   Lab Test 12/08/21  1221 05/27/22  1121 05/30/22  0058 06/02/22  1127 06/02/22 1957 06/03/22  0302 06/03/22  1052 06/11/22  0907 06/12/22  1436   PCAL  --   --   --   --   --   --   --   --  2.49*   LACT  --    < > 0.8 0.7 1.2 0.8 0.6* 1.4  --    CRP 7.1  --   --   --   --   --   --   --   --     < > = values in this interval not displayed.             Culture Results:  7-Day Micro Results       Procedure Component Value Units Date/Time    Respiratory Aerobic Bacterial Culture with Gram Stain     Order Status: Sent Lab Status: No result     Specimen: Aspirate from Trachea     Respiratory Aerobic Bacterial Culture [[id number]] Collected: 06/11/22 1018    Order Status: Canceled Lab Status: No result Updated: 06/11/22 1053    Specimen: Sputum from Expectorate     Respiratory Aerobic Bacterial Culture with Gram Stain [[id number]] Collected: 06/11/22 1018    Order Status: Completed Lab Status: Final result Updated: 06/11/22 1414    Specimen: Sputum from Expectorate      Culture >10 Squamous epithelial cells/low power field indicates oral contamination. Please recollect.     Gram Stain Result >10 Squamous epithelial cells/low power field      >25 PMNs/low power field      4+ Mixed nica    MRSA MSSA PCR, Nasal Swab [[id number]] Collected: 06/11/22 1018    Order Status: Completed Lab Status: Final result Updated: 06/11/22 1212    Specimen: Swab from Nares, Bilateral      MRSA Target DNA Negative     SA Target DNA Negative    Narrative:      The [employer]  Xpert SA Nasal Complete assay performed in the [employer]  Dx System is a qualitative in vitro diagnostic test designed for rapid detection of Staphylococcus aureus (SA) and methicillin-resistant Staphylococcus  aureus (MRSA) from nasal swabs in patients at risk for nasal colonization. The test utilizes automated real-time polymerase chain reaction (PCR) to detect MRSA/SA DNA. The Xpert SA Nasal Complete assay is intended to aid in the prevention and control of MRSA/SA infections in healthcare settings. The assay is not intended to diagnose, guide or monitor treatment for MRSA/SA infections, or provide results of susceptibility to methicillin. A negative result does not preclude MRSA/SA nasal colonization.     Clostridium difficile toxin B PCR [[id number]]  (Abnormal) Collected: 06/11/22 0927    Order Status: Completed Lab Status: Final result Updated: 06/11/22 1053    Specimen: Stool from Per Rectum      C Difficile Toxin B by PCR Positive     Comment: Detection of C. difficile nucleic acid in stools confirms the presence of these organisms in diarrheal patients but may not indicate that C. difficile are the etiologic agents of the diarrhea. Results from the Xpert C. difficile assay should be interpreted in conjunction with other laboratory and clinical data available to the clinician.       Narrative:      The [employer] Xpert C. difficile Assay, performed on the [employer]  Instrument Systems, is a qualitative in vitro diagnostic test for rapid detection of toxin B gene sequences from unformed (liquid or soft) stool specimens collected from patients suspected of having Clostridioides difficile infection (CDI). The test utilizes automated real-time polymerase chain reaction (PCR) to detect toxin gene sequences associated with toxin producing C. difficile. The Xpert C. difficile Assay is intended as an aid in the diagnosis of CDI.    Blood Culture Hand, Left [[id number]]  (Normal) Collected: 06/11/22 0839    Order Status: Completed Lab Status: Preliminary result Updated: 06/14/22 0933    Specimen: Blood from Hand, Left      Culture No growth after 3 days    Blood Culture Hand, Right [[id number]]  (Normal)  Collected: 06/11/22 0833    Order Status: Completed Lab Status: Preliminary result Updated: 06/14/22 0933    Specimen: Blood from Hand, Right      Culture No growth after 3 days    Blood Culture Peripheral Blood     Order Status: Canceled Lab Status: No result     Specimen: Peripheral Blood     Blood Culture Peripheral Blood     Order Status: Canceled Lab Status: No result     Specimen: Peripheral Blood             Recent Labs   Lab Test 05/28/22  1122 05/30/22  0953 06/11/22  1330   URINEPH 5.0 5.5 5.5   NITRITE Negative Negative Negative   LEUKEST Negative Negative Negative   WBCU 2 6* 8*             Recent Labs   Lab Test 06/02/22  1139   IFLUA Not Detected   FLUAH1 Not Detected   FLUAH3 Not Detected   IV5653 Not Detected   IFLUB Not Detected   RSVA Not Detected   RSVB Not Detected   PIV1 Not Detected   PIV2 Not Detected   PIV3 Not Detected   HMPV Not Detected       Recent Labs   Lab Test 06/11/22  0927   CDBPCT Positive*       Imaging: US Lower Extremity Venous Bilateral Port    Result Date: 6/10/2022  EXAMINATION: DOPPLER VENOUS ULTRASOUND OF BILATERAL LOWER EXTREMITIES, 6/10/2022 10:05 AM COMPARISON: None. HISTORY: Swelling. TECHNIQUE:  Gray-scale evaluation with compression, spectral flow and color Doppler assessment of the deep venous system of both legs from groin to knee, and then at the ankles. FINDINGS: In both lower extremities, the common femoral, femoral, popliteal and posterior tibial veins demonstrate normal compressibility and blood flow.     IMPRESSION: No evidence of deep venous thrombosis in either lower extremity. JESSICA WALKER MD   SYSTEM ID:  [id number]    XR Chest Port 1 View    Result Date: 6/14/2022  EXAM: XR Chest 1 view 6/14/2022 1:21 AM  HISTORY: Ongoing hypoxia in setting of CTEPH s/p pulmonary endarterectomy. COMPARISON: Previous day. TECHNIQUE: Frontal view of the chest. FINDINGS: Enteric tube is subdiaphragmatic with tip collimated from the study. Stable position of right basilar  chest tube. Interval retraction of the right-sided PICC with portion of catheter in the left innominate vein and tip in the SVC. Postsurgical changes of the chest and median sternotomy wires intact. Trachea is midline. Cardiomediastinal silhouette is stable. Stable patchy airspace and interstitial opacities. Small bilateral pleural effusions. No pneumothoraces. Osseous structures are unchanged.     IMPRESSION: 1. Interval retraction of the right-sided PICC with portion of the catheter in the left innominate vein. 2. Stable patchy airspace and interstitial opacities. 3. Stable small pleural effusions. I have personally reviewed the examination and initial interpretation and I agree with the findings. RENE ASTUDILLO MD   SYSTEM ID:  [id number]    XR Chest Port 1 View    Result Date: 6/13/2022  EXAM: XR Chest 1 view 6/13/2022 1:05 AM  HISTORY: Ongoing hypoxia in setting of CTEPH s/p pulmonary endarterectomy. COMPARISON: Previous day. TECHNIQUE: Frontal view of the chest. FINDINGS: Right-sided PICC with tip in the atrium. Enteric tube is subdiaphragmatic with tip coming from the study. Right basilar chest tube. Postsurgical changes of the chest with median sternotomy wires intact. The trachea is midline. Heart and mediastinal silhouette is partially obscured. Increased patchy interstitial and airspace opacities bilaterally. Small left pleural effusion. Small right pleural effusion. No pneumothoraces.     IMPRESSION: 1. New patchy airspace and interstitial pulmonary opacities, which may represent infection versus edema. 2. Small bilateral pleural effusions. I have personally reviewed the examination and initial interpretation and I agree with the findings. CANDACE ALMONTE MD   SYSTEM ID:  [id number]    XR Chest Port 1 View    Result Date: 6/12/2022  Exam: XR CHEST PORT 1 VIEW, 6/12/2022 1:22 PM Indication: hypoxia Comparison: 6/10/2022 Findings: Sternotomy wires. Feeding tube courses past the diaphragm with tip excluded from  the field-of-view. Right PICC line tip near superior cavoatrial junction. Unchanged right pleural chest tube. Unchanged cardiac mediastinal silhouette. Unchanged right and mildly decreased left pleural effusions. No appreciable pneumothorax. Continued streaky perihilar opacities with increased right basilar opacities.     Impression: 1. Unchanged right abnormality decreased left effusions with right pleural chest tube in place. 2. Increasing right basilar opacities, favor atelectasis though developing infection is not excluded. 3. Interstitial pulmonary edema. I have personally reviewed the examination and initial interpretation and I agree with the findings. JESSICA WALKER MD   SYSTEM ID:  [id number]    XR Chest Port 1 View    Result Date: 6/10/2022  EXAM: XR CHEST PORT 1 VIEW  6/10/2022 4:23 PM  HISTORY: new chest tube COMPARISON: 6/9/2022 FINDINGS: Portable upright view of the chest. Right basilar pigtail chest tube. Right arm PICC tip at the superior cavoatrial junction. Feeding tube projects towards the stomach. Postoperative changes of prior median sternotomy with intact sternotomy wires. Stable heart size. Stable bilateral small effusions. Slightly increased perihilar and bibasilar diffuse mixed opacities, with decreased lung volumes. No appreciable pneumothorax.     IMPRESSION: 1. New right basilar pigtail chest tube with stable small bilateral pleural effusions. No pneumothorax. 2. Slightly decreased lung volumes, with increased perihilar and bibasilar mixed opacities likely representing atelectasis and pulmonary edema. I have personally reviewed the examination and initial interpretation and I agree with the findings. ZEE FISHMAN MD   SYSTEM ID:  [id number]    XR Chest Port 1 View    Result Date: 6/9/2022  EXAM: XR CHEST PORT 1 VIEW  6/9/2022 2:02 AM  HISTORY: s/p CTEPH assess support devices COMPARISON: 6/8/2022 FINDINGS: Portable upright view of the chest. Right arm PICC tip at the superior cavoatrial  junction. Feeding tube projects towards the stomach. Postoperative changes of prior median sternotomy with intact cerclage wires. Stable heart size. Stable bilateral effusions and diffuse mixed opacities. No appreciable pneumothorax.     IMPRESSION: 1. Grossly unchanged bilateral effusions and mixed opacities bilaterally. 2. Stable tubes and lines. I have personally reviewed the examination and initial interpretation and I agree with the findings. JESSICA WALKER MD   SYSTEM ID:  [id number]    XR Chest Port 1 View    Result Date: 6/8/2022  Exam: Chest x-ray single view, 6/8/2022 1:19 AM Indication: s/p CTEPH assess support devices Comparison: 6/7/2022 Findings: Portable AP semiupright view of the chest. Right arm PICC tip at the superior cavoatrial junction. Feeding tube tip courses into the stomach. Prior median sternotomy with intact cerclage wires. Trachea is midline. Stable size of the cardiomediastinal silhouette. Small bilateral pleural effusions greater on the left are slightly increased in size from prior. Stable mixed opacities. No appreciable pneumothorax.     Impression: 1. Stable tubes and lines. 2. Slightly increased small bilateral pleural effusions. 3. Stable mixed opacities bilaterally representing edema, infection, or atelectasis. I have personally reviewed the examination and initial interpretation and I agree with the findings. BEBE CLIFFORD MD   SYSTEM ID:  [id number]    IR Chest Tube Place Non Tunneled Right    Result Date: 6/10/2022  PROCEDURES 6/10/2022 3:11 PM: 1. Attempted ultrasound guided thoracentesis. 2. Ultrasound guided right chest tube placement. CLINICAL HISTORY: image guided right diagnostic and therapeutic thoracentesis. Dyspnea. Right larger than left effusion. COMPARISONS: CT 6/9/2022 ATTENDING RADIOLOGIST: JERRELL Ojeda MD I, MIGUEL OJEDA MD, attest that I was present in the procedure room for the entire procedure. MEDICATIONS: The patient was placed on continuous monitoring. Vital  signs were monitored by the Interventional Radiology nurse under the Attending Physician's supervision. No intravenous sedation administered. The patient remained stable throughout the procedure. ATTENDING FACE-TO-FACE SEDATION TIME: No intravenous sedation administered. PROCEDURE: The patient understood the limitations, alternatives, and risks of the procedure and requested the procedure be performed. Both written and verbal consent were obtained. Patient placed in the upright sitting position. The posterior lateral right back was prepped and draped in the usual sterile fashion. 1% lidocaine without epinephrine was used for local anesthesia. Under ultrasound guidance, a 5 [place]ese [employer] Yueh centesis catheter needle was advanced into the loculated right pleural effusion. Ultrasound image documenting catheter needle placement was saved in the patient's record. Catheter advanced over the needle. Needle removed. Poor drainage was noted. Despite repositioning the catheter, the effusion would not drain easily. Guidewire was advanced through the catheter in an attempt to disrupt the loculations. Catheter still did not drain well. We discussed with the team and agreed to place a chest tube for drainage and possible lysis of loculations. Catheter removed over guidewire. Track dilated over guidewire to 12 Congolese size. 12 Congolese [employer] [facility] non locking pigtail catheter advanced over guidewire and placed as a right chest tube. Ultrasound image documenting tube placement was saved in the patient's record. Guidewire removed. 2-0 nylon catheter retaining suture and sterile dressing applied. Catheter to water seal chest drainage. No immediate complication.     IMPRESSION: 1. Loculated right effusion would not easily drain through the 5 Congolese Yueh catheter. Attempts to disrupt the loculations with a guidewire did not improve drainage. 2. 12 [place]ese [employer] non locking pigtail catheter placed as a right chest  tube to facilitate drainage and for possible lysis of loculations. Tube to water seal chest drainage. Tube to 20 cm underwater suction when the patient returns to her unit. Further chest tube orders per pulmonology or patient's primary team. MIGUEL OJEDA MD   SYSTEM ID:  [id number]    Echo Limited    Result Date: 2022  [id number]^NIRANJAN^LILIBETH  [facility] [address] [place] [id number]  Name: CARLOS LEGER MRN: [id number] : 1945 Study Date: 2022 08:02 AM Age: 76 yrs Gender: Female Patient Location: [facility] Reason For Study: Heart Failure Ordering Physician: LILIBETH UREÑA Performed By: Yane Locke  BSA: 1.6 m2 Height: 60 in Weight: 146 lb HR: 82 BP: 106/54 mmHg ______________________________________________________________________________ Procedure Limited Portable Echo Adult. ______________________________________________________________________________ Interpretation Summary Global and regional left ventricular function is hyperkinetic with an EF of 65-70%. Moderate right ventricular dilation with severely reduced global right ventricular function. Moderate tricuspid insufficiency. Estimated pulmonary artery systolic pressure is 57 mmHg plus right atrial pressure.  This study was compared with the study from 22. Estimated PA pressure is lower. No change in LV/RV function. ______________________________________________________________________________ Left Ventricle Global and regional left ventricular function is hyperkinetic with an EF of 65-70%. Flattened septum is consistent with right ventricular pressure overload.  Right Ventricle Moderate right ventricular dilation is present. Global right ventricular function is severely reduced.  Mitral Valve The mitral valve is normal. Trace mitral insufficiency is present.  Tricuspid Valve Moderate tricuspid insufficiency is present. Estimated pulmonary  artery systolic pressure is 57 mmHg plus right atrial pressure.  Pulmonic Valve The pulmonic valve is normal. Mild pulmonic insufficiency is present.  Vessels Unable to assess mean RA pressure given the patient is on a ventilator.  Pericardium No pericardial effusion is present.  Compared to Previous Study This study was compared with the study from 22 .  ______________________________________________________________________________ Doppler Measurements & Calculations TR max gurwinder: 367.6 cm/sec TR max P.8 mmHg  ______________________________________________________________________________ Report approved by: Mariama Juares 2022 09:54 AM       CT Chest w/o Contrast    Result Date: 2022  CT CHEST W/O CONTRAST 2022 4:38 PM History: Pneumonia, effusion or abscess suspected, xray done Comparison: Chest x-ray same date Technique: CT of the chest was obtained without intravenous contrast. Axial, coronal, and sagittal reconstructions were obtained and reviewed. Contrast: none. Findings: Postsurgical changes of pulmonary artery endarterectomy, median sternotomy wires are intact. Lungs: Patchy groundglass opacities most significant within the bilateral upper lobes with mild interlobular septal thickening and focal consolidative opacities. Trace simple left effusion and small right partially loculated effusion. No pneumothorax. No suspicious nodule is visualized although sensitivity is reduced in the setting of active pulmonary consolidation. Airways: Central tracheobronchial tree is clear. Vessels: Main pulmonary artery and aorta are normal in caliber. Normal three-vessel arch. Right upper extremity PICC tip terminates in the superior cavoatrial junction. Heart: Heart size is normal without pericardial effusion adjacent to the right heart border is a hyperdense fluid collection measuring 8.3 x 2.4 cm exerting a mild amount of mass effect on the right atria would be consistent with pericardial  hematoma. Lymph nodes: Multiple prominent mediastinal and hilar lymph nodes are likely reactive. Thyroid: Within normal limits. Esophagus: Feeding tube courses through the esophagus, tip not visualized. Upper abdomen: Nonobstructing right-sided nephrolithiasis measuring 8 mm. Bones and soft tissues: No suspicious axillary lymphadenopathy or soft tissue mass. No suspicious osseous lesion.     Impression: 1. Dense pericardial hematoma about the right atria measuring up to 8.3 x 2.4 cm in maximum dimension, CTA is recommended to further evaluate if there is clinical concern for active bleed. 2. Multifocal groundglass and patchy airspace opacities suspicious for infectious etiology. 3. Trace simple left and small partially loculated right pleural effusions. [Urgent Result: Pericardial hematoma] Finding was identified on 6/9/2022 4:45 PM. Dr Silva was contacted by Dr. Cale Elaine at 6/9/2022 5:01 PM and verbalized understanding of the urgent finding. I have personally reviewed the examination and initial interpretation and I agree with the findings. BEBE CLIFFORD MD   SYSTEM ID:  [id number]

## 2022-06-14 NOTE — PROGRESS NOTES
06/14/22 1033   Quick Adds   Quick Adds Edema   Edema General Information   Onset of Edema 06/04/22   Affected Body Part(s) Left LE;Right LE   Edema Etiology Surgery  (Decreased muscle pump, low Albumin)   Edema Examination/Assessment   Skin Condition Pitting;Venous distention;Dryness   Skin Condition Comments Dusky in color, dryness, superficial nickel sized abrasion on medial aspect of right leg   Pitting Assessment 2+   Clinical Impression   Criteria for Skilled Therapeutic Interventions Met (OT) Yes, treatment indicated   Edema: Patient Presentation Edema   Edema: Planned Interventions Gradient compression bandaging;Fit for compression garment;Edema exercises;Precautions to prevent infection/exacerbation;Education   Clinical Decision Making Complexity (OT) low complexity   Risk & Benefits of therapy have been explained evaluation/treatment results reviewed   Total Evaluation Time (Minutes)   Total Evaluation Time (Minutes) 5   OT Goals   Therapy Frequency (OT) 5 times/wk   OT Predicted Duration/Target Date for Goal Attainment 06/20/22   OT Goals Edema   OT: Edema education to increase ability to manage edema after discharge from the hospital Patient;Caregiver;Verbalize;Demonstrate;Rice;Skin care routine;signs/symptoms of intolerance;wear schedule;limb positioning;garrnet/bandage care;discharge recommendations   OT: Management of edema bandages Patient;Caregiver;Verbalize;Demonstrate;Rice;quick wrap;garment(s)

## 2022-06-14 NOTE — PROCEDURES
[facility]    Procedure: IR Procedure Note    Date/Time: 6/14/2022 3:50 PM  Performed by: Elmo Pollack MD  Authorized by: Elmo Pollack MD   IR Fellow Physician:  Radiology Resident Physician: Chuy Cross        UNIVERSAL PROTOCOL   Site Marked: NA  Prior Images Obtained and Reviewed:  Yes  Required items: Required blood products, implants, devices and special equipment available    Patient identity confirmed:  Verbally with patient, arm band, provided demographic data and hospital-assigned identification number  Patient was reevaluated immediately before administering moderate or deep sedation or anesthesia  Confirmation Checklist:  Patient's identity using two indicators, relevant allergies, procedure was appropriate and matched the consent or emergent situation and correct equipment/implants were available  Time out: Immediately prior to the procedure a time out was called    Universal Protocol: the Joint Commission Universal Protocol was followed    Preparation: Patient was prepped and draped in usual sterile fashion       ANESTHESIA    Anesthesia: Local infiltration  Local Anesthetic:  Lidocaine 1% without epinephrine      SEDATION    Patient Sedated: No    Sedation:  Fentanyl and midazolam  Vital signs: Vital signs monitored during sedation    See dictated procedure note for full details.  Findings: Successful left sided chest tube placement    Specimens: none    Complications: None    Condition: Stable    Plan: Left sided chest tube to water seal      PROCEDURE    Patient Tolerance:  Patient tolerated the procedure well with no immediate complications  Length of time physician/provider present for 1:1 monitoring during sedation: 0

## 2022-06-14 NOTE — IR NOTE
Patient Name: Debi Espinoza  Medical Record Number: [id number]  Today's Date: 6/14/2022    Procedure: Image guided left chest tube  Proceduralist: Dr. Pollack and Dr. Cross  Pathology present: No    Procedure Start: 1532  Procedure end: 1547  Sedation medications administered: None     Report given to: Dayron MCKEON  : RHETT    Other Notes: Pt arrived to IR room 6 from . Consent reviewed. Pt denies any questions or concerns regarding procedure. Pt positioned sitting and monitored per protocol. Pt tolerated procedure without any noted complications. Pt transferred back to .

## 2022-06-14 NOTE — PROGRESS NOTES
CV ICU PROGRESS NOTE      CO-MORBIDITIES:   CTEPH (chronic thromboembolic pulmonary hypertension) (H)  (primary encounter diagnosis)  Primary pulmonary hypertension (H)  SOB (shortness of breath)  S/P coronary angiogram    ASSESSMENT: Debi Espinoza is a 76 year old female with PMH of HTN, nephrolithiasis, DVT (2019) and PE (2019, 2021) on chronic anticoagulation, and chronic thromboembolic pulmonary hypertension who underwent pulmonary artery thromboendarterectomy on 5/27 with Dr. Peterson.      TODAY'S PROGRESS:   - IR for left pleural effusion drainage (scheduled)  - Leave right IR pigtail given 100 mL output  - Continue diuresis for net even -> lasix TID  - Psych consult -> will start Lexapro 10 daily    PLAN:  Neuro/ pain/ sedation:  Acute Postoperative pain  - Monitor neurological status. Notify the MD for any acute changes in exam.  - Pain: S tylenol 975 TID, lidocaine patches. PRN oxycodone 5-10 q4h, robaxin 500 q6h, PRN dilaudid 0.2 q2h-  - Psych consult today  Start Lexapro 10 mg per day (ordered)  Seroquel 12.5-25 mg BID scheduled, hold if somnolent. Also 25-50 mg HS (will discuss)     Pulmonary care:   S/p Pulmonary artery thromboendarterectomy on 5/27/2022 by Dr. Peterson  Hx Chronic pulmonary thromboembolic disease  Hx PE (2019, 2021)  Hx Emphysema, moderate  Small right pleural effusion - seen on CT chest 6/9  S/p right pigtail catheter placement on 6/10  PTA regimen: apixaban 5mg BID  CTA 05/2019 demonstrated underlying moderate emphysema  Extubated 5/31  - Supplemental oxygen: stable on NC, wean as tolerated  - PTA Opsumit, hold Adempas  - 6/9 CT chest with small right pleural effusion  - 6/10 right pigtail catheter placement with IR, noted to be loculated -> continue for now  - Bedside US today demonstrating left pleural effusion -> IR consult for drainage, scheduled for today    Cardiovascular:    Hx Severe pulmonary hypertension  Hx RV dilation and reduced RV function  Hx Severe tricuspid  insufficiency  Hx Essential HTN  Distributive shock  PTA regimen: furosemide 20mg qd, adempas 2.5mg TID, opsumit 10mg qd  Echo on 03/2021 with LVEF of 78%, severe pulmonary hypertension (82 mmHg), reduced RV function (TAPSE 16mm), severe tricuspid insufficiency    - 6/2 ECHO: Global and regional LV function is normal: EF of 60-65%. Flattened septum c/w RV pressure overload. Moderate RV dilation with severely reduced global right ventricular function. Mod TR. Severe p-HTN. Estimated pulmonary artery systolic pressure is 85 mmHg plus right atrial pressure. Unable to visualize IVC. No pericardial effusions. Compared to prior TTE, RV is slightly less dilated, function appears similar.    6/8 ECHO: Global and regional LV function is hyperkinetic EF of 65-70%.  Mod LV dilation w/ severely reduced global RV function. Moderate TR. Estimated pulmonary artery systolic pressure is 57 mmHg plus right atrial pressure. Compared with the study from 6/2/22. Estimated PA pressure is lower.     - Monitor hemodynamic status.   - If hypotensive, start vasopressin and avoid additional fluid boluses  - MAP goal > 65  - no indication for ASA use  - on p-HTN meds as above  - V pacing back up 50 BPM- been in sinus with 1st degree AV-block  - Continue midodrine 20 q8h  - Apixiban started 6/8  - 6/8-9 digoxin loaded - 500 mcg, followed by 250 q8h x2   - Digoxin maintenance dose 125 mcg daily (per pharmacy for renal/weight dosing)    GI care/ Nutrition:   - Speech consult, appreciate recs   - Regular diet- decreased appetite, encourage PO nutrition/ ensure  - Continuous tube feeds  - PPI  - CDIFF (+) 6/11- hold all bowel regimen, PO vanco     Renal/ Fluid Balance/ Electrolytes:   Nephrolithiasis  Hypernatremia  PTA regimen: furosemide 20mg qd  BL creat appears to be ~ 1.14  - Strict I/O, daily weights  - Avoid/limit nephrotoxins as able  - Replete lytes PRN per protocol  - FWF at 30 ml q4h  - Net +2.9L overnight  - Lasix 20 TID -> goal net  even     Endocrine:    Stress induced hyperglycemia  Preop A1c 5.8 (12/21)  - High sliding scale insulin   - Goal BG <180 for optimal healing      ID/ Antibiotics:  Stress induced leukocytosis  Distributive shock  5/30 MRSA swab negative  5/30 Sputum - Klebsiella  5/31 Sputum culture with 4+ lactose fermenting gram negative rods  5/31 Repeat blood cultures NGTD  6/4 Blood culture x2: NGTD    - s/p Meropenem (6/2-6/3) and Zosyn (6/3- 6/6)  - Continue to monitor fever curve, WBC and inflammatory markers as appropriate  - 6/10 Febrile, WBC 31  - Emperic cefepime (6/10 - ), Vanc (6/10 - 6/13), stopped with neg MRSA swab  - Cont PO vanc (Cdiff)    Cultures  6/11 Bcx, UA/UC, Sputum (follow-up 6/12 sputum as 6/11 cx's bad)  6/11 C. Diff (+)     Heme:     Stress induced leukocytosis  Acute blood loss anemia  Acute blood loss thrombocytopenia  Hx DVT (2019), PE (2019, 2021) on chronic anticoagulation (Apixaban)  PA tear intra-op s/p patch repair.  - Continue to monitor  - Apixiban for prophylaxis  - Daily CBC   - last transfusion 6/5. Has been stable 7->8 since      MSK/ Skin:  Sternotomy  Surgical Incision  - Sternal precautions  - Postoperative incision management per protocol  - PT/OT/CR     Prophylaxis:    - Mechanical prophylaxis for DVT  - Chemical DVT prophylaxis - Apixiban  - PPI     Lines/ tubes/ drains:  - PICC line  - NJ  - PIV     Disposition:  - CVICU    Patient seen, findings and plan discussed with CV ICU staff, Dr. Abbi Corado MD  PGY-3, General Surgery  ---    ====================================    SUBJECTIVE:   No acute events overnight. Still feeling down today. Also not wanting interventions however finally amenable to TONG santana.     OBJECTIVE:   1. VITAL SIGNS:   Temp:  [97.6  F (36.4  C)-99.1  F (37.3  C)] 98.8  F (37.1  C)  Pulse:  [] 90  Resp:  [12-28] 24  BP: ()/(37-73) 94/73  FiO2 (%):  [35 %-45 %] 35 %  SpO2:  [92 %-100 %] 95 %  FiO2 (%): 35 %  Resp: 24    2. INTAKE/  OUTPUT:   I/O last 3 completed shifts:  In: 1915 [I.V.:540; NG/GT:455]  Out: 1750 [Urine:1100; Stool:550; Chest Tube:100]    3. PHYSICAL EXAMINATION:   General: Sitting upright in bed, sad affect  Neuro: Responsive and follows commands  Resp: Breathing less labored. Regular respiratory effort, on HFNC  CV: RRR. peirpheral pulses intact  Abdomen: Soft, Non-distended, Non-tender  Incisions: clean dry intact  Extremities: warm and well perfused    4. INVESTIGATIONS:   Arterial Blood Gases   No lab results found in last 7 days.  Complete Blood Count   Recent Labs   Lab 06/14/22 0423 06/13/22 0429 06/12/22 0448 06/11/22  0356   WBC 17.3* 25.9* 30.4* 31.0*   HGB 7.4* 7.7* 7.6* 8.8*    409 386 457*     Basic Metabolic Panel  Recent Labs   Lab 06/14/22 0423 06/14/22 0358 06/13/22 2358 06/13/22 2026 06/13/22 0437 06/13/22 0429 06/12/22  1634 06/12/22  1436 06/12/22  0452 06/12/22  0448     --   --   --   --  141  --  141  --  141   POTASSIUM 4.1  --   --   --   --  4.6  --  4.4  --  4.7   CHLORIDE 109  --   --   --   --  108  --  107  --  108   CO2 27  --   --   --   --  28  --  27  --  30   BUN 40*  --   --   --   --  41*  --  41*  --  41*   CR 1.03  --   --   --   --  1.07*  --  0.97  --  1.13*   * 114* 139* 123*   < > 115*   < > 170*   < > 159*    < > = values in this interval not displayed.     Liver Function Tests  Recent Labs   Lab 06/14/22 0423 06/13/22 0429 06/12/22 0448 06/11/22  0356   AST  --   --  25  --    ALT  --   --  16  --    ALKPHOS  --   --  111  --    BILITOTAL  --   --  1.1  --    ALBUMIN  --   --  2.0*  --    INR 1.90* 1.89* 2.11* 1.84*     Pancreatic Enzymes  No lab results found in last 7 days.  Coagulation Profile  Recent Labs   Lab 06/14/22  0423 06/13/22  0429 06/12/22  0448 06/11/22  0356   INR 1.90* 1.89* 2.11* 1.84*     5. RADIOLOGY:   Recent Results (from the past 24 hour(s))   XR Chest Port 1 View    Impression    RESIDENT PRELIMINARY INTERPRETATION  IMPRESSION:    1. Interval retraction of the right-sided PICC with portion of the  catheter in the left innominate vein.  2. Stable patchy airspace and interstitial opacities.  3. Stable small pleural effusions.         =========================================

## 2022-06-15 ENCOUNTER — APPOINTMENT (OUTPATIENT)
Dept: GENERAL RADIOLOGY | Facility: CLINIC | Age: 77
DRG: 270 | End: 2022-06-15
Attending: INTERNAL MEDICINE
Payer: MEDICARE

## 2022-06-15 ENCOUNTER — APPOINTMENT (OUTPATIENT)
Dept: OCCUPATIONAL THERAPY | Facility: CLINIC | Age: 77
DRG: 270 | End: 2022-06-15
Attending: INTERNAL MEDICINE
Payer: MEDICARE

## 2022-06-15 LAB
ANION GAP SERPL CALCULATED.3IONS-SCNC: 3 MMOL/L (ref 3–14)
BUN SERPL-MCNC: 44 MG/DL (ref 7–30)
CALCIUM SERPL-MCNC: 9 MG/DL (ref 8.5–10.1)
CHLORIDE BLD-SCNC: 108 MMOL/L (ref 94–109)
CO2 SERPL-SCNC: 29 MMOL/L (ref 20–32)
CREAT SERPL-MCNC: 1.09 MG/DL (ref 0.52–1.04)
ERYTHROCYTE [DISTWIDTH] IN BLOOD BY AUTOMATED COUNT: 18.1 % (ref 10–15)
GFR SERPL CREATININE-BSD FRML MDRD: 52 ML/MIN/1.73M2
GLUCOSE BLD-MCNC: 131 MG/DL (ref 70–99)
GLUCOSE BLDC GLUCOMTR-MCNC: 107 MG/DL (ref 70–99)
GLUCOSE BLDC GLUCOMTR-MCNC: 118 MG/DL (ref 70–99)
GLUCOSE BLDC GLUCOMTR-MCNC: 122 MG/DL (ref 70–99)
GLUCOSE BLDC GLUCOMTR-MCNC: 133 MG/DL (ref 70–99)
GLUCOSE BLDC GLUCOMTR-MCNC: 136 MG/DL (ref 70–99)
GLUCOSE BLDC GLUCOMTR-MCNC: 141 MG/DL (ref 70–99)
HCT VFR BLD AUTO: 24.5 % (ref 35–47)
HGB BLD-MCNC: 7.6 G/DL (ref 11.7–15.7)
INR PPP: 1.93 (ref 0.85–1.15)
MAGNESIUM SERPL-MCNC: 2.5 MG/DL (ref 1.6–2.3)
MCH RBC QN AUTO: 29.3 PG (ref 26.5–33)
MCHC RBC AUTO-ENTMCNC: 31 G/DL (ref 31.5–36.5)
MCV RBC AUTO: 95 FL (ref 78–100)
PHOSPHATE SERPL-MCNC: 3 MG/DL (ref 2.5–4.5)
PLATELET # BLD AUTO: 415 10E3/UL (ref 150–450)
POTASSIUM BLD-SCNC: 4.3 MMOL/L (ref 3.4–5.3)
RBC # BLD AUTO: 2.59 10E6/UL (ref 3.8–5.2)
SODIUM SERPL-SCNC: 140 MMOL/L (ref 133–144)
WBC # BLD AUTO: 12 10E3/UL (ref 4–11)

## 2022-06-15 PROCEDURE — 84100 ASSAY OF PHOSPHORUS: CPT | Performed by: THORACIC SURGERY (CARDIOTHORACIC VASCULAR SURGERY)

## 2022-06-15 PROCEDURE — 250N000013 HC RX MED GY IP 250 OP 250 PS 637: Performed by: STUDENT IN AN ORGANIZED HEALTH CARE EDUCATION/TRAINING PROGRAM

## 2022-06-15 PROCEDURE — 85027 COMPLETE CBC AUTOMATED: CPT | Performed by: STUDENT IN AN ORGANIZED HEALTH CARE EDUCATION/TRAINING PROGRAM

## 2022-06-15 PROCEDURE — 200N000002 HC R&B ICU UMMC

## 2022-06-15 PROCEDURE — 250N000011 HC RX IP 250 OP 636: Performed by: STUDENT IN AN ORGANIZED HEALTH CARE EDUCATION/TRAINING PROGRAM

## 2022-06-15 PROCEDURE — 999N000155 HC STATISTIC RAPCV CVP MONITORING

## 2022-06-15 PROCEDURE — 99233 SBSQ HOSP IP/OBS HIGH 50: CPT | Mod: 25 | Performed by: INTERNAL MEDICINE

## 2022-06-15 PROCEDURE — 250N000013 HC RX MED GY IP 250 OP 250 PS 637

## 2022-06-15 PROCEDURE — 97530 THERAPEUTIC ACTIVITIES: CPT | Mod: GO

## 2022-06-15 PROCEDURE — 250N000013 HC RX MED GY IP 250 OP 250 PS 637: Performed by: THORACIC SURGERY (CARDIOTHORACIC VASCULAR SURGERY)

## 2022-06-15 PROCEDURE — 80048 BASIC METABOLIC PNL TOTAL CA: CPT | Performed by: STUDENT IN AN ORGANIZED HEALTH CARE EDUCATION/TRAINING PROGRAM

## 2022-06-15 PROCEDURE — 85610 PROTHROMBIN TIME: CPT | Performed by: STUDENT IN AN ORGANIZED HEALTH CARE EDUCATION/TRAINING PROGRAM

## 2022-06-15 PROCEDURE — 71045 X-RAY EXAM CHEST 1 VIEW: CPT

## 2022-06-15 PROCEDURE — 83735 ASSAY OF MAGNESIUM: CPT | Performed by: STUDENT IN AN ORGANIZED HEALTH CARE EDUCATION/TRAINING PROGRAM

## 2022-06-15 PROCEDURE — 71045 X-RAY EXAM CHEST 1 VIEW: CPT | Mod: 26 | Performed by: RADIOLOGY

## 2022-06-15 PROCEDURE — 97140 MANUAL THERAPY 1/> REGIONS: CPT | Mod: GO

## 2022-06-15 RX ADMIN — FUROSEMIDE 20 MG: 10 INJECTION, SOLUTION INTRAMUSCULAR; INTRAVENOUS at 13:42

## 2022-06-15 RX ADMIN — MIDODRINE HYDROCHLORIDE 20 MG: 5 TABLET ORAL at 17:21

## 2022-06-15 RX ADMIN — ACETAMINOPHEN 325MG 975 MG: 325 TABLET ORAL at 08:09

## 2022-06-15 RX ADMIN — MIDODRINE HYDROCHLORIDE 20 MG: 5 TABLET ORAL at 08:08

## 2022-06-15 RX ADMIN — FUROSEMIDE 20 MG: 10 INJECTION, SOLUTION INTRAMUSCULAR; INTRAVENOUS at 08:06

## 2022-06-15 RX ADMIN — ACETAMINOPHEN 325MG 975 MG: 325 TABLET ORAL at 17:22

## 2022-06-15 RX ADMIN — LIDOCAINE PATCH 4% 1 PATCH: 40 PATCH TOPICAL at 08:19

## 2022-06-15 RX ADMIN — VANCOMYCIN HYDROCHLORIDE 125 MG: KIT at 12:30

## 2022-06-15 RX ADMIN — APIXABAN 5 MG: 5 TABLET, FILM COATED ORAL at 21:16

## 2022-06-15 RX ADMIN — DIGOXIN 125 MCG: 125 TABLET ORAL at 08:09

## 2022-06-15 RX ADMIN — APIXABAN 5 MG: 5 TABLET, FILM COATED ORAL at 08:09

## 2022-06-15 RX ADMIN — VANCOMYCIN HYDROCHLORIDE 125 MG: KIT at 17:26

## 2022-06-15 RX ADMIN — VANCOMYCIN HYDROCHLORIDE 125 MG: KIT at 21:23

## 2022-06-15 RX ADMIN — OXYCODONE HYDROCHLORIDE 10 MG: 10 TABLET ORAL at 12:43

## 2022-06-15 RX ADMIN — METHOCARBAMOL 500 MG: 500 TABLET ORAL at 08:38

## 2022-06-15 RX ADMIN — VANCOMYCIN HYDROCHLORIDE 125 MG: KIT at 08:10

## 2022-06-15 RX ADMIN — CEFEPIME 2 G: 2 INJECTION, POWDER, FOR SOLUTION INTRAVENOUS at 00:57

## 2022-06-15 RX ADMIN — ACETAMINOPHEN 325MG 975 MG: 325 TABLET ORAL at 00:39

## 2022-06-15 RX ADMIN — INSULIN ASPART 1 UNITS: 100 INJECTION, SOLUTION INTRAVENOUS; SUBCUTANEOUS at 00:40

## 2022-06-15 RX ADMIN — Medication 1 PACKET: at 08:12

## 2022-06-15 RX ADMIN — FUROSEMIDE 20 MG: 10 INJECTION, SOLUTION INTRAMUSCULAR; INTRAVENOUS at 21:10

## 2022-06-15 RX ADMIN — OXYCODONE HYDROCHLORIDE 5 MG: 5 TABLET ORAL at 05:15

## 2022-06-15 RX ADMIN — ESCITALOPRAM OXALATE 10 MG: 10 TABLET ORAL at 08:09

## 2022-06-15 RX ADMIN — MIDODRINE HYDROCHLORIDE 20 MG: 5 TABLET ORAL at 00:40

## 2022-06-15 RX ADMIN — Medication 15 ML: at 08:08

## 2022-06-15 RX ADMIN — Medication 10 MG: at 21:16

## 2022-06-15 RX ADMIN — Medication 40 MG: at 08:10

## 2022-06-15 RX ADMIN — CEFEPIME 2 G: 2 INJECTION, POWDER, FOR SOLUTION INTRAVENOUS at 13:42

## 2022-06-15 ASSESSMENT — ACTIVITIES OF DAILY LIVING (ADL)
ADLS_ACUITY_SCORE: 39
ADLS_ACUITY_SCORE: 39
ADLS_ACUITY_SCORE: 35
ADLS_ACUITY_SCORE: 39
ADLS_ACUITY_SCORE: 39
ADLS_ACUITY_SCORE: 35
ADLS_ACUITY_SCORE: 39
ADLS_ACUITY_SCORE: 36
ADLS_ACUITY_SCORE: 39
ADLS_ACUITY_SCORE: 39

## 2022-06-15 NOTE — PROGRESS NOTES
Care Management Follow Up    Length of Stay (days): 21    Expected Discharge Date: 6/20/2022     Concerns to be Addressed:  Discharge planning     Patient plan of care discussed at interdisciplinary rounds: Yes    Anticipated Discharge Disposition:  Transitional Care     Anticipated Discharge Services:  Post-acute therapies  Anticipated Discharge DME:  TBD    Patient/family educated on Medicare website which has current facility and service quality ratings:  Yes        Private pay costs discussed: Not at this time.    Additional Information:  SW met with pt at bedside to discuss discharge disposition. Pt aware that TCU is recommended, and prefers to be at a SNF closer to home. SW provided pt a 'Care Compare' list of SNFs within a 50 mile radius of [place]. Pt agreed to review list and select 5 options for preferred placement. Pt asked SW to provide same list to son to review as well.    SW spoke to CVICU Med Team: pt medically ready to discharge in 5-7 days d/t high-flow O2 needs; anticipating enteral feedings will be discontinued prior to discharge.    SW called pt's son, Hernesto, provided the above information. Hernesto stated that his wife, Nga, will be visiting pt at the hospital on Friday. Hernesto agreeable to SW emailing 'Care Compare' list for him and spouse to review, per pt's request.  SW emailed Hernesto the same 'Care Compare' list as was given to pt ([email or web address]).      DARREL Mobley, LSW  [facility]   Pager: [phone]

## 2022-06-15 NOTE — CONSULTS
Cardiology Fellow Progress Note    06/15/22      Interval Events  ----------------------  TRISTIAN  Plan to transfer from SICU to advanced heart failure service for further care  Continues to require HiFlow NC  Continues to feel short of breath, improved since chest tube placement      Current Medications  ----------------------  Current Facility-Administered Medications   Medication     acetaminophen (TYLENOL) tablet 975 mg     apixaban ANTICOAGULANT (ELIQUIS) tablet 5 mg     bisacodyl (DULCOLAX) Suppository 10 mg     ceFEPIme (MAXIPIME) 2 g vial to attach to  ml bag for ADULTS or 50 ml bag for PEDS     dextrose 10% infusion     glucose gel 15-30 g    Or     dextrose 50 % injection 25-50 mL    Or     glucagon injection 1 mg     digoxin (LANOXIN) tablet 125 mcg     escitalopram (LEXAPRO) tablet 10 mg     furosemide (LASIX) injection 20 mg     heparin lock flush 10 UNIT/ML injection 5-20 mL     heparin lock flush 10 UNIT/ML injection 5-20 mL     hydrALAZINE (APRESOLINE) injection 10 mg     HYDROmorphone (DILAUDID) injection 0.2 mg     hydrOXYzine (ATARAX) tablet 25-50 mg     insulin aspart (NovoLOG) injection (RAPID ACTING)     ipratropium - albuterol 0.5 mg/2.5 mg/3 mL (DUONEB) neb solution 3 mL     Lidocaine (LIDOCARE) 4 % Patch 1 patch     lidocaine (LMX4) cream     lidocaine (LMX4) cream     lidocaine 1 % 0.1-1 mL     lidocaine 1 % 0.1-1 mL     lidocaine patch in PLACE     lidocaine-prilocaine (EMLA) cream     macitentan (OPSUMIT) tablet 10 mg     magnesium hydroxide (MILK OF MAGNESIA) suspension 30 mL     medication instruction     melatonin tablet 10 mg     methocarbamol (ROBAXIN) tablet 500 mg     midodrine (PROAMATINE) tablet 20 mg     multivitamins w/minerals liquid 15 mL     naloxone (NARCAN) injection 0.2 mg    Or     naloxone (NARCAN) injection 0.4 mg    Or     naloxone (NARCAN) injection 0.2 mg    Or     naloxone (NARCAN) injection 0.4 mg     ondansetron (ZOFRAN ODT) ODT tab 4 mg    Or     ondansetron  (ZOFRAN) injection 4 mg     oxyCODONE (ROXICODONE) tablet 5 mg    Or     oxyCODONE IR (ROXICODONE) tablet 10 mg     pantoprazole (PROTONIX) 2 mg/mL suspension 40 mg     [Held by provider] polyethylene glycol (MIRALAX) Packet 17 g     prochlorperazine (COMPAZINE) tablet 5 mg    Or     prochlorperazine (COMPAZINE) injection 5 mg     protein modular (PROSOURCE TF) 1 packet     Reason beta blocker order not selected     [Held by provider] senna-docusate (SENOKOT-S/PERICOLACE) 8.6-50 MG per tablet 1 tablet     sodium chloride (PF) 0.9% PF flush 10 mL     sodium chloride (PF) 0.9% PF flush 10-20 mL     sodium chloride (PF) 0.9% PF flush 10-20 mL     sodium chloride (PF) 0.9% PF flush 10-40 mL     sodium chloride (PF) 0.9% PF flush 10-40 mL     sodium chloride (PF) 0.9% PF flush 3 mL     sodium chloride (PF) 0.9% PF flush 3 mL     sodium chloride (PF) 0.9% PF flush 3 mL     sodium chloride (PF) 0.9% PF flush 3 mL     sodium chloride (PF) 0.9% PF flush 3 mL     sodium chloride bacteriostatic 0.9 % flush 10 mL     vancomycin (FIRVANQ) oral solution 125 mg        Intake and Output  ----------------------      Intake/Output Summary (Last 24 hours) at 6/15/2022 0804  Last data filed at 6/15/2022 0700  Gross per 24 hour   Intake 1639 ml   Output 1790 ml   Net -151 ml           Weight  ----------------------  Wt Readings from Last 2 Encounters:   06/14/22 66.8 kg (147 lb 4.3 oz)   04/29/22 67.4 kg (148 lb 8 oz)       Objective  ----------------------  Results for orders placed or performed during the hospital encounter of 05/25/22 (from the past 24 hour(s))   Glucose by meter   Result Value Ref Range    GLUCOSE BY METER POCT 121 (H) 70 - 99 mg/dL   Glucose by meter   Result Value Ref Range    GLUCOSE BY METER POCT 123 (H) 70 - 99 mg/dL   IR Chest Tube Place Non Tunneled Left    Narrative    Procedures: 6/14/22  1. Left chest tube placement    History: The patient has a history of a left sided pleural effusion.  Continues to  require pressors. Chest tube placement was requested.    The history and physical examination and appropriate laboratory  studies were confirmed as appropriate and complete for the procedure.     Informed consent was obtained and the site confirmed.    Staff Radiologist: Elmo Bosch MD  Fellow/Resident: Chuy Cross MD  Medications: 10 ml 1% lidocaine for local anesthesia.    I, Dr. Elmo Bosch, performed the entire procedure with the  assistance of Dr. Cross.    Vital signs were monitored by nursing staff throughout the procedure  under attending supervision,  and remained stable.    Fluoro time: None    Procedure details and findings:     Ultrasound was used to evaluate the left chest. An effusion was seen  with the patient in the sitting position. The chest was then prepped  and draped. 1% lidocaine was used to anesthetize the subcutaneous  tissues down to the level of the rib. A Yueh needle was advanced over  the top of the rib and into the pleural space under ultrasound  guidance. The Yueh catheter was advanced off the needle. A wire was  placed through the Yueh catheter and the tract was dilated. Over the  wire, 10 [place] Skater catheter was advanced into the pleural space.  The catheter was secured in place with a suture and a sterile bandage  was applied.     The chest tube was connected to a Pleurovac.    Complications: None    Estimated blood loss: Minimal        Impression    Impression: Ultrasound guided 10 [place] chest tube placement.    Plan:  -Return the patient care unit for post procedural monitoring.  -Chest tube to water seal.    I have personally reviewed the examination and initial interpretation  and I agree with the findings.    ELMO BOSCH         SYSTEM ID:  [id number]   IR Procedure Note    Narrative    Chuy Cross MD     6/14/2022  3:52 PM  [facility]    Procedure: IR Procedure Note    Date/Time: 6/14/2022 3:50  PM  Performed by: Elmo Pollack MD  Authorized by: Elmo Pollack MD   IR Fellow Physician:  Radiology Resident Physician: Chuy Cross        UNIVERSAL PROTOCOL   Site Marked: NA  Prior Images Obtained and Reviewed:  Yes  Required items: Required blood products, implants, devices and special   equipment available    Patient identity confirmed:  Verbally with patient, arm band, provided   demographic data and hospital-assigned identification number  Patient was reevaluated immediately before administering moderate or deep   sedation or anesthesia  Confirmation Checklist:  Patient's identity using two indicators, relevant   allergies, procedure was appropriate and matched the consent or emergent   situation and correct equipment/implants were available  Time out: Immediately prior to the procedure a time out was called    Universal Protocol: the Joint Commission Universal Protocol was followed    Preparation: Patient was prepped and draped in usual sterile fashion       ANESTHESIA    Anesthesia: Local infiltration  Local Anesthetic:  Lidocaine 1% without epinephrine      SEDATION    Patient Sedated: No    Sedation:  Fentanyl and midazolam  Vital signs: Vital signs monitored during sedation    See dictated procedure note for full details.  Findings: Successful left sided chest tube placement    Specimens: none    Complications: None    Condition: Stable    Plan: Left sided chest tube to water seal      PROCEDURE    Patient Tolerance:  Patient tolerated the procedure well with no immediate   complications  Length of time physician/provider present for 1:1 monitoring during   sedation: 0   Glucose by meter   Result Value Ref Range    GLUCOSE BY METER POCT 107 (H) 70 - 99 mg/dL   Glucose by meter   Result Value Ref Range    GLUCOSE BY METER POCT 131 (H) 70 - 99 mg/dL   Glucose by meter   Result Value Ref Range    GLUCOSE BY METER POCT 141 (H) 70 - 99 mg/dL   XR Chest Port 1 View    Impression    RESIDENT  PRELIMINARY INTERPRETATION  IMPRESSION:   1. Increased interstitial and airspace pulmonary opacities  bilaterally.  2. Interval placement of left basilar chest tube. Otherwise support  devices are stable.   Basic metabolic panel   Result Value Ref Range    Sodium 140 133 - 144 mmol/L    Potassium 4.3 3.4 - 5.3 mmol/L    Chloride 108 94 - 109 mmol/L    Carbon Dioxide (CO2) 29 20 - 32 mmol/L    Anion Gap 3 3 - 14 mmol/L    Urea Nitrogen 44 (H) 7 - 30 mg/dL    Creatinine 1.09 (H) 0.52 - 1.04 mg/dL    Calcium 9.0 8.5 - 10.1 mg/dL    Glucose 131 (H) 70 - 99 mg/dL    GFR Estimate 52 (L) >60 mL/min/1.73m2   Magnesium   Result Value Ref Range    Magnesium 2.5 (H) 1.6 - 2.3 mg/dL   CBC with platelets   Result Value Ref Range    WBC Count 12.0 (H) 4.0 - 11.0 10e3/uL    RBC Count 2.59 (L) 3.80 - 5.20 10e6/uL    Hemoglobin 7.6 (L) 11.7 - 15.7 g/dL    Hematocrit 24.5 (L) 35.0 - 47.0 %    MCV 95 78 - 100 fL    MCH 29.3 26.5 - 33.0 pg    MCHC 31.0 (L) 31.5 - 36.5 g/dL    RDW 18.1 (H) 10.0 - 15.0 %    Platelet Count 415 150 - 450 10e3/uL   INR   Result Value Ref Range    INR 1.93 (H) 0.85 - 1.15   Phosphorus   Result Value Ref Range    Phosphorus 3.0 2.5 - 4.5 mg/dL   Glucose by meter   Result Value Ref Range    GLUCOSE BY METER POCT 133 (H) 70 - 99 mg/dL       Recent Labs   Lab Test 12/08/21  1221   CHOL 113   HDL 35*   LDL 59   TRIG 94       Hemoglobin A1C   Date Value Ref Range Status   12/08/2021 5.8 (H) 0.0 - 5.6 % Final     Comment:     Normal <5.7%   Prediabetes 5.7-6.4%    Diabetes 6.5% or higher     Note: Adopted from ADA consensus guidelines.       Results for orders placed or performed during the hospital encounter of 05/25/22   XR Chest Port 1 View    Impression    IMPRESSION: [place] catheter tip projects over the central right  pulmonary artery.    I have personally reviewed the examination and initial interpretation  and I agree with the findings.    CANDACE ALMONTE MD         SYSTEM ID:  [id number]   XR Abdomen Port 1  View    Impression    IMPRESSION: Gastric tube tip and sidehole project over the stomach.    I have personally reviewed the examination and initial interpretation  and I agree with the findings.    CANDACE ALMONTE MD         SYSTEM ID:  [id number]   XR Chest Port 1 View    Impression    Impression: Postoperative chest with slightly improved perihilar  atelectasis or edema. Endotracheal tube tip in lower trachea 2.5 cm  above the man.    I have personally reviewed the examination and initial interpretation  and I agree with the findings.    YVETTE GRAY MD         SYSTEM ID:  [id number]   XR Chest Port 1 View    Impression    Impression: Postoperative chest with stable perihilar atelectasis or  edema. Endotracheal tube tip 1.7 cm superior to the man.    I have personally reviewed the examination and initial interpretation  and I agree with the findings.    YVETTE GRAY MD         SYSTEM ID:  [id number]   XR Chest Port 1 View    Impression    Impression:  1. Endotracheal tube retracted now 2.4 cm from the man.   2. No significant change in small amount of atelectasis lung bases  bilaterally..    YVETTE GRAY MD         SYSTEM ID:  [id number]   XR Chest Port 1 View    Impression    Impression:   1. Unchanged support devices as described above.  2. Unchanged bibasilar atelectasis. No new focal pulmonary opacities.    I have personally reviewed the examination and initial interpretation  and I agree with the findings.    CANDACE ALMONTE MD         SYSTEM ID:  [id number]   XR Chest Port 1 View    Impression    Impression:   1. Unchanged small effusions and bibasilar predominant opacities.  2. Unchanged support devices including pulmonary artery catheter and  ET tube over the low thoracic trachea.    I have personally reviewed the examination and initial interpretation  and I agree with the findings.    JESSICA WALKER MD         SYSTEM ID:  [id number]   XR Chest Port 1 View    Impression    IMPRESSION:  Interval extubation. Slightly increased left pleural  effusion/basilar atelectasis. Atherosclerosis. Mild central  atelectasis/subtle positive fluid volume balance.    MARK GUADARRAMA MD         SYSTEM ID:  [id number]   XR Abdomen Port 1 View    Impression    Impression: Enteric tube is subdiaphragmatic with tip in the left  pelvis, presumably within the jejunum.    I have personally reviewed the examination and initial interpretation  and I agree with the findings.    BEBE CLIFFORD MD         SYSTEM ID:  [id number]   XR Chest Port 1 View    Impression    Impression:   1. Unchanged effusions with increased left basilar atelectasis and  interstitial pulmonary edema.  2. Removal of pulmonary artery catheter, mediastinal drain and left  chest tube. Given that lung apices are not entirely included in the  field-of-view, small pneumothoraces could be missed.    I have personally reviewed the examination and initial interpretation  and I agree with the findings.    MARK GUADARRAMA MD         SYSTEM ID:  [id number]   XR Chest Port 1 View   Result Value Ref Range    Radiologist flags New moderate right-sided pneumothorax status post (AA)     Impression    Impression:   1. New moderate right-sided pneumothorax.  2. Possible trace left pneumothorax, attention on follow-up.  3. Increasing atelectasis/pulmonary edema.  4. Small left pleural effusion.  5. Interval removal of right basilar chest tube.    [Critical Result: New moderate right-sided pneumothorax status post  chest tube removal]    Finding was identified on 6/2/2022 6:21 PM.     Raz Rodgers was contacted by Dr. Wyatt at 6/2/2022 6:36 PM and  verbalized understanding of the critical finding.     I have personally reviewed the examination and initial interpretation  and I agree with the findings.    BK DUPREE MD         SYSTEM ID:  [id number]   XR Chest Port 1 View    Impression    Impression:   1. Unchanged appearance of mixed pulmonary opacities.  2.  Small biapical pneumothoraces, right more than left.  3. Trace bilateral pleural effusions.    I have personally reviewed the examination and initial interpretation  and I agree with the findings.    BK DUPREE MD         SYSTEM ID:  [id number]   XR Chest Port 1 View    Impression    IMPRESSION:  1.  Lines and tubes, as above.  2.  Stable perihilar opacities, likely edema and atelectasis. Slightly  decreased retrocardiac opacity, likely decreasing atelectasis.   3.  Stable trace biapical pneumothoraces.    I have personally reviewed the examination and initial interpretation  and I agree with the findings.    ZEE FISHMAN MD         SYSTEM ID:  [id number]   XR Chest Port 1 View    Impression    Impression:   1. Unchanged left IJ catheter projecting over the innominate vein.  2. Unchanged effusions and mixed airspace opacities.  3. Decreasing biapical pneumothoraces.    I have personally reviewed the examination and initial interpretation  and I agree with the findings.    JESSICA WALKER MD         SYSTEM ID:  [id number]   Echo Complete   Result Value Ref Range    LVEF  60-65%        Physical Exam  Vitals:    06/15/22 0300 06/15/22 0400 06/15/22 0500 06/15/22 0600   BP: 114/51 116/61 107/48 97/47   BP Location:  Left arm     Pulse: 73 75 76 67   Resp: 15 19 15 14   Temp:  98.1  F (36.7  C)     TempSrc:  Axillary     SpO2: 97% 95% 98% 97%   Weight:       Height:         Physical Exam   Constitutional: No distress. She appears chronically ill.   Pulmonary/Chest: Effort normal.   Abdominal: Soft. Bowel sounds are normal. She exhibits no distension.   Neurological: She is alert and oriented to person, place, and time.   Skin: Skin is warm and dry.        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 Continues to  recover and improve daily.     Changes Today:   - chest tube management per surgical team  - right chest tube removal  - continue L chest tube  - wean Fi)2 (goal 92%)  - oral vanco  - diuresis to keep even daily  - Continue HiFlow/BiPAP as needed  - Continue home pHTN medications as tolerated      # Pulmonary hypertension, Group IV  # CTEPH s/p pulmonary endaterectomy  # DVTs  # Severe tricuspid insufficiency  Initially diagnosed with bilateral DVT, PE 2019. TTE showed dilated RV with reduced RV function and RVSP of 84mmHg suggestive of severe pulmonary hypertension with mild to moderate TR. Patient believes the above relates to a work related injury, unna boot early 2019. RHC 12/18/2021 with RA 12, PA 84/19/45, PCWP 2, TAHIRA CO/CI 2.8/1.6. Last dose eliquis 5/23 AM. Has oxygen at home but does not use this. Underwent endarterectomy on 5/27. Post op on multiple pressors and briefly on milrinone.   - Goal CVP < 10, diuresis as needed to achieve  - continue home pHTN meds        # Volume overload  PTA on lasix 20mg qday (decreased from 40mg ~6 weeks ago). JVP elevated on initial exam. S/p 40mg IV lasix 5/25; repeat as needed     # Anemia  Hgb 11.4, similar to prior last month.   Has been trending down with HgB today at 6.4 HgB would recommend workup for acute blood loss anemia       # CKD  Baseline Cr ~1.1.     Dwain Ferrer MD, MSc  Cardiovascular Disease Fellow  [facility]

## 2022-06-15 NOTE — PLAN OF CARE
D/I: No change in patients neuro exam compared to last shift (see flow sheets). ICP 8-14, ventric output 2-13cc/hr. Sats 95% on room air. Nicardipine weaned to off. Blood pressure within parameters. Passed swallow test, no can have clear liquids. Adequate UO. Tylenol for headache.   A: Pt neuro exam and vitals stable at this point.   P: Continue to manage EVD and every two hour neuro checks. Update MD with concerns.

## 2022-06-15 NOTE — PLAN OF CARE
D/I: Pt continues on high flow. Sats 96%, on 37% FiO2, 40 lpm. Right chest tube discontinued. Left chest tube remains in place. Vital signs stable, afebrile. Tube feeds at goal. Lasix for diuresis with good results.   A: Pt tolerating decreasing oxygen. Vital stable.   P: Continue with current plan of care. Update MD with concerns.

## 2022-06-15 NOTE — CONSULTS
Health Psychology                                                                                                                          Fanny Mckay, Ph.D., L.P [phone]  Ira Mendoza, Ph.D., L.P. [phone]  Pascale Roger, Ph.D, L..P. [phone]  Freya Sheikh, Ph.D., L.P. [phone]  Oli Coello, Ph.D., A.B.P.P., L.P. [phone]         Keeley Barrera, Ph.D., L.P. [phone]       Sonya Mclean, Ph.D., A.B.P.P., LP [phone]           [facility], 3rd Floor  [address] [place] [address]    Inpatient Health Psychology Consultation      Date of Service:  6/15/22    Per EMR: Debi Espinoza is a 76 year old female with PMH of HTN, nephrolithiasis, DVT (2019) and PE (2019, 2021) on chronic anticoagulation, and chronic thromboembolic pulmonary hypertension who underwent pulmonary artery thromboendarterectomy on 5/27 with Dr. Peterson.    Health Psychology consulted by Dr. Ngo with psychiatry to offer support in context of prolonged admission. Psychiatry met with her yesterday, 6/14, and recommended Lexapro and Seroquel as options for managing symptoms of depression and anxiety.     Stopped by Debi's room to introduce Health Psychology services and discuss nature of referral.  She was initially sleeping but woke to this writer's voice.  She was unable to stay awake for conversation and was unable to engage.      PLAN: Meet with Debi tomorrow, 6/16, or Friday, 6/17.    Pascale Roger, PhD, LP  Clinical Health Psychologist  Phone: [phone]  Pager: [phone]

## 2022-06-15 NOTE — PLAN OF CARE
Major Shift Events:  Neuros unchanged throughout shift. Lethargic, flat affect. Follows commands and moves extremities. HFNC  increased to 51% on 40 L. VSS, afebrile. CVP 12-14.  Adequate UOP in purewick, rectal tube patent. TF at goal of 40 mL/hr with 30 mL fwf, poor PO intake. Oxycodone given for back/CT site pain x2.    Plan: Continue POC.    For vital signs and complete assessments, please see documentation flowsheets.     Goal Outcome Evaluation:    Plan of Care Reviewed With: patient

## 2022-06-15 NOTE — PROGRESS NOTES
Pt has been refusing to go on Bipap and had been doing worse will on. Due to not tolerating and a shortage of V60s RT pulled this AM. Pt currently doing well on HFNC. Will leave order for one more day in case needed.     RT will continue to monitor and follow    Ray Cabral, JAKE.

## 2022-06-16 ENCOUNTER — APPOINTMENT (OUTPATIENT)
Dept: PHYSICAL THERAPY | Facility: CLINIC | Age: 77
DRG: 270 | End: 2022-06-16
Attending: INTERNAL MEDICINE
Payer: MEDICARE

## 2022-06-16 ENCOUNTER — APPOINTMENT (OUTPATIENT)
Dept: OCCUPATIONAL THERAPY | Facility: CLINIC | Age: 77
DRG: 270 | End: 2022-06-16
Attending: INTERNAL MEDICINE
Payer: MEDICARE

## 2022-06-16 ENCOUNTER — APPOINTMENT (OUTPATIENT)
Dept: GENERAL RADIOLOGY | Facility: CLINIC | Age: 77
DRG: 270 | End: 2022-06-16
Attending: INTERNAL MEDICINE
Payer: MEDICARE

## 2022-06-16 LAB
ANION GAP SERPL CALCULATED.3IONS-SCNC: 7 MMOL/L (ref 3–14)
BACTERIA BLD CULT: NO GROWTH
BACTERIA BLD CULT: NO GROWTH
BUN SERPL-MCNC: 45 MG/DL (ref 7–30)
CALCIUM SERPL-MCNC: 8.9 MG/DL (ref 8.5–10.1)
CHLORIDE BLD-SCNC: 107 MMOL/L (ref 94–109)
CO2 SERPL-SCNC: 29 MMOL/L (ref 20–32)
CREAT SERPL-MCNC: 0.97 MG/DL (ref 0.52–1.04)
ERYTHROCYTE [DISTWIDTH] IN BLOOD BY AUTOMATED COUNT: 18 % (ref 10–15)
GFR SERPL CREATININE-BSD FRML MDRD: 60 ML/MIN/1.73M2
GLUCOSE BLD-MCNC: 113 MG/DL (ref 70–99)
GLUCOSE BLDC GLUCOMTR-MCNC: 112 MG/DL (ref 70–99)
GLUCOSE BLDC GLUCOMTR-MCNC: 133 MG/DL (ref 70–99)
HCT VFR BLD AUTO: 24.7 % (ref 35–47)
HGB BLD-MCNC: 7.7 G/DL (ref 11.7–15.7)
MAGNESIUM SERPL-MCNC: 2.5 MG/DL (ref 1.6–2.3)
MCH RBC QN AUTO: 29.6 PG (ref 26.5–33)
MCHC RBC AUTO-ENTMCNC: 31.2 G/DL (ref 31.5–36.5)
MCV RBC AUTO: 95 FL (ref 78–100)
PHOSPHATE SERPL-MCNC: 3 MG/DL (ref 2.5–4.5)
PLATELET # BLD AUTO: 417 10E3/UL (ref 150–450)
POTASSIUM BLD-SCNC: 4.1 MMOL/L (ref 3.4–5.3)
RBC # BLD AUTO: 2.6 10E6/UL (ref 3.8–5.2)
SODIUM SERPL-SCNC: 143 MMOL/L (ref 133–144)
WBC # BLD AUTO: 14 10E3/UL (ref 4–11)

## 2022-06-16 PROCEDURE — 250N000013 HC RX MED GY IP 250 OP 250 PS 637: Performed by: THORACIC SURGERY (CARDIOTHORACIC VASCULAR SURGERY)

## 2022-06-16 PROCEDURE — 250N000013 HC RX MED GY IP 250 OP 250 PS 637: Performed by: STUDENT IN AN ORGANIZED HEALTH CARE EDUCATION/TRAINING PROGRAM

## 2022-06-16 PROCEDURE — 97140 MANUAL THERAPY 1/> REGIONS: CPT | Mod: GO

## 2022-06-16 PROCEDURE — 71045 X-RAY EXAM CHEST 1 VIEW: CPT | Mod: 26 | Performed by: RADIOLOGY

## 2022-06-16 PROCEDURE — 999N000043 HC STATISTIC CTO2 CONT OXYGEN TECH TIME EA 90 MIN

## 2022-06-16 PROCEDURE — 71045 X-RAY EXAM CHEST 1 VIEW: CPT

## 2022-06-16 PROCEDURE — 82310 ASSAY OF CALCIUM: CPT | Performed by: STUDENT IN AN ORGANIZED HEALTH CARE EDUCATION/TRAINING PROGRAM

## 2022-06-16 PROCEDURE — 97530 THERAPEUTIC ACTIVITIES: CPT | Mod: GP

## 2022-06-16 PROCEDURE — 83735 ASSAY OF MAGNESIUM: CPT | Performed by: STUDENT IN AN ORGANIZED HEALTH CARE EDUCATION/TRAINING PROGRAM

## 2022-06-16 PROCEDURE — 250N000013 HC RX MED GY IP 250 OP 250 PS 637

## 2022-06-16 PROCEDURE — 999N000157 HC STATISTIC RCP TIME EA 10 MIN

## 2022-06-16 PROCEDURE — 999N000155 HC STATISTIC RAPCV CVP MONITORING

## 2022-06-16 PROCEDURE — 84100 ASSAY OF PHOSPHORUS: CPT | Performed by: THORACIC SURGERY (CARDIOTHORACIC VASCULAR SURGERY)

## 2022-06-16 PROCEDURE — 85027 COMPLETE CBC AUTOMATED: CPT | Performed by: STUDENT IN AN ORGANIZED HEALTH CARE EDUCATION/TRAINING PROGRAM

## 2022-06-16 PROCEDURE — 250N000011 HC RX IP 250 OP 636: Performed by: STUDENT IN AN ORGANIZED HEALTH CARE EDUCATION/TRAINING PROGRAM

## 2022-06-16 PROCEDURE — 200N000002 HC R&B ICU UMMC

## 2022-06-16 PROCEDURE — 97110 THERAPEUTIC EXERCISES: CPT | Mod: GP

## 2022-06-16 PROCEDURE — 99291 CRITICAL CARE FIRST HOUR: CPT | Mod: GC | Performed by: INTERNAL MEDICINE

## 2022-06-16 RX ADMIN — APIXABAN 5 MG: 5 TABLET, FILM COATED ORAL at 20:15

## 2022-06-16 RX ADMIN — Medication 15 ML: at 07:52

## 2022-06-16 RX ADMIN — OXYCODONE HYDROCHLORIDE 10 MG: 10 TABLET ORAL at 00:33

## 2022-06-16 RX ADMIN — VANCOMYCIN HYDROCHLORIDE 125 MG: KIT at 20:18

## 2022-06-16 RX ADMIN — ACETAMINOPHEN 325MG 975 MG: 325 TABLET ORAL at 16:19

## 2022-06-16 RX ADMIN — ESCITALOPRAM OXALATE 10 MG: 10 TABLET ORAL at 07:52

## 2022-06-16 RX ADMIN — OXYCODONE HYDROCHLORIDE 10 MG: 10 TABLET ORAL at 20:15

## 2022-06-16 RX ADMIN — OXYCODONE HYDROCHLORIDE 10 MG: 10 TABLET ORAL at 08:16

## 2022-06-16 RX ADMIN — VANCOMYCIN HYDROCHLORIDE 125 MG: KIT at 07:51

## 2022-06-16 RX ADMIN — ACETAMINOPHEN 325MG 975 MG: 325 TABLET ORAL at 00:27

## 2022-06-16 RX ADMIN — HYDROXYZINE HYDROCHLORIDE 25 MG: 25 TABLET ORAL at 12:51

## 2022-06-16 RX ADMIN — MIDODRINE HYDROCHLORIDE 20 MG: 5 TABLET ORAL at 00:27

## 2022-06-16 RX ADMIN — MIDODRINE HYDROCHLORIDE 20 MG: 5 TABLET ORAL at 07:53

## 2022-06-16 RX ADMIN — MIDODRINE HYDROCHLORIDE 20 MG: 5 TABLET ORAL at 16:19

## 2022-06-16 RX ADMIN — HYDROXYZINE HYDROCHLORIDE 25 MG: 25 TABLET ORAL at 00:33

## 2022-06-16 RX ADMIN — METHOCARBAMOL 500 MG: 500 TABLET ORAL at 08:16

## 2022-06-16 RX ADMIN — Medication 40 MG: at 07:51

## 2022-06-16 RX ADMIN — ACETAMINOPHEN 325MG 975 MG: 325 TABLET ORAL at 07:51

## 2022-06-16 RX ADMIN — CEFEPIME 2 G: 2 INJECTION, POWDER, FOR SOLUTION INTRAVENOUS at 15:37

## 2022-06-16 RX ADMIN — Medication 10 MG: at 20:15

## 2022-06-16 RX ADMIN — OXYCODONE HYDROCHLORIDE 10 MG: 10 TABLET ORAL at 12:51

## 2022-06-16 RX ADMIN — DIGOXIN 125 MCG: 125 TABLET ORAL at 07:52

## 2022-06-16 RX ADMIN — FUROSEMIDE 20 MG: 10 INJECTION, SOLUTION INTRAMUSCULAR; INTRAVENOUS at 15:37

## 2022-06-16 RX ADMIN — OXYCODONE HYDROCHLORIDE 10 MG: 10 TABLET ORAL at 04:26

## 2022-06-16 RX ADMIN — METHOCARBAMOL 500 MG: 500 TABLET ORAL at 16:19

## 2022-06-16 RX ADMIN — Medication 1 PACKET: at 07:51

## 2022-06-16 RX ADMIN — VANCOMYCIN HYDROCHLORIDE 125 MG: KIT at 12:29

## 2022-06-16 RX ADMIN — FUROSEMIDE 20 MG: 10 INJECTION, SOLUTION INTRAMUSCULAR; INTRAVENOUS at 20:25

## 2022-06-16 RX ADMIN — FUROSEMIDE 20 MG: 10 INJECTION, SOLUTION INTRAMUSCULAR; INTRAVENOUS at 08:07

## 2022-06-16 RX ADMIN — CEFEPIME 2 G: 2 INJECTION, POWDER, FOR SOLUTION INTRAVENOUS at 01:59

## 2022-06-16 RX ADMIN — APIXABAN 5 MG: 5 TABLET, FILM COATED ORAL at 07:52

## 2022-06-16 RX ADMIN — HYDROXYZINE HYDROCHLORIDE 25 MG: 25 TABLET ORAL at 20:15

## 2022-06-16 RX ADMIN — VANCOMYCIN HYDROCHLORIDE 125 MG: KIT at 16:19

## 2022-06-16 ASSESSMENT — ACTIVITIES OF DAILY LIVING (ADL)
ADLS_ACUITY_SCORE: 36
ADLS_ACUITY_SCORE: 36
ADLS_ACUITY_SCORE: 37
ADLS_ACUITY_SCORE: 37
ADLS_ACUITY_SCORE: 38
ADLS_ACUITY_SCORE: 36
ADLS_ACUITY_SCORE: 38
ADLS_ACUITY_SCORE: 38
ADLS_ACUITY_SCORE: 36
ADLS_ACUITY_SCORE: 37
ADLS_ACUITY_SCORE: 36
ADLS_ACUITY_SCORE: 38

## 2022-06-16 NOTE — PROVIDER NOTIFICATION
On call provider notified pt reporting increased SOB, chest XR ordered. VSS on HFNC ex RR 24, LS continue to be dim LLL, pt refusing BiPap, pain/anxiety medication given.

## 2022-06-16 NOTE — PLAN OF CARE
D/I: Pt remains on high flow NC, 40 lpm, 47% FiO2, sats 95%. Blood pressure and heart rate stable (see flow sheets). Tube feeds now being cycled over night instead of continuous. Lasix for diuresis.   A: No change in overall condition of patient. Unable to  wean down O2.   P: Continue to wean O2 as able. Update MD with concerns.

## 2022-06-16 NOTE — PROGRESS NOTES
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 Required multiple pressors post-op and briefly on milrinone. Extubated 6/1/22. Had difficulty with volume management with ongoing hypotension requiring pressors. Bilateral chest tubes were eventually placed for pleural effusion; right-sided chest tube removed 6/15/22. Currently she continues to recover and improve daily. Now able to wean down her O2 support gradually with slowly decreasing in her flow; aiming for O2sat >92%. Will continue to wean down O2 support as able + promote physical mobility. Specific pulmonary hypertension therapies delayed up until 90 days and this will be done in the outpatient.     Changes Today:   - right chest tube removed 6/15/22, follow up CXR today 6/16/22 without new infiltration  - continue left chest tube, management per surgical team  - continue home pHTN medications as tolerated   - macitentan 10 mg daily started on 6/5   - digoxin 125 mcg daily for RV support  - continue apixaban 5 mg BID for DVTs/PE  - continue midodrine 20 mg q8h  - continue HFNC, wean FiO2 with goal O2sat 92%  - diuresis to keep even daily  - oral vanco      # Pulmonary hypertension, Group IV  # CTEPH s/p pulmonary endaterectomy  # DVTs  # Severe tricuspid insufficiency  Initially diagnosed with bilateral DVT, PE 2019. TTE showed dilated RV with reduced RV function and RVSP of 84mmHg suggestive of severe pulmonary hypertension with mild to moderate TR. Patient believes the above relates to a work related injury, unna boot early 2019. RHC 12/18/2021 with RA 12, PA 84/19/45, PCWP 2, TAHIRA CO/CI 2.8/1.6. Last dose eliquis 5/23 AM. Has oxygen at home but does not use this. Underwent endarterectomy on 5/27. Post op on multiple pressors  and briefly on milrinone. Currently remains stable of pressors.  - Goal CVP 10-12, will keep net even for now    - continue current diuresis plan: lasix 20 mg PO TID  - continue apixaban  - continue home pHTN meds    - macitentan 10 mg daily started on 6/5   - digoxin 125 mcg daily for RV support    # Volume overload, improving  # Hypotension  PTA on lasix 20mg qday (decreased from 40mg ~6 weeks ago). Currently she is getting lasix 20 mg PO TID and has remained net even with this diuretic. Still has relatively elevated JVP. However, blood pressure still fluctuates and also has remained on midodrine.   - continue lasix 20 mg qday at this time   - midodrine     # Reperfusion pulmonary edema, improving  # Bilateral pleural effusion s/p bilat chest tubes, still has left chest tube   # VAP  Gradually improving. Gradually decreased O2 requirement.  - continue diuresis, keep net even  - continue cefepime for a total of 7 days     # C.diff infection  Still has diarrhea. Rectal tube in place. No abdominal pain, N/V.   - continue PO Vancomycin *10 days      # Anemia  Hgb 11.4, similar to prior last month. Has been trending down with HgB today at 6.4 HgB would recommend workup for acute blood loss anemia.    # CKD  Baseline Cr ~1.1.     Staffed with Dr. Knox.    Anastasiia Oquendo MD  PGY-2 Internal Medicine  -----------------------------------------------------------------------------------------------------------------------------    Interval Events  ----------------------  TRISTIAN  Reports feeling crappy but not worsened compared to yesterday. Still has chest pressure feeling over her surgical area which has been there for 4-5 days. Also still feels short of breath. Denies N/V. Accepting feeding well but hasn't had much appetite. Urinating okay. Still has diarrhea without abdominal pain. Rectal tube in place.    Current Medications  ----------------------  Current Facility-Administered Medications   Medication      acetaminophen (TYLENOL) tablet 975 mg     apixaban ANTICOAGULANT (ELIQUIS) tablet 5 mg     bisacodyl (DULCOLAX) Suppository 10 mg     ceFEPIme (MAXIPIME) 2 g vial to attach to  ml bag for ADULTS or 50 ml bag for PEDS     dextrose 10% infusion     glucose gel 15-30 g    Or     dextrose 50 % injection 25-50 mL    Or     glucagon injection 1 mg     digoxin (LANOXIN) tablet 125 mcg     escitalopram (LEXAPRO) tablet 10 mg     furosemide (LASIX) injection 20 mg     heparin lock flush 10 UNIT/ML injection 5-20 mL     heparin lock flush 10 UNIT/ML injection 5-20 mL     hydrALAZINE (APRESOLINE) injection 10 mg     HYDROmorphone (DILAUDID) injection 0.2 mg     hydrOXYzine (ATARAX) tablet 25-50 mg     insulin aspart (NovoLOG) injection (RAPID ACTING)     ipratropium - albuterol 0.5 mg/2.5 mg/3 mL (DUONEB) neb solution 3 mL     Lidocaine (LIDOCARE) 4 % Patch 1 patch     lidocaine (LMX4) cream     lidocaine (LMX4) cream     lidocaine 1 % 0.1-1 mL     lidocaine 1 % 0.1-1 mL     lidocaine patch in PLACE     lidocaine-prilocaine (EMLA) cream     macitentan (OPSUMIT) tablet 10 mg     magnesium hydroxide (MILK OF MAGNESIA) suspension 30 mL     medication instruction     melatonin tablet 10 mg     methocarbamol (ROBAXIN) tablet 500 mg     midodrine (PROAMATINE) tablet 20 mg     multivitamins w/minerals liquid 15 mL     naloxone (NARCAN) injection 0.2 mg    Or     naloxone (NARCAN) injection 0.4 mg    Or     naloxone (NARCAN) injection 0.2 mg    Or     naloxone (NARCAN) injection 0.4 mg     ondansetron (ZOFRAN ODT) ODT tab 4 mg    Or     ondansetron (ZOFRAN) injection 4 mg     oxyCODONE (ROXICODONE) tablet 5 mg    Or     oxyCODONE IR (ROXICODONE) tablet 10 mg     pantoprazole (PROTONIX) 2 mg/mL suspension 40 mg     prochlorperazine (COMPAZINE) tablet 5 mg    Or     prochlorperazine (COMPAZINE) injection 5 mg     protein modular (PROSOURCE TF) 1 packet     Reason beta blocker order not selected     sodium chloride (PF) 0.9% PF  flush 10 mL     sodium chloride (PF) 0.9% PF flush 10-20 mL     sodium chloride (PF) 0.9% PF flush 10-20 mL     sodium chloride (PF) 0.9% PF flush 10-40 mL     sodium chloride (PF) 0.9% PF flush 10-40 mL     sodium chloride (PF) 0.9% PF flush 3 mL     sodium chloride (PF) 0.9% PF flush 3 mL     sodium chloride (PF) 0.9% PF flush 3 mL     sodium chloride (PF) 0.9% PF flush 3 mL     sodium chloride (PF) 0.9% PF flush 3 mL     sodium chloride bacteriostatic 0.9 % flush 10 mL     vancomycin (FIRVANQ) oral solution 125 mg        Intake and Output  ----------------------      Intake/Output Summary (Last 24 hours) at 6/15/2022 0804  Last data filed at 6/15/2022 0700  Gross per 24 hour   Intake 1639 ml   Output 1790 ml   Net -151 ml           Weight  ----------------------  Wt Readings from Last 2 Encounters:   06/16/22 68.1 kg (150 lb 2.1 oz)   04/29/22 67.4 kg (148 lb 8 oz)       Objective  ----------------------  Results for orders placed or performed during the hospital encounter of 05/25/22 (from the past 24 hour(s))   Glucose by meter   Result Value Ref Range    GLUCOSE BY METER POCT 136 (H) 70 - 99 mg/dL   Glucose by meter   Result Value Ref Range    GLUCOSE BY METER POCT 118 (H) 70 - 99 mg/dL   Glucose by meter   Result Value Ref Range    GLUCOSE BY METER POCT 122 (H) 70 - 99 mg/dL   Glucose by meter   Result Value Ref Range    GLUCOSE BY METER POCT 107 (H) 70 - 99 mg/dL   Glucose by meter   Result Value Ref Range    GLUCOSE BY METER POCT 133 (H) 70 - 99 mg/dL   XR Chest Port 1 View    Impression    RESIDENT PRELIMINARY INTERPRETATION  IMPRESSION:   1. Interval removal of right basilar chest tube.  2. Decreased interstitial and airspace pulmonary opacities.  3. Small bilateral pleural effusions.   Glucose by meter   Result Value Ref Range    GLUCOSE BY METER POCT 112 (H) 70 - 99 mg/dL   Basic metabolic panel   Result Value Ref Range    Sodium 143 133 - 144 mmol/L    Potassium 4.1 3.4 - 5.3 mmol/L    Chloride 107 94  - 109 mmol/L    Carbon Dioxide (CO2) 29 20 - 32 mmol/L    Anion Gap 7 3 - 14 mmol/L    Urea Nitrogen 45 (H) 7 - 30 mg/dL    Creatinine 0.97 0.52 - 1.04 mg/dL    Calcium 8.9 8.5 - 10.1 mg/dL    Glucose 113 (H) 70 - 99 mg/dL    GFR Estimate 60 (L) >60 mL/min/1.73m2   Magnesium   Result Value Ref Range    Magnesium 2.5 (H) 1.6 - 2.3 mg/dL   CBC with platelets   Result Value Ref Range    WBC Count 14.0 (H) 4.0 - 11.0 10e3/uL    RBC Count 2.60 (L) 3.80 - 5.20 10e6/uL    Hemoglobin 7.7 (L) 11.7 - 15.7 g/dL    Hematocrit 24.7 (L) 35.0 - 47.0 %    MCV 95 78 - 100 fL    MCH 29.6 26.5 - 33.0 pg    MCHC 31.2 (L) 31.5 - 36.5 g/dL    RDW 18.0 (H) 10.0 - 15.0 %    Platelet Count 417 150 - 450 10e3/uL   Phosphorus   Result Value Ref Range    Phosphorus 3.0 2.5 - 4.5 mg/dL       Recent Labs   Lab Test 12/08/21  1221   CHOL 113   HDL 35*   LDL 59   TRIG 94       Hemoglobin A1C   Date Value Ref Range Status   12/08/2021 5.8 (H) 0.0 - 5.6 % Final     Comment:     Normal <5.7%   Prediabetes 5.7-6.4%    Diabetes 6.5% or higher     Note: Adopted from ADA consensus guidelines.       Results for orders placed or performed during the hospital encounter of 05/25/22   XR Chest Port 1 View    Impression    IMPRESSION: [place] catheter tip projects over the central right  pulmonary artery.    I have personally reviewed the examination and initial interpretation  and I agree with the findings.    CANDACE ALMONTE MD         SYSTEM ID:  [id number]   XR Abdomen Port 1 View    Impression    IMPRESSION: Gastric tube tip and sidehole project over the stomach.    I have personally reviewed the examination and initial interpretation  and I agree with the findings.    CANDACE ALMONTE MD         SYSTEM ID:  [id number]   XR Chest Port 1 View    Impression    Impression: Postoperative chest with slightly improved perihilar  atelectasis or edema. Endotracheal tube tip in lower trachea 2.5 cm  above the man.    I have personally reviewed the examination and  initial interpretation  and I agree with the findings.    YVETTE GRAY MD         SYSTEM ID:  [id number]   XR Chest Port 1 View    Impression    Impression: Postoperative chest with stable perihilar atelectasis or  edema. Endotracheal tube tip 1.7 cm superior to the man.    I have personally reviewed the examination and initial interpretation  and I agree with the findings.    YVETTE GRAY MD         SYSTEM ID:  [id number]   XR Chest Port 1 View    Impression    Impression:  1. Endotracheal tube retracted now 2.4 cm from the man.   2. No significant change in small amount of atelectasis lung bases  bilaterally..    YVETTE GRAY MD         SYSTEM ID:  [id number]   XR Chest Port 1 View    Impression    Impression:   1. Unchanged support devices as described above.  2. Unchanged bibasilar atelectasis. No new focal pulmonary opacities.    I have personally reviewed the examination and initial interpretation  and I agree with the findings.    CANDACE ALMONTE MD         SYSTEM ID:  [id number]   XR Chest Port 1 View    Impression    Impression:   1. Unchanged small effusions and bibasilar predominant opacities.  2. Unchanged support devices including pulmonary artery catheter and  ET tube over the low thoracic trachea.    I have personally reviewed the examination and initial interpretation  and I agree with the findings.    JESSICA WALKER MD         SYSTEM ID:  [id number]   XR Chest Port 1 View    Impression    IMPRESSION: Interval extubation. Slightly increased left pleural  effusion/basilar atelectasis. Atherosclerosis. Mild central  atelectasis/subtle positive fluid volume balance.    MARK GUADARRAMA MD         SYSTEM ID:  [id number]   XR Abdomen Port 1 View    Impression    Impression: Enteric tube is subdiaphragmatic with tip in the left  pelvis, presumably within the jejunum.    I have personally reviewed the examination and initial interpretation  and I agree with the findings.    BEBE  MD VENESSA         SYSTEM ID:  [id number]   XR Chest Port 1 View    Impression    Impression:   1. Unchanged effusions with increased left basilar atelectasis and  interstitial pulmonary edema.  2. Removal of pulmonary artery catheter, mediastinal drain and left  chest tube. Given that lung apices are not entirely included in the  field-of-view, small pneumothoraces could be missed.    I have personally reviewed the examination and initial interpretation  and I agree with the findings.    MARK GUADARRAMA MD         SYSTEM ID:  [id number]   XR Chest Port 1 View   Result Value Ref Range    Radiologist flags New moderate right-sided pneumothorax status post (AA)     Impression    Impression:   1. New moderate right-sided pneumothorax.  2. Possible trace left pneumothorax, attention on follow-up.  3. Increasing atelectasis/pulmonary edema.  4. Small left pleural effusion.  5. Interval removal of right basilar chest tube.    [Critical Result: New moderate right-sided pneumothorax status post  chest tube removal]    Finding was identified on 6/2/2022 6:21 PM.     Raz Rodgers was contacted by Dr. Wyatt at 6/2/2022 6:36 PM and  verbalized understanding of the critical finding.     I have personally reviewed the examination and initial interpretation  and I agree with the findings.    BK DUPREE MD         SYSTEM ID:  [id number]   XR Chest Port 1 View    Impression    Impression:   1. Unchanged appearance of mixed pulmonary opacities.  2. Small biapical pneumothoraces, right more than left.  3. Trace bilateral pleural effusions.    I have personally reviewed the examination and initial interpretation  and I agree with the findings.    BK DUPREE MD         SYSTEM ID:  [id number]   XR Chest Port 1 View    Impression    IMPRESSION:  1.  Lines and tubes, as above.  2.  Stable perihilar opacities, likely edema and atelectasis. Slightly  decreased retrocardiac opacity, likely decreasing atelectasis.   3.  Stable trace  biapical pneumothoraces.    I have personally reviewed the examination and initial interpretation  and I agree with the findings.    ZEE FISHMAN MD         SYSTEM ID:  [id number]   XR Chest Port 1 View    Impression    Impression:   1. Unchanged left IJ catheter projecting over the innominate vein.  2. Unchanged effusions and mixed airspace opacities.  3. Decreasing biapical pneumothoraces.    I have personally reviewed the examination and initial interpretation  and I agree with the findings.    JESSICA WALKER MD         SYSTEM ID:  [id number]   Echo Complete   Result Value Ref Range    LVEF  60-65%        Physical Exam  Vitals:    06/16/22 0500 06/16/22 0600 06/16/22 0610 06/16/22 0700   BP: 94/41 104/47 112/47 116/46   BP Location:       Cuff Size:       Pulse: 69 73  78   Resp: 13 16  13   Temp:       TempSrc:       SpO2: 94% 92%  93%   Weight:       Height:         Physical Exam   Constitutional: No distress. She appears chronically ill.   ENT: JVP ~ 10  Pulmonary/Chest: minimal crackles, decrease breathsound BLL  Abdominal: Soft. Bowel sounds are normal. Soft, no distension, nontender  Ext: no edema  Neurological: She is alert and oriented to person, place, and time.   Skin: Skin is warm and dry.

## 2022-06-16 NOTE — PROGRESS NOTES
CLINICAL NUTRITION SERVICES - REASSESSMENT NOTE     Nutrition Prescription    Malnutrition Status:    Moderate malnutrition in the context of acute illness    Recommendations already ordered by Registered Dietitian (RD):  - Change from continuous to cycled TF over 12 hours to allow pt to develop an appetite during the day:  Osmolite 1.5 Mayur @ goal of 60 ml/hr x 12 hours ( 720 ml/day) + 1 pkt prosource  will provide: 1120 kcals (21kcal/kg), 56 g PRO (1.1 gm/kg), 547 ml free H20, 147g CHO, and 0 g fiber daily.  This provides 75% estimated kcal/protein needs  - Add room service with assist  - Decrease Ensure to once daily (accumulating at bedside)  - Calorie counts 6/17-6/19    Future/Additional Recommendations:  - Calorie count results to follow  - monitor acceptance of ONS / need to modify     EVALUATION OF THE PROGRESS TOWARD GOALS   Diet: Regular with Anette Ensure Enlive TID  Nutrition Support:  6/8-6/11: Osmolite 1.5 Mayur @ goal of 40ml/hr x 12 hours per day (480ml/day) + 1 pkt Prosource will provide: 760 kcals (15 kcal/kg), 46 g PRO (0.9 g/kg), 366 ml free H20, 98 g CHO, and 0 g fiber daily.   6/11 - ___: Osmolite 1.5 Mayur @ goal of 40ml/hr (960ml/day) + 1 pkt Prosource will provide: 1520 kcals (29 kcal/kg), 71 g PRO (1.4 g/kg), 731 ml free H20, 195 g CHO, and 0 g fiber daily.    Access: NJT ([email or web address])  FWF: 30 ml q 4 hours    Intake:  PO:  25% per flowsheets  Calorie Counts:  6/11       Total Kcals: 0           Total Protein: 0g (nothing recorded)   6/10       Total Kcals: 369       Total Protein: 13g (1 meal, 0 supplement)   6/9         Total Kcals: 408       Total Protein: 11g (2 meals, 0 supplements)    EN:  Average EN intake over past 7 days: 757 ml + 1 pkt prosource provides 1175 kcals (23 kcal/kg), 59 gm protein (1.1gm/kg), provides 90% needs.    NEW FINDINGS   TF cycled x 4 days 6/8-6/11, changed back to continuous.  PO intake minimal.  Taking sips of ensure only.  No meals ordered per review of [facility].  Awaiting TCU placement.  Per team, hopeful to be able to remove FT prior to transfer    Weight: 68.1 kg, stable from admission 3 weeks ago.  Fluctuations with diuresis.    Labs: lytes stable,  to 133    Meds: reviewed and include lasix, MVI with minerals, protonix    Resp: on HFNC    GI: Cdiff + , rectal tube in place    Skin: bridle inspected and appears loose, although appropriate given HFNC.  Dried blood on bridle string.  Sacrum/ coccyx red blanchable per flowsheets    MALNUTRITION  % Intake: Decreased intake does not meet criteria  % Weight Loss: None noted  Subcutaneous Fat Loss: Facial region, Upper arm, and Thoracic/intercostal: all mild  Muscle Loss: Temporal:  mild, Upper arm (bicep, tricep):  Mild to moderate and Lower arm  (forearm):  Mild.  LE difficult to assess with edema and wraps  Fluid Accumulation/Edema: +1 to +3 edema  Malnutrition Diagnosis: Moderate malnutrition in the context of acute illness    Previous Goals   Total avg nutritional intake to meet a minimum of 25 kcal/kg and 1.2 g PRO/kg daily (per dosing wt 52 kg).  Evaluation: Met    Previous Nutrition Diagnosis  Inadequate oral intake related to poor appetite, poor PO intake as evidenced by RN documentation, calorie counts, and need for EN to meet ~50% of estimated energy needs.   Evaluation: Declining    CURRENT NUTRITION DIAGNOSIS  Inadequate oral intake related to poor appetite, weakness, resp status as evidenced by only taking sips of ensure with continuous EN via NJT providing 100% nutrition      INTERVENTIONS  Implementation  Collaboration with other providers - discussed with Cards 2 and RN  Enteral Nutrition - Modify  Medical food supplement therapy    Goals  Total avg nutritional intake to meet a minimum of 25 kcal/kg and 1.2 g PRO/kg daily (per dosing wt 52 kg).    Monitoring/Evaluation  Progress toward goals will be monitored and evaluated per protocol.    Ita Arreaga, RD, LD, CNSC  [address] RD pager: [phone]  Ascom:  [phone]

## 2022-06-16 NOTE — CONSULTS
"    Health Psychology          Fanny Mckay, Ph.D.,  [phone]  Ira Mendoza, Ph.D., LP [phone]  Pascale Roger, Ph.D.,  [phone]  Freya Sheikh, Ph.D., , ABP [phone]  Oli Coello, Ph.D., , ABPP [phone]  Keeley Barrera, Ph.D.,  [phone]  Sonya Mclean, Ph.D., , ABPP [phone]    [facility], 3rd Floor  [address] [place] [address]       Inpatient Health Psychology Consultation     Date of Service: 06/16/22   Time in: 11:08 AM  Time out: 11:12 AM    Per EMR: Debi Espinoza is a 76 year old female with PMH of HTN, nephrolithiasis, DVT (2019) and PE (2019, 2021) on chronic anticoagulation, and chronic thromboembolic pulmonary hypertension who underwent pulmonary artery thromboendarterectomy on 5/27 with Dr. Peterson.     Health Psychology consulted by Dr. Ngo with psychiatry to offer support in context of prolonged admission. Psychiatry met with her Tuesday, 6/14, and recommended Lexapro and Seroquel as options for managing symptoms of depression and anxiety.      Entered patient's room to introduce Health Psychology services and discuss nature of referral.  She was initially sleeping but woke when I entered the room.  She was unable to stay awake for conversation and was unable to engage. She mentioned feeling \"down\" and \"crappy\" but was unable to remain awake long enough to provide additional context or details.     PLAN: Health psychology will attempt to see patient at a later time.     Sonya Mclean, Ph.D., , [facility]  Clinical Health Psychologist  Phone: [phone]   Pager: [phone]   "

## 2022-06-16 NOTE — PLAN OF CARE
Major Shift Events:   Neuro: A&Ox4, Neuros & CMS intact ex anxiety & dizziness with position changes   Cardiac: NSR, MAP > 65, pulses palpable, afebrile, compression stockings for edema , CVP 11-12  Resp: lung sounds dim, severe AGUILAR/moderate SOB at rest, sating > 92% HFNC 40L/37%   GI: rectal tube intact, bowel sounds hyperactive, loose/watery stool   : voiding spontaneously via purewick, adequate amount of kuldip urine   Skin: chest incision CDI, mepi on coccyx  Pain/Nausea: incisional chest pain treated with oxy, tylenol, & atarax; denies nausea  LDA: R PIV SL, R PICC (2), L CT -20 sx without airleak, NJT TF 40 ml/hr at goal   Diet: regular, poor appetite   Mobility: 2 assist FWW & GB   Labs: reviewed  Plan: continue plan of care   For vital signs and complete assessments, please see documentation flowsheets.

## 2022-06-17 ENCOUNTER — APPOINTMENT (OUTPATIENT)
Dept: PHYSICAL THERAPY | Facility: CLINIC | Age: 77
DRG: 270 | End: 2022-06-17
Attending: INTERNAL MEDICINE
Payer: MEDICARE

## 2022-06-17 ENCOUNTER — APPOINTMENT (OUTPATIENT)
Dept: GENERAL RADIOLOGY | Facility: CLINIC | Age: 77
DRG: 270 | End: 2022-06-17
Attending: INTERNAL MEDICINE
Payer: MEDICARE

## 2022-06-17 LAB
ANION GAP SERPL CALCULATED.3IONS-SCNC: 6 MMOL/L (ref 3–14)
BUN SERPL-MCNC: 49 MG/DL (ref 7–30)
CALCIUM SERPL-MCNC: 8.7 MG/DL (ref 8.5–10.1)
CHLORIDE BLD-SCNC: 105 MMOL/L (ref 94–109)
CO2 SERPL-SCNC: 30 MMOL/L (ref 20–32)
CREAT SERPL-MCNC: 1.16 MG/DL (ref 0.52–1.04)
DIGOXIN SERPL-MCNC: 1.9 UG/L
ERYTHROCYTE [DISTWIDTH] IN BLOOD BY AUTOMATED COUNT: 18.3 % (ref 10–15)
GFR SERPL CREATININE-BSD FRML MDRD: 48 ML/MIN/1.73M2
GLUCOSE BLD-MCNC: 141 MG/DL (ref 70–99)
GLUCOSE BLDC GLUCOMTR-MCNC: 88 MG/DL (ref 70–99)
HCT VFR BLD AUTO: 26.4 % (ref 35–47)
HGB BLD-MCNC: 7.9 G/DL (ref 11.7–15.7)
HOLD SPECIMEN: NORMAL
MAGNESIUM SERPL-MCNC: 2.5 MG/DL (ref 1.6–2.3)
MCH RBC QN AUTO: 28.6 PG (ref 26.5–33)
MCHC RBC AUTO-ENTMCNC: 29.9 G/DL (ref 31.5–36.5)
MCV RBC AUTO: 96 FL (ref 78–100)
PLATELET # BLD AUTO: 457 10E3/UL (ref 150–450)
POTASSIUM BLD-SCNC: 4.8 MMOL/L (ref 3.4–5.3)
RBC # BLD AUTO: 2.76 10E6/UL (ref 3.8–5.2)
SODIUM SERPL-SCNC: 141 MMOL/L (ref 133–144)
WBC # BLD AUTO: 14 10E3/UL (ref 4–11)

## 2022-06-17 PROCEDURE — 250N000013 HC RX MED GY IP 250 OP 250 PS 637: Performed by: STUDENT IN AN ORGANIZED HEALTH CARE EDUCATION/TRAINING PROGRAM

## 2022-06-17 PROCEDURE — 85027 COMPLETE CBC AUTOMATED: CPT | Performed by: STUDENT IN AN ORGANIZED HEALTH CARE EDUCATION/TRAINING PROGRAM

## 2022-06-17 PROCEDURE — 83735 ASSAY OF MAGNESIUM: CPT | Performed by: STUDENT IN AN ORGANIZED HEALTH CARE EDUCATION/TRAINING PROGRAM

## 2022-06-17 PROCEDURE — 80048 BASIC METABOLIC PNL TOTAL CA: CPT | Performed by: STUDENT IN AN ORGANIZED HEALTH CARE EDUCATION/TRAINING PROGRAM

## 2022-06-17 PROCEDURE — 71045 X-RAY EXAM CHEST 1 VIEW: CPT | Mod: 26 | Performed by: RADIOLOGY

## 2022-06-17 PROCEDURE — 999N000043 HC STATISTIC CTO2 CONT OXYGEN TECH TIME EA 90 MIN

## 2022-06-17 PROCEDURE — 999N000157 HC STATISTIC RCP TIME EA 10 MIN

## 2022-06-17 PROCEDURE — 250N000013 HC RX MED GY IP 250 OP 250 PS 637

## 2022-06-17 PROCEDURE — 97530 THERAPEUTIC ACTIVITIES: CPT | Mod: GP | Performed by: PHYSICAL THERAPIST

## 2022-06-17 PROCEDURE — 80162 ASSAY OF DIGOXIN TOTAL: CPT | Performed by: THORACIC SURGERY (CARDIOTHORACIC VASCULAR SURGERY)

## 2022-06-17 PROCEDURE — 200N000002 HC R&B ICU UMMC

## 2022-06-17 PROCEDURE — 250N000011 HC RX IP 250 OP 636: Performed by: STUDENT IN AN ORGANIZED HEALTH CARE EDUCATION/TRAINING PROGRAM

## 2022-06-17 PROCEDURE — 71045 X-RAY EXAM CHEST 1 VIEW: CPT

## 2022-06-17 PROCEDURE — 99291 CRITICAL CARE FIRST HOUR: CPT | Mod: 25 | Performed by: INTERNAL MEDICINE

## 2022-06-17 PROCEDURE — 250N000013 HC RX MED GY IP 250 OP 250 PS 637: Performed by: THORACIC SURGERY (CARDIOTHORACIC VASCULAR SURGERY)

## 2022-06-17 RX ADMIN — ESCITALOPRAM OXALATE 10 MG: 10 TABLET ORAL at 07:40

## 2022-06-17 RX ADMIN — ACETAMINOPHEN 325MG 975 MG: 325 TABLET ORAL at 16:24

## 2022-06-17 RX ADMIN — CEFEPIME 2 G: 2 INJECTION, POWDER, FOR SOLUTION INTRAVENOUS at 02:41

## 2022-06-17 RX ADMIN — VANCOMYCIN HYDROCHLORIDE 125 MG: KIT at 12:01

## 2022-06-17 RX ADMIN — HYDROXYZINE HYDROCHLORIDE 25 MG: 25 TABLET ORAL at 16:20

## 2022-06-17 RX ADMIN — MIDODRINE HYDROCHLORIDE 20 MG: 5 TABLET ORAL at 00:18

## 2022-06-17 RX ADMIN — VANCOMYCIN HYDROCHLORIDE 125 MG: KIT at 16:20

## 2022-06-17 RX ADMIN — MIDODRINE HYDROCHLORIDE 20 MG: 5 TABLET ORAL at 07:40

## 2022-06-17 RX ADMIN — APIXABAN 5 MG: 5 TABLET, FILM COATED ORAL at 07:40

## 2022-06-17 RX ADMIN — Medication 10 MG: at 20:22

## 2022-06-17 RX ADMIN — ACETAMINOPHEN 325MG 975 MG: 325 TABLET ORAL at 00:18

## 2022-06-17 RX ADMIN — APIXABAN 5 MG: 5 TABLET, FILM COATED ORAL at 20:20

## 2022-06-17 RX ADMIN — FUROSEMIDE 20 MG: 10 INJECTION, SOLUTION INTRAMUSCULAR; INTRAVENOUS at 07:40

## 2022-06-17 RX ADMIN — OXYCODONE HYDROCHLORIDE 5 MG: 5 TABLET ORAL at 16:19

## 2022-06-17 RX ADMIN — OXYCODONE HYDROCHLORIDE 5 MG: 5 TABLET ORAL at 04:42

## 2022-06-17 RX ADMIN — FUROSEMIDE 20 MG: 10 INJECTION, SOLUTION INTRAMUSCULAR; INTRAVENOUS at 20:21

## 2022-06-17 RX ADMIN — VANCOMYCIN HYDROCHLORIDE 125 MG: KIT at 07:40

## 2022-06-17 RX ADMIN — VANCOMYCIN HYDROCHLORIDE 125 MG: KIT at 20:21

## 2022-06-17 RX ADMIN — MIDODRINE HYDROCHLORIDE 20 MG: 5 TABLET ORAL at 16:27

## 2022-06-17 RX ADMIN — OXYCODONE HYDROCHLORIDE 5 MG: 5 TABLET ORAL at 00:18

## 2022-06-17 RX ADMIN — Medication 1 PACKET: at 07:41

## 2022-06-17 RX ADMIN — FUROSEMIDE 20 MG: 10 INJECTION, SOLUTION INTRAMUSCULAR; INTRAVENOUS at 14:42

## 2022-06-17 RX ADMIN — ACETAMINOPHEN 325MG 975 MG: 325 TABLET ORAL at 07:40

## 2022-06-17 RX ADMIN — OXYCODONE HYDROCHLORIDE 5 MG: 5 TABLET ORAL at 20:21

## 2022-06-17 RX ADMIN — Medication 40 MG: at 07:40

## 2022-06-17 RX ADMIN — HYDROXYZINE HYDROCHLORIDE 25 MG: 25 TABLET ORAL at 04:42

## 2022-06-17 RX ADMIN — OXYCODONE HYDROCHLORIDE 5 MG: 5 TABLET ORAL at 12:31

## 2022-06-17 RX ADMIN — Medication 15 ML: at 07:40

## 2022-06-17 ASSESSMENT — ACTIVITIES OF DAILY LIVING (ADL)
ADLS_ACUITY_SCORE: 36
ADLS_ACUITY_SCORE: 32
ADLS_ACUITY_SCORE: 36

## 2022-06-17 NOTE — PLAN OF CARE
Major Shift Events:   Neuro: A&Ox4, Neuros & CMS intact ex anxiety & dizziness with position changes   Cardiac: NSR, MAP > 65, pulses palpable, afebrile, lymph wraps for edema , CVP 6-12  Resp: lung sounds dim, severe AGUILAR/moderate SOB at rest, sating > 92% HFNC 40L/35% (attempted to wean to NC)  GI: rectal tube intact, bowel sounds hyperactive, loose/watery stool   : voiding spontaneously via purewick, adequate amount of kuldip urine   Skin: chest incision CDI, mepi on coccyx  Pain/Nausea: incisional chest pain treated with oxy, tylenol, & atarax; denies nausea  LDA: R PIV SL, R PICC (2), L CT -20 sx without airleak, NJT cycled TF 60 ml/hr  Diet: regular, poor appetite   Mobility: 2 assist FWW & GB   Labs: reviewed  Plan: continue plan of care   For vital signs and complete assessments, please see documentation flowsheets.

## 2022-06-17 NOTE — PROGRESS NOTES
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 Required multiple pressors post-op and briefly on milrinone. Extubated 6/1/22. Had difficulty with volume management with ongoing hypotension requiring pressors. Bilateral chest tubes were eventually placed for pleural effusion; right-sided chest tube removed 6/15/22. Currently she continues to recover and improve daily. Now able to wean down her O2 support gradually with slowly decreasing in her flow; aiming for O2sat >92%. Will continue to wean down O2 support as able + promote physical mobility. Specific pulmonary hypertension therapies delayed up until 90 days and this will be done in the outpatient.     Changes Today:   - continue home pHTN medications as tolerated   - macitentan 10 mg daily started on 6/5   - digoxin 125 mcg daily for RV support --> digoxin level elevated today; holding until Monday, recheck level at that time   - continue apixaban 5 mg BID for DVTs/PE  - continue midodrine 20 mg q8h  - continue HFNC, wean FiO2 with goal O2sat 92%  - diuresis to keep even daily  - oral vanco  - when planning to transfer out of ICU, will be transferred to CVICU floor       # Pulmonary hypertension, Group IV  # CTEPH s/p pulmonary endaterectomy  # DVTs  # Severe tricuspid insufficiency  Initially diagnosed with bilateral DVT, PE 2019. TTE showed dilated RV with reduced RV function and RVSP of 84mmHg suggestive of severe pulmonary hypertension with mild to moderate TR. Patient believes the above relates to a work related injury, unna boot early 2019. RHC 12/18/2021 with RA 12, PA 84/19/45, PCWP 2, TAHIRA CO/CI 2.8/1.6. Last dose eliquis 5/23 AM. Has oxygen at home but does not use this. Underwent endarterectomy on 5/27. Post op on  multiple pressors and briefly on milrinone. Currently remains stable of pressors.  - Goal CVP 10-12, will keep net even for now    - continue current diuresis plan: lasix 20 mg IV Q8H  - continue apixaban  - [facility]    - macitentan 10 mg daily started on 6/5   - holding:  digoxin 125 mcg daily for RV support    # Volume overload, improving  # Hypotension  PTA on lasix 20mg qday (decreased from 40mg ~6 weeks ago). Currently she is getting lasix 20 mg PO TID and has remained net even with this diuretic. Still has relatively elevated JVP. However, blood pressure still fluctuates and also has remained on midodrine.   - continue lasix 20 mg qday at this time   - midodrine     # Reperfusion pulmonary edema, improving  # Bilateral pleural effusion s/p bilat chest tubes, still has left chest tube   # VAP  Gradually improving. Gradually decreased O2 requirement.  - continue diuresis, keep net even  - continue cefepime for a total of 7 days     # C.diff infection  Still has diarrhea. Rectal tube in place. No abdominal pain, N/V.   - continue PO Vancomycin *10 days      # Anemia  Hgb 11.4, similar to prior last month. Has been trending down with HgB today at 6.4 HgB would recommend workup for acute blood loss anemia.    # CKD  Baseline Cr ~1.1.     Discussed with Dr Dumont who is in agreement.       Dwain Ferrer MD, MSc  Cardiovascular Disease Fellow  [facility]    -----------------------------------------------------------------------------------------------------------------------------    Interval Events  ----------------------  No overnight events.  Received  500 ml IV bolus of fluids yesterday afternoon for low BP    Current Medications  ----------------------  Current Facility-Administered Medications   Medication     acetaminophen (TYLENOL) tablet 975 mg     apixaban ANTICOAGULANT (ELIQUIS) tablet 5 mg     bisacodyl (DULCOLAX) Suppository 10 mg     ceFEPIme (MAXIPIME) 2 g  vial to attach to  ml bag for ADULTS or 50 ml bag for PEDS     dextrose 10% infusion     glucose gel 15-30 g    Or     dextrose 50 % injection 25-50 mL    Or     glucagon injection 1 mg     [Held by provider] digoxin (LANOXIN) tablet 125 mcg     escitalopram (LEXAPRO) tablet 10 mg     furosemide (LASIX) injection 20 mg     heparin lock flush 10 UNIT/ML injection 5-20 mL     heparin lock flush 10 UNIT/ML injection 5-20 mL     hydrALAZINE (APRESOLINE) injection 10 mg     HYDROmorphone (DILAUDID) injection 0.2 mg     hydrOXYzine (ATARAX) tablet 25-50 mg     ipratropium - albuterol 0.5 mg/2.5 mg/3 mL (DUONEB) neb solution 3 mL     Lidocaine (LIDOCARE) 4 % Patch 1 patch     lidocaine patch in PLACE     macitentan (OPSUMIT) tablet 10 mg     magnesium hydroxide (MILK OF MAGNESIA) suspension 30 mL     medication instruction     melatonin tablet 10 mg     methocarbamol (ROBAXIN) tablet 500 mg     midodrine (PROAMATINE) tablet 20 mg     multivitamins w/minerals liquid 15 mL     naloxone (NARCAN) injection 0.2 mg    Or     naloxone (NARCAN) injection 0.4 mg    Or     naloxone (NARCAN) injection 0.2 mg    Or     naloxone (NARCAN) injection 0.4 mg     ondansetron (ZOFRAN ODT) ODT tab 4 mg    Or     ondansetron (ZOFRAN) injection 4 mg     oxyCODONE (ROXICODONE) tablet 5 mg    Or     oxyCODONE IR (ROXICODONE) tablet 10 mg     pantoprazole (PROTONIX) 2 mg/mL suspension 40 mg     prochlorperazine (COMPAZINE) tablet 5 mg    Or     prochlorperazine (COMPAZINE) injection 5 mg     protein modular (PROSOURCE TF) 1 packet     Reason beta blocker order not selected     sodium chloride (PF) 0.9% PF flush 10 mL     sodium chloride (PF) 0.9% PF flush 10-20 mL     sodium chloride (PF) 0.9% PF flush 10-40 mL     sodium chloride (PF) 0.9% PF flush 3 mL     sodium chloride bacteriostatic 0.9 % flush 10 mL     vancomycin (FIRVANQ) oral solution 125 mg        Intake and Output  ----------------------      Intake/Output Summary (Last 24 hours)  at 6/17/2022 0805  Last data filed at 6/17/2022 0800  Gross per 24 hour   Intake 1852 ml   Output 1410 ml   Net 442 ml         Weight  ----------------------  Wt Readings from Last 2 Encounters:   06/17/22 70.4 kg (155 lb 3.3 oz)   04/29/22 67.4 kg (148 lb 8 oz)       Objective  ----------------------  Results for orders placed or performed during the hospital encounter of 05/25/22 (from the past 24 hour(s))   XR Chest Port 1 View    Narrative    EXAM: XR Chest 1 view 6/17/2022 12:59 AM      HISTORY: Ongoing hypoxia in setting of CTEPH s/p pulmonary  endarterectomy.    COMPARISON: Previous day.     TECHNIQUE: Frontal view of the chest.    FINDINGS: Stable configuration of the right-sided PICC with tip in  SVC. Enteric tube is subdiaphragmatic with tip, new from the study.  Stable position of left basilar chest tube. Postsurgical changes of  the chest and median sternotomy wires intact.  Trachea is midline. Cardiac mediastinal silhouette is stable. Stable  interstitial and airspace pulmonary opacities. Small bilateral pleural  effusions. No pneumothoraces.      Impression    IMPRESSION:   1. Stable bilateral interstitial and airspace pulmonary opacities.  2. Stable bilateral small pleural effusions.  3. Stable support devices.    I have personally reviewed the examination and initial interpretation  and I agree with the findings.    JESSICA WALKER MD         SYSTEM ID:  [id number]   Basic metabolic panel   Result Value Ref Range    Sodium 141 133 - 144 mmol/L    Potassium 4.8 3.4 - 5.3 mmol/L    Chloride 105 94 - 109 mmol/L    Carbon Dioxide (CO2) 30 20 - 32 mmol/L    Anion Gap 6 3 - 14 mmol/L    Urea Nitrogen 49 (H) 7 - 30 mg/dL    Creatinine 1.16 (H) 0.52 - 1.04 mg/dL    Calcium 8.7 8.5 - 10.1 mg/dL    Glucose 141 (H) 70 - 99 mg/dL    GFR Estimate 48 (L) >60 mL/min/1.73m2   Magnesium   Result Value Ref Range    Magnesium 2.5 (H) 1.6 - 2.3 mg/dL   CBC with platelets   Result Value Ref Range    WBC Count 14.0 (H)  4.0 - 11.0 10e3/uL    RBC Count 2.76 (L) 3.80 - 5.20 10e6/uL    Hemoglobin 7.9 (L) 11.7 - 15.7 g/dL    Hematocrit 26.4 (L) 35.0 - 47.0 %    MCV 96 78 - 100 fL    MCH 28.6 26.5 - 33.0 pg    MCHC 29.9 (L) 31.5 - 36.5 g/dL    RDW 18.3 (H) 10.0 - 15.0 %    Platelet Count 457 (H) 150 - 450 10e3/uL   Digoxin level   Result Value Ref Range    Digoxin 1.9   ug/L   Extra Tube    Narrative    The following orders were created for panel order Extra Tube.  Procedure                               Abnormality         Status                     ---------                               -----------         ------                     Extra Green Top (Lithium...[[id number]]                      Final result                 Please view results for these tests on the individual orders.   Extra Green Top (Lithium Heparin) Tube   Result Value Ref Range    Hold Specimen [facility]        Recent Labs   Lab Test 12/08/21  1221   CHOL 113   HDL 35*   LDL 59   TRIG 94       Hemoglobin A1C   Date Value Ref Range Status   12/08/2021 5.8 (H) 0.0 - 5.6 % Final     Comment:     Normal <5.7%   Prediabetes 5.7-6.4%    Diabetes 6.5% or higher     Note: Adopted from ADA consensus guidelines.       Results for orders placed or performed during the hospital encounter of 05/25/22   XR Chest Port 1 View    Impression    IMPRESSION: Hanson-Diamond catheter tip projects over the central right  pulmonary artery.    I have personally reviewed the examination and initial interpretation  and I agree with the findings.    CANDACE ALMONTE MD         SYSTEM ID:  [id number]   XR Abdomen Port 1 View    Impression    IMPRESSION: Gastric tube tip and sidehole project over the stomach.    I have personally reviewed the examination and initial interpretation  and I agree with the findings.    CANDACE ALMONTE MD         SYSTEM ID:  [id number]   XR Chest Port 1 View    Impression    Impression: Postoperative chest with slightly improved perihilar  atelectasis or edema. Endotracheal tube tip in  lower trachea 2.5 cm  above the man.    I have personally reviewed the examination and initial interpretation  and I agree with the findings.    YVETTE GRAY MD         SYSTEM ID:  [id number]   XR Chest Port 1 View    Impression    Impression: Postoperative chest with stable perihilar atelectasis or  edema. Endotracheal tube tip 1.7 cm superior to the man.    I have personally reviewed the examination and initial interpretation  and I agree with the findings.    YVETTE GRAY MD         SYSTEM ID:  [id number]   XR Chest Port 1 View    Impression    Impression:  1. Endotracheal tube retracted now 2.4 cm from the man.   2. No significant change in small amount of atelectasis lung bases  bilaterally..    YVETTE GRAY MD         SYSTEM ID:  [id number]   XR Chest Port 1 View    Impression    Impression:   1. Unchanged support devices as described above.  2. Unchanged bibasilar atelectasis. No new focal pulmonary opacities.    I have personally reviewed the examination and initial interpretation  and I agree with the findings.    CANDACE ALMONTE MD         SYSTEM ID:  [id number]   XR Chest Port 1 View    Impression    Impression:   1. Unchanged small effusions and bibasilar predominant opacities.  2. Unchanged support devices including pulmonary artery catheter and  ET tube over the low thoracic trachea.    I have personally reviewed the examination and initial interpretation  and I agree with the findings.    JESSICA WALKER MD         SYSTEM ID:  [id number]   XR Chest Port 1 View    Impression    IMPRESSION: Interval extubation. Slightly increased left pleural  effusion/basilar atelectasis. Atherosclerosis. Mild central  atelectasis/subtle positive fluid volume balance.    MARK GUADARRAMA MD         SYSTEM ID:  [id number]   XR Abdomen Port 1 View    Impression    Impression: Enteric tube is subdiaphragmatic with tip in the left  pelvis, presumably within the jejunum.    I have personally reviewed  the examination and initial interpretation  and I agree with the findings.    BEBE CLIFFORD MD         SYSTEM ID:  [id number]   XR Chest Port 1 View    Impression    Impression:   1. Unchanged effusions with increased left basilar atelectasis and  interstitial pulmonary edema.  2. Removal of pulmonary artery catheter, mediastinal drain and left  chest tube. Given that lung apices are not entirely included in the  field-of-view, small pneumothoraces could be missed.    I have personally reviewed the examination and initial interpretation  and I agree with the findings.    MARK GUADARRAMA MD         SYSTEM ID:  [id number]   XR Chest Port 1 View   Result Value Ref Range    Radiologist flags New moderate right-sided pneumothorax status post (AA)     Impression    Impression:   1. New moderate right-sided pneumothorax.  2. Possible trace left pneumothorax, attention on follow-up.  3. Increasing atelectasis/pulmonary edema.  4. Small left pleural effusion.  5. Interval removal of right basilar chest tube.    [Critical Result: New moderate right-sided pneumothorax status post  chest tube removal]    Finding was identified on 6/2/2022 6:21 PM.     Raz Rodgers was contacted by Dr. Wyatt at 6/2/2022 6:36 PM and  verbalized understanding of the critical finding.     I have personally reviewed the examination and initial interpretation  and I agree with the findings.    BK DUPREE MD         SYSTEM ID:  [id number]   XR Chest Port 1 View    Impression    Impression:   1. Unchanged appearance of mixed pulmonary opacities.  2. Small biapical pneumothoraces, right more than left.  3. Trace bilateral pleural effusions.    I have personally reviewed the examination and initial interpretation  and I agree with the findings.    BK DUPREE MD         SYSTEM ID:  [id number]   XR Chest Port 1 View    Impression    IMPRESSION:  1.  Lines and tubes, as above.  2.  Stable perihilar opacities, likely edema and atelectasis.  Slightly  decreased retrocardiac opacity, likely decreasing atelectasis.   3.  Stable trace biapical pneumothoraces.    I have personally reviewed the examination and initial interpretation  and I agree with the findings.    ZEE FISHMAN MD         SYSTEM ID:  [id number]   XR Chest Port 1 View    Impression    Impression:   1. Unchanged left IJ catheter projecting over the innominate vein.  2. Unchanged effusions and mixed airspace opacities.  3. Decreasing biapical pneumothoraces.    I have personally reviewed the examination and initial interpretation  and I agree with the findings.    JESSICA WALKER MD         SYSTEM ID:  [id number]   Echo Complete   Result Value Ref Range    LVEF  60-65%        Physical Exam  Vitals:    06/17/22 0500 06/17/22 0600 06/17/22 0700 06/17/22 0800   BP: 112/44 108/50 112/52    BP Location:       Cuff Size:       Pulse: 71 73 78    Resp: 14 24 17    Temp:    97.6  F (36.4  C)   TempSrc:    Axillary   SpO2: 96% 98% 97%    Weight:  70.4 kg (155 lb 3.3 oz)     Height:         Physical Exam   Constitutional: No distress. She appears chronically ill.   ENT: JVP ~ 8-10 (exaggerated/pronounced due to TR)  Pulmonary/Chest: minimal crackles, decrease breathsound BLL  Abdominal: Soft. Bowel sounds are normal. Soft, no distension, nontender  Ext: no edema  Neurological: She is alert and oriented to person, place, and time.   Skin: Skin is warm and dry.

## 2022-06-17 NOTE — CARE PLAN
Major Shift Events:  Refuses some cares, repositioning, and food.  Risks and consequences of these actions explained, pt acknowledges them.  Pt expresses wish to stop cares at times, shows signs of depression.  Up in chair time 7hrs, well tolerated.  Pain well controlled.  Weaned from HiFlo NC to 6L Oxy Mask.  Chest tube with no output remains at -20.  Refuses oral intake supplements.  Rectal tube remains in place.  Purewick in place.    Plan: Wean oxygen, transfer orders to floor in place.  Arrange acute rehab placement.  For vital signs and complete assessments, please see documentation flowsheets.

## 2022-06-18 ENCOUNTER — APPOINTMENT (OUTPATIENT)
Dept: OCCUPATIONAL THERAPY | Facility: CLINIC | Age: 77
DRG: 270 | End: 2022-06-18
Attending: INTERNAL MEDICINE
Payer: MEDICARE

## 2022-06-18 ENCOUNTER — APPOINTMENT (OUTPATIENT)
Dept: GENERAL RADIOLOGY | Facility: CLINIC | Age: 77
DRG: 270 | End: 2022-06-18
Attending: INTERNAL MEDICINE
Payer: MEDICARE

## 2022-06-18 LAB
ANION GAP SERPL CALCULATED.3IONS-SCNC: 3 MMOL/L (ref 3–14)
BUN SERPL-MCNC: 47 MG/DL (ref 7–30)
CALCIUM SERPL-MCNC: 9.1 MG/DL (ref 8.5–10.1)
CHLORIDE BLD-SCNC: 106 MMOL/L (ref 94–109)
CO2 SERPL-SCNC: 33 MMOL/L (ref 20–32)
CREAT SERPL-MCNC: 1.21 MG/DL (ref 0.52–1.04)
ERYTHROCYTE [DISTWIDTH] IN BLOOD BY AUTOMATED COUNT: 18.5 % (ref 10–15)
GFR SERPL CREATININE-BSD FRML MDRD: 46 ML/MIN/1.73M2
GLUCOSE BLD-MCNC: 126 MG/DL (ref 70–99)
GLUCOSE BLDC GLUCOMTR-MCNC: 109 MG/DL (ref 70–99)
GLUCOSE BLDC GLUCOMTR-MCNC: 126 MG/DL (ref 70–99)
HCT VFR BLD AUTO: 26.4 % (ref 35–47)
HGB BLD-MCNC: 7.9 G/DL (ref 11.7–15.7)
MAGNESIUM SERPL-MCNC: 2.8 MG/DL (ref 1.6–2.3)
MCH RBC QN AUTO: 28.6 PG (ref 26.5–33)
MCHC RBC AUTO-ENTMCNC: 29.9 G/DL (ref 31.5–36.5)
MCV RBC AUTO: 96 FL (ref 78–100)
PLATELET # BLD AUTO: 456 10E3/UL (ref 150–450)
POTASSIUM BLD-SCNC: 4.6 MMOL/L (ref 3.4–5.3)
RBC # BLD AUTO: 2.76 10E6/UL (ref 3.8–5.2)
SODIUM SERPL-SCNC: 142 MMOL/L (ref 133–144)
WBC # BLD AUTO: 13.1 10E3/UL (ref 4–11)

## 2022-06-18 PROCEDURE — 250N000013 HC RX MED GY IP 250 OP 250 PS 637: Performed by: STUDENT IN AN ORGANIZED HEALTH CARE EDUCATION/TRAINING PROGRAM

## 2022-06-18 PROCEDURE — 80048 BASIC METABOLIC PNL TOTAL CA: CPT | Performed by: STUDENT IN AN ORGANIZED HEALTH CARE EDUCATION/TRAINING PROGRAM

## 2022-06-18 PROCEDURE — 250N000011 HC RX IP 250 OP 636: Performed by: STUDENT IN AN ORGANIZED HEALTH CARE EDUCATION/TRAINING PROGRAM

## 2022-06-18 PROCEDURE — 99291 CRITICAL CARE FIRST HOUR: CPT | Mod: 25 | Performed by: INTERNAL MEDICINE

## 2022-06-18 PROCEDURE — 85027 COMPLETE CBC AUTOMATED: CPT | Performed by: STUDENT IN AN ORGANIZED HEALTH CARE EDUCATION/TRAINING PROGRAM

## 2022-06-18 PROCEDURE — 120N000002 HC R&B MED SURG/OB UMMC

## 2022-06-18 PROCEDURE — 250N000013 HC RX MED GY IP 250 OP 250 PS 637

## 2022-06-18 PROCEDURE — 250N000013 HC RX MED GY IP 250 OP 250 PS 637: Performed by: THORACIC SURGERY (CARDIOTHORACIC VASCULAR SURGERY)

## 2022-06-18 PROCEDURE — 71045 X-RAY EXAM CHEST 1 VIEW: CPT | Mod: 26 | Performed by: RADIOLOGY

## 2022-06-18 PROCEDURE — 71045 X-RAY EXAM CHEST 1 VIEW: CPT

## 2022-06-18 PROCEDURE — 97535 SELF CARE MNGMENT TRAINING: CPT | Mod: GO

## 2022-06-18 PROCEDURE — 83735 ASSAY OF MAGNESIUM: CPT | Performed by: STUDENT IN AN ORGANIZED HEALTH CARE EDUCATION/TRAINING PROGRAM

## 2022-06-18 PROCEDURE — 250N000011 HC RX IP 250 OP 636: Performed by: THORACIC SURGERY (CARDIOTHORACIC VASCULAR SURGERY)

## 2022-06-18 PROCEDURE — 999N000155 HC STATISTIC RAPCV CVP MONITORING

## 2022-06-18 PROCEDURE — 999N000157 HC STATISTIC RCP TIME EA 10 MIN

## 2022-06-18 PROCEDURE — 71045 X-RAY EXAM CHEST 1 VIEW: CPT | Mod: 77

## 2022-06-18 RX ORDER — FUROSEMIDE 20 MG
20 TABLET ORAL
Status: DISCONTINUED | OUTPATIENT
Start: 2022-06-18 | End: 2022-06-22

## 2022-06-18 RX ADMIN — FUROSEMIDE 20 MG: 20 TABLET ORAL at 15:22

## 2022-06-18 RX ADMIN — VANCOMYCIN HYDROCHLORIDE 125 MG: KIT at 18:02

## 2022-06-18 RX ADMIN — OXYCODONE HYDROCHLORIDE 5 MG: 5 TABLET ORAL at 00:56

## 2022-06-18 RX ADMIN — ACETAMINOPHEN 325MG 975 MG: 325 TABLET ORAL at 23:22

## 2022-06-18 RX ADMIN — APIXABAN 5 MG: 5 TABLET, FILM COATED ORAL at 09:10

## 2022-06-18 RX ADMIN — OXYCODONE HYDROCHLORIDE 5 MG: 5 TABLET ORAL at 13:09

## 2022-06-18 RX ADMIN — ACETAMINOPHEN 325MG 975 MG: 325 TABLET ORAL at 00:09

## 2022-06-18 RX ADMIN — Medication 15 ML: at 09:10

## 2022-06-18 RX ADMIN — ACETAMINOPHEN 325MG 975 MG: 325 TABLET ORAL at 09:10

## 2022-06-18 RX ADMIN — MIDODRINE HYDROCHLORIDE 20 MG: 5 TABLET ORAL at 23:22

## 2022-06-18 RX ADMIN — MIDODRINE HYDROCHLORIDE 20 MG: 5 TABLET ORAL at 09:10

## 2022-06-18 RX ADMIN — Medication 5 ML: at 15:22

## 2022-06-18 RX ADMIN — ACETAMINOPHEN 325MG 975 MG: 325 TABLET ORAL at 15:22

## 2022-06-18 RX ADMIN — FUROSEMIDE 20 MG: 10 INJECTION, SOLUTION INTRAMUSCULAR; INTRAVENOUS at 09:12

## 2022-06-18 RX ADMIN — MIDODRINE HYDROCHLORIDE 20 MG: 5 TABLET ORAL at 00:09

## 2022-06-18 RX ADMIN — OXYCODONE HYDROCHLORIDE 5 MG: 5 TABLET ORAL at 20:32

## 2022-06-18 RX ADMIN — HYDROXYZINE HYDROCHLORIDE 50 MG: 25 TABLET ORAL at 00:09

## 2022-06-18 RX ADMIN — Medication 1 PACKET: at 09:15

## 2022-06-18 RX ADMIN — VANCOMYCIN HYDROCHLORIDE 125 MG: KIT at 13:07

## 2022-06-18 RX ADMIN — ESCITALOPRAM OXALATE 10 MG: 10 TABLET ORAL at 09:14

## 2022-06-18 RX ADMIN — Medication 40 MG: at 09:14

## 2022-06-18 RX ADMIN — ONDANSETRON 4 MG: 2 INJECTION INTRAMUSCULAR; INTRAVENOUS at 09:13

## 2022-06-18 RX ADMIN — OXYCODONE HYDROCHLORIDE 5 MG: 5 TABLET ORAL at 09:13

## 2022-06-18 RX ADMIN — APIXABAN 5 MG: 5 TABLET, FILM COATED ORAL at 20:16

## 2022-06-18 RX ADMIN — LIDOCAINE PATCH 4% 1 PATCH: 40 PATCH TOPICAL at 09:14

## 2022-06-18 RX ADMIN — Medication 10 MG: at 20:15

## 2022-06-18 RX ADMIN — VANCOMYCIN HYDROCHLORIDE 125 MG: KIT at 09:15

## 2022-06-18 RX ADMIN — VANCOMYCIN HYDROCHLORIDE 125 MG: KIT at 21:35

## 2022-06-18 RX ADMIN — MIDODRINE HYDROCHLORIDE 20 MG: 5 TABLET ORAL at 15:22

## 2022-06-18 ASSESSMENT — ACTIVITIES OF DAILY LIVING (ADL)
ADLS_ACUITY_SCORE: 36
ADLS_ACUITY_SCORE: 32
ADLS_ACUITY_SCORE: 32
ADLS_ACUITY_SCORE: 36
ADLS_ACUITY_SCORE: 32
ADLS_ACUITY_SCORE: 36
ADLS_ACUITY_SCORE: 32
ADLS_ACUITY_SCORE: 32
ADLS_ACUITY_SCORE: 36

## 2022-06-18 ASSESSMENT — VISUAL ACUITY: OU: GLASSES

## 2022-06-18 NOTE — PROGRESS NOTES
Calorie Count  Intake recorded for: 6/17  Total Kcals: 0 Total Protein: 0g  Kcals from Hospital Food: 0   Protein: 0g  Kcals from Outside Food (average):0 Protein: 0g  # Meals Ordered from Kitchen: 2 small meals   # Meals Recorded: no intake recorded.  # Supplements Recorded: no intake recorded.

## 2022-06-18 NOTE — PLAN OF CARE
Major Shift Events:  Pt withdrawn, whispers. Transfer orders back in place, BP has been soft this shift, held transfer d/t this.   Plan: OT to see  For vital signs and complete assessments, please see documentation flowsheets.

## 2022-06-18 NOTE — PROGRESS NOTES
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 Required multiple pressors post-op and briefly on milrinone. Extubated 6/1/22. Had difficulty with volume management with ongoing hypotension requiring pressors. Bilateral chest tubes were eventually placed for pleural effusion; right-sided chest tube removed 6/15/22. Currently she continues to recover and improve daily. Now able to wean down her O2 support gradually with slowly decreasing in her flow; aiming for O2sat >92%. Will continue to wean down O2 support as able + promote physical mobility. Specific pulmonary hypertension therapies delayed up until 90 days and this will be done in the outpatient.     Changes Today:   - bedside ultrasound done for IVC eval: 1.8 cm IVC, collapsible  - decrease lasix from 20 IV TID --> 20 mg PO BID, will try to keep net even   - continue to wean down her O2 requirement, now on O2 NC  - transfer to floor   - touched base with CVTS regarding kinked left chest tube, and that CVTS will be primary    - cardiology will continue to follow      # Pulmonary hypertension, Group IV  # CTEPH s/p pulmonary endaterectomy  # DVTs  # Severe tricuspid insufficiency  Initially diagnosed with bilateral DVT, PE 2019. TTE showed dilated RV with reduced RV function and RVSP of 84mmHg suggestive of severe pulmonary hypertension with mild to moderate TR. Patient believes the above relates to a work related injury, unna boot early 2019. RHC 12/18/2021 with RA 12, PA 84/19/45, PCWP 2, TAHIRA CO/CI 2.8/1.6. Last dose eliquis 5/23 AM. Has oxygen at home but does not use this. Underwent endarterectomy on 5/27. Post op on multiple pressors and briefly on milrinone. Currently remains stable of pressors.  - weaned off HFNC, now on  NC 2-6 LPM  - Goal CVP 10-12, will keep net even for now, diuresis plan as below  - continue apixaban 5 mg BID for DVTs/PE  - continue home pHTN meds    - macitentan 10 mg daily started on 6/5   - digoxin 125 mcg daily for RV support --> digoxin level elevated today; holding until Monday, recheck level at that time     # Volume overload, resolved  # Hypotension  PTA on lasix 20mg qday (decreased from 40mg ~6 weeks ago). Currently she is getting lasix 20 mg PO TID and has remained net even with this diuretic. Still has relatively elevated JVP. However, blood pressure still fluctuates and also has remained on midodrine.   - fluid status:    - bedside ultrasound done for IVC eval: 1.8 cm IVC, collapsible   - decrease lasix from 20 IV TID --> 20 mg PO BID 6/18/22, will try to keep net even    - continue midodrine 20 mg q8h     # Reperfusion pulmonary edema, improving  # Bilateral pleural effusion s/p bilat chest tubes, still has left chest tube   # VAP  Gradually improving. Gradually decreased O2 requirement. However, CXR from this AM noted left chest tube being kinked.  - continue diuresis as above  - cefepime given for a total of 7 days; 6/11-17  - CVTS notified regarding kinked left chest tube with low output    # C.diff infection  Still has diarrhea. Intermittently nauseous. Denies vomiting. No abdominal pain. Denies fever/chills. Rectal tube in place.   - continue PO Vancomycin *10 days     # CKD  Baseline Cr ~1.1. Current creatinine 1.27 with eGFR 46.  - monitor     # Anemia  Hgb 11.4, similar to prior last month. Has been trending down with HgB today at 6.4 HgB would recommend workup for acute blood loss anemia.    # malnutrition  - nutrition consulted  - continue intermittent tube feed  - encourage oral intake    Discussed with Dr Dumont who is in agreement.       Anastasiia Oquendo MD  Resident      -----------------------------------------------------------------------------------------------------------------------------    Interval Events  ----------------------  Had an episode of low blood pressure overnight: BP 98/42 (MAP 57), BP 85/58(MAP 63). However, improved with rechecks. Still feels crappy. Noted chest pain, SOB, lightheadedness. However her O2 requirement has improved gradually. Accepting feeding well but has not eat as much orally.     This AM she is currently requiring 6 LPM NC with c/o worsening shortness of breath and has increase RR this AM.    Current Medications  ----------------------  Current Facility-Administered Medications   Medication     acetaminophen (TYLENOL) tablet 975 mg     apixaban ANTICOAGULANT (ELIQUIS) tablet 5 mg     bisacodyl (DULCOLAX) Suppository 10 mg     dextrose 10% infusion     glucose gel 15-30 g    Or     dextrose 50 % injection 25-50 mL    Or     glucagon injection 1 mg     [Held by provider] digoxin (LANOXIN) tablet 125 mcg     escitalopram (LEXAPRO) tablet 10 mg     furosemide (LASIX) injection 20 mg     heparin lock flush 10 UNIT/ML injection 5-20 mL     heparin lock flush 10 UNIT/ML injection 5-20 mL     HYDROmorphone (DILAUDID) injection 0.2 mg     hydrOXYzine (ATARAX) tablet 25-50 mg     ipratropium - albuterol 0.5 mg/2.5 mg/3 mL (DUONEB) neb solution 3 mL     Lidocaine (LIDOCARE) 4 % Patch 1 patch     lidocaine patch in PLACE     macitentan (OPSUMIT) tablet 10 mg     magnesium hydroxide (MILK OF MAGNESIA) suspension 30 mL     medication instruction     melatonin tablet 10 mg     methocarbamol (ROBAXIN) tablet 500 mg     midodrine (PROAMATINE) tablet 20 mg     multivitamins w/minerals liquid 15 mL     naloxone (NARCAN) injection 0.2 mg    Or     naloxone (NARCAN) injection 0.4 mg    Or     naloxone (NARCAN) injection 0.2 mg    Or     naloxone (NARCAN) injection 0.4 mg     ondansetron (ZOFRAN ODT) ODT tab 4 mg    Or     ondansetron (ZOFRAN) injection 4 mg      oxyCODONE (ROXICODONE) tablet 5 mg    Or     oxyCODONE IR (ROXICODONE) tablet 10 mg     pantoprazole (PROTONIX) 2 mg/mL suspension 40 mg     prochlorperazine (COMPAZINE) tablet 5 mg    Or     prochlorperazine (COMPAZINE) injection 5 mg     protein modular (PROSOURCE TF) 1 packet     Reason beta blocker order not selected     sodium chloride (PF) 0.9% PF flush 10 mL     sodium chloride (PF) 0.9% PF flush 10-20 mL     sodium chloride (PF) 0.9% PF flush 10-40 mL     vancomycin (FIRVANQ) oral solution 125 mg        Intake and Output  ----------------------      Intake/Output Summary (Last 24 hours) at 6/17/2022 0805  Last data filed at 6/17/2022 0800  Gross per 24 hour   Intake 1852 ml   Output 1410 ml   Net 442 ml         Weight  ----------------------  Wt Readings from Last 2 Encounters:   06/18/22 68.5 kg (151 lb 0.2 oz)   04/29/22 67.4 kg (148 lb 8 oz)       Objective  ----------------------  Results for orders placed or performed during the hospital encounter of 05/25/22 (from the past 24 hour(s))   Glucose by meter   Result Value Ref Range    GLUCOSE BY METER POCT 88 70 - 99 mg/dL   Glucose by meter   Result Value Ref Range    GLUCOSE BY METER POCT 109 (H) 70 - 99 mg/dL   XR Chest Port 1 View    Narrative    EXAM: XR Chest 1 view 6/18/2022 1:34 AM      HISTORY: Ongoing hypoxia in setting of CTEPH s/p pulmonary  endarterectomy.    COMPARISON: Previous day.     TECHNIQUE: Frontal view of the chest.    FINDINGS: Stable position of right sided PICC with tip in the mid SVC.  Enteric tube is subdiaphragmatic with tip coming from the study. Left  basilar chest tube appears kinked compared to prior exam. Post  surgical changes of the chest and median sternotomy wires intact.  Trachea is midline. Heart remains borderline enlarged. Stable  bilateral interstitial and airspace pulmonary opacities. Small  bilateral pleural effusions. No pneumothoraces.      Impression    IMPRESSION:   1. Left basilar chest tube appears  kinked.  2. Stable bilateral interstitial and airspace pulmonary opacities.  3. Stable small bilateral pleural effusions.  4. Borderline cardiomegaly    Findings were discussed with Dr. Stewart by Dr. Mariee 1:41 AM 6/18/2022    I have personally reviewed the examination and initial interpretation  and I agree with the findings.    MARK GUADARRAMA MD         SYSTEM ID:  [id number]   Basic metabolic panel   Result Value Ref Range    Sodium 142 133 - 144 mmol/L    Potassium 4.6 3.4 - 5.3 mmol/L    Chloride 106 94 - 109 mmol/L    Carbon Dioxide (CO2) 33 (H) 20 - 32 mmol/L    Anion Gap 3 3 - 14 mmol/L    Urea Nitrogen 47 (H) 7 - 30 mg/dL    Creatinine 1.21 (H) 0.52 - 1.04 mg/dL    Calcium 9.1 8.5 - 10.1 mg/dL    Glucose 126 (H) 70 - 99 mg/dL    GFR Estimate 46 (L) >60 mL/min/1.73m2   Magnesium   Result Value Ref Range    Magnesium 2.8 (H) 1.6 - 2.3 mg/dL   CBC with platelets   Result Value Ref Range    WBC Count 13.1 (H) 4.0 - 11.0 10e3/uL    RBC Count 2.76 (L) 3.80 - 5.20 10e6/uL    Hemoglobin 7.9 (L) 11.7 - 15.7 g/dL    Hematocrit 26.4 (L) 35.0 - 47.0 %    MCV 96 78 - 100 fL    MCH 28.6 26.5 - 33.0 pg    MCHC 29.9 (L) 31.5 - 36.5 g/dL    RDW 18.5 (H) 10.0 - 15.0 %    Platelet Count 456 (H) 150 - 450 10e3/uL   Glucose by meter   Result Value Ref Range    GLUCOSE BY METER POCT 126 (H) 70 - 99 mg/dL       Recent Labs   Lab Test 12/08/21  1221   CHOL 113   HDL 35*   LDL 59   TRIG 94       Hemoglobin A1C   Date Value Ref Range Status   12/08/2021 5.8 (H) 0.0 - 5.6 % Final     Comment:     Normal <5.7%   Prediabetes 5.7-6.4%    Diabetes 6.5% or higher     Note: Adopted from ADA consensus guidelines.       Results for orders placed or performed during the hospital encounter of 05/25/22   XR Chest Port 1 View    Impression    IMPRESSION: [place] catheter tip projects over the central right  pulmonary artery.    I have personally reviewed the examination and initial interpretation  and I agree with the findings.    CANDACE  MD GAGE         SYSTEM ID:  [id number]   XR Abdomen Port 1 View    Impression    IMPRESSION: Gastric tube tip and sidehole project over the stomach.    I have personally reviewed the examination and initial interpretation  and I agree with the findings.    CANDACE ALMONTE MD         SYSTEM ID:  [id number]   XR Chest Port 1 View    Impression    Impression: Postoperative chest with slightly improved perihilar  atelectasis or edema. Endotracheal tube tip in lower trachea 2.5 cm  above the man.    I have personally reviewed the examination and initial interpretation  and I agree with the findings.    YVETTE GRAY MD         SYSTEM ID:  [id number]   XR Chest Port 1 View    Impression    Impression: Postoperative chest with stable perihilar atelectasis or  edema. Endotracheal tube tip 1.7 cm superior to the man.    I have personally reviewed the examination and initial interpretation  and I agree with the findings.    YVETTE GRAY MD         SYSTEM ID:  [id number]   XR Chest Port 1 View    Impression    Impression:  1. Endotracheal tube retracted now 2.4 cm from the man.   2. No significant change in small amount of atelectasis lung bases  bilaterally..    YVETTE GRAY MD         SYSTEM ID:  [id number]   XR Chest Port 1 View    Impression    Impression:   1. Unchanged support devices as described above.  2. Unchanged bibasilar atelectasis. No new focal pulmonary opacities.    I have personally reviewed the examination and initial interpretation  and I agree with the findings.    CANDACE ALMONTE MD         SYSTEM ID:  [id number]   XR Chest Port 1 View    Impression    Impression:   1. Unchanged small effusions and bibasilar predominant opacities.  2. Unchanged support devices including pulmonary artery catheter and  ET tube over the low thoracic trachea.    I have personally reviewed the examination and initial interpretation  and I agree with the findings.    JESSICA WALKER MD         SYSTEM ID:   [id number]   XR Chest Port 1 View    Impression    IMPRESSION: Interval extubation. Slightly increased left pleural  effusion/basilar atelectasis. Atherosclerosis. Mild central  atelectasis/subtle positive fluid volume balance.    MARK GUADARRAMA MD         SYSTEM ID:  [id number]   XR Abdomen Port 1 View    Impression    Impression: Enteric tube is subdiaphragmatic with tip in the left  pelvis, presumably within the jejunum.    I have personally reviewed the examination and initial interpretation  and I agree with the findings.    BEBE CLIFFORD MD         SYSTEM ID:  [id number]   XR Chest Port 1 View    Impression    Impression:   1. Unchanged effusions with increased left basilar atelectasis and  interstitial pulmonary edema.  2. Removal of pulmonary artery catheter, mediastinal drain and left  chest tube. Given that lung apices are not entirely included in the  field-of-view, small pneumothoraces could be missed.    I have personally reviewed the examination and initial interpretation  and I agree with the findings.    MARK GUADARRAMA MD         SYSTEM ID:  [id number]   XR Chest Port 1 View   Result Value Ref Range    Radiologist flags New moderate right-sided pneumothorax status post (AA)     Impression    Impression:   1. New moderate right-sided pneumothorax.  2. Possible trace left pneumothorax, attention on follow-up.  3. Increasing atelectasis/pulmonary edema.  4. Small left pleural effusion.  5. Interval removal of right basilar chest tube.    [Critical Result: New moderate right-sided pneumothorax status post  chest tube removal]    Finding was identified on 6/2/2022 6:21 PM.     Raz Rodgers was contacted by Dr. Wyatt at 6/2/2022 6:36 PM and  verbalized understanding of the critical finding.     I have personally reviewed the examination and initial interpretation  and I agree with the findings.    BK DUPREE MD         SYSTEM ID:  [id number]   XR Chest Port 1 View    Impression    Impression:    1. Unchanged appearance of mixed pulmonary opacities.  2. Small biapical pneumothoraces, right more than left.  3. Trace bilateral pleural effusions.    I have personally reviewed the examination and initial interpretation  and I agree with the findings.    BK DUPREE MD         SYSTEM ID:  [id number]   XR Chest Port 1 View    Impression    IMPRESSION:  1.  Lines and tubes, as above.  2.  Stable perihilar opacities, likely edema and atelectasis. Slightly  decreased retrocardiac opacity, likely decreasing atelectasis.   3.  Stable trace biapical pneumothoraces.    I have personally reviewed the examination and initial interpretation  and I agree with the findings.    ZEE FISHMAN MD         SYSTEM ID:  [id number]   XR Chest Port 1 View    Impression    Impression:   1. Unchanged left IJ catheter projecting over the innominate vein.  2. Unchanged effusions and mixed airspace opacities.  3. Decreasing biapical pneumothoraces.    I have personally reviewed the examination and initial interpretation  and I agree with the findings.    JESSICA WALKER MD         SYSTEM ID:  [id number]   Echo Complete   Result Value Ref Range    LVEF  60-65%        Physical Exam  Vitals:    06/18/22 0400 06/18/22 0500 06/18/22 0600 06/18/22 0700   BP: 111/47 108/49 120/46 120/48   BP Location: Left arm      Cuff Size: Adult Regular      Pulse: 76 75 74 78   Resp: 17 19 22 20   Temp: 97.8  F (36.6  C)      TempSrc: Axillary      SpO2: 99% 100% 97% 92%   Weight: 68.5 kg (151 lb 0.2 oz)      Height:         Physical Exam   Constitutional: No distress. She appears chronically ill.   ENT: JVP ~ 8-10 (exaggerated/pronounced due to TR)  Pulmonary/Chest: minimal crackles, decrease breathsound  LLL  Abdominal: Soft. Bowel sounds are normal. Soft, slightly distended, nontender  Ext: no edema  Neurological: She is alert and oriented to person, place, and time.   Skin: Skin is warm and dry.

## 2022-06-19 ENCOUNTER — APPOINTMENT (OUTPATIENT)
Dept: GENERAL RADIOLOGY | Facility: CLINIC | Age: 77
DRG: 270 | End: 2022-06-19
Attending: PHYSICIAN ASSISTANT
Payer: MEDICARE

## 2022-06-19 ENCOUNTER — APPOINTMENT (OUTPATIENT)
Dept: GENERAL RADIOLOGY | Facility: CLINIC | Age: 77
DRG: 270 | End: 2022-06-19
Attending: INTERNAL MEDICINE
Payer: MEDICARE

## 2022-06-19 LAB
ANION GAP SERPL CALCULATED.3IONS-SCNC: 5 MMOL/L (ref 3–14)
BUN SERPL-MCNC: 48 MG/DL (ref 7–30)
CALCIUM SERPL-MCNC: 8.9 MG/DL (ref 8.5–10.1)
CHLORIDE BLD-SCNC: 106 MMOL/L (ref 94–109)
CO2 SERPL-SCNC: 32 MMOL/L (ref 20–32)
CREAT SERPL-MCNC: 1.14 MG/DL (ref 0.52–1.04)
ERYTHROCYTE [DISTWIDTH] IN BLOOD BY AUTOMATED COUNT: 18.3 % (ref 10–15)
GFR SERPL CREATININE-BSD FRML MDRD: 49 ML/MIN/1.73M2
GLUCOSE BLD-MCNC: 139 MG/DL (ref 70–99)
GLUCOSE BLDC GLUCOMTR-MCNC: 137 MG/DL (ref 70–99)
HCT VFR BLD AUTO: 25.1 % (ref 35–47)
HGB BLD-MCNC: 7.5 G/DL (ref 11.7–15.7)
LACTATE SERPL-SCNC: 1 MMOL/L (ref 0.7–2)
MAGNESIUM SERPL-MCNC: 2.8 MG/DL (ref 1.6–2.3)
MCH RBC QN AUTO: 29.2 PG (ref 26.5–33)
MCHC RBC AUTO-ENTMCNC: 29.9 G/DL (ref 31.5–36.5)
MCV RBC AUTO: 98 FL (ref 78–100)
PLATELET # BLD AUTO: 405 10E3/UL (ref 150–450)
POTASSIUM BLD-SCNC: 4.7 MMOL/L (ref 3.4–5.3)
RBC # BLD AUTO: 2.57 10E6/UL (ref 3.8–5.2)
SODIUM SERPL-SCNC: 143 MMOL/L (ref 133–144)
WBC # BLD AUTO: 12.2 10E3/UL (ref 4–11)

## 2022-06-19 PROCEDURE — 250N000013 HC RX MED GY IP 250 OP 250 PS 637: Performed by: THORACIC SURGERY (CARDIOTHORACIC VASCULAR SURGERY)

## 2022-06-19 PROCEDURE — 250N000013 HC RX MED GY IP 250 OP 250 PS 637: Performed by: STUDENT IN AN ORGANIZED HEALTH CARE EDUCATION/TRAINING PROGRAM

## 2022-06-19 PROCEDURE — 250N000013 HC RX MED GY IP 250 OP 250 PS 637

## 2022-06-19 PROCEDURE — 120N000002 HC R&B MED SURG/OB UMMC

## 2022-06-19 PROCEDURE — 999N000044 HC STATISTIC CVC DRESSING CHANGE

## 2022-06-19 PROCEDURE — 80048 BASIC METABOLIC PNL TOTAL CA: CPT | Performed by: THORACIC SURGERY (CARDIOTHORACIC VASCULAR SURGERY)

## 2022-06-19 PROCEDURE — 250N000011 HC RX IP 250 OP 636: Performed by: STUDENT IN AN ORGANIZED HEALTH CARE EDUCATION/TRAINING PROGRAM

## 2022-06-19 PROCEDURE — 71045 X-RAY EXAM CHEST 1 VIEW: CPT | Mod: 77

## 2022-06-19 PROCEDURE — 250N000011 HC RX IP 250 OP 636: Performed by: THORACIC SURGERY (CARDIOTHORACIC VASCULAR SURGERY)

## 2022-06-19 PROCEDURE — 83735 ASSAY OF MAGNESIUM: CPT | Performed by: THORACIC SURGERY (CARDIOTHORACIC VASCULAR SURGERY)

## 2022-06-19 PROCEDURE — 71045 X-RAY EXAM CHEST 1 VIEW: CPT | Mod: 26 | Performed by: RADIOLOGY

## 2022-06-19 PROCEDURE — 85014 HEMATOCRIT: CPT | Performed by: THORACIC SURGERY (CARDIOTHORACIC VASCULAR SURGERY)

## 2022-06-19 PROCEDURE — 71045 X-RAY EXAM CHEST 1 VIEW: CPT

## 2022-06-19 PROCEDURE — 36592 COLLECT BLOOD FROM PICC: CPT | Performed by: INTERNAL MEDICINE

## 2022-06-19 PROCEDURE — 83605 ASSAY OF LACTIC ACID: CPT | Performed by: INTERNAL MEDICINE

## 2022-06-19 PROCEDURE — 36592 COLLECT BLOOD FROM PICC: CPT | Performed by: THORACIC SURGERY (CARDIOTHORACIC VASCULAR SURGERY)

## 2022-06-19 RX ADMIN — Medication 3 ML: at 09:19

## 2022-06-19 RX ADMIN — APIXABAN 5 MG: 5 TABLET, FILM COATED ORAL at 09:57

## 2022-06-19 RX ADMIN — OXYCODONE HYDROCHLORIDE 5 MG: 5 TABLET ORAL at 04:56

## 2022-06-19 RX ADMIN — LIDOCAINE PATCH 4% 1 PATCH: 40 PATCH TOPICAL at 09:57

## 2022-06-19 RX ADMIN — HYDROXYZINE HYDROCHLORIDE 50 MG: 25 TABLET ORAL at 16:07

## 2022-06-19 RX ADMIN — ONDANSETRON 4 MG: 2 INJECTION INTRAMUSCULAR; INTRAVENOUS at 11:19

## 2022-06-19 RX ADMIN — ACETAMINOPHEN 325MG 975 MG: 325 TABLET ORAL at 09:57

## 2022-06-19 RX ADMIN — APIXABAN 5 MG: 5 TABLET, FILM COATED ORAL at 20:12

## 2022-06-19 RX ADMIN — FUROSEMIDE 20 MG: 20 TABLET ORAL at 16:08

## 2022-06-19 RX ADMIN — ACETAMINOPHEN 325MG 975 MG: 325 TABLET ORAL at 16:07

## 2022-06-19 RX ADMIN — Medication 40 MG: at 11:24

## 2022-06-19 RX ADMIN — Medication 10 ML: at 16:09

## 2022-06-19 RX ADMIN — MIDODRINE HYDROCHLORIDE 20 MG: 5 TABLET ORAL at 09:57

## 2022-06-19 RX ADMIN — METHOCARBAMOL 500 MG: 500 TABLET ORAL at 14:16

## 2022-06-19 RX ADMIN — ESCITALOPRAM OXALATE 10 MG: 10 TABLET ORAL at 09:57

## 2022-06-19 RX ADMIN — MIDODRINE HYDROCHLORIDE 20 MG: 5 TABLET ORAL at 16:08

## 2022-06-19 RX ADMIN — FUROSEMIDE 20 MG: 20 TABLET ORAL at 09:57

## 2022-06-19 RX ADMIN — MIDODRINE HYDROCHLORIDE 20 MG: 5 TABLET ORAL at 23:58

## 2022-06-19 RX ADMIN — VANCOMYCIN HYDROCHLORIDE 125 MG: KIT at 20:12

## 2022-06-19 RX ADMIN — VANCOMYCIN HYDROCHLORIDE 125 MG: KIT at 09:58

## 2022-06-19 RX ADMIN — Medication 1 PACKET: at 09:56

## 2022-06-19 RX ADMIN — Medication 15 ML: at 09:58

## 2022-06-19 RX ADMIN — Medication 5 ML: at 07:00

## 2022-06-19 RX ADMIN — Medication 10 MG: at 20:12

## 2022-06-19 RX ADMIN — VANCOMYCIN HYDROCHLORIDE 125 MG: KIT at 16:12

## 2022-06-19 RX ADMIN — VANCOMYCIN HYDROCHLORIDE 125 MG: KIT at 14:16

## 2022-06-19 RX ADMIN — ACETAMINOPHEN 325MG 975 MG: 325 TABLET ORAL at 23:58

## 2022-06-19 ASSESSMENT — ACTIVITIES OF DAILY LIVING (ADL)
ADLS_ACUITY_SCORE: 36
ADLS_ACUITY_SCORE: 40
ADLS_ACUITY_SCORE: 36

## 2022-06-19 NOTE — PROGRESS NOTES
Transfer  Transferred from: ICU  Via:bed  Reason for transfer: Pt appropriate for 6D- improved patient condition  Family: Aware of transfer  Belongings: Received with pt  Chart: Received with pt  Medications: Meds received from old unit with pt  Code Status verified on armband: yes  2 RN Skin Assessment Completed By: Payal PARKS and Becki MCGOVERN and found old chest tube site w/dressing CDI, chest tube to suction, BLE edema +2-3 and preventative dressing bilateral facial cheeks and preventative mepilex to coccyx, approximated midsternal surgical site and scattered bruising.

## 2022-06-19 NOTE — PROGRESS NOTES
Cardiovascular Surgery Progress Note  06/19/2022         Assessment and Plan:     Debi Espinoza is a 76 year old female with PMH of HTN, nephrolithiasis, DVT (2019) and PE (2019, 2021) on chronic anticoagulation, and chronic thromboembolic pulmonary hypertension who underwent pulmonary artery thromboendarterectomy on 5/27 with Dr. Peterson.    Cardiovascular:   Hx Severe pulmonary hypertension  Hx RV dilation and reduced RV function  Hx Severe tricuspid insufficiency  Hx Essential HTN  Distributive shock  PTA regimen: furosemide 20mg qd, adempas 2.5mg TID, opsumit 10mg qd  Echo on 03/2021 with LVEF of 78%, severe pulmonary hypertension (82 mmHg), reduced RV function (TAPSE 16mm), severe tricuspid insufficiency     - 6/2 ECHO: Global and regional LV function is normal: EF of 60-65%. Flattened septum c/w RV pressure overload. Moderate RV dilation with severely reduced global right ventricular function. Mod TR. Severe p-HTN. Estimated pulmonary artery systolic pressure is 85 mmHg plus right atrial pressure. Unable to visualize IVC. No pericardial effusions. Compared to prior TTE, RV is slightly less dilated, function appears similar.     6/8 ECHO: Global and regional LV function is hyperkinetic EF of 65-70%.  Mod LV dilation w/ severely reduced global RV function. Moderate TR. Estimated pulmonary artery systolic pressure is 57 mmHg plus right atrial pressure. Compared with the study from 6/2/22. Estimated PA pressure is lower.      - Monitor hemodynamic status.   - If hypotensive, start vasopressin and avoid additional fluid boluses  - MAP goal > 65  - no indication for ASA use  - on p-HTN meds as above  - V pacing back up 50 BPM- been in sinus with 1st degree AV-block  - Continue midodrine 20 q8h  - Apixiban started 6/8  - 6/8-9 digoxin loaded - 500 mcg, followed by 250 q8h x2   - Digoxin maintenance dose 125 mcg daily (Currently being held per Cards 2) (per pharmacy for renal/weight dosing)    Chest tubes: Left  pigtail catheter removed 6/19 without immediate complication    Pulmonary:  S/p Pulmonary artery thromboendarterectomy on 5/27/2022 by Dr. Peterson  Hx Chronic pulmonary thromboembolic disease  Hx PE (2019, 2021)  Hx Emphysema, moderate  Small right pleural effusion - seen on CT chest 6/9  S/p right pigtail catheter placement on 6/10  PTA regimen: apixaban 5mg BID  CTA 05/2019 demonstrated underlying moderate emphysema  Extubated 5/31  - Supplemental oxygen: stable on NC, wean as tolerated  - PTA Opsumit, hold Adempas  - 6/9 CT chest with small right pleural effusion  - 6/10 right pigtail catheter placement with IR, noted to be loculated -> continue for now    Neurology  Acute Postoperative pain  - Monitor neurological status. Notify the MD for any acute changes in exam.  - Pain: S tylenol 975 TID, lidocaine patches. PRN oxycodone 5-10 q4h, robaxin 500 q6h, PRN dilaudid 0.2 q2h-  - Psych consult today  Start Lexapro 10 mg per day (ordered)  Seroquel 12.5-25 mg BID scheduled, hold if somnolent. Also 25-50 mg HS (will discuss)    MSK:  # Sternotomy  # Surgical Incision  - PT/OT treat as indicated  - Reinforce sternal precautions  - Postoperative incision management per protocol     / Renal:  Nephrolithiasis  Hypernatremia  PTA regimen: furosemide 20mg qd  BL creat appears to be ~ 1.14  - Strict I/O, daily weights  - Avoid/limit nephrotoxins as able  - Replete lytes PRN per protocol  - FWF at 30 ml q4h  - Net +2.9L overnight  - Lasix 20 TID -> goal net even    GI / FEN:   - Speech consult, appreciate recs   - Regular diet- decreased appetite, encourage PO nutrition/ ensure  - Continuous tube feeds    Endocrine:  Stress induced hyperglycemia  Preop A1c 5.8 (12/21)  - High sliding scale insulin   - Goal BG <180 for optimal healing    Infectious Disease:  Stress induced leukocytosis  Distributive shock  Clostridiodes dificile  5/30 MRSA swab negative  5/30 Sputum - Klebsiella  5/31 Sputum culture with 4+ lactose  fermenting gram negative rods  5/31 Repeat blood cultures NGTD  6/4 Blood culture x2: NGTD     - s/p Meropenem (6/2-6/3) and Zosyn (6/3- 6/6)  - Continue to monitor fever curve, WBC and inflammatory markers as appropriate  - 6/10 Febrile, WBC 31  - Emperic cefepime (6/10 - ), Vanc (6/10 - 6/13), stopped with neg MRSA swab  - Cont PO vanc (Cdiff)     Cultures  6/11 Bcx, UA/UC, Sputum (follow-up 6/12 sputum as 6/11 cx's bad)  6/11 C. Diff (+)     Hematology:   Stress induced leukocytosis  Acute blood loss anemia  Acute blood loss thrombocytopenia  Hx DVT (2019), PE (2019, 2021) on chronic anticoagulation (Apixaban)  PA tear intra-op s/p patch repair.  - Continue to monitor  - Apixiban for prophylaxis  - Daily CBC   - last transfusion 6/5. Has been stable 7->8 since     Antithrombotics:   - ASA 81 mg  Apixiban for Thromboemoblism    Prophylaxis:   - Stress ulcer prophylaxis: Pantoprazole 40 mg daily for 30 days  - DVT prophylaxis: Subcutaneous heparin, SCD    Disposition:   - Transferred to  on 6/19  - Therapies recommending discharge pending    Discussed with Surgeon, Dr. Peterson via written and verbal commnication.     Jeremy Clemens PA-c  Pager: [phone]  7:31 AM June 19, 2022           Interval History:     No overnight events.  States pain is well managed on current regimen. Slept well overnight.  Tolerating diet, is passing flatus, BM x 0. No nausea or vomiting.  Breathing well without complaints.   Working with therapies and ambulating in halls without assistance.   Denies chest pain, palpitations, dizziness, syncopal symptoms, fevers, chills, myalgias, or sternal popping/clicking.         Physical Exam:   Blood pressure 114/52, pulse 79, temperature 98.3  F (36.8  C), temperature source Oral, resp. rate 22, height 1.524 m (5'), weight 68.5 kg (151 lb 0.2 oz), SpO2 95 %, not currently breastfeeding.  Vitals:    06/16/22 0400 06/17/22 0600 06/18/22 0400   Weight: 68.1 kg (150 lb 2.1 oz) 70.4 kg (155 lb 3.3 oz)  68.5 kg (151 lb 0.2 oz)      Current Weight: 68.5 Kg Trend: -1.9 Kg  Admit weight: 68.3 Kg    Daily Fluid status; net loss: -731  mL    Net loss SA: +1835 mL  MAPs: 61-79  LVEF: 65-70%    Gen: A&Ox4, NAD  Neuro: no focal deficits   CV: RRR, normal S1 S2, no murmurs, rubs or gallops. No appreciable JVD  Vascular: Peripheral pulses present Radial 2+, Dorsalis Pedis 2+, Posterior Tibialis 2+.   Pulm: CTA, no wheezing or rhonchi, normal breathing on 2-3 L/m   Abd: nondistended, normal BS, soft, nontender  Ext: positive peripheral edema, 2+ pitting. Warm and soft.   Incision: clean, dry, intact, no erythema, sternum stable  Tubes/drain sites: dressing clean and dry         Data:    Imaging:  reviewed recent imaging    Chest x-ray      Echocardiogram      CT scan    Labs:  CBC  Recent Labs   Lab 06/18/22  0514 06/17/22  0454 06/16/22  0411 06/15/22  0422   WBC 13.1* 14.0* 14.0* 12.0*   RBC 2.76* 2.76* 2.60* 2.59*   HGB 7.9* 7.9* 7.7* 7.6*   HCT 26.4* 26.4* 24.7* 24.5*   MCV 96 96 95 95   MCH 28.6 28.6 29.6 29.3   MCHC 29.9* 29.9* 31.2* 31.0*   RDW 18.5* 18.3* 18.0* 18.1*   * 457* 417 415     CMP:  Last Comprehensive Metabolic Panel:  Sodium   Date Value Ref Range Status   06/18/2022 142 133 - 144 mmol/L Final     Potassium   Date Value Ref Range Status   06/18/2022 4.6 3.4 - 5.3 mmol/L Final   05/30/2022 3.4 3.4 - 5.3 mmol/L Final     Chloride   Date Value Ref Range Status   06/18/2022 106 94 - 109 mmol/L Final     Carbon Dioxide (CO2)   Date Value Ref Range Status   06/18/2022 33 (H) 20 - 32 mmol/L Final     Anion Gap   Date Value Ref Range Status   06/18/2022 3 3 - 14 mmol/L Final     Glucose   Date Value Ref Range Status   06/18/2022 126 (H) 70 - 99 mg/dL Final     GLUCOSE BY METER POCT   Date Value Ref Range Status   06/18/2022 126 (H) 70 - 99 mg/dL Final     Urea Nitrogen   Date Value Ref Range Status   06/18/2022 47 (H) 7 - 30 mg/dL Final     Creatinine   Date Value Ref Range Status   06/18/2022 1.21 (H) 0.52  - 1.04 mg/dL Final     GFR Estimate   Date Value Ref Range Status   06/18/2022 46 (L) >60 mL/min/1.73m2 Final     Comment:     Effective December 21, 2021 eGFRcr in adults is calculated using the 2021 CKD-EPI creatinine equation which includes age and gender (Susan et al., [place], DOI: 10.1056/HLVTqo0635109)     Calcium   Date Value Ref Range Status   06/18/2022 9.1 8.5 - 10.1 mg/dL Final     Bilirubin Total   Date Value Ref Range Status   06/12/2022 1.1 0.2 - 1.3 mg/dL Final     Alkaline Phosphatase   Date Value Ref Range Status   06/12/2022 111 40 - 150 U/L Final     ALT   Date Value Ref Range Status   06/12/2022 16 0 - 50 U/L Final     AST   Date Value Ref Range Status   06/12/2022 25 0 - 45 U/L Final     BMP  Recent Labs   Lab 06/18/22  0519 06/18/22  0514 06/18/22  0007 06/17/22 2019 06/17/22 0454 06/16/22  0411 06/15/22  0427 06/15/22  0422   NA  --  142  --   --  141 143  --  140   POTASSIUM  --  4.6  --   --  4.8 4.1  --  4.3   CHLORIDE  --  106  --   --  105 107  --  108   CHELE  --  9.1  --   --  8.7 8.9  --  9.0   CO2  --  33*  --   --  30 29  --  29   BUN  --  47*  --   --  49* 45*  --  44*   CR  --  1.21*  --   --  1.16* 0.97  --  1.09*   * 126* 109* 88 141* 113*   < > 131*    < > = values in this interval not displayed.     INR  Recent Labs   Lab 06/15/22  0422 06/14/22  0423 06/13/22  0429   INR 1.93* 1.90* 1.89*      Hepatic Panel  No lab results found in last 7 days.  GLUCOSE:   Recent Labs   Lab 06/18/22  0519 06/18/22  0514 06/18/22  0007 06/17/22 2019 06/17/22 0454 06/16/22 0411   * 126* 109* 88 141* 113*

## 2022-06-19 NOTE — PLAN OF CARE
Goal Outcome Evaluation:  Major Shift Events:  C/O increased SOB this am.  HO notified and CXR ordered.  O2 increased from 2l to 4l NC.  Chest tube with no output.  Breath sound very diminished LLL.  Only fair effort at CDB/IS.  Medicated with oxy 5 po and pt stated ease of breathing.   Sats remain >94 most of day, until 1700, upon returning to bed-sats decreased to 87 on 6L NC.  Takes her some time to recover after exertion. Switched to Oximask at 7l with sats >93. States she is more comfortable.  Poor appetite today.  Says she does not care for the choices offered.  Daughter-in-law brought in beverage choices. Nausea x 1 this am.  REsesolved w zofran.  Plan: Report given and transferred to 6D.  Continue to monitor resp status and increase activity as tolerated.   For vital signs and complete assessments, please see documentation flowsheets.

## 2022-06-19 NOTE — PLAN OF CARE
Goal Outcome Evaluation:    Plan of Care Reviewed With: patient     Overall Patient Progress: no change    Outcome Evaluation: Now on 4L O2 via NC w/sats in low 90's. C/o pain, gave prn oxycodone x 2 w/good relief. Rectal tube patent w/brown output; purewick in place w/good UOP. NJ w/tube feeds running from 1832-3056 and meds given via NJ.

## 2022-06-19 NOTE — PROGRESS NOTES
Major shift updates: Patient left pleural chest tube was removed at 0948.  1412- RN notified paged provider about patient c/o 10/10 chest pain. RN is waiting on provider to call back. RN gave patient scheduled tylenol for pain. Will page provider again after 20 minutes with no response.   GIGU: Zofran given for nausea.         Tanya Dowell RN

## 2022-06-20 ENCOUNTER — APPOINTMENT (OUTPATIENT)
Dept: OCCUPATIONAL THERAPY | Facility: CLINIC | Age: 77
DRG: 270 | End: 2022-06-20
Attending: INTERNAL MEDICINE
Payer: MEDICARE

## 2022-06-20 ENCOUNTER — APPOINTMENT (OUTPATIENT)
Dept: PHYSICAL THERAPY | Facility: CLINIC | Age: 77
DRG: 270 | End: 2022-06-20
Attending: INTERNAL MEDICINE
Payer: MEDICARE

## 2022-06-20 ENCOUNTER — APPOINTMENT (OUTPATIENT)
Dept: GENERAL RADIOLOGY | Facility: CLINIC | Age: 77
DRG: 270 | End: 2022-06-20
Attending: PHYSICIAN ASSISTANT
Payer: MEDICARE

## 2022-06-20 LAB
ALBUMIN SERPL-MCNC: 1.8 G/DL (ref 3.4–5)
ALP SERPL-CCNC: 107 U/L (ref 40–150)
ALT SERPL W P-5'-P-CCNC: 27 U/L (ref 0–50)
ANION GAP SERPL CALCULATED.3IONS-SCNC: 3 MMOL/L (ref 3–14)
ANION GAP SERPL CALCULATED.3IONS-SCNC: 3 MMOL/L (ref 3–14)
AST SERPL W P-5'-P-CCNC: 37 U/L (ref 0–45)
BILIRUB DIRECT SERPL-MCNC: 0.1 MG/DL (ref 0–0.2)
BILIRUB SERPL-MCNC: 0.2 MG/DL (ref 0.2–1.3)
BUN SERPL-MCNC: 44 MG/DL (ref 7–30)
BUN SERPL-MCNC: 44 MG/DL (ref 7–30)
CALCIUM SERPL-MCNC: 8.4 MG/DL (ref 8.5–10.1)
CALCIUM SERPL-MCNC: 8.6 MG/DL (ref 8.5–10.1)
CHLORIDE BLD-SCNC: 102 MMOL/L (ref 94–109)
CHLORIDE BLD-SCNC: 105 MMOL/L (ref 94–109)
CO2 SERPL-SCNC: 32 MMOL/L (ref 20–32)
CO2 SERPL-SCNC: 33 MMOL/L (ref 20–32)
CREAT SERPL-MCNC: 1.12 MG/DL (ref 0.52–1.04)
CREAT SERPL-MCNC: 1.17 MG/DL (ref 0.52–1.04)
DIGOXIN SERPL-MCNC: 1.2 UG/L
ERYTHROCYTE [DISTWIDTH] IN BLOOD BY AUTOMATED COUNT: 18.2 % (ref 10–15)
GFR SERPL CREATININE-BSD FRML MDRD: 48 ML/MIN/1.73M2
GFR SERPL CREATININE-BSD FRML MDRD: 50 ML/MIN/1.73M2
GLUCOSE BLD-MCNC: 123 MG/DL (ref 70–99)
GLUCOSE BLD-MCNC: 98 MG/DL (ref 70–99)
GLUCOSE BLDC GLUCOMTR-MCNC: 89 MG/DL (ref 70–99)
GLUCOSE BLDC GLUCOMTR-MCNC: 91 MG/DL (ref 70–99)
HCT VFR BLD AUTO: 24.4 % (ref 35–47)
HGB BLD-MCNC: 7.2 G/DL (ref 11.7–15.7)
LACTATE SERPL-SCNC: 0.7 MMOL/L (ref 0.7–2)
MAGNESIUM SERPL-MCNC: 2.5 MG/DL (ref 1.6–2.3)
MCH RBC QN AUTO: 28.7 PG (ref 26.5–33)
MCHC RBC AUTO-ENTMCNC: 29.5 G/DL (ref 31.5–36.5)
MCV RBC AUTO: 97 FL (ref 78–100)
PLATELET # BLD AUTO: 384 10E3/UL (ref 150–450)
POTASSIUM BLD-SCNC: 4.6 MMOL/L (ref 3.4–5.3)
POTASSIUM BLD-SCNC: 5.3 MMOL/L (ref 3.4–5.3)
PROT SERPL-MCNC: 5.3 G/DL (ref 6.8–8.8)
RBC # BLD AUTO: 2.51 10E6/UL (ref 3.8–5.2)
SODIUM SERPL-SCNC: 138 MMOL/L (ref 133–144)
SODIUM SERPL-SCNC: 140 MMOL/L (ref 133–144)
WBC # BLD AUTO: 13.4 10E3/UL (ref 4–11)

## 2022-06-20 PROCEDURE — 250N000011 HC RX IP 250 OP 636: Performed by: PHYSICIAN ASSISTANT

## 2022-06-20 PROCEDURE — 74018 RADEX ABDOMEN 1 VIEW: CPT

## 2022-06-20 PROCEDURE — 90832 PSYTX W PT 30 MINUTES: CPT | Performed by: STUDENT IN AN ORGANIZED HEALTH CARE EDUCATION/TRAINING PROGRAM

## 2022-06-20 PROCEDURE — 83735 ASSAY OF MAGNESIUM: CPT | Performed by: THORACIC SURGERY (CARDIOTHORACIC VASCULAR SURGERY)

## 2022-06-20 PROCEDURE — 250N000011 HC RX IP 250 OP 636: Performed by: STUDENT IN AN ORGANIZED HEALTH CARE EDUCATION/TRAINING PROGRAM

## 2022-06-20 PROCEDURE — 97530 THERAPEUTIC ACTIVITIES: CPT | Mod: GP | Performed by: PHYSICAL THERAPIST

## 2022-06-20 PROCEDURE — 250N000013 HC RX MED GY IP 250 OP 250 PS 637: Performed by: THORACIC SURGERY (CARDIOTHORACIC VASCULAR SURGERY)

## 2022-06-20 PROCEDURE — 82310 ASSAY OF CALCIUM: CPT | Performed by: THORACIC SURGERY (CARDIOTHORACIC VASCULAR SURGERY)

## 2022-06-20 PROCEDURE — 36592 COLLECT BLOOD FROM PICC: CPT | Performed by: INTERNAL MEDICINE

## 2022-06-20 PROCEDURE — 36592 COLLECT BLOOD FROM PICC: CPT | Performed by: PHYSICIAN ASSISTANT

## 2022-06-20 PROCEDURE — 97110 THERAPEUTIC EXERCISES: CPT | Mod: GO | Performed by: OCCUPATIONAL THERAPIST

## 2022-06-20 PROCEDURE — 250N000013 HC RX MED GY IP 250 OP 250 PS 637

## 2022-06-20 PROCEDURE — 250N000011 HC RX IP 250 OP 636: Performed by: THORACIC SURGERY (CARDIOTHORACIC VASCULAR SURGERY)

## 2022-06-20 PROCEDURE — 82248 BILIRUBIN DIRECT: CPT | Performed by: PHYSICIAN ASSISTANT

## 2022-06-20 PROCEDURE — 74018 RADEX ABDOMEN 1 VIEW: CPT | Mod: 26 | Performed by: RADIOLOGY

## 2022-06-20 PROCEDURE — 36592 COLLECT BLOOD FROM PICC: CPT | Performed by: THORACIC SURGERY (CARDIOTHORACIC VASCULAR SURGERY)

## 2022-06-20 PROCEDURE — 250N000013 HC RX MED GY IP 250 OP 250 PS 637: Performed by: PHYSICIAN ASSISTANT

## 2022-06-20 PROCEDURE — 250N000013 HC RX MED GY IP 250 OP 250 PS 637: Performed by: STUDENT IN AN ORGANIZED HEALTH CARE EDUCATION/TRAINING PROGRAM

## 2022-06-20 PROCEDURE — 80162 ASSAY OF DIGOXIN TOTAL: CPT | Performed by: THORACIC SURGERY (CARDIOTHORACIC VASCULAR SURGERY)

## 2022-06-20 PROCEDURE — 97535 SELF CARE MNGMENT TRAINING: CPT | Mod: GO

## 2022-06-20 PROCEDURE — 120N000002 HC R&B MED SURG/OB UMMC

## 2022-06-20 PROCEDURE — 83605 ASSAY OF LACTIC ACID: CPT | Performed by: INTERNAL MEDICINE

## 2022-06-20 PROCEDURE — 85014 HEMATOCRIT: CPT | Performed by: THORACIC SURGERY (CARDIOTHORACIC VASCULAR SURGERY)

## 2022-06-20 RX ORDER — NYSTATIN 100000/ML
1000000 SUSPENSION, ORAL (FINAL DOSE FORM) ORAL 4 TIMES DAILY
Status: DISCONTINUED | OUTPATIENT
Start: 2022-06-20 | End: 2022-06-25 | Stop reason: HOSPADM

## 2022-06-20 RX ORDER — FUROSEMIDE 10 MG/ML
20 INJECTION INTRAMUSCULAR; INTRAVENOUS ONCE
Status: COMPLETED | OUTPATIENT
Start: 2022-06-20 | End: 2022-06-20

## 2022-06-20 RX ORDER — CALCIUM CARBONATE 500 MG/1
500 TABLET, CHEWABLE ORAL DAILY PRN
Status: DISCONTINUED | OUTPATIENT
Start: 2022-06-20 | End: 2022-06-25 | Stop reason: HOSPADM

## 2022-06-20 RX ADMIN — MIDODRINE HYDROCHLORIDE 20 MG: 5 TABLET ORAL at 08:16

## 2022-06-20 RX ADMIN — NYSTATIN 1000000 UNITS: 100000 SUSPENSION ORAL at 20:52

## 2022-06-20 RX ADMIN — APIXABAN 5 MG: 5 TABLET, FILM COATED ORAL at 20:48

## 2022-06-20 RX ADMIN — ACETAMINOPHEN 325MG 975 MG: 325 TABLET ORAL at 08:16

## 2022-06-20 RX ADMIN — NYSTATIN 1000000 UNITS: 100000 SUSPENSION ORAL at 11:21

## 2022-06-20 RX ADMIN — PROCHLORPERAZINE EDISYLATE 5 MG: 5 INJECTION INTRAMUSCULAR; INTRAVENOUS at 06:04

## 2022-06-20 RX ADMIN — VANCOMYCIN HYDROCHLORIDE 125 MG: KIT at 11:26

## 2022-06-20 RX ADMIN — Medication 1 PACKET: at 08:15

## 2022-06-20 RX ADMIN — FUROSEMIDE 20 MG: 20 TABLET ORAL at 17:57

## 2022-06-20 RX ADMIN — NYSTATIN 1000000 UNITS: 100000 SUSPENSION ORAL at 17:57

## 2022-06-20 RX ADMIN — ACETAMINOPHEN 325MG 975 MG: 325 TABLET ORAL at 17:57

## 2022-06-20 RX ADMIN — HYDROXYZINE HYDROCHLORIDE 50 MG: 25 TABLET ORAL at 11:21

## 2022-06-20 RX ADMIN — APIXABAN 5 MG: 5 TABLET, FILM COATED ORAL at 08:16

## 2022-06-20 RX ADMIN — FUROSEMIDE 20 MG: 10 INJECTION, SOLUTION INTRAMUSCULAR; INTRAVENOUS at 11:21

## 2022-06-20 RX ADMIN — MIDODRINE HYDROCHLORIDE 20 MG: 5 TABLET ORAL at 17:57

## 2022-06-20 RX ADMIN — Medication 15 ML: at 08:17

## 2022-06-20 RX ADMIN — ESCITALOPRAM OXALATE 10 MG: 10 TABLET ORAL at 08:16

## 2022-06-20 RX ADMIN — Medication 40 MG: at 08:17

## 2022-06-20 RX ADMIN — FUROSEMIDE 20 MG: 20 TABLET ORAL at 08:16

## 2022-06-20 RX ADMIN — ONDANSETRON 4 MG: 2 INJECTION INTRAMUSCULAR; INTRAVENOUS at 04:38

## 2022-06-20 RX ADMIN — OXYCODONE HYDROCHLORIDE 10 MG: 10 TABLET ORAL at 20:47

## 2022-06-20 RX ADMIN — LIDOCAINE PATCH 4% 1 PATCH: 40 PATCH TOPICAL at 08:16

## 2022-06-20 RX ADMIN — Medication 5 ML: at 09:51

## 2022-06-20 RX ADMIN — VANCOMYCIN HYDROCHLORIDE 125 MG: KIT at 20:52

## 2022-06-20 RX ADMIN — METHOCARBAMOL 500 MG: 500 TABLET ORAL at 20:48

## 2022-06-20 RX ADMIN — NYSTATIN 1000000 UNITS: 100000 SUSPENSION ORAL at 13:13

## 2022-06-20 RX ADMIN — Medication 10 ML: at 17:57

## 2022-06-20 RX ADMIN — HYDROXYZINE HYDROCHLORIDE 50 MG: 25 TABLET ORAL at 20:48

## 2022-06-20 RX ADMIN — CALCIUM CARBONATE (ANTACID) CHEW TAB 500 MG 500 MG: 500 CHEW TAB at 13:13

## 2022-06-20 RX ADMIN — Medication 10 MG: at 20:48

## 2022-06-20 RX ADMIN — VANCOMYCIN HYDROCHLORIDE 125 MG: KIT at 18:00

## 2022-06-20 RX ADMIN — VANCOMYCIN HYDROCHLORIDE 125 MG: KIT at 08:17

## 2022-06-20 ASSESSMENT — ACTIVITIES OF DAILY LIVING (ADL)
ADLS_ACUITY_SCORE: 36
ADLS_ACUITY_SCORE: 40
ADLS_ACUITY_SCORE: 36
ADLS_ACUITY_SCORE: 40
ADLS_ACUITY_SCORE: 36
ADLS_ACUITY_SCORE: 40
ADLS_ACUITY_SCORE: 40
ADLS_ACUITY_SCORE: 36
ADLS_ACUITY_SCORE: 36
ADLS_ACUITY_SCORE: 40

## 2022-06-20 NOTE — PROGRESS NOTES
Care Management Follow Up    Length of Stay (days): 26    Expected Discharge Date:  TBD     Concerns to be Addressed: Discharge planning  Patient plan of care discussed at interdisciplinary rounds: Yes    Anticipated Discharge Disposition: TCU       Anticipated Discharge Services:  Therapies  Anticipated Discharge DME:  TBD    Patient/family educated on Medicare website which has current:  facility and service quality ratings:  Yes  Education Provided on the Discharge Plan:  Yes  Patient/Family in Agreement with the Plan:  Yes    Referrals Placed by CM/SW:    EZEQUIEL: [place]: [address] [place] [address]  Phone: [phone]  Private pay costs discussed: Not applicable    Additional Information:  SW introduced himself to Pt and identified that he was there to follow up on TCU list a colleague had provided Pt with. Pt identified that daughter in law had the list. SW called daughter in law who requested a referral be placed with [facility] in [facility]. SW called EZEQUIEL and determined that EZEQUIEL is an [facility] with a shorter stay. SW faxed a referral to EZEQUIEL. RAMÓN will explore discharge transportation options with colleagues.     ________________    DESMOND Gilbert, LICSW  6D   [facility]  Phone: [phone]  Pager: [phone]  Fax: [phone]

## 2022-06-20 NOTE — PROGRESS NOTES
Cardiovascular Surgery Progress Note  06/20/2022         Assessment and Plan:     Debi Espinoza is a 76 year old female with PMH of HTN, nephrolithiasis, DVT (2019) and PE (2019, 2021) on chronic anticoagulation (Apixiban), and chronic thromboembolic pulmonary hypertension. 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  She started lifelong anticoagulation since her second PE earlier this year and is on Apixaban.  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  Debi is now s/p pulmonary artery thromboendarterectomy on 5/27 with Dr. Peterson.  She required multiple pressors post-op and briefly on milrinone. Extubated 6/1/22. Had difficulty with volume management with ongoing hypotension requiring pressors. Bilateral chest tubes were eventually placed for pleural effusion; right-sided chest tube removed 6/15/22. Currently she continues to recover and improve daily. Now able to wean down her O2 support gradually with slowly decreasing in her flow; aiming for O2 sat >92%.     Cardiovascular:   S/p Pulmonary artery thromboendarterectomy on 5/27  Hx Severe pulmonary hypertension  Hx RV dilation and reduced RV function  Hx Severe tricuspid insufficiency  Hx Essential HTN  Distributive shock  1st degree AV block  PTA regimen: furosemide 20mg qd, adempas 2.5mg TID, opsumit 10mg qd  Echo on 03/2021 with LVEF of 78%, severe pulmonary hypertension (82 mmHg), reduced RV function (TAPSE 16mm), severe tricuspid insufficiency.  Stayed down in ICU due to orthostatic hypotension episodes and slow weaning of pressors, required multiple fluid boluses. She left ICU on Midodrine 20 mg q 8 hours.   Had also required V-pacing for 1st degree AV block. Pacing needs have now resolved and TPW removed without incident.   Echo 6/2 showed LV EF 60-65% with moderate RV dilation with dysfunction  Echo 6/8 showed LV EF 65-70%, moderate  RV dilation with dysfunction  Digoxin loaded for RV support.  ASA NOT indicated. Statin not indicated.  Chest tubes: none  TPW: none    Pulmonary:  S/p Pulmonary artery thromboendarterectomy on 5/27/2022 by Dr. Peterson  Hx Chronic pulmonary thromboembolic disease  Hx PE (2019, 2021)  Hx Emphysema, moderate  Small right pleural effusion - seen on CT chest 6/9  S/p right pigtail catheter placement on 6/10  PTA regimen: apixaban 5mg BID  CTA 05/2019 demonstrated underlying moderate emphysema  Extubated 5/31  - Supplemental oxygen: stable on NC at 5 lpm, wean as tolerated. Refusing BiPAP at times.    - PTA Opsumit, hold Adempas  - 6/9 CT chest with small right pleural effusion  - 6/10 right pigtail catheter placement with IR, noted to be loculated -> continue for now    Neurology / MSK:  Sternotomy  - Neuro intact  - Acute post-operative pain well controlled with acetaminophen, PO oxycodone PRN, IV dilaudid PRN  - Psych consult 6/16, started Lexapro. Seroquel 12.5 / 25 mg BID with HS dosing     / Renal:  Nephrolithiasis  Hypernatremia  CKD  - Had PTA lasix 20 mg daily  - CKD reported in pre-op clinic visits. Most recent creatinine 1.17, adequate UOP.   - Pre-op weight 150 lbs.  Lasix 20 mg PO BID, but gave extra 20 mg IV 6/20.  - Strict Intake and Outputs    GI / FEN:   Post-op Dysphagia   - Speech following for diet advancement. Now on Regular diet, continue bowel regimen. Protein supplements as able.   - Strict I & O's.  - NJ with Cycled tube feeds since 6/16, free water flushes 30 mL q 4 hrs  - Replace electrolytes as needed, hepatic enzymes WNL.  Nausea  - Seems to be persistent throughout the day and not associated with tube feeds or medications (but possibly made worse by AM meds). Likely related to C diff.  - continue zofran and compazine prn    Endocrine:  Pre-op Hgb A1C 5.8%.  Stress induced hyperglycemia initially managed on insulin drip postop, transitioned to high resistance sliding scale and now weaned  off.    Infectious Disease:  Stress induced leukocytosis  - WBC 13.4, remains afebrile, no signs or symptoms of infection besides C diff.  - Completed perioperative antibiotics.  - S/p Meropenem (6/2-6/3) and Zosyn (6/3- 6/6)  - Emperic cefepime (6/10 - ), Vanc (6/10 - 6/13), stopped with neg MRSA swab  C-diff positive 6/11  - Continue PO vancomycin for C difficile colitis through 14 days (6/25)  Oral Thrush  - Nystatin for 7 days (6/20 - 6/26)    Hematology:   Acute blood loss anemia and thrombocytopenia  Hx DVT (2019), PE (2019, 2021) on chronic anticoagulation (Apixaban)  - Last transfusion 6/5. Has been stable 7->8 since   - Hgb 7.2; Plt 384, no signs or symptoms of active bleeding    Anticoagulation:   - ASA not needed per surgeon  - Apixiban for Hx of PE / CTEPH.       Prophylaxis:   - Stress ulcer prophylaxis: Pantoprazole 40 mg daily for 30 days  - DVT prophylaxis: Subcutaneous heparin, SCD    Disposition:   - Transferred to  on 6/17  - Therapies recommending discharge to TCU     Discussed with Dr Peterson through both written and verbal communication.      Andrew Drew PA-C  Cardiothoracic Surgery  Pager [phone]    7:50 AM   June 20, 2022        Interval History:     No overnight events. Still deconditioned but improving slowly, hoping to pivot to chair today.     States pain is well managed on current regimen. Slept well overnight.  Tolerating minimal PO intake and tube feeds, is passing flatus, + BM via rectal tube. No nausea or vomiting.  Breathing well without complaints on oxymask.   Working with therapies and requiring heavy assistance.   Denies chest pain, palpitations, dizziness, syncopal symptoms, fevers, chills, myalgias, or sternal popping/clicking.         Physical Exam:   Blood pressure 111/52, pulse 79, temperature 98.7  F (37.1  C), temperature source Oral, resp. rate 22, height 1.524 m (5'), weight 70.9 kg (156 lb 4.9 oz), SpO2 95 %, not currently breastfeeding.  Vitals:    06/18/22  0400 06/19/22 1100 06/20/22 0808   Weight: 68.5 kg (151 lb 0.2 oz) 73.2 kg (161 lb 6 oz) 70.9 kg (156 lb 4.9 oz)      Weight; + 6 lbs since admit and trending down.   24 hr Fluid status; net gain 337 mL.  mL with 2 unrecorded  MAPs: 70 - 82     Gen: A&Ox4, NAD  Neuro: no focal deficits   CV: RRR, normal S1 S2, no murmurs, rubs or gallops.   Pulm: CTA, no wheezing or rhonchi, normal breathing on 5 lpm  Abd: nondistended, normal BS, soft, nontender  Ext: trace fluffy peripheral edema, 1+ pitting  Incision: clean, dry, intact, no erythema, sternum stable  Tubes/drain sites: dressing clean and dry         Data:    Imaging:  reviewed recent imaging, no acute concerns    Labs:  BMP  Recent Labs   Lab 06/20/22  0959 06/19/22  1621 06/19/22  0703 06/18/22  0519 06/18/22  0514 06/17/22  2019 06/17/22  0454     --  143  --  142  --  141   POTASSIUM 5.3  --  4.7  --  4.6  --  4.8   CHLORIDE 105  --  106  --  106  --  105   CHELE 8.4*  --  8.9  --  9.1  --  8.7   CO2 32  --  32  --  33*  --  30   BUN 44*  --  48*  --  47*  --  49*   CR 1.17*  --  1.14*  --  1.21*  --  1.16*   GLC 98 137* 139* 126* 126*   < > 141*    < > = values in this interval not displayed.     CBC  Recent Labs   Lab 06/20/22  0959 06/19/22  0703 06/18/22  0514 06/17/22  0454   WBC 13.4* 12.2* 13.1* 14.0*   RBC 2.51* 2.57* 2.76* 2.76*   HGB 7.2* 7.5* 7.9* 7.9*   HCT 24.4* 25.1* 26.4* 26.4*   MCV 97 98 96 96   MCH 28.7 29.2 28.6 28.6   MCHC 29.5* 29.9* 29.9* 29.9*   RDW 18.2* 18.3* 18.5* 18.3*    405 456* 457*     INR  Recent Labs   Lab 06/15/22  0422 06/14/22  0423   INR 1.93* 1.90*      Hepatic Panel  No lab results found in last 7 days.  GLUCOSE:   Recent Labs   Lab 06/20/22  0959 06/19/22  1621 06/19/22  0703 06/18/22  0519 06/18/22  0514 06/18/22  0007   GLC 98 137* 139* 126* 126* 109*

## 2022-06-20 NOTE — PROGRESS NOTES
Calorie Count  Intake recorded for: 6/19  Total Kcals: 0 Total Protein: 0g  Kcals from Hospital Food: 0   Protein: 0g  Kcals from Outside Food (average):0 Protein: 0g  # Meals Recorded: no meals ordered from kitchen, no intake recorded  # Supplements Recorded: no intake recorded

## 2022-06-20 NOTE — PLAN OF CARE
Major Shift Events:  C/o nausea/stomach pain, gave prn IV zofran and compazine and BS hyperactive. Pain controlled w/scheduled tylenol. Started night on 7L via oxymask, now down to 5L via NC. Swabbed tongue and mouth and tongue is white and patchy. NJ w/TF running @ goal rate of 60 mL/hr from 5598-8142.  R CT site dressing CDI. Purewick in place w/good UOP and rectal tube patent w/no leaking.   Plan: continue to titrate O2 down as able and monitor rectal tube for output and leaking and encourage PO intake as tolerated.  For vital signs and complete assessments, please see documentation flowsheets.       Goal Outcome Evaluation:    Plan of Care Reviewed With: patient     Overall Patient Progress: no change

## 2022-06-20 NOTE — CONSULTS
Health Psychology                                                                                                                          Fanny Mckay, Ph.D., L.P [phone]  Ira Mendoza, Ph.D., L.P. [phone]  Pascale Roger, Ph.D, L..P. [phone]  Freya Sheikh, Ph.D., L.P. [phone]  Oli Coello, Ph.D., A.B.P.P., L.P. [phone]         Keeley Barrera, Ph.D., L.P. [phone]       Sonya Mclean, Ph.D., A.B.P.P., LP [phone]           [facility], 3rd Floor  [address] [place] [address]    Inpatient Health Psychology Consultation    Date of Service:  6/20/22    BACKGROUND:  Per EMR: Debi Espinoza is a 76 year old female with PMH of HTN, nephrolithiasis, DVT (2019) and PE (2019, 2021) on chronic anticoagulation (Apixiban), and chronic thromboembolic pulmonary hypertension. 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 Debi is now s/p pulmonary artery thromboendarterectomy on 5/27 with Dr. Peterson.  She required multiple pressors post-op and briefly on milrinone. Extubated 6/1/22. Currently she continues to recover and improve daily.     Health Psychology consulted by Dr. Ngo with psychiatry to offer support in context of prolonged admission. Psychiatry met with her yesterday, 6/14, and recommended Lexapro and Seroquel as options for managing symptoms of depression and anxiety.     SUBJECTIVE:  Met with Debi in her room to introduce Health Psychology services and discuss nature of referral.  Debi was initially ordering food when I entered.  We discussed recent GI symptoms and how she is not having a good appetite but is trying to eat anyway. She also said she was swallowing better today so she wanted to take advantage of that and eat. She attributed this to improved oral hygiene in the past days.     Reviewed Debi's emotional wellbeing in  the past days. She continues to experiences symptoms of depression related to loneliness/missing her family and her slow recovery. She described more feelings of hopelessness in days prior including questioning if she wanted to continue with treatments and her recovery. She said she is feeling slightly more hopeful now, denied suicidal ideation. Debi also shared that hearing distressing sounds and experiences of other patients in the hospital can contribute to her mood. She specifically referenced hearing a patient pass away which led her to reflect on her own mortality more.     Validated Debi's emotional experiences and conducted risk assessment. Discussed ways to cope with depressive symptoms and what she is looking forward to. She said her daughter-in-law is helping her look for a rehab facility closer to [place] so Debi can be closer to family.     Made plans to meet later in the week or early next week to offer support.       OBJECTIVE:  Debi was sitting upright in her chair during our visit. She reported low mood, affect was flat. Eye contact inconsistent. Thought processes logical and linear although difficult to assess cognition, uncertain if events occurred or not (I.e. hearing/witnessing another patient pass, having teeth brushed by nursing) although they are possible.Speech quiet of normal rate and rhythm. Denied active suicidal ideation.        ASSESSMENT:  Debi has a history of depressive symptoms and anxiety secondary to medical issues. She is currently experiencing more depressive symptoms than anxiety related to slow progress of recovery yet reports motivation to continue with her recovery and discharge to a rehab facility closer to her home. Uncertain of her cognitive status as of now, additional assessment warranted.      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    Pascale Roger, PhD,   Clinical Health Psychologist  Phone: [phone]  Pager: [phone]      Time in: 3:15  Time out: 3:45

## 2022-06-21 ENCOUNTER — APPOINTMENT (OUTPATIENT)
Dept: PHYSICAL THERAPY | Facility: CLINIC | Age: 77
DRG: 270 | End: 2022-06-21
Attending: INTERNAL MEDICINE
Payer: MEDICARE

## 2022-06-21 LAB
ABO/RH(D): NORMAL
ANION GAP SERPL CALCULATED.3IONS-SCNC: 3 MMOL/L (ref 3–14)
ANTIBODY SCREEN: NEGATIVE
BLD PROD TYP BPU: NORMAL
BLOOD COMPONENT TYPE: NORMAL
BUN SERPL-MCNC: 42 MG/DL (ref 7–30)
CALCIUM SERPL-MCNC: 8.6 MG/DL (ref 8.5–10.1)
CHLORIDE BLD-SCNC: 102 MMOL/L (ref 94–109)
CO2 SERPL-SCNC: 34 MMOL/L (ref 20–32)
CODING SYSTEM: NORMAL
CREAT SERPL-MCNC: 1.12 MG/DL (ref 0.52–1.04)
CROSSMATCH: NORMAL
DIGOXIN SERPL-MCNC: 1.1 UG/L
ERYTHROCYTE [DISTWIDTH] IN BLOOD BY AUTOMATED COUNT: 18 % (ref 10–15)
GFR SERPL CREATININE-BSD FRML MDRD: 50 ML/MIN/1.73M2
GLUCOSE BLD-MCNC: 116 MG/DL (ref 70–99)
GLUCOSE BLDC GLUCOMTR-MCNC: 116 MG/DL (ref 70–99)
HCT VFR BLD AUTO: 25.2 % (ref 35–47)
HGB BLD-MCNC: 7.4 G/DL (ref 11.7–15.7)
HOLD SPECIMEN: NORMAL
ISSUE DATE AND TIME: NORMAL
MAGNESIUM SERPL-MCNC: 2.4 MG/DL (ref 1.6–2.3)
MCH RBC QN AUTO: 28.1 PG (ref 26.5–33)
MCHC RBC AUTO-ENTMCNC: 29.4 G/DL (ref 31.5–36.5)
MCV RBC AUTO: 96 FL (ref 78–100)
PLATELET # BLD AUTO: 359 10E3/UL (ref 150–450)
POTASSIUM BLD-SCNC: 5.2 MMOL/L (ref 3.4–5.3)
RBC # BLD AUTO: 2.63 10E6/UL (ref 3.8–5.2)
SODIUM SERPL-SCNC: 139 MMOL/L (ref 133–144)
SPECIMEN EXPIRATION DATE: NORMAL
UNIT ABO/RH: NORMAL
UNIT NUMBER: NORMAL
UNIT STATUS: NORMAL
UNIT TYPE ISBT: 6200
WBC # BLD AUTO: 11.1 10E3/UL (ref 4–11)

## 2022-06-21 PROCEDURE — 250N000013 HC RX MED GY IP 250 OP 250 PS 637: Performed by: STUDENT IN AN ORGANIZED HEALTH CARE EDUCATION/TRAINING PROGRAM

## 2022-06-21 PROCEDURE — 86923 COMPATIBILITY TEST ELECTRIC: CPT | Performed by: PHYSICIAN ASSISTANT

## 2022-06-21 PROCEDURE — 36592 COLLECT BLOOD FROM PICC: CPT | Performed by: THORACIC SURGERY (CARDIOTHORACIC VASCULAR SURGERY)

## 2022-06-21 PROCEDURE — 80162 ASSAY OF DIGOXIN TOTAL: CPT | Performed by: THORACIC SURGERY (CARDIOTHORACIC VASCULAR SURGERY)

## 2022-06-21 PROCEDURE — 250N000011 HC RX IP 250 OP 636: Performed by: STUDENT IN AN ORGANIZED HEALTH CARE EDUCATION/TRAINING PROGRAM

## 2022-06-21 PROCEDURE — P9016 RBC LEUKOCYTES REDUCED: HCPCS | Performed by: PHYSICIAN ASSISTANT

## 2022-06-21 PROCEDURE — 83735 ASSAY OF MAGNESIUM: CPT | Performed by: THORACIC SURGERY (CARDIOTHORACIC VASCULAR SURGERY)

## 2022-06-21 PROCEDURE — 120N000002 HC R&B MED SURG/OB UMMC

## 2022-06-21 PROCEDURE — 80048 BASIC METABOLIC PNL TOTAL CA: CPT | Performed by: THORACIC SURGERY (CARDIOTHORACIC VASCULAR SURGERY)

## 2022-06-21 PROCEDURE — 86850 RBC ANTIBODY SCREEN: CPT | Performed by: PHYSICIAN ASSISTANT

## 2022-06-21 PROCEDURE — 250N000013 HC RX MED GY IP 250 OP 250 PS 637

## 2022-06-21 PROCEDURE — 250N000013 HC RX MED GY IP 250 OP 250 PS 637: Performed by: PHYSICIAN ASSISTANT

## 2022-06-21 PROCEDURE — 250N000013 HC RX MED GY IP 250 OP 250 PS 637: Performed by: THORACIC SURGERY (CARDIOTHORACIC VASCULAR SURGERY)

## 2022-06-21 PROCEDURE — 86901 BLOOD TYPING SEROLOGIC RH(D): CPT | Performed by: PHYSICIAN ASSISTANT

## 2022-06-21 PROCEDURE — 250N000011 HC RX IP 250 OP 636: Performed by: PHYSICIAN ASSISTANT

## 2022-06-21 PROCEDURE — 85027 COMPLETE CBC AUTOMATED: CPT | Performed by: THORACIC SURGERY (CARDIOTHORACIC VASCULAR SURGERY)

## 2022-06-21 PROCEDURE — 97530 THERAPEUTIC ACTIVITIES: CPT | Mod: GP | Performed by: PHYSICAL THERAPIST

## 2022-06-21 RX ORDER — DRONABINOL 2.5 MG/1
5 CAPSULE ORAL 2 TIMES DAILY
Status: DISCONTINUED | OUTPATIENT
Start: 2022-06-21 | End: 2022-06-25 | Stop reason: HOSPADM

## 2022-06-21 RX ORDER — FUROSEMIDE 10 MG/ML
20 INJECTION INTRAMUSCULAR; INTRAVENOUS ONCE
Status: COMPLETED | OUTPATIENT
Start: 2022-06-21 | End: 2022-06-21

## 2022-06-21 RX ADMIN — FUROSEMIDE 20 MG: 10 INJECTION, SOLUTION INTRAMUSCULAR; INTRAVENOUS at 13:01

## 2022-06-21 RX ADMIN — ACETAMINOPHEN 325MG 975 MG: 325 TABLET ORAL at 23:28

## 2022-06-21 RX ADMIN — OXYCODONE HYDROCHLORIDE 5 MG: 5 TABLET ORAL at 16:50

## 2022-06-21 RX ADMIN — FUROSEMIDE 20 MG: 20 TABLET ORAL at 16:45

## 2022-06-21 RX ADMIN — DRONABINOL 5 MG: 2.5 CAPSULE ORAL at 20:06

## 2022-06-21 RX ADMIN — FUROSEMIDE 20 MG: 20 TABLET ORAL at 08:32

## 2022-06-21 RX ADMIN — OXYCODONE HYDROCHLORIDE 10 MG: 10 TABLET ORAL at 02:41

## 2022-06-21 RX ADMIN — APIXABAN 5 MG: 5 TABLET, FILM COATED ORAL at 20:06

## 2022-06-21 RX ADMIN — VANCOMYCIN HYDROCHLORIDE 125 MG: KIT at 08:31

## 2022-06-21 RX ADMIN — OXYCODONE HYDROCHLORIDE 5 MG: 5 TABLET ORAL at 22:04

## 2022-06-21 RX ADMIN — Medication 1 PACKET: at 08:30

## 2022-06-21 RX ADMIN — DRONABINOL 5 MG: 2.5 CAPSULE ORAL at 08:30

## 2022-06-21 RX ADMIN — MIDODRINE HYDROCHLORIDE 20 MG: 5 TABLET ORAL at 00:24

## 2022-06-21 RX ADMIN — Medication 10 MG: at 20:06

## 2022-06-21 RX ADMIN — Medication 10 ML: at 16:45

## 2022-06-21 RX ADMIN — ACETAMINOPHEN 325MG 975 MG: 325 TABLET ORAL at 00:03

## 2022-06-21 RX ADMIN — ACETAMINOPHEN 325MG 975 MG: 325 TABLET ORAL at 08:31

## 2022-06-21 RX ADMIN — NYSTATIN 1000000 UNITS: 100000 SUSPENSION ORAL at 13:00

## 2022-06-21 RX ADMIN — Medication 40 MG: at 08:31

## 2022-06-21 RX ADMIN — MIDODRINE HYDROCHLORIDE 20 MG: 5 TABLET ORAL at 23:27

## 2022-06-21 RX ADMIN — NYSTATIN 1000000 UNITS: 100000 SUSPENSION ORAL at 08:31

## 2022-06-21 RX ADMIN — ACETAMINOPHEN 325MG 975 MG: 325 TABLET ORAL at 16:45

## 2022-06-21 RX ADMIN — ESCITALOPRAM OXALATE 10 MG: 10 TABLET ORAL at 08:32

## 2022-06-21 RX ADMIN — Medication 15 ML: at 08:31

## 2022-06-21 RX ADMIN — NYSTATIN 1000000 UNITS: 100000 SUSPENSION ORAL at 20:06

## 2022-06-21 RX ADMIN — LIDOCAINE PATCH 4% 1 PATCH: 40 PATCH TOPICAL at 20:06

## 2022-06-21 RX ADMIN — APIXABAN 5 MG: 5 TABLET, FILM COATED ORAL at 08:32

## 2022-06-21 RX ADMIN — NYSTATIN 1000000 UNITS: 100000 SUSPENSION ORAL at 16:44

## 2022-06-21 RX ADMIN — MIDODRINE HYDROCHLORIDE 20 MG: 5 TABLET ORAL at 16:44

## 2022-06-21 RX ADMIN — MIDODRINE HYDROCHLORIDE 20 MG: 5 TABLET ORAL at 08:31

## 2022-06-21 RX ADMIN — OXYCODONE HYDROCHLORIDE 5 MG: 5 TABLET ORAL at 12:02

## 2022-06-21 ASSESSMENT — ACTIVITIES OF DAILY LIVING (ADL)
ADLS_ACUITY_SCORE: 34
ADLS_ACUITY_SCORE: 36
ADLS_ACUITY_SCORE: 34
ADLS_ACUITY_SCORE: 36
ADLS_ACUITY_SCORE: 34
ADLS_ACUITY_SCORE: 34

## 2022-06-21 NOTE — PROGRESS NOTES
Care Management Follow Up    Length of Stay (days): 27    Expected Discharge Date:  6/23     Concerns to be Addressed: Discharge Planning  Patient plan of care discussed at interdisciplinary rounds: Yes    Anticipated Discharge Disposition:  ARU or TCU     Anticipated Discharge Services:  Therapies  Anticipated Discharge DME: TBD    Patient/family educated on Medicare website which has current facility and service quality ratings:  Yes  Education Provided on the Discharge Plan:  Yes  Patient/Family in Agreement with the Plan:  Yes    [facility] CM[facility]SW:    [facility] [address] [place] [id number]  Phone: [phone]  Private pay costs discussed: Not applicable    Additional Information:  SW spoke with [facility] admission and Pt's family. Per provider Pt will be discharge ready Thursday or Friday. [facility] may have bed Thursday Morning. Writer provided Pt's daughter in law with quote for EMS stretcher ride of roughly $7814 and validated that family transportation in family car would be more economically viable. SW to follow up with PT to see if they support ARU vs TCU.   ________________    DESMOND Gilbert, LICSW  6D   [facility]  Phone: [phone]  Pager: [phone]  Fax: [phone]

## 2022-06-21 NOTE — PROGRESS NOTES
CLINICAL NUTRITION SERVICES - REASSESSMENT NOTE     Nutrition Prescription    RECOMMENDATIONS FOR MDs/PROVIDERS TO ORDER:  Recommend continuing EN until PO intake meeting 70% minimum estimated nutrition needs (900 kcal and 45g PRO) x2-3 days.     Malnutrition Status:    Moderate malnutrition in the context of acute illness    Recommendations already ordered by Registered Dietitian (RD):  - Continue current EN: Osmolite 1.5 Mayur @ goal of 60 ml/hr x 12 hours ( 720 ml/day) + 1 pkt prosource   - Continue Ensure Enlive order    Future/Additional Recommendations:  Monitor tolerance of EN and PO intake      EVALUATION OF THE PROGRESS TOWARD GOALS   Diet: Regular, room service with assist, mayur counts 6/20-6/23  Ensure Enlive- Please send strawberry once daily @ 2pm  Nutrition Support: Osmolite 1.5 Mayur @ goal of 60 ml/hr x 12 hours ( 720 ml/day) + 1 pkt prosource  will provide: 1120 kcals (21kcal/kg), 56 g PRO (1.1 gm/kg), 547 ml free H20, 147g CHO, and 0 g fiber daily.  This provides 75% estimated kcal/protein needs     EN Intake: 7 day average EN intake of 743 mL/day + 1 pkt Prosource/day. Providing 1155 kcal (22 kcal/kg) and 58g PRO (1.1 g/kg). Meets 89% estimated energy needs and 89% estimated protein needs.     PO intake: Debi reports that her appetite is okay, thinks she could eat 2 meals and 1 snack per day. Reports that she continues to drink Ensure Enlive.   6/20       Total Kcals: 379       Total Protein: 5g  6/19       Total Kcals: 0           Total Protein: 0g   6/18       Total Kcals: 0           Total Protein: 0g      NEW FINDINGS   Weight: 71.1 kg on 6/21, weight variable since admit likely due to fluid shifts.     Labs:   Cr 1.12 (H)  K+ 5.2 (WNL)  Mg 2.4 (H)    Meds:   Marinol BID- started 6/21  Lasix  Liquid multivitamin   Protonix    GI: Rectal tube, output of 150-650 mL/day    MALNUTRITION  % Intake: No decreased intake noted- EN  % Weight Loss: None noted  Subcutaneous Fat Loss: Facial region, Upper arm,  and Thoracic/intercostal: all mild  Muscle Loss: Temporal:  mild, Upper arm (bicep, tricep):  Mild to moderate and Lower arm  (forearm):  Mild.  LE difficult to assess with edema and wraps  Fluid Accumulation/Edema: Moderate  Malnutrition Diagnosis: Moderate malnutrition in the context of acute illness    Previous Goals   Total avg nutritional intake to meet a minimum of 25 kcal/kg and 1.2 g PRO/kg daily (per dosing wt 52 kg).  Evaluation: Met    Previous Nutrition Diagnosis  Inadequate oral intake related to poor appetite, weakness, resp status as evidenced by only taking sips of ensure with continuous EN via NJT providing 100% nutrition    Evaluation: No change    CURRENT NUTRITION DIAGNOSIS  Inadequate oral intake related to okay appetite as evidenced by pt eating small meals and reliant on EN and PO intake to meet 100% nutrition needs    INTERVENTIONS  Implementation  Enteral Nutrition - Continue as ordered   Medical food supplement therapy - Continue as ordered     Goals  Total avg nutritional intake to meet a minimum of 25 kcal/kg and 1.2 g PRO/kg daily (per dosing wt 52 kg).    Monitoring/Evaluation  Progress toward goals will be monitored and evaluated per protocol.    Candida Patrick, RD, LD  6D RD pager [phone]  Weekend/ED RD pager [phone]

## 2022-06-21 NOTE — PROGRESS NOTES
Calorie Count  Intake recorded for: 6/20  Total Kcals: 379 Total Protein: 5g  Kcals from Hospital Food: 379  Protein: 5g  Kcals from Outside Food (average):0 Protein: 0g  # Meals Ordered from Kitchen: 1 meal   # Meals Recorded: 1 meal (First - 100% orange juice, coffee, mashed potatoes w/ gravy & butter, jello)  # Supplements Recorded: 0

## 2022-06-21 NOTE — PROGRESS NOTES
Cardiovascular Surgery Progress Note  06/21/2022         Assessment and Plan:     Debi Espinoza is a 76 year old female with PMH of HTN, nephrolithiasis, DVT (2019) and PE (2019, 2021) on chronic anticoagulation (Apixiban), and chronic thromboembolic pulmonary hypertension. 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  She started lifelong anticoagulation since her second PE earlier this year and is on Apixaban.  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  Debi is now s/p pulmonary artery thromboendarterectomy on 5/27 with Dr. Peterson.  She required multiple pressors post-op and briefly on milrinone. Extubated 6/1/22. Had difficulty with volume management with ongoing hypotension requiring pressors. Bilateral chest tubes were eventually placed for pleural effusion; right-sided chest tube removed 6/15/22. Currently she continues to recover and improve daily. Now able to wean down her O2 support gradually with slowly decreasing in her flow; aiming for O2 sat >92%.      Cardiovascular:   S/p Pulmonary artery thromboendarterectomy on 5/27  Hx Severe pulmonary hypertension  Hx RV dilation and reduced RV function  Hx Severe tricuspid insufficiency  Hx Essential HTN  Distributive shock  1st degree AV block  PTA regimen: furosemide 20mg qd, adempas 2.5mg TID, opsumit 10mg qd  Echo on 03/2021 with LVEF of 78%, severe pulmonary hypertension (82 mmHg), reduced RV function (TAPSE 16mm), severe tricuspid insufficiency.  Stayed down in ICU due to orthostatic hypotension episodes and slow weaning of pressors, required multiple fluid boluses. She left ICU on Midodrine 20 mg q 8 hours.   Had also required V-pacing for 1st degree AV block. Pacing needs have now resolved and TPW removed without incident.   Echo 6/2 showed LV EF 60-65% with moderate RV dilation with dysfunction  Echo 6/8 showed LV EF 65-70%,  moderate RV dilation with dysfunction  Digoxin loaded for RV support.  ASA NOT indicated. Statin not indicated.  Chest tubes: none  TPW: none     Pulmonary:  S/p Pulmonary artery thromboendarterectomy on 5/27/2022 by Dr. Peterson  Hx Chronic pulmonary thromboembolic disease  Hx PE (2019, 2021)  Hx Emphysema, moderate  Small right pleural effusion - seen on CT chest 6/9  S/p right pigtail catheter placement on 6/10  PTA regimen: apixaban 5mg BID  CTA 05/2019 demonstrated underlying moderate emphysema  Extubated 5/31  - Supplemental oxygen: stable on NC at 5 lpm, wean as tolerated. Refusing BiPAP at times.    - PTA Opsumit, hold Adempas  - 6/9 CT chest with small right pleural effusion  - 6/10 right pigtail catheter placement with IR, noted to be loculated -> continue for now     Neurology / MSK:  Sternotomy  - Neuro intact  - Acute post-operative pain well controlled with acetaminophen, PO oxycodone PRN, IV dilaudid PRN  - Psych consult 6/16, started Lexapro. Seroquel 12.5 / 25 mg BID with HS dosing      / Renal:  Nephrolithiasis  Hypernatremia  CKD  - Had PTA lasix 20 mg daily  - CKD reported in pre-op clinic visits. Most recent creatinine 1.17, adequate UOP.   - Pre-op weight 150 lbs.  Lasix 20 mg PO BID, but gave extra 20 mg IV 6/20 and 6/21.  - Strict Intake and Outputs.     GI / FEN:   Post-op Dysphagia   - Speech following for diet advancement. Now on Regular diet, continue bowel regimen. Protein supplements as able.   - Strict I & O's.  - NJ with Cycled tube feeds since 6/16, free water flushes 30 mL q 4 hrs  - Replace electrolytes as needed, hepatic enzymes WNL.  - Marinol 5 mg PO BID since 6/21  Nausea  - Seems to be persistent throughout the day and not associated with tube feeds or medications (but possibly made worse by AM meds). Likely related to C diff.  - continue zofran and compazine prn     Endocrine:  Pre-op Hgb A1C 5.8%.  Stress induced hyperglycemia initially managed on insulin drip postop,  transitioned to high resistance sliding scale and now weaned off.     Infectious Disease:  Stress induced leukocytosis  - WBC 11.1, remains afebrile, no signs or symptoms of infection besides C diff.  - Completed perioperative antibiotics.  - S/p Meropenem (6/2-6/3) and Zosyn (6/3- 6/6).  - Emperic cefepime (6/10 - 6/17), Vanc (6/10 - 6/13), stopped with neg MRSA swab.  C-diff positive 6/11  - Continue PO vancomycin for C difficile colitis through 10 days (6/21).  Oral Thrush   - Nystatin for 7 days (6/20 - 6/26)     Hematology:   Acute blood loss anemia and thrombocytopenia  Hx DVT (2019), PE (2019, 2021) on chronic anticoagulation (Apixaban)  - Last transfusion 6/5. Has been stable 7 - 8 since but overall trending down over past few days.   - Giving 1 unit PRBC on 6/21.   - Hgb 7.4; Plt WNL, no signs or symptoms of active bleeding     Anticoagulation:   - ASA not needed per surgeon  - Apixiban for Hx of PE / CTEPH.        Prophylaxis:   - Stress ulcer prophylaxis: Pantoprazole 40 mg daily for 30 days  - DVT prophylaxis: Subcutaneous heparin, SCD     Disposition:   - Transferred to  on 6/17  - Therapies recommending discharge to TCU     Discussed with Dr Peterson through written communication.      Andrew Drew PA-C  Cardiothoracic Surgery  Pager [phone]    7:23 AM   June 21, 2022        Interval History:     No overnight events.  Less abdominal discomfort.   States pain is well managed on current regimen. Slept better overnight.  Tolerating diet (ate more yesterday) and tube feeds, is passing flatus, + BM via rectal tube. No nausea or vomiting.  Breathing well without complaints.   Working with therapies and requires heavy assistance to chair  Denies chest pain, palpitations, dizziness, syncopal symptoms, fevers, chills, myalgias, or sternal popping/clicking.         Physical Exam:   Blood pressure 102/50, pulse 66, temperature 98.2  F (36.8  C), temperature source Oral, resp. rate 12, height 1.524 m (5'),  weight 71.1 kg (156 lb 12 oz), SpO2 94 %, not currently breastfeeding.  Vitals:    06/19/22 1100 06/20/22 0808 06/21/22 0535   Weight: 73.2 kg (161 lb 6 oz) 70.9 kg (156 lb 4.9 oz) 71.1 kg (156 lb 12 oz)      Weight; + 6 lbs since admit and trending up and down. Only getting bed scales.  24 hr Fluid status; net loss 1.2 L. UOP 2.2 L  MAPs: 66 - 83    Gen: A&Ox4, NAD  Neuro: no focal deficits   CV: RRR, normal S1 S2, no murmurs, rubs or gallops.   Pulm: CTA, no wheezing or rhonchi, normal breathing on 5 lpm  Abd: nondistended, normal BS, soft, nontender  Ext: moderate fluffy peripheral edema, compression stockings in place  Incision: clean, dry, intact, no erythema, sternum stable  Tubes/drain sites: dressing clean and dry         Data:    Imaging:  reviewed recent imaging, no acute concerns, Abd Xray 6/20 was without acute concerns.    Labs:  BMP  Recent Labs   Lab 06/20/22  1934 06/20/22  1702 06/20/22  1430 06/20/22  0959 06/19/22  1621 06/19/22  0703 06/18/22  0519 06/18/22  0514   NA  --   --  138 140  --  143  --  142   POTASSIUM  --   --  4.6 5.3  --  4.7  --  4.6   CHLORIDE  --   --  102 105  --  106  --  106   CHELE  --   --  8.6 8.4*  --  8.9  --  9.1   CO2  --   --  33* 32  --  32  --  33*   BUN  --   --  44* 44*  --  48*  --  47*   CR  --   --  1.12* 1.17*  --  1.14*  --  1.21*   GLC 89 91 123* 98   < > 139*   < > 126*    < > = values in this interval not displayed.     CBC  Recent Labs   Lab 06/21/22  0638 06/20/22  0959 06/19/22  0703 06/18/22  0514   WBC 11.1* 13.4* 12.2* 13.1*   RBC 2.63* 2.51* 2.57* 2.76*   HGB 7.4* 7.2* 7.5* 7.9*   HCT 25.2* 24.4* 25.1* 26.4*   MCV 96 97 98 96   MCH 28.1 28.7 29.2 28.6   MCHC 29.4* 29.5* 29.9* 29.9*   RDW 18.0* 18.2* 18.3* 18.5*    384 405 456*     INR  Recent Labs   Lab 06/15/22  0422   INR 1.93*      Hepatic Panel  Recent Labs   Lab 06/20/22  0959   AST 37   ALT 27   ALKPHOS 107   BILITOTAL 0.2   ALBUMIN 1.8*     GLUCOSE:   Recent Labs   Lab 06/20/22  1934  06/20/22  1702 06/20/22  1430 06/20/22  0959 06/19/22  1621 06/19/22  0703   GLC 89 91 123* 98 137* 139*

## 2022-06-21 NOTE — PLAN OF CARE
Problem: Activity Intolerance (Cardiovascular Surgery)  Goal: Improved Activity Tolerance  Outcome: Ongoing, Progressing     Problem: Postoperative Urinary Retention (Cardiovascular Surgery)  Goal: Effective Urinary Elimination (Cardiovascular Surgery)  Outcome: Ongoing, Progressing     Problem: Plan of Care - These are the overarching goals to be used throughout the patient stay.    Goal: Absence of Hospital-Acquired Illness or Injury  Intervention: Identify and Manage Fall Risk  Recent Flowsheet Documentation  Taken 6/21/2022 1200 by Tanya Dowell RN  Safety Promotion/Fall Prevention:   activity supervised   clutter free environment maintained   fall prevention program maintained   lighting adjusted   room door open  Taken 6/21/2022 0800 by Tanya Dowell RN  Safety Promotion/Fall Prevention:   activity supervised   clutter free environment maintained   fall prevention program maintained   lighting adjusted   room door open  Intervention: Prevent Skin Injury  Recent Flowsheet Documentation  Taken 6/21/2022 1537 by Tanya Dowell RN  Body Position: position changed independently  Taken 6/21/2022 1233 by Tanya Dowell RN  Body Position: position changed independently  Taken 6/21/2022 1200 by Tanya Dowell RN  Body Position: position changed independently  Taken 6/21/2022 0809 by Tanya Dowell RN  Body Position:   right   turned   upper extremity elevated  Taken 6/21/2022 0800 by Tanya Dowell RN  Body Position: position changed independently  Intervention: Prevent and Manage VTE (Venous Thromboembolism) Risk  Recent Flowsheet Documentation  Taken 6/21/2022 1641 by Tanya Dowell RN  Activity Management:   activity adjusted per tolerance   activity encouraged  Taken 6/21/2022 1537 by Tanya Dowell RN  Activity Management:   activity adjusted per tolerance   activity encouraged  Taken 6/21/2022 1233 by Tanya Dowell RN  Activity Management:   activity adjusted per tolerance   activity encouraged  Taken 6/21/2022 1200  by Tanya Dowell RN  Range of Motion: active ROM (range of motion) encouraged  VTE Prevention/Management: (eliquis)   compression stockings on   other (see comments)  Activity Management: activity adjusted per tolerance  Taken 6/21/2022 0809 by Tanya Dowell RN  Activity Management:   activity adjusted per tolerance   activity encouraged  Taken 6/21/2022 0800 by Tanya Dowell RN  Range of Motion: active ROM (range of motion) encouraged  VTE Prevention/Management: (eliquis)   compression stockings on   other (see comments)  Activity Management: activity adjusted per tolerance  Intervention: Prevent Infection  Recent Flowsheet Documentation  Taken 6/21/2022 1200 by Tanya Dowell RN  Infection Prevention:   hand hygiene promoted   rest/sleep promoted   single patient room provided  Taken 6/21/2022 0800 by Tanya Dowell RN  Infection Prevention:   hand hygiene promoted   rest/sleep promoted   single patient room provided  Goal: Optimal Comfort and Wellbeing  Intervention: Monitor Pain and Promote Comfort  Recent Flowsheet Documentation  Taken 6/21/2022 1537 by Tanya Dowell RN  Pain Management Interventions: repositioned  Taken 6/21/2022 0809 by Tanya Dowell RN  Pain Management Interventions:   medication (see MAR)   repositioned  Intervention: Provide Person-Centered Care  Recent Flowsheet Documentation  Taken 6/21/2022 1200 by Tanya Dowell RN  Trust Relationship/Rapport:   care explained   choices provided   emotional support provided   empathic listening provided   questions answered   questions encouraged   thoughts/feelings acknowledged  Taken 6/21/2022 0800 by Tanya Dowell RN  Trust Relationship/Rapport:   care explained   choices provided   emotional support provided   empathic listening provided   questions answered   questions encouraged   thoughts/feelings acknowledged     Problem: COPD (Chronic Obstructive Pulmonary Disease) Comorbidity  Goal: Maintenance of COPD Symptom Control  Intervention: Maintain  COPD-Symptom Control  Recent Flowsheet Documentation  Taken 6/21/2022 1200 by Tanya Dowell RN  Medication Review/Management: medications reviewed  Taken 6/21/2022 0800 by Tanya Dowell RN  Medication Review/Management: medications reviewed     Problem: Gas Exchange Impaired  Goal: Optimal Gas Exchange  Intervention: Optimize Oxygenation and Ventilation  Recent Flowsheet Documentation  Taken 6/21/2022 1537 by Tanya Dowell RN  Head of Bed (HOB) Positioning: HOB at 30-45 degrees  Taken 6/21/2022 1233 by Tanya Dowell RN  Head of Bed (HOB) Positioning: HOB at 30-45 degrees  Taken 6/21/2022 0809 by Tanya Dowell RN  Head of Bed (HOB) Positioning: HOB at 30 degrees     Problem: Cerebral Tissue Perfusion (Cardiovascular Surgery)  Goal: Optimal Cerebral Tissue Perfusion (Cardiovascular Surgery)  Intervention: Protect and Optimize Cerebral Perfusion  Recent Flowsheet Documentation  Taken 6/21/2022 1537 by Tanya Dowell RN  Head of Bed (HOB) Positioning: HOB at 30-45 degrees  Taken 6/21/2022 1233 by Tanya Dowell RN  Head of Bed (HOB) Positioning: HOB at 30-45 degrees  Taken 6/21/2022 0809 by Tanya Dowell RN  Head of Bed (HOB) Positioning: HOB at 30 degrees     Problem: Infection (Cardiovascular Surgery)  Goal: Absence of Infection Signs and Symptoms  Intervention: Prevent or Manage Infection  Recent Flowsheet Documentation  Taken 6/21/2022 1200 by Tanya Dowell RN  Infection Prevention:   hand hygiene promoted   rest/sleep promoted   single patient room provided  Taken 6/21/2022 0800 by Tanya Dowell RN  Infection Prevention:   hand hygiene promoted   rest/sleep promoted   single patient room provided     Problem: Ongoing Anesthesia Effects (Cardiovascular Surgery)  Goal: Anesthesia/Sedation Recovery  Intervention: Optimize Anesthesia Recovery  Recent Flowsheet Documentation  Taken 6/21/2022 1200 by Tanya Dowell RN  Safety Promotion/Fall Prevention:   activity supervised   clutter free environment maintained   fall  prevention program maintained   lighting adjusted   room door open  Taken 6/21/2022 0800 by Tanya Dowell RN  Safety Promotion/Fall Prevention:   activity supervised   clutter free environment maintained   fall prevention program maintained   lighting adjusted   room door open     Problem: Pain (Cardiovascular Surgery)  Goal: Acceptable Pain Control  Intervention: Prevent or Manage Pain  Recent Flowsheet Documentation  Taken 6/21/2022 1537 by Tanya Dowell RN  Pain Management Interventions: repositioned  Taken 6/21/2022 0809 by Tanya Dowell RN  Pain Management Interventions:   medication (see MAR)   repositioned     Problem: Respiratory Compromise (Cardiovascular Surgery)  Goal: Effective Oxygenation and Ventilation (Cardiovascular Surgery)  Intervention: Promote Airway Secretion Clearance  Recent Flowsheet Documentation  Taken 6/21/2022 1200 by Tanya Dowell RN  Cough And Deep Breathing: done with encouragement  Taken 6/21/2022 0800 by Tanya Dowell RN  Cough And Deep Breathing: done with encouragement     Problem: Respiratory Compromise (Cardiovascular Surgery)  Goal: Effective Oxygenation and Ventilation (Cardiovascular Surgery)  Intervention: Promote Airway Secretion Clearance  Recent Flowsheet Documentation  Taken 6/21/2022 1200 by Tanya Dowell RN  Cough And Deep Breathing: done with encouragement  Taken 6/21/2022 0800 by Tanya Dowell RN  Cough And Deep Breathing: done with encouragement   Goal Outcome Evaluation:    Shift updates: Patient hgb was 7.5; provider ordered one unit of blood. Provider also ordered extra dose of lasix IV. Patient worked with PT this morning. She ambulated in room. Patient sat up in chair for couple of hours this afternoon. Patient vital stable. Continue to monitor.       Tanya Dowell RN

## 2022-06-22 ENCOUNTER — APPOINTMENT (OUTPATIENT)
Dept: GENERAL RADIOLOGY | Facility: CLINIC | Age: 77
DRG: 270 | End: 2022-06-22
Attending: SURGERY
Payer: MEDICARE

## 2022-06-22 ENCOUNTER — APPOINTMENT (OUTPATIENT)
Dept: PHYSICAL THERAPY | Facility: CLINIC | Age: 77
DRG: 270 | End: 2022-06-22
Attending: INTERNAL MEDICINE
Payer: MEDICARE

## 2022-06-22 LAB
ANION GAP SERPL CALCULATED.3IONS-SCNC: 6 MMOL/L (ref 7–15)
BUN SERPL-MCNC: 38.4 MG/DL (ref 8–23)
CALCIUM SERPL-MCNC: 9.3 MG/DL (ref 8.8–10.2)
CHLORIDE SERPL-SCNC: 96 MMOL/L (ref 98–107)
CREAT SERPL-MCNC: 1.07 MG/DL (ref 0.51–0.95)
DEPRECATED HCO3 PLAS-SCNC: 34 MMOL/L (ref 22–29)
ERYTHROCYTE [DISTWIDTH] IN BLOOD BY AUTOMATED COUNT: 18 % (ref 10–15)
GFR SERPL CREATININE-BSD FRML MDRD: 53 ML/MIN/1.73M2
GLUCOSE SERPL-MCNC: 133 MG/DL (ref 70–99)
HCT VFR BLD AUTO: 27.6 % (ref 35–47)
HGB BLD-MCNC: 8.6 G/DL (ref 11.7–15.7)
MAGNESIUM SERPL-MCNC: 2.2 MG/DL (ref 1.7–2.3)
MCH RBC QN AUTO: 29 PG (ref 26.5–33)
MCHC RBC AUTO-ENTMCNC: 31.2 G/DL (ref 31.5–36.5)
MCV RBC AUTO: 93 FL (ref 78–100)
PLATELET # BLD AUTO: 354 10E3/UL (ref 150–450)
POTASSIUM SERPL-SCNC: 5.2 MMOL/L (ref 3.4–4.5)
RBC # BLD AUTO: 2.97 10E6/UL (ref 3.8–5.2)
SODIUM SERPL-SCNC: 136 MMOL/L (ref 136–145)
WBC # BLD AUTO: 12.1 10E3/UL (ref 4–11)

## 2022-06-22 PROCEDURE — 97530 THERAPEUTIC ACTIVITIES: CPT | Mod: GP | Performed by: PHYSICAL THERAPIST

## 2022-06-22 PROCEDURE — 250N000013 HC RX MED GY IP 250 OP 250 PS 637

## 2022-06-22 PROCEDURE — 250N000013 HC RX MED GY IP 250 OP 250 PS 637: Performed by: THORACIC SURGERY (CARDIOTHORACIC VASCULAR SURGERY)

## 2022-06-22 PROCEDURE — 36592 COLLECT BLOOD FROM PICC: CPT | Performed by: THORACIC SURGERY (CARDIOTHORACIC VASCULAR SURGERY)

## 2022-06-22 PROCEDURE — 250N000013 HC RX MED GY IP 250 OP 250 PS 637: Performed by: STUDENT IN AN ORGANIZED HEALTH CARE EDUCATION/TRAINING PROGRAM

## 2022-06-22 PROCEDURE — 250N000011 HC RX IP 250 OP 636: Performed by: STUDENT IN AN ORGANIZED HEALTH CARE EDUCATION/TRAINING PROGRAM

## 2022-06-22 PROCEDURE — 83735 ASSAY OF MAGNESIUM: CPT | Performed by: THORACIC SURGERY (CARDIOTHORACIC VASCULAR SURGERY)

## 2022-06-22 PROCEDURE — 250N000011 HC RX IP 250 OP 636: Performed by: THORACIC SURGERY (CARDIOTHORACIC VASCULAR SURGERY)

## 2022-06-22 PROCEDURE — 82310 ASSAY OF CALCIUM: CPT | Performed by: THORACIC SURGERY (CARDIOTHORACIC VASCULAR SURGERY)

## 2022-06-22 PROCEDURE — 97116 GAIT TRAINING THERAPY: CPT | Mod: GP | Performed by: PHYSICAL THERAPIST

## 2022-06-22 PROCEDURE — 250N000011 HC RX IP 250 OP 636: Performed by: SURGERY

## 2022-06-22 PROCEDURE — 250N000013 HC RX MED GY IP 250 OP 250 PS 637: Performed by: SURGERY

## 2022-06-22 PROCEDURE — 71045 X-RAY EXAM CHEST 1 VIEW: CPT

## 2022-06-22 PROCEDURE — 71045 X-RAY EXAM CHEST 1 VIEW: CPT | Mod: 26 | Performed by: RADIOLOGY

## 2022-06-22 PROCEDURE — 250N000013 HC RX MED GY IP 250 OP 250 PS 637: Performed by: PHYSICIAN ASSISTANT

## 2022-06-22 PROCEDURE — 120N000002 HC R&B MED SURG/OB UMMC

## 2022-06-22 PROCEDURE — 999N000007 HC SITE CHECK

## 2022-06-22 PROCEDURE — 85027 COMPLETE CBC AUTOMATED: CPT | Performed by: THORACIC SURGERY (CARDIOTHORACIC VASCULAR SURGERY)

## 2022-06-22 RX ORDER — FUROSEMIDE 10 MG/ML
20 INJECTION INTRAMUSCULAR; INTRAVENOUS ONCE
Status: COMPLETED | OUTPATIENT
Start: 2022-06-22 | End: 2022-06-22

## 2022-06-22 RX ORDER — HYDROMORPHONE HCL IN WATER/PF 6 MG/30 ML
0.2 PATIENT CONTROLLED ANALGESIA SYRINGE INTRAVENOUS EVERY 12 HOURS PRN
Status: DISCONTINUED | OUTPATIENT
Start: 2022-06-22 | End: 2022-06-25 | Stop reason: HOSPADM

## 2022-06-22 RX ORDER — FUROSEMIDE 40 MG
40 TABLET ORAL
Status: DISCONTINUED | OUTPATIENT
Start: 2022-06-22 | End: 2022-06-25 | Stop reason: HOSPADM

## 2022-06-22 RX ADMIN — DRONABINOL 5 MG: 2.5 CAPSULE ORAL at 20:39

## 2022-06-22 RX ADMIN — LIDOCAINE PATCH 4% 1 PATCH: 40 PATCH TOPICAL at 20:39

## 2022-06-22 RX ADMIN — ONDANSETRON 4 MG: 2 INJECTION INTRAMUSCULAR; INTRAVENOUS at 07:26

## 2022-06-22 RX ADMIN — ESCITALOPRAM OXALATE 10 MG: 10 TABLET ORAL at 09:28

## 2022-06-22 RX ADMIN — APIXABAN 5 MG: 5 TABLET, FILM COATED ORAL at 09:28

## 2022-06-22 RX ADMIN — ACETAMINOPHEN 325MG 975 MG: 325 TABLET ORAL at 16:24

## 2022-06-22 RX ADMIN — Medication 10 MG: at 20:39

## 2022-06-22 RX ADMIN — MIDODRINE HYDROCHLORIDE 20 MG: 5 TABLET ORAL at 16:24

## 2022-06-22 RX ADMIN — OXYCODONE HYDROCHLORIDE 5 MG: 5 TABLET ORAL at 11:05

## 2022-06-22 RX ADMIN — NYSTATIN 1000000 UNITS: 100000 SUSPENSION ORAL at 09:27

## 2022-06-22 RX ADMIN — Medication 1 PACKET: at 09:29

## 2022-06-22 RX ADMIN — FUROSEMIDE 20 MG: 10 INJECTION, SOLUTION INTRAMUSCULAR; INTRAVENOUS at 13:29

## 2022-06-22 RX ADMIN — HYDROXYZINE HYDROCHLORIDE 50 MG: 25 TABLET ORAL at 00:31

## 2022-06-22 RX ADMIN — Medication 15 ML: at 09:28

## 2022-06-22 RX ADMIN — NYSTATIN 1000000 UNITS: 100000 SUSPENSION ORAL at 20:39

## 2022-06-22 RX ADMIN — DRONABINOL 5 MG: 2.5 CAPSULE ORAL at 09:28

## 2022-06-22 RX ADMIN — NYSTATIN 1000000 UNITS: 100000 SUSPENSION ORAL at 13:29

## 2022-06-22 RX ADMIN — HYDROMORPHONE HYDROCHLORIDE 0.2 MG: 0.2 INJECTION, SOLUTION INTRAMUSCULAR; INTRAVENOUS; SUBCUTANEOUS at 00:31

## 2022-06-22 RX ADMIN — FUROSEMIDE 40 MG: 40 TABLET ORAL at 16:24

## 2022-06-22 RX ADMIN — APIXABAN 5 MG: 5 TABLET, FILM COATED ORAL at 20:39

## 2022-06-22 RX ADMIN — Medication 40 MG: at 09:29

## 2022-06-22 RX ADMIN — FUROSEMIDE 20 MG: 20 TABLET ORAL at 09:24

## 2022-06-22 RX ADMIN — MIDODRINE HYDROCHLORIDE 20 MG: 5 TABLET ORAL at 09:24

## 2022-06-22 RX ADMIN — OXYCODONE HYDROCHLORIDE 10 MG: 10 TABLET ORAL at 22:45

## 2022-06-22 RX ADMIN — Medication 5 ML: at 16:24

## 2022-06-22 RX ADMIN — ACETAMINOPHEN 325MG 975 MG: 325 TABLET ORAL at 09:23

## 2022-06-22 ASSESSMENT — ACTIVITIES OF DAILY LIVING (ADL)
ADLS_ACUITY_SCORE: 42
ADLS_ACUITY_SCORE: 34
ADLS_ACUITY_SCORE: 38
ADLS_ACUITY_SCORE: 34

## 2022-06-22 NOTE — PROGRESS NOTES
Cardiovascular Surgery Progress Note  06/22/2022         Assessment and Plan:     Debi Espinoza is a 76 year old female with PMH of HTN, nephrolithiasis, DVT (2019) and PE (2019, 2021) on chronic anticoagulation (Apixiban), and chronic thromboembolic pulmonary hypertension. 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  She has been on lifelong anticoagulation (apixaban) since her second PE earlier this year.  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  Debi is now s/p pulmonary artery thromboendarterectomy on 5/27 with Dr. Peterson.  She required multiple pressors post-op and briefly on milrinone. Extubated 6/1/22. Had difficulty with volume management with ongoing hypotension requiring pressors. Bilateral chest tubes were eventually placed for pleural effusion; right-sided chest tube removed 6/15/22. Currently she continues to recover and improve daily. Now able to wean down her O2 support gradually with slowly decreasing in her flow; aiming for O2 sat >=92%.      Cardiovascular:   S/p Pulmonary artery thromboendarterectomy on 5/27  Hx Severe pulmonary hypertension  Hx RV dilation and reduced RV function  Hx Severe tricuspid insufficiency  Hx Essential HTN  Distributive shock  1st degree AV block  Volume overload  PTA regimen: furosemide 20mg qd, adempas 2.5mg TID, opsumit 10mg qd  Echo on 03/2021 with LVEF of 78%, severe pulmonary hypertension (82 mmHg), reduced RV function (TAPSE 16mm), severe tricuspid insufficiency.  Stayed down in ICU due to orthostatic hypotension episodes and slow weaning of pressors, required multiple fluid boluses. She left ICU on midodrine 20 mg q 8 hours.   Had also required V-pacing for 1st degree AV block. Pacing needs have now resolved and TPW removed without incident.   Echo 6/2 showed LV EF 60-65% with moderate RV dilation with dysfunction  Echo 6/8 showed LV EF  65-70%, moderate RV dilation with dysfunction  Digoxin loaded for RV support.  ASA NOT indicated.   Statin not indicated.  Lasix dose increased to 40mg BID    Chest tubes: removed  TPW: removed     Pulmonary:  S/p Pulmonary artery thromboendarterectomy on 5/27/2022 by Dr. Peterson  Hx Chronic pulmonary thromboembolic disease  Hx PE (2019, 2021)  Hx Emphysema, moderate  Small right pleural effusion - seen on CT chest 6/9  S/p right pigtail catheter placement on 6/10  PTA regimen: apixaban 5mg BID  CTA 05/2019 demonstrated underlying moderate emphysema  Extubated 5/31  - Supplemental oxygen: stable on NC at 2-5 lpm, wean as tolerated. Refusing BiPAP at times.    - PTA Opsumit, hold Adempas  - 6/9 CT chest with small right pleural effusion  - 6/10 right pigtail catheter placement with IR, noted to be loculated   - Consult Medicine Proceduralist team for consideration of US-guided thoracentesis   - Diuresis, as above   - Add QID metanebs to other pulmonary toilet interventions (OOB/activity, IS)     Neurology / MSK:  Sternotomy  - Neuro intact  - Acute post-operative pain well controlled with acetaminophen, PO oxycodone PRN, IV dilaudid PRN, Robaxin  - Psych consult 6/16, started Lexapro. Seroquel 12.5 / 25 mg BID discontinued      / Renal:  Nephrolithiasis  Hypernatremia  CKD  - PTA lasix 20 mg daily  - CKD reported in pre-op clinic visit; adequate UOP  - Pre-op weight 150 lbs.  Lasix increased to 40 mg PO BID, extra 20 mg IV 6/20-22. Reassess diuretic need daily  - Strict Intake and Outputs     GI / FEN:   Post-op Dysphagia   - Speech following for diet advancement. Now on Regular diet, continue bowel regimen. Protein supplements as able.   - Strict I & Os  - NJ with cycled tube feeds since 6/16, free water flushes decreased to 30 mL q 6 hrs  - Replace electrolytes as needed, hepatic enzymes WNL 6/20  - Marinol 5 mg PO BID since 6/21    Nausea, improving  - Seems to be persistent throughout the day and not associated  with tube feeds or medications (but possibly made worse by AM meds). Likely related to C diff.  - continue zofran and compazine prn    C. Diff colitis, improving  Diarrhea, improving  Treatment now complete; see ID, below     Endocrine:  Pre-op Hgb A1C 5.8%.  Stress induced hyperglycemia initially managed on insulin drip postop, transitioned to high resistance sliding scale and since discontinued with demonstrated euglycemia     Infectious Disease:  Stress-induced leukocytosis  - WBC stably mildly elevated, remains afebrile  - Completed perioperative antibiotics.  - S/p Meropenem (6/2-6/3) and Zosyn (6/3- 6/6).  - Emperic cefepime (6/10 - 6/17), Vanc (6/10 - 6/13), stopped with neg MRSA swab.  C-diff positive 6/11  - Completed 10 days of PO vancomycin 6/21  Oral Thrush   - Nystatin for 7 days (6/20 - 6/26)     Hematology:   Acute blood loss anemia Thrombocytopenia, resolved  Thrombocytosis, resolved  Hx DVT (2019), PE (2019, 2021) on chronic anticoagulation (Apixaban)  - Hgb stable 7 - 8  - Got 1 unit PRBC on 6/21 with appropriate response     Anticoagulation:   - ASA not needed per surgeon  - Apixiban for Hx of PE / CTEPH     Prophylaxis:   - Stress ulcer prophylaxis: Pantoprazole 40 mg daily for 30 days  - DVT prophylaxis: Subcutaneous heparin, SCD     Disposition:   - Transferred to  on 6/17  - Therapies recommending discharge to TCU once medically stable - likely ready Thurs vs Fri     Discussed with Dr. Peterson.    Lissy Ingram, CNP, [facility]-  Cardiothoracic Surgery  Pager [phone]        Interval History:     No overnight events.  States pain is well controlled  Tolerating diet and tube feeds; diarrhea improving and rectal tube removed. No nausea or vomiting.  AGUILAR  Denies chest pain, palpitations, dizziness, syncopal symptoms, fevers, chills, myalgias, or sternal popping/clicking.         Physical Exam:   Blood pressure 115/53, pulse 71, temperature 98.1  F (36.7  C), temperature source Oral, resp. rate  14, height 1.524 m (5'), weight 71.1 kg (156 lb 12 oz), SpO2 97 %, not currently breastfeeding.  Vitals:    06/19/22 1100 06/20/22 0808 06/21/22 0535   Weight: 73.2 kg (161 lb 6 oz) 70.9 kg (156 lb 4.9 oz) 71.1 kg (156 lb 12 oz)    2  Admission weight: 68.3 kg    Weight: up since admit   24 hr Fluid status:  -275 mL  MAPs: 71-97    Gen: NAD  Neuro: A&O, no focal deficits   CV: NSR on telemetry monitor  Pulm: Unlabored breathing on supplemental oxygen via NC  Abd: nondistended  Ext: No gross deformities, moderate minimally pitting peripheral edema, bilateral compression stockings in place  Incision: clean, dry, intact, no erythema, sternum stable  Tubes/drain sites: C/D/I without surrounding erythema or drainage         Data:    Imaging:  CXR pending    No results found for this or any previous visit (from the past 24 hour(s)).    Labs:  BMP  Recent Labs   Lab 06/22/22  0638 06/21/22  1240 06/21/22  0638 06/20/22  1934 06/20/22  1702 06/20/22  1430 06/20/22  0959     --  139  --   --  138 140   POTASSIUM 5.2*  --  5.2  --   --  4.6 5.3   CHLORIDE 96*  --  102  --   --  102 105   CHELE 9.3  --  8.6  --   --  8.6 8.4*   CO2 34*  --  34*  --   --  33* 32   BUN 38.4*  --  42*  --   --  44* 44*   CR 1.07*  --  1.12*  --   --  1.12* 1.17*   * 116* 116* 89   < > 123* 98    < > = values in this interval not displayed.     CBC  Recent Labs   Lab 06/22/22  0638 06/21/22  0638 06/20/22  0959 06/19/22  0703   WBC 12.1* 11.1* 13.4* 12.2*   RBC 2.97* 2.63* 2.51* 2.57*   HGB 8.6* 7.4* 7.2* 7.5*   HCT 27.6* 25.2* 24.4* 25.1*   MCV 93 96 97 98   MCH 29.0 28.1 28.7 29.2   MCHC 31.2* 29.4* 29.5* 29.9*   RDW 18.0* 18.0* 18.2* 18.3*    359 384 405     INR  No lab results found in last 7 days.   Hepatic Panel  Recent Labs   Lab 06/20/22  0959   AST 37   ALT 27   ALKPHOS 107   BILITOTAL 0.2   ALBUMIN 1.8*     GLUCOSE:   Recent Labs   Lab 06/22/22  0638 06/21/22  1240 06/21/22  0638 06/20/22  1934 06/20/22  1702  06/20/22  1430   * 116* 116* 89 91 123*

## 2022-06-22 NOTE — PLAN OF CARE
Major Shift Events:  Pt c/o pain in back and incisional, gave prn IV dilaudid, prn oxycodone, atarax and scheduled tylenol; also had lidocaine patch to R upper chest. Appetite still poor, on fede counts and tube feeds from 4095-2374. LS dim and had to increase O2 from 5 to 6L to keep sats above 90% . Purewick in place w/good UOP. Rectal tube w/minimal output overnight.   Plan: Continue w/plan of care, monitor fede count, possible discharge in 2-3 days.   For vital signs and complete assessments, please see documentation flowsheets.     Goal Outcome Evaluation:    Plan of Care Reviewed With: patient     Overall Patient Progress: no change

## 2022-06-22 NOTE — PROGRESS NOTES
Care Management Follow Up    Length of Stay (days): 28    Expected Discharge Date:  06/24/22     Concerns to be Addressed: Discharge planning      Patient plan of care discussed at interdisciplinary rounds: Yes    Anticipated Discharge Disposition:  ARU     Anticipated Discharge Services:  Therapies  Anticipated Discharge DME:  TBD    Patient/family educated on Medicare website which has current facility and service quality ratings:  NA  Education Provided on the Discharge Plan:  N A  Patient/Family in Agreement with the Plan:  NA    Referrals Placed by CM/SW:    [facility] in [facility] in admissions [phone].  Private pay costs discussed: Not applicable    Additional Information:  SW spoke with tx team and admissions at [facility] in [place] as well as Pt's daughter in law. SW created a plan with all parties to follow up after primary tx team has assessed Pt in the AM.      ________________    DESMOND Gilbert, LICSW  6D   [facility]  Phone: [phone]  Pager: [phone]  Fax: [phone]

## 2022-06-22 NOTE — PROGRESS NOTES
Calorie Count  Intake recorded for: 6/21  Total Kcals: 45 Total Protein: 1g  Kcals from Hospital Food: 45   Protein: 1g  Kcals from Outside Food (average):0 Protein: 0g  # Meals Ordered from Kitchen: 2 meals   # Meals Recorded: 1 meal (First - 25% potato salad)  # Supplements Recorded: 0

## 2022-06-22 NOTE — PLAN OF CARE
"NEURO: alert and oriented x4. Neuros WNL.   RESPIRATORY: LS diminished, AGUILAR. SpO2>92%on 5 LPM NC at rest. After activity with PT- pt had a desaturation event in which pt felt like she  \"melted away for a minute\" and SpO2 was 80-84% on 15 LPM oxymask and was not improving. A nasal cannula at 6 LPM (applied to an extra oxygen tank) was applied in addition to mask and patient recovered shortly after. At rest patient was weaned back to 5 LPM. MD was notified of this event. An extra dose of diuretic ordered and chest xray was taken shortly afterward.   CARDIO: VSS.   GI/: BS active. + loose BM x2. Rectal tube removed. Purewick in place- adequate urinary output. Ate approximately 50% of lunch. Reported nausea in AM,, nausea relieved after anti emetic, rectal tube removal and TF stopped for the day (Cycled). NJ in place- patent- clamped.   SKIN: Incision: KALEN- WNL. Chest tube site- WNL, dressings changed. Generalized bruising.   ACTIVITY: Up with 1-2 assist pivot. Up to chair x2.   PAIN: Reported pain in afternoon , given PRN oxycodone x1.   LINES:PIV saline locked WNL. PICC- no blood return, but flushes well.   PLAN: Continue POC- possible discharge to TCU or ARU tomorrow or Friday.     "

## 2022-06-23 ENCOUNTER — APPOINTMENT (OUTPATIENT)
Dept: PHYSICAL THERAPY | Facility: CLINIC | Age: 77
DRG: 270 | End: 2022-06-23
Attending: INTERNAL MEDICINE
Payer: MEDICARE

## 2022-06-23 LAB
ANION GAP SERPL CALCULATED.3IONS-SCNC: 9 MMOL/L (ref 7–15)
BUN SERPL-MCNC: 37.7 MG/DL (ref 8–23)
CALCIUM SERPL-MCNC: 9.2 MG/DL (ref 8.8–10.2)
CHLORIDE SERPL-SCNC: 95 MMOL/L (ref 98–107)
CREAT SERPL-MCNC: 1.06 MG/DL (ref 0.51–0.95)
DEPRECATED HCO3 PLAS-SCNC: 32 MMOL/L (ref 22–29)
DIGOXIN SERPL-MCNC: 0.6 NG/ML (ref 0.6–2)
ERYTHROCYTE [DISTWIDTH] IN BLOOD BY AUTOMATED COUNT: 17.7 % (ref 10–15)
FIBRINOGEN PPP-MCNC: 591 MG/DL (ref 170–490)
GFR SERPL CREATININE-BSD FRML MDRD: 54 ML/MIN/1.73M2
GLUCOSE BLDC GLUCOMTR-MCNC: 102 MG/DL (ref 70–99)
GLUCOSE BLDC GLUCOMTR-MCNC: 116 MG/DL (ref 70–99)
GLUCOSE BLDC GLUCOMTR-MCNC: 129 MG/DL (ref 70–99)
GLUCOSE BLDC GLUCOMTR-MCNC: 156 MG/DL (ref 70–99)
GLUCOSE SERPL-MCNC: 119 MG/DL (ref 70–99)
HCT VFR BLD AUTO: 28 % (ref 35–47)
HGB BLD-MCNC: 8.7 G/DL (ref 11.7–15.7)
INR PPP: 1.71 (ref 0.85–1.15)
MAGNESIUM SERPL-MCNC: 2.2 MG/DL (ref 1.7–2.3)
MCH RBC QN AUTO: 29 PG (ref 26.5–33)
MCHC RBC AUTO-ENTMCNC: 31.1 G/DL (ref 31.5–36.5)
MCV RBC AUTO: 93 FL (ref 78–100)
PLATELET # BLD AUTO: 350 10E3/UL (ref 150–450)
POTASSIUM SERPL-SCNC: 4.7 MMOL/L (ref 3.4–4.5)
RBC # BLD AUTO: 3 10E6/UL (ref 3.8–5.2)
SODIUM SERPL-SCNC: 136 MMOL/L (ref 136–145)
WBC # BLD AUTO: 11.2 10E3/UL (ref 4–11)

## 2022-06-23 PROCEDURE — 999N000007 HC SITE CHECK

## 2022-06-23 PROCEDURE — 97116 GAIT TRAINING THERAPY: CPT | Mod: GP

## 2022-06-23 PROCEDURE — 97530 THERAPEUTIC ACTIVITIES: CPT | Mod: GP

## 2022-06-23 PROCEDURE — 120N000002 HC R&B MED SURG/OB UMMC

## 2022-06-23 PROCEDURE — 80162 ASSAY OF DIGOXIN TOTAL: CPT | Performed by: THORACIC SURGERY (CARDIOTHORACIC VASCULAR SURGERY)

## 2022-06-23 PROCEDURE — 36415 COLL VENOUS BLD VENIPUNCTURE: CPT | Performed by: THORACIC SURGERY (CARDIOTHORACIC VASCULAR SURGERY)

## 2022-06-23 PROCEDURE — 94799 UNLISTED PULMONARY SVC/PX: CPT

## 2022-06-23 PROCEDURE — 250N000013 HC RX MED GY IP 250 OP 250 PS 637: Performed by: PHYSICIAN ASSISTANT

## 2022-06-23 PROCEDURE — 250N000013 HC RX MED GY IP 250 OP 250 PS 637: Performed by: SURGERY

## 2022-06-23 PROCEDURE — 36415 COLL VENOUS BLD VENIPUNCTURE: CPT | Performed by: STUDENT IN AN ORGANIZED HEALTH CARE EDUCATION/TRAINING PROGRAM

## 2022-06-23 PROCEDURE — 999N000157 HC STATISTIC RCP TIME EA 10 MIN

## 2022-06-23 PROCEDURE — 250N000013 HC RX MED GY IP 250 OP 250 PS 637: Performed by: STUDENT IN AN ORGANIZED HEALTH CARE EDUCATION/TRAINING PROGRAM

## 2022-06-23 PROCEDURE — 80048 BASIC METABOLIC PNL TOTAL CA: CPT | Performed by: THORACIC SURGERY (CARDIOTHORACIC VASCULAR SURGERY)

## 2022-06-23 PROCEDURE — 250N000013 HC RX MED GY IP 250 OP 250 PS 637: Performed by: THORACIC SURGERY (CARDIOTHORACIC VASCULAR SURGERY)

## 2022-06-23 PROCEDURE — 85384 FIBRINOGEN ACTIVITY: CPT | Performed by: STUDENT IN AN ORGANIZED HEALTH CARE EDUCATION/TRAINING PROGRAM

## 2022-06-23 PROCEDURE — 85027 COMPLETE CBC AUTOMATED: CPT | Performed by: THORACIC SURGERY (CARDIOTHORACIC VASCULAR SURGERY)

## 2022-06-23 PROCEDURE — 250N000013 HC RX MED GY IP 250 OP 250 PS 637

## 2022-06-23 PROCEDURE — 83735 ASSAY OF MAGNESIUM: CPT | Performed by: THORACIC SURGERY (CARDIOTHORACIC VASCULAR SURGERY)

## 2022-06-23 PROCEDURE — 250N000011 HC RX IP 250 OP 636: Performed by: SURGERY

## 2022-06-23 PROCEDURE — 85610 PROTHROMBIN TIME: CPT | Performed by: STUDENT IN AN ORGANIZED HEALTH CARE EDUCATION/TRAINING PROGRAM

## 2022-06-23 PROCEDURE — 250N000011 HC RX IP 250 OP 636: Performed by: STUDENT IN AN ORGANIZED HEALTH CARE EDUCATION/TRAINING PROGRAM

## 2022-06-23 RX ORDER — DIGOXIN 0.06 MG/1
62.5 TABLET ORAL
Status: DISCONTINUED | OUTPATIENT
Start: 2022-06-23 | End: 2022-06-25 | Stop reason: HOSPADM

## 2022-06-23 RX ORDER — FUROSEMIDE 10 MG/ML
40 INJECTION INTRAMUSCULAR; INTRAVENOUS ONCE
Status: COMPLETED | OUTPATIENT
Start: 2022-06-23 | End: 2022-06-23

## 2022-06-23 RX ADMIN — OXYCODONE HYDROCHLORIDE 10 MG: 10 TABLET ORAL at 22:55

## 2022-06-23 RX ADMIN — OXYCODONE HYDROCHLORIDE 10 MG: 10 TABLET ORAL at 14:43

## 2022-06-23 RX ADMIN — Medication 1 PACKET: at 07:48

## 2022-06-23 RX ADMIN — MIDODRINE HYDROCHLORIDE 20 MG: 5 TABLET ORAL at 15:17

## 2022-06-23 RX ADMIN — Medication 10 MG: at 19:59

## 2022-06-23 RX ADMIN — Medication 40 MG: at 07:48

## 2022-06-23 RX ADMIN — NYSTATIN 1000000 UNITS: 100000 SUSPENSION ORAL at 15:18

## 2022-06-23 RX ADMIN — METHOCARBAMOL 500 MG: 500 TABLET ORAL at 03:02

## 2022-06-23 RX ADMIN — APIXABAN 5 MG: 5 TABLET, FILM COATED ORAL at 07:49

## 2022-06-23 RX ADMIN — ESCITALOPRAM OXALATE 10 MG: 10 TABLET ORAL at 07:49

## 2022-06-23 RX ADMIN — ACETAMINOPHEN 325MG 975 MG: 325 TABLET ORAL at 07:49

## 2022-06-23 RX ADMIN — NYSTATIN 1000000 UNITS: 100000 SUSPENSION ORAL at 19:59

## 2022-06-23 RX ADMIN — NYSTATIN 1000000 UNITS: 100000 SUSPENSION ORAL at 12:06

## 2022-06-23 RX ADMIN — ACETAMINOPHEN 325MG 975 MG: 325 TABLET ORAL at 15:17

## 2022-06-23 RX ADMIN — HYDROXYZINE HYDROCHLORIDE 50 MG: 25 TABLET ORAL at 22:54

## 2022-06-23 RX ADMIN — DRONABINOL 5 MG: 2.5 CAPSULE ORAL at 19:59

## 2022-06-23 RX ADMIN — FUROSEMIDE 40 MG: 40 TABLET ORAL at 07:49

## 2022-06-23 RX ADMIN — DIGOXIN 62.5 MCG: 0.06 TABLET ORAL at 15:16

## 2022-06-23 RX ADMIN — HYDROXYZINE HYDROCHLORIDE 50 MG: 25 TABLET ORAL at 14:43

## 2022-06-23 RX ADMIN — MIDODRINE HYDROCHLORIDE 20 MG: 5 TABLET ORAL at 07:49

## 2022-06-23 RX ADMIN — FUROSEMIDE 40 MG: 40 TABLET ORAL at 15:17

## 2022-06-23 RX ADMIN — Medication 10 ML: at 15:18

## 2022-06-23 RX ADMIN — MIDODRINE HYDROCHLORIDE 20 MG: 5 TABLET ORAL at 00:58

## 2022-06-23 RX ADMIN — LIDOCAINE PATCH 4% 1 PATCH: 40 PATCH TOPICAL at 19:59

## 2022-06-23 RX ADMIN — DRONABINOL 5 MG: 2.5 CAPSULE ORAL at 07:49

## 2022-06-23 RX ADMIN — ACETAMINOPHEN 325MG 975 MG: 325 TABLET ORAL at 00:58

## 2022-06-23 RX ADMIN — FUROSEMIDE 40 MG: 10 INJECTION, SOLUTION INTRAVENOUS at 12:06

## 2022-06-23 RX ADMIN — NYSTATIN 1000000 UNITS: 100000 SUSPENSION ORAL at 07:48

## 2022-06-23 RX ADMIN — Medication 15 ML: at 07:48

## 2022-06-23 ASSESSMENT — ACTIVITIES OF DAILY LIVING (ADL)
ADLS_ACUITY_SCORE: 42
ADLS_ACUITY_SCORE: 36
ADLS_ACUITY_SCORE: 42
ADLS_ACUITY_SCORE: 36
ADLS_ACUITY_SCORE: 36
ADLS_ACUITY_SCORE: 42
ADLS_ACUITY_SCORE: 41
ADLS_ACUITY_SCORE: 36
ADLS_ACUITY_SCORE: 41
ADLS_ACUITY_SCORE: 36

## 2022-06-23 NOTE — PROGRESS NOTES
Cardiovascular Surgery Progress Note  06/23/2022         Assessment and Plan:     Debi Espinoza is a 76 year old female with PMH of HTN, nephrolithiasis, DVT (2019) and PE (2019, 2021) on chronic anticoagulation (Apixiban), and chronic thromboembolic pulmonary hypertension. 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  She has been on lifelong anticoagulation (apixaban) since her second PE earlier this year.  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  Debi is now s/p pulmonary artery thromboendarterectomy on 5/27 with Dr. Peterson.  She required multiple pressors post-op and briefly on milrinone. Extubated 6/1/22. Had difficulty with volume management with ongoing hypotension requiring pressors. Bilateral chest tubes were eventually placed for pleural effusion; right-sided chest tube removed 6/15/22. Currently she continues to recover and improve daily. Now able to wean down her O2 support gradually with slowly decreasing in her flow; aiming for O2 sat >=92%.      Cardiovascular:   S/p Pulmonary artery thromboendarterectomy on 5/27  Hx Severe pulmonary hypertension  Hx RV dilation and reduced RV function  Hx Severe tricuspid insufficiency  Hx Essential HTN  Distributive shock  1st degree AV block  Volume overload  PTA regimen: furosemide 20mg qd, adempas 2.5mg TID, opsumit 10mg qd  Echo on 03/2021 with LVEF of 78%, severe pulmonary hypertension (82 mmHg), reduced RV function (TAPSE 16mm), severe tricuspid insufficiency.  Stayed down in ICU due to orthostatic hypotension episodes and slow weaning of pressors, required multiple fluid boluses. She left ICU on midodrine 20 mg q 8 hours.   Had also required V-pacing for 1st degree AV block. Pacing needs have now resolved and TPW removed without incident.   Echo 6/2 showed LV EF 60-65% with moderate RV dilation with dysfunction  Echo 6/8 showed LV EF  65-70%, moderate RV dilation with dysfunction  Digoxin loaded for RV support then held when level elevated.  Resume 62.5 mcg dose EOD and monitor level.  ASA NOT indicated.   Statin not indicated.  Lasix dose increased to 40mg BID and give 40 mg IV dose x1    Chest tubes: removed  TPW: removed     Pulmonary:  S/p Pulmonary artery thromboendarterectomy on 5/27/2022 by Dr. Peterson  Hx Chronic pulmonary thromboembolic disease  Hx PE (2019, 2021)  Hx Emphysema, moderate  Small right pleural effusion - seen on CT chest 6/9  S/p right pigtail catheter placement on 6/10  Left pleural effusion  PTA regimen: apixaban 5mg BID  CTA 05/2019 demonstrated underlying moderate emphysema  Extubated 5/31  - Supplemental oxygen: stable on NC at 2-5 lpm, wean as tolerated. Refusing BiPAP at times.    - PTA Opsumit, hold Adempas  - 6/9 CT chest with small right pleural effusion  - 6/10 right pigtail catheter placement with IR, noted to be loculated   - Medicine Proceduralist team consulted 6/23 for consideration of US-guided thoracentesis - hold DOAC for likely thora 6/23   - Diuresis, as above   - QID metanebs per RT REORDERED 6/23     Neurology / MSK:  Sternotomy  Delirium, resolved  - Neuro intact  - Acute post-operative pain well controlled with acetaminophen, PO oxycodone PRN, IV dilaudid PRN, Robaxin, lidocaine patch  - Psych consult 6/16, started Lexapro. Seroquel 12.5 / 25 mg BID previously discontinued      / Renal:  Nephrolithiasis  Hypernatremia  CKD  - PTA lasix 20 mg daily  - CKD reported in pre-op clinic visit; adequate UOP  - Pre-op weight 150 lbs.  Lasix increased to 40 mg PO BID 6/22, extra 20 mg IV 6/20-22; escalated to 40 mg IV furosemide x1 today. Reassess diuretic need daily  - Strict I/Os, daily standing weights     GI / FEN:   Post-op Dysphagia   - Speech following for diet advancement. Now on Regular diet, continue bowel regimen. Protein supplements as able.   - NJ with cycled tube feeds since 6/16, free water  flushes decreased to 30 mL q 6 hrs  - Replace electrolytes as needed, hepatic enzymes WNL 6/20  - Marinol 5 mg PO BID since 6/21    Nausea, improving  - Seems to be persistent throughout the day and not associated with tube feeds or medications (but possibly made worse by AM meds). Likely related to C diff.  - continue zofran and compazine prn    C. Diff colitis, improving  Diarrhea, improving  Treatment now complete; see ID, below     Endocrine:  Pre-op Hgb A1C 5.8%.  Stress-induced hyperglycemia   Initially managed on insulin drip postop, transitioned to high resistance sliding scale and since discontinued with demonstrated euglycemia     Infectious Disease:  Stress-induced leukocytosis  - WBC stably mildly elevated, remains afebrile  - Completed perioperative antibiotics.  - S/p Meropenem (6/2-6/3) and Zosyn (6/3- 6/6).  - Empiric cefepime (6/10 - 6/17), Vanc (6/10 - 6/13), stopped with neg MRSA swab.  C-diff positive 6/11  - Completed 10 days of PO vancomycin 6/21  Oral Thrush   - Nystatin for 7 days (6/20 - 6/26)     Hematology:   Acute blood loss anemia   Thrombocytopenia, resolved  Thrombocytosis, resolved  Hx DVT (2019), PE (2019, 2021) on chronic anticoagulation (Apixaban)  - Hgb grossly stable  - Got 1 unit PRBC on 6/21 with appropriate response     Anticoagulation:   - ASA not needed per surgeon  - Apixiban for Hx of PE / CTEPH     Prophylaxis:   - Stress ulcer prophylaxis: Pantoprazole 40 mg daily for 30 days  - DVT prophylaxis: Subcutaneous heparin, SCD     Disposition:   - Transferred to  on 6/17  - Therapies recommending discharge to ARU/TCU once medically stable - likely ready Saturday, following thoracentesis Friday.     Discussed with Dr. Peterson.    Lissy Ingram, CNP, ACNPC-AG  Cardiothoracic Surgery  Pager [phone]        Interval History:     No overnight events.  States pain is well controlled  Tolerating diet and tube feeds but low appetite and needing a lot of encouragement to eat;  diarrhea improving. No nausea or vomiting.  AGUILAR, deconditioned.  Denies chest pain, palpitations, dizziness, syncopal symptoms, fevers, chills, myalgias, or sternal popping/clicking.         Physical Exam:   Blood pressure 121/58, pulse 78, temperature 97.9  F (36.6  C), temperature source Oral, resp. rate 21, height 1.524 m (5'), weight 71.1 kg (156 lb 12 oz), SpO2 94 %, not currently breastfeeding.  Vitals:    06/19/22 1100 06/20/22 0808 06/21/22 0535   Weight: 73.2 kg (161 lb 6 oz) 70.9 kg (156 lb 4.9 oz) 71.1 kg (156 lb 12 oz)      Admission weight: 68.3 kg    Weight: up since admit   24 hr Fluid status:  +700 mL  MAPs: 71-97    Gen: NAD  Neuro: A&O, no focal deficits   CV: NSR on telemetry monitor  Pulm: Unlabored breathing on supplemental oxygen via NC  Abd: nondistended  Ext: No gross deformities, mild non-pitting peripheral edema, bilateral compression stockings in place  Incision: clean, dry, intact, no erythema, sternum stable  Tubes/drain sites: C/D/I without surrounding erythema or drainage         Data:    Imaging:  CXR pending    No results found for this or any previous visit (from the past 24 hour(s)).    Labs:  BMP  Recent Labs   Lab 06/23/22  0812 06/23/22  0651 06/22/22  0638 06/21/22  1240 06/21/22  0638 06/20/22  1702 06/20/22  1430   NA  --  136 136  --  139  --  138   POTASSIUM  --  4.7* 5.2*  --  5.2  --  4.6   CHLORIDE  --  95* 96*  --  102  --  102   CHELE  --  9.2 9.3  --  8.6  --  8.6   CO2  --  32* 34*  --  34*  --  33*   BUN  --  37.7* 38.4*  --  42*  --  44*   CR  --  1.06* 1.07*  --  1.12*  --  1.12*   * 119* 133* 116* 116*   < > 123*    < > = values in this interval not displayed.     CBC  Recent Labs   Lab 06/23/22  0651 06/22/22  0638 06/21/22  0638 06/20/22  0959   WBC 11.2* 12.1* 11.1* 13.4*   RBC 3.00* 2.97* 2.63* 2.51*   HGB 8.7* 8.6* 7.4* 7.2*   HCT 28.0* 27.6* 25.2* 24.4*   MCV 93 93 96 97   MCH 29.0 29.0 28.1 28.7   MCHC 31.1* 31.2* 29.4* 29.5*   RDW 17.7* 18.0* 18.0*  18.2*    354 359 384     INR  Recent Labs   Lab 06/23/22  0914   INR 1.71*      Hepatic Panel  Recent Labs   Lab 06/20/22  0959   AST 37   ALT 27   ALKPHOS 107   BILITOTAL 0.2   ALBUMIN 1.8*     GLUCOSE:   Recent Labs   Lab 06/23/22  0812 06/23/22  0651 06/22/22  0638 06/21/22  1240 06/21/22  0638 06/20/22  1934   * 119* 133* 116* 116* 89

## 2022-06-23 NOTE — PROGRESS NOTES
Calorie Count  Intake recorded for: 6/22  Total Kcals: 284 Total Protein: 19g  Kcals from Hospital Food: 284   Protein: 19g  Kcals from Outside Food (average):0 Protein: 0g  # Meals Ordered from Kitchen: 1 meal  # Meals Recorded: 1 meal (First - 75% cottage cheese, 50% egg salad sandwich, 25% brownie)  # Supplements Recorded: 0

## 2022-06-23 NOTE — PROGRESS NOTES
Pt seen for bipap order. Pt refuse bipap @ noc or intermittent use due to  claustrophobia. V60 not found in room. Will continue to monitor pt as needed.     Radha Waite, RT

## 2022-06-23 NOTE — PROGRESS NOTES
Patient AOX4. On 5L NC. Atarax and Oxy given. TF off at 8am and to be restarted at 8pm. Appetite improving.   Purewick in place. Up to the commode with X2 assist.   VS remained stable. Will continue to monitor.

## 2022-06-23 NOTE — PROGRESS NOTES
Care Management Follow Up    Length of Stay (days): 29    Expected Discharge Date:  TBD     Concerns to be Addressed: Discharge planning     Patient plan of care discussed at interdisciplinary rounds: Yes    Anticipated Discharge Disposition: ARU     Anticipated Discharge Services: Therapies   Anticipated Discharge DME:  TBD    Patient/family educated on Medicare website which has current facility and service quality ratings:  NA  Education Provided on the Discharge Plan:  NA  Patient/Family in Agreement with the Plan:  NA    Referrals Placed by CM/SW:  [facility] in admissions [phone].  Private pay costs discussed: Not applicable    Additional Information:  RAMÓN spoke with Lynette in admissions and with Nga Pt's daughter in law. RAMÓN spoke with TX team. At this time 2:22PM 6/23 SW has communicated to Pt's family, [facility] and TX team that Pt will discharge in the morning of 6/25     ________________    DESMOND Gilbert, LICSW  6D   [facility]  Phone: [phone]  Pager: [phone]  Fax: [phone]

## 2022-06-23 NOTE — PLAN OF CARE
Major Shift Events:  A & O x 4. C/o pain, gave prn oxycodone and robaxin as well as scheduled tylenol. On 5 L O2 via NC. NJ w/cyclic tube feeds from 8833-0492, regular diet, appetite slowly improving. R chest w/lidocaine patch. Purewick in place, good UOP. Up w/A2 to bedside commode, incontinent of stool x 1. Old chest tube dressing CDI. R  Plan: IM procedure consulted today for possible bilateral thoracentesis.  For vital signs and complete assessments, please see documentation flowsheets.     Goal Outcome Evaluation:    Plan of Care Reviewed With: patient     Overall Patient Progress: improving

## 2022-06-24 ENCOUNTER — APPOINTMENT (OUTPATIENT)
Dept: PHYSICAL THERAPY | Facility: CLINIC | Age: 77
DRG: 270 | End: 2022-06-24
Attending: INTERNAL MEDICINE
Payer: MEDICARE

## 2022-06-24 ENCOUNTER — APPOINTMENT (OUTPATIENT)
Dept: OCCUPATIONAL THERAPY | Facility: CLINIC | Age: 77
DRG: 270 | End: 2022-06-24
Attending: INTERNAL MEDICINE
Payer: MEDICARE

## 2022-06-24 LAB
ANION GAP SERPL CALCULATED.3IONS-SCNC: 6 MMOL/L (ref 7–15)
BUN SERPL-MCNC: 37.5 MG/DL (ref 8–23)
CALCIUM SERPL-MCNC: 9.2 MG/DL (ref 8.8–10.2)
CHLORIDE SERPL-SCNC: 92 MMOL/L (ref 98–107)
CREAT SERPL-MCNC: 1.01 MG/DL (ref 0.51–0.95)
DEPRECATED HCO3 PLAS-SCNC: 35 MMOL/L (ref 22–29)
ERYTHROCYTE [DISTWIDTH] IN BLOOD BY AUTOMATED COUNT: 17.5 % (ref 10–15)
GFR SERPL CREATININE-BSD FRML MDRD: 57 ML/MIN/1.73M2
GLUCOSE BLDC GLUCOMTR-MCNC: 108 MG/DL (ref 70–99)
GLUCOSE BLDC GLUCOMTR-MCNC: 113 MG/DL (ref 70–99)
GLUCOSE BLDC GLUCOMTR-MCNC: 129 MG/DL (ref 70–99)
GLUCOSE BLDC GLUCOMTR-MCNC: 134 MG/DL (ref 70–99)
GLUCOSE BLDC GLUCOMTR-MCNC: 85 MG/DL (ref 70–99)
GLUCOSE BLDC GLUCOMTR-MCNC: 89 MG/DL (ref 70–99)
GLUCOSE BLDC GLUCOMTR-MCNC: 93 MG/DL (ref 70–99)
GLUCOSE SERPL-MCNC: 110 MG/DL (ref 70–99)
HCT VFR BLD AUTO: 27.5 % (ref 35–47)
HGB BLD-MCNC: 8.4 G/DL (ref 11.7–15.7)
MAGNESIUM SERPL-MCNC: 2.2 MG/DL (ref 1.7–2.3)
MCH RBC QN AUTO: 28.7 PG (ref 26.5–33)
MCHC RBC AUTO-ENTMCNC: 30.5 G/DL (ref 31.5–36.5)
MCV RBC AUTO: 94 FL (ref 78–100)
PLATELET # BLD AUTO: 325 10E3/UL (ref 150–450)
POTASSIUM SERPL-SCNC: 4.4 MMOL/L (ref 3.4–5.3)
RBC # BLD AUTO: 2.93 10E6/UL (ref 3.8–5.2)
SODIUM SERPL-SCNC: 133 MMOL/L (ref 136–145)
WBC # BLD AUTO: 9.1 10E3/UL (ref 4–11)

## 2022-06-24 PROCEDURE — 36415 COLL VENOUS BLD VENIPUNCTURE: CPT | Performed by: THORACIC SURGERY (CARDIOTHORACIC VASCULAR SURGERY)

## 2022-06-24 PROCEDURE — 99207 PR NO BILLABLE SERVICE THIS VISIT: CPT | Performed by: STUDENT IN AN ORGANIZED HEALTH CARE EDUCATION/TRAINING PROGRAM

## 2022-06-24 PROCEDURE — 94799 UNLISTED PULMONARY SVC/PX: CPT

## 2022-06-24 PROCEDURE — 250N000013 HC RX MED GY IP 250 OP 250 PS 637: Performed by: STUDENT IN AN ORGANIZED HEALTH CARE EDUCATION/TRAINING PROGRAM

## 2022-06-24 PROCEDURE — 250N000013 HC RX MED GY IP 250 OP 250 PS 637: Performed by: SURGERY

## 2022-06-24 PROCEDURE — 85027 COMPLETE CBC AUTOMATED: CPT | Performed by: THORACIC SURGERY (CARDIOTHORACIC VASCULAR SURGERY)

## 2022-06-24 PROCEDURE — 258N000001 HC RX 258: Performed by: STUDENT IN AN ORGANIZED HEALTH CARE EDUCATION/TRAINING PROGRAM

## 2022-06-24 PROCEDURE — 83735 ASSAY OF MAGNESIUM: CPT | Performed by: THORACIC SURGERY (CARDIOTHORACIC VASCULAR SURGERY)

## 2022-06-24 PROCEDURE — 97116 GAIT TRAINING THERAPY: CPT | Mod: GP | Performed by: PHYSICAL THERAPIST

## 2022-06-24 PROCEDURE — 120N000002 HC R&B MED SURG/OB UMMC

## 2022-06-24 PROCEDURE — 250N000013 HC RX MED GY IP 250 OP 250 PS 637: Performed by: PHYSICIAN ASSISTANT

## 2022-06-24 PROCEDURE — 250N000013 HC RX MED GY IP 250 OP 250 PS 637

## 2022-06-24 PROCEDURE — 80048 BASIC METABOLIC PNL TOTAL CA: CPT | Performed by: THORACIC SURGERY (CARDIOTHORACIC VASCULAR SURGERY)

## 2022-06-24 PROCEDURE — 97530 THERAPEUTIC ACTIVITIES: CPT | Mod: GO

## 2022-06-24 PROCEDURE — 97530 THERAPEUTIC ACTIVITIES: CPT | Mod: GP | Performed by: PHYSICAL THERAPIST

## 2022-06-24 PROCEDURE — 250N000013 HC RX MED GY IP 250 OP 250 PS 637: Performed by: THORACIC SURGERY (CARDIOTHORACIC VASCULAR SURGERY)

## 2022-06-24 PROCEDURE — 97535 SELF CARE MNGMENT TRAINING: CPT | Mod: GO

## 2022-06-24 RX ORDER — MIDODRINE HYDROCHLORIDE 5 MG/1
10 TABLET ORAL EVERY 8 HOURS
Status: DISCONTINUED | OUTPATIENT
Start: 2022-06-24 | End: 2022-06-25 | Stop reason: HOSPADM

## 2022-06-24 RX ORDER — ACETAMINOPHEN 325 MG/1
975 TABLET ORAL EVERY 8 HOURS PRN
Status: DISCONTINUED | OUTPATIENT
Start: 2022-06-24 | End: 2022-06-25 | Stop reason: HOSPADM

## 2022-06-24 RX ADMIN — DRONABINOL 5 MG: 2.5 CAPSULE ORAL at 20:07

## 2022-06-24 RX ADMIN — HYDROXYZINE HYDROCHLORIDE 50 MG: 25 TABLET ORAL at 21:20

## 2022-06-24 RX ADMIN — ACETAMINOPHEN 975 MG: 325 TABLET, FILM COATED ORAL at 10:16

## 2022-06-24 RX ADMIN — ACETAMINOPHEN 325MG 975 MG: 325 TABLET ORAL at 08:25

## 2022-06-24 RX ADMIN — FUROSEMIDE 40 MG: 40 TABLET ORAL at 16:15

## 2022-06-24 RX ADMIN — FUROSEMIDE 40 MG: 40 TABLET ORAL at 08:25

## 2022-06-24 RX ADMIN — MIDODRINE HYDROCHLORIDE 20 MG: 5 TABLET ORAL at 08:25

## 2022-06-24 RX ADMIN — HYDROXYZINE HYDROCHLORIDE 50 MG: 25 TABLET ORAL at 10:16

## 2022-06-24 RX ADMIN — NYSTATIN 1000000 UNITS: 100000 SUSPENSION ORAL at 20:07

## 2022-06-24 RX ADMIN — LIDOCAINE PATCH 4% 1 PATCH: 40 PATCH TOPICAL at 20:07

## 2022-06-24 RX ADMIN — DRONABINOL 5 MG: 2.5 CAPSULE ORAL at 08:25

## 2022-06-24 RX ADMIN — MIDODRINE HYDROCHLORIDE 10 MG: 5 TABLET ORAL at 16:15

## 2022-06-24 RX ADMIN — Medication 15 ML: at 08:25

## 2022-06-24 RX ADMIN — Medication 40 MG: at 08:25

## 2022-06-24 RX ADMIN — Medication 1 PACKET: at 08:26

## 2022-06-24 RX ADMIN — MIDODRINE HYDROCHLORIDE 20 MG: 5 TABLET ORAL at 00:07

## 2022-06-24 RX ADMIN — ESCITALOPRAM OXALATE 10 MG: 10 TABLET ORAL at 08:25

## 2022-06-24 RX ADMIN — Medication 10 MG: at 20:07

## 2022-06-24 RX ADMIN — OXYCODONE HYDROCHLORIDE 5 MG: 5 TABLET ORAL at 21:26

## 2022-06-24 RX ADMIN — NYSTATIN 1000000 UNITS: 100000 SUSPENSION ORAL at 08:25

## 2022-06-24 RX ADMIN — NYSTATIN 1000000 UNITS: 100000 SUSPENSION ORAL at 12:05

## 2022-06-24 RX ADMIN — ACETAMINOPHEN 975 MG: 325 TABLET, FILM COATED ORAL at 16:16

## 2022-06-24 RX ADMIN — ACETAMINOPHEN 325MG 975 MG: 325 TABLET ORAL at 00:07

## 2022-06-24 RX ADMIN — APIXABAN 5 MG: 5 TABLET, FILM COATED ORAL at 20:06

## 2022-06-24 RX ADMIN — NYSTATIN 1000000 UNITS: 100000 SUSPENSION ORAL at 16:15

## 2022-06-24 RX ADMIN — DEXTROSE MONOHYDRATE 1000 ML: 100 INJECTION, SOLUTION INTRAVENOUS at 00:08

## 2022-06-24 ASSESSMENT — ACTIVITIES OF DAILY LIVING (ADL)
ADLS_ACUITY_SCORE: 37
ADLS_ACUITY_SCORE: 37
ADLS_ACUITY_SCORE: 41
ADLS_ACUITY_SCORE: 37
ADLS_ACUITY_SCORE: 41
ADLS_ACUITY_SCORE: 37

## 2022-06-24 NOTE — PLAN OF CARE
Major Shift Events:  Pt VSS, afebrile and A&Ox4. Pt is on 3L NC and tolerating it well. Pt did not have thora today due to minimal fluid being present on ultrasound. Pt was taken off of D10 gtt and restarted on regular diet. Cyclic TF will restart tonight as well. Pt having adequate UO. Pt having frequent BM's.    Plan: Continue to monitor and wean O2 as tolerated.     For vital signs and complete assessments, please see documentation flowsheets.

## 2022-06-24 NOTE — PROGRESS NOTES
Cardiovascular Surgery Progress Note  06/24/2022         Assessment and Plan:     Debi Espinoza is a 76 year old female with PMH of HTN, nephrolithiasis, DVT (2019) and PE (2019, 2021) on chronic anticoagulation (Apixiban), and chronic thromboembolic pulmonary hypertension. 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  She has been on lifelong anticoagulation (apixaban) since her second PE earlier this year.  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  Debi is now s/p pulmonary artery thromboendarterectomy on 5/27 with Dr. Peterson.  She required multiple pressors post-op and briefly on milrinone. Extubated 6/1/22. Had difficulty with volume management with ongoing hypotension requiring pressors. Bilateral chest tubes were eventually placed for pleural effusion; right-sided chest tube removed 6/15/22. Currently she continues to recover and improve daily. Now able to wean down her O2 support gradually with slowly decreasing in her flow; aiming for O2 sat >=92%.      Cardiovascular:   S/p Pulmonary artery thromboendarterectomy on 5/27  Hx Severe pulmonary hypertension  Hx RV dilation and reduced RV function  Hx Severe tricuspid insufficiency  Hx Essential HTN  Distributive shock  1st degree AV block  Volume overload  PTA regimen: furosemide 20mg qd, adempas 2.5mg TID, opsumit 10mg qd  Echo on 03/2021 with LVEF of 78%, severe pulmonary hypertension (82 mmHg), reduced RV function (TAPSE 16mm), severe tricuspid insufficiency.  Stayed down in ICU due to orthostatic hypotension episodes and slow weaning of pressors, required multiple fluid boluses. She left ICU on midodrine 20 mg q 8 hours; will wean to 10mg q 8 hrs today and monitor hemodynamics.   Had also required V-pacing for 1st degree AV block. Pacing needs have now resolved and TPW removed without incident.   Echo 6/2 showed LV EF 60-65% with  moderate RV dilation with dysfunction  Echo 6/8 showed LV EF 65-70%, moderate RV dilation with dysfunction  Digoxin loaded for RV support then held when level elevated.  Resumed 62.5 mcg dose 6/23 EOD and monitor level.  ASA NOT indicated.   Statin not indicated.  Continue Lasix at increased dose of 40mg BID; hold further IV diuresis and monitor daily.    Chest tubes: removed  TPW: removed     Pulmonary:  S/p Pulmonary artery thromboendarterectomy on 5/27/2022 by Dr. Peterson  Hx Chronic pulmonary thromboembolic disease  Hx PE (2019, 2021)  Hx Emphysema, moderate  Small right pleural effusion - seen on CT chest 6/9  S/p right pigtail catheter placement on 6/10  Left pleural effusion  PTA regimen: apixaban 5mg BID  CTA 05/2019 demonstrated underlying moderate emphysema  Extubated 5/31  - Supplemental oxygen: stable on NC at 2-5 lpm, wean as tolerated. Refusing BiPAP at times.    - PTA Opsumit, hold Adempas  - 6/9 CT chest with small right pleural effusion  - 6/10 right pigtail catheter placement with IR, noted to be loculated   - Medicine Proceduralist team consulted 6/23 for consideration of US-guided thoracentesis - hold DOAC for likely thora 6/24   - Diuresis, as above   - QID metanebs per RT REORDERED 6/23     Neurology / MSK:  Sternotomy  Delirium, resolved  - Neuro intact  - Acute post-operative pain well controlled with acetaminophen, PO oxycodone PRN, IV dilaudid PRN, Robaxin, lidocaine patch  - Psych consult 6/16, started Lexapro. Seroquel 12.5 / 25 mg BID, previously discontinued      / Renal:  Nephrolithiasis  Hypernatremia  CKD  - PTA lasix 20 mg daily  - CKD reported in pre-op clinic visit; adequate UOP  - Pre-op weight 150 lbs.  Lasix increased to 40 mg PO BID 6/22, extra 20 mg IV 6/20-22, 40 mg IV 6/23. Reassess diuretic need daily  - Strict I/Os, daily standing weights     GI / FEN:   Post-op Dysphagia   Moderate malnutrition in the context of acute illness  - Speech following for diet advancement.  Now on regular diet, continue bowel regimen. Protein supplements as able.   - NJ with cycled tube feeds since 6/16, free water flushes decreased to 30 mL q 6 hrs to optimize volume status in setting of ongoing diuresis  - Replace electrolytes per protocol as needed, hepatic enzymes WNL 6/20  - Marinol 5 mg PO BID since 6/21  - Encourage PO intake, continue to encourage good PO intake, continue calorie counts    Nausea, improving  - Seems to be persistent throughout the day and not associated with tube feeds or medications (but possibly made worse by AM meds). Likely related to C diff.  - continue zofran and compazine prn    C. Diff colitis, improving  Diarrhea, improving  Treatment now complete; see ID, below     Endocrine:  Pre-op Hgb A1C 5.8%.  Stress-induced hyperglycemia   Initially managed on insulin drip postop, transitioned to high resistance sliding scale and since discontinued with demonstrated euglycemia off insulin     Infectious Disease:  Stress-induced leukocytosis  - WBC WNL, remains afebrile  - Completed perioperative antibiotics.  - S/p Meropenem (6/2-6/3) and Zosyn (6/3- 6/6).  - Empiric cefepime (6/10 - 6/17), Vanc (6/10 - 6/13), stopped with neg MRSA swab.  C-diff positive 6/11  - Completed 10 days of PO vancomycin 6/21  Oral Thrush   - Nystatin for 7 days (6/20 - 6/26)     Hematology:   Acute blood loss anemia   Thrombocytopenia, resolved  Thrombocytosis, resolved  Hx DVT (2019), PE (2019, 2021) on chronic anticoagulation (Apixaban)  - Hgb grossly stable  - Got 1 unit PRBC on 6/21 with appropriate response     Anticoagulation:   - ASA not needed per surgeon  - Apixiban for Hx of PE / CTEPH - resume s/p thoracentesis (held last PM and this AM's doses)     Prophylaxis:   - Stress ulcer prophylaxis: Pantoprazole 40 mg daily for 30 days  - DVT prophylaxis: Subcutaneous heparin, SCD     Disposition:   - Transferred to  on 6/17  - Therapies recommending discharge to ARU/TCU once medically stable -  planning for Saturday, following thoracentesis today barring any unexpected complications.     Discussed with Dr. Peterson.    Lissy Ingram, CNP, [facility]-  Cardiothoracic Surgery  Pager [phone]        Interval History:     No overnight events.  States pain is well controlled, states she is doing well.  Tolerating diet and tube feeds but low appetite - feels this is improving though; diarrhea improving. No nausea or vomiting.  AGUILAR, deconditioned, weak.  Denies chest pain, palpitations, dizziness, syncopal symptoms, fevers, chills, myalgias, or sternal popping/clicking.         Physical Exam:   Blood pressure 122/61, pulse 66, temperature 96.8  F (36  C), temperature source Axillary, resp. rate 13, height 1.524 m (5'), weight 71.1 kg (156 lb 12 oz), SpO2 94 %, not currently breastfeeding.  Vitals:    06/19/22 1100 06/20/22 0808 06/21/22 0535   Weight: 73.2 kg (161 lb 6 oz) 70.9 kg (156 lb 4.9 oz) 71.1 kg (156 lb 12 oz)      Admission weight: 68.3 kg    Weight: no current weight  24 hr Fluid status:  -1315 mL  MAPs: 65-88    Gen: NAD, dozing in bed  Neuro: A&O, after rousing to voice, no focal deficits   CV: NSR on telemetry monitor, S1, S2  Pulm: Unlabored breathing on supplemental oxygen via oxyplus, CTA - ?mild R base crackles  Abd: nondistended, soft, non-tender, hypoactive bowel sounds  Ext: No gross deformities, mild non-pitting peripheral edema  Incision: clean, dry, intact, no erythema, sternum stable  Tubes/drain sites: C/D/I without surrounding erythema or drainage         Data:    Imaging:  CXR pending    No results found for this or any previous visit (from the past 24 hour(s)).    Labs:  BMP  Recent Labs   Lab 06/24/22  0822 06/24/22  0651 06/24/22  0411 06/24/22  0019 06/23/22  0812 06/23/22  0651 06/22/22  0638 06/21/22  1240 06/21/22  0638   NA  --  133*  --   --   --  136 136  --  139   POTASSIUM  --  4.4  --   --   --  4.7* 5.2*  --  5.2   CHLORIDE  --  92*  --   --   --  95* 96*  --  102   CHELE   --  9.2  --   --   --  9.2 9.3  --  8.6   CO2  --  35*  --   --   --  32* 34*  --  34*   BUN  --  37.5*  --   --   --  37.7* 38.4*  --  42*   CR  --  1.01*  --   --   --  1.06* 1.07*  --  1.12*   * 110* 108* 129*   < > 119* 133*   < > 116*    < > = values in this interval not displayed.     CBC  Recent Labs   Lab 06/24/22  0651 06/23/22  0651 06/22/22  0638 06/21/22  0638   WBC 9.1 11.2* 12.1* 11.1*   RBC 2.93* 3.00* 2.97* 2.63*   HGB 8.4* 8.7* 8.6* 7.4*   HCT 27.5* 28.0* 27.6* 25.2*   MCV 94 93 93 96   MCH 28.7 29.0 29.0 28.1   MCHC 30.5* 31.1* 31.2* 29.4*   RDW 17.5* 17.7* 18.0* 18.0*    350 354 359     INR  Recent Labs   Lab 06/23/22  0914   INR 1.71*      Hepatic Panel  Recent Labs   Lab 06/20/22  0959   AST 37   ALT 27   ALKPHOS 107   BILITOTAL 0.2   ALBUMIN 1.8*     GLUCOSE:   Recent Labs   Lab 06/24/22  0822 06/24/22  0651 06/24/22  0411 06/24/22  0019 06/23/22 2033 06/23/22  1743   * 110* 108* 129* 102* 116*

## 2022-06-24 NOTE — PLAN OF CARE
NURSING PROGRESS NOTE  Shift Summary      Date: June 24, 2022     Neuro/Musculoskeletal:  A&Ox4. Moves all extremities.   Cardiac:  SR.  VSS.  2+ edema BLE, compression socks removed at bedtime.    Respiratory:  Sating in the 90s on 5L oxymask.  GI/:  Adequate urine output.  1 small BM overnight.   Diet/Appetite:  Tolerating regular diet with cyclic TF overnight.   Activity:  Assist of 2, stand pivot transfer.    Pain:  Rates pain 9/10, medicated with PRN oxycodone and atarax with good effect. Lidocaine patch in place.   Skin:  No new deficits noted.   LDAs + Drips/IVF:  Double lumen PICC in RUE, PIV in RUE. D10 infusing since midnight in place of TF.   Protocols/Labs:  AM labs ordered.     Pertinent Shift Updates:  Patient slept between cares, no acute events overnight. NPO since midnight for thoracentesis today.       Plan:  Continue to monitor patient.       Lola Gonzales RN  .................................................... June 24, 2022   6:20 AM  [facility] ([facility]): [facility] ICU (Unit 6D)

## 2022-06-24 NOTE — PROGRESS NOTES
Care Management Follow Up    Length of Stay (days): 30    Expected Discharge Date: 06/25/2022     Concerns to be Addressed:     Discharge planning  Patient plan of care discussed at interdisciplinary rounds: Yes    Anticipated Discharge Disposition:  [facility]     Anticipated Discharge Services:  Therapies  Anticipated Discharge DME:  TBD    Patient/family educated on Medicare website which has current facility and service quality ratings:  YES  Education Provided on the Discharge Plan:  Yes  Patient/Family in Agreement with the Plan:  Yes    Referrals Placed by CM/SW:  [facility] [place], [phone].  Private pay costs discussed: Not applicable    Additional Information:  SW left VM with [facility] [phone]. SW identified that at this time Pt will be discharging in the AM via family transportation. SW left VM with on-call Nurse Practitioner for Cardiothoracic team and paged KERRI Ingram at [phone] to confirm Pt is medically ready for discharge. Per discussion with tx team baring complications from today's procedure Pt will be med ready in the AM.    ________________    DESMOND Gilbert, LICSW  6D   [facility]  Phone: [phone]  Pager: [phone]  Fax: [phone]

## 2022-06-24 NOTE — CONSULTS
CAPs was consulted regarding b/l effusions. Images uploaded. Effusions to small to safely drain fluid. Primary team was contacted about the same.     Sam Qureshi MD   [facility]

## 2022-06-25 VITALS
HEIGHT: 60 IN | RESPIRATION RATE: 14 BRPM | WEIGHT: 156.75 LBS | HEART RATE: 73 BPM | DIASTOLIC BLOOD PRESSURE: 62 MMHG | BODY MASS INDEX: 30.77 KG/M2 | OXYGEN SATURATION: 97 % | TEMPERATURE: 97.6 F | SYSTOLIC BLOOD PRESSURE: 126 MMHG

## 2022-06-25 LAB
ANION GAP SERPL CALCULATED.3IONS-SCNC: 11 MMOL/L (ref 7–15)
BUN SERPL-MCNC: 33.9 MG/DL (ref 8–23)
CALCIUM SERPL-MCNC: 9.7 MG/DL (ref 8.8–10.2)
CHLORIDE SERPL-SCNC: 92 MMOL/L (ref 98–107)
CREAT SERPL-MCNC: 0.94 MG/DL (ref 0.51–0.95)
DEPRECATED HCO3 PLAS-SCNC: 32 MMOL/L (ref 22–29)
ERYTHROCYTE [DISTWIDTH] IN BLOOD BY AUTOMATED COUNT: 17.6 % (ref 10–15)
GFR SERPL CREATININE-BSD FRML MDRD: 62 ML/MIN/1.73M2
GLUCOSE BLDC GLUCOMTR-MCNC: 102 MG/DL (ref 70–99)
GLUCOSE BLDC GLUCOMTR-MCNC: 119 MG/DL (ref 70–99)
GLUCOSE BLDC GLUCOMTR-MCNC: 95 MG/DL (ref 70–99)
GLUCOSE SERPL-MCNC: 122 MG/DL (ref 70–99)
HCT VFR BLD AUTO: 30.7 % (ref 35–47)
HGB BLD-MCNC: 9.3 G/DL (ref 11.7–15.7)
MAGNESIUM SERPL-MCNC: 2.2 MG/DL (ref 1.7–2.3)
MCH RBC QN AUTO: 28.6 PG (ref 26.5–33)
MCHC RBC AUTO-ENTMCNC: 30.3 G/DL (ref 31.5–36.5)
MCV RBC AUTO: 95 FL (ref 78–100)
PLATELET # BLD AUTO: 347 10E3/UL (ref 150–450)
POTASSIUM SERPL-SCNC: 4.6 MMOL/L (ref 3.4–5.3)
RBC # BLD AUTO: 3.25 10E6/UL (ref 3.8–5.2)
SODIUM SERPL-SCNC: 135 MMOL/L (ref 136–145)
WBC # BLD AUTO: 9.4 10E3/UL (ref 4–11)

## 2022-06-25 PROCEDURE — 250N000013 HC RX MED GY IP 250 OP 250 PS 637: Performed by: PHYSICIAN ASSISTANT

## 2022-06-25 PROCEDURE — 80048 BASIC METABOLIC PNL TOTAL CA: CPT | Performed by: THORACIC SURGERY (CARDIOTHORACIC VASCULAR SURGERY)

## 2022-06-25 PROCEDURE — 36415 COLL VENOUS BLD VENIPUNCTURE: CPT | Performed by: THORACIC SURGERY (CARDIOTHORACIC VASCULAR SURGERY)

## 2022-06-25 PROCEDURE — 85027 COMPLETE CBC AUTOMATED: CPT | Performed by: THORACIC SURGERY (CARDIOTHORACIC VASCULAR SURGERY)

## 2022-06-25 PROCEDURE — 250N000013 HC RX MED GY IP 250 OP 250 PS 637: Performed by: THORACIC SURGERY (CARDIOTHORACIC VASCULAR SURGERY)

## 2022-06-25 PROCEDURE — 250N000013 HC RX MED GY IP 250 OP 250 PS 637: Performed by: SURGERY

## 2022-06-25 PROCEDURE — 83735 ASSAY OF MAGNESIUM: CPT | Performed by: THORACIC SURGERY (CARDIOTHORACIC VASCULAR SURGERY)

## 2022-06-25 PROCEDURE — 250N000013 HC RX MED GY IP 250 OP 250 PS 637: Performed by: STUDENT IN AN ORGANIZED HEALTH CARE EDUCATION/TRAINING PROGRAM

## 2022-06-25 PROCEDURE — 250N000013 HC RX MED GY IP 250 OP 250 PS 637

## 2022-06-25 PROCEDURE — 250N000011 HC RX IP 250 OP 636: Performed by: THORACIC SURGERY (CARDIOTHORACIC VASCULAR SURGERY)

## 2022-06-25 RX ORDER — AMINO AC/PROTEIN HYDR/WHEY PRO 10G-100/30
1 LIQUID (ML) ORAL DAILY
DISCHARGE
Start: 2022-06-26 | End: 2022-08-24

## 2022-06-25 RX ORDER — OXYCODONE HYDROCHLORIDE 5 MG/1
5 TABLET ORAL EVERY 6 HOURS PRN
Qty: 10 TABLET | Refills: 0 | Status: SHIPPED | DISCHARGE
Start: 2022-06-25 | End: 2022-07-29

## 2022-06-25 RX ORDER — LIDOCAINE 4 G/G
1 PATCH TOPICAL EVERY 24 HOURS
DISCHARGE
Start: 2022-06-25 | End: 2022-08-24

## 2022-06-25 RX ORDER — PHENOL 1.4 %
10 AEROSOL, SPRAY (ML) MUCOUS MEMBRANE EVERY EVENING
DISCHARGE
Start: 2022-06-25 | End: 2022-08-24

## 2022-06-25 RX ORDER — IPRATROPIUM BROMIDE AND ALBUTEROL SULFATE 2.5; .5 MG/3ML; MG/3ML
3 SOLUTION RESPIRATORY (INHALATION) EVERY 4 HOURS PRN
Qty: 90 ML | DISCHARGE
Start: 2022-06-25 | End: 2022-08-24

## 2022-06-25 RX ORDER — HYDROXYZINE HYDROCHLORIDE 25 MG/1
25 TABLET, FILM COATED ORAL EVERY 8 HOURS PRN
DISCHARGE
Start: 2022-06-25 | End: 2022-08-24

## 2022-06-25 RX ORDER — ONDANSETRON 4 MG/1
4 TABLET, ORALLY DISINTEGRATING ORAL EVERY 12 HOURS PRN
DISCHARGE
Start: 2022-06-25 | End: 2022-08-24

## 2022-06-25 RX ORDER — METHOCARBAMOL 500 MG/1
500 TABLET, FILM COATED ORAL EVERY 6 HOURS PRN
DISCHARGE
Start: 2022-06-25 | End: 2022-08-24

## 2022-06-25 RX ORDER — GUAIFENESIN 600 MG/1
15 TABLET, EXTENDED RELEASE ORAL DAILY
DISCHARGE
Start: 2022-06-26 | End: 2022-08-24

## 2022-06-25 RX ORDER — MIDODRINE HYDROCHLORIDE 10 MG/1
10 TABLET ORAL EVERY 8 HOURS
DISCHARGE
Start: 2022-06-25 | End: 2022-08-24

## 2022-06-25 RX ORDER — DIGOXIN 0.06 MG/1
62.5 TABLET ORAL
DISCHARGE
Start: 2022-06-25 | End: 2022-08-24

## 2022-06-25 RX ORDER — ACETAMINOPHEN 325 MG/1
975 TABLET ORAL EVERY 8 HOURS PRN
DISCHARGE
Start: 2022-06-25

## 2022-06-25 RX ORDER — ESCITALOPRAM OXALATE 10 MG/1
10 TABLET ORAL DAILY
DISCHARGE
Start: 2022-06-26 | End: 2022-08-24

## 2022-06-25 RX ORDER — NYSTATIN 100000/ML
1000000 SUSPENSION, ORAL (FINAL DOSE FORM) ORAL 4 TIMES DAILY
DISCHARGE
Start: 2022-06-25 | End: 2022-08-24

## 2022-06-25 RX ADMIN — NYSTATIN 1000000 UNITS: 100000 SUSPENSION ORAL at 08:12

## 2022-06-25 RX ADMIN — ACETAMINOPHEN 975 MG: 325 TABLET, FILM COATED ORAL at 13:27

## 2022-06-25 RX ADMIN — MIDODRINE HYDROCHLORIDE 10 MG: 5 TABLET ORAL at 08:11

## 2022-06-25 RX ADMIN — ACETAMINOPHEN 975 MG: 325 TABLET, FILM COATED ORAL at 00:43

## 2022-06-25 RX ADMIN — METHOCARBAMOL 500 MG: 500 TABLET ORAL at 00:42

## 2022-06-25 RX ADMIN — MIDODRINE HYDROCHLORIDE 10 MG: 5 TABLET ORAL at 00:00

## 2022-06-25 RX ADMIN — ONDANSETRON 4 MG: 4 TABLET, ORALLY DISINTEGRATING ORAL at 13:28

## 2022-06-25 RX ADMIN — ESCITALOPRAM OXALATE 10 MG: 10 TABLET ORAL at 08:12

## 2022-06-25 RX ADMIN — OXYCODONE HYDROCHLORIDE 5 MG: 5 TABLET ORAL at 04:55

## 2022-06-25 RX ADMIN — NYSTATIN 1000000 UNITS: 100000 SUSPENSION ORAL at 12:00

## 2022-06-25 RX ADMIN — Medication 15 ML: at 08:11

## 2022-06-25 RX ADMIN — DIGOXIN 62.5 MCG: 0.06 TABLET ORAL at 13:30

## 2022-06-25 RX ADMIN — APIXABAN 5 MG: 5 TABLET, FILM COATED ORAL at 08:12

## 2022-06-25 RX ADMIN — HYDROXYZINE HYDROCHLORIDE 50 MG: 25 TABLET ORAL at 04:55

## 2022-06-25 RX ADMIN — DRONABINOL 5 MG: 2.5 CAPSULE ORAL at 08:11

## 2022-06-25 RX ADMIN — FUROSEMIDE 40 MG: 40 TABLET ORAL at 08:12

## 2022-06-25 RX ADMIN — Medication 40 MG: at 08:30

## 2022-06-25 RX ADMIN — HYDROXYZINE HYDROCHLORIDE 50 MG: 25 TABLET ORAL at 13:28

## 2022-06-25 ASSESSMENT — ACTIVITIES OF DAILY LIVING (ADL)
ADLS_ACUITY_SCORE: 37

## 2022-06-25 NOTE — PROGRESS NOTES
Pt discharged via family. Pt VSS, afebrile and A&Ox4. Pt sent with all belongings. Pt sent with home health oxygen tank running @ 2L/min.

## 2022-06-25 NOTE — DISCHARGE SUMMARY
[facility], [place]   Cardiothoracic Surgery Hospital Discharge Summary     Debi Espinoza MRN# [id number]   Age: 76 year old YOB: 1945     Admitting Physician:  Jg Kidd MD  Discharge Physician:  Lissy Ingram NP  Primary Care Physician:        Lisette Knox      DATE OF ADMISSION: 5/25/2022      DATE OF DISCHARGE: 06/25/22    Admit Wt: 150 lbs 9.6 oz  Discharge Wt: 156 lbs 11.95 oz         Primary Diagnoses:     Chronic thromboembolic pulmonary hypertension    Pulmonary embolism          Secondary Diagnoses:     RV dilation and reduced function    Distributive shock    Volume overload    Emphysema    Bilateral pleural effusions    Delirium, resolved    Nephrolithiasis    Hypernatremia    CKD    Post-op dysphagia    Moderate malnutrition in the context of acute illness    Nausea    C. Diff infection, resolved    Thrush    Thrombocytopenia, resolved    Thrombocytosis, resolved    Acute post-operative pain    Chronic anticoagulation    Stress-induced hyperglycemia    Stress-induced leukocytosis    Acute blood loss anemia    PROCEDURES PERFORMED:   Date: 5/27/2022    Surgeon: Pat Peterson and Janice  1. Median sternotomy and cardiopulmonary bypass with profound hypothermic circulatory arrest  2. Right pulmonary endarterectomy  3. Left pulmonary endarterectomy  4. Patch repair of right pulmonary artery    INTRAOPERATIVE FINDINGS:    1. Right pulmonary endarterectomy specimen extracted under circulatory arrest  2. Left pulmonary endarterectomy specimen extracted under circulatory arrest        Preop (Cath) Preop (OR) Post op   AoP 150/82/110 103/49/68 102/44/60   PAP 77/30/44 85/30/49 44/25/30    800 500   CO 3.29 3.9 3.2   CVP 6 15 11      PATHOLOGY RESULTS:    Final Diagnosis   Site not specified, endarterectomy:  -Organizing thromboembolism  -Portion of elastic artery   Electronically signed by Rachna Odell MD on 6/9/2022 at  8:51  PM     CULTURE RESULTS:      5/30/2022:  Sputum   4+ Klebsiella oxytoca Abnormal     4+ Klebsiella oxytoca Abnormal     2+ Streptococcus constellatus Abnormal        CONSULTS:      PT/OT    BRIEF HISTORY OF ILLNESS:  Debi Espinoza is a 76 year old female with PMH of HTN, nephrolithiasis, DVT (2019) and PE (2019, 2021) on chronic anticoagulation, and chronic thromboembolic pulmonary hypertension.    HOSPITAL COURSE: Debi Espinoza is a 76 year old female who on 5/25/2022 underwent the above-named procedures and tolerated the operation well.     Postoperatively was admitted to the CVICU.  Patient was extubated on POD #6.  Blood pressure and cardiac index were managed with vasopressors and inotropic agents which were continuously weaned until no longer needed.  She remained dependent on supplemental oxygen throughout her admission and at discharge.     Patient was transiently hyperglycemic post-operatively and treated with insulin infusion then transitioned to sliding scale insulin per protocol. Blood sugars remained stable such that exogenous insulin was no longer required and no further glycemic control agents will be required at discharge.    Patient was subsequently transferred to the step-down telemetry unit.    While on the surgical unit, the patient continued to progress well. Chest tubes and temporary pacemaker wires were removed when deemed appropriate.     Patient was fluid overloaded and treated with diuretics. She remains up about 6 lbs from preoperative weight.  She continue her PTA regimen of 20mg Lasix daily at discharge.    Her post-operative course was complicated by   -  acute on chronic hypoxic respiratory failure treated with bipap, nebs, and metaneb therapy during the course of her admission,   - RV dysfunction requiring digoxin loading and ongoing EOD dosing for maintenance, level monitoring recommended following discharge,   - hypotension requiring TID midodrine support, this was weaned from  20 mg TID to 10 mg TID prior to discharge,   - clostridium difficile infection for which she completed a course of oral vancomycin,   - post-operative dysphagia for which SLP was consulted and diet eventually advanced to regular   - moderate malnutrition for which she has been supported by cycled tube feeding via small bore NJ feeding tube, Dietitian consultation, calorie counts, and Marinol for appetite stimulation   - bilateral pleural effusions for which she had a right pigtail catheter placed by interventional radiology; left effusion managed expectantly as this was found to be too small to drain via thoracentesis,   - delirium which was resolved by the time of hospital discharge,   - volume overload for which she was treated with IV and oral Lasix,   - nausea that was treated with prn antiemetics, improved at the time of hospital discharge such that regular prn administration was no longer needed,   - oral thrush, for which she was started on a 7-day course of nystatin (due to be complete 6/26),   - hematological abnormalities without evidence of ongoing bleeding at the time of hospital discharge.  Last transfusion was 1 unit PRBC on 6/21/2022    She was restarted on her PTA apixiban and will discharge with ongoing chronic anticoagulation.  Her PTA macitentan was resumed while the PTA riociguat was held through hospitalization and at discharge.     Prior to discharge, his pain was adequately controlled on a multimodal regimen of oral and topical analgesics, she was working well with therapies, ambulate with assistance, and had full return of bowel and bladder function.  On 06/25/22 he was discharged to acute rehab in stable condition. Follow up with Cardiology and Cardiac Surgery are recommended, including follow up with Dr. Peterson in about 2 weeks. Pt encouraged to follow up with PCP and Cardiac Rehab upon discharge.    Patient discharged on aspirin:  No, not indicated  Patient discharged on beta blocker: no,  soft BPs supported by midodrine   Patient discharged on ACE Inhibitor/ARB: no, contraindicated d/t soft BPs    Patient discharged on statin: no, not indicated    I certify that this patient, Debi Espinoza has been under my care. This is the face-to-face encounter for oxygen medical necessity.      Debi Espinoza is now in a chronic stable state and continues to require supplemental oxygen. Patient has continued oxygen desaturation due to Pulmonary Hypertension I27.20.    Alternative treatment(s) tried or considered and deemed clinically infective for treatment of Pulmonary Hypertension I27.20 include nebulizers.         Discharge Disposition:     Discharged to rehabilitation facility            Condition on Discharge:     Discharge condition: Stable   Discharge vitals: /53 (BP Location: Left arm)   Pulse 75   Temp 97.5  F (36.4  C) (Oral)   Resp 16   Ht 1.524 m (5')   Wt 71.1 kg (156 lb 12 oz)   SpO2 98%   BMI 30.61 kg/m     Code status on discharge: Full Code     Vitals:    06/19/22 1100 06/20/22 0808 06/21/22 0535   Weight: 73.2 kg (161 lb 6 oz) 70.9 kg (156 lb 4.9 oz) 71.1 kg (156 lb 12 oz)         DAY OF DISCHARGE PHYSICAL EXAM:    Gen: NAD, sitting up in recliner chair  Neuro: A&Ox4, no focal deficits   CV: RRR - sinus on monitor, S1 S2  Pulm: unlabored breathing on supplemental oxygen via NC  Abd: nondistended, soft, nontender  Ext: mild, non-pitting peripheral edema  Incision: clean, dry, intact, no erythema, sternum stable  Tubes/drain sites: dressing clean and dry without surrounding erythema    BMP  Recent Labs   Lab 06/25/22  0804 06/25/22  0721 06/25/22  0408 06/25/22  0007 06/24/22  0822 06/24/22  0651 06/23/22  0812 06/23/22  0651 06/22/22  0638   NA  --  135*  --   --   --  133*  --  136 136   POTASSIUM  --  4.6  --   --   --  4.4  --  4.7* 5.2*   CHLORIDE  --  92*  --   --   --  92*  --  95* 96*   CHELE  --  9.7  --   --   --  9.2  --  9.2 9.3   CO2  --  32*  --   --   --  35*  --  32*  34*   BUN  --  33.9*  --   --   --  37.5*  --  37.7* 38.4*   CR  --  0.94  --   --   --  1.01*  --  1.06* 1.07*   * 122* 119* 95   < > 110*   < > 119* 133*    < > = values in this interval not displayed.     CBC  Recent Labs   Lab 22  0721 22  0651 22  0651 22  0638   WBC 9.4 9.1 11.2* 12.1*   RBC 3.25* 2.93* 3.00* 2.97*   HGB 9.3* 8.4* 8.7* 8.6*   HCT 30.7* 27.5* 28.0* 27.6*   MCV 95 94 93 93   MCH 28.6 28.7 29.0 29.0   MCHC 30.3* 30.5* 31.1* 31.2*   RDW 17.6* 17.5* 17.7* 18.0*    325 350 354     INR  Recent Labs   Lab 22  0914   INR 1.71*      Hepatic Panel  Recent Labs   Lab 22  0959   AST 37   ALT 27   ALKPHOS 107   BILITOTAL 0.2   ALBUMIN 1.8*     Recent Labs   Lab 22  0804 22  0721 22  0408 22  0007 22  2124 22  2037   * 122* 119* 95 85 89       ECHOCARDIOGRAM  Recent Results (from the past 4320 hour(s))   Echo Limited   Result Value    LVEF  65-70%    [facility]    [id number]  [id number]  [id number]  [id number]^NIRANJAN^LILIBETH     [facility],[place]  Echocardiography Laboratory  [address] [place] [id number]     Name: CARLOS LEGER  MRN: [id number]  : 1945  Study Date: 2022 08:02 AM  Age: 76 yrs  Gender: Female  Patient Location: [facility]  Reason For Study: Heart Failure  Ordering Physician: LILIBETH UREÑA  Performed By: Yane Locke     BSA: 1.6 m2  Height: 60 in  Weight: 146 lb  HR: 82  BP: 106/54 mmHg  ______________________________________________________________________________  Procedure  Limited Portable Echo Adult.  ______________________________________________________________________________  Interpretation Summary  Global and regional left ventricular function is hyperkinetic with an EF of  65-70%.  Moderate right ventricular dilation with severely reduced global right  ventricular function.  Moderate tricuspid insufficiency.  Estimated pulmonary artery systolic  pressure is 57 mmHg plus right atrial  pressure.     This study was compared with the study from 22.  Estimated PA pressure is lower. No change in LV/RV function.  ______________________________________________________________________________  Left Ventricle  Global and regional left ventricular function is hyperkinetic with an EF of  65-70%. Flattened septum is consistent with right ventricular pressure  overload.     Right Ventricle  Moderate right ventricular dilation is present. Global right ventricular  function is severely reduced.     Mitral Valve  The mitral valve is normal. Trace mitral insufficiency is present.     Tricuspid Valve  Moderate tricuspid insufficiency is present. Estimated pulmonary artery  systolic pressure is 57 mmHg plus right atrial pressure.     Pulmonic Valve  The pulmonic valve is normal. Mild pulmonic insufficiency is present.     Vessels  Unable to assess mean RA pressure given the patient is on a ventilator.     Pericardium  No pericardial effusion is present.     Compared to Previous Study  This study was compared with the study from 22 .     ______________________________________________________________________________  Doppler Measurements & Calculations  TR max gurwinder: 367.6 cm/sec  TR max P.8 mmHg     ______________________________________________________________________________  Report approved by: Mariama Juares 2022 09:54 AM         Echo Complete   Result Value    LVEF  60-65%    Narrative    [id number]  [id number]  [id number]  [id number]^VELO^PEREZ^MILLER     [facility],[place]  Echocardiography Laboratory  [address] [place] [address]     Name: CARLOS LEGER  MRN: [id number]  : 1945  Study Date: 2022 01:08 PM  Age: 76 yrs  Gender: Female  Patient Location: [facility]  Reason For Study: Dyspnea  Ordering Physician: PEREZ POWELL  Performed By: Naresh Bonner RDCS     BSA: 1.6 m2  Height: 60 in  Weight: 145  lb  HR: 101  BP: 93/40 mmHg  ______________________________________________________________________________  Procedure  Complete Portable Echo Adult.  ______________________________________________________________________________  Interpretation Summary  Global and regional left ventricular function is normal with an EF of 60-65%.  LV appears underfilled. Flattened septum is consistent with right ventricular  pressure overload.  Moderate right ventricular dilation with severely reduced global right  ventricular function.  Moderate tricuspid insufficiency.  Severe pulmonary hypertension. Estimated pulmonary artery systolic pressure is  85 mmHg plus right atrial pressure.  Unable to visualize IVC.  No pericardial effusion.     Compared to prior TTE, RV is slightly less dilated, function appears similar.  ______________________________________________________________________________  Left Ventricle  Global and regional left ventricular function is normal with an EF of 60-65%.  Left ventricular wall thickness is normal. Left ventricle is small and appears  underfilled. Left ventricular diastolic function is normal. Flattened septum  is consistent with right ventricular pressure overload.     Right Ventricle  Moderate right ventricular dilation is present. Global right ventricular  function is severely reduced.     Atria  Both atria appear normal. The atrial septum is intact as assessed by agitated  saline bubble study .     Mitral Valve  The mitral valve is normal.     Aortic Valve  The aortic valve is tricuspid. On Doppler interrogation, there is no  significant stenosis or regurgitation.     Tricuspid Valve  Moderate tricuspid insufficiency is present. Estimated pulmonary artery  systolic pressure is 85 mmHg plus right atrial pressure. Severe pulmonary  hypertension is present.     Pulmonic Valve  The pulmonic valve is normal. Trace to mild pulmonic insufficiency is present.     Vessels  The aorta root is normal. The  thoracic aorta cannot be assessed. The inferior  vena cava cannot be assessed.     Pericardium  No pericardial effusion is present.     Miscellaneous  D-shaped interventricular septum and septal bounce present signifying right  ventricular pressure and  volume overload.     Compared to Previous Study  This study was compared with the study from 2021 . Compared to prior TTE  TR, RV is slightly less dilated, function appears similar.     Attestation  Reji Stewart.  ______________________________________________________________________________  MMode/2D Measurements & Calculations  IVSd: 1.0 cm  LVIDd: 3.0 cm  LVIDs: 1.6 cm  LVPWd: 0.84 cm  FS: 48.3 %  LV mass(C)d: 74.6 grams  LV mass(C)dI: 45.8 grams/m2  Ao root diam: 2.9 cm  LVOT diam: 1.9 cm  LVOT area: 2.8 cm2  LA Volume (BP): 37.8 ml     LA Volume Index (BP): 23.2 ml/m2  RWT: 0.56     Doppler Measurements & Calculations  MV E max gurwinder: 105.0 cm/sec  MV A max gurwinder: 110.0 cm/sec  MV E/A: 0.95  MV dec slope: 1166 cm/sec2  MV dec time: 0.09 sec  TR max gurwinder: 398.5 cm/sec  TR max P.2 mmHg     ______________________________________________________________________________  Report approved by: Mariama Juares 2022 02:52 PM             CXR:  No results found for this or any previous visit (from the past 24 hour(s)).      PRE-ADMISSION MEDICATIONS:  Medications Prior to Admission   Medication Sig Dispense Refill Last Dose     furosemide (LASIX) 20 MG tablet Take 20 mg by mouth daily   2022 at 1200     macitentan (OPSUMIT) 10 MG tablet Take 10 mg by mouth daily (with lunch)  3 2022 at 1200     apixaban ANTICOAGULANT (ELIQUIS) 5 MG tablet Take 1 tablet (5 mg) by mouth 2 times daily 60 tablet 3 2022 at 0800     [DISCONTINUED] acetaminophen (TYLENOL) 500 MG tablet Take 1,000 mg by mouth every 6 hours as needed   2022 at 0700     [DISCONTINUED] riociguat (ADEMPAS) 2.5 MG tablet Take 2.5 mg by mouth 3 times daily   2022 at 0730          DISCHARGE MEDICATIONS:      Review of your medicines      START taking      Dose / Directions   digoxin 62.5 MCG tablet  Commonly known as: LANOXIN      Dose: 62.5 mcg  Take 1 tablet (62.5 mcg) by mouth every 48 hours  Refills: 0     escitalopram 10 MG tablet  Commonly known as: LEXAPRO      Dose: 10 mg  Start taking on: June 26, 2022  Take 1 tablet (10 mg) by mouth daily  Refills: 0     hydrOXYzine 25 MG tablet  Commonly known as: ATARAX      Dose: 25 mg  1 tablet (25 mg) by Oral or Feeding Tube route every 8 hours as needed for anxiety  Refills: 0     ipratropium - albuterol 0.5 mg/2.5 mg/3 mL 0.5-2.5 (3) MG/3ML neb solution  Commonly known as: DUONEB      Dose: 3 mL  Take 1 vial (3 mLs) by nebulization every 4 hours as needed for wheezing  Quantity: 90 mL  Refills: 0     Lidocaine 4 % Patch  Commonly known as: LIDOCARE      Dose: 1 patch  Place 1 patch onto the skin every 24 hours To prevent lidocaine toxicity, patient should be patch free for 12 hrs daily.  Refills: 0     Melatonin 10 MG Tabs tablet      Dose: 10 mg  1 tablet (10 mg) by Oral or Feeding Tube route every evening  Refills: 0     methocarbamol 500 MG tablet  Commonly known as: ROBAXIN      Dose: 500 mg  1 tablet (500 mg) by Oral or Feeding Tube route every 6 hours as needed for muscle spasms  Refills: 0     midodrine 10 MG tablet  Commonly known as: PROAMATINE      Dose: 10 mg  Take 1 tablet (10 mg) by mouth every 8 hours  Refills: 0     multivitamins w/minerals liquid      Dose: 15 mL  Start taking on: June 26, 2022  15 mLs by Per Feeding Tube route daily  Refills: 0     nystatin 245680 UNIT/ML suspension  Commonly known as: MYCOSTATIN  Indication: Candidiasis Fungal Infection of the Oropharynx      Dose: 1,000,000 Units  Take 10 mLs (1,000,000 Units) by mouth 4 times daily  Refills: 0     ondansetron 4 MG ODT tab  Commonly known as: ZOFRAN ODT      Dose: 4 mg  Take 1 tablet (4 mg) by mouth every 12 hours as needed for nausea or  vomiting  Refills: 0     oxyCODONE 5 MG tablet  Commonly known as: ROXICODONE      Dose: 5 mg  1 tablet (5 mg) by Oral or Feeding Tube route every 6 hours as needed for breakthrough pain  Quantity: 10 tablet  Refills: 0     pantoprazole 2 mg/mL Susp suspension  Commonly known as: PROTONIX      Dose: 40 mg  Take 20 mLs (40 mg) by mouth every morning (before breakfast) for 14 days  Refills: 0     protein modular Liqd      Dose: 1 packet  Start taking on: June 26, 2022  1 packet by Per Feeding Tube route daily  Refills: 0        CONTINUE these medicines which may have CHANGED, or have new prescriptions. If we are uncertain of the size of tablets/capsules you have at home, strength may be listed as something that might have changed.      Dose / Directions   acetaminophen 325 MG tablet  Commonly known as: TYLENOL  This may have changed:     medication strength    how much to take    how to take this    when to take this    reasons to take this      Dose: 975 mg  3 tablets (975 mg) by Oral or Feeding Tube route every 8 hours as needed for mild pain or fever  Refills: 0        CONTINUE these medicines which have NOT CHANGED      Dose / Directions   apixaban ANTICOAGULANT 5 MG tablet  Commonly known as: ELIQUIS      Dose: 5 mg  Take 1 tablet (5 mg) by mouth 2 times daily  Quantity: 60 tablet  Refills: 3     furosemide 20 MG tablet  Commonly known as: LASIX      Dose: 20 mg  Take 20 mg by mouth daily  Refills: 0     macitentan 10 MG tablet  Commonly known as: OPSUMIT      Dose: 10 mg  Take 10 mg by mouth daily (with lunch)  Refills: 3        STOP taking    riociguat 2.5 MG tablet  Commonly known as: ADEMPAS              Where to get your medicines      Information about where to get these medications is not yet available    Ask your nurse or doctor about these medications    oxyCODONE 5 MG tablet         Lisette Hunt, CNP, ACNPC-AG  [facility]   Cardiothoracic Surgery  Office  phone: [phone]  Office fax: [phone]

## 2022-06-25 NOTE — PROGRESS NOTES
Care Management Follow Up    Length of Stay (days): 31    Expected Discharge Date: 06/25/2022     Concerns to be Addressed: Transport oxygen       Patient plan of care discussed at interdisciplinary rounds: Yes    Anticipated Discharge Disposition: [facility]U ([facility])     Anticipated Discharge Services: ARU  Anticipated Discharge DME: Transport oxygen    Additional Information:  This received update from PA and bedside RN that pt needs oxygen for transport. Per family, pt has home oxygen, likely through [facility] but the tanks are at pt's home back in [place] and family is already here in [place] to transport patient. Family stating they were not aware they were supposed to bring portable tank.     This writer called:  [facility]  Ph: [phone]    This writer called and spoke with their on call representative Yassine who stated he is working from home and is unable to confirm if pt's oxygen is provided by them. Yassine also stated that [facility] is out of their 50 mile delivery radius so they would not be able to delivery a portable tank on the weekend or weekday.     This writer then called [facility] (Ph: [phone]) and spoke with Ana the on call respiratory rep (cell: [phone]). Ana stated they could rent a POC for $150/week, but this would need to be returned to the showroom. Ana stated that a transport tank could be purchased outright for $200 and would not need to be returned. Based on pt's current oxygen needs of 2lpm, a transport tank will last 5 hrs per Ana. Calculated the distance to [facility] which will take 3.5 hrs. Escalated to management who will cover the cost of transport tank so pt can discharge today.   This writer called Ana back to update and she stated transport tank will be delivered within 4 hrs. This writer requested they try to expedite and that the  call this writer with an ETA.    Updated RAMÓN who has updated  ARU and the family who will transport.    CC will continue to monitor patient's medical condition and progress towards discharge.  Idania Hawkins RN BSN  6C Unit Care Coordinator  Phone number: [phone]  Pager: [phone]

## 2022-06-25 NOTE — PLAN OF CARE
Major Shift Events:  Pt VSS, afebrile and A&Ox4. Pt is on 2L NC and tolerating it well.Pt having adequate UO. Pt having frequent BM's.    Plan: Pt will discharge today with family who will transport them to the rehab facility in [place], [place].     For vital signs and complete assessments, please see documentation flowsheets.

## 2022-06-25 NOTE — PROGRESS NOTES
Care Management Discharge Note    Discharge Date: 06/25/2022     Discharge Disposition:    [facility] in [place]  [address] [place] [address], [place] [address]  Fax for discharge orders: [phone]  Ph for RN to RN report: [phone], option 1    Discharge Services:  none    Discharge DME:  1 portable 02 tank    Discharge Transportation: car, drives self    Private pay costs discussed: Not applicable    PAS Confirmation Code:  N/A, going to ARU  Patient/family educated on Medicare website which has current facility and service quality ratings:  yes    Education Provided on the Discharge Plan:  yes  Persons Notified of Discharge Plans: pt, pt's daughter, [facility] Lynette, charge RN, bedside RN  Patient/Family in Agreement with the Plan:  yes      Additional Information:  RAMÓN spoke to DENIS Ingram who confirms pt is ready for discharge. RAMÓN spoke to Lynette at [facility] who confirms they have a bed available for pt;she is requesting updated meds list be faxed to her. RAMÓN sent this via secure email to [email or web address].    RAMÓN reviewed chart; pt is on 02 and does not have her portable tank here. RAMÓN spoke to pt's family who states they were not told to bring her portable tank for transport. Pt's family has already driven to the hospital and is waiting at this time to discharge pt and bring her to [place]. Family lives more than 3-4 hours away.      Pt states she gets 02 through [facility]; RNCC called [employer] [facility] and they state they do not work on the weekends and even during the week, they would not deliver a tank more than 50 miles. Lynette at [facility] called 4 DME companies within [facility] and none of them work weekends to deliver a tank.    RAMÓN reviewed notes; did not find a plan for 02 needs for transport. Pt's family had been given option to take stretcher transport for 02 needs, however this would have been nearly $7814 (according to RAMÓN note on 6/21). RAMÓN called  [facility] (Andrew, ph [phone]) to  inquire about ride. Andrew states due to the long distance, there are no w/c or stretcher rides available to [place] today and unsure about tomorrow. Andrew states that w/c ride also would not have enough portable tanks for pt and she would need to change the 02 tanks on her own as w/c staff do not assist. Therefore, stretcher ride would be most appropriate (and very expensive).     RNCC spoke to [facility] who could deliver 1 portable 02 tank at $200.RAMÓN consulted with Yane Crouch, Director of , who authorized  to pay for this, as pt malena not be able to discharge TODAY without one. Yane requested this be billed directly to  department. Please see RNCC note on 6/25 for details about 02.    RAMÓN spoke to [facility] WILLI[facility] Gonsales who confirms she is okay with pt being transported by family with 02. She will complete discharge orders now.    RAMÓN updated pt's daughter-in-law on the phone. She is in agreement with plan and is grateful for 02 tank.     The 02 tank arrived and family left around 1330. RAMÓN faxed orders and informed Lynette of their estimated arrival time.     No other  needs identified at this time.    -------------------------------------------------------------------------------------------------------  [facility]  Weekend Adult Acute Care     For weekend social work needs, contact information below and available on [facility]:  4A, 4C, 4E, 5A, 5B                  pager [phone]  6A, 6B, 6C                               pager [phone]  7A, 7B, 7C, 7D, 5C                 pager [phone]  ED/OBS                                  pager [phone]     For weekend RN care coordinator needs (home discharge with needs including home care, rides home, assisted living facility returns, durable medical equip, IV antibiotics) - page [phone].     For hours 1600 - midnight Pager: [phone]

## 2022-06-25 NOTE — PROGRESS NOTES
Handoff report given to charge nurse at [facility] rehab unit in [place]. Notified nurse of pt hx and current problems. Notified about pt going to facility with current [place] tube and TF schedule. Pt will be packed up with all belongings and transferring per family to [place]. Pt and family educated on oxygen tank received from [facility] for their transport to [place].

## 2022-06-26 NOTE — PLAN OF CARE
Occupational Therapy Discharge Summary    Reason for therapy discharge:    Discharged to acute rehabilitation facility.    Progress towards therapy goal(s). See goals on Care Plan in [facility] electronic health record for goal details.  Goals partially met.  Barriers to achieving goals:   discharge from facility.    Therapy recommendation(s):    Continued therapy is recommended.  Rationale/Recommendations:  Pt will benefit from continued therapy to maximize functional independence..

## 2022-06-27 ENCOUNTER — TELEPHONE (OUTPATIENT)
Dept: CARDIOLOGY | Facility: CLINIC | Age: 77
End: 2022-06-27

## 2022-06-27 DIAGNOSIS — I27.24 CTEPH (CHRONIC THROMBOEMBOLIC PULMONARY HYPERTENSION) (H): Primary | ICD-10-CM

## 2022-06-27 NOTE — PLAN OF CARE
Physical Therapy Discharge Summary    Reason for therapy discharge:    Discharged to acute rehabilitation facility.    Progress towards therapy goal(s). See goals on Care Plan in [facility] electronic health record for goal details.  Goals partially met.  Barriers to achieving goals:   discharge from facility.    Therapy recommendation(s):    Continued therapy is recommended.  Rationale/Recommendations:  ARU to maximize safety and IND.

## 2022-06-27 NOTE — TELEPHONE ENCOUNTER
[facility] Call Center    Phone Message    May a detailed message be left on voicemail: yes     Reason for Call: Medication Question or concern regarding medication   Prescription Clarification  Name of Medication: macitentan (OPSUMIT) 10 MG tablet  Prescribing Provider: Lisette   Pharmacy:    What on the order needs clarification?  Juan was calling needing clarification on the patient's medication while the patient is admitted. Please call and discuss.        Action Taken: Other: Cardiology    Travel Screening: Not Applicable           ----------------------------------  Called Juan back and patient is currently at their rehab center.  Nurse knew medication was a specilaty med and was wondering which pharmacy was filling it.  Gave her [facility]'s name & phone number to try and call for refills for patient.  Yane Cardenas RN on 6/27/2022 at 1:56 PM

## 2022-06-28 ENCOUNTER — TELEPHONE (OUTPATIENT)
Dept: CARDIOLOGY | Facility: CLINIC | Age: 77
End: 2022-06-28

## 2022-06-28 NOTE — TELEPHONE ENCOUNTER
----- Message from Lissy Ingram NP sent at 6/26/2022 11:09 AM CDT -----  Regarding: Patient Discharge  Surgeon: Kristen  Case: pulmonary artery thromboendarterectomy  Complications: --  Discharge Date: 6/25/22    Please schedule:   Does not need [facility] clinic follow d/t prolonged hospitalization  Primary Care Provider follow up in ~2 weeks  Cardiologist follow up in ~4 weeks   Dr. Peterson f/up ~6 weeks post-op    Lissy Ingram CNP, [facility]-  Cardiothoracic Surgery  Pager [phone]

## 2022-07-06 ENCOUNTER — TELEPHONE (OUTPATIENT)
Dept: CARDIOLOGY | Facility: CLINIC | Age: 77
End: 2022-07-06

## 2022-07-06 NOTE — TELEPHONE ENCOUNTER
LM letting her know I was following up to see how she's been doing since discharge.  I also was wondering what dose of Adempas she is currently taking.  I see she has a follow up appt with both  & Dr. Knox in August.  I will send her a [employer] message as well.  Yane Cardenas RN on 7/6/2022 at 12:06 PM

## 2022-07-11 ENCOUNTER — TELEPHONE (OUTPATIENT)
Dept: CARDIOLOGY | Facility: CLINIC | Age: 77
End: 2022-07-11

## 2022-07-11 NOTE — TELEPHONE ENCOUNTER
[facility] Call Center    Phone Message    May a detailed message be left on voicemail: no     Reason for Call: Other: Simin would like a call back to discuss the adempas medication as pt stated they were told to discontinue taking and they just need clearification     Action Taken: Message routed to:  [facility] Cardio    Travel Screening: Not Applicable           --------------------------------  Also received a VM with this questions.  -----------------------------  Called [facility] and verified patient's Adempas was stopped prior to discharge form the hospital on 6/24/22. Yane Cardenas RN on 7/11/2022 at 1:12 PM

## 2022-07-21 NOTE — TELEPHONE ENCOUNTER
[facility] [place] she stated:      [facility] has d/c pt. for being unable to contact.  Her last shipment was on 5/3/22.  I am d/c pt. in our program.  If Dr. Knox would ever need to restart this pt., you can call [facility] at [phone]     Ladan Aldrich  [facility]    Pulmonary Hypertension   [facility]  (P) [phone]

## 2022-07-22 NOTE — CONSULTS
Cardiology Fellow Progress Note    06/13/22    Interval Events  ----------------------  TRISTIAN  Remains in ICU for intermittented pressor needs to treat hypotension after diuresis  Continues to require HiFlow NC  Continues to feel short of breath, but improved overall since prior to admission  C diff positive    Current Medications  ----------------------  Current Facility-Administered Medications   Medication     acetaminophen (TYLENOL) tablet 975 mg     apixaban ANTICOAGULANT (ELIQUIS) tablet 5 mg     bisacodyl (DULCOLAX) Suppository 10 mg     ceFEPIme (MAXIPIME) 2 g vial to attach to  ml bag for ADULTS or 50 ml bag for PEDS     dextrose 10% infusion     glucose gel 15-30 g    Or     dextrose 50 % injection 25-50 mL    Or     glucagon injection 1 mg     digoxin (LANOXIN) tablet 125 mcg     escitalopram (LEXAPRO) tablet 10 mg     furosemide (LASIX) injection 20 mg     heparin lock flush 10 UNIT/ML injection 5-20 mL     heparin lock flush 10 UNIT/ML injection 5-20 mL     hydrALAZINE (APRESOLINE) injection 10 mg     HYDROmorphone (DILAUDID) injection 0.2 mg     hydrOXYzine (ATARAX) tablet 25-50 mg     insulin aspart (NovoLOG) injection (RAPID ACTING)     ipratropium - albuterol 0.5 mg/2.5 mg/3 mL (DUONEB) neb solution 3 mL     Lidocaine (LIDOCARE) 4 % Patch 1 patch     lidocaine (LMX4) cream     lidocaine (LMX4) cream     lidocaine 1 % 0.1-1 mL     lidocaine 1 % 0.1-1 mL     lidocaine patch in PLACE     lidocaine-prilocaine (EMLA) cream     macitentan (OPSUMIT) tablet 10 mg     magnesium hydroxide (MILK OF MAGNESIA) suspension 30 mL     medication instruction     melatonin tablet 10 mg     methocarbamol (ROBAXIN) tablet 500 mg     midodrine (PROAMATINE) tablet 20 mg     multivitamins w/minerals liquid 15 mL     naloxone (NARCAN) injection 0.2 mg    Or     naloxone (NARCAN) injection 0.4 mg    Or     naloxone (NARCAN) injection 0.2 mg    Or     naloxone (NARCAN) injection 0.4 mg     ondansetron (ZOFRAN ODT) ODT  tab 4 mg    Or     ondansetron (ZOFRAN) injection 4 mg     oxyCODONE (ROXICODONE) tablet 5 mg    Or     oxyCODONE IR (ROXICODONE) tablet 10 mg     pantoprazole (PROTONIX) 2 mg/mL suspension 40 mg     [Held by provider] polyethylene glycol (MIRALAX) Packet 17 g     prochlorperazine (COMPAZINE) tablet 5 mg    Or     prochlorperazine (COMPAZINE) injection 5 mg     protein modular (PROSOURCE TF) 1 packet     Reason beta blocker order not selected     [Held by provider] senna-docusate (SENOKOT-S/PERICOLACE) 8.6-50 MG per tablet 1 tablet     sodium chloride (PF) 0.9% PF flush 10 mL     sodium chloride (PF) 0.9% PF flush 10-20 mL     sodium chloride (PF) 0.9% PF flush 10-20 mL     sodium chloride (PF) 0.9% PF flush 10-40 mL     sodium chloride (PF) 0.9% PF flush 10-40 mL     sodium chloride (PF) 0.9% PF flush 3 mL     sodium chloride (PF) 0.9% PF flush 3 mL     sodium chloride (PF) 0.9% PF flush 3 mL     sodium chloride (PF) 0.9% PF flush 3 mL     sodium chloride (PF) 0.9% PF flush 3 mL     sodium chloride bacteriostatic 0.9 % flush 10 mL     vancomycin (FIRVANQ) oral solution 125 mg        Intake and Output  ----------------------      Intake/Output Summary (Last 24 hours) at 6/7/2022 0721  Last data filed at 6/7/2022 0700  Gross per 24 hour   Intake 3622.82 ml   Output 3230 ml   Net 392.82 ml         Weight  ----------------------  Wt Readings from Last 2 Encounters:   06/14/22 66.8 kg (147 lb 4.3 oz)   04/29/22 67.4 kg (148 lb 8 oz)       Objective  ----------------------  Results for orders placed or performed during the hospital encounter of 05/25/22 (from the past 24 hour(s))   Blood gas venous with oxyhemoglobin   Result Value Ref Range    pH Venous 7.47 (H) 7.32 - 7.43    pCO2 Venous 40 40 - 50 mm Hg    pO2 Venous 27 25 - 47 mm Hg    Bicarbonate Venous 30 (H) 21 - 28 mmol/L    FIO2 5     Oxyhemoglobin Venous 57 (L) 70 - 75 %    Base Excess/Deficit (+/-) 5.6 (H) -7.7 - 1.9 mmol/L   Glucose by meter   Result Value  Ref Range    GLUCOSE BY METER POCT 128 (H) 70 - 99 mg/dL   Glucose by meter   Result Value Ref Range    GLUCOSE BY METER POCT 123 (H) 70 - 99 mg/dL   Glucose by meter   Result Value Ref Range    GLUCOSE BY METER POCT 139 (H) 70 - 99 mg/dL   XR Chest Port 1 View    Narrative    EXAM: XR Chest 1 view 6/14/2022 1:21 AM      HISTORY: Ongoing hypoxia in setting of CTEPH s/p pulmonary  endarterectomy.    COMPARISON: Previous day.     TECHNIQUE: Frontal view of the chest.    FINDINGS: Enteric tube is subdiaphragmatic with tip collimated from  the study. Stable position of right basilar chest tube. Interval  retraction of the right-sided PICC with portion of catheter in the  left innominate vein and tip in the SVC. Postsurgical changes of the  chest and median sternotomy wires intact.    Trachea is midline. Cardiomediastinal silhouette is stable. Stable  patchy airspace and interstitial opacities. Small bilateral pleural  effusions. No pneumothoraces.  Osseous structures are unchanged.      Impression    IMPRESSION:   1. Interval retraction of the right-sided PICC with portion of the  catheter in the left innominate vein.  2. Stable patchy airspace and interstitial opacities.  3. Stable small pleural effusions.    I have personally reviewed the examination and initial interpretation  and I agree with the findings.    RENE ASTUDILLO MD         SYSTEM ID:  [id number]   Glucose by meter   Result Value Ref Range    GLUCOSE BY METER POCT 114 (H) 70 - 99 mg/dL   Basic metabolic panel   Result Value Ref Range    Sodium 140 133 - 144 mmol/L    Potassium 4.1 3.4 - 5.3 mmol/L    Chloride 109 94 - 109 mmol/L    Carbon Dioxide (CO2) 27 20 - 32 mmol/L    Anion Gap 4 3 - 14 mmol/L    Urea Nitrogen 40 (H) 7 - 30 mg/dL    Creatinine 1.03 0.52 - 1.04 mg/dL    Calcium 9.0 8.5 - 10.1 mg/dL    Glucose 120 (H) 70 - 99 mg/dL    GFR Estimate 56 (L) >60 mL/min/1.73m2   Magnesium   Result Value Ref Range    Magnesium 2.5 (H) 1.6 - 2.3 mg/dL   CBC  with platelets   Result Value Ref Range    WBC Count 17.3 (H) 4.0 - 11.0 10e3/uL    RBC Count 2.59 (L) 3.80 - 5.20 10e6/uL    Hemoglobin 7.4 (L) 11.7 - 15.7 g/dL    Hematocrit 24.1 (L) 35.0 - 47.0 %    MCV 93 78 - 100 fL    MCH 28.6 26.5 - 33.0 pg    MCHC 30.7 (L) 31.5 - 36.5 g/dL    RDW 17.9 (H) 10.0 - 15.0 %    Platelet Count 428 150 - 450 10e3/uL   INR   Result Value Ref Range    INR 1.90 (H) 0.85 - 1.15   Glucose by meter   Result Value Ref Range    GLUCOSE BY METER POCT 121 (H) 70 - 99 mg/dL   Glucose by meter   Result Value Ref Range    GLUCOSE BY METER POCT 123 (H) 70 - 99 mg/dL   IR Procedure Note    Narrative    Chuy Cross MD     6/14/2022  3:52 PM  [facility]    Procedure: IR Procedure Note    Date/Time: 6/14/2022 3:50 PM  Performed by: Elmo Pollack MD  Authorized by: Elmo Pollack MD   IR Fellow Physician:  Radiology Resident Physician: Chuy Cross        UNIVERSAL PROTOCOL   Site Marked: NA  Prior Images Obtained and Reviewed:  Yes  Required items: Required blood products, implants, devices and special   equipment available    Patient identity confirmed:  Verbally with patient, arm band, provided   demographic data and hospital-assigned identification number  Patient was reevaluated immediately before administering moderate or deep   sedation or anesthesia  Confirmation Checklist:  Patient's identity using two indicators, relevant   allergies, procedure was appropriate and matched the consent or emergent   situation and correct equipment/implants were available  Time out: Immediately prior to the procedure a time out was called    Universal Protocol: the Joint Commission Universal Protocol was followed    Preparation: Patient was prepped and draped in usual sterile fashion       ANESTHESIA    Anesthesia: Local infiltration  Local Anesthetic:  Lidocaine 1% without epinephrine      SEDATION    Patient Sedated: No    Sedation:  Fentanyl and  midazolam  Vital signs: Vital signs monitored during sedation    See dictated procedure note for full details.  Findings: Successful left sided chest tube placement    Specimens: none    Complications: None    Condition: Stable    Plan: Left sided chest tube to water seal      PROCEDURE    Patient Tolerance:  Patient tolerated the procedure well with no immediate   complications  Length of time physician/provider present for 1:1 monitoring during   sedation: 0       Recent Labs   Lab Test 12/08/21  1221   CHOL 113   HDL 35*   LDL 59   TRIG 94       Hemoglobin A1C   Date Value Ref Range Status   12/08/2021 5.8 (H) 0.0 - 5.6 % Final     Comment:     Normal <5.7%   Prediabetes 5.7-6.4%    Diabetes 6.5% or higher     Note: Adopted from ADA consensus guidelines.       Results for orders placed or performed during the hospital encounter of 05/25/22   XR Chest Port 1 View    Impression    IMPRESSION: Lackey-Diamond catheter tip projects over the central right  pulmonary artery.    I have personally reviewed the examination and initial interpretation  and I agree with the findings.    CANDACE ALMONTE MD         SYSTEM ID:  [id number]   XR Abdomen Port 1 View    Impression    IMPRESSION: Gastric tube tip and sidehole project over the stomach.    I have personally reviewed the examination and initial interpretation  and I agree with the findings.    CANDACE ALMONTE MD         SYSTEM ID:  [id number]   XR Chest Port 1 View    Impression    Impression: Postoperative chest with slightly improved perihilar  atelectasis or edema. Endotracheal tube tip in lower trachea 2.5 cm  above the man.    I have personally reviewed the examination and initial interpretation  and I agree with the findings.    YVETTE GRAY MD         SYSTEM ID:  [id number]   XR Chest Port 1 View    Impression    Impression: Postoperative chest with stable perihilar atelectasis or  edema. Endotracheal tube tip 1.7 cm superior to the man.    I have personally  reviewed the examination and initial interpretation  and I agree with the findings.    YVETTE GRAY MD         SYSTEM ID:  [id number]   XR Chest Port 1 View    Impression    Impression:  1. Endotracheal tube retracted now 2.4 cm from the man.   2. No significant change in small amount of atelectasis lung bases  bilaterally..    YVETTE GRAY MD         SYSTEM ID:  [id number]   XR Chest Port 1 View    Impression    Impression:   1. Unchanged support devices as described above.  2. Unchanged bibasilar atelectasis. No new focal pulmonary opacities.    I have personally reviewed the examination and initial interpretation  and I agree with the findings.    CANDACE ALMONTE MD         SYSTEM ID:  [id number]   XR Chest Port 1 View    Impression    Impression:   1. Unchanged small effusions and bibasilar predominant opacities.  2. Unchanged support devices including pulmonary artery catheter and  ET tube over the low thoracic trachea.    I have personally reviewed the examination and initial interpretation  and I agree with the findings.    JESSICA WALKER MD         SYSTEM ID:  [id number]   XR Chest Port 1 View    Impression    IMPRESSION: Interval extubation. Slightly increased left pleural  effusion/basilar atelectasis. Atherosclerosis. Mild central  atelectasis/subtle positive fluid volume balance.    MARK GUADARRAMA MD         SYSTEM ID:  [id number]   XR Abdomen Port 1 View    Impression    Impression: Enteric tube is subdiaphragmatic with tip in the left  pelvis, presumably within the jejunum.    I have personally reviewed the examination and initial interpretation  and I agree with the findings.    BEBE CLIFFORD MD         SYSTEM ID:  [id number]   XR Chest Port 1 View    Impression    Impression:   1. Unchanged effusions with increased left basilar atelectasis and  interstitial pulmonary edema.  2. Removal of pulmonary artery catheter, mediastinal drain and left  chest tube. Given that lung apices are not  entirely included in the  field-of-view, small pneumothoraces could be missed.    I have personally reviewed the examination and initial interpretation  and I agree with the findings.    MARK GUADARRAMA MD         SYSTEM ID:  [id number]   XR Chest Port 1 View   Result Value Ref Range    Radiologist flags New moderate right-sided pneumothorax status post (AA)     Impression    Impression:   1. New moderate right-sided pneumothorax.  2. Possible trace left pneumothorax, attention on follow-up.  3. Increasing atelectasis/pulmonary edema.  4. Small left pleural effusion.  5. Interval removal of right basilar chest tube.    [Critical Result: New moderate right-sided pneumothorax status post  chest tube removal]    Finding was identified on 6/2/2022 6:21 PM.     Raz Rodgers was contacted by Dr. Wyatt at 6/2/2022 6:36 PM and  verbalized understanding of the critical finding.     I have personally reviewed the examination and initial interpretation  and I agree with the findings.    BK DUPREE MD         SYSTEM ID:  [id number]   XR Chest Port 1 View    Impression    Impression:   1. Unchanged appearance of mixed pulmonary opacities.  2. Small biapical pneumothoraces, right more than left.  3. Trace bilateral pleural effusions.    I have personally reviewed the examination and initial interpretation  and I agree with the findings.    BK DUPREE MD         SYSTEM ID:  [id number]   XR Chest Port 1 View    Impression    IMPRESSION:  1.  Lines and tubes, as above.  2.  Stable perihilar opacities, likely edema and atelectasis. Slightly  decreased retrocardiac opacity, likely decreasing atelectasis.   3.  Stable trace biapical pneumothoraces.    I have personally reviewed the examination and initial interpretation  and I agree with the findings.    ZEE FISHMAN MD         SYSTEM ID:  [id number]   XR Chest Port 1 View    Impression    Impression:   1. Unchanged left IJ catheter projecting over the innominate vein.  2.  Unchanged effusions and mixed airspace opacities.  3. Decreasing biapical pneumothoraces.    I have personally reviewed the examination and initial interpretation  and I agree with the findings.    JESSICA WALKER MD         SYSTEM ID:  [id number]   Echo Complete   Result Value Ref Range    LVEF  60-65%        Physical Exam  Vitals:    06/14/22 1400 06/14/22 1515 06/14/22 1530 06/14/22 1545   BP: 114/53 108/57 117/46 107/43   BP Location:  Left arm Left arm    Pulse: 84 89 89 90   Resp: 25 16 30 26   Temp:       TempSrc:       SpO2: 96% 96% 96% 90%   Weight:       Height:         Physical Exam   Constitutional: No distress. She appears chronically ill.   Pulmonary/Chest: Effort normal.   Abdominal: Soft. Bowel sounds are normal. She exhibits no distension.   Neurological: She is alert and oriented to person, place, and time.   Skin: Skin is warm and dry.        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 Continues to recover and improve daily.     Changes Today:   - Agree with IR evaluation for L pleural effusion thoracentesis  - Continue to diuresis with caution to maintain euvolemia  - Agree with midodrine and low dose vasopressin for hypotension  - Continue HiFlow/BiPAP as needed  - Continue Warfarin  - continue home pHTN medications as tolerated      # Pulmonary hypertension, Group IV  # CTEPH s/p pulmonary endaterectomy  # DVTs  # Severe tricuspid insufficiency  Initially diagnosed with bilateral DVT, PE 2019. TTE showed dilated RV with reduced RV function and RVSP of 84mmHg suggestive of severe pulmonary hypertension with mild to moderate TR. Patient believes the above relates to a work related injury, unna boot early 2019. RHC 12/18/2021 with RA 12, PA 84/19/45, PCWP 2, TAHIRA CO/CI 2.8/1.6. Last dose  eliquis 5/23 AM. Has oxygen at home but does not use this. Underwent endarterectomy on 5/27. Post op on multiple pressors and briefly on milrinone.   - Goal CVP < 10, diuresis as needed to achieve  - continue home pHTN meds        # Volume overload  PTA on lasix 20mg qday (decreased from 40mg ~6 weeks ago). JVP elevated on initial exam. S/p 40mg IV lasix 5/25; repeat as needed     # Anemia  Hgb 11.4, similar to prior last month.   Has been trending down with HgB today at 6.4 HgB would recommend workup for acute blood loss anemia       # CKD  Baseline Cr ~1.1.     Dwain Ferrer MD, MSc  Cardiovascular Disease Fellow  [facility]             Crescentic Advancement Flap Text: The defect edges were debeveled with a #15 scalpel blade.  Given the location of the defect and the proximity to free margins a crescentic advancement flap was deemed most appropriate.  Using a sterile surgical marker, the appropriate advancement flap was drawn incorporating the defect and placing the expected incisions within the relaxed skin tension lines where possible.    The area thus outlined was incised deep to adipose tissue with a #15 scalpel blade.  The skin margins were undermined to an appropriate distance in all directions utilizing iris scissors.

## 2022-07-29 ENCOUNTER — TELEPHONE (OUTPATIENT)
Dept: CARDIOLOGY | Facility: CLINIC | Age: 77
End: 2022-07-29

## 2022-07-29 DIAGNOSIS — I27.24 CTEPH (CHRONIC THROMBOEMBOLIC PULMONARY HYPERTENSION) (H): ICD-10-CM

## 2022-07-29 RX ORDER — OXYCODONE HYDROCHLORIDE 5 MG/1
5 TABLET ORAL EVERY 6 HOURS PRN
Qty: 10 TABLET | Refills: 0 | Status: SHIPPED | OUTPATIENT
Start: 2022-07-29 | End: 2022-08-24

## 2022-07-29 NOTE — TELEPHONE ENCOUNTER
Patient called yesterday and spoke to me today as well. She was a little tearful on the phone and states she feels depressed lately. She wishes she could go outside. Patient had surgery on 5/27/22 and is past her sternal restrictions. Advised patient she is encouraged to go for walks outside when she sees fit, and that she is ok to drive. Patient was encouraged to hear this. She has not been attending cardiac rehab and stated this might be a good idea for her to do. Patient will call if she decides to do so.    Patient is requesting additional oxycodone for surgical pain, as she has had a hard time getting sleeping at night and feeling comfortable. Message sent to [email or web address] for prescription for oxycodone to be sent to her pharmacy.

## 2022-08-02 ENCOUNTER — TELEPHONE (OUTPATIENT)
Dept: CARDIOLOGY | Facility: CLINIC | Age: 77
End: 2022-08-02

## 2022-08-02 NOTE — TELEPHONE ENCOUNTER
Called patient and advised her of Dr. Knox's request to see her in person 8/24.  Advised her I still need to figure out the timing, so once I do I will call her back.  Patient verbalized understanding, agreed with plan and denied any further questions. Yane Cardenas RN on 8/2/2022 at 3:20 PM    ---------------------------------------------------  RE: August Appt  Received: 5 days ago  Lisette Knox MD sent to Yane Cardenas RN  Hi Yane:     She is older and frail. I can see her in between cases on 8/24. Please go a head and schedule an in-person visit.     Thank you     TT             Previous Messages       ----- Message -----   From: Yane Cardenas RN   Sent: 7/28/2022  12:08 PM CDT   To: Lisette Knox MD   Subject: August Appt                                       Hi Dr. Knox,     Patient is scheduled to come down and see Dr. Peterson on Wed 8/24.  The RN was asking if you could see her that day, but I didn't think so because you're in CV lab that day.     Patient is asking not to have to come down twice, so may she have a video visit with you on 8/22?     Yane

## 2022-08-11 ENCOUNTER — TELEPHONE (OUTPATIENT)
Dept: CARDIOLOGY | Facility: CLINIC | Age: 77
End: 2022-08-11

## 2022-08-11 NOTE — TELEPHONE ENCOUNTER
Patient LM she had recently been to the ER due to URI.  She has body aches and a big muscle knot in her back which is really painful.  ------------------------------  Spoke with patient and recommended she call her PMD to get a muscle relaxer and purchase a warm pack.  Reviewed the upcoming appt details with Dr. Knox for 8/24.  Patient verbalized understanding, agreed with plan and denied any further questions. Yane Cardenas RN on 8/11/2022 at 3:59 PM

## 2022-08-22 DIAGNOSIS — R06.02 SOB (SHORTNESS OF BREATH): ICD-10-CM

## 2022-08-22 DIAGNOSIS — I27.24 CTEPH (CHRONIC THROMBOEMBOLIC PULMONARY HYPERTENSION) (H): Primary | ICD-10-CM

## 2022-08-24 ENCOUNTER — VIRTUAL VISIT (OUTPATIENT)
Dept: CARDIOLOGY | Facility: CLINIC | Age: 77
End: 2022-08-24
Attending: THORACIC SURGERY (CARDIOTHORACIC VASCULAR SURGERY)
Payer: MEDICARE

## 2022-08-24 VITALS
DIASTOLIC BLOOD PRESSURE: 83 MMHG | SYSTOLIC BLOOD PRESSURE: 156 MMHG | OXYGEN SATURATION: 95 % | HEIGHT: 59 IN | WEIGHT: 131.6 LBS | BODY MASS INDEX: 26.53 KG/M2 | HEART RATE: 85 BPM

## 2022-08-24 VITALS
DIASTOLIC BLOOD PRESSURE: 83 MMHG | HEART RATE: 85 BPM | SYSTOLIC BLOOD PRESSURE: 156 MMHG | HEIGHT: 59 IN | WEIGHT: 131.6 LBS | OXYGEN SATURATION: 95 % | BODY MASS INDEX: 26.53 KG/M2

## 2022-08-24 DIAGNOSIS — I27.24 CTEPH (CHRONIC THROMBOEMBOLIC PULMONARY HYPERTENSION) (H): ICD-10-CM

## 2022-08-24 DIAGNOSIS — I27.24 CTEPH (CHRONIC THROMBOEMBOLIC PULMONARY HYPERTENSION) (H): Primary | ICD-10-CM

## 2022-08-24 DIAGNOSIS — Z98.890 S/P CORONARY ANGIOGRAM: ICD-10-CM

## 2022-08-24 DIAGNOSIS — R06.02 SOB (SHORTNESS OF BREATH): ICD-10-CM

## 2022-08-24 DIAGNOSIS — F41.9 ANXIETY: ICD-10-CM

## 2022-08-24 LAB
6 MIN WALK (FT): 470 FT
6 MIN WALK (M): 143 M
FIO2-PRE: 24 %
SARS-COV-2 RNA RESP QL NAA+PROBE: NEGATIVE

## 2022-08-24 PROCEDURE — 99214 OFFICE O/P EST MOD 30 MIN: CPT | Mod: 95 | Performed by: INTERNAL MEDICINE

## 2022-08-24 PROCEDURE — 99024 POSTOP FOLLOW-UP VISIT: CPT | Performed by: THORACIC SURGERY (CARDIOTHORACIC VASCULAR SURGERY)

## 2022-08-24 PROCEDURE — G0463 HOSPITAL OUTPT CLINIC VISIT: HCPCS

## 2022-08-24 PROCEDURE — U0003 INFECTIOUS AGENT DETECTION BY NUCLEIC ACID (DNA OR RNA); SEVERE ACUTE RESPIRATORY SYNDROME CORONAVIRUS 2 (SARS-COV-2) (CORONAVIRUS DISEASE [COVID-19]), AMPLIFIED PROBE TECHNIQUE, MAKING USE OF HIGH THROUGHPUT TECHNOLOGIES AS DESCRIBED BY CMS-2020-01-R: HCPCS | Performed by: INTERNAL MEDICINE

## 2022-08-24 PROCEDURE — 94618 PULMONARY STRESS TESTING: CPT | Performed by: INTERNAL MEDICINE

## 2022-08-24 ASSESSMENT — PAIN SCALES - GENERAL
PAINLEVEL: NO PAIN (0)
PAINLEVEL: NO PAIN (0)

## 2022-08-24 NOTE — PROGRESS NOTES
[facility]  Cardiovascular and Thoracic Surgery Daily Note          Assessment and Plan:   Debi Espinoza is a 76 year old female with CTEPH s/p pulmonary thromboendarterectomy on 5/27/2022. She was discharged to rehab on 6/25/2022. Her postoperative course was complicated by hypoxic respiratory failure requiring oxygen, ventilator associated pneumonia, vasoplegia, volume overload and right heart failure, cdiff enteritis, and delirium. She presents today for routine follow up.     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 Knox  - Repeat CTEPH work up at 3 months, consider BPA if PA pressure remains elevated    Connor Peterson MD  Cardiothoracic Surgery  [phone]        Interval History:   Debi was discharged from her transitional care unit and has been home.  She felt like her recovery has progressed slowly and is frustrated by the lack of progress.  She started to experience new right chest wall pain 3 weeks ago prompting an emergency room visit.  A CT scan was obtained that did not show any acute processes.  Her pain radiates to her back and is located along the mid chest.  She also now has terrible anxiety.  She gets minimal sleep at night due to pain and anxiety.  Her leg swelling she feels that is at her baseline.  Her weight remains near her baseline, despite stopping her Lasix after she ran out.  She uses supplemental oxygen at home mostly with exercise.  She denies sternal clicking.           Review of Systems:   The Review of Systems is negative other than noted in the HPI          Medications:     Current Outpatient Medications Ordered in Epic   Medication     acetaminophen (TYLENOL) 325 MG tablet     apixaban  "ANTICOAGULANT (ELIQUIS) 5 MG tablet     macitentan (OPSUMIT) 10 MG tablet     No current Epic-ordered facility-administered medications on file.        BP (!) 156/83 (BP Location: Right arm, Patient Position: Sitting, Cuff Size: Adult Regular)   Pulse 85   Ht 1.501 m (4' 11.1\")   Wt 59.7 kg (131 lb 9.6 oz)   SpO2 95%   BMI 26.49 kg/m       Gen: Alert, oriented, cooperative, pleasant  Neck: No JVD, trachea midline  HEENT: Anicteric  Chest: Incision well-healed, no sternal clicking with Valsalva or coughing, no visible or palpable etiology for chest pain  Ext: Significant bilateral lower extremity edema up to her knees.         Data:   CT Chest w/ IV contrast 8/8/2022 OSH:  1. Displaced fracture involving the sternal body.   2. No definite CT evidence to suggest mediastinitis.   3. Pulmonary opacities as above are nonspecific and may be related to mild pulmonary edema. Underlying interstitial lung disease is a possibility.   4. Additional findings as above.        Clinically Significant Risk Factors Present on Admission                        "

## 2022-08-24 NOTE — LETTER
8/24/2022      RE: Debi Espinoza  [address] [facility] [facility] [address]       Dear Colleague,    Thank you for the opportunity to participate in the care of your patient, Debi Espinoza, at the [facility] [place] at [facility]. Please see a copy of my visit note below.    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 She was discharged to rehab on 6/25/2022. Her postoperative course was complicated by hypoxic respiratory failure requiring oxygen, ventilator associated pneumonia, vasoplegia, volume overload and right heart failure, cdiff enteritis, and delirium. She presents today for routine follow up.     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 Knox  - Repeat CTEPH work up at 3 months, consider BPA if PA pressure remains elevated    Connor Peterson MD  Cardiothoracic Surgery  [phone]        Interval History:   Debi was discharged from her transitional care unit and has been home.  She felt like her recovery has progressed slowly and is frustrated by the lack of progress.  She started to experience new right chest wall pain 3 weeks ago prompting an emergency room visit.  A CT scan was obtained that did not show any acute processes.  Her pain radiates to her back and is located along the mid chest.  She also now has terrible anxiety.  She gets minimal sleep at night due to pain and anxiety.  Her leg swelling she feels that is at her baseline.  Her weight remains near her baseline, despite stopping her Lasix after  "she ran out.  She uses supplemental oxygen at home mostly with exercise.  She denies sternal clicking.           Review of Systems:   The Review of Systems is negative other than noted in the HPI          Medications:     Current Outpatient Medications Ordered in Epic   Medication     acetaminophen (TYLENOL) 325 MG tablet     apixaban ANTICOAGULANT (ELIQUIS) 5 MG tablet     macitentan (OPSUMIT) 10 MG tablet     No current Epic-ordered facility-administered medications on file.        BP (!) 156/83 (BP Location: Right arm, Patient Position: Sitting, Cuff Size: Adult Regular)   Pulse 85   Ht 1.501 m (4' 11.1\")   Wt 59.7 kg (131 lb 9.6 oz)   SpO2 95%   BMI 26.49 kg/m       Gen: Alert, oriented, cooperative, pleasant  Neck: No JVD, trachea midline  HEENT: Anicteric  Chest: Incision well-healed, no sternal clicking with Valsalva or coughing, no visible or palpable etiology for chest pain  Ext: Significant bilateral lower extremity edema up to her knees.         Data:   CT Chest w/ IV contrast 8/8/2022 OSH:  1. Displaced fracture involving the sternal body.   2. No definite CT evidence to suggest mediastinitis.   3. Pulmonary opacities as above are nonspecific and may be related to mild pulmonary edema. Underlying interstitial lung disease is a possibility.   4. Additional findings as above.        Clinically Significant Risk Factors Present on Admission                     Please do not hesitate to contact me if you have any questions/concerns.     Sincerely,     Connor Peterson MD    "

## 2022-08-24 NOTE — NURSING NOTE
Chief Complaint   Patient presents with     Follow Up     PH follow-up after PTE surgery     Vitals were taken and medications were reconciled. EKG was performed   DORINA Flood  2:18 PM    Debi is a 76 year old who is being evaluated via a billable video visit.      How would you like to obtain your AVS? MyChart  If the video visit is dropped, the invitation should be resent by: Text to cell phone: [phone]  Will anyone else be joining your video visit? No

## 2022-08-24 NOTE — PROGRESS NOTES
August 24th, 2022    Dear Dr. Jerome,     We had the pleasure of seeing Ms. Debi Espinoza in our pulmonary hypertension clinic for follow-up.  As you know she is a 76-year-old female with chronic thromboembolic pulmonary hypertension who underwent pulmonary thromboendarterectomy in May of this year.  She returns today for follow-up.    Her pulmonary thromboendarterectomy surgery was overall uneventful.  She was extubated on day 2.  However she had a prolonged hospital course due to C. Difficile infection as well as respiratory failure from pleural effusion.  She required left-sided chest tube with which her respiratory status improved significantly.  Subsequently she was discharged to acute rehab on supplemental oxygen.  She is currently now at home.  She is using oxygen only as needed.      She is able to walk by herself inside the house as well as outside.  She does have shortness of breath when she over exerts.  She is complaining of chest pain in the middle of the chest that radiates to the back which is there all the time.  No specific aggravating or relieving factors.  She denies having any lower extremity swelling or abdominal distention.  No exertional presyncope or syncope.  She is off of her midodrine.  She is taking only macitentan, apixaban, and Tylenol for pain.    PAST MEDICAL HISTORY:  Past Medical History:   Diagnosis Date     History of deep venous thrombosis     2019     History of pulmonary embolism     2019, 2021     HTN (hypertension)      Nephrolithiasis      Osteoarthritis        CURRENT MEDICATIONS:  Current Outpatient Medications   Medication Sig     apixaban ANTICOAGULANT (ELIQUIS) 5 MG tablet Take 1 tablet (5 mg) by mouth 2 times daily     macitentan (OPSUMIT) 10 MG tablet Take 10 mg by mouth daily (with lunch)     acetaminophen (TYLENOL) 325 MG tablet 3 tablets (975 mg) by Oral or Feeding Tube route every 8 hours as needed for mild pain or fever     No current facility-administered  "medications for this visit.     ROS:   10 point ROS negative except as discussed in above HPI.    EXAM:  BP (!) 156/83 (BP Location: Right arm, Patient Position: Sitting, Cuff Size: Adult Regular)   Pulse 85   Ht 1.501 m (4' 11.1\")   Wt 59.7 kg (131 lb 9.6 oz)   SpO2 95%   BMI 26.49 kg/m      Exam deferred given Video Encounter    Labs:  Her most recent CBC August 12 showed a hemoglobin of 10 hematocrit of 31.4 WBC of 5.9 and a platelet count of 369.      Her most recent chemistry showed a sodium of 138, potassium of 4, chloride of 112, bicarb of 20, BUN of 11 creatinine of 0.8, calcium of 8.8 and a glucose of 98.      Her liver function test showed a total protein of 6.4 albumin of 2.8 alkaline phosphatase of 87 AST of 17 ALT of 7, and total bilirubin of 0.4.  Her CRP was only mildly elevated at 1.2.  Her troponin was normal.    She had a 6-minute walk test today where she walked only for 134 meters.  On room air she desaturated to 87% which improved on 2 L of supplemental oxygen.    Echo (06/2022)  Global and regional left ventricular function is hyperkinetic with an EF of  65-70%.  Moderate right ventricular dilation with severely reduced global right  ventricular function.  Moderate tricuspid insufficiency.  Estimated pulmonary artery systolic pressure is 57 mmHg plus right atrial  pressure.     This study was compared with the study from 6/2/22.  Estimated PA pressure is lower. No change in LV/RV function.    RHC 05/2022 (pre-PTE)  RA 10  RV 85/10  PA 86/33 (47)  PCWP 12  PA% 64  CO/CI 5.3/3.1  PVR 6.7 MARTINEZ    RHC (06/01 - after PTE)  RA 9  PA 59/18 (32)  PA% 59  CO/CI 5.1/3.1  PVR 2.7 MARTINEZ using PADP    Assessment and Plan:     Debi Espinoza is a 76 year old female thromboembolic pulmonary hypertension who is now status post pulmonary thromboendarterectomy.  She is currently on apixaban and macitentan.  She returns today for follow-up.    Overall, she is doing well after her pulmonary thromboendarterectomy. "  Although her postoperative course was complicated C. difficile infection as well as respiratory failure due to left-sided pleural effusion, she has recovered well gradually. She is no longer needing midodrine.  She is only on apixaban and macitentan.  Her laboratory evaluations are unremarkable.    She is complaining of chest pain which is likely musculoskeletal.  I recommend her to take Aleve as a first step.  She is requesting low-dose oxycodone which really helped her.  We will give her a very small supply of oxycodone for breakthrough pain.    I have recommended to have a repeat echocardiogram, right heart catheterization and pulmonary angiogram as per our protocol in the next 4 to 6 weeks to assess for residual disease after pulmonary thromboendarterectomy.    It was a pleasure meeting Ms. Espinoza in the [place] pulmonary hypertension center.  We thank you for involving us in her care.  Please do not hesitate to call us in the interim with any further questions.    Video-Visit Details     Type of service:  Video Visit     Video Start Time: 2.40 PM  Video End Time: 3.10 PM    Patient gave verbal consent for Videovisit    Originating Location (pt. Location): [facility]     Distant Location (provider location):   Home     Platform used for Video Visit:Bessie      Sincerely,  Lisette Knox MD   Center for Pulmonary Hypertension  Heart Failure, Transplant, and Mechanical Circulatory Support Cardiology   Cardiovascular Division  [facility]   [phone]

## 2022-08-24 NOTE — LETTER
RE: Debi Espinoza  [address] [facility] [address]     Dear Colleague,    Thank you for the opportunity to participate in the care of your patient, Debi Espinoza, at the [facility] [place] at [facility]. Please see a copy of my visit note below.    August 24th, 2022    Dear Dr. Jerome,     We had the pleasure of seeing Ms. Debi Espinoza in our pulmonary hypertension clinic for follow-up.  As you know she is a 76-year-old female with chronic thromboembolic pulmonary hypertension who underwent pulmonary thromboendarterectomy in May of this year.  She returns today for follow-up.    Her pulmonary thromboendarterectomy surgery was overall uneventful.  She was extubated on day 2.  However she had a prolonged hospital course due to C. Difficile infection as well as respiratory failure from pleural effusion.  She required left-sided chest tube with which her respiratory status improved significantly.  Subsequently she was discharged to acute rehab on supplemental oxygen.  She is currently now at home.  She is using oxygen only as needed.      She is able to walk by herself inside the house as well as outside.  She does have shortness of breath when she over exerts.  She is complaining of chest pain in the middle of the chest that radiates to the back which is there all the time.  No specific aggravating or relieving factors.  She denies having any lower extremity swelling or abdominal distention.  No exertional presyncope or syncope.  She is off of her midodrine.  She is taking only macitentan, apixaban, and Tylenol for pain.    PAST MEDICAL HISTORY:  Past Medical History:   Diagnosis Date     History of deep venous thrombosis     2019     History of pulmonary embolism     2019, 2021     HTN (hypertension)      Nephrolithiasis      Osteoarthritis        CURRENT MEDICATIONS:  Current Outpatient Medications   Medication Sig     apixaban  "ANTICOAGULANT (ELIQUIS) 5 MG tablet Take 1 tablet (5 mg) by mouth 2 times daily     macitentan (OPSUMIT) 10 MG tablet Take 10 mg by mouth daily (with lunch)     acetaminophen (TYLENOL) 325 MG tablet 3 tablets (975 mg) by Oral or Feeding Tube route every 8 hours as needed for mild pain or fever     No current facility-administered medications for this visit.     ROS:   10 point ROS negative except as discussed in above HPI.    EXAM:  BP (!) 156/83 (BP Location: Right arm, Patient Position: Sitting, Cuff Size: Adult Regular)   Pulse 85   Ht 1.501 m (4' 11.1\")   Wt 59.7 kg (131 lb 9.6 oz)   SpO2 95%   BMI 26.49 kg/m      Exam deferred given Video Encounter    Labs:  Her most recent CBC August 12 showed a hemoglobin of 10 hematocrit of 31.4 WBC of 5.9 and a platelet count of 369.      Her most recent chemistry showed a sodium of 138, potassium of 4, chloride of 112, bicarb of 20, BUN of 11 creatinine of 0.8, calcium of 8.8 and a glucose of 98.      Her liver function test showed a total protein of 6.4 albumin of 2.8 alkaline phosphatase of 87 AST of 17 ALT of 7, and total bilirubin of 0.4.  Her CRP was only mildly elevated at 1.2.  Her troponin was normal.    She had a 6-minute walk test today where she walked only for 134 meters.  On room air she desaturated to 87% which improved on 2 L of supplemental oxygen.    Echo (06/2022)  Global and regional left ventricular function is hyperkinetic with an EF of  65-70%.  Moderate right ventricular dilation with severely reduced global right  ventricular function.  Moderate tricuspid insufficiency.  Estimated pulmonary artery systolic pressure is 57 mmHg plus right atrial  pressure.     This study was compared with the study from 6/2/22.  Estimated PA pressure is lower. No change in LV/RV function.    RHC 05/2022 (pre-PTE)  RA 10  RV 85/10  PA 86/33 (47)  PCWP 12  PA% 64  CO/CI 5.3/3.1  PVR 6.7 MARTINEZ    RHC (06/01 - after PTE)  RA 9  PA 59/18 (32)  PA% 59  CO/CI 5.1/3.1  PVR " 2.7 MARTINEZ using PADP    Assessment and Plan:     Debi Espinoza is a 76 year old female thromboembolic pulmonary hypertension who is now status post pulmonary thromboendarterectomy.  She is currently on apixaban and macitentan.  She returns today for follow-up.    Overall, she is doing well after her pulmonary thromboendarterectomy.  Although her postoperative course was complicated C. difficile infection as well as respiratory failure due to left-sided pleural effusion, she has recovered well gradually. She is no longer needing midodrine.  She is only on apixaban and macitentan.  Her laboratory evaluations are unremarkable.    She is complaining of chest pain which is likely musculoskeletal.  I recommend her to take Aleve as a first step.  She is requesting low-dose oxycodone which really helped her.  We will give her a very small supply of oxycodone for breakthrough pain.    I have recommended to have a repeat echocardiogram, right heart catheterization and pulmonary angiogram as per our protocol in the next 4 to 6 weeks to assess for residual disease after pulmonary thromboendarterectomy.    It was a pleasure meeting Ms. Espinoza in the [place] pulmonary hypertension center.  We thank you for involving us in her care.  Please do not hesitate to call us in the interim with any further questions.    Video-Visit Details     Type of service:  Video Visit     Video Start Time: 2.40 PM  Video End Time: 3.10 PM    Patient gave verbal consent for Videovisit    Originating Location (pt. Location): [facility]     Distant Location (provider location):   Home     Platform used for Video Visit:Bessie      Sincerely,  Lisette Knox MD   Center for Pulmonary Hypertension  Heart Failure, Transplant, and Mechanical Circulatory Support Cardiology   Cardiovascular Division  [facility]   [phone]

## 2022-08-24 NOTE — NURSING NOTE
Chief Complaint   Patient presents with     Follow Up     after PTE surgery     Vitals were taken and medications were reconciled.   Jerrell Greenberg, EMT  1:59 PM

## 2022-08-25 ENCOUNTER — TELEPHONE (OUTPATIENT)
Dept: CARDIOLOGY | Facility: CLINIC | Age: 77
End: 2022-08-25

## 2022-08-25 DIAGNOSIS — R52 SEVERE PAIN: Primary | ICD-10-CM

## 2022-08-25 DIAGNOSIS — I27.24 CTEPH (CHRONIC THROMBOEMBOLIC PULMONARY HYPERTENSION) (H): Primary | ICD-10-CM

## 2022-08-25 DIAGNOSIS — R06.02 SOB (SHORTNESS OF BREATH): ICD-10-CM

## 2022-08-25 DIAGNOSIS — I26.99: ICD-10-CM

## 2022-08-25 DIAGNOSIS — G89.18 POSTOPERATIVE PAIN: ICD-10-CM

## 2022-08-25 RX ORDER — LIDOCAINE 40 MG/G
CREAM TOPICAL
Status: CANCELLED | OUTPATIENT
Start: 2022-08-25

## 2022-08-25 RX ORDER — POTASSIUM CHLORIDE 1500 MG/1
20 TABLET, EXTENDED RELEASE ORAL
Status: CANCELLED | OUTPATIENT
Start: 2022-08-25

## 2022-08-25 RX ORDER — SODIUM CHLORIDE 9 MG/ML
INJECTION, SOLUTION INTRAVENOUS CONTINUOUS
Status: CANCELLED | OUTPATIENT
Start: 2022-08-25

## 2022-08-25 RX ORDER — POTASSIUM CHLORIDE 1500 MG/1
40 TABLET, EXTENDED RELEASE ORAL
Status: CANCELLED | OUTPATIENT
Start: 2022-08-25

## 2022-08-25 NOTE — TELEPHONE ENCOUNTER
----- Message -----  From: Lisette Knox MD  Sent: 8/24/2022   3:31 PM CDT  To: Cardiology Ph Nurse-    Team:    I did a virtual visit with her.  She is overall doing well and recovering from her PTE surgery well.    Plan    1. please schedule her for labs, echocardiogram, right heart catheterization with pulmonary angiogram, and dual-energy CT PE protocol in the next 4 to 6 weeks.  I can also see her when she returns for this testing.    2.  Continue Eliquis, macitentan and Tylenol.    3.  She is requesting oxycodone for pain.  I agreed to give her a very small supply of oxycodone.  We will send the new prescription for oxycodone 5 mg every 6 hours as needed for pain a total of 12 tablets.  I understand that we cannot send this electronically.  Since I have to sign it, I told her that we will send this prescription on Monday.  In the interim I have recommended her to take Aleve in addition to Tylenol for pain.    Thank you.  Please let me know if you have any questions.    TT  ------------------------------------------  Placed all orders and forwarded Ladan request to schedule between Sept 25-Oct.  Yane Cardenas RN on 8/25/2022 at 2:36 PM    Called [place] pharmacy and inquired about calling in Oxycodone and this is not allowed.  The MD may order it via Epic under his own name.  Verbalized  Understanding. Yane Cardenas RN on 8/25/2022 at 2:40 PM    Forwarded Dr. Knox a pended Oxycodone Erx to sign & send to patient's pharmacy. Yane Cardenas RN on 8/25/2022 at 2:43 PM

## 2022-08-26 RX ORDER — OXYCODONE HYDROCHLORIDE 5 MG/1
5 TABLET ORAL EVERY 6 HOURS PRN
Qty: 12 TABLET | Refills: 0 | Status: CANCELLED | OUTPATIENT
Start: 2022-08-26 | End: 2022-08-29

## 2022-08-29 ENCOUNTER — TELEPHONE (OUTPATIENT)
Dept: CARDIOLOGY | Facility: CLINIC | Age: 77
End: 2022-08-29

## 2022-08-29 DIAGNOSIS — R52 PAIN: ICD-10-CM

## 2022-08-29 DIAGNOSIS — R52 PAIN: Primary | ICD-10-CM

## 2022-08-29 RX ORDER — OXYCODONE HYDROCHLORIDE 5 MG/1
5 TABLET ORAL EVERY 6 HOURS PRN
Qty: 12 TABLET | Refills: 0 | Status: SHIPPED | OUTPATIENT
Start: 2022-08-29 | End: 2022-09-06

## 2022-08-29 NOTE — TELEPHONE ENCOUNTER
Prescription signed and faxed to pharmacy. PT notified    Shante Zeng RN on 8/29/2022 at 2:07 PM    ---------------------------------------  [facility] Call Center    Phone Message    May a detailed message be left on voicemail: yes     Reason for Call: Other: pt states Dr. Peralta told her during their virtual appt on 8.24.22 that he would refill her oxyCODONE (ROXICODONE) 5 MG tablet  but so far there has not been a refill sent to her pharmacy in [place].  She called again today inquiring about it stating she is not able to sleep without the med due to sever body aches.   If there is a concern please call pt back and let her know what that is.  If not, please refill script asap for pt.     Action Taken: Message routed to:  [facility] cardio    Travel Screening: Not Applicable

## 2022-08-29 NOTE — TELEPHONE ENCOUNTER
[facility] cardiac rehab phone [phone], fax [phone]. Referral faxed.    [place] psychology dept referral center fax [phone]. Referral faxed.    [facility] pain management fax [phone]. Referral faxed.    Called patient and updated her about facilities and the programs they offer. Pt preferred to have everything done at [facility], but facility does not offer cardiac rehab or pain management.    -------------------------------------------------------------------------------------------------------------------    Connor Peterson MD O'Brien, Jessica, RN Hi Jess,   Was hoping you could help me set up three referrals for Debi outpatient near her home:   1. Pain consult, left mid chest rib pain radiating to the back   2. Psychology/psychiatry referral for post hospitalization PTSD and severe anxiety   3. Outpatient cardiac rehab. For some reason she stopped getting any rehab after discharge from the TCU     Let me know how I can help.   Luanne,   Connor

## 2022-08-30 DIAGNOSIS — R52 PAIN: ICD-10-CM

## 2022-09-06 ENCOUNTER — TELEPHONE (OUTPATIENT)
Dept: CARDIOLOGY | Facility: CLINIC | Age: 77
End: 2022-09-06

## 2022-09-06 DIAGNOSIS — R52 PAIN: ICD-10-CM

## 2022-09-06 RX ORDER — OXYCODONE HYDROCHLORIDE 5 MG/1
5 TABLET ORAL EVERY 6 HOURS PRN
Qty: 12 TABLET | Refills: 0 | Status: ON HOLD | OUTPATIENT
Start: 2022-09-06 | End: 2022-10-03

## 2022-09-06 NOTE — TELEPHONE ENCOUNTER
Unable to E-scribe prescription, must be printed. Unable to fax signed prescription to patient, must be original signed copy. Patient is unable to print off scanned script sent via [employer] being it is not the original paper script signed. Prescription needs to be mailed to patient.     Shante Zeng RN on 9/6/2022 at 4:43 PM      --------------------------------------------------------  [facility] Call Center    Phone Message    May a detailed message be left on voicemail: yes     Reason for Call: Medication Question or concern regarding medication   Prescription Clarification  Name of Medication: oxyCODONE (ROXICODONE) 5 MG tablet  Prescribing Provider: Lisette   Pharmacy:   [facility] - [place], [place] - [address] [place] [address] AT    What on the order needs clarification? Pharmacy called stating this prescription needs to be e-scribed or written. The order cannot be faxed. Please call pharmacy back to further discuss, thank you.    Action Taken: Message routed to:  Other: Cardiology    Travel Screening: Not Applicable

## 2022-09-28 ENCOUNTER — TELEPHONE (OUTPATIENT)
Dept: CARDIOLOGY | Facility: CLINIC | Age: 77
End: 2022-09-28

## 2022-09-28 NOTE — TELEPHONE ENCOUNTER
Called patient to discuss pre-procedure instructions. Patient will complete a home covid test. Patient will hold eliquis x48 hours prior to procedure.. Patient denied further questions, verbalized understanding of instructions.    Shante Zeng RN on 9/28/2022 at 4:01 PM

## 2022-09-30 ENCOUNTER — TELEPHONE (OUTPATIENT)
Dept: CARDIOLOGY | Facility: CLINIC | Age: 77
End: 2022-09-30

## 2022-09-30 NOTE — TELEPHONE ENCOUNTER
Call complete for pre procedure reminder and updated visitor policy.  COVID Negative (Care Everywhere)

## 2022-10-03 ENCOUNTER — APPOINTMENT (OUTPATIENT)
Dept: MEDSURG UNIT | Facility: CLINIC | Age: 77
End: 2022-10-03
Attending: INTERNAL MEDICINE
Payer: MEDICARE

## 2022-10-03 ENCOUNTER — HEALTH MAINTENANCE LETTER (OUTPATIENT)
Age: 77
End: 2022-10-03

## 2022-10-03 ENCOUNTER — APPOINTMENT (OUTPATIENT)
Dept: LAB | Facility: CLINIC | Age: 77
End: 2022-10-03
Attending: INTERNAL MEDICINE
Payer: MEDICARE

## 2022-10-03 ENCOUNTER — OFFICE VISIT (OUTPATIENT)
Dept: CARDIOLOGY | Facility: CLINIC | Age: 77
End: 2022-10-03
Attending: INTERNAL MEDICINE
Payer: MEDICARE

## 2022-10-03 ENCOUNTER — HOSPITAL ENCOUNTER (OUTPATIENT)
Dept: CT IMAGING | Facility: CLINIC | Age: 77
Discharge: HOME | End: 2022-10-03
Attending: INTERNAL MEDICINE | Admitting: INTERNAL MEDICINE
Payer: MEDICARE

## 2022-10-03 ENCOUNTER — HOSPITAL ENCOUNTER (OUTPATIENT)
Facility: CLINIC | Age: 77
Discharge: HOME | End: 2022-10-03
Attending: INTERNAL MEDICINE | Admitting: INTERNAL MEDICINE
Payer: MEDICARE

## 2022-10-03 VITALS
BODY MASS INDEX: 26.9 KG/M2 | SYSTOLIC BLOOD PRESSURE: 146 MMHG | DIASTOLIC BLOOD PRESSURE: 78 MMHG | OXYGEN SATURATION: 95 % | HEART RATE: 100 BPM | WEIGHT: 133.6 LBS

## 2022-10-03 VITALS
WEIGHT: 128.8 LBS | SYSTOLIC BLOOD PRESSURE: 177 MMHG | HEIGHT: 59 IN | DIASTOLIC BLOOD PRESSURE: 90 MMHG | HEART RATE: 89 BPM | BODY MASS INDEX: 25.96 KG/M2 | OXYGEN SATURATION: 93 % | RESPIRATION RATE: 18 BRPM | TEMPERATURE: 97.7 F

## 2022-10-03 DIAGNOSIS — I27.24 CTEPH (CHRONIC THROMBOEMBOLIC PULMONARY HYPERTENSION) (H): ICD-10-CM

## 2022-10-03 DIAGNOSIS — I26.99: ICD-10-CM

## 2022-10-03 DIAGNOSIS — R06.02 SOB (SHORTNESS OF BREATH): ICD-10-CM

## 2022-10-03 LAB
ALBUMIN SERPL BCG-MCNC: 4.2 G/DL (ref 3.5–5.2)
ALP SERPL-CCNC: 132 U/L (ref 35–104)
ALT SERPL W P-5'-P-CCNC: 23 U/L (ref 10–35)
ANION GAP SERPL CALCULATED.3IONS-SCNC: 10 MMOL/L (ref 7–15)
AST SERPL W P-5'-P-CCNC: 34 U/L (ref 10–35)
BILIRUB SERPL-MCNC: 0.6 MG/DL
BUN SERPL-MCNC: 19.9 MG/DL (ref 8–23)
CALCIUM SERPL-MCNC: 9.8 MG/DL (ref 8.8–10.2)
CHLORIDE SERPL-SCNC: 106 MMOL/L (ref 98–107)
CREAT SERPL-MCNC: 0.85 MG/DL (ref 0.51–0.95)
DEPRECATED HCO3 PLAS-SCNC: 23 MMOL/L (ref 22–29)
ERYTHROCYTE [DISTWIDTH] IN BLOOD BY AUTOMATED COUNT: 16.2 % (ref 10–15)
GFR SERPL CREATININE-BSD FRML MDRD: 70 ML/MIN/1.73M2
GLUCOSE SERPL-MCNC: 97 MG/DL (ref 70–99)
HCT VFR BLD AUTO: 37.4 % (ref 35–47)
HGB BLD-MCNC: 11 G/DL (ref 11.7–15.7)
HGB BLD-MCNC: 11.7 G/DL (ref 11.7–15.7)
MCH RBC QN AUTO: 27 PG (ref 26.5–33)
MCHC RBC AUTO-ENTMCNC: 31.3 G/DL (ref 31.5–36.5)
MCV RBC AUTO: 86 FL (ref 78–100)
NT-PROBNP SERPL-MCNC: 495 PG/ML (ref 0–1800)
OXYHGB MFR BLDV: 62 % (ref 92–100)
PLATELET # BLD AUTO: 253 10E3/UL (ref 150–450)
POTASSIUM SERPL-SCNC: 4.2 MMOL/L (ref 3.4–5.3)
PROT SERPL-MCNC: 7.1 G/DL (ref 6.4–8.3)
RBC # BLD AUTO: 4.34 10E6/UL (ref 3.8–5.2)
SODIUM SERPL-SCNC: 139 MMOL/L (ref 136–145)
WBC # BLD AUTO: 7.2 10E3/UL (ref 4–11)

## 2022-10-03 PROCEDURE — 71275 CT ANGIOGRAPHY CHEST: CPT | Mod: 26 | Performed by: RADIOLOGY

## 2022-10-03 PROCEDURE — 250N000009 HC RX 250: Performed by: INTERNAL MEDICINE

## 2022-10-03 PROCEDURE — G1010 CDSM STANSON: HCPCS | Mod: GC | Performed by: RADIOLOGY

## 2022-10-03 PROCEDURE — 93451 RIGHT HEART CATH: CPT | Mod: 26 | Performed by: INTERNAL MEDICINE

## 2022-10-03 PROCEDURE — 80053 COMPREHEN METABOLIC PANEL: CPT | Performed by: INTERNAL MEDICINE

## 2022-10-03 PROCEDURE — 83880 ASSAY OF NATRIURETIC PEPTIDE: CPT | Performed by: INTERNAL MEDICINE

## 2022-10-03 PROCEDURE — 36415 COLL VENOUS BLD VENIPUNCTURE: CPT | Performed by: INTERNAL MEDICINE

## 2022-10-03 PROCEDURE — G1010 CDSM STANSON: HCPCS

## 2022-10-03 PROCEDURE — 85018 HEMOGLOBIN: CPT

## 2022-10-03 PROCEDURE — 999N000132 HC STATISTIC PP CARE STAGE 1

## 2022-10-03 PROCEDURE — 93568 NJX CAR CTH NSLC P-ART ANGRP: CPT | Performed by: INTERNAL MEDICINE

## 2022-10-03 PROCEDURE — 250N000011 HC RX IP 250 OP 636: Performed by: INTERNAL MEDICINE

## 2022-10-03 PROCEDURE — 82810 BLOOD GASES O2 SAT ONLY: CPT

## 2022-10-03 PROCEDURE — 85014 HEMATOCRIT: CPT | Performed by: INTERNAL MEDICINE

## 2022-10-03 PROCEDURE — 999N000142 HC STATISTIC PROCEDURE PREP ONLY

## 2022-10-03 PROCEDURE — 93451 RIGHT HEART CATH: CPT | Performed by: INTERNAL MEDICINE

## 2022-10-03 PROCEDURE — 272N000001 HC OR GENERAL SUPPLY STERILE: Performed by: INTERNAL MEDICINE

## 2022-10-03 PROCEDURE — 93568 NJX CAR CTH NSLC P-ART ANGRP: CPT | Mod: GC | Performed by: INTERNAL MEDICINE

## 2022-10-03 PROCEDURE — 99215 OFFICE O/P EST HI 40 MIN: CPT | Performed by: INTERNAL MEDICINE

## 2022-10-03 PROCEDURE — G0463 HOSPITAL OUTPT CLINIC VISIT: HCPCS | Mod: 25

## 2022-10-03 PROCEDURE — 999N000128 HC STATISTIC PERIPHERAL IV START W/O US GUIDANCE

## 2022-10-03 RX ORDER — POTASSIUM CHLORIDE 750 MG/1
20 TABLET, EXTENDED RELEASE ORAL
Status: DISCONTINUED | OUTPATIENT
Start: 2022-10-03 | End: 2022-10-03 | Stop reason: HOSPADM

## 2022-10-03 RX ORDER — SODIUM CHLORIDE 9 MG/ML
INJECTION, SOLUTION INTRAVENOUS CONTINUOUS
Status: DISCONTINUED | OUTPATIENT
Start: 2022-10-03 | End: 2022-10-03 | Stop reason: HOSPADM

## 2022-10-03 RX ORDER — LIDOCAINE 40 MG/G
CREAM TOPICAL
Status: DISCONTINUED | OUTPATIENT
Start: 2022-10-03 | End: 2022-10-03 | Stop reason: HOSPADM

## 2022-10-03 RX ORDER — IOPAMIDOL 755 MG/ML
100 INJECTION, SOLUTION INTRAVASCULAR ONCE
Status: COMPLETED | OUTPATIENT
Start: 2022-10-03 | End: 2022-10-03

## 2022-10-03 RX ORDER — POTASSIUM CHLORIDE 750 MG/1
40 TABLET, EXTENDED RELEASE ORAL
Status: DISCONTINUED | OUTPATIENT
Start: 2022-10-03 | End: 2022-10-03 | Stop reason: HOSPADM

## 2022-10-03 RX ADMIN — IOPAMIDOL 100 ML: 755 INJECTION, SOLUTION INTRAVENOUS at 10:02

## 2022-10-03 RX ADMIN — LIDOCAINE: 40 CREAM TOPICAL at 12:12

## 2022-10-03 ASSESSMENT — ACTIVITIES OF DAILY LIVING (ADL)
ADLS_ACUITY_SCORE: 35

## 2022-10-03 ASSESSMENT — PAIN SCALES - GENERAL: PAINLEVEL: NO PAIN (0)

## 2022-10-03 NOTE — PATIENT INSTRUCTIONS
Medication Changes:  We will have you restart Adempas at 1mg three times daily  We will check on the prior authorization of this medication    Follow up Appointment Information:  We will have you follow-up in 6 weeks via telephone with Cherelle Benson 6 weeks after restarting  Follow-up 3 months after that with a 6 minute walk test, labs with Dr. Knox    Results:   Latest Reference Range & Units 10/03/22 09:49   Sodium 136 - 145 mmol/L 139   Potassium 3.4 - 5.3 mmol/L 4.2   Chloride 98 - 107 mmol/L 106   Carbon Dioxide (CO2) 22 - 29 mmol/L 23   Urea Nitrogen 8.0 - 23.0 mg/dL 19.9   Creatinine 0.51 - 0.95 mg/dL 0.85   GFR Estimate >60 mL/min/1.73m2 70   Calcium 8.8 - 10.2 mg/dL 9.8   Anion Gap 7 - 15 mmol/L 10   Albumin 3.5 - 5.2 g/dL 4.2   Protein Total 6.4 - 8.3 g/dL 7.1   Alkaline Phosphatase 35 - 104 U/L 132 (H)   ALT 10 - 35 U/L 23   AST 10 - 35 U/L 34   Bilirubin Total <=1.2 mg/dL 0.6   Glucose 70 - 99 mg/dL 97   N-Terminal Pro BNP Inpatient 0 - 1,800 pg/mL 495   WBC 4.0 - 11.0 10e3/uL 7.2   Hemoglobin 11.7 - 15.7 g/dL 11.7   Hematocrit 35.0 - 47.0 % 37.4   Platelet Count 150 - 450 10e3/uL 253   RBC Count 3.80 - 5.20 10e6/uL 4.34   MCV 78 - 100 fL 86   MCH 26.5 - 33.0 pg 27.0   MCHC 31.5 - 36.5 g/dL 31.3 (L)   RDW 10.0 - 15.0 % 16.2 (H)   (H): Data is abnormally high  (L): Data is abnormally low  We are located on the third floor of the [facility] on the [facility].  Our address is     [address] [place] [address] [place] [address] [place] [address]    Thank you for allowing us to be a part of your care here at the [facility]    If you have questions or concerns please contact us at:    Yane Cardenas RN, BSN, PHN  Ladan Aldrich (Scheduling,Prior Auth)  Nurse Coordinator     Clinic   Pulmonary Hypertension   Pulmonary Hypertension  [facility]  Care  (Phone)[phone]   (Phone) [phone]        (Fax) [phone]    ** Please note that you will NOT receive a reminder call regarding your scheduled testing, reminder calls are for provider appointments only.  If you are scheduled for testing within the [facility] system you may receive a call regarding pre-registration for billing purposes only.**     Support Group:  Pulmonary Hypertension Association  Https://www.phassociation.org/  **Look at the Events Tab** They even have Support Groups that you can call into    [facility] PH Support Group  Second Saturday of the Month from 1-3 PM   Location: [address] [place] [id number]  Leader: Rupa Worthy  Phone: [phone]  Email: [email or web address]     Great Videos about Pulmonary Hypertension!!  Scan ME!    Website: [email or web address]

## 2022-10-03 NOTE — DISCHARGE INSTRUCTIONS
[facility]                        Interventional Cardiology  Discharge Instructions   Post Right Heart Cath      AFTER YOU GO HOME:  DO drink plenty of fluids  DO resume your regular diet and medications unless otherwise instructed by your Primary Physician  Do Not scrub the procedure site vigorously  No lotion or powder to the puncture site for 3 days    CALL YOUR PRIMARY PHYSICIAN IF: You may resume all normal activity.  Monitor neck site for bleeding, swelling, or voice changes. If you notice bleeding or swelling immediately apply pressure to the site and call number below to speak with Cardiology Fellow.  If you experience any changes in your breathing you should call your doctor immediately or come to the closest Emergency Department.  Do not drive yourself.    ADDITIONAL INSTRUCTIONS: Medications: You are to resume all home medications including anticoagulation therapy unless otherwise advised by your primary cardiologist or nurse coordinator.    Follow Up: Per your primary cardiology team    If you have any questions or concerns regarding your procedure site please call [phone] at anytime and ask for Cardiology Fellow on call.  They are available 24 hours a day.  You may also contact the Cardiology Clinic after hours number at [phone].                                                       Telephone Numbers [phone] Monday-Friday 8:00 am to 4:30 pm    [phone] After 4:30 pm Monday-Friday, Weekends & Holidays  Ask for Interventional Cardiologist on call. Someone is on call 24 hours/day   [facility] toll free number [phone] Monday-Friday 8:00 am to 4:30 pm   [facility] Emergency Dept [phone]

## 2022-10-03 NOTE — NURSING NOTE
Patient educated to the following during visit and educated to contact RN or MD for any questions.       Med Reconcile: Reviewed and verified all current medications with the patient. The updated medication list was printed and given to the patient.  Labs: Patient was given results of the laboratory testing obtained today. Patient demonstrated understanding of this information and agreed to call with further questions or concerns.   Diet: Patient instructed regarding a heart healthy diet, including discussion of reduced fat and sodium intake. Patient demonstrated understanding of this information and agreed to call with further questions or concerns.     Plan:   Medication Changes:  We will have you restart Adempas at 1mg three times daily. Increase by 0.5mg TID every 14 days to a goal of 2.5mg TID- Called Kevin and verbal order given for new titration.     Follow up Appointment Information:  Patient to call when she restarts this medication and will set up a virtual follow-up with Cherelle Benson  Follow-up 3 months after that with a 6 minute walk test, labs with Dr. Knox    Patient stated she understood all health information given and agreed to call with further questions or concerns.      Patient to be scheduled in future    Shante Zeng RN

## 2022-10-03 NOTE — PROGRESS NOTES
Pt back from CCL s/p RHC, pulmonary angiogram.  VSS.  Pt alert and oriented x4.  Pt denies any pain.  Rt neck site F/D/I.  Pt's family at the bedside.  1345-Pt tolerated po well.  Pt tolerated ambulation in the hallway and up to the bathroom.  Discharge instructions went over with and given to pt, pt has not further questions.  PIV D/C'ed, catheter intact. 1355-Pt discharged to home with family.

## 2022-10-03 NOTE — Clinical Note
dry, intact, no bleeding and no hematoma. 7fr RIJ sheath removed, manual pressure applied, hemostasis achieved, bandage placed.

## 2022-10-03 NOTE — PROGRESS NOTES
Service Date: 10/03/2022    Fiorella Solorio MD  [address] [place] [address]    RE:      Debi Espinoza  MRN:  [id number]  :   1945    Dear Dr. Solorio:    We had the pleasure of seeing Ms. Debi Espinoza for followup in our Pulmonary Hypertension Clinic.  As you know, she is a very pleasant, 77-year-old female with chronic thromboembolic pulmonary hypertension who underwent pulmonary thromboendarterectomy on  of this year.  She returns today for followup.    She states that she is continuing to feel better.  She is able to ambulate better.  She walks at home by herself.  She is able to do some yard work.  She is driving.  She was able to do shopping for the first time this week.  She has completely weaned off of oxygen.  She is able to climb a flight of stairs a couple of times at home.  She has not had any exertional lightheadedness or dizziness.  No exertional presyncope or syncope.  She has lower extremity swelling at the end of the day, but not persistently.  She does complain of some sternal discomfort.  She describes this as a protuberance in her chest, and it is 1/10 intensity.  This does not bother her during straining or coughing.    PAST MEDICAL HISTORY:    1.  DVT.  2.  Pulmonary embolism.  3.  Hypertension.  4.  Nephrolithiasis.  5.  Osteoarthritis.    MEDICATIONS:    Current Outpatient Medications   Medication Sig    acetaminophen (TYLENOL) 325 MG tablet 3 tablets (975 mg) by Oral or Feeding Tube route every 8 hours as needed for mild pain or fever    apixaban ANTICOAGULANT (ELIQUIS) 5 MG tablet Take 1 tablet (5 mg) by mouth 2 times daily    macitentan (OPSUMIT) 10 MG tablet Take 10 mg by mouth daily (with lunch)    riociguat (ADEMPAS) 1 MG tablet Take 1 tablet (1 mg) by mouth 3 times daily Increase by 0.5mg three times daily every 14 days to a goal of 2.5mg three times daily     No current facility-administered medications for this  visit.         REVIEW OF SYSTEMS:  A detailed 10 point review of systems was obtained and as described in the History of Present Illness.  All other systems were reviewed and are negative.      PHYSICAL EXAMINATION:  She was comfortable.  She was in no apparent distress.  Her blood pressure was 146/78.  Her pulse rate was 100.  Her oxygen was 95%.  Her weight was 133 pounds.  She had no pallor, cyanosis or jaundice.  Her neck exam revealed no jugular venous distention.  Her carotids were 1+ bilaterally.  Her pulse was regular in rhythm.  Cardiac auscultation revealed normal S1, normal S2.  No murmur, rub or gallop.  She had a sternal protuberance.  Her sternum was stable on cough.  Auscultation of the lungs revealed equal air entry on both sides.  She had normal vesicular breath sounds.  She had no wheezing or crepitations.  Her abdomen was soft with normal bowel sounds, no tenderness, no rigidity, no guarding.  She had no focal neurological deficit.  Her extremities showed no edema.      STUDIES:  Her NT-proBNP has completely normalized at 495.  Her chemistry and liver function tests were unremarkable.  Her CBC was stable.  She had a repeat right heart catheterization today.  This showed residual pulmonary hypertension.  Her PA pressure was 67/25 with a mean of 40, pulmonary capillary wedge pressure of 12, RA pressure of 7, PA saturation of 62%, thermodilution cardiac output of 4.2 with an index of 2.74 and a calculated PVR of 6.6.  Her PA pressure and PVR and cardiac output have improved compared to her baseline diagnostic catheterization.    She had a repeat pulmonary angiogram.  I personally reviewed this.  Her perfusion on the right lower lobe and upper lobe and middle lobe has improved.  Her perfusion on the left upper lobe and middle lobe has also improved.  However, she continues to have a persistent  on the left lower lobe, and her left lower lobe is not filling completely.  A dual-energy CT scan  confirms residual chronic thromboembolic disease, mainly in the segmental and subsegmental disease.  Even on her pulmonary angiogram, she has distal disease bilaterally.    On her CT chest from today, she had a transverse sternal fracture at the mid sternal body with posterior subluxation of the superior sternal segment related to the inferior segment.    ASSESSMENT AND PLAN:  Ms. Debi Espinoza is a 77-year-old female with chronic thromboembolic pulmonary hypertension who underwent pulmonary thromboendarterectomy in May of this year.  She returns today for followup.    She does have residual chronic thromboembolic pulmonary hypertension.  Her PVR is 6.6.  Her mean PA pressure is 40.  Her cardiac output has normalized.  Her last echocardiogram continued to show moderate RV dilatation with moderately reduced function.  Her CT pulmonary angiogram and invasive pulmonary angiogram confirm a residual chronic thromboembolic disease.  She predominantly has distal disease, but also has a  on the left lower lobe with a big proximal perfusion defect.    She is currently on macitentan.  I have recommended her to start riociguat.  She was on 2.5 mg 3 times a day prior to surgery.  We will start her at 1 mg 3 times a day and gradually increase it every 2 weeks as tolerated to a maximum of 2.5 mg 3 times a day.  She is currently weaned off of oxygen.  She is not in right heart failure.  She is using diuretics only as needed, which I have recommended her to continue.  She will continue Eliquis twice a day for her long-term anticoagulation.  She has potential targets for a balloon pulmonary angioplasty.  I discussed the risks and benefits with her.  She would like to avoid procedures for now.  She would like to try the medicine first and then decide on a pulmonary balloon angioplasty, which I think is reasonable.    Of note, she has a transverse fracture of her mid sternum.  She is not that symptomatic from this.  She has very  minimal pain occasionally.  She has no instability or cough or any increased intrathoracic pressure.  I discussed with our Surgical colleagues.  We will continue to watch this for now.  If this were to get worse or if she were to get symptomatic in the future, we will address this.    I will arrange for her to be seen virtually with my colleague Cherelle Benson in 6 weeks to make sure that she is tolerating up titration of riociguat.  She will return to see me in 3 months with a repeat echocardiogram, a 6 minute walk test and labs.  She will call us in the interim if she has any further worsening symptoms.  We thank you for involving us in her care.    Total time today was 55 minutes reviewing notes, imaging, labs, patient visit, orders and documentation         Sincerely,  Lisette Knox MD   [facility]  Heart Failure, Transplant, and Mechanical Circulatory Support Cardiology   Cardiovascular Division  [facility]   [phone]          D: 10/03/2022   T: 10/03/2022   MT: kevin    Name:     CARLOS LEGER  MRN:      [id number]        Account:      [id number]   :      1945           Service Date: 10/03/2022       Document: [id number]

## 2022-10-03 NOTE — PROGRESS NOTES
Pt prepped for right heart cath and pulm angiogram.LMX cream applied to neck.Consent signed and questions answered.IV contrast discharge instructions were reviewed and signed and a copy given to pt.

## 2022-10-03 NOTE — NURSING NOTE
Chief Complaint   Patient presents with     Follow Up     1MO follow-up after RHC       Vitals were taken and medications reconciled.    Javan Page, EMT  3:20 PM

## 2022-10-03 NOTE — LETTER
10/3/2022      RE: Debi Espinoza  [address] [facility] [address]       Dear Colleague,    Thank you for the opportunity to participate in the care of your patient, Debi Espinoza, at the [facility]. Please see a copy of my visit note below.    Service Date: 10/03/2022    Fiorella Solorio MD  [address] [address]    RE:      Debi Espinoza  MRN:  [id number]  :   1945    Dear Dr. Solorio:    We had the pleasure of seeing Ms. Debi Espinoza for followup in our Pulmonary Hypertension Clinic.  As you know, she is a very pleasant, 77-year-old female with chronic thromboembolic pulmonary hypertension who underwent pulmonary thromboendarterectomy on  of this year.  She returns today for followup.    She states that she is continuing to feel better.  She is able to ambulate better.  She walks at home by herself.  She is able to do some yard work.  She is driving.  She was able to do shopping for the first time this week.  She has completely weaned off of oxygen.  She is able to climb a flight of stairs a couple of times at home.  She has not had any exertional lightheadedness or dizziness.  No exertional presyncope or syncope.  She has lower extremity swelling at the end of the day, but not persistently.  She does complain of some sternal discomfort.  She describes this as a protuberance in her chest, and it is 1/10 intensity.  This does not bother her during straining or coughing.    PAST MEDICAL HISTORY:    1.  DVT.  2.  Pulmonary embolism.  3.  Hypertension.  4.  Nephrolithiasis.  5.  Osteoarthritis.    MEDICATIONS:    Current Outpatient Medications   Medication Sig     acetaminophen (TYLENOL) 325 MG tablet 3 tablets (975 mg) by Oral or Feeding Tube route every 8 hours as needed for mild pain or fever     apixaban ANTICOAGULANT (ELIQUIS) 5 MG tablet  Take 1 tablet (5 mg) by mouth 2 times daily     macitentan (OPSUMIT) 10 MG tablet Take 10 mg by mouth daily (with lunch)     riociguat (ADEMPAS) 1 MG tablet Take 1 tablet (1 mg) by mouth 3 times daily Increase by 0.5mg three times daily every 14 days to a goal of 2.5mg three times daily     No current facility-administered medications for this visit.         REVIEW OF SYSTEMS:  A detailed 10 point review of systems was obtained and as described in the History of Present Illness.  All other systems were reviewed and are negative.      PHYSICAL EXAMINATION:  She was comfortable.  She was in no apparent distress.  Her blood pressure was 146/78.  Her pulse rate was 100.  Her oxygen was 95%.  Her weight was 133 pounds.  She had no pallor, cyanosis or jaundice.  Her neck exam revealed no jugular venous distention.  Her carotids were 1+ bilaterally.  Her pulse was regular in rhythm.  Cardiac auscultation revealed normal S1, normal S2.  No murmur, rub or gallop.  She had a sternal protuberance.  Her sternum was stable on cough.  Auscultation of the lungs revealed equal air entry on both sides.  She had normal vesicular breath sounds.  She had no wheezing or crepitations.  Her abdomen was soft with normal bowel sounds, no tenderness, no rigidity, no guarding.  She had no focal neurological deficit.  Her extremities showed no edema.      STUDIES:  Her NT-proBNP has completely normalized at 495.  Her chemistry and liver function tests were unremarkable.  Her CBC was stable.  She had a repeat right heart catheterization today.  This showed residual pulmonary hypertension.  Her PA pressure was 67/25 with a mean of 40, pulmonary capillary wedge pressure of 12, RA pressure of 7, PA saturation of 62%, thermodilution cardiac output of 4.2 with an index of 2.74 and a calculated PVR of 6.6.  Her PA pressure and PVR and cardiac output have improved compared to her baseline diagnostic catheterization.    She had a repeat pulmonary  angiogram.  I personally reviewed this.  Her perfusion on the right lower lobe and upper lobe and middle lobe has improved.  Her perfusion on the left upper lobe and middle lobe has also improved.  However, she continues to have a persistent  on the left lower lobe, and her left lower lobe is not filling completely.  A dual-energy CT scan confirms residual chronic thromboembolic disease, mainly in the segmental and subsegmental disease.  Even on her pulmonary angiogram, she has distal disease bilaterally.    On her CT chest from today, she had a transverse sternal fracture at the mid sternal body with posterior subluxation of the superior sternal segment related to the inferior segment.    ASSESSMENT AND PLAN:  Ms. Debi Espinoza is a 77-year-old female with chronic thromboembolic pulmonary hypertension who underwent pulmonary thromboendarterectomy in May of this year.  She returns today for followup.    She does have residual chronic thromboembolic pulmonary hypertension.  Her PVR is 6.6.  Her mean PA pressure is 40.  Her cardiac output has normalized.  Her last echocardiogram continued to show moderate RV dilatation with moderately reduced function.  Her CT pulmonary angiogram and invasive pulmonary angiogram confirm a residual chronic thromboembolic disease.  She predominantly has distal disease, but also has a  on the left lower lobe with a big proximal perfusion defect.    She is currently on macitentan.  I have recommended her to start riociguat.  She was on 2.5 mg 3 times a day prior to surgery.  We will start her at 1 mg 3 times a day and gradually increase it every 2 weeks as tolerated to a maximum of 2.5 mg 3 times a day.  She is currently weaned off of oxygen.  She is not in right heart failure.  She is using diuretics only as needed, which I have recommended her to continue.  She will continue Eliquis twice a day for her long-term anticoagulation.  She has potential targets for a balloon pulmonary  angioplasty.  I discussed the risks and benefits with her.  She would like to avoid procedures for now.  She would like to try the medicine first and then decide on a pulmonary balloon angioplasty, which I think is reasonable.    Of note, she has a transverse fracture of her mid sternum.  She is not that symptomatic from this.  She has very minimal pain occasionally.  She has no instability or cough or any increased intrathoracic pressure.  I discussed with our Surgical colleagues.  We will continue to watch this for now.  If this were to get worse or if she were to get symptomatic in the future, we will address this.    I will arrange for her to be seen virtually with my colleague Cherelle Marcelino in 6 weeks to make sure that she is tolerating up titration of riociguat.  She will return to see me in 3 months with a repeat echocardiogram, a 6 minute walk test and labs.  She will call us in the interim if she has any further worsening symptoms.  We thank you for involving us in her care.    Total time today was 55 minutes reviewing notes, imaging, labs, patient visit, orders and documentation         Sincerely,  Lisette Knox MD   [facility] for Pulmonary Hypertension  Heart Failure, Transplant, and Mechanical Circulatory Support Cardiology   Cardiovascular Division  [facility]   [phone]          D: 10/03/2022   T: 10/03/2022   MT: kevin    Name:     CARLOS LEGER  MRN:      [id number]        Account:      [id number]   :      1945           Service Date: 10/03/2022       Document: [id number]

## 2022-10-04 ASSESSMENT — ACTIVITIES OF DAILY LIVING (ADL)
ADLS_ACUITY_SCORE: 35

## 2022-10-13 ENCOUNTER — TELEPHONE (OUTPATIENT)
Dept: CARDIOLOGY | Facility: CLINIC | Age: 77
End: 2022-10-13

## 2022-10-13 DIAGNOSIS — I27.24 CTEPH (CHRONIC THROMBOEMBOLIC PULMONARY HYPERTENSION) (H): Primary | ICD-10-CM

## 2022-10-13 NOTE — TELEPHONE ENCOUNTER
----- Message from Shante Zeng RN sent at 10/3/2022  4:34 PM CDT -----  Regarding: Restarting Adempas  Plan 10/3  restart Adempas at 1mg three times daily. Increase by 0.5mg TID every 14 days to a goal of 2.5mg TID- Called Kevin and verbal order given for new titration. This should be good to go      Patient to call you when she restarts this medication and will need to set up a virtual follow-up with Cherelle Benson    Follow-up 3 months after that with a 6 minute walk test, labs with Dr. Knox    ---------------------------  Verified Adempas is on med list and follow up orders placed. Sent staff msg to [facility] to check on PA status, as patient had previously been on it. Yane Cardenas RN on 10/13/2022 at 2:03 PM  --------------------------  Ladan verified Adempas is still approved through [facility] PAP program.  I sent new Erx to [email or web address] for fill. Yane Cardenas RN on 10/13/2022 at 4:00 PM

## 2022-11-04 ENCOUNTER — TELEPHONE (OUTPATIENT)
Dept: CARDIOLOGY | Facility: CLINIC | Age: 77
End: 2022-11-04

## 2022-11-04 DIAGNOSIS — I27.24 CTEPH (CHRONIC THROMBOEMBOLIC PULMONARY HYPERTENSION) (H): ICD-10-CM

## 2022-11-04 NOTE — TELEPHONE ENCOUNTER
[facility] Call Center    Phone Message    May a detailed message be left on voicemail: yes     Reason for Call: Medication Refill Request    Has the patient contacted the pharmacy for the refill?     Name of medication being requested:   riociguat (ADEMPAS) 1 MG tablet 28 tablet  10/13/2022  No   Sig - Route: Take 1 tablet (1 mg) by mouth 3 times daily Increase by 0.5mg three times daily every 14 days to a goal of 2.5mg three times daily - Oral       Provider who prescribed the medication: Dr Howard    Pharmacy:   [facility]  [address] [place] [address] [place] [address]    PH: [phone] / [place]    Please call patient to inform once its mailed.    Date medication is needed: Restarting this medication       Action Taken: Other: Cardiology    Travel Screening: Not Applicable  ------------------------------  Called patient and advised her I had received this message, but asked if she just needed us to send a refill of the Adempas?  Yes.  Verified she receives it from [facility].  Advised her I would send refill through now.  Patient verbalized understanding, agreed with plan and denied any further questions. Yane Cardenas RN on 11/8/2022 at 10:12 AM  -----------------------------------------  Called [facility].  Sent Erx to [facility] for patient's Adempas. Yane Cardenas RN on 11/8/2022 at 10:21 AM

## 2022-12-13 DIAGNOSIS — I27.24 CTEPH (CHRONIC THROMBOEMBOLIC PULMONARY HYPERTENSION) (H): Primary | ICD-10-CM

## 2022-12-15 RX ORDER — MACITENTAN 10 MG/1
TABLET, FILM COATED ORAL
Qty: 30 TABLET | Refills: 11 | Status: SHIPPED | OUTPATIENT
Start: 2022-12-15 | End: 2023-11-22

## 2022-12-15 NOTE — TELEPHONE ENCOUNTER
Medication Refill double check:    Last office visit was on 10/3/22 with .    Follow up was recommended for 3 months.    Any additional encounters with changes to requested med? no    Authorizing provider is: Dr. Knox    Refill was approved.     Additional orders/notes:       Yane Cardenas RN on 12/15/2022 at 3:20 PM

## 2022-12-15 NOTE — TELEPHONE ENCOUNTER
macitentan (OPSUMIT) 10 MG      Last Written Prescription Date:  4/4/22  Last Fill Quantity: ?  # refills: 3  Last Office Visit : 10/31/22  Future Office visit:  NONE  Routing refill request to provider for review/approval because:  Drug not active on patient's medication list / HISTORICAL

## 2022-12-19 ENCOUNTER — TELEPHONE (OUTPATIENT)
Dept: CARDIOLOGY | Facility: CLINIC | Age: 77
End: 2022-12-19

## 2022-12-19 NOTE — TELEPHONE ENCOUNTER
12/19/2022 4:00 PM -   Completed provider information and faxsed to [employer] @ [phone] and sent a copy to the pt via [place] for her to complete patient portion.                         Deedee MEADOWS CMA  Clinical   Cardiology/Pulmonary Hypertension   [facility]  PH: [phone]

## 2023-01-10 ENCOUNTER — TELEPHONE (OUTPATIENT)
Dept: CARDIOLOGY | Facility: CLINIC | Age: 78
End: 2023-01-10

## 2023-01-10 NOTE — TELEPHONE ENCOUNTER
1/16/23 - Pt has no rx coverage, has J&JPAF assistance thru 2023, updated snapshot. lrr    1/10/2023 11:31 AM -   Spoke to [place]/LUIS @  [phone] - status: as of 1/10/23- no open case;  Pt had J&JPAF in 2022, they called pt, confirmed she will need copay assistance and that she will need to fill out a new form for 2023. OLMANP has not rcvd anything yet.  JCSHANIQUA reached out to pt on 12/28/22.  LRR is sent JCP-PAP form via [employer].        Checked chart and pt has read message as of 1127 am today.     Deedee MEADOWS CMA  Clinical   Cardiology/Pulmonary Hypertension   [facility]  PH: [phone]

## 2023-01-16 ENCOUNTER — TELEPHONE (OUTPATIENT)
Dept: CARDIOLOGY | Facility: CLINIC | Age: 78
End: 2023-01-16

## 2023-01-16 ENCOUNTER — TELEPHONE (OUTPATIENT)
Dept: CARDIOLOGY | Facility: CLINIC | Age: 78
End: 2023-01-16
Payer: MEDICARE

## 2023-01-16 NOTE — TELEPHONE ENCOUNTER
1/16/2023 11:50 AM -  Opsumit Pt Assistance coverage for 2023    Prior Authorization Not Needed per Insurance    Medication: Opsumit 10 mg - J&JPAF coverage for 2023, pt does not have rx coverage  Insurance Company:  n/a  Expected CoPay:      Pharmacy Filling the Rx:  Ivette MEADOWS CMA  Clinical   Cardiology/Pulmonary Hypertension   [facility]  PH: [phone]

## 2023-01-16 NOTE — TELEPHONE ENCOUNTER
----- Message from Deedee Urena CMA sent at 1/12/2023 10:53 AM CST -----  Regarding: Treatment gap for pt's Opsumit?  Hi,   Following up  on this pt as she is on my list for assistace thru [employer]  Pt's last shipment was 9/2022 QTY: 30, is pt still taking Opsumit or did she discontinue?  I sent her a JCP form on 1/10/23, she has not sent in to [employer] yet.   Thx  lrr  --------------------------------------  Opsumit lst   [facility] saying she was approved.  She found the letter and it was from [employer] patient assistance. #Record ID # [id number]    She's not taking Adempas. - appears she ran out.  We sent new Erx but she still hasn't re-started.    Exercises on her own and can walk farther than before.  We reviewed some possible appt dates with Dr. Knox and she will talk with her family and call me back with the best date they can drive her.    -----------------------------------------  Sent Staff msg to Deedee updating her  JJ approval.jean claude Cardenas, RN on 1/16/2023 at 11:18 AM

## 2023-02-11 ENCOUNTER — HEALTH MAINTENANCE LETTER (OUTPATIENT)
Age: 78
End: 2023-02-11

## 2023-02-24 ENCOUNTER — TELEPHONE (OUTPATIENT)
Dept: CARDIOLOGY | Facility: CLINIC | Age: 78
End: 2023-02-24
Payer: MEDICARE

## 2023-02-24 NOTE — TELEPHONE ENCOUNTER
Called patient to see if she had received the Adempas? No.  Asked if she had ever heard from "[employer] pharmacy? No.  She filled out a bunch of paperwork and submitted it but never heard back from anyone.  She is still taking the Opsumit.  Verified we will see her Monday at clinic. Yane Cardenas RN on 2/24/2023 at 12:04 PM  -------------------------  Called [facility] to follow up on Adempas start.  They only ship Opsumit.    Called [employer] patient assistance Adempas program to find out what happened to shipments.  Last case note was patient enrolling into her     They need speak with [employer] to request new referral and she needs assistance. Kevin application will need to be completed again.  [phone]    Called [employer] and gave TORB that we want patient re-enrolled in their program & she will need Kevin assistance.  Yane Cardenas RN on 2/24/2023 at 12:37 PM

## 2023-02-27 ENCOUNTER — OFFICE VISIT (OUTPATIENT)
Dept: CARDIOLOGY | Facility: CLINIC | Age: 78
End: 2023-02-27
Attending: INTERNAL MEDICINE
Payer: MEDICARE

## 2023-02-27 ENCOUNTER — LAB (OUTPATIENT)
Dept: LAB | Facility: CLINIC | Age: 78
End: 2023-02-27
Payer: MEDICARE

## 2023-02-27 VITALS
RESPIRATION RATE: 22 BRPM | BODY MASS INDEX: 28.63 KG/M2 | OXYGEN SATURATION: 96 % | HEIGHT: 58 IN | WEIGHT: 136.4 LBS | TEMPERATURE: 97.9 F | SYSTOLIC BLOOD PRESSURE: 171 MMHG | HEART RATE: 87 BPM | DIASTOLIC BLOOD PRESSURE: 101 MMHG

## 2023-02-27 DIAGNOSIS — I26.99: ICD-10-CM

## 2023-02-27 DIAGNOSIS — I27.24 CTEPH (CHRONIC THROMBOEMBOLIC PULMONARY HYPERTENSION) (H): ICD-10-CM

## 2023-02-27 DIAGNOSIS — G47.01 INSOMNIA DUE TO MEDICAL CONDITION: Primary | ICD-10-CM

## 2023-02-27 DIAGNOSIS — R06.02 SOB (SHORTNESS OF BREATH): ICD-10-CM

## 2023-02-27 LAB
ALBUMIN SERPL BCG-MCNC: 4.5 G/DL (ref 3.5–5.2)
ALP SERPL-CCNC: 103 U/L (ref 35–104)
ALT SERPL W P-5'-P-CCNC: 27 U/L (ref 10–35)
ANION GAP SERPL CALCULATED.3IONS-SCNC: 13 MMOL/L (ref 7–15)
AST SERPL W P-5'-P-CCNC: 32 U/L (ref 10–35)
BILIRUB SERPL-MCNC: 0.6 MG/DL
BUN SERPL-MCNC: 16.6 MG/DL (ref 8–23)
CALCIUM SERPL-MCNC: 10.2 MG/DL (ref 8.8–10.2)
CHLORIDE SERPL-SCNC: 106 MMOL/L (ref 98–107)
CREAT SERPL-MCNC: 0.9 MG/DL (ref 0.51–0.95)
DEPRECATED HCO3 PLAS-SCNC: 23 MMOL/L (ref 22–29)
ERYTHROCYTE [DISTWIDTH] IN BLOOD BY AUTOMATED COUNT: 15.7 % (ref 10–15)
GFR SERPL CREATININE-BSD FRML MDRD: 65 ML/MIN/1.73M2
GLUCOSE SERPL-MCNC: 110 MG/DL (ref 70–99)
HCT VFR BLD AUTO: 42.2 % (ref 35–47)
HGB BLD-MCNC: 13.5 G/DL (ref 11.7–15.7)
MCH RBC QN AUTO: 27.3 PG (ref 26.5–33)
MCHC RBC AUTO-ENTMCNC: 32 G/DL (ref 31.5–36.5)
MCV RBC AUTO: 85 FL (ref 78–100)
NT-PROBNP SERPL-MCNC: 469 PG/ML (ref 0–1800)
PLATELET # BLD AUTO: 246 10E3/UL (ref 150–450)
POTASSIUM SERPL-SCNC: 4.1 MMOL/L (ref 3.4–5.3)
PROT SERPL-MCNC: 7.7 G/DL (ref 6.4–8.3)
RBC # BLD AUTO: 4.94 10E6/UL (ref 3.8–5.2)
SODIUM SERPL-SCNC: 142 MMOL/L (ref 136–145)
WBC # BLD AUTO: 6.1 10E3/UL (ref 4–11)

## 2023-02-27 PROCEDURE — 80053 COMPREHEN METABOLIC PANEL: CPT | Performed by: PATHOLOGY

## 2023-02-27 PROCEDURE — 83880 ASSAY OF NATRIURETIC PEPTIDE: CPT | Performed by: PATHOLOGY

## 2023-02-27 PROCEDURE — 36415 COLL VENOUS BLD VENIPUNCTURE: CPT | Performed by: PATHOLOGY

## 2023-02-27 PROCEDURE — G0463 HOSPITAL OUTPT CLINIC VISIT: HCPCS

## 2023-02-27 PROCEDURE — 99215 OFFICE O/P EST HI 40 MIN: CPT | Performed by: INTERNAL MEDICINE

## 2023-02-27 PROCEDURE — 85027 COMPLETE CBC AUTOMATED: CPT | Performed by: PATHOLOGY

## 2023-02-27 RX ORDER — ZOLPIDEM TARTRATE 5 MG/1
2.5 TABLET ORAL DAILY
Qty: 30 TABLET | Refills: 3 | Status: SHIPPED | OUTPATIENT
Start: 2023-02-27 | End: 2023-07-25

## 2023-02-27 ASSESSMENT — PAIN SCALES - GENERAL: PAINLEVEL: MILD PAIN (2)

## 2023-02-27 NOTE — PROGRESS NOTES
Service Date: 2023        RE:  Debi Espinoza  MRN:  [id number]  :  1945    Dear Dr. Solorio:      We had the pleasure of seeing Ms. Debi Espinoza for followup in our Pulmonary Hypertension Clinic.  As you know, she is a very pleasant 77-year-old female with residual chronic thromboembolic pulmonary hypertension, who is currently on macitentan therapy.    Ms. Espinoza underwent pulmonary thromboendarterectomy on 2022.  She has residual pulmonary hypertension.    She returns today for her 3-month followup.  She is overall continuing to feel better.  She is no longer needing oxygen.  She is not using a cane or a walker.  She is able to walk by herself.  She lives independently.  She is able to do her activities of daily living.  She is able to vacuum her house.  She is able to do her laundry.  She is able to climb a flight of stairs.  She is able to do her grocery shopping.  I would currently characterize her as functional class II.  She does not have any sternal pain.  Of note, she has a sternal fracture from the surgery.  She has no exertional chest pain or chest pressure.  No presyncope or syncope.  No lower extremity swelling or abdominal distention.  No bleeding complications.    She is very anxious.  She is not able to sleep well at night.  She feels like a new person is in her body after having the thromboendarterectomy surgery.    PAST MEDICAL HISTORY:    1.  DVT.  2.  Chronic pulmonary embolism.  3.  Chronic thromboembolic pulmonary hypertension, status post thromboendarterectomy in 2022.  4.  Hypertension.  5.  Nephrolithiasis.  6.  Osteoarthritis.    MEDICATIONS:    Current Outpatient Medications   Medication Sig    acetaminophen (TYLENOL) 325 MG tablet 3 tablets (975 mg) by Oral or Feeding Tube route every 8 hours as needed for mild pain or fever    apixaban ANTICOAGULANT (ELIQUIS) 5 MG tablet Take 1 tablet (5 mg) by mouth 2 times daily    OPSUMIT 10 MG tablet TAKE 1 TABLET BY  MOUTH ONCE DAILY    riociguat (ADEMPAS) 1 MG tablet Take 1.5 tablets (1.5 mg) by mouth 3 times daily Increase by 0.5mg three times daily every 14 days to a goal of 2.5mg three times daily    zolpidem (AMBIEN) 5 MG tablet Take 0.5 tablets (2.5 mg) by mouth daily At night for sleep     No current facility-administered medications for this visit.       REVIEW OF SYSTEMS:  A detailed 10-point review of systems was obtained as described in history of present illness.  All other systems reviewed and are negative.    PHYSICAL EXAMINATION:  She was comfortable.  She was in no apparent distress.  Her blood pressure was 171/101, pulse rate was 87, respiratory rate 22, saturating 96% on room air.  She was 97.9, temperature.  Weight 136 pounds.  She had no pallor, cyanosis, or jaundice.  Her neck exam revealed no jugular venous distention.  Her carotids were 2+ bilaterally.  Pulse was regular in rhythm.  Cardiac auscultation revealed normal S1 and normal S2.  No murmur, rub or gallop.  Auscultation of the lungs revealed equal air entry on both sides.  Her abdomen was soft with normal bowel sounds, no tenderness, no rigidity, no guarding.  She had no focal neurological deficit.    LABS:    Recent Results (from the past 168 hour(s))   N terminal pro BNP outpatient    Collection Time: 02/27/23  1:57 PM   Result Value Ref Range    N Terminal Pro BNP Outpatient 469 0 - 1,800 pg/mL   CBC with platelets    Collection Time: 02/27/23  1:57 PM   Result Value Ref Range    WBC Count 6.1 4.0 - 11.0 10e3/uL    RBC Count 4.94 3.80 - 5.20 10e6/uL    Hemoglobin 13.5 11.7 - 15.7 g/dL    Hematocrit 42.2 35.0 - 47.0 %    MCV 85 78 - 100 fL    MCH 27.3 26.5 - 33.0 pg    MCHC 32.0 31.5 - 36.5 g/dL    RDW 15.7 (H) 10.0 - 15.0 %    Platelet Count 246 150 - 450 10e3/uL   Comprehensive metabolic panel    Collection Time: 02/27/23  1:57 PM   Result Value Ref Range    Sodium 142 136 - 145 mmol/L    Potassium 4.1 3.4 - 5.3 mmol/L    Chloride 106 98 - 107  mmol/L    Carbon Dioxide (CO2) 23 22 - 29 mmol/L    Anion Gap 13 7 - 15 mmol/L    Urea Nitrogen 16.6 8.0 - 23.0 mg/dL    Creatinine 0.90 0.51 - 0.95 mg/dL    Calcium 10.2 8.8 - 10.2 mg/dL    Glucose 110 (H) 70 - 99 mg/dL    Alkaline Phosphatase 103 35 - 104 U/L    AST 32 10 - 35 U/L    ALT 27 10 - 35 U/L    Protein Total 7.7 6.4 - 8.3 g/dL    Albumin 4.5 3.5 - 5.2 g/dL    Bilirubin Total 0.6 <=1.2 mg/dL    GFR Estimate 65 >60 mL/min/1.73m2     Her last echocardiogram was from 06/2022.  This showed moderate right ventricular dilatation with moderate to severely reduced right ventricular function.  She had moderate right ventricular systolic insufficiency.  Her estimated right ventricular systolic pressure was 57 mmHg.  Her IVC was normal in size.  This echo was done immediately after her thromboendarterectomy.  She had a repeat right heart catheterization in October.  At that time, her RA pressure was 8, RV was 68/8, PA was 67/28 with a mean of 40, pulmonary capillary wedge pressure of 13.  Boris thermodilution cardiac output 4.2 with an index of 2.7 and a calculated PVR of 6.4 based on thermodilution cardiac output.    Pulmonary angiogram in 10/2022 showed occluded right A4 and A5 segments, type A and B lesions on A7 and A9, occluded left A1 segment and occluded A7-9 segments.    ASSESSMENT AND PLAN:      In summary, Ms. Debi Espinoza is a pleasant 77-year-old female with chronic thromboembolic pulmonary hypertension, status post pulmonary thromboendarterectomy and residual chronic thromboembolic pulmonary hypertension.  She is currently on macitentan monotherapy.  She returns today for followup.    She is clinically doing well.  She is functional class II.  She has no evidence of right heart failure.  Unfortunately, she did not start riociquat as recommended last time.  She states that she completed the prior authorization paperwork, but she never received the pills.I have recommended her to start Adempas at 1.5 mg  3 times a day.  She has pills left at home from prior to surgery.  I have recommended her to use this for now.  We will do the prior authorization as soon as possible. She is not interested in balloon angioplasty.  The majority of her lesions are also , which is a high-risk option.  Thus, we will just continue her on medical therapy.  Her PVR was 6.6 despite being on macitentan.  Thus, I have recommended her to stop the riociquat.  We will gradually titrate this every 2 weeks to a maximum of 2.5 mg 3 times a day.  She is currently euvolemic and not requiring any diuretics.  She will take Eliquis for her long-term anticoagulation. We will plan for repeat echocardiogram, 6-minute walk test, and labs in 3 months when she returns after being on full-dose riociquat.    Her systemic blood pressure is high today.  However, she states that she checks at home and this is not high at home.  This is probably related to anxiety.  We will continue to monitor her.    She does have a lot of anxiety.  She has not been able to sleep at nighttime.  We will start her on low-dose Ambien 2.5 mg once a day.    I recommended her to call us if she has any further worsening symptoms in the interim.  Otherwise, we will see her back in 3 months with repeat 6-minute walk test, labs and echocardiogram.    It was a pleasure meeting Ms. Carlos Leger in our Pulmonary Hypertension Clinic at the [facility].    Total time today was 48minutes reviewing notes, imaging, labs, patient visit, orders and documentation         Sincerely,    Lisette Knox MD   [facility] for Pulmonary Hypertension  Heart Failure, Transplant, and Mechanical Circulatory Support Cardiology   Cardiovascular Division  [facility]   [phone]          D: 2023   T: 2023   MT: ANGELITA    Name:     CARLOS LEGER  MRN:      [id number]        Account:      [id number]   :      1945           Service  Date: 02/27/2023       Document: [id number]

## 2023-02-27 NOTE — NURSING NOTE
"Patient educated to the following during visit and educated to contact RN or MD for any questions.     Plan reviewed with patient:     Medication Changes:  START ambien 2.5mg for sleep.  Plan to start adempas 1.5mg TID and increase [id number] by 0.5mg to a goal of 2.5mg TID.      Follow up Appointment Information:  Pt to schedule in June: Echocardiogram, 6 minute walk test, labs, and the follow up with Cherelle KRAMER.        Reviewed Med list and verified all medications with patient. Updated medication list printed    Lab:  results reviewed in clinic. Patient demonstrated understanding.     Diet: Patient instructed regarding a heart healthy diet, including discussion of reduced fat and sodium intake. Patient demonstrated understanding of this information and agreed to call with further questions or concerns    Completed AVS    Follow-up orders placed    Marked chart \"Ready for Checkout\"    Sent msg to  Sandra and  schedulers.     Patient verbalized understanding of plan and follow-up and agreed to call with further questions or concerns.      Patient brought to pod for scheduling.    Rancho Doe RN        "

## 2023-02-27 NOTE — LETTER
2023      RE: Debi Espinoza  [address] [address]       Dear Colleague,    Thank you for the opportunity to participate in the care of your patient, Debi Espinoza, at the [facility]. Please see a copy of my visit note below.    Service Date: 2023        RE:  Debi Espinoza  MRN:  [id number]  :  1945    Dear Dr. Solorio:      We had the pleasure of seeing Ms. Debi Espinoza for followup in our Pulmonary Hypertension Clinic.  As you know, she is a very pleasant 77-year-old female with residual chronic thromboembolic pulmonary hypertension, who is currently on macitentan therapy.    Ms. Espinoza underwent pulmonary thromboendarterectomy on 2022.  She has residual pulmonary hypertension.    She returns today for her 3-month followup.  She is overall continuing to feel better.  She is no longer needing oxygen.  She is not using a cane or a walker.  She is able to walk by herself.  She lives independently.  She is able to do her activities of daily living.  She is able to vacuum her house.  She is able to do her laundry.  She is able to climb a flight of stairs.  She is able to do her grocery shopping.  I would currently characterize her as functional class II.  She does not have any sternal pain.  Of note, she has a sternal fracture from the surgery.  She has no exertional chest pain or chest pressure.  No presyncope or syncope.  No lower extremity swelling or abdominal distention.  No bleeding complications.    She is very anxious.  She is not able to sleep well at night.  She feels like a new person is in her body after having the thromboendarterectomy surgery.    PAST MEDICAL HISTORY:    1.  DVT.  2.  Chronic pulmonary embolism.  3.  Chronic thromboembolic pulmonary hypertension, status post thromboendarterectomy in 2022.  4.  Hypertension.  5.  Nephrolithiasis.  6.   Osteoarthritis.    MEDICATIONS:    Current Outpatient Medications   Medication Sig     acetaminophen (TYLENOL) 325 MG tablet 3 tablets (975 mg) by Oral or Feeding Tube route every 8 hours as needed for mild pain or fever     apixaban ANTICOAGULANT (ELIQUIS) 5 MG tablet Take 1 tablet (5 mg) by mouth 2 times daily     OPSUMIT 10 MG tablet TAKE 1 TABLET BY MOUTH ONCE DAILY     riociguat (ADEMPAS) 1 MG tablet Take 1.5 tablets (1.5 mg) by mouth 3 times daily Increase by 0.5mg three times daily every 14 days to a goal of 2.5mg three times daily     zolpidem (AMBIEN) 5 MG tablet Take 0.5 tablets (2.5 mg) by mouth daily At night for sleep     No current facility-administered medications for this visit.       REVIEW OF SYSTEMS:  A detailed 10-point review of systems was obtained as described in history of present illness.  All other systems reviewed and are negative.    PHYSICAL EXAMINATION:  She was comfortable.  She was in no apparent distress.  Her blood pressure was 171/101, pulse rate was 87, respiratory rate 22, saturating 96% on room air.  She was 97.9, temperature.  Weight 136 pounds.  She had no pallor, cyanosis, or jaundice.  Her neck exam revealed no jugular venous distention.  Her carotids were 2+ bilaterally.  Pulse was regular in rhythm.  Cardiac auscultation revealed normal S1 and normal S2.  No murmur, rub or gallop.  Auscultation of the lungs revealed equal air entry on both sides.  Her abdomen was soft with normal bowel sounds, no tenderness, no rigidity, no guarding.  She had no focal neurological deficit.    LABS:    Recent Results (from the past 168 hour(s))   N terminal pro BNP outpatient    Collection Time: 02/27/23  1:57 PM   Result Value Ref Range    N Terminal Pro BNP Outpatient 469 0 - 1,800 pg/mL   CBC with platelets    Collection Time: 02/27/23  1:57 PM   Result Value Ref Range    WBC Count 6.1 4.0 - 11.0 10e3/uL    RBC Count 4.94 3.80 - 5.20 10e6/uL    Hemoglobin 13.5 11.7 - 15.7 g/dL     Hematocrit 42.2 35.0 - 47.0 %    MCV 85 78 - 100 fL    MCH 27.3 26.5 - 33.0 pg    MCHC 32.0 31.5 - 36.5 g/dL    RDW 15.7 (H) 10.0 - 15.0 %    Platelet Count 246 150 - 450 10e3/uL   Comprehensive metabolic panel    Collection Time: 02/27/23  1:57 PM   Result Value Ref Range    Sodium 142 136 - 145 mmol/L    Potassium 4.1 3.4 - 5.3 mmol/L    Chloride 106 98 - 107 mmol/L    Carbon Dioxide (CO2) 23 22 - 29 mmol/L    Anion Gap 13 7 - 15 mmol/L    Urea Nitrogen 16.6 8.0 - 23.0 mg/dL    Creatinine 0.90 0.51 - 0.95 mg/dL    Calcium 10.2 8.8 - 10.2 mg/dL    Glucose 110 (H) 70 - 99 mg/dL    Alkaline Phosphatase 103 35 - 104 U/L    AST 32 10 - 35 U/L    ALT 27 10 - 35 U/L    Protein Total 7.7 6.4 - 8.3 g/dL    Albumin 4.5 3.5 - 5.2 g/dL    Bilirubin Total 0.6 <=1.2 mg/dL    GFR Estimate 65 >60 mL/min/1.73m2     Her last echocardiogram was from 06/2022.  This showed moderate right ventricular dilatation with moderate to severely reduced right ventricular function.  She had moderate right ventricular systolic insufficiency.  Her estimated right ventricular systolic pressure was 57 mmHg.  Her IVC was normal in size.  This echo was done immediately after her thromboendarterectomy.  She had a repeat right heart catheterization in October.  At that time, her RA pressure was 8, RV was 68/8, PA was 67/28 with a mean of 40, pulmonary capillary wedge pressure of 13.  Boris thermodilution cardiac output 4.2 with an index of 2.7 and a calculated PVR of 6.4 based on thermodilution cardiac output.    Pulmonary angiogram in 10/2022 showed occluded right A4 and A5 segments, type A and B lesions on A7 and A9, occluded left A1 segment and occluded A7-9 segments.    ASSESSMENT AND PLAN:      In summary, Ms. Debi Espinoza is a pleasant 77-year-old female with chronic thromboembolic pulmonary hypertension, status post pulmonary thromboendarterectomy and residual chronic thromboembolic pulmonary hypertension.  She is currently on macitentan  monotherapy.  She returns today for followup.    She is clinically doing well.  She is functional class II.  She has no evidence of right heart failure.  Unfortunately, she did not start riociquat as recommended last time.  She states that she completed the prior authorization paperwork, but she never received the pills.I have recommended her to start Adempas at 1.5 mg 3 times a day.  She has pills left at home from prior to surgery.  I have recommended her to use this for now.  We will do the prior authorization as soon as possible. She is not interested in balloon angioplasty.  The majority of her lesions are also , which is a high-risk option.  Thus, we will just continue her on medical therapy.  Her PVR was 6.6 despite being on macitentan.  Thus, I have recommended her to stop the riociquat.  We will gradually titrate this every 2 weeks to a maximum of 2.5 mg 3 times a day.  She is currently euvolemic and not requiring any diuretics.  She will take Eliquis for her long-term anticoagulation. We will plan for repeat echocardiogram, 6-minute walk test, and labs in 3 months when she returns after being on full-dose riociquat.    Her systemic blood pressure is high today.  However, she states that she checks at home and this is not high at home.  This is probably related to anxiety.  We will continue to monitor her.    She does have a lot of anxiety.  She has not been able to sleep at nighttime.  We will start her on low-dose Ambien 2.5 mg once a day.    I recommended her to call us if she has any further worsening symptoms in the interim.  Otherwise, we will see her back in 3 months with repeat 6-minute walk test, labs and echocardiogram.    It was a pleasure meeting Ms. Debi Espinoza in our Pulmonary Hypertension Clinic at the [facility].    Total time today was 48minutes reviewing notes, imaging, labs, patient visit, orders and documentation       D: 02/27/2023   T: 02/27/2023   MT:  JG    Name:     CARLOS LEGER  MRN:      [id number]        Account:      [id number]   :      1945           Service Date: 2023       Document: [id number]        Please do not hesitate to contact me if you have any questions/concerns.     Sincerely,     Lisette Knox MD

## 2023-02-27 NOTE — PATIENT INSTRUCTIONS
Medication Changes:  START ambien 2.5mg for sleep.  We will follow up for you with the prior authorization for the Adempas.    Follow up Appointment Information:  Schedule in June: Echocardiogram, 6 minute walk test, labs, and the follow up with Cherelle KRAMER.    Results:   Latest Reference Range & Units 02/27/23 13:57   Sodium 136 - 145 mmol/L 142   Potassium 3.4 - 5.3 mmol/L 4.1   Chloride 98 - 107 mmol/L 106   Carbon Dioxide (CO2) 22 - 29 mmol/L 23   Urea Nitrogen 8.0 - 23.0 mg/dL 16.6   Creatinine 0.51 - 0.95 mg/dL 0.90   GFR Estimate >60 mL/min/1.73m2 65   Calcium 8.8 - 10.2 mg/dL 10.2   Anion Gap 7 - 15 mmol/L 13   Albumin 3.5 - 5.2 g/dL 4.5   Protein Total 6.4 - 8.3 g/dL 7.7   Alkaline Phosphatase 35 - 104 U/L 103   ALT 10 - 35 U/L 27   AST 10 - 35 U/L 32   Bilirubin Total <=1.2 mg/dL 0.6   Glucose 70 - 99 mg/dL 110 (H)   N-Terminal Pro Bnp 0 - 1,800 pg/mL 469   WBC 4.0 - 11.0 10e3/uL 6.1   Hemoglobin 11.7 - 15.7 g/dL 13.5   Hematocrit 35.0 - 47.0 % 42.2   Platelet Count 150 - 450 10e3/uL 246   RBC Count 3.80 - 5.20 10e6/uL 4.94   MCV 78 - 100 fL 85   MCH 26.5 - 33.0 pg 27.3   MCHC 31.5 - 36.5 g/dL 32.0   RDW 10.0 - 15.0 % 15.7 (H)   (H): Data is abnormally high  We are located on the third floor of the [facility] ([facility] [place] on the [facility].  Our address is     [address] [place] [address] [place] [address] [place] [address]    Thank you for allowing us to be a part of your care here at the [facility]    If you have questions or concerns please contact us at:    Yane Cardenas, RN, BSN, PHN  Nurse Coordinator   Pulmonary Hypertension  [facility]               (P)[phone]         Deedee Nagel, [facility]   Ladan Aldrich   (Prior Authorizations)   ()  Clinic   Clinic   Pulmonary Hypertension   Pulmonary Hypertension  [facility]  [facility]  (P)[phone]    (P) [phone]  (F) [phone]    ** Please note that you will NOT receive a reminder call regarding your scheduled testing, reminder calls are for provider appointments only.  If you are scheduled for testing within the [facility] system you may receive a call regarding pre-registration for billing purposes only.**     Support Group:  Pulmonary Hypertension Association  Https://www.phassociation.org/  **Look at the Events Tab** They even have Support Groups that you can call into    [facility] PH Support Group  Second Saturday of the Month from 1-3 PM   Location: [address] [place] [id number]  Leader: Rupa Worthy  Phone: [phone]  Email: [email or web address]     Great Videos about Pulmonary Hypertension!!  Scan ME!    Website: [email or web address]

## 2023-03-03 ENCOUNTER — TELEPHONE (OUTPATIENT)
Dept: CARDIOLOGY | Facility: CLINIC | Age: 78
End: 2023-03-03
Payer: MEDICARE

## 2023-03-03 ENCOUNTER — MEDICAL CORRESPONDENCE (OUTPATIENT)
Dept: HEALTH INFORMATION MANAGEMENT | Facility: CLINIC | Age: 78
End: 2023-03-03
Payer: MEDICARE

## 2023-03-03 NOTE — TELEPHONE ENCOUNTER
3/3/2023  @ 2:31 PM - Rancho Doe, Deedee Duran CMA; Yane Cardenas, MIKE; Shante Zeng RN Hey Linda,     Can you checkinto this pts prior auth for adempas please?     Yane,     Plan as below:     Medication Changes:   START ambien 2.5mg for sleep.   TT would like to start Adempas @ 1.5mg, unsure if she'd need to apply for PAP.  I didn't see a specialty pharm listed.     Follow up Appointment Information:   Schedule in June: Echocardiogram, 6 minute walk test, labs, and the follow up with Cherelle KRAMER.     Message Shante with questions! :)         Deedee MEADOWS CMA- Prior Auths  Cardiology/Pulmonary Hypertension

## 2023-03-03 NOTE — TELEPHONE ENCOUNTER
3/3/2023  @ 2:31 PM - **Enrollment -  Pending     Deedee MEADOWS CMA- Prior Auths  Cardiology/Pulmonary Hypertension

## 2023-03-03 NOTE — TELEPHONE ENCOUNTER
3/6/2023  @ 9:35 AM - per Simin/PARTH:Pt. was sent to the [employer] Patient Assistance Program on 2/27.  Once they get both her portion and your portion of the application, they will process it and reach out to the pt. when they show her approved and ready to schedule shipment.   Deedee MEADOWS CMA- Prior Auths  Cardiology/Pulmonary Hypertension   -----------------------------------------------------------------------------------------------------------------------------    3/3/2023  @ 2:31 PM - No ins information on file in Epic from 2/27/23 visit or via [employer].      339 pm - Called pt @ [phone] (home)  - for PBM/Part D - Pt states she does not have pharmacy ins coverage.   [employer] sent pt a form to fill out, she got it yesterday and will send back in the next few days. Pt has been off medication since May 2022; pt is restarting at 1.5 mg.  LRR completed provider portion of [employer] US Pt Assistance form and faxed to:  [phone] via Right Fax @ 403 pm.          PA Initiation  Medication: Adempas 1.5 mg - (restart)   Insurance Company: Comment:  NO PHARMACY COVERAGE - pt rcvd [employer] US Pt Assistance Form on 3/2/23, she will complete and return to [employer].     Pharmacy Filling the Rx:    Filling Pharmacy Phone:    Filling Pharmacy Fax:    Start Date: *tbd    Deedee MEADOWS CMA- Prior Auths  Cardiology/Pulmonary Hypertension

## 2023-03-09 NOTE — TELEPHONE ENCOUNTER
"3/27/23 - email from Simin [place]/[facility] - : \"This pt. has been approved with the [employer] Patient Assistance Program.  Pt. shipped on 3/13 for 1.5 MG.\"    3/24/23 -  Sent email to Simin [place]/[employer] to inquire on status of pt's assistance thru Kevin.  LRR    3/9/2023  -  Pt LVM @  221 pm -  Did we get forms from [facility]?       @ 252 pm -   pt [phone] (home)  - let her know we sent in our portion; pt stated she mailed hers back today.  Pt can call [employer] next week to check on status. PVU.      Dedeee MEADOWS, CHRIS/Cardiology Pulm Hypertension   " 13-Jun-2018 20:02

## 2023-03-20 ENCOUNTER — TELEPHONE (OUTPATIENT)
Dept: CARDIOLOGY | Facility: CLINIC | Age: 78
End: 2023-03-20
Payer: MEDICARE

## 2023-03-28 ENCOUNTER — TELEPHONE (OUTPATIENT)
Dept: CARDIOLOGY | Facility: CLINIC | Age: 78
End: 2023-03-28
Payer: MEDICARE

## 2023-03-28 DIAGNOSIS — E87.70 HYPERVOLEMIA: Primary | ICD-10-CM

## 2023-03-28 RX ORDER — FUROSEMIDE 20 MG
20 TABLET ORAL PRN
Qty: 30 TABLET | Refills: 1 | Status: SHIPPED | OUTPATIENT
Start: 2023-03-28

## 2023-03-28 NOTE — TELEPHONE ENCOUNTER
Unable to reach RN. LM asking for callback when able. Radha Leal RN on 3/28/2023 at 11:30 AM    Patient started on 3/15; up 3 lbs and BLE swelling (ankles and legs). Hasn't been on diuretics since surgery. Advised I would reach out to WILLIAMS Morin for medication adjustment. Radha Leal RN on 3/28/2023 at 12:39 PM    Date: 3/28/2023    Time of Call: 12:54 PM     Diagnosis: BLE edema, weight gain     [ VORB ] Ordering provider: WILLIAMS Morin  Order: Lasix 20 mg PRN for weight gain and swelling     Order received by: Jourdan Leal RN     Follow-up/additional notes: Labs if taking more than 3x/week. Will call patient Friday for weight update    Confirmed plan with patient; she will call our office if she is taking it more than three days in a row or 3x/week. We will need BMP recheck at that time. Patient did not have any other questions or concerns. Staff message sent to MIKE Che to follow up on Friday. Radha Leal RN on 3/28/2023 at 1:01 PM

## 2023-03-28 NOTE — TELEPHONE ENCOUNTER
3/28/2023  @ 919 am  -  Dominique Francisco RN / [employer]   ([phone])  -   She got off phone with pt :      Vitals :   BP:  134/67  Pulse:   82  Pt has gained 3 lbs (133 lbs) - she has gained in lower extremities and legs are puffy, with some edema for about a  week since she started this medication.    Resp: 28  (normally around 24)      She was previously on diuretics but not recently.    Her pharmacy is [facility].       Please follow up w/ patient at:   [phone].  Dominique is busy until 1045am, please call back - Dominique  will follow up after that as well.   She is following up with the pt on the 3rd, she doesnt think pt should change med dose at this time until we can clear up edema in lower extremities.       Deedee MEADOWS CMA- Prior Auths  Cardiology/Pulmonary Hypertension

## 2023-03-28 NOTE — TELEPHONE ENCOUNTER
"3/27/2023  @ 959 am  AM - Rcvd call from Dominique Francisco RN/ Adempas Program ([phone]) -   Pt restarted Adempas 1.5 mg on 3/15/23 TID;  Dominique will be following up with pt on 3/28/23 and is wondering if pt will stay on 1.5 mg or should she titrate up?      1001 am  -Dominique called back - she can see the Kevin Rx and it is checked for \"titration\", however, she is wanting to make sure if it is every 30 days or every 2 weeks?      Please call back to discuss.  Routing to PH Nursing.       Deedee MEADOWS CMA- Prior Auths  Cardiology/Pulmonary Hypertension     "

## 2023-03-30 ENCOUNTER — TELEPHONE (OUTPATIENT)
Dept: CARDIOLOGY | Facility: CLINIC | Age: 78
End: 2023-03-30
Payer: MEDICARE

## 2023-03-31 ENCOUNTER — TELEPHONE (OUTPATIENT)
Dept: CARDIOLOGY | Facility: CLINIC | Age: 78
End: 2023-03-31
Payer: MEDICARE

## 2023-03-31 NOTE — TELEPHONE ENCOUNTER
----- Message from Rancho Doe RN sent at 3/28/2023 12:59 PM CDT -----  Regarding: fu on lasix  H,    Starting lasix, check back on Friday to see if it's helping.    AG Anguiano    --------------------------------------  Called patient and her ankles and feet have gone down some since starting the Lasix.  She is down about a pound.  She has taken 1 tablet once daily and has taken them starting Wednesday.  When she took Adempas before this happened as well.      SP RN will call her Monday to re-evaluate the Adempas dosing.  Advised patient I will talk with her next week to see how things are going.  Patient verbalized understanding, agreed with plan and denied any further questions. Yane Cardenas RN on 3/31/2023 at 2:48 PM

## 2023-04-03 ENCOUNTER — TELEPHONE (OUTPATIENT)
Dept: CARDIOLOGY | Facility: CLINIC | Age: 78
End: 2023-04-03

## 2023-04-03 NOTE — TELEPHONE ENCOUNTER
LM I was following up to see if her ankle & foot swelling is any better.  Asked her to  for me or send me a mychart. Yane Cardenas RN on 4/3/2023 at 1:45 PM  ---------------------------------  patient  her weight was down another pound Monday. Yane Cardenas RN on 4/4/2023 at 3:35 PM

## 2023-04-20 ENCOUNTER — TELEPHONE (OUTPATIENT)
Dept: CARDIOLOGY | Facility: CLINIC | Age: 78
End: 2023-04-20
Payer: MEDICARE

## 2023-04-20 DIAGNOSIS — I27.24 CTEPH (CHRONIC THROMBOEMBOLIC PULMONARY HYPERTENSION) (H): ICD-10-CM

## 2023-04-20 NOTE — TELEPHONE ENCOUNTER
"RN from Specialty pharmacy states patient is having increased swelling and the lasix isn't helping anymore.  She asked that I call patient to discuss.   -----------------------------------  Was having terrible headaches, but they have subsided. Swelling has subsided. Her toes feel \"funny\" like they are swollen.  She mainly elevates her legs     Weight today 130lbs, breathing is good, her current dose is 1.5mg of just Adempas and just received a month supply of this dose.  Advised patient we can move up to 2.0mg, so I will send a new Rx to [facility] for her next shipment.    Got more energy and walking \"better\", been walking a lot and will be starting a new part time job.   Patient verbalized understanding, agreed with plan and denied any further questions. Yane Cardenas RN on 4/20/2023 at 1:47 PM    ----------------------------------  Called MIKE Fox with Adempas program that we will be increasing patient's dose to 2.0mg with next shipment.  Sent Erx for new dose. Yane Cardenas RN on 4/20/2023 at 1:58 PM    "

## 2023-05-10 ENCOUNTER — TELEPHONE (OUTPATIENT)
Dept: CARDIOLOGY | Facility: CLINIC | Age: 78
End: 2023-05-10
Payer: MEDICARE

## 2023-05-10 NOTE — TELEPHONE ENCOUNTER
[facility] Call Center    Phone Message    May a detailed message be left on voicemail: yes     Reason for Call: Other:    RN Dominique was calling to give a update on patient. From today's visit  132/56 BP  86 pulse   133 weight   20 respirations     Patient has the current symptoms   Headache   Some puffness      Regarding medication  riociguat (ADEMPAS) 2 MG tablet    Patient wants to stay on medication. For two more weeks    If you want patient to increase medication call Dominique to update medication list    Action Taken: Other: Cardiology     Travel Screening: Not Applicable     Thank you!  [facility]        -----------------------------  Agree with plan. Yane Cardenas RN on 5/11/2023 at 9:17 AM

## 2023-05-19 DIAGNOSIS — I27.24 CTEPH (CHRONIC THROMBOEMBOLIC PULMONARY HYPERTENSION) (H): ICD-10-CM

## 2023-05-25 ENCOUNTER — TELEPHONE (OUTPATIENT)
Dept: CARDIOLOGY | Facility: CLINIC | Age: 78
End: 2023-05-25
Payer: MEDICARE

## 2023-05-25 DIAGNOSIS — I27.24 CTEPH (CHRONIC THROMBOEMBOLIC PULMONARY HYPERTENSION) (H): ICD-10-CM

## 2023-05-25 NOTE — TELEPHONE ENCOUNTER
[facility] Call Center    Phone Message    May a detailed message be left on voicemail: yes     Reason for Call: Other:     Dominique is requesting a fede back to discuss increasing riociguat (ADEMPAS) 2 MG tablet to 2.5 mg.    Action Taken: Other: cardio    Travel Screening: Not Applicable     Thank you!  [facility]             ----------------------------------------  LM I was returning her call.  Left my direct dial number for call back.  Yane Cardenas RN on 5/26/2023 at 2:44 PM           Dominique Francisco RN LM asking if patient's Adempas can be increased to 2.5mg TID?  Patient will need new Rx.    LM for Dominique Francisco advising her it is ok for patient to increase Adempas dose to 2.5mg and that I sent an Erx for the new dose. Yane Cardenas RN on 5/30/2023 at 12:10 PM

## 2023-06-16 NOTE — TELEPHONE ENCOUNTER
[facility] Call Center    Phone Message    May a detailed message be left on voicemail: yes     Reason for Call: Other: tylor is calling to let the care team know that this was order to take 3 times a day and will be delivered wednesday. thank you    She will follow up on 7/12    Patients blood pressures for today is: 132/58  Pulse 86  Weight 131  Res: 120  Complain of some light headedness.  Muscles aches in her back most pain in the back of her head and patient would like to speak to care team regarding this call pt at [phone], thank you       Action Taken: Other: cardiology    Travel Screening: Not Applicable   Thank you!  [facility] Access Center

## 2023-06-19 ENCOUNTER — TELEPHONE (OUTPATIENT)
Dept: CARDIOLOGY | Facility: CLINIC | Age: 78
End: 2023-06-19
Payer: MEDICARE

## 2023-07-25 ENCOUNTER — LAB (OUTPATIENT)
Dept: LAB | Facility: CLINIC | Age: 78
End: 2023-07-25
Attending: INTERNAL MEDICINE
Payer: MEDICARE

## 2023-07-25 ENCOUNTER — ANCILLARY PROCEDURE (OUTPATIENT)
Dept: CARDIOLOGY | Facility: CLINIC | Age: 78
End: 2023-07-25
Attending: INTERNAL MEDICINE
Payer: MEDICARE

## 2023-07-25 VITALS
DIASTOLIC BLOOD PRESSURE: 80 MMHG | SYSTOLIC BLOOD PRESSURE: 156 MMHG | HEIGHT: 59 IN | BODY MASS INDEX: 29.72 KG/M2 | WEIGHT: 147.4 LBS | HEART RATE: 79 BPM | OXYGEN SATURATION: 91 %

## 2023-07-25 DIAGNOSIS — I27.24 CTEPH (CHRONIC THROMBOEMBOLIC PULMONARY HYPERTENSION) (H): ICD-10-CM

## 2023-07-25 DIAGNOSIS — R06.02 SOB (SHORTNESS OF BREATH): ICD-10-CM

## 2023-07-25 DIAGNOSIS — D64.9 ANEMIA, UNSPECIFIED TYPE: Primary | ICD-10-CM

## 2023-07-25 DIAGNOSIS — D64.9 ANEMIA, UNSPECIFIED TYPE: ICD-10-CM

## 2023-07-25 LAB
6 MIN WALK (FT): 650 FT
6 MIN WALK (FT): 650 FT
6 MIN WALK (M): 198 M
6 MIN WALK (M): 198 M
ANION GAP SERPL CALCULATED.3IONS-SCNC: 11 MMOL/L (ref 7–15)
BUN SERPL-MCNC: 17.2 MG/DL (ref 8–23)
CALCIUM SERPL-MCNC: 9.5 MG/DL (ref 8.8–10.2)
CHLORIDE SERPL-SCNC: 108 MMOL/L (ref 98–107)
CREAT SERPL-MCNC: 1.06 MG/DL (ref 0.51–0.95)
DEPRECATED HCO3 PLAS-SCNC: 23 MMOL/L (ref 22–29)
ERYTHROCYTE [DISTWIDTH] IN BLOOD BY AUTOMATED COUNT: 14.5 % (ref 10–15)
FERRITIN SERPL-MCNC: 22 NG/ML (ref 11–328)
GFR SERPL CREATININE-BSD FRML MDRD: 54 ML/MIN/1.73M2
GLUCOSE SERPL-MCNC: 145 MG/DL (ref 70–99)
HCT VFR BLD AUTO: 36.1 % (ref 35–47)
HGB BLD-MCNC: 11.3 G/DL (ref 11.7–15.7)
IRON BINDING CAPACITY (ROCHE): 373 UG/DL (ref 240–430)
IRON SATN MFR SERPL: 6 % (ref 15–46)
IRON SERPL-MCNC: 23 UG/DL (ref 37–145)
LVEF ECHO: NORMAL
MCH RBC QN AUTO: 27.8 PG (ref 26.5–33)
MCHC RBC AUTO-ENTMCNC: 31.3 G/DL (ref 31.5–36.5)
MCV RBC AUTO: 89 FL (ref 78–100)
NT-PROBNP SERPL-MCNC: 472 PG/ML (ref 0–1800)
PLATELET # BLD AUTO: 249 10E3/UL (ref 150–450)
POTASSIUM SERPL-SCNC: 3.8 MMOL/L (ref 3.4–5.3)
RBC # BLD AUTO: 4.06 10E6/UL (ref 3.8–5.2)
SODIUM SERPL-SCNC: 142 MMOL/L (ref 136–145)
WBC # BLD AUTO: 5.4 10E3/UL (ref 4–11)

## 2023-07-25 PROCEDURE — G0463 HOSPITAL OUTPT CLINIC VISIT: HCPCS | Performed by: PHYSICIAN ASSISTANT

## 2023-07-25 PROCEDURE — 94618 PULMONARY STRESS TESTING: CPT | Performed by: INTERNAL MEDICINE

## 2023-07-25 PROCEDURE — 85027 COMPLETE CBC AUTOMATED: CPT | Performed by: PATHOLOGY

## 2023-07-25 PROCEDURE — 82728 ASSAY OF FERRITIN: CPT | Performed by: PATHOLOGY

## 2023-07-25 PROCEDURE — 80048 BASIC METABOLIC PNL TOTAL CA: CPT | Performed by: PATHOLOGY

## 2023-07-25 PROCEDURE — 83540 ASSAY OF IRON: CPT | Performed by: PATHOLOGY

## 2023-07-25 PROCEDURE — 99214 OFFICE O/P EST MOD 30 MIN: CPT | Mod: 25 | Performed by: PHYSICIAN ASSISTANT

## 2023-07-25 PROCEDURE — 83880 ASSAY OF NATRIURETIC PEPTIDE: CPT | Performed by: PATHOLOGY

## 2023-07-25 PROCEDURE — 36415 COLL VENOUS BLD VENIPUNCTURE: CPT | Performed by: PATHOLOGY

## 2023-07-25 PROCEDURE — 83550 IRON BINDING TEST: CPT | Performed by: PATHOLOGY

## 2023-07-25 PROCEDURE — 93306 TTE W/DOPPLER COMPLETE: CPT | Performed by: STUDENT IN AN ORGANIZED HEALTH CARE EDUCATION/TRAINING PROGRAM

## 2023-07-25 RX ORDER — EPINEPHRINE 1 MG/ML
0.3 INJECTION, SOLUTION, CONCENTRATE INTRAVENOUS EVERY 5 MIN PRN
Status: CANCELLED | OUTPATIENT
Start: 2023-07-25

## 2023-07-25 RX ORDER — MEPERIDINE HYDROCHLORIDE 25 MG/ML
25 INJECTION INTRAMUSCULAR; INTRAVENOUS; SUBCUTANEOUS EVERY 30 MIN PRN
Status: CANCELLED | OUTPATIENT
Start: 2023-07-25

## 2023-07-25 RX ORDER — ESCITALOPRAM OXALATE 10 MG/1
1 TABLET ORAL AT BEDTIME
COMMUNITY
Start: 2023-04-17

## 2023-07-25 RX ORDER — ALBUTEROL SULFATE 0.83 MG/ML
2.5 SOLUTION RESPIRATORY (INHALATION)
Status: CANCELLED | OUTPATIENT
Start: 2023-07-25

## 2023-07-25 RX ORDER — HEPARIN SODIUM (PORCINE) LOCK FLUSH IV SOLN 100 UNIT/ML 100 UNIT/ML
5 SOLUTION INTRAVENOUS
Status: CANCELLED | OUTPATIENT
Start: 2023-07-25

## 2023-07-25 RX ORDER — ALBUTEROL SULFATE 90 UG/1
1-2 AEROSOL, METERED RESPIRATORY (INHALATION)
Status: CANCELLED
Start: 2023-07-25

## 2023-07-25 RX ORDER — DIPHENHYDRAMINE HYDROCHLORIDE 50 MG/ML
50 INJECTION INTRAMUSCULAR; INTRAVENOUS
Status: CANCELLED
Start: 2023-07-25

## 2023-07-25 RX ORDER — METHYLPREDNISOLONE SODIUM SUCCINATE 125 MG/2ML
125 INJECTION, POWDER, LYOPHILIZED, FOR SOLUTION INTRAMUSCULAR; INTRAVENOUS
Status: CANCELLED
Start: 2023-07-25

## 2023-07-25 RX ORDER — HEPARIN SODIUM,PORCINE 10 UNIT/ML
5-20 VIAL (ML) INTRAVENOUS DAILY PRN
Status: CANCELLED | OUTPATIENT
Start: 2023-07-25

## 2023-07-25 ASSESSMENT — PAIN SCALES - GENERAL: PAINLEVEL: NO PAIN (0)

## 2023-07-25 NOTE — NURSING NOTE
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    Ernesto Toro, EMT  3:11 PM

## 2023-07-25 NOTE — LETTER
7/25/2023      RE: Debi Espinoza  [address] [address]       Dear Colleague,    Thank you for the opportunity to participate in the care of your patient, Debi Espinoza, at the [facility] [place] [facility]. Please see a copy of my visit note below.        Date of Visit:  07/25/23      [facility]      Primary PH cardiologist: Dr. Knox      HPI:  Ms. Debi Espinoza is a pleasant 77 year old female with a past medical history significant for hypertension, nephrolithiasis, DVT, and pulmonary embolism. 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 She still has residual pulmonary hypertension and is currently on Opsumit and Adempas.    She was seen last in February at which time she was doing well clinically and able to do things around her home. However, was feeling very anxious and not sleeping well at night. She was to add Adempas back (was on prior to PTE) after his last visit with her, but she had not followed through. Thus, it was recommended at that time she resume. She was not interested in BPA, and the remainder of her lesions were , so this was felt to be higher risk.     Today, I am meeting Debi in clinic to follow up. She tells me overall she's doing ok. She still gets winded with activity at times but thinks this is no worse than usual. She is back on Adempas 2.5mg does as of about a month ago. Her main issue revolves around body aches and headaches. They sounds to be bothersome, but limiting her ADLs and not impairing her sleep. She reports no worsening LE edema or fluid retention, and uses her PRN Lasix about once a week with good result. No new dizziness/presyncope.    She had an echo prior to our visit today, which we reviewed. EF remains preserved at 60-65%. RV was mildly dilated with mildly reduced  function, though PASP estimated still at 96mmHg. It reports considerable improvement in RV function, though TR perhaps a bit more (though report states it was interrogated more thoroughly). She also had a 6MWT. She ambulated 650 ft, but this was up from 470 feet last year. Lowest sat was 89-91% though she did not require supplemental oxygen.    Labs performed prior to our visit today were reviewed as below.       CURRENT PULMONARY HYPERTENSION REGIMEN:    PAH Rx: Opsumit 10mg daily, Adempas 2.5mg TID    Diuretics: Lasix 20mg PRN    Oxygen: None (still has a concentrator at home from prior to PTE but has not used in several months)    Anticoagulation: Eliquis  Indication: CTEPH      ASSESSMENT/PLAN:      1. Chronic thromboembolic pulmonary hypertension.   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 She still has residual disease. The remainder of her lesions are  which are higher risk, and she is not interested in pursuing this. Thus, we are working on optimizing medical therapy. She is now on Opsumit 10mg daily and recently her Adempas was added back. She has been on 2.5mg dose for the last month or so; she continues to have a lot of muscle aches. While these are bothersome, they are not currently interfering with her ADLs. Echocardiogram today showed what sounds to be improvement in RV function, though TR may be a bit worse.   --Today she appears relatively euvolemic, using Lasix only PRN. She uses about once a week with good result. SCr up slightly, will continue to monitor. NT-proBNP is not significantly elevated.    --She required supplemental oxygen previously, but no longer using. Per 6MWT today she does desat to 89-91%. However, she is able to be on her feet for 3 hrs at work without much difficulty. Will continue to monitor this as well.    --Continue lifelong AC with Eliquis.    --She is a bit more anemic today, and iron studies show iron deficiency. Will set up for infusions  "locally.    Follow up plan: I will see her back virtually in ~3 months (she requests telephone as she has no video capability), with repeat labs (including iron studies) locally prior to visit. We are happy to see the patient back sooner with any new concerns.       Orders this Visit:  Orders Placed This Encounter   Procedures    Iron and iron binding capacity    Ferritin    Comprehensive metabolic panel    CBC with platelets    N terminal pro BNP outpatient    Iron & Iron Binding Capacity    Follow-Up with Cardiology PERRY    Follow-Up with Cardiology     Orders Placed This Encounter   Medications    escitalopram (LEXAPRO) 10 MG tablet     Sig: Take 1 tablet by mouth At Bedtime     Medications Discontinued During This Encounter   Medication Reason    zolpidem (AMBIEN) 5 MG tablet Med Rec(No AVS / No eCancel)         Review of Systems:  POS ROS ARE BOLDED, all other negative.    Cardiovascular: Chest pain, palpitations, orthopnea, LE edema (stable)  Resp: Dyspnea on exertion (stable), cough, known chronic lung disease  Hematologic/lymphatic: Current systemic anticoagulation, hx of blood clots, new bleeding concerns.  Neurological: Dizziness, syncope/presyncope     Physical Exam:  Vitals: BP (!) 156/80   Pulse 79   Ht 1.492 m (4' 10.74\")   Wt 66.9 kg (147 lb 6.4 oz)   SpO2 91%   BMI 30.04 kg/m     Wt Readings from Last 4 Encounters:   07/25/23 66.9 kg (147 lb 6.4 oz)   02/27/23 61.9 kg (136 lb 6.4 oz)   10/03/22 60.6 kg (133 lb 9.6 oz)   10/03/22 58.4 kg (128 lb 12.8 oz)       GEN:  In general, this is a well nourished female in no acute distress on RA.  Patient ambulatory, accompanied by her son.  NECK: Supple, No JVD while upright.   CV:  Regular rate and rhythm, no murmur, rub or gallop. No S3 or RV heave.   RESP: Respirations are unlabored. No use of accessory muscles. Clear to auscultation bilaterally without wheezing, rales, or rhonchi.  EXTREM: Appears to be mild chronic nonpitting LE edema.   NEURO: Alert " and oriented, cooperative. Gait not assessed.      Recent Lab Results:    LIVER ENZYME RESULTS:  Lab Results   Component Value Date    AST 32 02/27/2023    ALT 27 02/27/2023       CBC RESULTS:  Lab Results   Component Value Date    WBC 5.4 07/25/2023    RBC 4.06 07/25/2023    HGB 11.3 (L) 07/25/2023    HCT 36.1 07/25/2023    MCV 89 07/25/2023    MCH 27.8 07/25/2023    MCHC 31.3 (L) 07/25/2023    RDW 14.5 07/25/2023     07/25/2023       BMP RESULTS:  Lab Results   Component Value Date     07/25/2023    POTASSIUM 3.8 07/25/2023    POTASSIUM 5.2 06/21/2022    POTASSIUM 3.4 05/30/2022    CHLORIDE 108 (H) 07/25/2023    CHLORIDE 102 06/21/2022    CO2 23 07/25/2023    CO2 34 (H) 06/21/2022    ANIONGAP 11 07/25/2023    ANIONGAP 3 06/21/2022     (H) 07/25/2023     (H) 06/25/2022     (H) 06/21/2022    BUN 17.2 07/25/2023    BUN 42 (H) 06/21/2022    CR 1.06 (H) 07/25/2023    GFRESTIMATED 54 (L) 07/25/2023    CHELE 9.5 07/25/2023        Recent Labs   Lab Test 07/25/23  1244 02/27/23  1357 10/03/22  0949 05/25/22  1622 04/29/22  0929   NTBNPI  --   --  495 3,532* 4,583*   NTBNP 472 469  --   --   --          Most recent pertinent testing:      Echocardiogram  7/25/23  Interpretation Summary  Global and regional left ventricular function is normal with an EF of 60-65%.  Global right ventricular function is mildly reduced. Mild right ventricular  dilation is present. RVFAC 40%, TAPSE 1.3 cm, S' 10 cm/s  Moderate to severe tricuspid insufficiency is present.  The estimated PA systolic pressure is at least 96 mmHg.  There is mild dilation at the level of the ascending aorta when indexed to BSA  (3.6 cm, indexed 2.4 cm/m2).  IVC diameter >2.1 cm collapsing <50% with sniff suggests a high RA pressure  estimated at 15 mmHg or greater.  This study was compared with the study from 06/08/2022. The RV function has  improved considerably. The TR appears to be more severe, but it is also  interrogated more  thoroughly on today's exam.    [facility]  10/3/22    Hemodynamics    RA: 8/10/8 mmHg  RV: 68/8 mmHg  PA: 67/28/40 mmHg  PCWP: --/--13 mmHg  Boris CO/CI: 3.66/2.39  Thermo: CO/CI: 4.2/2.74  PVR: 6.4 (TD)   Right sided filling pressures are mildly elevated. Left sided filling pressures are normal. Moderately elevated pulmonary artery hypertension. Normal cardiac output level. Hemodynamic data has been modified in Epic per physician review.     Pulmonary Angiogram    Main PA, LPA and RPA, along with bilateral truncus anterior, interlobar arteries are all patent.    Right  A1-3 are patent.  A4-5 are occluded with perfusion defects in the middle lobe.  A6 appears patent.  A7-9 have Type A and B lesions.  A10 appears patent.    Left:  A1 is occluded.  A2 and 3 are patent.  A4-6 are patent.  A7 - 9 are occluded.       NYHA Functional Class:  3      A total of 35 minutes was spent today performing chart and history review, gathering HPI, physical exam, patient education, pre and post visit documentation, and care coordination.      Cherelle Benson PA-C  [facility]  Pager [phone]

## 2023-07-25 NOTE — PATIENT INSTRUCTIONS
Thank you for visiting the Pulmonary Hypertension Clinic today.      Medication Changes:   None    Additional information/recommendations:  We will arrange for IV iron to be done near your home.      Follow up Appointment Information:  Return in 3 months to see Cherelle via TELEPHONE visit.  We will arrange for labs to be done in [place] prior to your visit.       Additional Instructions:    1. Continue staying active and eat a heart healthy, low sodium diet.     2. Please keep current list of medications with you at all times.     3. Remember to weigh yourself daily after voiding and write it down on a log. If you have gained/lost 2 pounds overnight or 5 pounds in a week contact us for medication adjustments or further instructions.    4. Please call us immediately if you have syncope (fainting or passing out), chest pain, worsening edema (swelling or weight gain), or general worsening in how you are feeling.         ---------------------------------------------------------------------------------------------------------------    If you have questions or concerns please contact us at:        Yane Cardenas, RN, BSN, PHN  Nurse Coordinator   Pulmonary Hypertension  [facility]               (P)[phone]         Deedee Nagel, CHRIS Aldrich   (Prior Authorizations)    ()  Clinic   Clinic   Pulmonary Hypertension   Pulmonary Hypertension  [facility]  (P)[phone]    (P) [phone]  (F) [phone]    ** Please note that you will NOT receive a reminder call regarding your scheduled testing, reminder calls are for provider appointments only.  If you are scheduled for testing within the [facility] system you may receive a call regarding pre-registration for billing purposes only.**      ------------------------------------------------------------------------------------------------------------

## 2023-07-25 NOTE — PROGRESS NOTES
Date of Visit:  07/25/23      [facility] Pulmonary Hypertension [facility]      Primary PH cardiologist: Dr. Knox      HPI:  Ms. Debi Espinoza is a pleasant 77 year old female with a past medical history significant for hypertension, nephrolithiasis, DVT, and pulmonary embolism. 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 She still has residual pulmonary hypertension and is currently on Opsumit and Adempas.    She was seen last in February at which time she was doing well clinically and able to do things around her home. However, was feeling very anxious and not sleeping well at night. She was to add Adempas back (was on prior to PTE) after his last visit with her, but she had not followed through. Thus, it was recommended at that time she resume. She was not interested in BPA, and the remainder of her lesions were , so this was felt to be higher risk.     Today, I am meeting Debi in clinic to follow up. She tells me overall she's doing ok. She still gets winded with activity at times but thinks this is no worse than usual. She is back on Adempas 2.5mg does as of about a month ago. Her main issue revolves around body aches and headaches. They sounds to be bothersome, but limiting her ADLs and not impairing her sleep. She reports no worsening LE edema or fluid retention, and uses her PRN Lasix about once a week with good result. No new dizziness/presyncope.    She had an echo prior to our visit today, which we reviewed. EF remains preserved at 60-65%. RV was mildly dilated with mildly reduced function, though PASP estimated still at 96mmHg. It reports considerable improvement in RV function, though TR perhaps a bit more (though report states it was interrogated more thoroughly). She also had a 6MWT. She ambulated 650 ft, but this was up from 470 feet last year. Lowest sat was 89-91% though she did not require supplemental oxygen.    Labs  performed prior to our visit today were reviewed as below.       CURRENT PULMONARY HYPERTENSION REGIMEN:    PAH Rx: Opsumit 10mg daily, Adempas 2.5mg TID    Diuretics: Lasix 20mg PRN    Oxygen: None (still has a concentrator at home from prior to PTE but has not used in several months)    Anticoagulation: Eliquis  Indication: CTEPH      ASSESSMENT/PLAN:      1. Chronic thromboembolic pulmonary hypertension.   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 She still has residual disease. The remainder of her lesions are  which are higher risk, and she is not interested in pursuing this. Thus, we are working on optimizing medical therapy. She is now on Opsumit 10mg daily and recently her Adempas was added back. She has been on 2.5mg dose for the last month or so; she continues to have a lot of muscle aches. While these are bothersome, they are not currently interfering with her ADLs. Echocardiogram today showed what sounds to be improvement in RV function, though TR may be a bit worse.   --Today she appears relatively euvolemic, using Lasix only PRN. She uses about once a week with good result. SCr up slightly, will continue to monitor. NT-proBNP is not significantly elevated.    --She required supplemental oxygen previously, but no longer using. Per 6MWT today she does desat to 89-91%. However, she is able to be on her feet for 3 hrs at work without much difficulty. Will continue to monitor this as well.    --Continue lifelong AC with Eliquis.    --She is a bit more anemic today, and iron studies show iron deficiency. Will set up for infusions locally.    Follow up plan: I will see her back virtually in ~3 months (she requests telephone as she has no video capability), with repeat labs (including iron studies) locally prior to visit. We are happy to see the patient back sooner with any new concerns.       Orders this Visit:  Orders Placed This Encounter   Procedures    Iron and iron binding capacity  "   Ferritin    Comprehensive metabolic panel    CBC with platelets    N terminal pro BNP outpatient    Iron & Iron Binding Capacity    Follow-Up with Cardiology PERRY    Follow-Up with Cardiology     Orders Placed This Encounter   Medications    escitalopram (LEXAPRO) 10 MG tablet     Sig: Take 1 tablet by mouth At Bedtime     Medications Discontinued During This Encounter   Medication Reason    zolpidem (AMBIEN) 5 MG tablet Med Rec(No AVS / No eCancel)         Review of Systems:  POS ROS ARE BOLDED, all other negative.    Cardiovascular: Chest pain, palpitations, orthopnea, LE edema (stable)  Resp: Dyspnea on exertion (stable), cough, known chronic lung disease  Hematologic/lymphatic: Current systemic anticoagulation, hx of blood clots, new bleeding concerns.  Neurological: Dizziness, syncope/presyncope     Physical Exam:  Vitals: BP (!) 156/80   Pulse 79   Ht 1.492 m (4' 10.74\")   Wt 66.9 kg (147 lb 6.4 oz)   SpO2 91%   BMI 30.04 kg/m     Wt Readings from Last 4 Encounters:   07/25/23 66.9 kg (147 lb 6.4 oz)   02/27/23 61.9 kg (136 lb 6.4 oz)   10/03/22 60.6 kg (133 lb 9.6 oz)   10/03/22 58.4 kg (128 lb 12.8 oz)       GEN:  In general, this is a well nourished female in no acute distress on RA.  Patient ambulatory, accompanied by her son.  NECK: Supple, No JVD while upright.   CV:  Regular rate and rhythm, no murmur, rub or gallop. No S3 or RV heave.   RESP: Respirations are unlabored. No use of accessory muscles. Clear to auscultation bilaterally without wheezing, rales, or rhonchi.  EXTREM: Appears to be mild chronic nonpitting LE edema.   NEURO: Alert and oriented, cooperative. Gait not assessed.      Recent Lab Results:    LIVER ENZYME RESULTS:  Lab Results   Component Value Date    AST 32 02/27/2023    ALT 27 02/27/2023       CBC RESULTS:  Lab Results   Component Value Date    WBC 5.4 07/25/2023    RBC 4.06 07/25/2023    HGB 11.3 (L) 07/25/2023    HCT 36.1 07/25/2023    MCV 89 07/25/2023    MCH 27.8 " 07/25/2023    MCHC 31.3 (L) 07/25/2023    RDW 14.5 07/25/2023     07/25/2023       BMP RESULTS:  Lab Results   Component Value Date     07/25/2023    POTASSIUM 3.8 07/25/2023    POTASSIUM 5.2 06/21/2022    POTASSIUM 3.4 05/30/2022    CHLORIDE 108 (H) 07/25/2023    CHLORIDE 102 06/21/2022    CO2 23 07/25/2023    CO2 34 (H) 06/21/2022    ANIONGAP 11 07/25/2023    ANIONGAP 3 06/21/2022     (H) 07/25/2023     (H) 06/25/2022     (H) 06/21/2022    BUN 17.2 07/25/2023    BUN 42 (H) 06/21/2022    CR 1.06 (H) 07/25/2023    GFRESTIMATED 54 (L) 07/25/2023    CHELE 9.5 07/25/2023        Recent Labs   Lab Test 07/25/23  1244 02/27/23  1357 10/03/22  0949 05/25/22  1622 04/29/22  0929   NTBNPI  --   --  495 3,532* 4,583*   NTBNP 472 469  --   --   --          Most recent pertinent testing:      Echocardiogram  7/25/23  Interpretation Summary  Global and regional left ventricular function is normal with an EF of 60-65%.  Global right ventricular function is mildly reduced. Mild right ventricular  dilation is present. RVFAC 40%, TAPSE 1.3 cm, S' 10 cm/s  Moderate to severe tricuspid insufficiency is present.  The estimated PA systolic pressure is at least 96 mmHg.  There is mild dilation at the level of the ascending aorta when indexed to BSA  (3.6 cm, indexed 2.4 cm/m2).  IVC diameter >2.1 cm collapsing <50% with sniff suggests a high RA pressure  estimated at 15 mmHg or greater.  This study was compared with the study from 06/08/2022. The RV function has  improved considerably. The TR appears to be more severe, but it is also  interrogated more thoroughly on today's exam.    RHC  10/3/22    Hemodynamics    RA: 8/10/8 mmHg  RV: 68/8 mmHg  PA: 67/28/40 mmHg  PCWP: --/--13 mmHg  Boris CO/CI: 3.66/2.39  Thermo: CO/CI: 4.2/2.74  PVR: 6.4 (TD)   Right sided filling pressures are mildly elevated. Left sided filling pressures are normal. Moderately elevated pulmonary artery hypertension. Normal cardiac  output level. Hemodynamic data has been modified in Epic per physician review.     Pulmonary Angiogram    Main PA, LPA and RPA, along with bilateral truncus anterior, interlobar arteries are all patent.    Right  A1-3 are patent.  A4-5 are occluded with perfusion defects in the middle lobe.  A6 appears patent.  A7-9 have Type A and B lesions.  A10 appears patent.    Left:  A1 is occluded.  A2 and 3 are patent.  A4-6 are patent.  A7 - 9 are occluded.       NYHA Functional Class:  3      A total of 35 minutes was spent today performing chart and history review, gathering HPI, physical exam, patient education, pre and post visit documentation, and care coordination.      Cherelle Benson PA-C  [facility]  Pager [phone]

## 2023-07-26 LAB — FIO2-PRE: 21 %

## 2023-07-26 NOTE — NURSING NOTE
Faxed IV iron infusion orders to [facility] at (f) [phone]. [employer] message sent to patient updating her on location. Radha Leal RN on 7/26/2023 at 12:09 PM

## 2023-08-10 ENCOUNTER — TELEPHONE (OUTPATIENT)
Dept: CARDIOLOGY | Facility: CLINIC | Age: 78
End: 2023-08-10
Payer: MEDICARE

## 2023-08-10 NOTE — TELEPHONE ENCOUNTER
----- Message from Becki Javier sent at 7/27/2023  9:43 AM CDT -----  Regarding: INJECTAFER - remove  Luis F Randle,    Would you please remove the infusion appointment request line from the therapy plan for Injectafer? The 7/25/23 Office Visit states she will be going closer to home, and the 7/26/23 MyC MedAdvice states the orders were faxed to the [facility]    Thank you,  Becki GARCIA [facility] - 2nd Floor  [facility] Scheduling  [phone] (option 3, then option 2)

## 2023-10-11 ENCOUNTER — TELEPHONE (OUTPATIENT)
Dept: CARDIOLOGY | Facility: CLINIC | Age: 78
End: 2023-10-11
Payer: MEDICARE

## 2023-10-11 NOTE — TELEPHONE ENCOUNTER
[facility] Call Center    Phone Message    May a detailed message be left on voicemail: yes     Reason for Call: Other: Patient called wanting to know if lab orders that are needed have been sent to their preferred clinic. Patient will like for lab orders to be sent [facility] in [place], [place]. Please call patient back to further discuss.      Action Taken: Other: Cardiology    Travel Screening: Not Applicable    Thank you!  [facility]  --------------------------------------------------  Faxed lab orders to [facility] [phone].  Yane Cardenas RN on 10/11/2023 at 4:35 PM

## 2023-10-18 ENCOUNTER — TELEPHONE (OUTPATIENT)
Dept: CARDIOLOGY | Facility: CLINIC | Age: 78
End: 2023-10-18
Payer: MEDICARE

## 2023-10-18 DIAGNOSIS — R06.02 SOB (SHORTNESS OF BREATH): Primary | ICD-10-CM

## 2023-10-18 DIAGNOSIS — I27.24 CTEPH (CHRONIC THROMBOEMBOLIC PULMONARY HYPERTENSION) (H): ICD-10-CM

## 2023-10-26 ENCOUNTER — VIRTUAL VISIT (OUTPATIENT)
Dept: CARDIOLOGY | Facility: CLINIC | Age: 78
End: 2023-10-26
Attending: PHYSICIAN ASSISTANT
Payer: MEDICARE

## 2023-10-26 VITALS — HEIGHT: 58 IN | BODY MASS INDEX: 28.76 KG/M2 | WEIGHT: 137 LBS

## 2023-10-26 DIAGNOSIS — D64.9 ANEMIA, UNSPECIFIED TYPE: ICD-10-CM

## 2023-10-26 DIAGNOSIS — R06.02 SOB (SHORTNESS OF BREATH): ICD-10-CM

## 2023-10-26 DIAGNOSIS — I27.24 CTEPH (CHRONIC THROMBOEMBOLIC PULMONARY HYPERTENSION) (H): ICD-10-CM

## 2023-10-26 PROCEDURE — 99442 PR PHYSICIAN TELEPHONE EVALUATION 11-20 MIN: CPT | Mod: 93 | Performed by: PHYSICIAN ASSISTANT

## 2023-10-26 ASSESSMENT — PAIN SCALES - GENERAL: PAINLEVEL: WORST PAIN (10)

## 2023-10-26 NOTE — PATIENT INSTRUCTIONS
You were seen today in the Pulmonary Hypertension Clinic at the [facility].     Cardiology Provider you saw during your visit:    Cherelle Benson    Medication Changes:  DECREASE Adempas to 1.5mg three times daily      Recommendations:   Call Yane on Monday to update how you are feeling    Follow-up:   To be determined    Please call us immediately if you have any syncope (fainting or passing out), chest pain, edema (swelling or weight gain), or decline in your functional status (general decline in how you are feeling).    If you have emergent concerns after hours or on the weekend, please call our on-call Cardiologist at [phone], option 4. For emergencies call [phone].     Thank you for allowing us to be a part of your care here at the [facility]    If you have questions or concerns please contact us at:    Yane Cardenas RN (P: [phone])    Nurse Coordinator       Pulmonary Hypertension     [facility]         CHRIS Paredes   (Prior Authorizations)    ()  Clinic   Clinic   Pulmonary Hypertension   Pulmonary Hypertension  [facility]  (P)[phone]    (P) [phone]  (F) [phone]

## 2023-10-26 NOTE — NURSING NOTE
Patient confirms medications and allergies are accurate via patients echeck in completion, and or denies any changes since last reviewed/verified.       Willa Almeida, Virtual Facilitator    Is the patient currently in the state of [place]? YES    Visit mode:TELEPHONE    If the visit is dropped, the patient can be reconnected by: TELEPHONE VISIT: Phone number:   Telephone Information:   Mobile [phone]       Will anyone else be joining the visit? NO  (If patient encounters technical issues they should call [phone]    How would you like to obtain your AVS? Mail a copy    Are changes needed to the allergy or medication list? No    Reason for visit: RECHECK (Has questions per medication adempas)    Willa Almeida VVF

## 2023-10-26 NOTE — LETTER
"10/26/2023      RE: Debi Espinoza  [address] [address]       Dear Colleague,    Thank you for the opportunity to participate in the care of your patient, Debi Espinoza, at the [facility] [place] [facility]. Please see a copy of my visit note below.        CARDIOLOGY PH CLINIC TELEPHONE VISIT    Date of telephone visit: 10/26/23    Debi Espinoza is a 78 year old female who is being evaluated via a billable telephone visit.      Ht 1.473 m (4' 10\")   Wt 62.1 kg (137 lb)   BMI 28.63 kg/m        MEDICATIONS:  Current Outpatient Medications   Medication Sig Dispense Refill    acetaminophen (TYLENOL) 325 MG tablet 3 tablets (975 mg) by Oral or Feeding Tube route every 8 hours as needed for mild pain or fever      apixaban ANTICOAGULANT (ELIQUIS) 5 MG tablet Take 1 tablet (5 mg) by mouth 2 times daily 60 tablet 3    escitalopram (LEXAPRO) 10 MG tablet Take 1 tablet by mouth At Bedtime      furosemide (LASIX) 20 MG tablet Take 1 tablet (20 mg) by mouth as needed (5 lb weight gain in 1 week, leg swelling and edema) 30 tablet 1    OPSUMIT 10 MG tablet TAKE 1 TABLET BY MOUTH ONCE DAILY 30 tablet 11    riociguat (ADEMPAS) 2.5 MG tablet Take 1 tablet (2.5 mg) by mouth 3 times daily 90 tablet 11       Primary  cardiologist: Dr. Knox        HPI:  Ms. Debi Espinoza is a pleasant 78 year old female with a past medical history significant for hypertension, nephrolithiasis, DVT, and pulmonary embolism. 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 She still has residual pulmonary hypertension and is currently on Opsumit and Adempas.     When I saw Debi last in clinic in July, she was still getting winded with activities at time, but no worse than usual. She was having some muscle aches after resuming Adempas (this was transiently stopped after her PTE), but was not impairing " her ADLs or sleep. She was using her PRN Lasix about once a week with good result. Echo that visit showed EF remained preserved at 60-65%. RV was mildly dilated with mildly reduced function, though PASP estimated still at 96mmHg.  It reported considerable improvement in RV function, though TR perhaps a bit more (though report states it was interrogated more thoroughly). I made no changes at that time.    Today, I am meeting virtually with Debi to follow up. She tells me that she is not doing well. Since we met last she thinks that her muscle aches and side effects from her Adempas have worsened. The muscle aches have been difficult to tolerate, and she is also having more dizziness and noting lower BP at home. Because of this, she no longer drives. She is not having any LE edema and in fact, has not yet to use her PRN Lasix in quite some time.      Local labs done recently reviewed as below.       CURRENT PULMONARY HYPERTENSION REGIMEN:     PAH Rx: Opsumit 10mg daily, Adempas 2.5mg TID     Diuretics: Lasix 20mg PRN     Oxygen: None (I believe still has a concentrator at home from prior to PTE but has not used in several months)     Anticoagulation: Eliquis  Indication: CTEPH        ASSESSMENT/PLAN:        1. Chronic thromboembolic pulmonary hypertension.              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 She still has residual disease. The remainder of her lesions are  which are higher risk, and she is not interested in pursuing this. She is back on dual oral therapy with Adempas 2.5mg TID and Opsumit 10mg daily. Most recent echo in July showed what sounds to be improvement in RV function, though TR may be a bit worse.    --While her breathing sounds to be stable, she is having more issues with side effects from the Adempas that are now affecting her daily life (muscle aches, dizziness, mild hypotension). She is requesting to discontinue the medication. I asked her to reduce the dose first  to see if she can tolerate. She has some 1.5mg tabs at home, so will reduce to 1.5mg TID over the weekend. Will then touch base with her early next week to see if improved. If not, will consider transition to tadalafil.               --As today is a virtual visit I cannot fully evaluate her volume status. She reports no edema, using Lasix only PRN and has not used recently. Recent BMP shows preserved renal function, and NT-proBNP is not significantly elevated.               --She required supplemental oxygen previously, but no longer using. Per 6MWT  in July, she does desat to 89-91%. However, she has been able to be on her feet at work without much difficulty. Will continue to monitor this as well. Plan repeat 6MWT when she returns to clinic next.              --Continue lifelong AC with Eliquis.               --She is now s/p iron infusions after last visit. Iron studies normalized and hemoglobin improved.      Follow up plan: We will touch base with her early next week, and make further adjustments from there. Formal follow up based on this discussion.      Testing/labs:      Most recent labs:      Latest Reference Range & Units 10/16/23 16:18   Sodium (External) 136 - 145 meq/L 140   Potassium (External) 3.5 - 5.1 meq/L 4.2   Chloride (External) 98 - 109 meq/L 108   CO2 (External) 20 - 29 meq/L 23   Urea Nitrogen (External) 6 - 22 mg/dL 19   Creatinine (External) 0.55 - 1.02 mg/dL 0.99   GFR Estimated (External) >=60 ml/min/1.73m2 68   Calcium (External) 8.5 - 10.5 mg/dL 9.3   Anion Gap (External) 6 - 20 meq/L 13   Albumin (External) 3.2 - 4.6 g/dL 4.0   Protein Total (External) 6.0 - 8.3 g/dL 6.4   Alk Phosphatase (External) 40 - 150 U/L 72   ALT (External) 0 - 55 U/L 17   AST (External) 5 - 34 U/L 23   Bilirubin Total (External) 0.2 - 1.2 mg/dL 0.6   BUN/Creatinine Ratio (External) 10.0 - 25.0  19.2   Iron (External) 35 - 145 ug/dL 76   Iron Binding Cap (External) 250 - 400 ug/dL 290   Iron Saturation %  (External) 14 - 50 % 26   Glucose (External) 70 - 99 mg/dL 84   WBC Count (External) 4.0 - 11.0 K/uL 5.7   Hemoglobin (External) 11.5 - 15.8 g/dL 13.1   Hematocrit (External) 35.0 - 45.0 % 41.0   Platelet Count (External) 140 - 400 K/uL 189   RBC Count (External) 3.80 - 5.30 M/uL 4.47         Other recent pertinent testing:    Echocardiogram  7/25/23  Interpretation Summary  Global and regional left ventricular function is normal with an EF of 60-65%.  Global right ventricular function is mildly reduced. Mild right ventricular  dilation is present. RVFAC 40%, TAPSE 1.3 cm, S' 10 cm/s  Moderate to severe tricuspid insufficiency is present.  The estimated PA systolic pressure is at least 96 mmHg.  There is mild dilation at the level of the ascending aorta when indexed to BSA  (3.6 cm, indexed 2.4 cm/m2).  IVC diameter >2.1 cm collapsing <50% with sniff suggests a high RA pressure  estimated at 15 mmHg or greater.  This study was compared with the study from 06/08/2022. The RV function has  improved considerably. The TR appears to be more severe, but it is also  interrogated more thoroughly on today's exam.     [facility]  10/3/22    Hemodynamics     RA: 8/10/8 mmHg  RV: 68/8 mmHg  PA: 67/28/40 mmHg  PCWP: --/--13 mmHg  Boris CO/CI: 3.66/2.39  Thermo: CO/CI: 4.2/2.74  PVR: 6.4 (TD)   Right sided filling pressures are mildly elevated. Left sided filling pressures are normal. Moderately elevated pulmonary artery hypertension. Normal cardiac output level. Hemodynamic data has been modified in Epic per physician review.      Pulmonary Angiogram     Main PA, LPA and RPA, along with bilateral truncus anterior, interlobar arteries are all patent.    Right  A1-3 are patent.  A4-5 are occluded with perfusion defects in the middle lobe.  A6 appears patent.  A7-9 have Type A and B lesions.  A10 appears patent.    Left:  A1 is occluded.  A2 and 3 are patent.  A4-6 are patent.  A7 - 9 are occluded.          NYHA Functional Class:  2      I  have reviewed the note as documented above.  This accurately captures the substance of my conversation with the patient.      Phone call contact time  Call Started at 1026  Call Ended at 1037    An additional 15 minutes was spent today performing chart and history review, pre and post visit documentation, review of recent testing, patient education, and care coordination.      Cherelle Benson PA-C  [facility]  Pager [phone]

## 2023-10-26 NOTE — NURSING NOTE
"AVS completed from via virtual visit with Cherelle Benson    Plan:     Decrease Adempas to 1.5mg TID- Kevin Adempas PAP called for 2 week supply of 1.5mg tabs with 1-2 refills. Nurse will need to call in further refills for tiration if needed    Nurse to call patient on 10/30    Follow-up to be determined by Cherelle depending on how the patient is feeling     Patient to be called for scheduling after virtual visit by     Reviewed Med list   Completed AVS  Follow-up orders placed  Marked chart \"Ready for Checkout\"    Shante Zeng RN    "

## 2023-10-26 NOTE — PROGRESS NOTES
"    CARDIOLOGY PH CLINIC TELEPHONE VISIT    Date of telephone visit: 10/26/23    Debi Espinoza is a 78 year old female who is being evaluated via a billable telephone visit.      Ht 1.473 m (4' 10\")   Wt 62.1 kg (137 lb)   BMI 28.63 kg/m        MEDICATIONS:  Current Outpatient Medications   Medication Sig Dispense Refill    acetaminophen (TYLENOL) 325 MG tablet 3 tablets (975 mg) by Oral or Feeding Tube route every 8 hours as needed for mild pain or fever      apixaban ANTICOAGULANT (ELIQUIS) 5 MG tablet Take 1 tablet (5 mg) by mouth 2 times daily 60 tablet 3    escitalopram (LEXAPRO) 10 MG tablet Take 1 tablet by mouth At Bedtime      furosemide (LASIX) 20 MG tablet Take 1 tablet (20 mg) by mouth as needed (5 lb weight gain in 1 week, leg swelling and edema) 30 tablet 1    OPSUMIT 10 MG tablet TAKE 1 TABLET BY MOUTH ONCE DAILY 30 tablet 11    riociguat (ADEMPAS) 2.5 MG tablet Take 1 tablet (2.5 mg) by mouth 3 times daily 90 tablet 11       Primary  cardiologist: Dr. Knox        HPI:  Ms. Debi Espinoza is a pleasant 78 year old female with a past medical history significant for hypertension, nephrolithiasis, DVT, and pulmonary embolism. 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 She still has residual pulmonary hypertension and is currently on Opsumit and Adempas.     When I saw Debi saldana in clinic in July, she was still getting winded with activities at time, but no worse than usual. She was having some muscle aches after resuming Adempas (this was transiently stopped after her PTE), but was not impairing her ADLs or sleep. She was using her PRN Lasix about once a week with good result. Echo that visit showed EF remained preserved at 60-65%. RV was mildly dilated with mildly reduced function, though PASP estimated still at 96mmHg.  It reported considerable improvement in RV function, though TR perhaps a bit more (though report states it was " interrogated more thoroughly). I made no changes at that time.    Today, I am meeting virtually with Debi to follow up. She tells me that she is not doing well. Since we met last she thinks that her muscle aches and side effects from her Adempas have worsened. The muscle aches have been difficult to tolerate, and she is also having more dizziness and noting lower BP at home. Because of this, she no longer drives. She is not having any LE edema and in fact, has not yet to use her PRN Lasix in quite some time.      Local labs done recently reviewed as below.       CURRENT PULMONARY HYPERTENSION REGIMEN:     PAH Rx: Opsumit 10mg daily, Adempas 2.5mg TID     Diuretics: Lasix 20mg PRN     Oxygen: None (I believe still has a concentrator at home from prior to PTE but has not used in several months)     Anticoagulation: Eliquis  Indication: CTEPH        ASSESSMENT/PLAN:        1. Chronic thromboembolic pulmonary hypertension.              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 She still has residual disease. The remainder of her lesions are  which are higher risk, and she is not interested in pursuing this. She is back on dual oral therapy with Adempas 2.5mg TID and Opsumit 10mg daily. Most recent echo in July showed what sounds to be improvement in RV function, though TR may be a bit worse.    --While her breathing sounds to be stable, she is having more issues with side effects from the Adempas that are now affecting her daily life (muscle aches, dizziness, mild hypotension). She is requesting to discontinue the medication. I asked her to reduce the dose first to see if she can tolerate. She has some 1.5mg tabs at home, so will reduce to 1.5mg TID over the weekend. Will then touch base with her early next week to see if improved. If not, will consider transition to tadalafil.               --As today is a virtual visit I cannot fully evaluate her volume status. She reports no edema, using Lasix  only PRN and has not used recently. Recent BMP shows preserved renal function, and NT-proBNP is not significantly elevated.               --She required supplemental oxygen previously, but no longer using. Per 6MWT  in July, she does desat to 89-91%. However, she has been able to be on her feet at work without much difficulty. Will continue to monitor this as well. Plan repeat 6MWT when she returns to clinic next.              --Continue lifelong AC with Eliquis.               --She is now s/p iron infusions after last visit. Iron studies normalized and hemoglobin improved.      Follow up plan: We will touch base with her early next week, and make further adjustments from there. Formal follow up based on this discussion.      Testing/labs:      Most recent labs:      Latest Reference Range & Units 10/16/23 16:18   Sodium (External) 136 - 145 meq/L 140   Potassium (External) 3.5 - 5.1 meq/L 4.2   Chloride (External) 98 - 109 meq/L 108   CO2 (External) 20 - 29 meq/L 23   Urea Nitrogen (External) 6 - 22 mg/dL 19   Creatinine (External) 0.55 - 1.02 mg/dL 0.99   GFR Estimated (External) >=60 ml/min/1.73m2 68   Calcium (External) 8.5 - 10.5 mg/dL 9.3   Anion Gap (External) 6 - 20 meq/L 13   Albumin (External) 3.2 - 4.6 g/dL 4.0   Protein Total (External) 6.0 - 8.3 g/dL 6.4   Alk Phosphatase (External) 40 - 150 U/L 72   ALT (External) 0 - 55 U/L 17   AST (External) 5 - 34 U/L 23   Bilirubin Total (External) 0.2 - 1.2 mg/dL 0.6   BUN/Creatinine Ratio (External) 10.0 - 25.0  19.2   Iron (External) 35 - 145 ug/dL 76   Iron Binding Cap (External) 250 - 400 ug/dL 290   Iron Saturation % (External) 14 - 50 % 26   Glucose (External) 70 - 99 mg/dL 84   WBC Count (External) 4.0 - 11.0 K/uL 5.7   Hemoglobin (External) 11.5 - 15.8 g/dL 13.1   Hematocrit (External) 35.0 - 45.0 % 41.0   Platelet Count (External) 140 - 400 K/uL 189   RBC Count (External) 3.80 - 5.30 M/uL 4.47         Other recent pertinent  testing:    Echocardiogram  7/25/23  Interpretation Summary  Global and regional left ventricular function is normal with an EF of 60-65%.  Global right ventricular function is mildly reduced. Mild right ventricular  dilation is present. RVFAC 40%, TAPSE 1.3 cm, S' 10 cm/s  Moderate to severe tricuspid insufficiency is present.  The estimated PA systolic pressure is at least 96 mmHg.  There is mild dilation at the level of the ascending aorta when indexed to BSA  (3.6 cm, indexed 2.4 cm/m2).  IVC diameter >2.1 cm collapsing <50% with sniff suggests a high RA pressure  estimated at 15 mmHg or greater.  This study was compared with the study from 06/08/2022. The RV function has  improved considerably. The TR appears to be more severe, but it is also  interrogated more thoroughly on today's exam.     RHC  10/3/22    Hemodynamics     RA: 8/10/8 mmHg  RV: 68/8 mmHg  PA: 67/28/40 mmHg  PCWP: --/--13 mmHg  Boris CO/CI: 3.66/2.39  Thermo: CO/CI: 4.2/2.74  PVR: 6.4 (TD)   Right sided filling pressures are mildly elevated. Left sided filling pressures are normal. Moderately elevated pulmonary artery hypertension. Normal cardiac output level. Hemodynamic data has been modified in Epic per physician review.      Pulmonary Angiogram     Main PA, LPA and RPA, along with bilateral truncus anterior, interlobar arteries are all patent.    Right  A1-3 are patent.  A4-5 are occluded with perfusion defects in the middle lobe.  A6 appears patent.  A7-9 have Type A and B lesions.  A10 appears patent.    Left:  A1 is occluded.  A2 and 3 are patent.  A4-6 are patent.  A7 - 9 are occluded.          NYHA Functional Class:  2      I have reviewed the note as documented above.  This accurately captures the substance of my conversation with the patient.      Phone call contact time  Call Started at 1026  Call Ended at 1037    An additional 15 minutes was spent today performing chart and history review, pre and post visit documentation, review of  recent testing, patient education, and care coordination.      Cherelle Benson PA-C  [facility]  Pager [phone]

## 2023-10-30 ENCOUNTER — MYC MEDICAL ADVICE (OUTPATIENT)
Dept: CARDIOLOGY | Facility: CLINIC | Age: 78
End: 2023-10-30
Payer: MEDICARE

## 2023-10-30 DIAGNOSIS — I27.24 CTEPH (CHRONIC THROMBOEMBOLIC PULMONARY HYPERTENSION) (H): Primary | ICD-10-CM

## 2023-10-31 NOTE — TELEPHONE ENCOUNTER
LM to follow up on how patient is doing since decreasing Adempas to 1.5mg.  Left my direct dial number for call back.  Yane Cardenas RN on 10/31/2023 at 1:20 PM  ----------------------------------  Started lower dose (1.5mg TID) Thursday 10/26/23     No dizziness, BP WNL & HR is 80's.  Breathing seems to be better on lower dose.  Muscles feel sore still, but it hasn't been a full week yet so she wants to give it more time.  Patient confirms she has a new bottle of the 1.5mg tabs, so she is good for a while.  Advised patient I will forward this info to Cherelle Benson PA-C and if she has any new recommendations I will call her back. Patient verbalized understanding, agreed with plan and denied any further questions. Yane Cardenas RN on 10/31/2023 at 2:21 PM  ----------------------------------  Cherelle Benson PA  You1 hour ago (2:54 PM)     RK  Lets have her stay here for now, put her back in with me for VV in another 2 weeks or so. Then we will decide whether to stay here or change to Tyvaso.   Thanks  Cherelle   Placed follow up order & sent staff msg to coordinators to schedule visit. Yane Cardenas RN on 10/31/2023 at 4:40 PM

## 2023-11-02 ENCOUNTER — TELEPHONE (OUTPATIENT)
Dept: CARDIOLOGY | Facility: CLINIC | Age: 78
End: 2023-11-02
Payer: MEDICARE

## 2023-11-02 NOTE — ADDENDUM NOTE
Addended by: NEO POP on: 11/2/2023 10:40 AM     Modules accepted: Orders     Validate Note Data When Using Inventory: Yes

## 2023-11-02 NOTE — TELEPHONE ENCOUNTER
11/2 spoke to patient ad scheduled appt w/ Marcelino   Pt stated that she'll get her labs done at [place]

## 2023-11-06 ENCOUNTER — TELEPHONE (OUTPATIENT)
Dept: CARDIOLOGY | Facility: CLINIC | Age: 78
End: 2023-11-06
Payer: MEDICARE

## 2023-11-06 NOTE — TELEPHONE ENCOUNTER
12/15/2023  @ 9:48 AM -   PAF approved thru 12/10/24            Deedee MEADOWS CMA- Prior Auths  Cardiology/Pulmonary Hypertension       11/6/2023  @ 4:44 PM -  Opsumit 10 mg (30/30) - no Rx coverage - [facility] Pt assistance renewal - faxed via RightFax to [facility].   Copy of form sent to [facility] to pt.          Deedee MEADOWS CMA- Prior Auths  Cardiology/Pulmonary Hypertension

## 2023-11-07 ENCOUNTER — TELEPHONE (OUTPATIENT)
Dept: CARDIOLOGY | Facility: CLINIC | Age: 78
End: 2023-11-07
Payer: MEDICARE

## 2023-11-07 NOTE — TELEPHONE ENCOUNTER
[place] [facility]    Phone Message    May a detailed message be left on voicemail: yes     Reason for Call: Other: Cherie from [facility] needs the lab orders faxed over as pt is in the lab now, Fax number is [phone]     Action Taken: Other: Cardio    Travel Screening: Not Applicable           --------------------------------------------------------  Faxed all lab orders to above number. Yane Cardenas RN on 11/8/2023 at 12:07 PM

## 2023-11-16 ENCOUNTER — TELEPHONE (OUTPATIENT)
Dept: CARDIOLOGY | Facility: CLINIC | Age: 78
End: 2023-11-16

## 2023-11-16 ENCOUNTER — VIRTUAL VISIT (OUTPATIENT)
Dept: CARDIOLOGY | Facility: CLINIC | Age: 78
End: 2023-11-16
Attending: PHYSICIAN ASSISTANT
Payer: MEDICARE

## 2023-11-16 VITALS
WEIGHT: 136 LBS | HEIGHT: 58 IN | SYSTOLIC BLOOD PRESSURE: 132 MMHG | DIASTOLIC BLOOD PRESSURE: 78 MMHG | BODY MASS INDEX: 28.55 KG/M2

## 2023-11-16 DIAGNOSIS — R06.02 SOB (SHORTNESS OF BREATH): Primary | ICD-10-CM

## 2023-11-16 DIAGNOSIS — I27.24 CTEPH (CHRONIC THROMBOEMBOLIC PULMONARY HYPERTENSION) (H): ICD-10-CM

## 2023-11-16 PROCEDURE — 99442 PR PHYSICIAN TELEPHONE EVALUATION 11-20 MIN: CPT | Mod: 95 | Performed by: PHYSICIAN ASSISTANT

## 2023-11-16 RX ORDER — TREPROSTINIL 16-32-48
KIT INHALATION
COMMUNITY

## 2023-11-16 ASSESSMENT — PAIN SCALES - GENERAL: PAINLEVEL: MILD PAIN (3)

## 2023-11-16 NOTE — TELEPHONE ENCOUNTER
12/18/2023  @ 2:30 PM -  Rcvd signed [facility] form, faxed to [facility] and sent referral to [facility] via [facility] Fax.                Deedee MEADOWS CMA- Prior Auths  Cardiology/Pulmonary Hypertension       11/16/2023  @ 2:17 PM -  Tyvaso DPI (16/32/48 mcg titr kit) - new start  - **enrollment pending       Deedee MEADOWS CMA- Prior Auths  Cardiology/Pulmonary Hypertension

## 2023-11-16 NOTE — LETTER
"11/16/2023      RE: Debi Espinoza  [address] [facility] [address] [facility] [address]       Dear Colleague,    Thank you for the opportunity to participate in the care of your patient, Debi Espinoza, at the [facility] [place] [facility]. Please see a copy of my visit note below.        4/4/2022    12:45 PM   Vitals - Patient Reported   Weight (Patient Reported) 142 lb       /78   Ht 1.473 m (4' 10\")   Wt 61.7 kg (136 lb)   BMI 28.42 kg/m          CARDIOLOGY PH CLINIC TELEPHONE VISIT      Date of telephone visit: 11/16/23      Debi Espinoza is a 78 year old female who is being evaluated via a billable telephone visit.        MEDICATIONS:  Current Outpatient Medications   Medication Sig Dispense Refill    acetaminophen (TYLENOL) 325 MG tablet 3 tablets (975 mg) by Oral or Feeding Tube route every 8 hours as needed for mild pain or fever      apixaban ANTICOAGULANT (ELIQUIS) 5 MG tablet Take 1 tablet (5 mg) by mouth 2 times daily 60 tablet 3    escitalopram (LEXAPRO) 10 MG tablet Take 1 tablet by mouth At Bedtime      furosemide (LASIX) 20 MG tablet Take 1 tablet (20 mg) by mouth as needed (5 lb weight gain in 1 week, leg swelling and edema) 30 tablet 1    OPSUMIT 10 MG tablet TAKE 1 TABLET BY MOUTH ONCE DAILY 30 tablet 11    riociguat (ADEMPAS) 2.5 MG tablet Take 1 tablet (2.5 mg) by mouth 3 times daily 90 tablet 11       Primary  cardiologist: Dr. Knox        HPI:  Ms. Debi Espinoza is a pleasant 78 year old female with a past medical history significant for hypertension, nephrolithiasis, DVT, and pulmonary embolism. 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 She still has residual pulmonary hypertension and is currently on Opsumit and Adempas.     When I saw Debi saldana in clinic in July, she was still getting winded with activities at time, but no worse than usual. She was " "having some muscle aches after resuming Adempas (this was transiently stopped after her PTE), but was not impairing her ADLs or sleep. She was using her PRN Lasix about once a week with good result. Echo that visit showed EF remained preserved at 60-65%. RV was mildly dilated with mildly reduced function, though PASP estimated still at 96mmHg.  It reported considerable improvement in RV function, though TR perhaps a bit more (though report states it was interrogated more thoroughly). I made no changes at that time.    When I met with Debi virtually about a month ago, she relayed not doing well. While trying to uptitrate her Adempas her muscle aches because significantly worse to the point she was having trouble doing her daily activities. Additionally, she was more dizzy with lower BP, to the point she had stopped driving. That visit, I asked her to reduce her adempas from 2.5mg to 1.5mg TID.     Today, I am meeting virtually with Debi to follow up. She tells me she feels much better. She is still having muscle aches, though improved from prior. Dizziness is better as are her HR and BP at home. Breathing is \"still pretty good\" and does not think it has worsened significantly. She reports no new edema, CP, or palpitations.       Local labs done recently reviewed as below.         CURRENT PULMONARY HYPERTENSION REGIMEN:     PAH Rx: Opsumit 10mg daily, Adempas 1.5mg TID     Diuretics: Lasix 20mg PRN     Oxygen: None (I believe still has a concentrator at home from prior to PTE but has not used in several months)     Anticoagulation: Eliquis  Indication: CTEPH        ASSESSMENT/PLAN:        1. Chronic thromboembolic pulmonary hypertension.              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 She still has residual disease. The remainder of her lesions are  which are higher risk, and she is not interested in pursuing this. She was placed back on Opsumit, as well as Adempas, and was briefly up to " 2.5mg TID but was struggling with significant side effects of muscle aches and low BP/dizziness. I then reduced her dose to 1.5mg TID and she is able to tolerate this much better. She is not interested in trying to go up further. D/W Dr. Knox who recommended potential transition to Tyvaso DPI if unable to get her back up in dose. We chatted about this today and she is agreeable to trial this.    --Most recent echo in July showed what sounds to be improvement in RV function, though TR may be a bit worse. Will plan repeat after optimized on her therapies.              --As today is a virtual visit I cannot fully evaluate her volume status. She reports no edema, using Lasix only PRN. Recent BMP shows preserved renal function, and NT-proBNP is not significantly elevated.               --She required supplemental oxygen previously, but no longer using. Per 6MWT  in July, she does desat to 89-91%. However, she has been able to be on her feet at work without much difficulty. Will continue to monitor this as well and plan repeat 6MWT when she returns next.              --Continue lifelong AC with Eliquis.               --Hx of anemia; she is now s/p iron infusions; Iron studies normalized and hemoglobin improved.         Follow up plan: Return ~2 weeks after transition to Tyvaso DPI. Plan repeat testing once her regimen is optimized. I asked the patient to call sooner with any new concerns.         Testing/labs:      Most recent labs:      Latest Reference Range & Units 10/16/23 16:18 11/07/23 14:00   Sodium (External) 136 - 145 meq/L 140 140   Potassium (External) 3.5 - 5.1 meq/L 4.2 3.9   Chloride (External) 98 - 109 meq/L 108 108   CO2 (External) 20 - 29 meq/L 23 23   Urea Nitrogen (External) 6 - 22 mg/dL 19 22   Creatinine (External) 0.55 - 1.02 mg/dL 0.99 1.07 (H)   GFR Estimated (External) >=60 ml/min/1.73m2 68 53 (L)   Calcium (External) 8.5 - 10.5 mg/dL 9.3 9.4   Anion Gap (External) 6 - 20 meq/L 13 13    Albumin (External) 3.2 - 4.6 g/dL 4.0 4.0   Protein Total (External) 6.0 - 8.3 g/dL 6.4 6.9   Alk Phosphatase (External) 40 - 150 U/L 72 68   ALT (External) 0 - 55 U/L 17 18   AST (External) 5 - 34 U/L 23 23   Bilirubin Total (External) 0.2 - 1.2 mg/dL 0.6 0.7   BUN/Creatinine Ratio (External) 10.0 - 25.0 Ratio 19.2 20.6   Ferritin (External) 5 - 204 NG/Ml  269 (H)   Iron (External) 35 - 145 ug/dL 76    Iron Binding Cap (External) 250 - 400 ug/dL 290    Iron Saturation % (External) 14 - 50 % 26    Transferrin (External) 173 - 360 mg/dL  219   Scan Lab Results (External)   See Scanned Report (E)  See Scanned Report   Glucose (External) 70 - 99 mg/dL 84 92   WBC Count (External) 4.0 - 11.0 K/uL 5.7    Hemoglobin (External) 11.5 - 15.8 g/dL 13.1    Hematocrit (External) 35.0 - 45.0 % 41.0    Platelet Count (External) 140 - 400 K/uL 189    RBC Count (External) 3.80 - 5.30 M/uL 4.47    (H): Data is abnormally high  (L): Data is abnormally low  (E): External lab result      Other recent pertinent testing:    Echocardiogram  7/25/23  Interpretation Summary  Global and regional left ventricular function is normal with an EF of 60-65%.  Global right ventricular function is mildly reduced. Mild right ventricular  dilation is present. RVFAC 40%, TAPSE 1.3 cm, S' 10 cm/s  Moderate to severe tricuspid insufficiency is present.  The estimated PA systolic pressure is at least 96 mmHg.  There is mild dilation at the level of the ascending aorta when indexed to BSA  (3.6 cm, indexed 2.4 cm/m2).  IVC diameter >2.1 cm collapsing <50% with sniff suggests a high RA pressure  estimated at 15 mmHg or greater.  This study was compared with the study from 06/08/2022. The RV function has  improved considerably. The TR appears to be more severe, but it is also  interrogated more thoroughly on today's exam.     RHC  10/3/22    Hemodynamics     RA: 8/10/8 mmHg  RV: 68/8 mmHg  PA: 67/28/40 mmHg  PCWP: --/--13 mmHg  Boris CO/CI:  3.66/2.39  Thermo: CO/CI: 4.2/2.74  PVR: 6.4 (TD)   Right sided filling pressures are mildly elevated. Left sided filling pressures are normal. Moderately elevated pulmonary artery hypertension. Normal cardiac output level. Hemodynamic data has been modified in Epic per physician review.      Pulmonary Angiogram     Main PA, LPA and RPA, along with bilateral truncus anterior, interlobar arteries are all patent.    Right  A1-3 are patent.  A4-5 are occluded with perfusion defects in the middle lobe.  A6 appears patent.  A7-9 have Type A and B lesions.  A10 appears patent.    Left:  A1 is occluded.  A2 and 3 are patent.  A4-6 are patent.  A7 - 9 are occluded.            NYHA Functional Class:  2        I have reviewed the note as documented above.  This accurately captures the substance of my conversation with the patient.       Phone call contact time  Call Started at 1114  Call Ended at 1125    An additional 15 minutes was spent today performing chart and history review, pre and post visit documentation, review of recent testing, patient education, and care coordination.      Cherelle Benson PA-C  [facility]  Pager [phone]

## 2023-11-16 NOTE — TELEPHONE ENCOUNTER
"12/5/2023  @ 4:19 PM -  Pt RC, requested I send the form in regular mail, stated she \"does not have MyChart\".  Let pt know that someone read her msg on 11/27/23.   She will check with her DIL and see if she can get the form printed from her, but also wants it mailed.     Deedee MEADOWS CMA- Prior Auths  Cardiology/Pulmonary Hypertension       12/5/2023  @ 4:08 PM -  Pt has not returned [facility] [employer] Request for Support Form - @  409 pm -  Called pt @ [phone] (home)  - No answer, LVM to call back.      Deedee MEADOWS CMA- Prior Auths  Cardiology/Pulmonary Hypertension       11/16/2023  @ 2:18 PM -  Tyvaso DPI (16/32/48 mcg titr kit) - new start     ----------------------------  Insurance: NO PBM/ pharmacy coverage - sent pt [facility] Request for support for Tyvaso DPI to [employer].      Deedee MEADOWS CMA- Prior Auths  Cardiology/Pulmonary Hypertension     "

## 2023-11-16 NOTE — TELEPHONE ENCOUNTER
----- Message from Rancho Doe RN sent at 11/16/2023 11:33 AM CST -----  Regarding: NEW MED START  Team,    Starting this gal on Tyvaso DPI, so we need a PA started.  Once started she will stop her adempas as she's not tolerating well.  Follow-up 2 weeks after with BRAVO martin.    Rancho

## 2023-11-16 NOTE — NURSING NOTE
Is the patient currently in the state of [place]? YES    Visit mode:TELEPHONE    If the visit is dropped, the patient can be reconnected by: TELEPHONE VISIT: Phone number:   Telephone Information:   Mobile [phone]        Will anyone else be joining the visit? NO  (If patient encounters technical issues they should call [phone]    How would you like to obtain your AVS? MyChart    Are changes needed to the allergy or medication list? No    Reason for visit: YVETTE VásquezF

## 2023-11-16 NOTE — PROGRESS NOTES
"    4/4/2022    12:45 PM   Vitals - Patient Reported   Weight (Patient Reported) 142 lb       /78   Ht 1.473 m (4' 10\")   Wt 61.7 kg (136 lb)   BMI 28.42 kg/m          CARDIOLOGY  CLINIC TELEPHONE VISIT      Date of telephone visit: 11/16/23      Debi Espinoza is a 78 year old female who is being evaluated via a billable telephone visit.        MEDICATIONS:  Current Outpatient Medications   Medication Sig Dispense Refill    acetaminophen (TYLENOL) 325 MG tablet 3 tablets (975 mg) by Oral or Feeding Tube route every 8 hours as needed for mild pain or fever      apixaban ANTICOAGULANT (ELIQUIS) 5 MG tablet Take 1 tablet (5 mg) by mouth 2 times daily 60 tablet 3    escitalopram (LEXAPRO) 10 MG tablet Take 1 tablet by mouth At Bedtime      furosemide (LASIX) 20 MG tablet Take 1 tablet (20 mg) by mouth as needed (5 lb weight gain in 1 week, leg swelling and edema) 30 tablet 1    OPSUMIT 10 MG tablet TAKE 1 TABLET BY MOUTH ONCE DAILY 30 tablet 11    riociguat (ADEMPAS) 2.5 MG tablet Take 1 tablet (2.5 mg) by mouth 3 times daily 90 tablet 11       Primary  cardiologist: Dr. Knox        HPI:  Ms. Debi Espinoza is a pleasant 78 year old female with a past medical history significant for hypertension, nephrolithiasis, DVT, and pulmonary embolism. 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 She still has residual pulmonary hypertension and is currently on Opsumit and Adempas.     When I saw Debi saldana in clinic in July, she was still getting winded with activities at time, but no worse than usual. She was having some muscle aches after resuming Adempas (this was transiently stopped after her PTE), but was not impairing her ADLs or sleep. She was using her PRN Lasix about once a week with good result. Echo that visit showed EF remained preserved at 60-65%. RV was mildly dilated with mildly reduced function, though PASP estimated still at 96mmHg.  It " "reported considerable improvement in RV function, though TR perhaps a bit more (though report states it was interrogated more thoroughly). I made no changes at that time.    When I met with Debi virtually about a month ago, she relayed not doing well. While trying to uptitrate her Adempas her muscle aches because significantly worse to the point she was having trouble doing her daily activities. Additionally, she was more dizzy with lower BP, to the point she had stopped driving. That visit, I asked her to reduce her adempas from 2.5mg to 1.5mg TID.     Today, I am meeting virtually with Debi to follow up. She tells me she feels much better. She is still having muscle aches, though improved from prior. Dizziness is better as are her HR and BP at home. Breathing is \"still pretty good\" and does not think it has worsened significantly. She reports no new edema, CP, or palpitations.       Local labs done recently reviewed as below.         CURRENT PULMONARY HYPERTENSION REGIMEN:     PAH Rx: Opsumit 10mg daily, Adempas 1.5mg TID     Diuretics: Lasix 20mg PRN     Oxygen: None (I believe still has a concentrator at home from prior to PTE but has not used in several months)     Anticoagulation: Eliquis  Indication: CTEPH        ASSESSMENT/PLAN:        1. Chronic thromboembolic pulmonary hypertension.              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 She still has residual disease. The remainder of her lesions are  which are higher risk, and she is not interested in pursuing this. She was placed back on Opsumit, as well as Adempas, and was briefly up to 2.5mg TID but was struggling with significant side effects of muscle aches and low BP/dizziness. I then reduced her dose to 1.5mg TID and she is able to tolerate this much better. She is not interested in trying to go up further. D/W Dr. Knox who recommended potential transition to Tyvaso DPI if unable to get her back up in dose. We chatted " about this today and she is agreeable to trial this.    --Most recent echo in July showed what sounds to be improvement in RV function, though TR may be a bit worse. Will plan repeat after optimized on her therapies.              --As today is a virtual visit I cannot fully evaluate her volume status. She reports no edema, using Lasix only PRN. Recent BMP shows preserved renal function, and NT-proBNP is not significantly elevated.               --She required supplemental oxygen previously, but no longer using. Per 6MWT  in July, she does desat to 89-91%. However, she has been able to be on her feet at work without much difficulty. Will continue to monitor this as well and plan repeat 6MWT when she returns next.              --Continue lifelong AC with Eliquis.               --Hx of anemia; she is now s/p iron infusions; Iron studies normalized and hemoglobin improved.         Follow up plan: Return ~2 weeks after transition to Tyvaso DPI. Plan repeat testing once her regimen is optimized. I asked the patient to call sooner with any new concerns.         Testing/labs:      Most recent labs:      Latest Reference Range & Units 10/16/23 16:18 11/07/23 14:00   Sodium (External) 136 - 145 meq/L 140 140   Potassium (External) 3.5 - 5.1 meq/L 4.2 3.9   Chloride (External) 98 - 109 meq/L 108 108   CO2 (External) 20 - 29 meq/L 23 23   Urea Nitrogen (External) 6 - 22 mg/dL 19 22   Creatinine (External) 0.55 - 1.02 mg/dL 0.99 1.07 (H)   GFR Estimated (External) >=60 ml/min/1.73m2 68 53 (L)   Calcium (External) 8.5 - 10.5 mg/dL 9.3 9.4   Anion Gap (External) 6 - 20 meq/L 13 13   Albumin (External) 3.2 - 4.6 g/dL 4.0 4.0   Protein Total (External) 6.0 - 8.3 g/dL 6.4 6.9   Alk Phosphatase (External) 40 - 150 U/L 72 68   ALT (External) 0 - 55 U/L 17 18   AST (External) 5 - 34 U/L 23 23   Bilirubin Total (External) 0.2 - 1.2 mg/dL 0.6 0.7   BUN/Creatinine Ratio (External) 10.0 - 25.0 Ratio 19.2 20.6   Ferritin (External) 5 - 204  NG/Ml  269 (H)   Iron (External) 35 - 145 ug/dL 76    Iron Binding Cap (External) 250 - 400 ug/dL 290    Iron Saturation % (External) 14 - 50 % 26    Transferrin (External) 173 - 360 mg/dL  219   Scan Lab Results (External)   See Scanned Report (E)  See Scanned Report   Glucose (External) 70 - 99 mg/dL 84 92   WBC Count (External) 4.0 - 11.0 K/uL 5.7    Hemoglobin (External) 11.5 - 15.8 g/dL 13.1    Hematocrit (External) 35.0 - 45.0 % 41.0    Platelet Count (External) 140 - 400 K/uL 189    RBC Count (External) 3.80 - 5.30 M/uL 4.47    (H): Data is abnormally high  (L): Data is abnormally low  (E): External lab result      Other recent pertinent testing:    Echocardiogram  7/25/23  Interpretation Summary  Global and regional left ventricular function is normal with an EF of 60-65%.  Global right ventricular function is mildly reduced. Mild right ventricular  dilation is present. RVFAC 40%, TAPSE 1.3 cm, S' 10 cm/s  Moderate to severe tricuspid insufficiency is present.  The estimated PA systolic pressure is at least 96 mmHg.  There is mild dilation at the level of the ascending aorta when indexed to BSA  (3.6 cm, indexed 2.4 cm/m2).  IVC diameter >2.1 cm collapsing <50% with sniff suggests a high RA pressure  estimated at 15 mmHg or greater.  This study was compared with the study from 06/08/2022. The RV function has  improved considerably. The TR appears to be more severe, but it is also  interrogated more thoroughly on today's exam.     RHC  10/3/22    Hemodynamics     RA: 8/10/8 mmHg  RV: 68/8 mmHg  PA: 67/28/40 mmHg  PCWP: --/--13 mmHg  Boris CO/CI: 3.66/2.39  Thermo: CO/CI: 4.2/2.74  PVR: 6.4 (TD)   Right sided filling pressures are mildly elevated. Left sided filling pressures are normal. Moderately elevated pulmonary artery hypertension. Normal cardiac output level. Hemodynamic data has been modified in Epic per physician review.      Pulmonary Angiogram     Main PA, LPA and RPA, along with bilateral truncus  anterior, interlobar arteries are all patent.    Right  A1-3 are patent.  A4-5 are occluded with perfusion defects in the middle lobe.  A6 appears patent.  A7-9 have Type A and B lesions.  A10 appears patent.    Left:  A1 is occluded.  A2 and 3 are patent.  A4-6 are patent.  A7 - 9 are occluded.            NYHA Functional Class:  2        I have reviewed the note as documented above.  This accurately captures the substance of my conversation with the patient.       Phone call contact time  Call Started at 1114  Call Ended at 1125    An additional 15 minutes was spent today performing chart and history review, pre and post visit documentation, review of recent testing, patient education, and care coordination.      Cherelle Benson PA-C  [facility]  Pager [phone]

## 2023-11-16 NOTE — PATIENT INSTRUCTIONS
"You were seen today in the Pulmonary Hypertension Clinic at the [facility].     Cardiology Provider you saw during your visit:    Cherelle KRAMER    Medication Changes:  Start in the Tyvaso DPI Prior Authorization    New Medication Start:    This process could take 1-2 weeks before you start the medication:  We will submit a prior authorization to your insurance company.   We will inform you when it is approved and what specialty pharmacy will be dispensing your medication.   Your specialty pharmacy will then contact you to schedule the shipment of your medication.  When they do, PLEASE ask about your co-pay and continue asking about your co-pay with EVERY shipment. If the co-pay is something you cannot afford, you need to say, \"I cannot afford this medication. Please help find me assistance.\"  Call your nurse immediately and let them know you cannot afford your medication  If you have Medicare or no insurance and grants are open, please apply here FIRST:     [employer] Assistance:    [employer] Foundation (PAF):         https://www.copGiveLoop.org/diseases/pulmonary-hypertension  118-288-1211  Patient Access Network (PAN): https://panfoundation.org/index.php/en/patients/assistance-programs/pulmonary-hypertension   664-184-9150  The Assistance Fund (TAF):         https://Move In Historycares.org/program-listing/   447.825.5698  Good Days: https://www.mygooddays.org/patients/diseases-covered/pulmonary-arterial-hypertension   521.442.4599   e.   [facility]:        https://www.healthHealthcare Interactivefoundation.org/fund/pulmonary-hypertension-medicare-access/               744.368.5073      d. If the pharmacy does cannot provide you any assistance, please contact Deedee @ [phone].  Once your co-pay is something you are able to afford, have the medication shipped to your home.  When you receive the medication please notify your assigned nurse for further instructions.       Recommendations:   Stay on Adempas " until we can get you started on Tyvaso DPI.    Follow-up:   Follow-up virutally with Cherelle Benson 2 weeks after starting Tyvaso.    Please call us immediately if you have any syncope (fainting or passing out), chest pain, edema (swelling or weight gain), or decline in your functional status (general decline in how you are feeling).    If you have emergent concerns after hours or on the weekend, please call our on-call Cardiologist at [phone], option 4. For emergencies call [phone].     Thank you for allowing us to be a part of your care here at the [facility]    If you have questions or concerns please contact us at:    Yane Cardenas RN (P: [phone])    Nurse Coordinator       Pulmonary Hypertension     [facility]         CHRIS Purvis   (Prior Auths & Pt Assistance)   ()  Clinic   Clinic   Pulmonary Hypertension   Pulmonary Hypertension  [facility]  (P)[phone]    (P) [phone]  (F) [phone]

## 2023-11-16 NOTE — LETTER
Cardiology/Pulmonary Hypertension  [address]  [place] [address]  Phone [phone]  Fax [phone]    12/5/2023      Debi Espinoza  [address] [address]     Dear Ms. Espinoza,     Please find enclosed the [facility] Request for Support form.  Please sign and return via  regular mail.  You can also return via [employer] and I will fax in on your behalf, or, you can fax directly to [employer].   Please watch your mail for additional forms you will need to complete from [employer].      Let me know if you have any questions,     Thanks,         Deedee MEADOWS CMA  Prior Authorizations/Patient Assistance  Clinical   Cardiology/Pulmonary Hypertension   [facility]  PH: [phone]   FAX:   [phone]

## 2023-11-17 NOTE — TELEPHONE ENCOUNTER
1/8/24 -   TYVASO DPI SHIPPED PER KRYSTIAN/[facility]      1/2/2024  @ 4:45 PM -  Pt Assstance approved thru [employer]  -      FREE DRUG APPLICATION APPROVED    Medication: TYVASO DPI TITRATION KIT 16 & 32 & 48 MCG IN POWD  Program Name:  Other (see comments)Comment:  [employer] -  [id number]  Effective Date: 1/2/2024  Expiration Date: 12/31/2024  Pharmacy Filling the Rx:    Patient Notified: y  Additional Information: *          Deedee MEADOWS CMA- Prior Auths  Cardiology/Pulmonary Hypertension       12/20/2023  @ 1:24 PM -  Faxed [employer] Prov portion to [phone]          Deedee MEADOWS CMA- Prior Auths  Cardiology/Pulmonary Hypertension       12/18/2023  @ 12:14 PM -  Rcvd [employer] signed form, faxed to [employer] via Right Fax @ 1221 pm.    Deedee MEADOWS CMA- Prior Auths  Cardiology/Pulmonary Hypertension       12/16/2023  @ 10:07 AM -  Tyvao DPI enrollment not submitted, waiting on pt to return [employer] Request for Support form via mail, sent to pt with JAKUB.  Pt states she mailed on Sunday, 12/10/23.   Pt has no pharmacy / Rx coverage.   Deedee MEADOWS CMA- Prior Auths  Cardiology/Pulmonary Hypertension       12/12/2023  @ 12:47 PM -  Form not received as of this note, Ladan Aldrich will check reg mail again today and scan to [facility] if received.     Deedee MEADOWS CMA- Prior Auths  Cardiology/Pulmonary Hypertension     Patient called and advised me she just got off the phone with the [employer] who told her they needed us to call and verify her dose of Adempas before they will ship.[phone].  Spoke with Pharmacist and they will tell Kevin to go ahead and start shipment for 1.5mg tabs for 2 week supply. Yane Cardenas RN on 11/17/2023 at 11:50 AM  ---------------------------------------  Spoke with patient who states she signed forms Deedee sent and mailed them Sunday.  She is expecting to receive more paperwork per Deedee's explanation.  Verbalized understanding. Yane Cardenas RN on 12/12/2023 at 10:07 AM

## 2023-11-22 DIAGNOSIS — I27.24 CTEPH (CHRONIC THROMBOEMBOLIC PULMONARY HYPERTENSION) (H): ICD-10-CM

## 2023-11-27 RX ORDER — MACITENTAN 10 MG/1
10 TABLET, FILM COATED ORAL DAILY
Qty: 30 TABLET | Refills: 11 | Status: SHIPPED | OUTPATIENT
Start: 2023-11-27

## 2023-11-27 RX ORDER — MACITENTAN 10 MG/1
TABLET, FILM COATED ORAL
Start: 2023-11-27

## 2023-11-27 NOTE — TELEPHONE ENCOUNTER
"11/17/23 -  note from Yane Cardenas RN (on Tyvaso DPI encounter): \"Patient called and advised me she just got off the phone with the [employer] who told her they needed us to call and verify her dose of Adempas before they will ship.[phone].  Spoke with Pharmacist and they will tell Kevin to go ahead and start shipment for 1.5mg tabs for 2 week supply. Yane Cardenas RN on 11/17/2023 at 11:50 AM \"     Deedee MEADOWS CMA- Prior Auths  Cardiology/Pulmonary Hypertension     "

## 2023-11-27 NOTE — TELEPHONE ENCOUNTER
OPSUMIT 10 MG tablet       Last Written Prescription Date:  12-15-22  Last Fill Quantity: 30,   # refills: 11  Last Office Visit : 11-16-23  Future Office visit:  NONE    Routing refill request to provider for review/approval because:  Med not on cards protocol

## 2023-12-11 ENCOUNTER — TELEPHONE (OUTPATIENT)
Dept: CARDIOLOGY | Facility: CLINIC | Age: 78
End: 2023-12-11

## 2023-12-11 NOTE — TELEPHONE ENCOUNTER
Called patient and she is still completing forms and sent some in the mail within the last couple days.  She asked what to do if she runs out of Adempas?  I advised she call for more refills.  Patient verbalized understanding, agreed with plan and denied any further questions. Yane Cardenas RN on 12/11/2023 at 2:06 PM  -------------------------------------  Called patient and no answer.  Sent mychart attempting to clarify if patient has started the Tyvaso DPI medication yet so we can schedule follow up appt. Yane Cardenas RN on 12/20/2023 at 3:24 PM  -------------------------------------  Sent mychart asking if patient has started Tyvaso DPI, or have appt with Rn to do so?  Yane Cardenas RN on 1/2/2024 at 10:32 AM

## 2024-01-05 ENCOUNTER — TELEPHONE (OUTPATIENT)
Dept: CARDIOLOGY | Facility: CLINIC | Age: 79
End: 2024-01-05
Payer: MEDICARE

## 2024-01-05 NOTE — TELEPHONE ENCOUNTER
Patient called to ask if she needs to stop taking the Adempas a certain amount of time prior to beginning the Tyvaso DPI?  Advised patient to stop Adempas 2 days prior to starting Tyvaso DPI.  Danyell MCKEON is scheduled to come out & start TYvaso 1/10/24.    Called patient back and scheduled 2 week VV follow up after starting new med. Yane Cardenas RN on 1/5/2024 at 12:46 PM

## 2024-01-12 DIAGNOSIS — I27.24 CTEPH (CHRONIC THROMBOEMBOLIC PULMONARY HYPERTENSION) (H): ICD-10-CM

## 2024-01-24 NOTE — PROGRESS NOTES
"    4/4/2022    12:45 PM   Vitals - Patient Reported   Weight (Patient Reported) 142 lb     BP (!) 140/68   Pulse 70   Ht 1.473 m (4' 10\")   Wt 61.7 kg (136 lb)   BMI 28.42 kg/m          CARDIOLOGY PH CLINIC TELEPHONE VISIT      Date of telephone visit: 01/25/24      Debi Espinoza is a 78 year old female who is being evaluated via a billable telephone visit.        MEDICATIONS:  Current Outpatient Medications   Medication Sig Dispense Refill    acetaminophen (TYLENOL) 325 MG tablet 3 tablets (975 mg) by Oral or Feeding Tube route every 8 hours as needed for mild pain or fever      apixaban ANTICOAGULANT (ELIQUIS) 5 MG tablet Take 1 tablet (5 mg) by mouth 2 times daily 60 tablet 3    escitalopram (LEXAPRO) 10 MG tablet Take 1 tablet by mouth At Bedtime      furosemide (LASIX) 20 MG tablet Take 1 tablet (20 mg) by mouth as needed (5 lb weight gain in 1 week, leg swelling and edema) 30 tablet 1    OPSUMIT 10 MG tablet Take 1 tablet (10 mg) by mouth daily 30 tablet 11    riociguat (ADEMPAS) 1.5 MG tablet Take 1 tablet (1.5 mg) by mouth 3 times daily 90 tablet 11    Treprostinil (TYVASO DPI TITRATION KIT) 16 & 32 & 48 MCG POWD Inhale into the lungs every 4 hours (while awake). Goal of 64 mcg 4 times a day, with dose increases of 16 mcg 4 times a day every 1-2 weeks as tolerated    PA in process. Rx to be sent upon approval.         Primary  cardiologist: Dr. Knox        HPI:  Ms. Debi Espinoza is a pleasant 78 year old female with a past medical history significant for hypertension, nephrolithiasis, DVT, and pulmonary embolism. 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 She still has residual pulmonary hypertension and was then placed no dual oral therapy with Opsumit and Adempas.    I have met with Debi a few times while we were working on titration of Adempas but she was struggling with this due to muscle aches to the point she was having trouble " "doing her ADLs along with significant dizziness. She ended up having to stop driving herself places because of this. Despite reduction in dose she still was not doing well, so after d/w Dr. Knox, decision was made to change to Tyvaso instead.      Today, I am meeting virtually with Debi to follow up. She tells me she feels much better. She is off Adempas and currently on Tyvaso DPI 32mcg QID. She held titration for a week due to diarrhea. She is still having muscle aches mostly described as \"tightness\" in her calves, though improved from prior. Dizziness is also much better. Breathing is \"still pretty good\" and does not think it has worsened significantly. She reports no new edema, CP, or palpitations.       Most recent labs reviewed as below.      CURRENT PULMONARY HYPERTENSION REGIMEN:     PAH Rx: Opsumit 10mg daily, Tyvaso 32mcg QID (currently titrating)  (Failed Adempas with muscle aches and dizziness)     Diuretics: Lasix 20mg PRN     Oxygen: None     Anticoagulation: Eliquis   Indication: CTEPH        ASSESSMENT/PLAN:        1. Chronic thromboembolic pulmonary hypertension.              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 She still has residual disease. The remainder of her lesions are  which are higher risk, and she has not been interested in pursuing this. She was placed back on Opsumit, as well as Adempas, but she had significant muscle aches and dizziness, so we opted to discontinue. She is now on Tyvaso DPI, on 32mcg QID actively titrating. She sounds to be tolerating ok with the exception of some diarrhea. We have slowed titration slightly with use of Imodium PRN. Will continue titration efforts.     --As today is a virtual visit I cannot fully evaluate her volume status. However she reports that her ankles are \"skinny\" and has not used her Lasix recently. Most recent labs showed preserved renal function, and NT-proBNP is not significantly elevated.               --She " required supplemental oxygen previously, but no longer using. Per 6MWT in July, she does desat to 89-91%. Will plan repeat 6MWT with her repeat testing this spring.              --Continue lifelong AC with Eliquis.               --Hx of anemia; she is now s/p iron infusions; Iron studies normalized and hemoglobin improved.  Continue to monitor.      Follow up plan: Return ~4-6 weeks while we titrate her regimen. Plan repeat hemodynamic testing once her regimen is optimized. I asked the patient to call sooner with any new concerns.       Testing/labs:      Most recent labs:      Latest Reference Range & Units 10/16/23 16:18 11/07/23 14:00   Sodium (External) 136 - 145 meq/L 140 140   Potassium (External) 3.5 - 5.1 meq/L 4.2 3.9   Chloride (External) 98 - 109 meq/L 108 108   CO2 (External) 20 - 29 meq/L 23 23   Urea Nitrogen (External) 6 - 22 mg/dL 19 22   Creatinine (External) 0.55 - 1.02 mg/dL 0.99 1.07 (H)   GFR Estimated (External) >=60 ml/min/1.73m2 68 53 (L)   Calcium (External) 8.5 - 10.5 mg/dL 9.3 9.4   Anion Gap (External) 6 - 20 meq/L 13 13   Albumin (External) 3.2 - 4.6 g/dL 4.0 4.0   Protein Total (External) 6.0 - 8.3 g/dL 6.4 6.9   Alk Phosphatase (External) 40 - 150 U/L 72 68   ALT (External) 0 - 55 U/L 17 18   AST (External) 5 - 34 U/L 23 23   Bilirubin Total (External) 0.2 - 1.2 mg/dL 0.6 0.7   BUN/Creatinine Ratio (External) 10.0 - 25.0 Ratio 19.2 20.6   Ferritin (External) 5 - 204 NG/Ml  269 (H)   Iron (External) 35 - 145 ug/dL 76    Iron Binding Cap (External) 250 - 400 ug/dL 290    Iron Saturation % (External) 14 - 50 % 26    Transferrin (External) 173 - 360 mg/dL  219   Scan Lab Results (External)   See Scanned Report (E)  See Scanned Report   Glucose (External) 70 - 99 mg/dL 84 92   WBC Count (External) 4.0 - 11.0 K/uL 5.7    Hemoglobin (External) 11.5 - 15.8 g/dL 13.1    Hematocrit (External) 35.0 - 45.0 % 41.0    Platelet Count (External) 140 - 400 K/uL 189    RBC Count (External) 3.80 - 5.30  M/uL 4.47    (H): Data is abnormally high  (L): Data is abnormally low  (E): External lab result      Other recent pertinent testing:    Echocardiogram  7/25/23  Interpretation Summary  Global and regional left ventricular function is normal with an EF of 60-65%.  Global right ventricular function is mildly reduced. Mild right ventricular  dilation is present. RVFAC 40%, TAPSE 1.3 cm, S' 10 cm/s  Moderate to severe tricuspid insufficiency is present.  The estimated PA systolic pressure is at least 96 mmHg.  There is mild dilation at the level of the ascending aorta when indexed to BSA  (3.6 cm, indexed 2.4 cm/m2).  IVC diameter >2.1 cm collapsing <50% with sniff suggests a high RA pressure  estimated at 15 mmHg or greater.  This study was compared with the study from 06/08/2022. The RV function has  improved considerably. The TR appears to be more severe, but it is also  interrogated more thoroughly on today's exam.     RHC  10/3/22    Hemodynamics     RA: 8/10/8 mmHg  RV: 68/8 mmHg  PA: 67/28/40 mmHg  PCWP: --/--13 mmHg  Boris CO/CI: 3.66/2.39  Thermo: CO/CI: 4.2/2.74  PVR: 6.4 (TD)   Right sided filling pressures are mildly elevated. Left sided filling pressures are normal. Moderately elevated pulmonary artery hypertension. Normal cardiac output level. Hemodynamic data has been modified in Epic per physician review.      Pulmonary Angiogram     Main PA, LPA and RPA, along with bilateral truncus anterior, interlobar arteries are all patent.    Right  A1-3 are patent.  A4-5 are occluded with perfusion defects in the middle lobe.  A6 appears patent.  A7-9 have Type A and B lesions.  A10 appears patent.    Left:  A1 is occluded.  A2 and 3 are patent.  A4-6 are patent.  A7 - 9 are occluded.            NYHA Functional Class:  2        I have reviewed the note as documented above.  This accurately captures the substance of my conversation with the patient.       Phone call contact time  Call Started at 0935  Call Ended at  [phone]    An additional 15 minutes was spent today performing chart and history review, pre and post visit documentation, review of recent testing, patient education, and care coordination.      Cherelle Benson PA-C  [facility]  Pager [phone]

## 2024-01-25 ENCOUNTER — VIRTUAL VISIT (OUTPATIENT)
Dept: CARDIOLOGY | Facility: CLINIC | Age: 79
End: 2024-01-25
Attending: PHYSICIAN ASSISTANT
Payer: MEDICARE

## 2024-01-25 VITALS
DIASTOLIC BLOOD PRESSURE: 68 MMHG | HEIGHT: 58 IN | SYSTOLIC BLOOD PRESSURE: 140 MMHG | WEIGHT: 136 LBS | HEART RATE: 70 BPM | BODY MASS INDEX: 28.55 KG/M2

## 2024-01-25 DIAGNOSIS — R06.02 SOB (SHORTNESS OF BREATH): ICD-10-CM

## 2024-01-25 DIAGNOSIS — I27.24 CTEPH (CHRONIC THROMBOEMBOLIC PULMONARY HYPERTENSION) (H): ICD-10-CM

## 2024-01-25 PROCEDURE — 99213 OFFICE O/P EST LOW 20 MIN: CPT | Mod: 93 | Performed by: PHYSICIAN ASSISTANT

## 2024-01-25 ASSESSMENT — PAIN SCALES - GENERAL: PAINLEVEL: MODERATE PAIN (4)

## 2024-01-25 NOTE — NURSING NOTE
Is the patient currently in the state of [place]? YES    Visit mode:TELEPHONE    If the visit is dropped, the patient can be reconnected by: TELEPHONE VISIT: Phone number:   Telephone Information:   [phone]     Will anyone else be joining the visit? NO  (If patient encounters technical issues they should call [phone]    How would you like to obtain your AVS? MyChart    Are changes needed to the allergy or medication list?  Pt states not taking Adempas     Reason for visit: NYDIA Ivory MA VVF

## 2024-01-25 NOTE — LETTER
"1/25/2024      RE: Debi Espinoza  [address] [address]       Dear Colleague,    Thank you for the opportunity to participate in the care of your patient, Debi Espinoza, at the [facility] [place] [facility]. Please see a copy of my visit note below.        4/4/2022    12:45 PM   Vitals - Patient Reported   Weight (Patient Reported) 142 lb     BP (!) 140/68   Pulse 70   Ht 1.473 m (4' 10\")   Wt 61.7 kg (136 lb)   BMI 28.42 kg/m          CARDIOLOGY PH CLINIC TELEPHONE VISIT      Date of telephone visit: 01/25/24      Debi Espinoza is a 78 year old female who is being evaluated via a billable telephone visit.        MEDICATIONS:  Current Outpatient Medications   Medication Sig Dispense Refill    acetaminophen (TYLENOL) 325 MG tablet 3 tablets (975 mg) by Oral or Feeding Tube route every 8 hours as needed for mild pain or fever      apixaban ANTICOAGULANT (ELIQUIS) 5 MG tablet Take 1 tablet (5 mg) by mouth 2 times daily 60 tablet 3    escitalopram (LEXAPRO) 10 MG tablet Take 1 tablet by mouth At Bedtime      furosemide (LASIX) 20 MG tablet Take 1 tablet (20 mg) by mouth as needed (5 lb weight gain in 1 week, leg swelling and edema) 30 tablet 1    OPSUMIT 10 MG tablet Take 1 tablet (10 mg) by mouth daily 30 tablet 11    riociguat (ADEMPAS) 1.5 MG tablet Take 1 tablet (1.5 mg) by mouth 3 times daily 90 tablet 11    Treprostinil (TYVASO DPI TITRATION KIT) 16 & 32 & 48 MCG POWD Inhale into the lungs every 4 hours (while awake). Goal of 64 mcg 4 times a day, with dose increases of 16 mcg 4 times a day every 1-2 weeks as tolerated    PA in process. Rx to be sent upon approval.         Primary  cardiologist: Dr. Knox        HPI:  Ms. Debi Espinoza is a pleasant 78 year old female with a past medical history significant for hypertension, nephrolithiasis, DVT, and pulmonary embolism. She also has chronic thromboembolic " "pulmonary hypertension s/p thromboendarterectomy in May of 2022. She still has residual pulmonary hypertension and was then placed no dual oral therapy with Opsumit and Adempas.    I have met with Debi a few times while we were working on titration of Adempas but she was struggling with this due to muscle aches to the point she was having trouble doing her ADLs along with significant dizziness. She ended up having to stop driving herself places because of this. Despite reduction in dose she still was not doing well, so after d/w Dr. Knox, decision was made to change to Tyvaso instead.      Today, I am meeting virtually with Debi to follow up. She tells me she feels much better. She is off Adempas and currently on Tyvaso DPI 32mcg QID. She held titration for a week due to diarrhea. She is still having muscle aches mostly described as \"tightness\" in her calves, though improved from prior. Dizziness is also much better. Breathing is \"still pretty good\" and does not think it has worsened significantly. She reports no new edema, CP, or palpitations.       Most recent labs reviewed as below.      CURRENT PULMONARY HYPERTENSION REGIMEN:     PAH Rx: Opsumit 10mg daily, Tyvaso 32mcg QID (currently titrating)  (Failed Adempas with muscle aches and dizziness)     Diuretics: Lasix 20mg PRN     Oxygen: None     Anticoagulation: Eliquis   Indication: CTEPH        ASSESSMENT/PLAN:        1. Chronic thromboembolic pulmonary hypertension.              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 She still has residual disease. The remainder of her lesions are  which are higher risk, and she has not been interested in pursuing this. She was placed back on Opsumit, as well as Adempas, but she had significant muscle aches and dizziness, so we opted to discontinue. She is now on Tyvaso DPI, on 32mcg QID actively titrating. She sounds to be tolerating ok with the exception of some diarrhea. We have slowed " "titration slightly with use of Imodium PRN. Will continue titration efforts.     --As today is a virtual visit I cannot fully evaluate her volume status. However she reports that her ankles are \"skinny\" and has not used her Lasix recently. Most recent labs showed preserved renal function, and NT-proBNP is not significantly elevated.               --She required supplemental oxygen previously, but no longer using. Per 6MWT in July, she does desat to 89-91%. Will plan repeat 6MWT with her repeat testing this spring.              --Continue lifelong AC with Eliquis.               --Hx of anemia; she is now s/p iron infusions; Iron studies normalized and hemoglobin improved.  Continue to monitor.      Follow up plan: Return ~4-6 weeks while we titrate her regimen. Plan repeat hemodynamic testing once her regimen is optimized. I asked the patient to call sooner with any new concerns.       Testing/labs:      Most recent labs:      Latest Reference Range & Units 10/16/23 16:18 11/07/23 14:00   Sodium (External) 136 - 145 meq/L 140 140   Potassium (External) 3.5 - 5.1 meq/L 4.2 3.9   Chloride (External) 98 - 109 meq/L 108 108   CO2 (External) 20 - 29 meq/L 23 23   Urea Nitrogen (External) 6 - 22 mg/dL 19 22   Creatinine (External) 0.55 - 1.02 mg/dL 0.99 1.07 (H)   GFR Estimated (External) >=60 ml/min/1.73m2 68 53 (L)   Calcium (External) 8.5 - 10.5 mg/dL 9.3 9.4   Anion Gap (External) 6 - 20 meq/L 13 13   Albumin (External) 3.2 - 4.6 g/dL 4.0 4.0   Protein Total (External) 6.0 - 8.3 g/dL 6.4 6.9   Alk Phosphatase (External) 40 - 150 U/L 72 68   ALT (External) 0 - 55 U/L 17 18   AST (External) 5 - 34 U/L 23 23   Bilirubin Total (External) 0.2 - 1.2 mg/dL 0.6 0.7   BUN/Creatinine Ratio (External) 10.0 - 25.0 Ratio 19.2 20.6   Ferritin (External) 5 - 204 NG/Ml  269 (H)   Iron (External) 35 - 145 ug/dL 76    Iron Binding Cap (External) 250 - 400 ug/dL 290    Iron Saturation % (External) 14 - 50 % 26    Transferrin (External) " 173 - 360 mg/dL  219   Scan Lab Results (External)   See Scanned Report (E)  See Scanned Report   Glucose (External) 70 - 99 mg/dL 84 92   WBC Count (External) 4.0 - 11.0 K/uL 5.7    Hemoglobin (External) 11.5 - 15.8 g/dL 13.1    Hematocrit (External) 35.0 - 45.0 % 41.0    Platelet Count (External) 140 - 400 K/uL 189    RBC Count (External) 3.80 - 5.30 M/uL 4.47    (H): Data is abnormally high  (L): Data is abnormally low  (E): External lab result      Other recent pertinent testing:    Echocardiogram  7/25/23  Interpretation Summary  Global and regional left ventricular function is normal with an EF of 60-65%.  Global right ventricular function is mildly reduced. Mild right ventricular  dilation is present. RVFAC 40%, TAPSE 1.3 cm, S' 10 cm/s  Moderate to severe tricuspid insufficiency is present.  The estimated PA systolic pressure is at least 96 mmHg.  There is mild dilation at the level of the ascending aorta when indexed to BSA  (3.6 cm, indexed 2.4 cm/m2).  IVC diameter >2.1 cm collapsing <50% with sniff suggests a high RA pressure  estimated at 15 mmHg or greater.  This study was compared with the study from 06/08/2022. The RV function has  improved considerably. The TR appears to be more severe, but it is also  interrogated more thoroughly on today's exam.     [facility]  10/3/22    Hemodynamics     RA: 8/10/8 mmHg  RV: 68/8 mmHg  PA: 67/28/40 mmHg  PCWP: --/--13 mmHg  Boris CO/CI: 3.66/2.39  Thermo: CO/CI: 4.2/2.74  PVR: 6.4 (TD)   Right sided filling pressures are mildly elevated. Left sided filling pressures are normal. Moderately elevated pulmonary artery hypertension. Normal cardiac output level. Hemodynamic data has been modified in Epic per physician review.      Pulmonary Angiogram     Main PA, LPA and RPA, along with bilateral truncus anterior, interlobar arteries are all patent.    Right  A1-3 are patent.  A4-5 are occluded with perfusion defects in the middle lobe.  A6 appears patent.  A7-9 have Type A  and B lesions.  A10 appears patent.    Left:  A1 is occluded.  A2 and 3 are patent.  A4-6 are patent.  A7 - 9 are occluded.            NYHA Functional Class:  2        I have reviewed the note as documented above.  This accurately captures the substance of my conversation with the patient.       Phone call contact time  Call Started at 0935  Call Ended at 0944    An additional 15 minutes was spent today performing chart and history review, pre and post visit documentation, review of recent testing, patient education, and care coordination.      Cherelle Benson PA-C  [facility]  Pager [phone]

## 2024-01-25 NOTE — PATIENT INSTRUCTIONS
You were seen today in the Pulmonary Hypertension Clinic at the [facility].     Cardiology Provider you saw during your visit:    WILLIAMS Morin    Medication Changes:  Continue to up titrate Tyvaso DPI      Follow-up:   Follow up with Cherelle Benson PA-C in March '24 virtually with labs done locally prior      Please call us immediately if you have any syncope (fainting or passing out), chest pain, edema (swelling or weight gain), or decline in your functional status (general decline in how you are feeling).    If you have emergent concerns after hours or on the weekend, please call our on-call Cardiologist at [phone], option 4. For emergencies call [phone].     Thank you for allowing us to be a part of your care here at the [facility]    If you have questions or concerns please contact us at:    Yane Cardenas RN (P: [phone])    Nurse Coordinator       Pulmonary Hypertension     [facility]         CHRIS Purvis   (Prior Auths & Pt Assistance)   ()  Clinic   Clinic   Pulmonary Hypertension   Pulmonary Hypertension  [facility]  [facility]  (P)[phone]    (P) [phone]  (F) [phone]

## 2024-01-25 NOTE — NURSING NOTE
"Reviewed Med list  Completed AVS  Follow-up orders placed  Marked chart \"Ready for Checkout\"  Yane Cardenas RN on 1/25/2024 at 10:07 AM    "

## 2024-02-01 ENCOUNTER — TELEPHONE (OUTPATIENT)
Dept: CARDIOLOGY | Facility: CLINIC | Age: 79
End: 2024-02-01
Payer: MEDICARE

## 2024-02-05 ENCOUNTER — TELEPHONE (OUTPATIENT)
Dept: CARDIOLOGY | Facility: CLINIC | Age: 79
End: 2024-02-05
Payer: MEDICARE

## 2024-03-09 ENCOUNTER — HEALTH MAINTENANCE LETTER (OUTPATIENT)
Age: 79
End: 2024-03-09

## 2024-08-28 NOTE — PLAN OF CARE
Form complete.  This is approved   NURSING PROGRESS NOTE  Shift Summary        Date: June 25, 2022     Pertinent Updates/Shift Summary:  Patient slept between cares, no acute events overnight. PRN oxycodone, atarax, and robaxin given for pain. Maintaining sats on 3L O2 via NC. Up to bedside commode with 2 assist, 1 BM overnight. 2+ edema BLE, limbs elevated. Cyclic TF running at 60mL/hr.     Please see Nursing Flowsheets for full assessment details, I&Os, and VS.      Most Recent Vitals & Weight:   /50 (BP Location: Left arm, Cuff Size: Adult Regular)   Pulse 69   Temp 97.8  F (36.6  C) (Oral)   Resp 13   Ht 1.524 m (5')   Wt 71.1 kg (156 lb 12 oz)   SpO2 96%   BMI 30.61 kg/m       Wt Readings from Last 2 Encounters:   06/21/22 71.1 kg (156 lb 12 oz)   04/29/22 67.4 kg (148 lb 8 oz)          Plan:  Continue to monitor patient.         Lola Gonzales RN  .................................................... June 25, 2022   6:25 AM  [facility] ([facility]): [facility] ICU (Unit 6D)

## 2024-10-25 ENCOUNTER — TELEPHONE (OUTPATIENT)
Dept: CARDIOLOGY | Facility: CLINIC | Age: 79
End: 2024-10-25

## 2024-10-25 DIAGNOSIS — I27.24 CTEPH (CHRONIC THROMBOEMBOLIC PULMONARY HYPERTENSION) (H): Primary | ICD-10-CM

## 2024-10-25 DIAGNOSIS — R06.02 SOB (SHORTNESS OF BREATH): ICD-10-CM

## 2024-10-25 NOTE — TELEPHONE ENCOUNTER
Overdue follow up:  01/25/24 - VV with Cherelle Benson PA-C  11/16/23 - VV with Cherelle Benson PA-C  10/26/23 - VV with Cherelle Benson PA-C  07/25/23 - Office visit with Cherelle Benson PA-C    Last RHC 10/3/23  Last Echo 7/25/23  Last 6mwt 7/25/23    Due for VV in March '24, but multiple attempts made without ability to LM (2/5/24).  [employer] message sent to patient with no response.    Labs with PMD 10/21/24 stable. (Care Everywhere)  --------------------------------------------------------------------------------  Routed encounter to provider asking for suggestions on plan of care.  Yane Cardenas RN on 10/25/2024 at 2:34 PM

## 2024-10-25 NOTE — LETTER
11/7/24      RE: Debi JOSE Espinoza  [address] [address]           Dear Debi,    Our records indicate that you have not scheduled your follow up appointment and routine testing. Our clinic coordinator has made several attempts to reach you to schedule. Your last follow up visit was Virtually on 1/25/24.  In order for us to continue to prescribe your specialty medications, Opsumit & Tyvaso DPI and to provide you with comprehensive care, I am requesting that you have an office visit and testing scheduled no later than 12/25/24.     Please contact my Clinic Coordinator Ladan at [phone] to have these appointments scheduled.  If you fail to comply with this request to schedule an office visit in the Pulmonary Hypertension clinic at the [facility] by 12/25/24, it may affect our ability to continue to prescribe these specialty medications for you in the future.          Graciously,       Lisette Knox MD  Associate Professor of Medicine  Pulmonary Hypertension  [facility]  Cardiovascular Division

## 2024-10-29 DIAGNOSIS — I27.24 CTEPH (CHRONIC THROMBOEMBOLIC PULMONARY HYPERTENSION) (H): ICD-10-CM

## 2024-10-29 RX ORDER — MACITENTAN 10 MG/1
10 TABLET, FILM COATED ORAL DAILY
Qty: 30 TABLET | Refills: 1 | Status: SHIPPED | OUTPATIENT
Start: 2024-10-29

## 2024-10-29 NOTE — TELEPHONE ENCOUNTER
[facility] Call Center    Phone Message    May a detailed message be left on voicemail: yes     Reason for Call: Medication Refill Request    Has the patient contacted the pharmacy for the refill? Yes   Name of medication being requested:   OPSUMIT 10 MG tablet   Provider who prescribed the medication: Lisette Knox MD   Pharmacy:   [facility] [address]     Date medication is needed: asap     Action Taken: Other: cardio    Travel Screening: Not Applicable    Thank you!  [facility]       Date of Service:

## 2024-10-29 NOTE — TELEPHONE ENCOUNTER
OPSUMIT 10 MG tablet     Take 1 tablet (10 mg) by mouth daily -     Last Written Prescription Date:  11/27/23  Last Fill Quantity: 30,   # refills: 11  Last Office Visit : 1/25/24  Future Office visit:  0    Routing refill request to provider for review/approval because:  Drug not on the [facility], [facility] or [facility] refill protocol

## 2024-11-07 NOTE — CONFIDENTIAL NOTE
Sent max attempts letter to patient with cut off of 12/25/24 for appt & testing (Labs, Echo & 6mwt).  OK to schedule with Cherelle Benson PA-C or Dr. Knox.  Yane Cardenas RN on 11/7/2024 at 9:19 AM      Patient scheduled everything for March '25.  Sent msg to Ann Soliman asking if she thought that was OK.  Yane Cardenas RN on 12/16/2024 at 11:48 AM

## 2024-12-09 ENCOUNTER — TELEPHONE (OUTPATIENT)
Dept: CARDIOLOGY | Facility: CLINIC | Age: 79
End: 2024-12-09
Payer: MEDICARE

## 2024-12-09 NOTE — TELEPHONE ENCOUNTER
12/9/2024  @ 8:38 AM -  Opsumit 10 mg (30/30)  -  JJPAF expires:  12/10/24.     -  Called pt@ [phone] (home)  - no answer, LVM:  called to inquire if pt has completed the J&JPAP 2025 form for coverage for her Opsumit.  Does pt have new Part D coverage for 2025?     - Sent J&JPAP 2025 form via [employer].   Deedee MEADOWS CMA- Prior Auths  Cardiology/Pulmonary Hypertension

## 2025-01-02 DIAGNOSIS — I27.20 PULMONARY HTN (H): Primary | ICD-10-CM

## 2025-01-02 RX ORDER — TREPROSTINIL 64 UG/1
64 INHALANT ORAL 4 TIMES DAILY
Qty: 112 EACH | Refills: 5 | Status: SHIPPED | OUTPATIENT
Start: 2025-01-02

## 2025-01-30 ENCOUNTER — TELEPHONE (OUTPATIENT)
Dept: CARDIOLOGY | Facility: CLINIC | Age: 80
End: 2025-01-30
Payer: MEDICARE

## 2025-01-30 NOTE — TELEPHONE ENCOUNTER
1/30/2025  @ 12:11 PM -      Tyvaso DPI 64 mcg (112/28)- [facility]   PA Expires:  N/A - no rx coverage  Assistance:    -2025: [place] ASSIST -  [id number] - APPROVED :  1/1/25 - 12/31/25.  -2024: [place] ASSIST -  [id number] - APPROVED :  1/1/24 - 12/31/24.     Opsumit 10mg - [facility]  PA Expires: N/A - no rx coverage  PA#: N/A  Assistance:    -2025:  1/30/25, [employer] 2025 submitted; per pt, form correction needed, in process   -2024: Per [employer]: [employer]WILB[employer] approved for renewal (no rx coverage), thru 12/09/2025, ID:[id number].      Deedee MEADOWS CMA- Prior Auths  Cardiology/Pulmonary Hypertension

## 2025-03-10 ENCOUNTER — TELEPHONE (OUTPATIENT)
Dept: CARDIOLOGY | Facility: CLINIC | Age: 80
End: 2025-03-10
Payer: MEDICARE

## 2025-03-10 NOTE — TELEPHONE ENCOUNTER
Patient called Friday and Saturday ad left a voicemail in regard on not knowing how to fill out the application/ needs help / has questions on it. Will forward to Deedee Urena to call patient and answer questions.     Ladan Aldrich  Clinic    Pulmonary Hypertension   [facility]  (p) [phone]

## 2025-03-14 NOTE — PROGRESS NOTES
Service Date: 2025    RE:  Debi Espinoza  MRN:  [id number]  :  1945    Dear Dr. Solorio:      We had the pleasure of seeing Ms. Debi Espinoza for followup in our Pulmonary Hypertension Clinic.  As you know, she is a very pleasant 79-year-old female with residual chronic thromboembolic pulmonary hypertension, who is currently on inhaled treprostinil and macitentan therapy.    Ms. Espinoza underwent pulmonary thromboendarterectomy on 2022 and has residual pulmonary hypertension.    She returns today for her routine followup. Debi last saw my colleague, Cherelle Benson, on 2024 for a virtual visit where she reported improvement in symptoms after transitioning from riociguat to inhaled treprostinil. Today, ***Debi is overall continuing to feel better.  She is no longer needing oxygen.  She is not using a cane or a walker.  She is able to walk by herself.  She lives independently.  She is able to do her activities of daily living.  She is able to vacuum her house.  She is able to do her laundry.  She is able to climb a flight of stairs.  She is able to do her grocery shopping.  I would currently characterize her as functional class II.  She does not have any sternal pain.  Of note, she has a sternal fracture from the surgery.  She has no exertional chest pain or chest pressure.  No presyncope or syncope.  No lower extremity swelling or abdominal distention.  No bleeding complications.    She is very anxious.  She is not able to sleep well at night.  She feels like a new person is in her body after having the thromboendarterectomy surgery.    PAST MEDICAL HISTORY:    1.  DVT.  2.  Chronic pulmonary embolism.  3.  Chronic thromboembolic pulmonary hypertension, status post thromboendarterectomy in 2022.  4.  Hypertension.  5.  Nephrolithiasis.  6.  Osteoarthritis.    MEDICATIONS:    Current Outpatient Medications   Medication Sig Dispense Refill    acetaminophen (TYLENOL) 325 MG tablet 3  tablets (975 mg) by Oral or Feeding Tube route every 8 hours as needed for mild pain or fever      apixaban ANTICOAGULANT (ELIQUIS) 5 MG tablet Take 1 tablet (5 mg) by mouth 2 times daily 60 tablet 3    escitalopram (LEXAPRO) 10 MG tablet Take 1 tablet by mouth At Bedtime      furosemide (LASIX) 20 MG tablet Take 1 tablet (20 mg) by mouth as needed (5 lb weight gain in 1 week, leg swelling and edema) 30 tablet 1    OPSUMIT 10 MG tablet Take 1 tablet (10 mg) by mouth daily. 30 tablet 1    Treprostinil (TYVASO DPI INSTITUTIONAL KIT) 64 MCG inhaler Inhale 1 Inhalation (64 mcg) into the lungs 4 times daily. 112 each 5     No current facility-administered medications for this visit.       REVIEW OF SYSTEMS:  A detailed 10-point review of systems was obtained as described in history of present illness.  All other systems reviewed and are negative.    PHYSICAL EXAMINATION:    Exam:  Constitutional: healthy, alert, and no distress  Head: Normocephalic. No masses, lesions, or abnormalities  Neck: Normal appearance, JVP estimated ***, carotids without bruits.  ENT: EOMI, no scleral icterus or injection, external nose and ears are normal  Cardiovascular: Regular rate and rhythm, no murmur appreciated, no rub, radial pulses 2+ and equal bilaterally  Respiratory: Clear to auscultation bilaterally, normal work of breathing, no wheeze, no rales  Gastrointestinal: Abdomen soft, non-tender, non-distended. No masses.   : Deferred  Musculoskeletal: extremities normal with no gross deformities noted, normal muscle tone  Skin: no suspicious lesions or rashes  Neurologic: Alert and responsive, grossly non-focal, moves all extremities, sensation grossly intact.   Psychiatric: mentation appears normal and affect normal/bright      LABS:    No results found for this or any previous visit (from the past week).    DIAGNOSTICS:  Echocardiogram 7/25/23:  Interpretation Summary  Global and regional left ventricular function is normal with an  EF of 60-65%.  Global right ventricular function is mildly reduced. Mild right ventricular  dilation is present. RVFAC 40%, TAPSE 1.3 cm, S' 10 cm/s  Moderate to severe tricuspid insufficiency is present.  The estimated PA systolic pressure is at least 96 mmHg.  There is mild dilation at the level of the ascending aorta when indexed to BSA  (3.6 cm, indexed 2.4 cm/m2).  IVC diameter >2.1 cm collapsing <50% with sniff suggests a high RA pressure  estimated at 15 mmHg or greater.  This study was compared with the study from 06/08/2022. The RV function has  improved considerably. The TR appears to be more severe, but it is also  interrogated more thoroughly on today's exam.     Echocardiogram 03/2025:  Interpretation Summary  Left ventricular function is normal.The ejection fraction is 60-65%. No  regional wall motion abnormalities are seen.  Global right ventricular function is normal. The right ventricle is normal  size. TAPSE 1.9 cm, S' 10.9 cm/s, RVFAC 40%  Moderate to severe tricuspid insufficiency is present.  Pulmonary artery systolic pressure is 81 mmHg..  The inferior vena cava was normal in size with preserved respiratory  variability.  No pericardial effusion is present.  This study was compared with the study from 7/25/23: RV size and function have  improved.    RHC  10/3/22    Hemodynamics     RA: 8/10/8 mmHg  RV: 68/8 mmHg  PA: 67/28/40 mmHg  PCWP: --/--13 mmHg  Boris CO/CI: 3.66/2.39  Thermo: CO/CI: 4.2/2.74  PVR: 6.4 (TD)   Right sided filling pressures are mildly elevated. Left sided filling pressures are normal. Moderately elevated pulmonary artery hypertension. Normal cardiac output level. Hemodynamic data has been modified in Epic per physician review.      Pulmonary Angiogram     Main PA, LPA and RPA, along with bilateral truncus anterior, interlobar arteries are all patent.    Right  A1-3 are patent.  A4-5 are occluded with perfusion defects in the middle lobe.  A6 appears patent.  A7-9 have Type A  and B lesions.  A10 appears patent.    Left:  A1 is occluded.  A2 and 3 are patent.  A4-6 are patent.  A7 - 9 are occluded.       ASSESSMENT AND PLAN:      In summary, Ms. Debi Espinoza is a pleasant 79-year-old female with chronic thromboembolic pulmonary hypertension, status post pulmonary thromboendarterectomy and residual chronic thromboembolic pulmonary hypertension.  She is currently on macitentan and inhaled treprostinil.  She returns today for followup.    Chronic Thromboembolic Pulmonary Hypertension  S/p Thromboendarterectomy with Residual PH    She is clinically doing well.  She is functional class II.  She has no evidence of right heart failure. She was initially on macitentan monotherapy following her thromboendarterectomy however her PVR remained elevated at 6.6 so we pursued a second agent. Unfortunately, she did not tolerate riociquat but has symptomatically improved since switching to inhaled treprostinil. She has also demonstrated improvement in RV function on echocardiogram. Given her improvement in symptoms, she is not interested in pursuing balloon angioplasty; this is a reasonable choice as the majority of her lesions are also , which is a high-risk option. Thus, we will just continue her on medical therapy.  She is currently euvolemic and not requiring any diuretics.  She will take Eliquis for her long-term anticoagulation.     We will plan for repeat echocardiogram, 6-minute walk test, and labs in 12 months. She will call sooner if she has worsening or new symptoms.     It was a pleasure meeting Ms. Debi Espinoza in our Pulmonary Hypertension Clinic at the [facility].    This patient was seen and discussed with Dr Lisette Knox, attending cardiologist, who agrees with the assessment and plan unless otherwise indicated.    Quang Trevizo MD [facility], PGY-6  Fellow, Cardiovascular Disease

## 2025-03-16 ENCOUNTER — HEALTH MAINTENANCE LETTER (OUTPATIENT)
Age: 80
End: 2025-03-16

## 2025-03-17 ENCOUNTER — OFFICE VISIT (OUTPATIENT)
Dept: CARDIOLOGY | Facility: CLINIC | Age: 80
End: 2025-03-17
Attending: INTERNAL MEDICINE
Payer: MEDICARE

## 2025-03-17 ENCOUNTER — LAB (OUTPATIENT)
Dept: LAB | Facility: CLINIC | Age: 80
End: 2025-03-17
Attending: INTERNAL MEDICINE
Payer: MEDICARE

## 2025-03-17 VITALS — OXYGEN SATURATION: 93 % | DIASTOLIC BLOOD PRESSURE: 102 MMHG | SYSTOLIC BLOOD PRESSURE: 195 MMHG | HEART RATE: 82 BPM

## 2025-03-17 DIAGNOSIS — R06.02 SOB (SHORTNESS OF BREATH): ICD-10-CM

## 2025-03-17 DIAGNOSIS — I27.24 CTEPH (CHRONIC THROMBOEMBOLIC PULMONARY HYPERTENSION) (H): ICD-10-CM

## 2025-03-17 DIAGNOSIS — I27.20 PULMONARY HTN (H): ICD-10-CM

## 2025-03-17 LAB
ALBUMIN SERPL BCG-MCNC: 4 G/DL (ref 3.5–5.2)
ALP SERPL-CCNC: 77 U/L (ref 40–150)
ALT SERPL W P-5'-P-CCNC: 13 U/L (ref 0–50)
ANION GAP SERPL CALCULATED.3IONS-SCNC: 12 MMOL/L (ref 7–15)
AST SERPL W P-5'-P-CCNC: 24 U/L (ref 0–45)
BILIRUB SERPL-MCNC: 0.7 MG/DL
BUN SERPL-MCNC: 18.2 MG/DL (ref 8–23)
CALCIUM SERPL-MCNC: 9.7 MG/DL (ref 8.8–10.4)
CHLORIDE SERPL-SCNC: 105 MMOL/L (ref 98–107)
CREAT SERPL-MCNC: 0.98 MG/DL (ref 0.51–0.95)
EGFRCR SERPLBLD CKD-EPI 2021: 58 ML/MIN/1.73M2
ERYTHROCYTE [DISTWIDTH] IN BLOOD BY AUTOMATED COUNT: 14.6 % (ref 10–15)
GLUCOSE SERPL-MCNC: 102 MG/DL (ref 70–99)
HCO3 SERPL-SCNC: 23 MMOL/L (ref 22–29)
HCT VFR BLD AUTO: 44 % (ref 35–47)
HGB BLD-MCNC: 14.5 G/DL (ref 11.7–15.7)
LVEF ECHO: NORMAL
MCH RBC QN AUTO: 30.5 PG (ref 26.5–33)
MCHC RBC AUTO-ENTMCNC: 33 G/DL (ref 31.5–36.5)
MCV RBC AUTO: 92 FL (ref 78–100)
NT-PROBNP SERPL-MCNC: 453 PG/ML (ref 0–1800)
PLATELET # BLD AUTO: 224 10E3/UL (ref 150–450)
POTASSIUM SERPL-SCNC: 4.1 MMOL/L (ref 3.4–5.3)
PROT SERPL-MCNC: 6.6 G/DL (ref 6.4–8.3)
RBC # BLD AUTO: 4.76 10E6/UL (ref 3.8–5.2)
SODIUM SERPL-SCNC: 140 MMOL/L (ref 135–145)
WBC # BLD AUTO: 5.3 10E3/UL (ref 4–11)

## 2025-03-17 PROCEDURE — 93306 TTE W/DOPPLER COMPLETE: CPT | Mod: GC | Performed by: INTERNAL MEDICINE

## 2025-03-17 PROCEDURE — G0463 HOSPITAL OUTPT CLINIC VISIT: HCPCS | Performed by: INTERNAL MEDICINE

## 2025-03-17 PROCEDURE — 85027 COMPLETE CBC AUTOMATED: CPT | Performed by: PATHOLOGY

## 2025-03-17 PROCEDURE — 3080F DIAST BP >= 90 MM HG: CPT | Performed by: INTERNAL MEDICINE

## 2025-03-17 PROCEDURE — 80053 COMPREHEN METABOLIC PANEL: CPT | Performed by: PATHOLOGY

## 2025-03-17 PROCEDURE — 83880 ASSAY OF NATRIURETIC PEPTIDE: CPT | Performed by: PATHOLOGY

## 2025-03-17 PROCEDURE — 36415 COLL VENOUS BLD VENIPUNCTURE: CPT | Performed by: PATHOLOGY

## 2025-03-17 PROCEDURE — 1126F AMNT PAIN NOTED NONE PRSNT: CPT | Performed by: INTERNAL MEDICINE

## 2025-03-17 PROCEDURE — 3077F SYST BP >= 140 MM HG: CPT | Performed by: INTERNAL MEDICINE

## 2025-03-17 PROCEDURE — 99215 OFFICE O/P EST HI 40 MIN: CPT | Mod: 25 | Performed by: INTERNAL MEDICINE

## 2025-03-17 RX ORDER — TREPROSTINIL 64 UG/1
64 INHALANT ORAL 4 TIMES DAILY
Qty: 112 EACH | Refills: 5 | Status: CANCELLED | OUTPATIENT
Start: 2025-03-17

## 2025-03-17 RX ORDER — SILDENAFIL CITRATE 20 MG/1
20 TABLET ORAL 3 TIMES DAILY
COMMUNITY
Start: 2025-03-17

## 2025-03-17 ASSESSMENT — PAIN SCALES - GENERAL: PAINLEVEL_OUTOF10: NO PAIN (0)

## 2025-03-17 NOTE — LETTER
3/17/2025      RE: Debi Espinoza  [address] [facility] [address]       Service Date: 2025    RE:  Debi Espinoza  MRN:  [id number]  :  1945    Dear Dr. Solorio:      We had the pleasure of seeing Ms. Debi Espinoza for followup in our Pulmonary Hypertension Clinic.  As you know, she is a very pleasant 79 -year-old female with residual chronic thromboembolic pulmonary hypertension, who is currently on macitentan therapy and inhaled Tyvaso.    Ms. Espinoza underwent pulmonary thromboendarterectomy on 2022.  She has residual pulmonary hypertension.    She returns today for her 3-month followup.  She is overall continuing to feel better.  She is no longer needing oxygen.  She is not using a cane or a walker.  She is able to walk by herself.  She lives independently.  She is able to do her activities of daily living.  She is able to vacuum her house.  She is able to do her laundry.  She is able to climb a flight of stairs.  She is able to do her grocery shopping.  I would currently characterize her as functional class II.  She does not have any sternal pain.  Of note, she has a sternal fracture from the surgery.  She has no exertional chest pain or chest pressure.  No presyncope or syncope.  No lower extremity swelling or abdominal distention.  No bleeding complications.    She was unable to tolerate Adempas due to muscle cramping so she was started on inhaled Tyvaso. Unfortunately, she is also tolerating this. She has coughing, cramping in her legs and feels like her breathing is better when she does not use the inhaled Tyvaso. She requests that we switch it. Her proBNP is stable today and she does not have worsening of her RV function on echocardiogram. She continues to live alone and be very quite active.     PAST MEDICAL HISTORY:    1.  DVT.  2.  Chronic pulmonary embolism.  3.  Chronic thromboembolic pulmonary hypertension, status post thromboendarterectomy in 2022.  4.   Hypertension.  5.  Nephrolithiasis.  6.  Osteoarthritis.    MEDICATIONS:    Current Outpatient Medications   Medication Sig Dispense Refill     acetaminophen (TYLENOL) 325 MG tablet 3 tablets (975 mg) by Oral or Feeding Tube route every 8 hours as needed for mild pain or fever       apixaban ANTICOAGULANT (ELIQUIS) 5 MG tablet Take 1 tablet (5 mg) by mouth 2 times daily 60 tablet 3     escitalopram (LEXAPRO) 10 MG tablet Take 1 tablet by mouth At Bedtime       furosemide (LASIX) 20 MG tablet Take 1 tablet (20 mg) by mouth as needed (5 lb weight gain in 1 week, leg swelling and edema) 30 tablet 1     OPSUMIT 10 MG tablet Take 1 tablet (10 mg) by mouth daily. 30 tablet 1     sildenafil (REVATIO) 20 MG tablet Take 1 tablet (20 mg) by mouth 3 times daily.       No current facility-administered medications for this visit.       REVIEW OF SYSTEMS:  A detailed 10-point review of systems was obtained as described in history of present illness.  All other systems reviewed and are negative.    PHYSICAL EXAMINATION:  She was comfortable.  She was in no apparent distress.  Her blood pressure was 171/101, pulse rate was 87, respiratory rate 22, saturating 96% on room air.  She was 97.9, temperature.  Weight 136 pounds.  She had no pallor, cyanosis, or jaundice.  Her neck exam revealed no jugular venous distention.  Her carotids were 2+ bilaterally.  Pulse was regular in rhythm.  Cardiac auscultation revealed normal S1 and normal S2.  No murmur, rub or gallop.  Auscultation of the lungs revealed equal air entry on both sides.  Her abdomen was soft with normal bowel sounds, no tenderness, no rigidity, no guarding.  She had no focal neurological deficit.    LABS:    Recent Results (from the past week)   N terminal pro BNP outpatient    Collection Time: 03/17/25 10:17 AM   Result Value Ref Range    N Terminal Pro BNP Outpatient 453 0 - 1,800 pg/mL   CBC with platelets    Collection Time: 03/17/25 10:17 AM   Result Value Ref Range     WBC Count 5.3 4.0 - 11.0 10e3/uL    RBC Count 4.76 3.80 - 5.20 10e6/uL    Hemoglobin 14.5 11.7 - 15.7 g/dL    Hematocrit 44.0 35.0 - 47.0 %    MCV 92 78 - 100 fL    MCH 30.5 26.5 - 33.0 pg    MCHC 33.0 31.5 - 36.5 g/dL    RDW 14.6 10.0 - 15.0 %    Platelet Count 224 150 - 450 10e3/uL   Comprehensive metabolic panel    Collection Time: 03/17/25 10:17 AM   Result Value Ref Range    Sodium 140 135 - 145 mmol/L    Potassium 4.1 3.4 - 5.3 mmol/L    Carbon Dioxide (CO2) 23 22 - 29 mmol/L    Anion Gap 12 7 - 15 mmol/L    Urea Nitrogen 18.2 8.0 - 23.0 mg/dL    Creatinine 0.98 (H) 0.51 - 0.95 mg/dL    GFR Estimate 58 (L) >60 mL/min/1.73m2    Calcium 9.7 8.8 - 10.4 mg/dL    Chloride 105 98 - 107 mmol/L    Glucose 102 (H) 70 - 99 mg/dL    Alkaline Phosphatase 77 40 - 150 U/L    AST 24 0 - 45 U/L    ALT 13 0 - 50 U/L    Protein Total 6.6 6.4 - 8.3 g/dL    Albumin 4.0 3.5 - 5.2 g/dL    Bilirubin Total 0.7 <=1.2 mg/dL   Echocardiogram Complete    Collection Time: 03/17/25 11:05 AM   Result Value Ref Range    LVEF  60-65%      Her last echocardiogram was from 06/2022.  This showed moderate right ventricular dilatation with moderate to severely reduced right ventricular function.  She had moderate right ventricular systolic insufficiency.  Her estimated right ventricular systolic pressure was 57 mmHg.  Her IVC was normal in size.  This echo was done immediately after her thromboendarterectomy.  She had a repeat right heart catheterization in October.  At that time, her RA pressure was 8, RV was 68/8, PA was 67/28 with a mean of 40, pulmonary capillary wedge pressure of 13.  Boris thermodilution cardiac output 4.2 with an index of 2.7 and a calculated PVR of 6.4 based on thermodilution cardiac output.    Pulmonary angiogram in 10/2022 showed occluded right A4 and A5 segments, type A and B lesions on A7 and A9, occluded left A1 segment and occluded A7-9 segments.    [facility] 10/2022  RA: 8/10/8 mmHg  RV: 68/8 mmHg  PA: 67/28/40 mmHg  PCWP:  --/--13 mmHg  Boris CO/CI: 3.66/2.39  Thermo: CO/CI: 4.2/2.74  PVR: 6.4 (TD)   Right sided filling pressures are mildly elevated. Left sided filling pressures are normal. Moderately elevated pulmonary artery hypertension. Normal cardiac output level. Hemodynamic data has been modified in Epic per physician review.       Echocardiogram 03/17/2025  Interpretation Summary  Left ventricular function is normal.The ejection fraction is 60-65%. No  regional wall motion abnormalities are seen.  Global right ventricular function is normal. The right ventricle is normal  size. TAPSE 1.9 cm, S' 10.9 cm/s, RVFAC 40%  Moderate to severe tricuspid insufficiency is present.  Pulmonary artery systolic pressure is 81 mmHg..  The inferior vena cava was normal in size with preserved respiratory  variability.  No pericardial effusion is present.     This study was compared with the study from 7/25/23: RV size and function have  improved.      ASSESSMENT AND PLAN:      In summary, Ms. Debi Espinoza is a pleasant 79-year-old female with chronic thromboembolic pulmonary hypertension, status post pulmonary thromboendarterectomy and residual chronic thromboembolic pulmonary hypertension.  She is currently on macitentan and inhaled Tyvaso.  She returns today for followup.    She is clinically doing well.  She is functional class II.  She has no evidence of right heart failure.  Unfortunately, was not able to tolerate riociguat due to muscle cramping and she was started on inhaled Tyvaso. She is having a lot of coughing and muscle cramping with the Tyvaso as well and feels better when she does not use it so she would like to stop this. She remains FC II. She does not have edema, syncope or pre syncope. Does not have chest pressure or palpitations. Her proBNP is stable at 453 and her echocardiogram images were reviewed, her RV function does not appear worse compared to her last echocardiogram. We will switch her from Tyvaso to sildenafil 20 mg  TIDAPHNE. She is agreeable to this.     Her systemic blood pressure is high today.  However, she states that she checks at home and this is not high at home.  This is probably related to anxiety.  We will continue to monitor her.      I recommended her to call us if she has any further worsening symptoms in the interim.  We will have a phone visit with her in 3 months to see how she is tolerating the switch to Sildenafil and she can see us in one year in person.     Total time today was 48minutes reviewing notes, imaging, labs, patient visit, orders and documentation.     Sincerely,  Betty Cassidy PGY 7  Advanced Heart Failure Fellow     Seen with Dr Knox     I examined the patient and agree with the assessment and plan of Dr. 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 Due to the added complexity in care, I will continue to support Debi in the subsequent management and with ongoing continuity of care.      Lisette Knox MD   [facility]  Heart Failure, Transplant, and Mechanical Circulatory Support Cardiology   Cardiovascular Division  [facility]   [phone]      Lisette Knox MD

## 2025-03-17 NOTE — PROGRESS NOTES
Service Date: 2025    RE:  Debi Espinoza  MRN:  [id number]  :  1945    Dear Dr. Solorio:      We had the pleasure of seeing Ms. Debi Espinoza for followup in our Pulmonary Hypertension Clinic.  As you know, she is a very pleasant 79 -year-old female with residual chronic thromboembolic pulmonary hypertension, who is currently on macitentan therapy and inhaled Tyvaso.    Ms. Espinoza underwent pulmonary thromboendarterectomy on 2022.  She has residual pulmonary hypertension.    She returns today for her 3-month followup.  She is overall continuing to feel better.  She is no longer needing oxygen.  She is not using a cane or a walker.  She is able to walk by herself.  She lives independently.  She is able to do her activities of daily living.  She is able to vacuum her house.  She is able to do her laundry.  She is able to climb a flight of stairs.  She is able to do her grocery shopping.  I would currently characterize her as functional class II.  She does not have any sternal pain.  Of note, she has a sternal fracture from the surgery.  She has no exertional chest pain or chest pressure.  No presyncope or syncope.  No lower extremity swelling or abdominal distention.  No bleeding complications.    She was unable to tolerate Adempas due to muscle cramping so she was started on inhaled Tyvaso. Unfortunately, she is also tolerating this. She has coughing, cramping in her legs and feels like her breathing is better when she does not use the inhaled Tyvaso. She requests that we switch it. Her proBNP is stable today and she does not have worsening of her RV function on echocardiogram. She continues to live alone and be very quite active.     PAST MEDICAL HISTORY:    1.  DVT.  2.  Chronic pulmonary embolism.  3.  Chronic thromboembolic pulmonary hypertension, status post thromboendarterectomy in 2022.  4.  Hypertension.  5.  Nephrolithiasis.  6.  Osteoarthritis.    MEDICATIONS:    Current  Outpatient Medications   Medication Sig Dispense Refill    acetaminophen (TYLENOL) 325 MG tablet 3 tablets (975 mg) by Oral or Feeding Tube route every 8 hours as needed for mild pain or fever      apixaban ANTICOAGULANT (ELIQUIS) 5 MG tablet Take 1 tablet (5 mg) by mouth 2 times daily 60 tablet 3    escitalopram (LEXAPRO) 10 MG tablet Take 1 tablet by mouth At Bedtime      furosemide (LASIX) 20 MG tablet Take 1 tablet (20 mg) by mouth as needed (5 lb weight gain in 1 week, leg swelling and edema) 30 tablet 1    OPSUMIT 10 MG tablet Take 1 tablet (10 mg) by mouth daily. 30 tablet 1    sildenafil (REVATIO) 20 MG tablet Take 1 tablet (20 mg) by mouth 3 times daily.       No current facility-administered medications for this visit.       REVIEW OF SYSTEMS:  A detailed 10-point review of systems was obtained as described in history of present illness.  All other systems reviewed and are negative.    PHYSICAL EXAMINATION:  She was comfortable.  She was in no apparent distress.  Her blood pressure was 171/101, pulse rate was 87, respiratory rate 22, saturating 96% on room air.  She was 97.9, temperature.  Weight 136 pounds.  She had no pallor, cyanosis, or jaundice.  Her neck exam revealed no jugular venous distention.  Her carotids were 2+ bilaterally.  Pulse was regular in rhythm.  Cardiac auscultation revealed normal S1 and normal S2.  No murmur, rub or gallop.  Auscultation of the lungs revealed equal air entry on both sides.  Her abdomen was soft with normal bowel sounds, no tenderness, no rigidity, no guarding.  She had no focal neurological deficit.    LABS:    Recent Results (from the past week)   N terminal pro BNP outpatient    Collection Time: 03/17/25 10:17 AM   Result Value Ref Range    N Terminal Pro BNP Outpatient 453 0 - 1,800 pg/mL   CBC with platelets    Collection Time: 03/17/25 10:17 AM   Result Value Ref Range    WBC Count 5.3 4.0 - 11.0 10e3/uL    RBC Count 4.76 3.80 - 5.20 10e6/uL    Hemoglobin 14.5  11.7 - 15.7 g/dL    Hematocrit 44.0 35.0 - 47.0 %    MCV 92 78 - 100 fL    MCH 30.5 26.5 - 33.0 pg    MCHC 33.0 31.5 - 36.5 g/dL    RDW 14.6 10.0 - 15.0 %    Platelet Count 224 150 - 450 10e3/uL   Comprehensive metabolic panel    Collection Time: 03/17/25 10:17 AM   Result Value Ref Range    Sodium 140 135 - 145 mmol/L    Potassium 4.1 3.4 - 5.3 mmol/L    Carbon Dioxide (CO2) 23 22 - 29 mmol/L    Anion Gap 12 7 - 15 mmol/L    Urea Nitrogen 18.2 8.0 - 23.0 mg/dL    Creatinine 0.98 (H) 0.51 - 0.95 mg/dL    GFR Estimate 58 (L) >60 mL/min/1.73m2    Calcium 9.7 8.8 - 10.4 mg/dL    Chloride 105 98 - 107 mmol/L    Glucose 102 (H) 70 - 99 mg/dL    Alkaline Phosphatase 77 40 - 150 U/L    AST 24 0 - 45 U/L    ALT 13 0 - 50 U/L    Protein Total 6.6 6.4 - 8.3 g/dL    Albumin 4.0 3.5 - 5.2 g/dL    Bilirubin Total 0.7 <=1.2 mg/dL   Echocardiogram Complete    Collection Time: 03/17/25 11:05 AM   Result Value Ref Range    LVEF  60-65%      Her last echocardiogram was from 06/2022.  This showed moderate right ventricular dilatation with moderate to severely reduced right ventricular function.  She had moderate right ventricular systolic insufficiency.  Her estimated right ventricular systolic pressure was 57 mmHg.  Her IVC was normal in size.  This echo was done immediately after her thromboendarterectomy.  She had a repeat right heart catheterization in October.  At that time, her RA pressure was 8, RV was 68/8, PA was 67/28 with a mean of 40, pulmonary capillary wedge pressure of 13.  Boris thermodilution cardiac output 4.2 with an index of 2.7 and a calculated PVR of 6.4 based on thermodilution cardiac output.    Pulmonary angiogram in 10/2022 showed occluded right A4 and A5 segments, type A and B lesions on A7 and A9, occluded left A1 segment and occluded A7-9 segments.    RHC 10/2022  RA: 8/10/8 mmHg  RV: 68/8 mmHg  PA: 67/28/40 mmHg  PCWP: --/--13 mmHg  Boris CO/CI: 3.66/2.39  Thermo: CO/CI: 4.2/2.74  PVR: 6.4 (TD)   Right sided  filling pressures are mildly elevated. Left sided filling pressures are normal. Moderately elevated pulmonary artery hypertension. Normal cardiac output level. Hemodynamic data has been modified in [facility] per physician review.       Echocardiogram 03/17/2025  Interpretation Summary  Left ventricular function is normal.The ejection fraction is 60-65%. No  regional wall motion abnormalities are seen.  Global right ventricular function is normal. The right ventricle is normal  size. TAPSE 1.9 cm, S' 10.9 cm/s, RVFAC 40%  Moderate to severe tricuspid insufficiency is present.  Pulmonary artery systolic pressure is 81 mmHg..  The inferior vena cava was normal in size with preserved respiratory  variability.  No pericardial effusion is present.     This study was compared with the study from 7/25/23: RV size and function have  improved.      ASSESSMENT AND PLAN:      In summary, Ms. Debi Espinoza is a pleasant 79-year-old female with chronic thromboembolic pulmonary hypertension, status post pulmonary thromboendarterectomy and residual chronic thromboembolic pulmonary hypertension.  She is currently on macitentan and inhaled Tyvaso.  She returns today for followup.    She is clinically doing well.  She is functional class II.  She has no evidence of right heart failure.  Unfortunately, was not able to tolerate riociguat due to muscle cramping and she was started on inhaled Tyvaso. She is having a lot of coughing and muscle cramping with the Tyvaso as well and feels better when she does not use it so she would like to stop this. She remains FC II. She does not have edema, syncope or pre syncope. Does not have chest pressure or palpitations. Her proBNP is stable at 453 and her echocardiogram images were reviewed, her RV function does not appear worse compared to her last echocardiogram. We will switch her from Tyvaso to sildenafil 20 mg TID. She is agreeable to this.     Her systemic blood pressure is high today.  However,  she states that she checks at home and this is not high at home.  This is probably related to anxiety.  We will continue to monitor her.      I recommended her to call us if she has any further worsening symptoms in the interim.  We will have a phone visit with her in 3 months to see how she is tolerating the switch to Sildenafil and she can see us in one year in person.     Total time today was 48minutes reviewing notes, imaging, labs, patient visit, orders and documentation.     Sincerely,  Betty Cassidy PGY 7  Advanced Heart Failure Fellow     Seen with Dr Knox     I examined the patient and agree with the assessment and plan of Dr. 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 Due to the added complexity in care, I will continue to support Debi in the subsequent management and with ongoing continuity of care.      Lisette Knox MD   [facility]  Heart Failure, Transplant, and Mechanical Circulatory Support Cardiology   Cardiovascular Division  [facility]   [phone]

## 2025-03-17 NOTE — NURSING NOTE
Chief Complaint   Patient presents with    Follow Up     RETURN PULMONARY HYPERTENSION     Vitals were taken and medications reconciled.    Ernesto Toro, DORINA  2:28 PM

## 2025-03-17 NOTE — PATIENT INSTRUCTIONS
You were seen today in the Pulmonary Hypertension Clinic at the [facility].     Cardiology Provider you saw during your visit:    Dr. Knox    Medication Changes:  Stop tyvaso.  Start sildenafil 20mg 3 times a day- we will update you if we need to switch to Brand Adcirca as you don't have insurance    Recommendations:   Check blood pressure for 1 week and let Yane know    Follow-up:   Three months after starting sildenafil with Cherelle Benson with labs beforehand  In one year with Dr. Knox with labs and echo beforehand    Please call us immediately if you have any syncope (fainting or passing out), chest pain, edema (swelling or weight gain), or decline in your functional status (general decline in how you are feeling).    If you have emergent concerns after hours or on the weekend, please call our on-call Cardiologist at [phone], option [phone]. For emergencies call 911.     Thank you for allowing us to be a part of your care here at the [facility]    Please visit the pulmonary hypertension website for more information and support. [email or web address]    If you have questions or concerns please contact us at:    Yane Cardenas RN (P: [phone])    Nurse Coordinator       Pulmonary Hypertension     [facility]         CHRIS Purvis   (Prior Auths & Patient Assistance )             ()  Clinic   Clinic   Pulmonary Hypertension   Pulmonary Hypertension  [facility]  [facility]  (P)[phone]    (P) [phone]  (F) [phone]

## 2025-03-17 NOTE — LETTER
3/17/2025      RE: Debi Espinoza  [address] [address]       Dear Colleague,    Thank you for the opportunity to participate in the care of your patient, Debi Espinoza, at the [facility] [place] [facility]. Please see a copy of my visit note below.    Service Date: 2025    RE:  Debi Espinoza  MRN:  [id number]  :  1945    Dear Dr. Solorio:      We had the pleasure of seeing Ms. Debi Espinoza for followup in our Pulmonary Hypertension Clinic.  As you know, she is a very pleasant 79-year-old female with residual chronic thromboembolic pulmonary hypertension, who is currently on inhaled treprostinil and macitentan therapy.    Ms. Espinoza underwent pulmonary thromboendarterectomy on 2022 and has residual pulmonary hypertension.    She returns today for her routine followup. Debi last saw my colleague, Cherelle Benson, on 2024 for a virtual visit where she reported improvement in symptoms after transitioning from riociguat to inhaled treprostinil. Today, ***Debi is overall continuing to feel better.  She is no longer needing oxygen.  She is not using a cane or a walker.  She is able to walk by herself.  She lives independently.  She is able to do her activities of daily living.  She is able to vacuum her house.  She is able to do her laundry.  She is able to climb a flight of stairs.  She is able to do her grocery shopping.  I would currently characterize her as functional class II.  She does not have any sternal pain.  Of note, she has a sternal fracture from the surgery.  She has no exertional chest pain or chest pressure.  No presyncope or syncope.  No lower extremity swelling or abdominal distention.  No bleeding complications.    She is very anxious.  She is not able to sleep well at night.  She feels like a new person is in her body after having the thromboendarterectomy surgery.    PAST  MEDICAL HISTORY:    1.  DVT.  2.  Chronic pulmonary embolism.  3.  Chronic thromboembolic pulmonary hypertension, status post thromboendarterectomy in 05/2022.  4.  Hypertension.  5.  Nephrolithiasis.  6.  Osteoarthritis.    MEDICATIONS:    Current Outpatient Medications   Medication Sig Dispense Refill     acetaminophen (TYLENOL) 325 MG tablet 3 tablets (975 mg) by Oral or Feeding Tube route every 8 hours as needed for mild pain or fever       apixaban ANTICOAGULANT (ELIQUIS) 5 MG tablet Take 1 tablet (5 mg) by mouth 2 times daily 60 tablet 3     escitalopram (LEXAPRO) 10 MG tablet Take 1 tablet by mouth At Bedtime       furosemide (LASIX) 20 MG tablet Take 1 tablet (20 mg) by mouth as needed (5 lb weight gain in 1 week, leg swelling and edema) 30 tablet 1     OPSUMIT 10 MG tablet Take 1 tablet (10 mg) by mouth daily. 30 tablet 1     Treprostinil (TYVASO DPI INSTITUTIONAL KIT) 64 MCG inhaler Inhale 1 Inhalation (64 mcg) into the lungs 4 times daily. 112 each 5     No current facility-administered medications for this visit.       REVIEW OF SYSTEMS:  A detailed 10-point review of systems was obtained as described in history of present illness.  All other systems reviewed and are negative.    PHYSICAL EXAMINATION:    Exam:  Constitutional: healthy, alert, and no distress  Head: Normocephalic. No masses, lesions, or abnormalities  Neck: Normal appearance, JVP estimated ***, carotids without bruits.  ENT: EOMI, no scleral icterus or injection, external nose and ears are normal  Cardiovascular: Regular rate and rhythm, no murmur appreciated, no rub, radial pulses 2+ and equal bilaterally  Respiratory: Clear to auscultation bilaterally, normal work of breathing, no wheeze, no rales  Gastrointestinal: Abdomen soft, non-tender, non-distended. No masses.   : Deferred  Musculoskeletal: extremities normal with no gross deformities noted, normal muscle tone  Skin: no suspicious lesions or rashes  Neurologic: Alert and  responsive, grossly non-focal, moves all extremities, sensation grossly intact.   Psychiatric: mentation appears normal and affect normal/bright      LABS:    No results found for this or any previous visit (from the past week).    DIAGNOSTICS:  Echocardiogram 7/25/23:  Interpretation Summary  Global and regional left ventricular function is normal with an EF of 60-65%.  Global right ventricular function is mildly reduced. Mild right ventricular  dilation is present. RVFAC 40%, TAPSE 1.3 cm, S' 10 cm/s  Moderate to severe tricuspid insufficiency is present.  The estimated PA systolic pressure is at least 96 mmHg.  There is mild dilation at the level of the ascending aorta when indexed to BSA  (3.6 cm, indexed 2.4 cm/m2).  IVC diameter >2.1 cm collapsing <50% with sniff suggests a high RA pressure  estimated at 15 mmHg or greater.  This study was compared with the study from 06/08/2022. The RV function has  improved considerably. The TR appears to be more severe, but it is also  interrogated more thoroughly on today's exam.     Echocardiogram 03/2025:  Interpretation Summary  Left ventricular function is normal.The ejection fraction is 60-65%. No  regional wall motion abnormalities are seen.  Global right ventricular function is normal. The right ventricle is normal  size. TAPSE 1.9 cm, S' 10.9 cm/s, RVFAC 40%  Moderate to severe tricuspid insufficiency is present.  Pulmonary artery systolic pressure is 81 mmHg..  The inferior vena cava was normal in size with preserved respiratory  variability.  No pericardial effusion is present.  This study was compared with the study from 7/25/23: RV size and function have  improved.    RHC  10/3/22    Hemodynamics     RA: 8/10/8 mmHg  RV: 68/8 mmHg  PA: 67/28/40 mmHg  PCWP: --/--13 mmHg  Boris CO/CI: 3.66/2.39  Thermo: CO/CI: 4.2/2.74  PVR: 6.4 (TD)   Right sided filling pressures are mildly elevated. Left sided filling pressures are normal. Moderately elevated pulmonary artery  hypertension. Normal cardiac output level. Hemodynamic data has been modified in Epic per physician review.      Pulmonary Angiogram     Main PA, LPA and RPA, along with bilateral truncus anterior, interlobar arteries are all patent.    Right  A1-3 are patent.  A4-5 are occluded with perfusion defects in the middle lobe.  A6 appears patent.  A7-9 have Type A and B lesions.  A10 appears patent.    Left:  A1 is occluded.  A2 and 3 are patent.  A4-6 are patent.  A7 - 9 are occluded.       ASSESSMENT AND PLAN:      In summary, Ms. Debi Espinoza is a pleasant 79-year-old female with chronic thromboembolic pulmonary hypertension, status post pulmonary thromboendarterectomy and residual chronic thromboembolic pulmonary hypertension.  She is currently on macitentan and inhaled treprostinil.  She returns today for followup.    Chronic Thromboembolic Pulmonary Hypertension  S/p Thromboendarterectomy with Residual PH    She is clinically doing well.  She is functional class II.  She has no evidence of right heart failure. She was initially on macitentan monotherapy following her thromboendarterectomy however her PVR remained elevated at 6.6 so we pursued a second agent. Unfortunately, she did not tolerate riociquat but has symptomatically improved since switching to inhaled treprostinil. She has also demonstrated improvement in RV function on echocardiogram. Given her improvement in symptoms, she is not interested in pursuing balloon angioplasty; this is a reasonable choice as the majority of her lesions are also , which is a high-risk option. Thus, we will just continue her on medical therapy.  She is currently euvolemic and not requiring any diuretics.  She will take Eliquis for her long-term anticoagulation.     We will plan for repeat echocardiogram, 6-minute walk test, and labs in 12 months. She will call sooner if she has worsening or new symptoms.     It was a pleasure meeting Ms. Debi Espinoza in our Pulmonary  Hypertension Clinic at the [facility].    This patient was seen and discussed with Dr Lisette Knox, attending cardiologist, who agrees with the assessment and plan unless otherwise indicated.    Quang Trevizo MD [facility], PGY-6  Fellow, Cardiovascular Disease          Service Date: 2025    RE:  Debi Espinoza  MRN:  [id number]  :  1945    Dear Dr. Solorio:      We had the pleasure of seeing Ms. Debi Espinoza for followup in our Pulmonary Hypertension Clinic.  As you know, she is a very pleasant 79 -year-old female with residual chronic thromboembolic pulmonary hypertension, who is currently on macitentan therapy and inhaled Tyvaso.    Ms. Espinoza underwent pulmonary thromboendarterectomy on 2022.  She has residual pulmonary hypertension.    She returns today for her 3-month followup.  She is overall continuing to feel better.  She is no longer needing oxygen.  She is not using a cane or a walker.  She is able to walk by herself.  She lives independently.  She is able to do her activities of daily living.  She is able to vacuum her house.  She is able to do her laundry.  She is able to climb a flight of stairs.  She is able to do her grocery shopping.  I would currently characterize her as functional class II.  She does not have any sternal pain.  Of note, she has a sternal fracture from the surgery.  She has no exertional chest pain or chest pressure.  No presyncope or syncope.  No lower extremity swelling or abdominal distention.  No bleeding complications.    She was unable to tolerate Adempas due to muscle cramping so she was started on inhaled Tyvaso. Unfortunately, she is also tolerating this. She has coughing, cramping in her legs and feels like her breathing is better when she does not use the inhaled Tyvaso. She requests that we switch it. Her proBNP is stable today and she does not have worsening of her RV function on echocardiogram. She  continues to live alone and be very quite active.     PAST MEDICAL HISTORY:    1.  DVT.  2.  Chronic pulmonary embolism.  3.  Chronic thromboembolic pulmonary hypertension, status post thromboendarterectomy in 05/2022.  4.  Hypertension.  5.  Nephrolithiasis.  6.  Osteoarthritis.    MEDICATIONS:    Current Outpatient Medications   Medication Sig Dispense Refill     acetaminophen (TYLENOL) 325 MG tablet 3 tablets (975 mg) by Oral or Feeding Tube route every 8 hours as needed for mild pain or fever       apixaban ANTICOAGULANT (ELIQUIS) 5 MG tablet Take 1 tablet (5 mg) by mouth 2 times daily 60 tablet 3     escitalopram (LEXAPRO) 10 MG tablet Take 1 tablet by mouth At Bedtime       furosemide (LASIX) 20 MG tablet Take 1 tablet (20 mg) by mouth as needed (5 lb weight gain in 1 week, leg swelling and edema) 30 tablet 1     OPSUMIT 10 MG tablet Take 1 tablet (10 mg) by mouth daily. 30 tablet 1     sildenafil (REVATIO) 20 MG tablet Take 1 tablet (20 mg) by mouth 3 times daily.       No current facility-administered medications for this visit.       REVIEW OF SYSTEMS:  A detailed 10-point review of systems was obtained as described in history of present illness.  All other systems reviewed and are negative.    PHYSICAL EXAMINATION:  She was comfortable.  She was in no apparent distress.  Her blood pressure was 171/101, pulse rate was 87, respiratory rate 22, saturating 96% on room air.  She was 97.9, temperature.  Weight 136 pounds.  She had no pallor, cyanosis, or jaundice.  Her neck exam revealed no jugular venous distention.  Her carotids were 2+ bilaterally.  Pulse was regular in rhythm.  Cardiac auscultation revealed normal S1 and normal S2.  No murmur, rub or gallop.  Auscultation of the lungs revealed equal air entry on both sides.  Her abdomen was soft with normal bowel sounds, no tenderness, no rigidity, no guarding.  She had no focal neurological deficit.    LABS:    Recent Results (from the past week)   N  terminal pro BNP outpatient    Collection Time: 03/17/25 10:17 AM   Result Value Ref Range    N Terminal Pro BNP Outpatient 453 0 - 1,800 pg/mL   CBC with platelets    Collection Time: 03/17/25 10:17 AM   Result Value Ref Range    WBC Count 5.3 4.0 - 11.0 10e3/uL    RBC Count 4.76 3.80 - 5.20 10e6/uL    Hemoglobin 14.5 11.7 - 15.7 g/dL    Hematocrit 44.0 35.0 - 47.0 %    MCV 92 78 - 100 fL    MCH 30.5 26.5 - 33.0 pg    MCHC 33.0 31.5 - 36.5 g/dL    RDW 14.6 10.0 - 15.0 %    Platelet Count 224 150 - 450 10e3/uL   Comprehensive metabolic panel    Collection Time: 03/17/25 10:17 AM   Result Value Ref Range    Sodium 140 135 - 145 mmol/L    Potassium 4.1 3.4 - 5.3 mmol/L    Carbon Dioxide (CO2) 23 22 - 29 mmol/L    Anion Gap 12 7 - 15 mmol/L    Urea Nitrogen 18.2 8.0 - 23.0 mg/dL    Creatinine 0.98 (H) 0.51 - 0.95 mg/dL    GFR Estimate 58 (L) >60 mL/min/1.73m2    Calcium 9.7 8.8 - 10.4 mg/dL    Chloride 105 98 - 107 mmol/L    Glucose 102 (H) 70 - 99 mg/dL    Alkaline Phosphatase 77 40 - 150 U/L    AST 24 0 - 45 U/L    ALT 13 0 - 50 U/L    Protein Total 6.6 6.4 - 8.3 g/dL    Albumin 4.0 3.5 - 5.2 g/dL    Bilirubin Total 0.7 <=1.2 mg/dL   Echocardiogram Complete    Collection Time: 03/17/25 11:05 AM   Result Value Ref Range    LVEF  60-65%      Her last echocardiogram was from 06/2022.  This showed moderate right ventricular dilatation with moderate to severely reduced right ventricular function.  She had moderate right ventricular systolic insufficiency.  Her estimated right ventricular systolic pressure was 57 mmHg.  Her IVC was normal in size.  This echo was done immediately after her thromboendarterectomy.  She had a repeat right heart catheterization in October.  At that time, her RA pressure was 8, RV was 68/8, PA was 67/28 with a mean of 40, pulmonary capillary wedge pressure of 13.  Boris thermodilution cardiac output 4.2 with an index of 2.7 and a calculated PVR of 6.4 based on thermodilution cardiac  output.    Pulmonary angiogram in 10/2022 showed occluded right A4 and A5 segments, type A and B lesions on A7 and A9, occluded left A1 segment and occluded A7-9 segments.    RHC 10/2022  RA: 8/10/8 mmHg  RV: 68/8 mmHg  PA: 67/28/40 mmHg  PCWP: --/--13 mmHg  Boris CO/CI: 3.66/2.39  Thermo: CO/CI: 4.2/2.74  PVR: 6.4 (TD)   Right sided filling pressures are mildly elevated. Left sided filling pressures are normal. Moderately elevated pulmonary artery hypertension. Normal cardiac output level. Hemodynamic data has been modified in Epic per physician review.       Echocardiogram 03/17/2025  Interpretation Summary  Left ventricular function is normal.The ejection fraction is 60-65%. No  regional wall motion abnormalities are seen.  Global right ventricular function is normal. The right ventricle is normal  size. TAPSE 1.9 cm, S' 10.9 cm/s, RVFAC 40%  Moderate to severe tricuspid insufficiency is present.  Pulmonary artery systolic pressure is 81 mmHg..  The inferior vena cava was normal in size with preserved respiratory  variability.  No pericardial effusion is present.     This study was compared with the study from 7/25/23: RV size and function have  improved.      ASSESSMENT AND PLAN:      In summary, Ms. Debi Espinoza is a pleasant 79-year-old female with chronic thromboembolic pulmonary hypertension, status post pulmonary thromboendarterectomy and residual chronic thromboembolic pulmonary hypertension.  She is currently on macitentan and inhaled Tyvaso.  She returns today for followup.    She is clinically doing well.  She is functional class II.  She has no evidence of right heart failure.  Unfortunately, was not able to tolerate riociguat due to muscle cramping and she was started on inhaled Tyvaso. She is having a lot of coughing and muscle cramping with the Tyvaso as well and feels better when she does not use it so she would like to stop this. She remains FC II. She does not have edema, syncope or pre syncope.  Does not have chest pressure or palpitations. Her proBNP is stable at 453 and her echocardiogram images were reviewed, her RV function does not appear worse compared to her last echocardiogram. We will switch her from Tyvaso to sildenafil 20 mg TID. She is agreeable to this.     Her systemic blood pressure is high today.  However, she states that she checks at home and this is not high at home.  This is probably related to anxiety.  We will continue to monitor her.      I recommended her to call us if she has any further worsening symptoms in the interim.  We will have a phone visit with her in 3 months to see how she is tolerating the switch to Sildenafil and she can see us in one year in person.     Total time today was 48minutes reviewing notes, imaging, labs, patient visit, orders and documentation.     Sincerely,  Betty Cassidy PGY 7  Advanced Heart Failure Fellow     Seen with Dr Knox     I examined the patient and agree with the assessment and plan of Dr. 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 Due to the added complexity in care, I will continue to support Debi in the subsequent management and with ongoing continuity of care.      Lisette Knox MD   [facility]  Heart Failure, Transplant, and Mechanical Circulatory Support Cardiology   Cardiovascular Division  [facility]   [phone]      Please do not hesitate to contact me if you have any questions/concerns.     Sincerely,     Lisette Knox MD

## 2025-03-17 NOTE — NURSING NOTE
"Patient educated to the following during visit and educated to contact RN or MD for any questions.     Plan reviewed with patient:     Start Brand revatio or adcirca if patient doesn't have pharmacy coverage- message sent to JERONIMO Urena for clarification    Stop tyvaso- updated [facility]    Patient to monitor BP x1 week and update RNCC    Follow-up in 3MO with graciela with labs        Reviewed Med list and verified all medications with patient.   Lab:  results reviewed in clinic. Patient demonstrated understanding.   Diet: Patient instructed regarding a heart healthy diet, including discussion of reduced fat and sodium intake. Patient demonstrated understanding of this information and agreed to call with further questions or concerns  Completed AVS  Follow-up orders placed  Marked chart \"Ready for Checkout\"  Sent msg to Yane Cardenas RN (P: [phone])  and  GEORGIA Aldrich    Patient verbalized understanding of plan and follow-up and agreed to call with further questions or concerns.     Patient will be scheduled after starting medication, no orders placed    Shante Zeng RN      "

## 2025-03-18 ENCOUNTER — TELEPHONE (OUTPATIENT)
Dept: CARDIOLOGY | Facility: CLINIC | Age: 80
End: 2025-03-18
Payer: MEDICARE

## 2025-03-18 NOTE — TELEPHONE ENCOUNTER
----- Message from Shante JOHNSON sent at 3/17/2025  3:26 PM CDT -----  Regarding: WILLIAMS Mark/Yane- just wanted to clarify. Patient doesn't have pharmacy coverage? 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

## 2025-03-18 NOTE — TELEPHONE ENCOUNTER
3/18/2025  @ 10:34 AM -  sildenafil 20 mg (90/30) - new start - [pt is without Rx insurance]       -Sent pt Elissa Matson for Support form for Adcirca via [employer]      Deedee MEADOWS CMA- Prior Auths  Cardiology/Pulmonary Hypertension

## 2025-03-19 ENCOUNTER — TELEPHONE (OUTPATIENT)
Dept: CARDIOLOGY | Facility: CLINIC | Age: 80
End: 2025-03-19
Payer: MEDICARE

## 2025-03-19 NOTE — TELEPHONE ENCOUNTER
Patient Contacted for the patient to call back and schedule the following:    Appointment type: RTN PH  Provider: IVAN  Return date: 6/17/2025  Specialty phone number: [phone] OPT 1  Additional appointment(s) needed: LABS PRIOR  Additonal Notes: PATIENT SAID THEY ARE SUPPOSED TO DO A PHONE VISIT WITH MADYSON LOVE/BRAXTON AND WANT THE LAB ORDERS SENT UP TO THEM. DID NOT SAY WHERE BECAUSE PATIENT IS CONFUSED ABOUT NEEDING LABS AND NEEDING AN APPOINTMENT. TRIED TO EXPLAIN IT WAS BECAUSE OF THE MED START BUT SAID AGAIN IT SHOULD JUST BE A QUICK PHONE APPT AND WONDERS WHAT NEEDS SCHEDULING. PLEASE ADVISE.

## 2025-03-25 ENCOUNTER — TELEPHONE (OUTPATIENT)
Dept: CARDIOLOGY | Facility: CLINIC | Age: 80
End: 2025-03-25
Payer: MEDICARE

## 2025-03-25 DIAGNOSIS — I27.24 CTEPH (CHRONIC THROMBOEMBOLIC PULMONARY HYPERTENSION) (H): Primary | ICD-10-CM

## 2025-03-25 DIAGNOSIS — I15.9 SECONDARY HYPERTENSION: ICD-10-CM

## 2025-03-25 RX ORDER — SILDENAFIL CITRATE 20 MG/1
20 TABLET ORAL 3 TIMES DAILY
Qty: 90 TABLET | Refills: 11 | Status: SHIPPED | OUTPATIENT
Start: 2025-03-25 | End: 2025-03-25

## 2025-03-25 RX ORDER — SILDENAFIL CITRATE 20 MG/1
20 TABLET ORAL 3 TIMES DAILY
Qty: 90 TABLET | Refills: 11 | Status: SHIPPED | OUTPATIENT
Start: 2025-03-25

## 2025-03-25 NOTE — TELEPHONE ENCOUNTER
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

## 2025-03-25 NOTE — TELEPHONE ENCOUNTER
Spoke with patient who states her BP's have been high;    3/18 136/68, 93  3/19 143/72, 80  3/20 149/69, 80  3/21 153/75, 78  3/22 148/68, 84  3/23 154/67, 72  3/24 154/74, 88    Verified all the meds patient is taking are correct on her MAR.  Advised her I would forward to Dr. Knox and let her know his recommendations once he replies.  Patient verbalized understanding, agreed with plan and denied any further questions. Yane Cardenas RN on 3/25/2025 at 11:18 AM

## 2025-03-31 NOTE — TELEPHONE ENCOUNTER
Called patient & advised her Dr. Acosta wants to watch her BP after she starts the Sildenafil before adding any more therapies.  Patient stated she did start last week and her BP is better ~140's, but she' not been keeping good track of it.  We agreed she would check it daily, along with pulse & we would talk again next Monday. .Patient verbalized understanding, agreed with plan and denied any further questions. Yane Cardenas RN on 3/31/2025 at 11:54 AM

## 2025-03-31 NOTE — TELEPHONE ENCOUNTER
Lisette Knox MD  You5 days ago       I think we should watch her BP on Sildenafil. No need for any other antihypertensives.    Thank you    TT

## 2025-04-09 NOTE — TELEPHONE ENCOUNTER
LM I was following up on her BP.  Requested she call and leave me a message with her BP values so we know if we have to make any more adjustments. Yane Cardenas RN on 4/9/2025 at 2:34 PM

## 2025-04-09 NOTE — TELEPHONE ENCOUNTER
4/1 146/65, 84  4/2 164/72, 72  4/3 148/60, 88  4/4 154/72, 66  4/5 154/72, 80  4/6 152/68, 73  4/7 145/72, 80  4/8 152/74, 93  At clinic was 194/90, 68      Knee is causing her pain for a long time.  She does not want any surgery, but will see Ortho MD with hopes of some type of injection for better pain mgmt.    Advised patient I would forward BP values to Dr. nKox and I will let her know if he wants to make any further adjustments. Patient verbalized understanding, agreed with plan and denied any further questions. Yane Cardenas RN on 4/9/2025 at 3:57 PM

## 2025-04-10 RX ORDER — LOSARTAN POTASSIUM 25 MG/1
25 TABLET ORAL DAILY
Qty: 90 TABLET | Refills: 3 | Status: SHIPPED | OUTPATIENT
Start: 2025-04-10

## 2025-04-10 NOTE — TELEPHONE ENCOUNTER
Lisette Knox MD  You18 hours ago (4:23 PM)       Her BP's are on the higher side. If she is ok, I would start her on low dose losartan 25 mg daily.    Thank you    TT       Spoke with patient about recommendations.  She is agreeable so Erx sent to her pharmacy.  She will record BP/HR as she has been and we will talk in about 2 weeks for update.  She understands to call if she has any side effects. Yane Cardenas RN on 4/10/2025 at 10:43 AM

## 2025-05-01 ENCOUNTER — TELEPHONE (OUTPATIENT)
Dept: CARDIOLOGY | Facility: CLINIC | Age: 80
End: 2025-05-01
Payer: MEDICARE

## 2025-05-01 NOTE — TELEPHONE ENCOUNTER
Called patient and got most recent BP values since starting Losartan    4/23 149/62, 72  4/24 136/70, 81  4/24 126/55, 85  4/25 122/56, 86  4/26 147/76, 72  4/27 143/71, 72  4/28 132/61, 84  4/29 141/66, 93    Advised patient I would forward these to MD and call her back with any new recommendations.  Patient verbalized understanding, agreed with plan and denied any further questions. Yane Cardenas RN on 5/1/2025 at 1:00 PM

## 2025-05-01 NOTE — TELEPHONE ENCOUNTER
LM for patient with Dr. Knox's recommendations.  Yane Cardenas RN on 5/1/2025 at 3:04 PM     4 = No assist / stand by assistance

## 2025-05-01 NOTE — TELEPHONE ENCOUNTER
Lisette Knox MD  You4 minutes ago (2:57 PM)       The BP looks reasonable. She can just check her BP once a week. Continue Losartan 25 mg daily    Thank you    TT

## 2025-06-03 ENCOUNTER — VIRTUAL VISIT (OUTPATIENT)
Dept: CARDIOLOGY | Facility: CLINIC | Age: 80
End: 2025-06-03
Attending: INTERNAL MEDICINE
Payer: MEDICARE

## 2025-06-03 VITALS
WEIGHT: 147 LBS | BODY MASS INDEX: 30.86 KG/M2 | HEIGHT: 58 IN | HEART RATE: 80 BPM | DIASTOLIC BLOOD PRESSURE: 72 MMHG | SYSTOLIC BLOOD PRESSURE: 141 MMHG

## 2025-06-03 DIAGNOSIS — I27.20 PULMONARY HTN (H): ICD-10-CM

## 2025-06-03 DIAGNOSIS — I27.24 CTEPH (CHRONIC THROMBOEMBOLIC PULMONARY HYPERTENSION) (H): ICD-10-CM

## 2025-06-03 DIAGNOSIS — I87.2 VENOUS (PERIPHERAL) INSUFFICIENCY: Primary | ICD-10-CM

## 2025-06-03 DIAGNOSIS — R60.9 EDEMA, UNSPECIFIED TYPE: ICD-10-CM

## 2025-06-03 DIAGNOSIS — I15.9 SECONDARY HYPERTENSION: ICD-10-CM

## 2025-06-03 RX ORDER — LOSARTAN POTASSIUM 25 MG/1
50 TABLET ORAL DAILY
Qty: 180 TABLET | Refills: 3 | Status: SHIPPED | OUTPATIENT
Start: 2025-06-03

## 2025-06-03 ASSESSMENT — PAIN SCALES - GENERAL: PAINLEVEL_OUTOF10: NO PAIN (0)

## 2025-06-03 NOTE — PROGRESS NOTES
Virtual Visit Details    Type of service:  Telephone Visit           CARDIOLOGY PH CLINIC TELEPHONE VISIT      Date of telephone visit: 06/03/25      Debi Espinoza is a 79 year old female who is being evaluated via a billable virtual visit.        Self reported vitals:  Weight: 147#  /72    MEDICATIONS:  Current Outpatient Medications   Medication Sig Dispense Refill    apixaban ANTICOAGULANT (ELIQUIS) 5 MG tablet Take 1 tablet (5 mg) by mouth 2 times daily 60 tablet 3    escitalopram (LEXAPRO) 10 MG tablet Take 1 tablet by mouth At Bedtime      furosemide (LASIX) 20 MG tablet Take 1 tablet (20 mg) by mouth as needed (5 lb weight gain in 1 week, leg swelling and edema) 30 tablet 1    losartan (COZAAR) 25 MG tablet Take 2 tablets (50 mg) by mouth daily. 180 tablet 3    OPSUMIT 10 MG tablet TAKE 1 TABLET BY MOUTH ONCE DAILY 30 tablet 10    sildenafil (REVATIO) 20 MG tablet Take 1 tablet (20 mg) by mouth 3 times daily. 90 tablet 11    acetaminophen (TYLENOL) 325 MG tablet 3 tablets (975 mg) by Oral or Feeding Tube route every 8 hours as needed for mild pain or fever         Primary  cardiologist: Dr. Knox        HPI:  Ms. Debi Espinoza is a pleasant 79 year old female with a past medical history significant for hypertension, nephrolithiasis, DVT, and pulmonary embolism. 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 She still has residual pulmonary hypertension and was then initially placed no dual oral therapy with Opsumit and Adempas. However, she struggled with side effects of muscle cramping with Adempas, so plan was for inhaled Tyvaso.     She was seen most recently in March by Dr. Knox at which time she was struggling with the Tyvaso as well with muscle aches and coughing. Decision was made to stop this and resume sildenafil.     Today, I'm following up with Debi woodall. She tells me that she has been on the sildenafil for a few months  now and is tolerating much better without any significant side effects. Her breathing is overall unchanged from prior. She will get winded with things like stairs, otherwise doesn't bother her too much. Her BP sounds to still be running a bit on the higher side, 140s/80s for the most part. She also notes that toward the end of the day she gets some swelling in her legs, but they are improved in the morning when she gets up. No new chest pain. She was having some palpitations at times, but this resolved after changing to decaf coffee. No new dizziness/presyncope.     The patient did not have any new labs performed for our visit today, but most recent available labs were reviewed as below.          CURRENT PULMONARY HYPERTENSION REGIMEN:     PAH Rx: Opsumit 10mg daily, sildenafil 20mg TID  (Failed Adempas with muscle aches and dizziness, failed Tyvaso also with muscle aches and cough)     Diuretics: Lasix 20mg PRN     Oxygen: None     Anticoagulation: Eliquis   Indication: CTEPH        ASSESSMENT/PLAN:        1. Chronic thromboembolic pulmonary hypertension.              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 She still has residual disease. The remainder of her lesions are  which are higher risk, and she has not been interested in pursuing this. She was placed back on Opsumit, as well as Adempas, but she had significant muscle aches and dizziness, so we opted to discontinue. She also failed Tyvaso due to cough and muscle aches. She is now on dual oral therapy with Opsumit and sildenafil and sounds to be tolerating ok.                --As today is a virtual visit I cannot fully evaluate her volume status. She reports edema mostly toward the end of the day, and tells me she is also having some leg aches that have been bothersome. She denies burning in her feet so does not think it is neuropathy. Offered a venous US to look for venous insufficiency, which she is interested in--will have this  performed locally and contact her with results. For now, continue Lasix PRN (she uses ~1x week currently). Encouraged her to elevate her feet when she can. Most recent labs showed preserved renal function, and NT-proBNP is not significantly elevated.               --She required supplemental oxygen previously, but no longer using.               --Continue lifelong AC with Eliquis.     2. Hypertension.   --BP still above goal, says most home readings are 140s/80s. Will increase her losartan to 50mg daily and asked her to call the clinic in a few weeks for an update. If any new dizziness, etc, asked her to contact us sooner. Plan repeat BMP when she returns next.             Follow up plan: Return to see me virtually in ~2 months with local labs prior to follow up on BP, testing, etc. I asked the patient to call sooner with any new concerns.       Testing/labs:    Most recent labs:    Latest Reference Range & Units 03/17/25 10:17   Sodium 135 - 145 mmol/L 140   Potassium 3.4 - 5.3 mmol/L 4.1   Chloride 98 - 107 mmol/L 105   Carbon Dioxide (CO2) 22 - 29 mmol/L 23   Urea Nitrogen 8.0 - 23.0 mg/dL 18.2   Creatinine 0.51 - 0.95 mg/dL 0.98 (H)   GFR Estimate >60 mL/min/1.73m2 58 (L)   Calcium 8.8 - 10.4 mg/dL 9.7   Anion Gap 7 - 15 mmol/L 12   Albumin 3.5 - 5.2 g/dL 4.0   Protein Total 6.4 - 8.3 g/dL 6.6   Alkaline Phosphatase 40 - 150 U/L 77   ALT 0 - 50 U/L 13   AST 0 - 45 U/L 24   Bilirubin Total <=1.2 mg/dL 0.7   Glucose 70 - 99 mg/dL 102 (H)   N-Terminal Pro Bnp 0 - 1,800 pg/mL 453   WBC 4.0 - 11.0 10e3/uL 5.3   Hemoglobin 11.7 - 15.7 g/dL 14.5   Hematocrit 35.0 - 47.0 % 44.0   Platelet Count 150 - 450 10e3/uL 224   RBC Count 3.80 - 5.20 10e6/uL 4.76   MCV 78 - 100 fL 92   MCH 26.5 - 33.0 pg 30.5   MCHC 31.5 - 36.5 g/dL 33.0   RDW 10.0 - 15.0 % 14.6   (H): Data is abnormally high  (L): Data is abnormally low      Other most recent pertinent testing:    Echo 3/17/25  Interpretation Summary  Left ventricular function is  normal.The ejection fraction is 60-65%. No  regional wall motion abnormalities are seen.  Global right ventricular function is normal. The right ventricle is normal  size. TAPSE 1.9 cm, S' 10.9 cm/s, RVFAC 40%  Moderate to severe tricuspid insufficiency is present.  Pulmonary artery systolic pressure is 81 mmHg..  The inferior vena cava was normal in size with preserved respiratory  variability.  No pericardial effusion is present.     This study was compared with the study from 7/25/23: RV size and function have  improved.    RHC/pulmonary angiogram 10/3/22    Hemodynamics    RA: 8/10/8 mmHg  RV: 68/8 mmHg  PA: 67/28/40 mmHg  PCWP: --/--13 mmHg  Boris CO/CI: 3.66/2.39  Thermo: CO/CI: 4.2/2.74  PVR: 6.4 (TD)   Right sided filling pressures are mildly elevated. Left sided filling pressures are normal. Moderately elevated pulmonary artery hypertension. Normal cardiac output level. Hemodynamic data has been modified in Epic per physician review.     Pulmonary Angiogram    Main PA, LPA and RPA, along with bilateral truncus anterior, interlobar arteries are all patent.    Right  A1-3 are patent.  A4-5 are occluded with perfusion defects in the middle lobe.  A6 appears patent.  A7-9 have Type A and B lesions.  A10 appears patent.    Left:  A1 is occluded.  A2 and 3 are patent.  A4-6 are patent.  A7 - 9 are occluded.       NYHA Functional Class:  2b      I have reviewed the note as documented above.  This accurately captures the substance of my conversation with the patient.      Phone call contact time  Call Started at 1432  Call Ended at 1447    I spent a total of 30 minutes on the date of encounter of which 15 minutes was spent in direct telephone medical discussion.     The longitudinal plan of care for the diagnosis(es)/condition(s) as documented were addressed during this visit. Due to the added complexity in care, I will continue to support Debi in the subsequent management and with ongoing continuity of care.        Reason for audio visit only:  The patient did not have access to video, while the distant provider did.       Cherelle Benson PA-C  [facility] Cardiology~Pulmonary Hypertension Clinic

## 2025-06-03 NOTE — LETTER
6/3/2025      RE: Debi Espinoza  [address] [facility] [address]       Dear Colleague,    Thank you for the opportunity to participate in the care of your patient, Debi Espinoza, at the [facility] [place] at [facility]. Please see a copy of my visit note below.    Virtual Visit Details    Type of service:  Telephone Visit           CARDIOLOGY PH CLINIC TELEPHONE VISIT      Date of telephone visit: 06/03/25      Debi Espinoza is a 79 year old female who is being evaluated via a billable virtual visit.        Self reported vitals:  Weight: 147#  /72    MEDICATIONS:  Current Outpatient Medications   Medication Sig Dispense Refill     apixaban ANTICOAGULANT (ELIQUIS) 5 MG tablet Take 1 tablet (5 mg) by mouth 2 times daily 60 tablet 3     escitalopram (LEXAPRO) 10 MG tablet Take 1 tablet by mouth At Bedtime       furosemide (LASIX) 20 MG tablet Take 1 tablet (20 mg) by mouth as needed (5 lb weight gain in 1 week, leg swelling and edema) 30 tablet 1     losartan (COZAAR) 25 MG tablet Take 2 tablets (50 mg) by mouth daily. 180 tablet 3     OPSUMIT 10 MG tablet TAKE 1 TABLET BY MOUTH ONCE DAILY 30 tablet 10     sildenafil (REVATIO) 20 MG tablet Take 1 tablet (20 mg) by mouth 3 times daily. 90 tablet 11     acetaminophen (TYLENOL) 325 MG tablet 3 tablets (975 mg) by Oral or Feeding Tube route every 8 hours as needed for mild pain or fever         Primary  cardiologist: Dr. Knox        HPI:  Ms. Debi Espinoza is a pleasant 79 year old female with a past medical history significant for hypertension, nephrolithiasis, DVT, and pulmonary embolism. 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 She still has residual pulmonary hypertension and was then initially placed no dual oral therapy with Opsumit and Adempas. However, she struggled with side effects of muscle cramping with  Adempas, so plan was for inhaled Tyvaso.     She was seen most recently in March by Dr. Knox at which time she was struggling with the Tyvaso as well with muscle aches and coughing. Decision was made to stop this and resume sildenafil.     Today, I'm following up with Debi woodall. She tells me that she has been on the sildenafil for a few months now and is tolerating much better without any significant side effects. Her breathing is overall unchanged from prior. She will get winded with things like stairs, otherwise doesn't bother her too much. Her BP sounds to still be running a bit on the higher side, 140s/80s for the most part. She also notes that toward the end of the day she gets some swelling in her legs, but they are improved in the morning when she gets up. No new chest pain. She was having some palpitations at times, but this resolved after changing to decaf coffee. No new dizziness/presyncope.     The patient did not have any new labs performed for our visit today, but most recent available labs were reviewed as below.          CURRENT PULMONARY HYPERTENSION REGIMEN:     PAH Rx: Opsumit 10mg daily, sildenafil 20mg TID  (Failed Adempas with muscle aches and dizziness, failed Tyvaso also with muscle aches and cough)     Diuretics: Lasix 20mg PRN     Oxygen: None     Anticoagulation: Eliquis   Indication: CTEPH        ASSESSMENT/PLAN:        1. Chronic thromboembolic pulmonary hypertension.              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 She still has residual disease. The remainder of her lesions are  which are higher risk, and she has not been interested in pursuing this. She was placed back on Opsumit, as well as Adempas, but she had significant muscle aches and dizziness, so we opted to discontinue. She also failed Tyvaso due to cough and muscle aches. She is now on dual oral therapy with Opsumit and sildenafil and sounds to be tolerating ok.                --As today  is a virtual visit I cannot fully evaluate her volume status. She reports edema mostly toward the end of the day, and tells me she is also having some leg aches that have been bothersome. She denies burning in her feet so does not think it is neuropathy. Offered a venous US to look for venous insufficiency, which she is interested in--will have this performed locally and contact her with results. For now, continue Lasix PRN (she uses ~1x week currently). Encouraged her to elevate her feet when she can. Most recent labs showed preserved renal function, and NT-proBNP is not significantly elevated.               --She required supplemental oxygen previously, but no longer using.               --Continue lifelong AC with Eliquis.     2. Hypertension.   --BP still above goal, says most home readings are 140s/80s. Will increase her losartan to 50mg daily and asked her to call the clinic in a few weeks for an update. If any new dizziness, etc, asked her to contact us sooner. Plan repeat BMP when she returns next.             Follow up plan: Return to see me virtually in ~2 months with local labs prior to follow up on BP, testing, etc. I asked the patient to call sooner with any new concerns.       Testing/labs:    Most recent labs:    Latest Reference Range & Units 03/17/25 10:17   Sodium 135 - 145 mmol/L 140   Potassium 3.4 - 5.3 mmol/L 4.1   Chloride 98 - 107 mmol/L 105   Carbon Dioxide (CO2) 22 - 29 mmol/L 23   Urea Nitrogen 8.0 - 23.0 mg/dL 18.2   Creatinine 0.51 - 0.95 mg/dL 0.98 (H)   GFR Estimate >60 mL/min/1.73m2 58 (L)   Calcium 8.8 - 10.4 mg/dL 9.7   Anion Gap 7 - 15 mmol/L 12   Albumin 3.5 - 5.2 g/dL 4.0   Protein Total 6.4 - 8.3 g/dL 6.6   Alkaline Phosphatase 40 - 150 U/L 77   ALT 0 - 50 U/L 13   AST 0 - 45 U/L 24   Bilirubin Total <=1.2 mg/dL 0.7   Glucose 70 - 99 mg/dL 102 (H)   N-Terminal Pro Bnp 0 - 1,800 pg/mL 453   WBC 4.0 - 11.0 10e3/uL 5.3   Hemoglobin 11.7 - 15.7 g/dL 14.5   Hematocrit 35.0 - 47.0 %  44.0   Platelet Count 150 - 450 10e3/uL 224   RBC Count 3.80 - 5.20 10e6/uL 4.76   MCV 78 - 100 fL 92   MCH 26.5 - 33.0 pg 30.5   MCHC 31.5 - 36.5 g/dL 33.0   RDW 10.0 - 15.0 % 14.6   (H): Data is abnormally high  (L): Data is abnormally low      Other most recent pertinent testing:    Echo 3/17/25  Interpretation Summary  Left ventricular function is normal.The ejection fraction is 60-65%. No  regional wall motion abnormalities are seen.  Global right ventricular function is normal. The right ventricle is normal  size. TAPSE 1.9 cm, S' 10.9 cm/s, RVFAC 40%  Moderate to severe tricuspid insufficiency is present.  Pulmonary artery systolic pressure is 81 mmHg..  The inferior vena cava was normal in size with preserved respiratory  variability.  No pericardial effusion is present.     This study was compared with the study from 7/25/23: RV size and function have  improved.    RHC/pulmonary angiogram 10/3/22    Hemodynamics    RA: 8/10/8 mmHg  RV: 68/8 mmHg  PA: 67/28/40 mmHg  PCWP: --/--13 mmHg  Boris CO/CI: 3.66/2.39  Thermo: CO/CI: 4.2/2.74  PVR: 6.4 (TD)   Right sided filling pressures are mildly elevated. Left sided filling pressures are normal. Moderately elevated pulmonary artery hypertension. Normal cardiac output level. Hemodynamic data has been modified in Epic per physician review.     Pulmonary Angiogram    Main PA, LPA and RPA, along with bilateral truncus anterior, interlobar arteries are all patent.    Right  A1-3 are patent.  A4-5 are occluded with perfusion defects in the middle lobe.  A6 appears patent.  A7-9 have Type A and B lesions.  A10 appears patent.    Left:  A1 is occluded.  A2 and 3 are patent.  A4-6 are patent.  A7 - 9 are occluded.       NYHA Functional Class:  2b      I have reviewed the note as documented above.  This accurately captures the substance of my conversation with the patient.      Phone call contact time  Call Started at 1432  Call Ended at 1447    I spent a total of 30 minutes  on the date of encounter of which 15 minutes was spent in direct telephone medical discussion.     The longitudinal plan of care for the diagnosis(es)/condition(s) as documented were addressed during this visit. Due to the added complexity in care, I will continue to support Debi in the subsequent management and with ongoing continuity of care.       Reason for audio visit only:  The patient did not have access to video, while the distant provider did.       Cherelle Benson PA-C  [facility] Cardiology~Pulmonary Hypertension Clinic            Please do not hesitate to contact me if you have any questions/concerns.     Sincerely,     WILLIAMS Morin

## 2025-06-03 NOTE — NURSING NOTE
Current patient location: [address] [place] [id number]    Is the patient currently in the state of [place]? YES    Visit mode: TELEPHONE    If the visit is dropped, the patient can be reconnected by:TELEPHONE VISIT: Phone number:   Telephone Information:   Mobile [phone]       Will anyone else be joining the visit? NO  (If patient encounters technical issues they should call [phone]    Are changes needed to the allergy or medication list? No    Are refills needed on medications prescribed by this physician? NO    Rooming Documentation:  Questionnaire(s) completed    Reason for visit: RECHECK    Hui VARGASF

## 2025-06-03 NOTE — PATIENT INSTRUCTIONS
You were seen today in the Pulmonary Hypertension Clinic at the [facility].     Cardiology Provider you saw during your visit:    WILLIAMS Morin    Medication Changes:  - Increase Losartan to 50 mg daily    Recommendations:   - Keep a log of your daily blood pressures and call Yane in 2 weeks with an update  - Ultrasound of your legs to rule out venous insufficiency     Follow-up:   - 2 month telephone visit follow up with Cherelle to review results     Please call us immediately if you have any syncope (fainting or passing out), chest pain, edema (swelling or weight gain), or decline in your functional status (general decline in how you are feeling).    If you have emergent concerns after hours or on the weekend, please call our on-call Cardiologist at [phone], option [phone]. For emergencies call [phone].     Thank you for allowing us to be a part of your care here at the [facility]    If you have questions or concerns please contact us at:    Yane Cardenas RN (P: [phone])    Nurse Coordinator       Pulmonary Hypertension     [facility]         CHRIS Purvis   (Prior Auths & Pt Assistance)   ()  Clinic   Clinic   Pulmonary Hypertension   Pulmonary Hypertension  [facility]  [facility]  (P)[phone]    (P) [phone]  (F) [phone]

## 2025-06-03 NOTE — NURSING NOTE
"Follow Up Plan:  - Increase Losartan to 50 mg daily  - Keep a log of your daily blood pressures and call Yane in 2 weeks with an update  - Ultrasound of your legs to rule out venous insufficiency   - 2 month telephone visit follow up with Cherelle to review results     Orders placed and patient marked \"ready for checkout.\" Radha Leal RN on 6/3/2025 at 2:49 PM   "

## 2025-06-04 ENCOUNTER — TELEPHONE (OUTPATIENT)
Dept: CARDIOLOGY | Facility: CLINIC | Age: 80
End: 2025-06-04
Payer: MEDICARE

## 2025-06-04 NOTE — TELEPHONE ENCOUNTER
Patient Contacted for the patient to call back and schedule the following:    Appointment type: Return Pulm Hyper  Provider: Marcelino  Return date: 08/11  Specialty phone number: [phone] opt 1  Additional appointment(s) needed: Labs  Additonal Notes: NA

## 2025-06-17 ENCOUNTER — TELEPHONE (OUTPATIENT)
Dept: CARDIOLOGY | Facility: CLINIC | Age: 80
End: 2025-06-17
Payer: MEDICARE

## 2025-06-17 NOTE — TELEPHONE ENCOUNTER
----- Message from Yane PERALES sent at 6/3/2025  3:35 PM CDT -----  Regarding: BP fu  Patient to be monitoring BP's over last 2 weeks after med change.  Did she send values?  ----- Message -----  From: Radha Leal RN  Sent: 6/3/2025   2:50 PM CDT  To: Yane Cardenas RN  Subject: Follow Up Plan                                   Medication Changes:  - Increase Losartan to 50 mg daily    Recommendations:   - Keep a log of your daily blood pressures and call Yane in 2 weeks with an update  - Ultrasound of your legs to rule out venous insufficiency     Follow-up:   - 2 month telephone visit follow up with Cherelle to review results     Can you please check in with her in 2 weeks to see if her BPs have improved? Thank you!    Jourdan

## 2025-06-17 NOTE — TELEPHONE ENCOUNTER
"Called patient to get BP readings Cherelle had asked her to keep track of, and provide her with phone number to US scheduling.    Patient has been checking it \"here and there\", usually while sitting for a while.    6/17  137/60, 80  6/16  123/55,     Doesn't have any more readings, but \"it's been jumping back and forth\" per pt.    Patient clarified she wants to have US of her legs performed at [facility] in [place], so I agreed to fax number to clinic.  Patient will call them to schedule if she doesn't hear from them in a couple days.  Yane Cardenas RN on 6/17/2025 at 12:21 PM    Faxed US order to [facility]. Yane Cardenas RN on 6/17/2025 at 12:21 PM    "

## 2025-06-18 NOTE — TELEPHONE ENCOUNTER
Confirmed with patient that we will not make any changes until telephone visit follow up in August. No other questions or concerns. Patient stated that this morning's reading was 122/80. States it has been consistently in the 120 SBP and she is doing well. Radha Lela RN on 6/18/2025 at 2:45 PM

## 2025-08-11 ENCOUNTER — VIRTUAL VISIT (OUTPATIENT)
Dept: CARDIOLOGY | Facility: CLINIC | Age: 80
End: 2025-08-11
Attending: PHYSICIAN ASSISTANT
Payer: MEDICARE

## 2025-08-11 ENCOUNTER — TELEPHONE (OUTPATIENT)
Dept: VASCULAR SURGERY | Facility: CLINIC | Age: 80
End: 2025-08-11

## 2025-08-11 VITALS
BODY MASS INDEX: 27.82 KG/M2 | HEIGHT: 59 IN | DIASTOLIC BLOOD PRESSURE: 70 MMHG | WEIGHT: 138 LBS | SYSTOLIC BLOOD PRESSURE: 135 MMHG | HEART RATE: 68 BPM

## 2025-08-11 DIAGNOSIS — R06.02 SOB (SHORTNESS OF BREATH): ICD-10-CM

## 2025-08-11 DIAGNOSIS — I27.20 PULMONARY HTN (H): ICD-10-CM

## 2025-08-11 DIAGNOSIS — I87.2 VENOUS (PERIPHERAL) INSUFFICIENCY: Primary | ICD-10-CM

## 2025-08-11 DIAGNOSIS — I15.9 SECONDARY HYPERTENSION: ICD-10-CM

## 2025-08-11 DIAGNOSIS — M79.606: ICD-10-CM

## 2025-08-11 DIAGNOSIS — I27.24 CTEPH (CHRONIC THROMBOEMBOLIC PULMONARY HYPERTENSION) (H): ICD-10-CM

## 2025-08-11 PROCEDURE — G2211 COMPLEX E/M VISIT ADD ON: HCPCS | Mod: 93 | Performed by: PHYSICIAN ASSISTANT

## 2025-08-11 PROCEDURE — 98014 SYNCH AUDIO-ONLY EST MOD 30: CPT | Performed by: PHYSICIAN ASSISTANT

## 2025-08-11 PROCEDURE — 1126F AMNT PAIN NOTED NONE PRSNT: CPT | Mod: 93 | Performed by: PHYSICIAN ASSISTANT

## 2025-08-11 PROCEDURE — 3075F SYST BP GE 130 - 139MM HG: CPT | Mod: 93 | Performed by: PHYSICIAN ASSISTANT

## 2025-08-11 PROCEDURE — 3078F DIAST BP <80 MM HG: CPT | Mod: 93 | Performed by: PHYSICIAN ASSISTANT

## 2025-08-11 ASSESSMENT — PAIN SCALES - GENERAL: PAINLEVEL_OUTOF10: NO PAIN (0)

## 2025-08-12 ENCOUNTER — TELEPHONE (OUTPATIENT)
Dept: OTHER | Facility: CLINIC | Age: 80
End: 2025-08-12
Payer: MEDICARE

## 2025-08-14 ENCOUNTER — TELEPHONE (OUTPATIENT)
Dept: CARDIOLOGY | Facility: CLINIC | Age: 80
End: 2025-08-14
Payer: MEDICARE

## 2025-08-14 DIAGNOSIS — M79.606 PAIN OF LOWER EXTREMITY, UNSPECIFIED LATERALITY: ICD-10-CM

## 2025-08-14 DIAGNOSIS — M79.89 LEG SWELLING: Primary | ICD-10-CM

## 2025-08-25 ENCOUNTER — TELEPHONE (OUTPATIENT)
Dept: CARDIOLOGY | Facility: CLINIC | Age: 80
End: 2025-08-25
Payer: MEDICARE

## (undated) DEVICE — CATH ANGIO INFINITI PIGTAIL 6FRX110CM 6 SH 534650S

## (undated) DEVICE — COVER NEOPROBE SOFTFLEX 5X96" W/BANDS 20-PC596

## (undated) DEVICE — SU STEEL MYO/WIRE II STERNOTOMY 8 BE-1 3X14" 048-217

## (undated) DEVICE — Device

## (undated) DEVICE — SOL NACL 0.9% 10ML VIAL 0409-4888-02

## (undated) DEVICE — KIT HAND CONTROL ACIST 016795

## (undated) DEVICE — DRSG ABDOMINAL 07 1/2X8" 7197D

## (undated) DEVICE — WIRE GUIDE 0.035"X150CM EMERALD J TIP 502521

## (undated) DEVICE — TUBING SUCTION DRAINAGE PLEURAL DUAL 8884714200

## (undated) DEVICE — INTRO SHEATH 7FRX10CM PINNACLE RSS702

## (undated) DEVICE — SU ETHIBOND 2-0 SHDA 30" X563H

## (undated) DEVICE — SURGICEL HEMOSTAT 4X8" 1952

## (undated) DEVICE — CATH ANGIO INFINITI PIGTAIL 145 6 SH 6FRX110CM  534-652S

## (undated) DEVICE — SUCTION DRY CHEST DRAIN OASIS 3600-100

## (undated) DEVICE — PREP SCRUB SOL EXIDINE 4% CHG 4OZ 29002-404

## (undated) DEVICE — DEFIB PRO-PADZ LVP LQD GEL ADULT 8900-2105-01

## (undated) DEVICE — BLADE KNIFE SURG 11 371111

## (undated) DEVICE — SU SILK 0 TIE 6X30" A306H

## (undated) DEVICE — INTRO GLIDESHEATH SLENDER 6FR 10X45CM 60-1060

## (undated) DEVICE — SU STEEL 6 CCS 4X18" M654G

## (undated) DEVICE — SOL NACL 0.9% IRRIG 1000ML BOTTLE 2F7124

## (undated) DEVICE — FASTENER CATH BALLOON CLAMPX2 STATLOCK 0684-00-493

## (undated) DEVICE — TAPE MEDIPORE 4"X2YD 2864

## (undated) DEVICE — DRSG TELFA 3X8" 1238

## (undated) DEVICE — RX VISTASEAL FIBRIN SEALANT W/THROMBIN 10ML VST10

## (undated) DEVICE — DRAPE SLUSH/WARMER 66X44" ORS-320

## (undated) DEVICE — SU UMBILICAL TAPE .125X30" U11T

## (undated) DEVICE — SU PROLENE 4-0 SHDA 36" 8521H

## (undated) DEVICE — DRAIN CHEST TUBE 32FR STR 8032

## (undated) DEVICE — SOL NACL 0.9% IRRIG 3000ML BAG 2B7477

## (undated) DEVICE — 30IN (76CM) EXCITE FLEXIBLE, CLEAR NYLON/POLYURETHANE CO-EXTRUDED CONTRAST INJECTION TUBING, FIXED MALE CONNECTOR

## (undated) DEVICE — DRAIN CHEST TUBE RIGHT ANGLED 28FR 8128

## (undated) DEVICE — TOURNIQUET VASCULAR KIT ARGYLE 8888-585000

## (undated) DEVICE — SU PROLENE 6-0 C-1DA 30" 8706H

## (undated) DEVICE — KIT HAND CONTROL ACIST 014644 AR-P54

## (undated) DEVICE — PROTECTOR ARM ONE-STEP TRENDELENBURG 40418

## (undated) DEVICE — TOURNIQUET VASCULAR KIT 7 1/2" 79012

## (undated) DEVICE — PREP CHLORAPREP 26ML TINTED HI-LITE ORANGE 930815

## (undated) DEVICE — MANIFOLD KIT ANGIO AUTOMATED 014613

## (undated) DEVICE — SU PROLENE 5-0 RB-2DA 30" 8710H

## (undated) DEVICE — SUCTION MANIFOLD NEPTUNE 2 SYS 4 PORT 0702-020-000

## (undated) DEVICE — SU MONOCRYL 4-0 PS-2 27" UND Y426H

## (undated) DEVICE — SU PROLENE 4-0 RB-1DA 36" 8557H

## (undated) DEVICE — SU ETHIBOND 0 CT-1 CR 8X18" CX21D

## (undated) DEVICE — COMPENSATOR PRESSURE MONITORING MANIFOLDS, THREE-VALVE HANDLES OFF, RIGHT PORTS, ROTATING ADAPTER, MEDIUM PRESSURE

## (undated) DEVICE — ESU HOLSTER PLASTIC DISP E2400

## (undated) DEVICE — LEAD PACER MYOCARDIAL BIPOLAR TEMPORARY 53CM 6495F

## (undated) DEVICE — RX SURGIFLO HEMOSTATIC MATRIX W/THROMBIN 8ML 2994

## (undated) DEVICE — GLOVE PROTEXIS POWDER FREE 7.5 ORTHOPEDIC 2D73ET75

## (undated) DEVICE — ADH SKIN CLOSURE PREMIERPRO EXOFIN 1.0ML 3470

## (undated) DEVICE — ESU PENCIL SMOKE EVAC W/ROCKER SWITCH 0703-047-000

## (undated) DEVICE — NDL SPINAL 22GA 3.5" QUINCKE 405181

## (undated) DEVICE — ESU ELEC BLADE 6" COATED/INSULATED E1455-6

## (undated) DEVICE — TUBING PRESSURE 30"

## (undated) DEVICE — SU PLEDGET SOFT TFE 3/8"X3/26"X1/16" PCP40

## (undated) DEVICE — DRAIN PENROSE 1X18" LATEX FREE GR207

## (undated) DEVICE — TUBING INSUFFLATION PNEUMOCLEAR 0620050100

## (undated) DEVICE — SU STRATAFIX MONOCRYL 3-0 SPIRAL PS-2 45CM SXMP1B107

## (undated) DEVICE — BLANKET BAIR ADLT PLYMR 60X36IN 63000

## (undated) DEVICE — PACK ADULT HEART UMMC PV15CG92D

## (undated) DEVICE — SU STRATAFIX PDS PLUS 0 CT 45CM SXPP1A406

## (undated) DEVICE — SU ETHIBOND 3-0 BBDA 36" X588H

## (undated) DEVICE — BLADE CLIPPER SGL USE 9680

## (undated) DEVICE — TUBE BLOOD VACUETTE RED TOP 4ML 454204

## (undated) DEVICE — ANTIFOG SOLUTION W/FOAM PAD 31142527

## (undated) DEVICE — COVER ULTRASOUND PROBE W/GEL FLEXI-FEEL 6"X58" LF  25-FF658

## (undated) DEVICE — HEMOSTASIS BONE OSTENE 2.5G SYNTHETIC 1503832

## (undated) DEVICE — SU STRATAFIX MONOCRYL 2-0 SPIRAL CT-1 30CM SXPP1B412

## (undated) DEVICE — DRAPE IOBAN INCISE 23X17" 6650EZ

## (undated) DEVICE — LINEN TOWEL PACK X6 WHITE 5487

## (undated) DEVICE — LEAD ELEC MYOCARDIO PACING TEMPORARY MEDTRONIC

## (undated) DEVICE — DRSG DRAIN 4X4" 7086

## (undated) DEVICE — KIT ARTERIAL LINE 2GA 12CM INTRO NDL ASK-04510-HF

## (undated) DEVICE — INTRO SHEATH MICRO PLATINUM TIP 4FRX40CM 7274

## (undated) DEVICE — ESU ELEC BLADE E-SEP INSULATED NEPTUNE 70MM 0703-070-002

## (undated) DEVICE — SPONGE RAY-TEC 4X8" 7318

## (undated) DEVICE — PACK HEART LEFT CUSTOM

## (undated) DEVICE — TIES BANDING T50R

## (undated) DEVICE — BLADE SAW STERNAL 20X30MM KM-32

## (undated) DEVICE — PACK HEART RIGHT CUSTOM SAN32RHF18

## (undated) DEVICE — WIPES FOLEY CARE SURESTEP PROVON DFC100

## (undated) DEVICE — LINEN TOWEL PACK X30 5481

## (undated) DEVICE — SUCTION CATH AIRLIFE TRI-FLO W/CONTROL PORT 14FR  T60C

## (undated) DEVICE — SLEEVE TR BAND RADIAL COMPRESSION DEVICE 24CM TRB24-REG

## (undated) RX ORDER — LIDOCAINE HYDROCHLORIDE 10 MG/ML
INJECTION, SOLUTION EPIDURAL; INFILTRATION; INTRACAUDAL; PERINEURAL
Status: DISPENSED
Start: 2022-06-14

## (undated) RX ORDER — HEPARIN SODIUM 1000 [USP'U]/ML
INJECTION, SOLUTION INTRAVENOUS; SUBCUTANEOUS
Status: DISPENSED
Start: 2022-05-27

## (undated) RX ORDER — CHLORHEXIDINE GLUCONATE ORAL RINSE 1.2 MG/ML
SOLUTION DENTAL
Status: DISPENSED
Start: 2022-05-27

## (undated) RX ORDER — FENTANYL CITRATE-0.9 % NACL/PF 10 MCG/ML
PLASTIC BAG, INJECTION (ML) INTRAVENOUS
Status: DISPENSED
Start: 2022-05-27

## (undated) RX ORDER — HEPARIN SODIUM 1000 [USP'U]/ML
INJECTION, SOLUTION INTRAVENOUS; SUBCUTANEOUS
Status: DISPENSED
Start: 2022-05-26

## (undated) RX ORDER — FENTANYL CITRATE 50 UG/ML
INJECTION, SOLUTION INTRAMUSCULAR; INTRAVENOUS
Status: DISPENSED
Start: 2022-05-27

## (undated) RX ORDER — ALBUMIN, HUMAN INJ 5% 5 %
SOLUTION INTRAVENOUS
Status: DISPENSED
Start: 2022-05-27

## (undated) RX ORDER — NICARDIPINE HCL-0.9% SOD CHLOR 1 MG/10 ML
SYRINGE (ML) INTRAVENOUS
Status: DISPENSED
Start: 2022-05-26

## (undated) RX ORDER — ACETAMINOPHEN 325 MG/1
TABLET ORAL
Status: DISPENSED
Start: 2022-05-27

## (undated) RX ORDER — VANCOMYCIN HYDROCHLORIDE 1 G/20ML
INJECTION, POWDER, LYOPHILIZED, FOR SOLUTION INTRAVENOUS
Status: DISPENSED
Start: 2022-05-27

## (undated) RX ORDER — FENTANYL CITRATE 50 UG/ML
INJECTION, SOLUTION INTRAMUSCULAR; INTRAVENOUS
Status: DISPENSED
Start: 2022-05-26

## (undated) RX ORDER — LIDOCAINE 40 MG/G
CREAM TOPICAL
Status: DISPENSED
Start: 2022-10-03

## (undated) RX ORDER — CEFAZOLIN SODIUM 1 G/3ML
INJECTION, POWDER, FOR SOLUTION INTRAMUSCULAR; INTRAVENOUS
Status: DISPENSED
Start: 2022-05-27

## (undated) RX ORDER — LIDOCAINE 40 MG/G
CREAM TOPICAL
Status: DISPENSED
Start: 2021-12-08

## (undated) RX ORDER — LIDOCAINE HYDROCHLORIDE 10 MG/ML
INJECTION, SOLUTION EPIDURAL; INFILTRATION; INTRACAUDAL; PERINEURAL
Status: DISPENSED
Start: 2021-12-08

## (undated) RX ORDER — SODIUM CHLORIDE 9 MG/ML
INJECTION, SOLUTION INTRAVENOUS
Status: DISPENSED
Start: 2021-12-08

## (undated) RX ORDER — CEFAZOLIN SODIUM/WATER 2 G/20 ML
SYRINGE (ML) INTRAVENOUS
Status: DISPENSED
Start: 2022-05-27

## (undated) RX ORDER — LIDOCAINE HYDROCHLORIDE 20 MG/ML
INJECTION, SOLUTION EPIDURAL; INFILTRATION; INTRACAUDAL; PERINEURAL
Status: DISPENSED
Start: 2022-05-27

## (undated) RX ORDER — LIDOCAINE HYDROCHLORIDE 10 MG/ML
INJECTION, SOLUTION EPIDURAL; INFILTRATION; INTRACAUDAL; PERINEURAL
Status: DISPENSED
Start: 2022-06-10

## (undated) RX ORDER — NITROGLYCERIN 5 MG/ML
VIAL (ML) INTRAVENOUS
Status: DISPENSED
Start: 2022-05-26

## (undated) RX ORDER — FAMOTIDINE 20 MG/1
TABLET, FILM COATED ORAL
Status: DISPENSED
Start: 2022-05-27